# Patient Record
Sex: FEMALE | Race: WHITE | NOT HISPANIC OR LATINO | Employment: FULL TIME | URBAN - METROPOLITAN AREA
[De-identification: names, ages, dates, MRNs, and addresses within clinical notes are randomized per-mention and may not be internally consistent; named-entity substitution may affect disease eponyms.]

---

## 2017-02-09 ENCOUNTER — TELEPHONE (OUTPATIENT)
Dept: FAMILY MEDICINE | Facility: CLINIC | Age: 47
End: 2017-02-09

## 2017-04-05 ENCOUNTER — MYC MEDICAL ADVICE (OUTPATIENT)
Dept: FAMILY MEDICINE | Facility: CLINIC | Age: 47
End: 2017-04-05

## 2017-09-21 ENCOUNTER — OFFICE VISIT (OUTPATIENT)
Dept: FAMILY MEDICINE | Facility: CLINIC | Age: 47
End: 2017-09-21
Payer: COMMERCIAL

## 2017-09-21 ENCOUNTER — HOSPITAL ENCOUNTER (OUTPATIENT)
Dept: MAMMOGRAPHY | Facility: CLINIC | Age: 47
Discharge: HOME | End: 2017-09-21
Attending: FAMILY MEDICINE | Admitting: FAMILY MEDICINE
Payer: COMMERCIAL

## 2017-09-21 VITALS
HEIGHT: 65 IN | BODY MASS INDEX: 24.32 KG/M2 | WEIGHT: 146 LBS | HEART RATE: 100 BPM | SYSTOLIC BLOOD PRESSURE: 116 MMHG | DIASTOLIC BLOOD PRESSURE: 80 MMHG

## 2017-09-21 DIAGNOSIS — Z00.00 ROUTINE GENERAL MEDICAL EXAMINATION AT A HEALTH CARE FACILITY: Primary | ICD-10-CM

## 2017-09-21 DIAGNOSIS — I10 ESSENTIAL HYPERTENSION: ICD-10-CM

## 2017-09-21 DIAGNOSIS — Z12.31 VISIT FOR SCREENING MAMMOGRAM: ICD-10-CM

## 2017-09-21 DIAGNOSIS — Z30.41 ENCOUNTER FOR SURVEILLANCE OF CONTRACEPTIVE PILLS: ICD-10-CM

## 2017-09-21 LAB
ANION GAP SERPL CALCULATED.3IONS-SCNC: 7 MMOL/L (ref 3–14)
BUN SERPL-MCNC: 17 MG/DL (ref 7–30)
CALCIUM SERPL-MCNC: 8.9 MG/DL (ref 8.5–10.1)
CHLORIDE SERPL-SCNC: 102 MMOL/L (ref 94–109)
CO2 SERPL-SCNC: 25 MMOL/L (ref 20–32)
CREAT SERPL-MCNC: 0.84 MG/DL (ref 0.52–1.04)
GFR SERPL CREATININE-BSD FRML MDRD: 72 ML/MIN/1.7M2
GLUCOSE SERPL-MCNC: 85 MG/DL (ref 70–99)
POTASSIUM SERPL-SCNC: 3.8 MMOL/L (ref 3.4–5.3)
SODIUM SERPL-SCNC: 134 MMOL/L (ref 133–144)

## 2017-09-21 PROCEDURE — G0202 SCR MAMMO BI INCL CAD: HCPCS

## 2017-09-21 PROCEDURE — 36415 COLL VENOUS BLD VENIPUNCTURE: CPT | Performed by: FAMILY MEDICINE

## 2017-09-21 PROCEDURE — 80048 BASIC METABOLIC PNL TOTAL CA: CPT | Performed by: FAMILY MEDICINE

## 2017-09-21 PROCEDURE — 99396 PREV VISIT EST AGE 40-64: CPT | Performed by: FAMILY MEDICINE

## 2017-09-21 RX ORDER — LISINOPRIL 20 MG/1
20 TABLET ORAL DAILY
Qty: 90 TABLET | Refills: 3 | Status: SHIPPED | OUTPATIENT
Start: 2017-09-21 | End: 2018-07-30

## 2017-09-21 NOTE — PROGRESS NOTES
SUBJECTIVE:   CC: Desi Granado is an 47 year old woman who presents for preventive health visit.   Answers for HPI/ROS submitted by the patient on 9/20/2017   Annual Exam:  Getting at least 3 servings of Calcium per day:: NO  Bi-annual eye exam:: Yes  Dental care twice a year:: Yes  Sleep apnea or symptoms of sleep apnea:: None  Diet:: Regular (no restrictions)  Frequency of exercise:: 6-7 days/week  Taking medications regularly:: Yes  Medication side effects:: None  Additional concerns today:: No  PHQ-2 Score: 0  Duration of exercise:: Greater than 60 minutes        Today's PHQ-2 Score:   PHQ-2 ( 1999 [employer]) 9/20/2017 9/2/2016   Q1: Little interest or pleasure in doing things 0 -   Q2: Feeling down, depressed or hopeless 0 -   PHQ-2 Score 0 -   Q1: Little interest or pleasure in doing things Not at all Not at all   Q2: Feeling down, depressed or hopeless Not at all Not at all   PHQ-2 Score 0 0         Abuse: Current or Past(Physical, Sexual or Emotional)- No  Do you feel safe in your environment - Yes  Social History   Substance Use Topics     Smoking status: Never Smoker     Smokeless tobacco: Never Used     Alcohol use No      Comment: very rare     The patient does not drink >3 drinks per day nor >7 drinks per week.    Reviewed orders with patient.  Reviewed health maintenance and updated orders accordingly - Yes  BP Readings from Last 3 Encounters:   09/21/17 116/80   09/08/16 124/80   06/21/16 123/82    Wt Readings from Last 3 Encounters:   09/21/17 146 lb (66.2 kg)   09/08/16 150 lb 1.6 oz (68.1 kg)   12/08/15 155 lb (70.3 kg)                  Patient Active Problem List   Diagnosis     Allergic rhinitis     Injury, other and unspecified, knee, leg, ankle, and foot     Family history of other cardiovascular diseases     Essential hypertension, benign     FAMILY HISTORY OF ENDOCRINE DISEASE( other endo or metabol)     Papanicolaou smear of cervix with atypical squamous cells of undetermined  significance (ASC-US)     CARDIOVASCULAR SCREENING; LDL GOAL LESS THAN 160     Past Surgical History:   Procedure Laterality Date     BIOPSY       BREAST LUMPECTOMY, RT/LT  6/23/10    Breast Lumpectomy RT for likely adenomatous lumps     ESOPHAGOSCOPY, GASTROSCOPY, DUODENOSCOPY (EGD), COMBINED N/A 10/1/2015    Procedure: COMBINED ESOPHAGOSCOPY, GASTROSCOPY, DUODENOSCOPY (EGD);  Surgeon: Vijay Almanza MD;  Location: [facility]     SOFT TISSUE SURGERY      Ankle surgery 20+ years ago     SURGICAL HISTORY OF -   1989    arthroscopy of Left Ankle     SURGICAL HISTORY OF -   1990    arthroscopy of Left Ankle     SURGICAL HISTORY OF -   1993    4 wisdom teeth extracted       Social History   Substance Use Topics     Smoking status: Never Smoker     Smokeless tobacco: Never Used     Alcohol use No      Comment: very rare     Family History   Problem Relation Age of Onset     Hypertension Mother      Allergies Mother      Thyroid Disease Mother      Taking thyroid medication     Obesity Mother      CEREBROVASCULAR DISEASE Mother      TIA Feb 2017     Hypertension Father      Thyroid Disease Father      two parathyroids removed     CANCER Maternal Grandmother      Bone CA     Allergies Maternal Grandmother      Circulatory Maternal Grandmother      CEREBROVASCULAR DISEASE Maternal Grandmother      Other Cancer Maternal Grandmother      multiple myeloma     C.A.D. Maternal Grandfather      Respiratory Maternal Grandfather      asthma     Allergies Maternal Grandfather      CEREBROVASCULAR DISEASE Maternal Grandfather      Alzheimer Disease Paternal Grandfather      Neurologic Disorder Paternal Grandfather      Parkinsons     Arthritis Paternal Grandmother      Respiratory Other      Emphysema     DIABETES Other      Asthma Other      generic emphysema     Genetic Disorder Other      genetic emphysema     DIABETES Other      Coronary Artery Disease Other      Other Cancer Other      multiple myeloma     Colon Cancer Other       Cancer - colorectal No family hx of      Breast Cancer No family hx of      Prostate Cancer No family hx of          Current Outpatient Prescriptions   Medication Sig Dispense Refill     lisinopril (PRINIVIL/ZESTRIL) 20 MG tablet Take 1 tablet (20 mg) by mouth daily 90 tablet 3     norgestrel-ethinyl estradiol (LO/OVRAL, 28,) 0.3-30 MG-MCG per tablet Take 1 tablet by mouth daily 84 tablet 4     IBUPROFEN PO AS NEEDED       ZYRTEC 10 MG OR TABS 1 TABLET DAILY 90 3     [DISCONTINUED] lisinopril (PRINIVIL,ZESTRIL) 20 MG tablet Take 1 tablet (20 mg) by mouth daily 90 tablet 3     [DISCONTINUED] norgestrel-ethinyl estradiol (LO/OVRAL, 28,) 0.3-30 MG-MCG per tablet Take 1 tablet by mouth daily 84 tablet 4     [DISCONTINUED] LO/OVRAL, 28, 0.3-30 MG-MCG per tablet Take 1 tablet by mouth daily HAM 84 tablet 3           Patient under age 50, mutual decision reflected in health maintenance.        Pertinent mammograms are reviewed under the imaging tab.Lab Results   Component Value Date    PAP NIL 09/08/2016    PAP NIL 08/14/2013    PAP NIL 08/16/2010     History of abnormal Pap smear: NO - age 30-65 PAP every 5 years with negative HPV co-testing recommended    Reviewed and updated as needed this visit by clinical staff  Tobacco  Allergies  Meds  Med Hx  Surg Hx  Fam Hx  Soc Hx        Reviewed and updated as needed this visit by Provider              ROS:  C: NEGATIVE for fever, chills, change in weight  I: NEGATIVE for worrisome rashes, moles or lesions  E: NEGATIVE for vision changes or irritation  ENT: NEGATIVE for ear, mouth and throat problems  R: NEGATIVE for significant cough or SOB  B: NEGATIVE for masses, tenderness or discharge  CV: NEGATIVE for chest pain, palpitations or peripheral edema  GI: NEGATIVE for nausea, abdominal pain, heartburn, or change in bowel habits  : NEGATIVE for unusual urinary or vaginal symptoms. Periods are regular.  M: NEGATIVE for significant arthralgias or myalgia  N: NEGATIVE for  "weakness, dizziness or paresthesias  P: NEGATIVE for changes in mood or affect    OBJECTIVE:   /84  Pulse 100  Ht 5' 5\" (1.651 m)  Wt 146 lb (66.2 kg)  BMI 24.3 kg/m2  EXAM:  GENERAL: healthy, alert and no distress  EYES: Eyes grossly normal to inspection, PERRL and conjunctivae and sclerae normal  HENT: ear canals and TM's normal, nose and mouth without ulcers or lesions  NECK: no adenopathy, no asymmetry, masses, or scars and thyroid normal to palpation  RESP: lungs clear to auscultation - no rales, rhonchi or wheezes  BREAST: normal without masses, tenderness or nipple discharge and no palpable axillary masses or adenopathy  CV: regular rate and rhythm, normal S1 S2, no S3 or S4, no murmur, click or rub, no peripheral edema and peripheral pulses strong  ABDOMEN: soft, nontender, no hepatosplenomegaly, no masses and bowel sounds normal  MS: no gross musculoskeletal defects noted, no edema  SKIN: no suspicious lesions or rashes  NEURO: Normal strength and tone, mentation intact and speech normal  PSYCH: mentation appears normal, affect normal/bright    ASSESSMENT/PLAN:   1. Routine general medical examination at a health care facility      2. Essential hypertension  Well controlled   - BASIC METABOLIC PANEL  - lisinopril (PRINIVIL/ZESTRIL) 20 MG tablet; Take 1 tablet (20 mg) by mouth daily  Dispense: 90 tablet; Refill: 3    3. Encounter for surveillance of contraceptive pills    - norgestrel-ethinyl estradiol (LO/OVRAL, 28,) 0.3-30 MG-MCG per tablet; Take 1 tablet by mouth daily  Dispense: 84 tablet; Refill: 4    COUNSELING:   Reviewed preventive health counseling, as reflected in patient instructions         reports that she has never smoked. She has never used smokeless tobacco.    Estimated body mass index is 24.3 kg/(m^2) as calculated from the following:    Height as of this encounter: 5' 5\" (1.651 m).    Weight as of this encounter: 146 lb (66.2 kg).         Counseling Resources:  ATP IV " Guidelines  Pooled Cohorts Equation Calculator  Breast Cancer Risk Calculator  FRAX Risk Assessment  ICSI Preventive Guidelines  Dietary Guidelines for Americans, 2010  myThings's MyPlate  ASA Prophylaxis  Lung CA Screening    Pearl Grover MD  [facility]

## 2017-09-21 NOTE — MR AVS SNAPSHOT
After Visit Summary   9/21/2017    Desi Granado    MRN: [id number]           Patient Information     Date Of Birth          1970        Visit Information        Provider Department      9/21/2017 8:00 AM Pearl Grover MD [facility]        Today's Diagnoses     Routine general medical examination at a health care facility    -  1    Essential hypertension        Encounter for surveillance of contraceptive pills          Care Instructions      Preventive Health Recommendations  Female Ages 40 to 49    Yearly exam:     See your health care provider every year in order to  1. Review health changes.   2. Discuss preventive care.    3. Review your medicines if your doctor prescribed any.      Get a Pap test every three years (unless you have an abnormal result and your provider advises testing more often).      If you get Pap tests with HPV test, you only need to test every 5 years, unless you have an abnormal result. You do not need a Pap test if your uterus was removed (hysterectomy) and you have not had cancer.      You should be tested each year for STDs (sexually transmitted diseases), if you're at risk.       Ask your doctor if you should have a mammogram.      Have a colonoscopy (test for colon cancer) if someone in your family has had colon cancer or polyps before age 50.       Have a cholesterol test every 5 years.       Have a diabetes test (fasting glucose) after age 45. If you are at risk for diabetes, you should have this test every 3 years.    Shots: Get a flu shot each year. Get a tetanus shot every 10 years.     Nutrition:     Eat at least 5 servings of fruits and vegetables each day.    Eat whole-grain bread, whole-wheat pasta and brown rice instead of white grains and rice.    Talk to your provider about Calcium and Vitamin D.     Lifestyle    Exercise at least 150 minutes a week (an average of 30 minutes a day, 5 days a week). This will help you control your weight  "and prevent disease.    Limit alcohol to one drink per day.    No smoking.     Wear sunscreen to prevent skin cancer.    See your dentist every six months for an exam and cleaning.          Follow-ups after your visit        Who to contact     Normal or non-critical lab and imaging results will be communicated to you by [employer], letter or phone within 4 business days after the clinic has received the results. If you do not hear from us within 7 days, please contact the clinic through [facility] or phone. If you have a critical or abnormal lab result, we will notify you by phone as soon as possible.  Submit refill requests through [facility] or call your pharmacy and they will forward the refill request to us. Please allow 3 business days for your refill to be completed.          If you need to speak with a  for additional information , please call: [phone]             Additional Information About Your Visit        [facility] Information     [facility] gives you secure access to your electronic health record. If you see a primary care provider, you can also send messages to your care team and make appointments. If you have questions, please call your primary care clinic.  If you do not have a primary care provider, please call [phone] and they will assist you.        Care EveryWhere ID     This is your Care EveryWhere ID. This could be used by other organizations to access your [facility] medical records  [id number]        Your Vitals Were     Pulse Height BMI (Body Mass Index)             100 5' 5\" (1.651 m) 24.3 kg/m2          Blood Pressure from Last 3 Encounters:   09/21/17 116/80   09/08/16 124/80   06/21/16 123/82    Weight from Last 3 Encounters:   09/21/17 146 lb (66.2 kg)   09/08/16 150 lb 1.6 oz (68.1 kg)   12/08/15 155 lb (70.3 kg)              We Performed the Following     BASIC METABOLIC PANEL          Where to get your medicines      These medications were sent to [facility], " [facility], [place] - [address] AT [facility]  [address], [place] [address]     Phone:  [phone]     lisinopril 20 MG tablet    norgestrel-ethinyl estradiol 0.3-30 MG-MCG per tablet          Primary Care Provider Office Phone # Fax #    Pearledmundo Grover [phone] [address] [facility] [address]        Equal Access to Services     [facility] Center[facility] : Hadii aad ku hadasho Soomaali, waaxda luqadaha, qaybta kaalmada adeegyada, waxay idiin hayaan jd mathewaraaidan vargas . So [facility] [phone].    ATENCIÓN: Si habla español, tiene a caldwell disposición servicios gratuitos de asistencia lingüística. [facility] [phone].    We comply with applicable federal civil rights laws and [place] laws. We do not discriminate on the basis of race, color, national origin, age, disability sex, sexual orientation or gender identity.            Thank you!     Thank you for choosing [facility]3]  for your care. Our goal is always to provide you with excellent care. Hearing back from our patients is one way we can continue to improve our services. Please take a few minutes to complete the written survey that you may receive in the mail after your visit with us. Thank you!             Your Updated Medication List - Protect others around you: Learn how to safely use, store and throw away your medicines at www.disposemymeds.org.          This list is accurate as of: 9/21/17 11:14 AM.  Always use your most recent med list.                   Brand Name Dispense Instructions for use Diagnosis    IBUPROFEN PO      AS NEEDED        lisinopril 20 MG tablet    PRINIVIL/ZESTRIL    90 tablet    Take 1 tablet (20 mg) by mouth daily    Essential hypertension       norgestrel-ethinyl estradiol 0.3-30 MG-MCG per tablet    LO/OVRAL (28)    84 tablet    Take 1 tablet by mouth daily    Encounter for surveillance of contraceptive pills       ZYRTEC 10 MG tablet   Generic drug:  cetirizine      90    1 TABLET DAILY    Allergic rhinitis, cause unspecified

## 2017-09-25 NOTE — PROGRESS NOTES
Desi,  Your lab results were normal/stable. Please feel free to my chart or call the office with questions. Pearl Grover M.D.

## 2017-10-02 ENCOUNTER — HOSPITAL ENCOUNTER (OUTPATIENT)
Dept: MAMMOGRAPHY | Facility: CLINIC | Age: 47
Discharge: HOME | End: 2017-10-02
Attending: FAMILY MEDICINE | Admitting: FAMILY MEDICINE
Payer: COMMERCIAL

## 2017-10-02 DIAGNOSIS — R92.8 ABNORMAL MAMMOGRAM: ICD-10-CM

## 2017-10-02 PROCEDURE — G0206 DX MAMMO INCL CAD UNI: HCPCS

## 2017-10-26 ENCOUNTER — MYC MEDICAL ADVICE (OUTPATIENT)
Dept: FAMILY MEDICINE | Facility: CLINIC | Age: 47
End: 2017-10-26

## 2017-10-26 DIAGNOSIS — Z12.31 ENCOUNTER FOR SCREENING MAMMOGRAM FOR BREAST CANCER: Primary | ICD-10-CM

## 2018-04-05 ENCOUNTER — HOSPITAL ENCOUNTER (OUTPATIENT)
Dept: MAMMOGRAPHY | Facility: CLINIC | Age: 48
Discharge: HOME | End: 2018-04-05
Attending: FAMILY MEDICINE | Admitting: FAMILY MEDICINE
Payer: COMMERCIAL

## 2018-04-05 DIAGNOSIS — R92.1 BREAST CALCIFICATION, LEFT: ICD-10-CM

## 2018-04-05 PROCEDURE — 77065 DX MAMMO INCL CAD UNI: CPT | Mod: LT

## 2018-06-01 ENCOUNTER — HOSPITAL ENCOUNTER (OUTPATIENT)
Dept: CT IMAGING | Facility: CLINIC | Age: 48
Discharge: HOME | End: 2018-06-01
Attending: INTERNAL MEDICINE | Admitting: INTERNAL MEDICINE
Payer: COMMERCIAL

## 2018-06-01 ENCOUNTER — OFFICE VISIT (OUTPATIENT)
Dept: FAMILY MEDICINE | Facility: CLINIC | Age: 48
End: 2018-06-01
Payer: COMMERCIAL

## 2018-06-01 ENCOUNTER — TELEPHONE (OUTPATIENT)
Dept: FAMILY MEDICINE | Facility: CLINIC | Age: 48
End: 2018-06-01

## 2018-06-01 VITALS
HEART RATE: 114 BPM | BODY MASS INDEX: 25.44 KG/M2 | DIASTOLIC BLOOD PRESSURE: 87 MMHG | SYSTOLIC BLOOD PRESSURE: 126 MMHG | WEIGHT: 149 LBS | HEIGHT: 64 IN | OXYGEN SATURATION: 99 % | TEMPERATURE: 98.1 F

## 2018-06-01 DIAGNOSIS — R10.32 LLQ PAIN: ICD-10-CM

## 2018-06-01 DIAGNOSIS — Z87.19 HISTORY OF DIVERTICULITIS: ICD-10-CM

## 2018-06-01 DIAGNOSIS — K57.92 ACUTE DIVERTICULITIS: Primary | ICD-10-CM

## 2018-06-01 DIAGNOSIS — Z12.11 SPECIAL SCREENING FOR MALIGNANT NEOPLASMS, COLON: ICD-10-CM

## 2018-06-01 DIAGNOSIS — K57.92 ACUTE DIVERTICULITIS: ICD-10-CM

## 2018-06-01 LAB
ALBUMIN SERPL-MCNC: 3.5 G/DL (ref 3.4–5)
ALP SERPL-CCNC: 82 U/L (ref 40–150)
ALT SERPL W P-5'-P-CCNC: 15 U/L (ref 0–50)
ANION GAP SERPL CALCULATED.3IONS-SCNC: 8 MMOL/L (ref 3–14)
AST SERPL W P-5'-P-CCNC: 13 U/L (ref 0–45)
BASOPHILS # BLD AUTO: 0 10E9/L (ref 0–0.2)
BASOPHILS NFR BLD AUTO: 0.1 %
BILIRUB SERPL-MCNC: 0.7 MG/DL (ref 0.2–1.3)
BUN SERPL-MCNC: 9 MG/DL (ref 7–30)
CALCIUM SERPL-MCNC: 8.8 MG/DL (ref 8.5–10.1)
CHLORIDE SERPL-SCNC: 96 MMOL/L (ref 94–109)
CO2 SERPL-SCNC: 26 MMOL/L (ref 20–32)
CREAT SERPL-MCNC: 0.75 MG/DL (ref 0.52–1.04)
DIFFERENTIAL METHOD BLD: ABNORMAL
EOSINOPHIL # BLD AUTO: 0 10E9/L (ref 0–0.7)
EOSINOPHIL NFR BLD AUTO: 0.2 %
ERYTHROCYTE [DISTWIDTH] IN BLOOD BY AUTOMATED COUNT: 12.3 % (ref 10–15)
GFR SERPL CREATININE-BSD FRML MDRD: 82 ML/MIN/1.7M2
GLUCOSE SERPL-MCNC: 109 MG/DL (ref 70–99)
HCT VFR BLD AUTO: 38.6 % (ref 35–47)
HGB BLD-MCNC: 13.2 G/DL (ref 11.7–15.7)
LYMPHOCYTES # BLD AUTO: 0.7 10E9/L (ref 0.8–5.3)
LYMPHOCYTES NFR BLD AUTO: 4.9 %
MCH RBC QN AUTO: 30.7 PG (ref 26.5–33)
MCHC RBC AUTO-ENTMCNC: 34.2 G/DL (ref 31.5–36.5)
MCV RBC AUTO: 90 FL (ref 78–100)
MONOCYTES # BLD AUTO: 1.1 10E9/L (ref 0–1.3)
MONOCYTES NFR BLD AUTO: 7.2 %
NEUTROPHILS # BLD AUTO: 13 10E9/L (ref 1.6–8.3)
NEUTROPHILS NFR BLD AUTO: 87.6 %
PLATELET # BLD AUTO: 289 10E9/L (ref 150–450)
POTASSIUM SERPL-SCNC: 3.7 MMOL/L (ref 3.4–5.3)
PROT SERPL-MCNC: 7.5 G/DL (ref 6.8–8.8)
RBC # BLD AUTO: 4.3 10E12/L (ref 3.8–5.2)
SODIUM SERPL-SCNC: 130 MMOL/L (ref 133–144)
WBC # BLD AUTO: 14.9 10E9/L (ref 4–11)

## 2018-06-01 PROCEDURE — 74177 CT ABD & PELVIS W/CONTRAST: CPT

## 2018-06-01 PROCEDURE — 36415 COLL VENOUS BLD VENIPUNCTURE: CPT | Performed by: INTERNAL MEDICINE

## 2018-06-01 PROCEDURE — 99214 OFFICE O/P EST MOD 30 MIN: CPT | Performed by: INTERNAL MEDICINE

## 2018-06-01 PROCEDURE — 80053 COMPREHEN METABOLIC PANEL: CPT | Performed by: INTERNAL MEDICINE

## 2018-06-01 PROCEDURE — 85025 COMPLETE CBC W/AUTO DIFF WBC: CPT | Performed by: INTERNAL MEDICINE

## 2018-06-01 PROCEDURE — 25000128 H RX IP 250 OP 636: Performed by: RADIOLOGY

## 2018-06-01 PROCEDURE — 25000125 ZZHC RX 250: Performed by: RADIOLOGY

## 2018-06-01 RX ORDER — CIPROFLOXACIN 500 MG/1
500 TABLET, FILM COATED ORAL 2 TIMES DAILY
Qty: 20 TABLET | Refills: 0 | Status: SHIPPED | OUTPATIENT
Start: 2018-06-01 | End: 2018-08-13

## 2018-06-01 RX ORDER — CIPROFLOXACIN 500 MG/1
500 TABLET, FILM COATED ORAL 2 TIMES DAILY
Qty: 20 TABLET | Refills: 0 | Status: SHIPPED | OUTPATIENT
Start: 2018-06-01 | End: 2018-06-01

## 2018-06-01 RX ORDER — IOPAMIDOL 755 MG/ML
72 INJECTION, SOLUTION INTRAVASCULAR ONCE
Status: COMPLETED | OUTPATIENT
Start: 2018-06-01 | End: 2018-06-01

## 2018-06-01 RX ORDER — METRONIDAZOLE 500 MG/1
500 TABLET ORAL 2 TIMES DAILY
Qty: 20 TABLET | Refills: 0 | Status: SHIPPED | OUTPATIENT
Start: 2018-06-01 | End: 2018-06-01

## 2018-06-01 RX ORDER — ONDANSETRON 4 MG/1
4-8 TABLET, FILM COATED ORAL EVERY 8 HOURS PRN
Qty: 10 TABLET | Refills: 0 | Status: SHIPPED | OUTPATIENT
Start: 2018-06-01 | End: 2018-06-01

## 2018-06-01 RX ORDER — HYDROCODONE BITARTRATE AND ACETAMINOPHEN 5; 325 MG/1; MG/1
1 TABLET ORAL EVERY 6 HOURS PRN
Qty: 8 TABLET | Refills: 0 | Status: SHIPPED | OUTPATIENT
Start: 2018-06-01 | End: 2018-08-13

## 2018-06-01 RX ORDER — METRONIDAZOLE 500 MG/1
500 TABLET ORAL 2 TIMES DAILY
Qty: 20 TABLET | Refills: 0 | Status: SHIPPED | OUTPATIENT
Start: 2018-06-01 | End: 2018-08-13

## 2018-06-01 RX ORDER — ONDANSETRON 4 MG/1
4-8 TABLET, FILM COATED ORAL EVERY 8 HOURS PRN
Qty: 10 TABLET | Refills: 0 | Status: SHIPPED | OUTPATIENT
Start: 2018-06-01 | End: 2018-08-13

## 2018-06-01 RX ADMIN — IOPAMIDOL 72 ML: 755 INJECTION, SOLUTION INTRAVENOUS at 11:23

## 2018-06-01 RX ADMIN — SODIUM CHLORIDE 58 ML: 9 INJECTION, SOLUTION INTRAVENOUS at 11:23

## 2018-06-01 NOTE — TELEPHONE ENCOUNTER
[facility] calling to see if we can send the 4 medications to them. He states he tried calling the pharmacy they were sent to and they said he would need new prescriptions.     [facility]

## 2018-06-01 NOTE — PATIENT INSTRUCTIONS
Diet for Diverticular Disease  What is diverticular disease?   When the inner wall of your colon weakens and forms small pouches, you have diverticulosis. For most people, this causes no symptoms.   If the pouches become inflamed or infected, you have diverticulitis. Warning signs may include pain in the lower abdomen, chills and vomiting (throwing up).  These conditions are called diverticular disease.  What causes it?  The cause has been linked to many years of eating too little fiber. People who eat plenty of whole grains, fresh fruits and vegetables are less likely to get this disease.   Once the pouches have formed, there is no way to reverse them.   How much fiber should I get?  If you're healing from diverticulitis or surgery to remove the inflamed area, you will at first follow a low-fiber diet (less than 10 grams each day). Then, as your doctor advises, you may slowly add fiber. Increase your intake by 1 to 2 grams a day. Your goal will be 25 to 30 grams a day.   To avoid future problems, continue to get 25 to 30 grams of fiber each day.   How can fiber help?   A high-fiber diet will help you have regular bowel movements. Fiber adds bulk to the stool and helps it pass more easily. Fiber also helps prevent the pouches from growing larger or getting infected.  In the past, doctors have warned patients to avoid eating nuts, seeds and popcorn. They believed these foods could get caught in the pouches and inflame them. But recent studies do not support this idea.   Please ask your dietitian for the handout Fiber Content in Common Foods. It will show you how to get more fiber in your diet.  For informational purposes only. Not to replace the advice of your health care provider.   Copyright   2007 [employer] [facility]. All rights reserved. [employer] [id number] - REV 09/15.    Diverticulitis    Some people get pouches along the wall of the colon as they get older. The pouches, called diverticuli, usually cause no  symptoms. If the pouches become blocked, you can get an infection. This infection is called diverticulitis. It causes pain in your lower abdomen and fever. If not treated, it can become a serious condition, causing an abscess to form inside the pouch. The abscess may block the intestinal tract even or rupture, spreading infection throughout the abdomen.  When treatment is started early, oral antibiotics alone may be enough to cure diverticulitis. This method is tried first. But, if you don't improve or if your condition gets worse while using oral antibiotics, you may need to be admitted to the hospital for IV antibiotics. Severe cases may require surgery.  Home care  The following guidelines will help you care for yourself at home:    During the acute illness, rest and follow your healthcare provider's instructions about diet. Sometimes you will need to follow a clear liquid diet to rest your bowel. Once your symptoms are better, you may be told to follow a low-fiber diet for some time. Include foods like:  ? Flake cereal, mashed potatoes, pancakes, waffles, pasta, white bread, rice, applesauce, bananas, eggs, fish, poultry, tofu, and cooked soft vegetables    Take antibiotics exactly as instructed. Don't miss any doses or stop taking the medication, even if you feel better.    Monitor your temperature and tell your healthcare provider if you have rising temperatures.  Preventing future attacks  Once you have an episode of diverticulitis, you are at risk for having it again. After you have recovered from this episode, you may be able to lower your risk by eating a high-fiber diet (20 gm/day to 35 gm/day of fiber). This cleans out the colon pouches that already exist and may prevent new ones from forming. Foods high in fiber include fresh fruits and edible peelings, raw or lightly cooked vegetables, whole grain cereals and breads, dried beans and peas, and bran.  Other steps that can help prevent future attacks  include:    Take your medicines, such as antibiotics, as your healthcare provider says.    Drink 6 to 8 glasses of water every day, unless told otherwise.    Use a heating pad or hot water bottle to help abdominal cramping or pain.    Begin an exercise program. Ask your healthcare provider how to get started. You can benefit from simple activities such as walking or gardening.    Treat diarrhea with a bland diet. Start with liquids only; then slowly add fiber over time.    Watch for changes in your bowel movements (constipation to diarrhea). Avoid constipation by eating a high fiber diet and taking a stool softener if needed.    Get plenty of rest and sleep.  Follow-up care  Follow up with your healthcare provider as advised or sooner if you are not getting better in the next 2 days.  When to seek medical advice  Call your healthcare provider right away if any of these occur:    Fever of 100.4 F (38 C) or higher, or as directed by your healthcare provider    Repeated vomiting or swelling of the abdomen    Weakness, dizziness, light-headedness    Pain in your abdomen that gets worse, severe, or spreads to your back    Pain that moves to the right lower abdomen    Rectal bleeding (stools that are red, black or maroon color)    Unexpected vaginal bleeding  Date Last Reviewed: 9/1/2016 2000-2017 [employer]. [address] [place] [address], [place] [address]. All rights reserved. This information is not intended as a substitute for professional medical care. Always follow your healthcare professional's instructions.

## 2018-06-01 NOTE — TELEPHONE ENCOUNTER
Pt was in today and requested refills be sent to [facility] in [facility]. They were sent to a mail order pharmacy instead. She requested they be transferred by us to [facility] and cancel mail order scripts.   I told her I would send her request to a nurse and call her back. She is at pharmacy. I spoke with an RN and was told patient could ask [facility] to have scripts transferred to them by contacting mail order pharmacy. I tried to call patient right back but got a voicemail so left message with this information.  Sosa Espinoza  CSS

## 2018-06-01 NOTE — TELEPHONE ENCOUNTER
RXs transferred to [facility] - except [place].  Patient reports she has this hard copy with her and will bring to pharmacy.  Heena ZAMORA RN

## 2018-06-01 NOTE — PROGRESS NOTES
SUBJECTIVE:   Desi Granado is a 47 year old female who presents to clinic today for the following health issues:      Father with history diverticulitis    ABDOMINAL PAIN     Onset: 3 days    Description:   Character: Cramping  Location: left lower quadrant  Radiation: None    Intensity: Current 0/10, At worst 7/10     Progression of Symptoms:  same and intermittent    Accompanying Signs & Symptoms:  Fever/Chills?: no   Gas/Bloating: YES  Nausea: YES  Vomitting: no   Diarrhea?: YES  Constipation:no   Dysuria or Hematuria: no    History:   Trauma: no   Previous similar pain: no    Previous tests done: none    Precipitating factors:   Does the pain change with:     Food: YES     BM: YES    Urination: no     Alleviating factors:  na    Therapies Tried and outcome: na    LMP:  not applicable     --started 5/29 PM after eating at pizza buffet which she doesn't normally  --5/30 and 5/31 started with mild diarrhea (3-4 small amounts of watery stool) - thought had stomach flu  --overnight 5/30 LLQ pain woke her up at night  --restricted diet to fluids, bland food, which helped the pain  --then last night had increase in pain, again keeping her up at night  --NPO since last night, so had some crackers this AM and that helped.      Current Outpatient Prescriptions   Medication Sig Dispense Refill     Fluticasone Propionate (FLONASE ALLERGY RELIEF NA)        IBUPROFEN PO AS NEEDED       lisinopril (PRINIVIL/ZESTRIL) 20 MG tablet Take 1 tablet (20 mg) by mouth daily 90 tablet 3     norgestrel-ethinyl estradiol (LO/OVRAL, 28,) 0.3-30 MG-MCG per tablet Take 1 tablet by mouth daily 84 tablet 4     ZYRTEC 10 MG OR TABS 1 TABLET DAILY 90 3       Reviewed and updated as needed this visit by clinical staff  Tobacco  Allergies  Meds  Problems  Med Hx  Surg Hx  Fam Hx  Soc Hx        Reviewed and updated as needed this visit by Provider  Allergies  Meds  Problems         ROS:  Constitutional, HEENT, cardiovascular,  "pulmonary, gi and gu systems are negative, except as otherwise noted.    OBJECTIVE:     /87 (BP Location: Right arm, Patient Position: Sitting, Cuff Size: Adult Regular)  Pulse 114  Temp 98.1  F (36.7  C) (Tympanic)  Ht 5' 4.17\" (1.63 m)  Wt 149 lb (67.6 kg)  SpO2 99%  Breastfeeding? No  BMI 25.44 kg/m2  Body mass index is 25.44 kg/(m^2).  GENERAL APPEARANCE: healthy, alert and no distress  HENT: MMM  NECK: no adenopathy, no asymmetry, masses, or scars and thyroid normal to palpation  RESP: lungs clear to auscultation - no rales, rhonchi or wheezes  CV: regular rates and rhythm, normal S1 S2, no S3 or S4 and no murmur, click or rub  ABDOMEN: soft, moderate left lower quadrant pain with palpation, without rebound or guarding.  Active bowel sounds.  No organomegaly.    Diagnostic Test Results:  Results for orders placed or performed in visit on 06/01/18 (from the past 24 hour(s))   CT Abdomen Pelvis w Contrast    Narrative    CT ABDOMEN AND PELVIS WITH CONTRAST   6/1/2018 11:33 AM     HISTORY: LLQ pain.    TECHNIQUE: CT abdomen and pelvis with 72 mL Isovue-370 IV. Radiation  dose for this scan was reduced using automated exposure control,  adjustment of the mA and/or kV according to patient size, or iterative  reconstruction technique.    COMPARISON: None.    FINDINGS:  Abdomen: The lung bases are unremarkable. The liver, spleen,  gallbladder, pancreas, adrenal glands and kidneys are normal in  appearance. There is no abdominal or pelvic lymph node enlargement.    Pelvis: There are sigmoid diverticula. A segment of sigmoid colon in  the left pelvis is thick-walled and surrounded by inflammation  consistent with acute diverticulitis. There is no perforation. No  focal fluid collection to suggest abscess. Small amount of free fluid  in the pelvis. No other bowel inflammation. No bowel obstruction. The  appendix is normal. There is a small anterior uterine fibroid. No  adnexal mass. No free intraperitoneal " gas.      Impression    IMPRESSION: Sigmoid diverticulitis. No perforation or abscess.    PARESH SANTACRUZ MD   Comprehensive metabolic panel   Result Value Ref Range    Sodium 130 (L) 133 - 144 mmol/L    Potassium 3.7 3.4 - 5.3 mmol/L    Chloride 96 94 - 109 mmol/L    Carbon Dioxide 26 20 - 32 mmol/L    Anion Gap 8 3 - 14 mmol/L    Glucose 109 (H) 70 - 99 mg/dL    Urea Nitrogen 9 7 - 30 mg/dL    Creatinine 0.75 0.52 - 1.04 mg/dL    GFR Estimate 82 >60 mL/min/1.7m2    GFR Estimate If Black >90 >60 mL/min/1.7m2    Calcium 8.8 8.5 - 10.1 mg/dL    Bilirubin Total 0.7 0.2 - 1.3 mg/dL    Albumin 3.5 3.4 - 5.0 g/dL    Protein Total 7.5 6.8 - 8.8 g/dL    Alkaline Phosphatase 82 40 - 150 U/L    ALT 15 0 - 50 U/L    AST 13 0 - 45 U/L   CBC with platelets and differential   Result Value Ref Range    WBC 14.9 (H) 4.0 - 11.0 10e9/L    RBC Count 4.30 3.8 - 5.2 10e12/L    Hemoglobin 13.2 11.7 - 15.7 g/dL    Hematocrit 38.6 35.0 - 47.0 %    MCV 90 78 - 100 fl    MCH 30.7 26.5 - 33.0 pg    MCHC 34.2 31.5 - 36.5 g/dL    RDW 12.3 10.0 - 15.0 %    Platelet Count 289 150 - 450 10e9/L    Diff Method Automated Method     % Neutrophils 87.6 %    % Lymphocytes 4.9 %    % Monocytes 7.2 %    % Eosinophils 0.2 %    % Basophils 0.1 %    Absolute Neutrophil 13.0 (H) 1.6 - 8.3 10e9/L    Absolute Lymphocytes 0.7 (L) 0.8 - 5.3 10e9/L    Absolute Monocytes 1.1 0.0 - 1.3 10e9/L    Absolute Eosinophils 0.0 0.0 - 0.7 10e9/L    Absolute Basophils 0.0 0.0 - 0.2 10e9/L       ASSESSMENT/PLAN:       ICD-10-CM    1. Acute diverticulitis K57.92 ciprofloxacin (CIPRO) 500 MG tablet     metroNIDAZOLE (FLAGYL) 500 MG tablet     ondansetron (ZOFRAN) 4 MG tablet     HYDROcodone-acetaminophen (NORCO) 5-325 MG per tablet   2. LLQ pain R10.32 Fluticasone Propionate (FLONASE ALLERGY RELIEF NA)     Comprehensive metabolic panel     CBC with platelets and differential     CT Abdomen Pelvis w Contrast   3. History of diverticulitis Z87.19 GASTROENTEROLOGY ADULT REF  PROCEDURE ONLY [facility] [phone]   4. Special screening for malignant neoplasms, colon Z12.11 GASTROENTEROLOGY ADULT REF PROCEDURE ONLY [facility] [phone]     Patient should have follow-up colonoscopy 8 weeks after this first initial episode of diverticulitis.    Shirley Rios,   [facility]

## 2018-06-01 NOTE — MR AVS SNAPSHOT
After Visit Summary   6/1/2018    Desi Granado    MRN: [id number]           Patient Information     Date Of Birth          1970        Visit Information        Provider Department      6/1/2018 10:00 AM Shirley Rios, DO [facility]        Today's Diagnoses     Acute diverticulitis    -  1    LLQ pain          Care Instructions    Diet for Diverticular Disease  What is diverticular disease?   When the inner wall of your colon weakens and forms small pouches, you have diverticulosis. For most people, this causes no symptoms.   If the pouches become inflamed or infected, you have diverticulitis. Warning signs may include pain in the lower abdomen, chills and vomiting (throwing up).  These conditions are called diverticular disease.  What causes it?  The cause has been linked to many years of eating too little fiber. People who eat plenty of whole grains, fresh fruits and vegetables are less likely to get this disease.   Once the pouches have formed, there is no way to reverse them.   How much fiber should I get?  If you're healing from diverticulitis or surgery to remove the inflamed area, you will at first follow a low-fiber diet (less than 10 grams each day). Then, as your doctor advises, you may slowly add fiber. Increase your intake by 1 to 2 grams a day. Your goal will be 25 to 30 grams a day.   To avoid future problems, continue to get 25 to 30 grams of fiber each day.   How can fiber help?   A high-fiber diet will help you have regular bowel movements. Fiber adds bulk to the stool and helps it pass more easily. Fiber also helps prevent the pouches from growing larger or getting infected.  In the past, doctors have warned patients to avoid eating nuts, seeds and popcorn. They believed these foods could get caught in the pouches and inflame them. But recent studies do not support this idea.   Please ask your dietitian for the handout Fiber Content in Common Foods. It  will show you how to get more fiber in your diet.  For informational purposes only. Not to replace the advice of your health care provider.   Copyright   2007 [employer]. All rights reserved. [employer] [id number] - REV 09/15.    Diverticulitis    Some people get pouches along the wall of the colon as they get older. The pouches, called diverticuli, usually cause no symptoms. If the pouches become blocked, you can get an infection. This infection is called diverticulitis. It causes pain in your lower abdomen and fever. If not treated, it can become a serious condition, causing an abscess to form inside the pouch. The abscess may block the intestinal tract even or rupture, spreading infection throughout the abdomen.  When treatment is started early, oral antibiotics alone may be enough to cure diverticulitis. This method is tried first. But, if you don't improve or if your condition gets worse while using oral antibiotics, you may need to be admitted to the hospital for IV antibiotics. Severe cases may require surgery.  Home care  The following guidelines will help you care for yourself at home:    During the acute illness, rest and follow your healthcare provider's instructions about diet. Sometimes you will need to follow a clear liquid diet to rest your bowel. Once your symptoms are better, you may be told to follow a low-fiber diet for some time. Include foods like:  ? Flake cereal, mashed potatoes, pancakes, waffles, pasta, white bread, rice, applesauce, bananas, eggs, fish, poultry, tofu, and cooked soft vegetables    Take antibiotics exactly as instructed. Don't miss any doses or stop taking the medication, even if you feel better.    Monitor your temperature and tell your healthcare provider if you have rising temperatures.  Preventing future attacks  Once you have an episode of diverticulitis, you are at risk for having it again. After you have recovered from this episode, you may be able to lower  your risk by eating a high-fiber diet (20 gm/day to 35 gm/day of fiber). This cleans out the colon pouches that already exist and may prevent new ones from forming. Foods high in fiber include fresh fruits and edible peelings, raw or lightly cooked vegetables, whole grain cereals and breads, dried beans and peas, and bran.  Other steps that can help prevent future attacks include:    Take your medicines, such as antibiotics, as your healthcare provider says.    Drink 6 to 8 glasses of water every day, unless told otherwise.    Use a heating pad or hot water bottle to help abdominal cramping or pain.    Begin an exercise program. Ask your healthcare provider how to get started. You can benefit from simple activities such as walking or gardening.    Treat diarrhea with a bland diet. Start with liquids only; then slowly add fiber over time.    Watch for changes in your bowel movements (constipation to diarrhea). Avoid constipation by eating a high fiber diet and taking a stool softener if needed.    Get plenty of rest and sleep.  Follow-up care  Follow up with your healthcare provider as advised or sooner if you are not getting better in the next 2 days.  When to seek medical advice  Call your healthcare provider right away if any of these occur:    Fever of 100.4 F (38 C) or higher, or as directed by your healthcare provider    Repeated vomiting or swelling of the abdomen    Weakness, dizziness, light-headedness    Pain in your abdomen that gets worse, severe, or spreads to your back    Pain that moves to the right lower abdomen    Rectal bleeding (stools that are red, black or maroon color)    Unexpected vaginal bleeding  Date Last Reviewed: 9/1/2016 2000-2017 [employer]. [address] [place] [address], [place] [address]. All rights reserved. This information is not intended as a substitute for professional medical care. Always follow your healthcare professional's instructions.                Follow-ups  "after your visit        Who to contact     If you have questions or need follow up information about today's clinic visit or your schedule please contact [facility] directly at [phone].  Normal or non-critical lab and imaging results will be communicated to you by MyChart, letter or phone within 4 business days after the clinic has received the results. If you do not hear from us within 7 days, please contact the clinic through [employer] or phone. If you have a critical or abnormal lab result, we will notify you by phone as soon as possible.  Submit refill requests through [employer] or call your pharmacy and they will forward the refill request to us. Please allow 3 business days for your refill to be completed.          Additional Information About Your Visit        [employer] Information     [employer] gives you secure access to your electronic health record. If you see a primary care provider, you can also send messages to your care team and make appointments. If you have questions, please call your primary care clinic.  If you do not have a primary care provider, please call [phone] and they will assist you.        Care EveryWhere ID     This is your Care EveryWhere ID. This could be used by other organizations to access your [facility] medical records  [id number]        Your Vitals Were     Pulse Temperature Height Pulse Oximetry Breastfeeding? BMI (Body Mass Index)    114 98.1  F (36.7  C) (Tympanic) 5' 4.17\" (1.63 m) 99% No 25.44 kg/m2       Blood Pressure from Last 3 Encounters:   06/01/18 126/87   09/21/17 116/80   09/08/16 124/80    Weight from Last 3 Encounters:   06/01/18 149 lb (67.6 kg)   09/21/17 146 lb (66.2 kg)   09/08/16 150 lb 1.6 oz (68.1 kg)              We Performed the Following     CBC with platelets and differential     Comprehensive metabolic panel     CT Abdomen Pelvis w Contrast          Today's Medication Changes          These changes are accurate as of 6/1/18 11:52 AM.  " If you have any questions, ask your nurse or doctor.               Start taking these medicines.        Dose/Directions    ciprofloxacin 500 MG tablet   Commonly known as:  CIPRO   Used for:  Acute diverticulitis   Started by:  Shirley Rios DO        Dose:  500 mg   Take 1 tablet (500 mg) by mouth 2 times daily   Quantity:  20 tablet   Refills:  0       HYDROcodone-acetaminophen 5-325 MG per tablet   Commonly known as:  NORCO   Used for:  Acute diverticulitis   Started by:  Shirley Rios DO        Dose:  1 tablet   Take 1 tablet by mouth every 6 hours as needed for pain   Quantity:  8 tablet   Refills:  0       metroNIDAZOLE 500 MG tablet   Commonly known as:  FLAGYL   Used for:  Acute diverticulitis   Started by:  Shirley Rios DO        Dose:  500 mg   Take 1 tablet (500 mg) by mouth 2 times daily   Quantity:  20 tablet   Refills:  0       ondansetron 4 MG tablet   Commonly known as:  ZOFRAN   Used for:  Acute diverticulitis   Started by:  Shirley Rios DO        Dose:  4-8 mg   Take 1-2 tablets (4-8 mg) by mouth every 8 hours as needed for nausea   Quantity:  10 tablet   Refills:  0            Where to get your medicines      [email or web address] [place], [place] - [address] [facility]  [address] [place] [address]     Phone:  [phone]     ciprofloxacin 500 MG tablet    metroNIDAZOLE 500 MG tablet    ondansetron 4 MG tablet         Some of these will need a paper prescription and others can be bought over the counter.  Ask your nurse if you have questions.     Bring a paper prescription for each of these medications     HYDROcodone-acetaminophen 5-325 MG per tablet               Information about OPIOIDS     PRESCRIPTION OPIOIDS: WHAT YOU NEED TO KNOW   You have a prescription for an opioid (narcotic) pain medicine. Opioids can cause addiction. If you have a history of chemical dependency of  any type, you are at a higher risk of becoming addicted to opioids. Only take this medicine after all other options have been tried. Take it for as short a time and as few doses as possible.     Do not:    Drive. If you drive while taking these medicines, you could be arrested for driving under the influence (DUI).    Operate heavy machinery    Do any other dangerous activities while taking these medicines.     Drink any alcohol while taking these medicines.      Take with any other medicines that contain acetaminophen. Read all labels carefully. Look for the word  acetaminophen  or  Tylenol.  Ask your pharmacist if you have questions or are unsure.    Store your pills in a secure place, locked if possible. We will not replace any lost or stolen medicine. If you don t finish your medicine, please throw away (dispose) as directed by your pharmacist. The [place] Pollution Control Agency has more information about safe disposal: https://www.pca.[facility].[place].us/living-green/managing-unwanted-medications    All opioids tend to cause constipation. Drink plenty of water and eat foods that have a lot of fiber, such as fruits, vegetables, prune juice, apple juice and high-fiber cereal. Take a laxative (Miralax, milk of magnesia, Colace, Senna) if you don t move your bowels at least every other day.          Primary Care Provider Office Phone # Fax #    Pearl Grover [phone] [address] [facility] [address]        Equal Access to Services     ZOILA PITTS : Hadgracie Tellez, waaxda bc, qaybta kaaljared lyn, roz shea. So [facility] [phone].    ATENCIÓN: Si habla español, tiene a caldwell disposición servicios gratuitos de asistencia lingüística. Llame al [phone].    We comply with applicable federal civil rights laws and [place] laws. We do not discriminate on the basis of race, color, national origin, age, disability, sex, sexual  orientation, or gender identity.            Thank you!     Thank you for choosing [facility]  for your care. Our goal is always to provide you with excellent care. Hearing back from our patients is one way we can continue to improve our services. Please take a few minutes to complete the written survey that you may receive in the mail after your visit with us. Thank you!             Your Updated Medication List - Protect others around you: Learn how to safely use, store and throw away your medicines at www.disposemymeds.org.          This list is accurate as of 6/1/18 11:52 AM.  Always use your most recent med list.                   Brand Name Dispense Instructions for use Diagnosis    ciprofloxacin 500 MG tablet    CIPRO    20 tablet    Take 1 tablet (500 mg) by mouth 2 times daily    Acute diverticulitis       FLONASE ALLERGY RELIEF NA       LLQ pain       HYDROcodone-acetaminophen 5-325 MG per tablet    NORCO    8 tablet    Take 1 tablet by mouth every 6 hours as needed for pain    Acute diverticulitis       IBUPROFEN PO      AS NEEDED        lisinopril 20 MG tablet    PRINIVIL/ZESTRIL    90 tablet    Take 1 tablet (20 mg) by mouth daily    Essential hypertension       metroNIDAZOLE 500 MG tablet    FLAGYL    20 tablet    Take 1 tablet (500 mg) by mouth 2 times daily    Acute diverticulitis       norgestrel-ethinyl estradiol 0.3-30 MG-MCG per tablet    LO/OVRAL (28)    84 tablet    Take 1 tablet by mouth daily    Encounter for surveillance of contraceptive pills       ondansetron 4 MG tablet    ZOFRAN    10 tablet    Take 1-2 tablets (4-8 mg) by mouth every 8 hours as needed for nausea    Acute diverticulitis       ZYRTEC 10 MG tablet   Generic drug:  cetirizine     90    1 TABLET DAILY    Allergic rhinitis, cause unspecified

## 2018-06-04 ENCOUNTER — MYC MEDICAL ADVICE (OUTPATIENT)
Dept: FAMILY MEDICINE | Facility: CLINIC | Age: 48
End: 2018-06-04

## 2018-06-05 NOTE — TELEPHONE ENCOUNTER
Okay to take Tums and a stool softener.  Could also try Gas-X for gas symptoms.  If the pain is still present 1 week after starting antibiotics, return to clinic

## 2018-06-05 NOTE — TELEPHONE ENCOUNTER
Patient notified of MD's recommendations   Patient verbalized understanding and agreed with MD's plan    Nalini ZAMORA Rn

## 2018-07-30 DIAGNOSIS — I10 ESSENTIAL HYPERTENSION: ICD-10-CM

## 2018-07-30 NOTE — TELEPHONE ENCOUNTER
"LISINOPRIL 20MG TABLETS        Last Written Prescription Date:  9/21/17  Last Fill Quantity: 90,   # refills: 3  Last Office Visit: 9/21/17  Future Office visit:       Requested Prescriptions   Pending Prescriptions Disp Refills     lisinopril (PRINIVIL/ZESTRIL) 20 MG tablet [Pharmacy Med Name: LISINOPRIL 20MG TABLETS] 90 tablet 0     Sig: TAKE 1 TABLET BY MOUTH DAILY    ACE Inhibitors (Including Combos) Protocol Passed    7/30/2018  5:34 PM       Passed - Blood pressure under 140/90 in past 12 months    BP Readings from Last 3 Encounters:   06/01/18 126/87   09/21/17 116/80   09/08/16 124/80                Passed - Recent (12 mo) or future (30 days) visit within the authorizing provider's specialty    Patient had office visit in the last 12 months or has a visit in the next 30 days with authorizing provider or within the authorizing provider's specialty.  See \"Patient Info\" tab in inbasket, or \"Choose Columns\" in Meds & Orders section of the refill encounter.           Passed - Patient is age 18 or older       Passed - No active pregnancy on record       Passed - Normal serum creatinine on file in past 12 months    Recent Labs   Lab Test  06/01/18   1138   CR  0.75            Passed - Normal serum potassium on file in past 12 months    Recent Labs   Lab Test  06/01/18   1138   POTASSIUM  3.7            Passed - No positive pregnancy test in past 12 months          "

## 2018-08-01 RX ORDER — LISINOPRIL 20 MG/1
20 TABLET ORAL DAILY
Qty: 30 TABLET | Refills: 0 | Status: SHIPPED | OUTPATIENT
Start: 2018-08-01 | End: 2018-08-03

## 2018-08-03 DIAGNOSIS — I10 ESSENTIAL HYPERTENSION: ICD-10-CM

## 2018-08-03 RX ORDER — LISINOPRIL 20 MG/1
20 TABLET ORAL DAILY
Qty: 90 TABLET | Refills: 1 | Status: SHIPPED | OUTPATIENT
Start: 2018-08-03 | End: 2018-09-14 | Stop reason: DRUGHIGH

## 2018-08-03 NOTE — TELEPHONE ENCOUNTER
Pharm is requesting for a 90day supply per INS    lisinopril (PRINIVIL/ZESTRIL) 20 MG tablet  Last Written Prescription Date:  8/1/18  Last Fill Quantity: 30,  # refills: 0   Last office visit: 6/1/2018 with prescribing provider:  6/1/18 [facility]   Future Office Visit:

## 2018-08-12 ENCOUNTER — ANESTHESIA EVENT (OUTPATIENT)
Dept: GASTROENTEROLOGY | Facility: CLINIC | Age: 48
End: 2018-08-12
Payer: COMMERCIAL

## 2018-08-12 NOTE — ANESTHESIA PREPROCEDURE EVALUATION
Anesthesia Evaluation     . Pt has had prior anesthetic. Type: MAC and General           ROS/MED HX    ENT/Pulmonary:     (+)allergic rhinitis, , . .    Neurologic:       Cardiovascular: Comment: History of long QT interval    (+) Dyslipidemia, hypertension----. : . . . :. . Previous cardiac testing date:results:date: results:ECG reviewed date:12-12-06 results:Sinus Rhythm   P:QRS - 1:1, Normal P axis, H Rate 98  -RSR(V1) -nondiagnostic .     PROBABLY NORMAL date: results:          METS/Exercise Tolerance:     Hematologic:         Musculoskeletal: Comment: Injury, other and unspecified, knee, leg, ankle, and foot         GI/Hepatic: Comment: hx diverticulitis        Renal/Genitourinary:         Endo:         Psychiatric:         Infectious Disease:         Malignancy:         Other:                                    Anesthesia Plan      History & Physical Review  History and physical reviewed and following examination; no interval change.    ASA Status:  2 .    NPO Status:  > 4 hours    Plan for MAC Reason for MAC:  Deep or markedly invasive procedure (G8)         Postoperative Care      Consents  Anesthetic plan, risks, benefits and alternatives discussed with:  Patient..                          .

## 2018-08-16 ENCOUNTER — HOSPITAL ENCOUNTER (OUTPATIENT)
Facility: CLINIC | Age: 48
Discharge: HOME | End: 2018-08-16
Attending: SURGERY | Admitting: SURGERY
Payer: COMMERCIAL

## 2018-08-16 ENCOUNTER — SURGERY (OUTPATIENT)
Age: 48
End: 2018-08-16

## 2018-08-16 ENCOUNTER — ANESTHESIA (OUTPATIENT)
Dept: GASTROENTEROLOGY | Facility: CLINIC | Age: 48
End: 2018-08-16
Payer: COMMERCIAL

## 2018-08-16 VITALS
SYSTOLIC BLOOD PRESSURE: 129 MMHG | HEIGHT: 65 IN | RESPIRATION RATE: 16 BRPM | BODY MASS INDEX: 24.16 KG/M2 | WEIGHT: 145 LBS | DIASTOLIC BLOOD PRESSURE: 81 MMHG | TEMPERATURE: 98.1 F | OXYGEN SATURATION: 96 %

## 2018-08-16 LAB
COLONOSCOPY: NORMAL
HCG UR QL: NEGATIVE

## 2018-08-16 PROCEDURE — 25000125 ZZHC RX 250: Performed by: NURSE ANESTHETIST, CERTIFIED REGISTERED

## 2018-08-16 PROCEDURE — 25000128 H RX IP 250 OP 636: Performed by: NURSE ANESTHETIST, CERTIFIED REGISTERED

## 2018-08-16 PROCEDURE — 81025 URINE PREGNANCY TEST: CPT | Performed by: NURSE ANESTHETIST, CERTIFIED REGISTERED

## 2018-08-16 PROCEDURE — 45378 DIAGNOSTIC COLONOSCOPY: CPT | Performed by: SURGERY

## 2018-08-16 PROCEDURE — 37000008 ZZH ANESTHESIA TECHNICAL FEE, 1ST 30 MIN: Performed by: SURGERY

## 2018-08-16 RX ORDER — SODIUM CHLORIDE, SODIUM LACTATE, POTASSIUM CHLORIDE, CALCIUM CHLORIDE 600; 310; 30; 20 MG/100ML; MG/100ML; MG/100ML; MG/100ML
INJECTION, SOLUTION INTRAVENOUS CONTINUOUS
Status: DISCONTINUED | OUTPATIENT
Start: 2018-08-16 | End: 2018-08-16 | Stop reason: HOSPADM

## 2018-08-16 RX ORDER — LIDOCAINE 40 MG/G
CREAM TOPICAL
Status: DISCONTINUED | OUTPATIENT
Start: 2018-08-16 | End: 2018-08-16 | Stop reason: HOSPADM

## 2018-08-16 RX ORDER — PROPOFOL 10 MG/ML
INJECTION, EMULSION INTRAVENOUS PRN
Status: DISCONTINUED | OUTPATIENT
Start: 2018-08-16 | End: 2018-08-16

## 2018-08-16 RX ORDER — LIDOCAINE HYDROCHLORIDE 10 MG/ML
INJECTION, SOLUTION INFILTRATION; PERINEURAL PRN
Status: DISCONTINUED | OUTPATIENT
Start: 2018-08-16 | End: 2018-08-16

## 2018-08-16 RX ORDER — ONDANSETRON 2 MG/ML
4 INJECTION INTRAMUSCULAR; INTRAVENOUS
Status: DISCONTINUED | OUTPATIENT
Start: 2018-08-16 | End: 2018-08-16 | Stop reason: HOSPADM

## 2018-08-16 RX ORDER — PROPOFOL 10 MG/ML
INJECTION, EMULSION INTRAVENOUS CONTINUOUS PRN
Status: DISCONTINUED | OUTPATIENT
Start: 2018-08-16 | End: 2018-08-16

## 2018-08-16 RX ADMIN — LIDOCAINE HYDROCHLORIDE 50 MG: 10 INJECTION, SOLUTION INFILTRATION; PERINEURAL at 10:56

## 2018-08-16 RX ADMIN — SODIUM CHLORIDE, POTASSIUM CHLORIDE, SODIUM LACTATE AND CALCIUM CHLORIDE: 600; 310; 30; 20 INJECTION, SOLUTION INTRAVENOUS at 10:31

## 2018-08-16 RX ADMIN — LIDOCAINE HYDROCHLORIDE 1 ML: 10 INJECTION, SOLUTION EPIDURAL; INFILTRATION; INTRACAUDAL; PERINEURAL at 10:31

## 2018-08-16 RX ADMIN — PROPOFOL 50 MG: 10 INJECTION, EMULSION INTRAVENOUS at 11:01

## 2018-08-16 RX ADMIN — PROPOFOL 100 MG: 10 INJECTION, EMULSION INTRAVENOUS at 10:56

## 2018-08-16 RX ADMIN — PROPOFOL 175 MCG/KG/MIN: 10 INJECTION, EMULSION INTRAVENOUS at 10:56

## 2018-08-16 NOTE — ANESTHESIA CARE TRANSFER NOTE
Patient: Desi Granado    Procedure(s):  colonoscopy - Wound Class: II-Clean Contaminated    Diagnosis: hx diverticulitis    screening  Diagnosis Additional Information: No value filed.    Anesthesia Type:   MAC     Note:  Airway :Room Air  Patient transferred to:Phase II  Comments: Patient's VSS. Spontaneous respirations. Patient awake and oriented. IV patent. Report to RN.Handoff Report: Identifed the Patient, Identified the Reponsible Provider, Reviewed the pertinent medical history, Discussed the surgical course, Reviewed Intra-OP anesthesia mangement and issues during anesthesia, Set expectations for post-procedure period and Allowed opportunity for questions and acknowledgement of understanding      Vitals: (Last set prior to Anesthesia Care Transfer)    CRNA VITALS  8/16/2018 1040 - 8/16/2018 1110      8/16/2018             Pulse: 91    SpO2: 99 %                Electronically Signed By: MEE Villalpando CRNA  August 16, 2018  11:10 AM

## 2018-08-16 NOTE — ANESTHESIA POSTPROCEDURE EVALUATION
Patient: Desi Granado    Procedure(s):  colonoscopy - Wound Class: II-Clean Contaminated    Diagnosis:hx diverticulitis    screening  Diagnosis Additional Information: No value filed.    Anesthesia Type:  MAC    Note:  Anesthesia Post Evaluation    Patient location during evaluation: Phase 2 and Bedside  Patient participation: Able to fully participate in evaluation  Level of consciousness: awake and alert  Pain management: adequate  Airway patency: patent  Cardiovascular status: acceptable  Respiratory status: acceptable  Hydration status: acceptable  PONV: none     Anesthetic complications: None          Last vitals:  Vitals:    08/16/18 0957 08/16/18 1115   BP: (!) 137/104 120/82   Resp: 16 16   Temp: 36.7  C (98.1  F)    SpO2: 98% 96%         Electronically Signed By: MEE Villalpando CRNA  August 16, 2018  11:29 AM

## 2018-08-16 NOTE — H&P
"48 year old year old female here for colonoscopy for screening.    Patient Active Problem List   Diagnosis     Allergic rhinitis     Injury, other and unspecified, knee, leg, ankle, and foot     Family history of other cardiovascular diseases     Essential hypertension, benign     FAMILY HISTORY OF ENDOCRINE DISEASE( other endo or metabol)     Papanicolaou smear of cervix with atypical squamous cells of undetermined significance (ASC-US)     CARDIOVASCULAR SCREENING; LDL GOAL LESS THAN 160       Past Medical History:   Diagnosis Date     Allergic rhinitis, cause unspecified      Hypertension 8 years?     Injury, other and unspecified, knee, leg, ankle, and foot 8th grade    left ankle injury       Past Surgical History:   Procedure Laterality Date     BIOPSY       BREAST LUMPECTOMY, RT/LT  6/23/10    Breast Lumpectomy RT for likely adenomatous lumps     ESOPHAGOSCOPY, GASTROSCOPY, DUODENOSCOPY (EGD), COMBINED N/A 10/1/2015    Procedure: COMBINED ESOPHAGOSCOPY, GASTROSCOPY, DUODENOSCOPY (EGD);  Surgeon: Vijay Almanza MD;  Location: [facility]     SOFT TISSUE SURGERY      Ankle surgery 20+ years ago     SURGICAL HISTORY OF -   1989    arthroscopy of Left Ankle     SURGICAL HISTORY OF -   1990    arthroscopy of Left Ankle     SURGICAL HISTORY OF -   1993    4 wisdom teeth extracted       [facility]    No current outpatient prescriptions on file.       Allergies   Allergen Reactions     Aspirin Hives     Dust Mites      Morphine GI Disturbance       Pt reports that she has never smoked. She has never used smokeless tobacco. She reports that she does not drink alcohol or use illicit drugs.    Exam:  BP (!) 137/104  Temp 98.1  F (36.7  C) (Oral)  Resp 16  Ht 1.651 m (5' 5\")  Wt 65.8 kg (145 lb)  LMP 08/13/2018  SpO2 98%  BMI 24.13 kg/m2    Awake, Alert OX3  Lungs - CTA bilaterally  CV - RRR, no murmurs, distal pulses intact  Abd - soft, non-distended, non-tender, +BS  Extr - No cyanosis or edema    A/P 48 year old year old " female in need of colonoscopy for screening. Risks, benefits, alternatives, and complications were discussed including the possibility of perforation and the patient agreed to proceed    Vijay Almanza MD

## 2018-09-14 ENCOUNTER — OFFICE VISIT (OUTPATIENT)
Dept: FAMILY MEDICINE | Facility: CLINIC | Age: 48
End: 2018-09-14
Payer: COMMERCIAL

## 2018-09-14 VITALS
DIASTOLIC BLOOD PRESSURE: 87 MMHG | HEART RATE: 100 BPM | BODY MASS INDEX: 25.19 KG/M2 | HEIGHT: 65 IN | TEMPERATURE: 98.3 F | WEIGHT: 151.2 LBS | SYSTOLIC BLOOD PRESSURE: 138 MMHG

## 2018-09-14 DIAGNOSIS — K57.32 DIVERTICULITIS OF COLON: ICD-10-CM

## 2018-09-14 DIAGNOSIS — I10 ESSENTIAL HYPERTENSION, BENIGN: ICD-10-CM

## 2018-09-14 DIAGNOSIS — Z00.00 ROUTINE GENERAL MEDICAL EXAMINATION AT A HEALTH CARE FACILITY: Primary | ICD-10-CM

## 2018-09-14 DIAGNOSIS — Z30.41 ENCOUNTER FOR SURVEILLANCE OF CONTRACEPTIVE PILLS: ICD-10-CM

## 2018-09-14 LAB
ANION GAP SERPL CALCULATED.3IONS-SCNC: 7 MMOL/L (ref 3–14)
BUN SERPL-MCNC: 15 MG/DL (ref 7–30)
CALCIUM SERPL-MCNC: 8.8 MG/DL (ref 8.5–10.1)
CHLORIDE SERPL-SCNC: 103 MMOL/L (ref 94–109)
CO2 SERPL-SCNC: 25 MMOL/L (ref 20–32)
CREAT SERPL-MCNC: 0.77 MG/DL (ref 0.52–1.04)
GFR SERPL CREATININE-BSD FRML MDRD: 80 ML/MIN/1.7M2
GLUCOSE SERPL-MCNC: 86 MG/DL (ref 70–99)
POTASSIUM SERPL-SCNC: 3.9 MMOL/L (ref 3.4–5.3)
SODIUM SERPL-SCNC: 135 MMOL/L (ref 133–144)

## 2018-09-14 PROCEDURE — 80048 BASIC METABOLIC PNL TOTAL CA: CPT | Performed by: FAMILY MEDICINE

## 2018-09-14 PROCEDURE — 36415 COLL VENOUS BLD VENIPUNCTURE: CPT | Performed by: FAMILY MEDICINE

## 2018-09-14 PROCEDURE — 99396 PREV VISIT EST AGE 40-64: CPT | Performed by: FAMILY MEDICINE

## 2018-09-14 PROCEDURE — 99213 OFFICE O/P EST LOW 20 MIN: CPT | Mod: 25 | Performed by: FAMILY MEDICINE

## 2018-09-14 RX ORDER — CIPROFLOXACIN 500 MG/1
500 TABLET, FILM COATED ORAL 2 TIMES DAILY
Qty: 14 TABLET | Refills: 0 | Status: SHIPPED | OUTPATIENT
Start: 2018-09-14 | End: 2018-10-26

## 2018-09-14 RX ORDER — METRONIDAZOLE 500 MG/1
500 TABLET ORAL 2 TIMES DAILY
Qty: 14 TABLET | Refills: 0 | Status: SHIPPED | OUTPATIENT
Start: 2018-09-14 | End: 2018-10-26

## 2018-09-14 RX ORDER — LISINOPRIL 40 MG/1
40 TABLET ORAL DAILY
Qty: 90 TABLET | Refills: 3 | Status: SHIPPED | OUTPATIENT
Start: 2018-09-14 | End: 2019-08-14

## 2018-09-14 NOTE — MR AVS SNAPSHOT
After Visit Summary   9/14/2018    Desi Granado    MRN: [id number]           Patient Information     Date Of Birth          1970        Visit Information        Provider Department      9/14/2018 8:30 AM Pearl Grover MD [facility]        Today's Diagnoses     Routine general medical examination at a health care facility    -  1    Diverticulitis of colon        Essential hypertension, benign          Care Instructions      Preventive Health Recommendations  Female Ages 40 to 49    Yearly exam:     See your health care provider every year in order to  1. Review health changes.   2. Discuss preventive care.    3. Review your medicines if your doctor prescribed any.      Get a Pap test every three years (unless you have an abnormal result and your provider advises testing more often).      If you get Pap tests with HPV test, you only need to test every 5 years, unless you have an abnormal result. You do not need a Pap test if your uterus was removed (hysterectomy) and you have not had cancer.      You should be tested each year for STDs (sexually transmitted diseases), if you're at risk.     Ask your doctor if you should have a mammogram.      Have a colonoscopy (test for colon cancer) if someone in your family has had colon cancer or polyps before age 50.       Have a cholesterol test every 5 years.       Have a diabetes test (fasting glucose) after age 45. If you are at risk for diabetes, you should have this test every 3 years.    Shots: Get a flu shot each year. Get a tetanus shot every 10 years.     Nutrition:     Eat at least 5 servings of fruits and vegetables each day.    Eat whole-grain bread, whole-wheat pasta and brown rice instead of white grains and rice.    Get adequate Calcium and Vitamin D.      Lifestyle    Exercise at least 150 minutes a week (an average of 30 minutes a day, 5 days a week). This will help you control your weight and prevent disease.    Limit  "alcohol to one drink per day.    No smoking.     Wear sunscreen to prevent skin cancer.    See your dentist every six months for an exam and cleaning.          Follow-ups after your visit        Who to contact     Normal or non-critical lab and imaging results will be communicated to you by Gift Card [employer], letter or phone within 4 business days after the clinic has received the results. If you do not hear from us within 7 days, please contact the clinic through Gift Card [employer] or phone. If you have a critical or abnormal lab result, we will notify you by phone as soon as possible.  Submit refill requests through [employer] or call your pharmacy and they will forward the refill request to us. Please allow 3 business days for your refill to be completed.          If you need to speak with a  for additional information , please call: [phone]             Additional Information About Your Visit        M[employer] Information     M[employer] gives you secure access to your electronic health record. If you see a primary care provider, you can also send messages to your care team and make appointments. If you have questions, please call your primary care clinic.  If you do not have a primary care provider, please call [phone] and they will assist you.        Care EveryWhere ID     This is your Care EveryWhere ID. This could be used by other organizations to access your [place] medical records  [id number]        Your Vitals Were     Pulse Temperature Height Last Period BMI (Body Mass Index)       100 98.3  F (36.8  C) (Tympanic) 5' 5.16\" (1.655 m) 09/11/2018 25.04 kg/m2        Blood Pressure from Last 3 Encounters:   09/14/18 138/87   08/16/18 (P) 141/86   06/01/18 126/87    Weight from Last 3 Encounters:   09/14/18 151 lb 3.2 oz (68.6 kg)   08/16/18 145 lb (65.8 kg)   06/01/18 149 lb (67.6 kg)              We Performed the Following     Basic metabolic panel          Today's Medication Changes          These changes are " accurate as of 9/14/18  9:30 AM.  If you have any questions, ask your nurse or doctor.               Start taking these medicines.        Dose/Directions    ciprofloxacin 500 MG tablet   Commonly known as:  CIPRO   Used for:  Diverticulitis of colon   Started by:  Pearl Grover MD        Dose:  500 mg   Take 1 tablet (500 mg) by mouth 2 times daily   Quantity:  14 tablet   Refills:  0       metroNIDAZOLE 500 MG tablet   Commonly known as:  FLAGYL   Used for:  Diverticulitis of colon   Started by:  Pearl Grover MD        Dose:  500 mg   Take 1 tablet (500 mg) by mouth 2 times daily   Quantity:  14 tablet   Refills:  0         These medicines have changed or have updated prescriptions.        Dose/Directions    * lisinopril 20 MG tablet   Commonly known as:  PRINIVIL/ZESTRIL   This may have changed:  Another medication with the same name was added. Make sure you understand how and when to take each.   Used for:  Essential hypertension   Changed by:  Pearl Grover MD        Dose:  20 mg   Take 1 tablet (20 mg) by mouth daily   Quantity:  90 tablet   Refills:  1       * lisinopril 40 MG tablet   Commonly known as:  PRINIVIL/ZESTRIL   This may have changed:  You were already taking a medication with the same name, and this prescription was added. Make sure you understand how and when to take each.   Used for:  Essential hypertension, benign   Changed by:  Pearl Grover MD        Dose:  40 mg   Take 1 tablet (40 mg) by mouth daily   Quantity:  90 tablet   Refills:  3       * Notice:  This list has 2 medication(s) that are the same as other medications prescribed for you. Read the directions carefully, and ask your doctor or other care provider to review them with you.         Where to get your medicines      These medications were sent to [facility] - [place], [place] - [address] AT [facility]  [address] [place] [address]     Phone:  [phone]      lisinopril 40 MG tablet         Some of these will need a paper prescription and others can be bought over the counter.  Ask your nurse if you have questions.     Bring a paper prescription for each of these medications     ciprofloxacin 500 MG tablet    metroNIDAZOLE 500 MG tablet                Primary Care Provider Office Phone # Fax #    Pearl Grover [phone] [address] [facility] [address]        Equal Access to Services     [facility] GISSELLE : Hadii aad ku hadasho Soomaali, waaxda luqadaha, qaybta kaalmada adeegyada, waxay idiin hayaan adeeg kharash la'aan ah. So [facility] [phone].    ATENCIÓN: Si habla elsie, tiene a caldwell disposición servicios gratuitos de asistencia lingüística. [facility] [phone].    We comply with applicable federal civil rights laws and [place] laws. We do not discriminate on the basis of race, color, national origin, age, disability, sex, sexual orientation, or gender identity.            Thank you!     Thank you for choosing [facility]  for your care. Our goal is always to provide you with excellent care. Hearing back from our patients is one way we can continue to improve our services. Please take a few minutes to complete the written survey that you may receive in the mail after your visit with us. Thank you!             Your Updated Medication List - Protect others around you: Learn how to safely use, store and throw away your medicines at www.disposemymeds.org.          This list is accurate as of 9/14/18  9:30 AM.  Always use your most recent med list.                   Brand Name Dispense Instructions for use Diagnosis    ciprofloxacin 500 MG tablet    CIPRO    14 tablet    Take 1 tablet (500 mg) by mouth 2 times daily    Diverticulitis of colon       FLONASE ALLERGY RELIEF NA       LLQ pain       IBUPROFEN PO      AS NEEDED        * lisinopril 20 MG tablet    PRINIVIL/ZESTRIL    90 tablet    Take 1 tablet (20 mg) by mouth daily     Essential hypertension       * lisinopril 40 MG tablet    PRINIVIL/ZESTRIL    90 tablet    Take 1 tablet (40 mg) by mouth daily    Essential hypertension, benign       metroNIDAZOLE 500 MG tablet    FLAGYL    14 tablet    Take 1 tablet (500 mg) by mouth 2 times daily    Diverticulitis of colon       norgestrel-ethinyl estradiol 0.3-30 MG-MCG per tablet    LO/OVRAL (28)    84 tablet    Take 1 tablet by mouth daily    Encounter for surveillance of contraceptive pills       ZYRTEC 10 MG tablet   Generic drug:  cetirizine     90    1 TABLET DAILY    Allergic rhinitis, cause unspecified       * Notice:  This list has 2 medication(s) that are the same as other medications prescribed for you. Read the directions carefully, and ask your doctor or other care provider to review them with you.

## 2018-09-14 NOTE — PROGRESS NOTES
SUBJECTIVE:   CC: Desi Granado is an 48 year old woman who presents for preventive health visit.     Healthy Habits:    Do you get at least three servings of calcium containing foods daily (dairy, green leafy vegetables, etc.)? No    Amount of exercise or daily activities, outside of work: 5 day(s) per week, 30 min or more    Problems taking medications regularly No    Medication side effects: No    Have you had an eye exam in the past two years? yes    Do you see a dentist twice per year? yes    Do you have sleep apnea, excessive snoring or daytime drowsiness?no      *  Blood pressures have been little more elevated, especially when coming to doctor.    Today's PHQ-2 Score:   PHQ-2 ( 1999 [employer]) 9/20/2017 9/2/2016   Q1: Little interest or pleasure in doing things 0 -   Q2: Feeling down, depressed or hopeless 0 -   PHQ-2 Score 0 -   Q1: Little interest or pleasure in doing things Not at all Not at all   Q2: Feeling down, depressed or hopeless Not at all Not at all   PHQ-2 Score 0 0       Abuse: Current or Past(Physical, Sexual or Emotional)- No  Do you feel safe in your environment - Yes    Social History   Substance Use Topics     Smoking status: Never Smoker     Smokeless tobacco: Never Used     Alcohol use No      Comment: very rare     If you drink alcohol do you typically have >3 drinks per day or >7 drinks per week? No                     Reviewed orders with patient.  Reviewed health maintenance and updated orders accordingly - Yes  Labs reviewed in EPIC  BP Readings from Last 3 Encounters:   09/14/18 138/87   08/16/18 (P) 141/86   06/01/18 126/87    Wt Readings from Last 3 Encounters:   09/14/18 151 lb 3.2 oz (68.6 kg)   08/16/18 145 lb (65.8 kg)   06/01/18 149 lb (67.6 kg)                    Patient under age 50, mutual decision reflected in health maintenance.      Pertinent mammograms are reviewed under the imaging tab. Due in April   History of abnormal Pap smear: NO - age 30-65 PAP every 5  "years with negative HPV co-testing recommended  PAP / HPV Latest Ref Rng & Units 9/8/2016 8/14/2013 8/16/2010   PAP - NIL NIL NIL   HPV 16 DNA NEG Negative - -   HPV 18 DNA NEG Negative - -   OTHER HR HPV NEG Negative - -     Reviewed and updated as needed this visit by clinical staff  Allergies       She was diagnosed with diagnosed diverticulitis and she had the one bout this summer   She does have to travel for work and she is concerned about having a flare she is wondering about antibiotics to travel   She did take the antibiotics and did feel better   Blood pressure is a bit high still a bit high never less than 809 diastolic     Reviewed and updated as needed this visit by Provider        Past Medical History:   Diagnosis Date     Allergic rhinitis, cause unspecified      Hypertension 8 years?     Injury, other and unspecified, knee, leg, ankle, and foot 8th grade    left ankle injury        ROS:  CONSTITUTIONAL: NEGATIVE for fever, chills, change in weight  INTEGUMENTARU/SKIN: NEGATIVE for worrisome rashes, moles or lesions  EYES: NEGATIVE for vision changes or irritation  ENT: NEGATIVE for ear, mouth and throat problems  RESP: NEGATIVE for significant cough or SOB  BREAST: NEGATIVE for masses, tenderness or discharge  CV: NEGATIVE for chest pain, palpitations or peripheral edema  GI: NEGATIVE for nausea, abdominal pain, heartburn, or change in bowel habits  : NEGATIVE for unusual urinary or vaginal symptoms. Periods are regular.  MUSCULOSKELETAL: NEGATIVE for significant arthralgias or myalgia  NEURO: NEGATIVE for weakness, dizziness or paresthesias  PSYCHIATRIC: NEGATIVE for changes in mood or affect    OBJECTIVE:   /87  Pulse 100  Temp 98.3  F (36.8  C) (Tympanic)  Ht 5' 5.16\" (1.655 m)  Wt 151 lb 3.2 oz (68.6 kg)  LMP 09/11/2018  BMI 25.04 kg/m2  EXAM:  GENERAL: healthy, alert and no distress  HENT: ear canals and TM's normal, nose and mouth without ulcers or lesions  NECK: no adenopathy, " "no asymmetry, masses, or scars and thyroid normal to palpation  RESP: lungs clear to auscultation - no rales, rhonchi or wheezes  BREAST: normal without masses, tenderness or nipple discharge and no palpable axillary masses or adenopathy  CV: regular rate and rhythm, normal S1 S2, no S3 or S4, no murmur, click or rub, no peripheral edema and peripheral pulses strong  ABDOMEN: soft, nontender, no hepatosplenomegaly, no masses and bowel sounds normal  MS: no gross musculoskeletal defects noted, no edema  SKIN: no suspicious lesions or rashes  NEURO: Normal strength and tone, mentation intact and speech normal  PSYCH: mentation appears normal, affect normal/bright    Diagnostic Test Results:  No results found for this or any previous visit (from the past 24 hour(s)).    ASSESSMENT/PLAN:   1. Routine general medical examination at a health care facility      2. Diverticulitis of colon  She will travel with paper scripts - metroNIDAZOLE (FLAGYL) 500 MG tablet; Take 1 tablet (500 mg) by mouth 2 times daily  Dispense: 14 tablet; Refill: 0  - ciprofloxacin (CIPRO) 500 MG tablet; Take 1 tablet (500 mg) by mouth 2 times daily  Dispense: 14 tablet; Refill: 0    3. Essential hypertension, benign  Needs better control will increase to 40 mg recheck in 4 weeks   - Basic metabolic panel  - lisinopril (PRINIVIL/ZESTRIL) 40 MG tablet; Take 1 tablet (40 mg) by mouth daily  Dispense: 90 tablet; Refill: 3    COUNSELING:   Reviewed preventive health counseling, as reflected in patient instructions    BP Readings from Last 1 Encounters:   08/16/18 (P) 141/86     Estimated body mass index is 24.13 kg/(m^2) as calculated from the following:    Height as of 8/16/18: 5' 5\" (1.651 m).    Weight as of 8/16/18: 145 lb (65.8 kg).           reports that she has never smoked. She has never used smokeless tobacco.      Counseling Resources:  ATP IV Guidelines  Pooled Cohorts Equation Calculator  Breast Cancer Risk Calculator  FRAX Risk " Assessment  ICSI Preventive Guidelines  Dietary Guidelines for Americans, 2010  USDA's MyPlate  ASA Prophylaxis  Lung CA Screening    Pearl Grover MD  [facility]

## 2018-10-03 ENCOUNTER — HOSPITAL ENCOUNTER (OUTPATIENT)
Dept: MAMMOGRAPHY | Facility: CLINIC | Age: 48
Discharge: HOME | End: 2018-10-03
Attending: FAMILY MEDICINE | Admitting: FAMILY MEDICINE
Payer: COMMERCIAL

## 2018-10-03 DIAGNOSIS — Z12.31 ENCOUNTER FOR SCREENING MAMMOGRAM FOR BREAST CANCER: ICD-10-CM

## 2018-10-03 PROCEDURE — 77067 SCR MAMMO BI INCL CAD: CPT

## 2018-10-04 ENCOUNTER — TELEPHONE (OUTPATIENT)
Dept: FAMILY MEDICINE | Facility: CLINIC | Age: 48
End: 2018-10-04

## 2018-10-04 DIAGNOSIS — R92.8 ABNORMAL MAMMOGRAM: Primary | ICD-10-CM

## 2018-10-04 NOTE — TELEPHONE ENCOUNTER
Please call her.  There is a finding on her mammogram that requires a closer look.     I have placed the orders. She should make the appointment     YOHAN Becerril MD       IMPRESSION: BI-RADS CATEGORY: 0 - Need Additional Imaging Evaluation  and/or Prior Mammograms for Comparison.    RECOMMENDED FOLLOW-UP: Diagnostic Mammogram and Ultrasound.    Additional imaging evaluation the LEFT breast with diagnostic  mammography and possibly ultrasound.    The patient will be notified of the results.

## 2018-10-04 NOTE — TELEPHONE ENCOUNTER
Call placed to patient, reported result notes. Patient has already been notified of follow up needed. Scheduled appointment Ultrasound,Mamography 10/15/18.   Becky AVILES/WILMAR

## 2018-10-15 ENCOUNTER — HOSPITAL ENCOUNTER (OUTPATIENT)
Dept: MAMMOGRAPHY | Facility: CLINIC | Age: 48
Discharge: HOME | End: 2018-10-15
Attending: FAMILY MEDICINE | Admitting: FAMILY MEDICINE
Payer: COMMERCIAL

## 2018-10-15 ENCOUNTER — HOSPITAL ENCOUNTER (OUTPATIENT)
Dept: ULTRASOUND IMAGING | Facility: CLINIC | Age: 48
End: 2018-10-15
Attending: FAMILY MEDICINE
Payer: COMMERCIAL

## 2018-10-15 DIAGNOSIS — R92.8 ABNORMAL MAMMOGRAM: ICD-10-CM

## 2018-10-15 PROCEDURE — G0279 TOMOSYNTHESIS, MAMMO: HCPCS

## 2018-10-15 PROCEDURE — 76642 ULTRASOUND BREAST LIMITED: CPT | Mod: LT

## 2018-10-17 ENCOUNTER — HOSPITAL ENCOUNTER (OUTPATIENT)
Dept: MAMMOGRAPHY | Facility: CLINIC | Age: 48
Discharge: HOME | End: 2018-10-17
Attending: FAMILY MEDICINE | Admitting: FAMILY MEDICINE
Payer: COMMERCIAL

## 2018-10-17 ENCOUNTER — HOSPITAL ENCOUNTER (OUTPATIENT)
Dept: ULTRASOUND IMAGING | Facility: CLINIC | Age: 48
End: 2018-10-17
Attending: FAMILY MEDICINE
Payer: COMMERCIAL

## 2018-10-17 DIAGNOSIS — N63.20 BREAST MASS, LEFT: ICD-10-CM

## 2018-10-17 PROCEDURE — 40000986 MA POST PROCEDURE LEFT

## 2018-10-17 PROCEDURE — 88360 TUMOR IMMUNOHISTOCHEM/MANUAL: CPT | Performed by: RADIOLOGY

## 2018-10-17 PROCEDURE — 00000158 ZZHCL STATISTIC H-FISH PROCESS B/S: Performed by: RADIOLOGY

## 2018-10-17 PROCEDURE — 88341 IMHCHEM/IMCYTCHM EA ADD ANTB: CPT | Mod: 26 | Performed by: RADIOLOGY

## 2018-10-17 PROCEDURE — 88342 IMHCHEM/IMCYTCHM 1ST ANTB: CPT | Mod: 26 | Performed by: RADIOLOGY

## 2018-10-17 PROCEDURE — 88342 IMHCHEM/IMCYTCHM 1ST ANTB: CPT | Performed by: RADIOLOGY

## 2018-10-17 PROCEDURE — 88360 TUMOR IMMUNOHISTOCHEM/MANUAL: CPT | Mod: 26 | Performed by: RADIOLOGY

## 2018-10-17 PROCEDURE — 00000159 ZZHCL STATISTIC H-SEND OUTS PREP: Performed by: RADIOLOGY

## 2018-10-17 PROCEDURE — 88360 TUMOR IMMUNOHISTOCHEM/MANUAL: CPT | Mod: 26,59 | Performed by: RADIOLOGY

## 2018-10-17 PROCEDURE — 88305 TISSUE EXAM BY PATHOLOGIST: CPT | Performed by: RADIOLOGY

## 2018-10-17 PROCEDURE — 88377 M/PHMTRC ALYS ISHQUANT/SEMIQ: CPT | Performed by: PATHOLOGY

## 2018-10-17 PROCEDURE — 88305 TISSUE EXAM BY PATHOLOGIST: CPT | Mod: 26 | Performed by: RADIOLOGY

## 2018-10-17 PROCEDURE — 25000125 ZZHC RX 250: Performed by: RADIOLOGY

## 2018-10-17 PROCEDURE — 88341 IMHCHEM/IMCYTCHM EA ADD ANTB: CPT | Performed by: RADIOLOGY

## 2018-10-17 PROCEDURE — 19083 BX BREAST 1ST LESION US IMAG: CPT | Mod: LT

## 2018-10-17 RX ADMIN — LIDOCAINE HYDROCHLORIDE 10 ML: 10 INJECTION, SOLUTION EPIDURAL; INFILTRATION; INTRACAUDAL; PERINEURAL at 10:33

## 2018-10-17 NOTE — IP AVS SNAPSHOT
MRN:[id number]                      After Visit Summary   10/17/2018    Desi Granado    MRN: [id number]           Visit Information        Provider Department      10/17/2018 10:20 AM [place] [facility]; [facility] Ultrasound           Review of your medicines      UNREVIEWED medicines. Ask your doctor about these medicines        Dose / Directions    ciprofloxacin 500 MG tablet   Commonly known as:  CIPRO   Used for:  Diverticulitis of colon        Dose:  500 mg   Take 1 tablet (500 mg) by mouth 2 times daily   Quantity:  14 tablet   Refills:  0       FLONASE ALLERGY RELIEF NA   Used for:  LLQ pain        Refills:  0       IBUPROFEN PO        AS NEEDED   Refills:  0       lisinopril 40 MG tablet   Commonly known as:  PRINIVIL/ZESTRIL   Used for:  Essential hypertension, benign        Dose:  40 mg   Take 1 tablet (40 mg) by mouth daily   Quantity:  90 tablet   Refills:  3       metroNIDAZOLE 500 MG tablet   Commonly known as:  FLAGYL   Used for:  Diverticulitis of colon        Dose:  500 mg   Take 1 tablet (500 mg) by mouth 2 times daily   Quantity:  14 tablet   Refills:  0       norgestrel-ethinyl estradiol 0.3-30 MG-MCG per tablet   Commonly known as:  LO/OVRAL (28)   Used for:  Encounter for surveillance of contraceptive pills        Dose:  1 tablet   Take 1 tablet by mouth daily   Quantity:  84 tablet   Refills:  4       ZYRTEC 10 MG tablet   Used for:  Allergic rhinitis, cause unspecified   Generic drug:  cetirizine        1 TABLET DAILY   Quantity:  90   Refills:  3                Protect others around you: Learn how to safely use, store and throw away your medicines at www.disposemymeds.org.         Follow-ups after your visit        Your next 10 appointments already scheduled     Oct 17, 2018 11:00 AM CDT   [place] POST PROCEDURE LEFT with [facility]   [facility]    [address] [address]   [phone]           How do I prepare  "for my exam? (Food and drink instructions) No Food and Drink Restrictions.  How do I prepare for my exam? (Other instructions) Do not use any powder, lotion or deodorant under your arms or on your breast. If you do, we will ask you to remove it before your exam.  What should I wear: Wear comfortable, two-piece clothing.  How long does the exam take: Most scans will take 15 minutes.  What should I bring: Bring any previous mammograms from other facilities or have them mailed to the breast center.  Do I need a :  No  is needed.  What do I need to tell my doctor: If you have any allergies, tell your care team.  What should I do after the exam: No restrictions, You may resume normal activities.  What is this test: This test is an x-ray of the breast to look for breast disease. The breast is pressed between two plates to flatten and spread the tissue. An X-ray is taken of the breast from different angles.  Who should I call with questions: If you have any questions, please call the Imaging Department where you will have your exam. Directions, parking instructions, and other information is available on our website, [employer]/imaging.  Other information about my exam Three-dimensional (3D) mammograms are available at [place] locations in [facility], [place], and [place]. [facility] locations include [place] and the [facility] in [place].  Benefits of 3D mammograms include: * Improved rate of cancer detection * Decreases your chance of having to go back for more tests, which means fewer: * \"False-positive\" results (This means that there is an abnormal area but it isn't cancer.) * Invasive testing procedures, such as a biopsy or surgery * Can provide clearer images of the breast if you have dense breast tissue.  *3D mammography is an optional exam that anyone can have with a 2D mammogram. It doesn't replace or take the place of a 2D mammogram. " 2D mammograms remain an effective screening test for all women.  Not all insurance companies cover the cost of a 3D mammogram. Check with your insurance.               Care Instructions        Further instructions from your care team       Breast Biopsy Care Instructions      Site Care:    You may have some discomfort in the breast and may see some bruising at the needle site.    You will be given an ice pack which should be kept in place over the dressing until bedtime tonight.Always keep a gauze pad between your skin and the ice pack.    Wearing your bra continuously for 24 hours post biopsy will give optimal support to the biopsy site.    Activity:       You may go back to your normal routine.     No heavy lifting for 24 hours.    Diet and Medications:       You may go back to your regular diet.     Tylenol is the best choice to relieve your discomfort, the first 24 hours. If you have no bleeding on the first day, Ibuprofen (such as Advil, or Motrin), may be taken on the second day after for pain.     Do not take aspirin for 72 hours following your biopsy.    Questions:     If you have any questions about your procedure performed in Ultrasound, you may contact us at [phone].If you have any questions about your procedure performed in Mammography, you may contact us at [phone].    Results:       The pathology report on your biopsy is usually available within 72 hours. The Radiologist or your personal physician will call you with the results.     You will receive information about any further follow up from your physician.    Call your doctor if you have:       More than slight bleeding or significant pain, or experience swelling of the breast.     If your physician is unavailable, please call the Nurse Advice Line at [phone], or [facility] Emergency Department at [phone].      Patient:______________________________  Time:_________   Date:_____________    Instructor:____________________________   Time:_________  Date:_____________      Additional Information About Your Visit        MyChart Information     [employer] gives you secure access to your electronic health record. If you see a primary care provider, you can also send messages to your care team and make appointments. If you have questions, please call your primary care clinic.  If you do not have a primary care provider, please call [phone] and they will assist you.        Care EveryWhere ID     This is your Care EveryWhere ID. This could be used by other organizations to access your [facility] medical records  [id number]         Primary Care Provider Office Phone # Fax #    Pearl Grover [phone]      Equal Access to Services     [facility] : Hadii essie fagano Sokaruna, waaxda luanthony, qaybta kaalmada riki, roz vargas . So [facility] [phone].    ATENCIÓN: Si andrew zimmerman, tiene a [facility] disposición servicios gratuitos de asistencia lingüística. Llame al [phone].    We comply with applicable federal civil rights laws and [place] laws. We do not discriminate on the basis of race, color, national origin, age, disability, sex, sexual orientation, or gender identity.            Thank you!     Thank you for choosing [facility] for your care. Our goal is always to provide you with excellent care. Hearing back from our patients is one way we can continue to improve our services. Please take a few minutes to complete the written survey that you may receive in the mail after you visit with us. Thank you!             Medication List: This is a list of all your medications and when to take them. Check marks below indicate your daily home schedule. Keep this list as a reference.      Medications           Morning Afternoon Evening Bedtime As Needed    ciprofloxacin 500 MG tablet   Commonly known as:  CIPRO   Take 1 tablet (500 mg) by  mouth 2 times daily                                FLONASE ALLERGY RELIEF NA                                IBUPROFEN PO   AS NEEDED                                lisinopril 40 MG tablet   Commonly known as:  PRINIVIL/ZESTRIL   Take 1 tablet (40 mg) by mouth daily                                metroNIDAZOLE 500 MG tablet   Commonly known as:  FLAGYL   Take 1 tablet (500 mg) by mouth 2 times daily                                norgestrel-ethinyl estradiol 0.3-30 MG-MCG per tablet   Commonly known as:  LO/OVRAL (28)   Take 1 tablet by mouth daily                                ZYRTEC 10 MG tablet   1 TABLET DAILY   Generic drug:  cetirizine

## 2018-10-17 NOTE — IP AVS SNAPSHOT
[facility]    [address] [facility] [place] [place] [address]    Phone:  [phone]                                       After Visit Summary   10/17/2018    Desi Granado    MRN: [id number]           After Visit Summary Signature Page     I have received my discharge instructions, and my questions have been answered. I have discussed any challenges I see with this plan with the nurse or doctor.    ..........................................................................................................................................  Patient/Patient Representative Signature      ..........................................................................................................................................  Patient Representative Print Name and Relationship to Patient    ..................................................               ................................................  Date                                   Time    ..........................................................................................................................................  Reviewed by Signature/Title    ...................................................              ..............................................  Date                                               Time          22EPIC Rev 08/18

## 2018-10-17 NOTE — DISCHARGE INSTRUCTIONS
Breast Biopsy Care Instructions      Site Care:    You may have some discomfort in the breast and may see some bruising at the needle site.    You will be given an ice pack which should be kept in place over the dressing until bedtime tonight.Always keep a gauze pad between your skin and the ice pack.    Wearing your bra continuously for 24 hours post biopsy will give optimal support to the biopsy site.    Activity:       You may go back to your normal routine.     No heavy lifting for 24 hours.    Diet and Medications:       You may go back to your regular diet.     Tylenol is the best choice to relieve your discomfort, the first 24 hours. If you have no bleeding on the first day, Ibuprofen (such as Advil, or Motrin), may be taken on the second day after for pain.     Do not take aspirin for 72 hours following your biopsy.    Questions:     If you have any questions about your procedure performed in Ultrasound, you may contact us at [phone].If you have any questions about your procedure performed in Mammography, you may contact us at [phone].    Results:       The pathology report on your biopsy is usually available within 72 hours. The Radiologist or your personal physician will call you with the results.     You will receive information about any further follow up from your physician.    Call your doctor if you have:       More than slight bleeding or significant pain, or experience swelling of the breast.     If your physician is unavailable, please call the Nurse Advice Line at [phone], or [facility] Emergency Department at [phone].      Patient:______________________________  Time:_________  Date:_____________    Instructor:____________________________   Time:_________  Date:_____________

## 2018-10-18 LAB — COPATH REPORT: NORMAL

## 2018-10-22 ENCOUNTER — MYC MEDICAL ADVICE (OUTPATIENT)
Dept: FAMILY MEDICINE | Facility: CLINIC | Age: 48
End: 2018-10-22

## 2018-10-22 DIAGNOSIS — F43.22 ADJUSTMENT DISORDER WITH ANXIOUS MOOD: Primary | ICD-10-CM

## 2018-10-22 LAB — COPATH REPORT: NORMAL

## 2018-10-22 RX ORDER — LORAZEPAM 1 MG/1
1 TABLET ORAL EVERY 8 HOURS PRN
Qty: 20 TABLET | Refills: 0 | Status: SHIPPED | OUTPATIENT
Start: 2018-10-22 | End: 2018-11-06

## 2018-10-23 DIAGNOSIS — N63.0 BREAST MASS: Primary | ICD-10-CM

## 2018-10-23 NOTE — TELEPHONE ENCOUNTER
Results were given to patient and she has opted to be treated through [facility]. A note was sent to oncology and they are setting up her appointments.   All questions were answered and one of the doctors in the office sent in a prescription for ativan for her. Pearl Grover M.D.;

## 2018-10-24 ENCOUNTER — ONCOLOGY VISIT (OUTPATIENT)
Dept: ONCOLOGY | Facility: CLINIC | Age: 48
End: 2018-10-24
Attending: INTERNAL MEDICINE
Payer: COMMERCIAL

## 2018-10-24 VITALS
HEIGHT: 65 IN | TEMPERATURE: 97.9 F | HEART RATE: 110 BPM | WEIGHT: 145.5 LBS | OXYGEN SATURATION: 98 % | RESPIRATION RATE: 20 BRPM | DIASTOLIC BLOOD PRESSURE: 81 MMHG | BODY MASS INDEX: 24.24 KG/M2 | SYSTOLIC BLOOD PRESSURE: 138 MMHG

## 2018-10-24 DIAGNOSIS — C50.912 INVASIVE DUCTAL CARCINOMA OF BREAST, FEMALE, LEFT (H): ICD-10-CM

## 2018-10-24 DIAGNOSIS — I10 ESSENTIAL HYPERTENSION, BENIGN: Primary | ICD-10-CM

## 2018-10-24 PROCEDURE — G0463 HOSPITAL OUTPT CLINIC VISIT: HCPCS

## 2018-10-24 PROCEDURE — 99205 OFFICE O/P NEW HI 60 MIN: CPT | Performed by: INTERNAL MEDICINE

## 2018-10-24 ASSESSMENT — PAIN SCALES - GENERAL: PAINLEVEL: NO PAIN (0)

## 2018-10-24 NOTE — LETTER
10/24/2018         RE: Desi Granado  [address] [place] [address]        Dear Colleague,    Thank you for referring your patient, Desi Granado, to the [facility], [place], operated by [facility]. Please see a copy of my visit note below.    DATE OF VISIT: Oct 24, 2018    REASON FOR REFERRAL: Management of breast cancer    CHIEF COMPLAINT:   Chief Complaint   Patient presents with     Oncology Clinic Visit     New Patient,  Breast mass        HISTORY OF PRESENT ILLNESS:     48-year-old female patient was found to have developing focal asymmetry in the left breast at approximately 3 o'clock position about 7-8 cm from the nipple on the routine mammogram done on October 30, 2018.  Subsequently she went on to have diagnostic mammogram and ultrasound.  Directed sonogram at the site of mammographic finding showed a 1.6 cm irregular solid lesion.  Subsequently the patient went on to have ultrasound-guided needle core biopsy done on October 17, 2018.  The biopsy came back positive for invasive ductal carcinoma with papillary features.  There is grade 2 out of 3.  Angiolymphatic invasion was not seen.  Ductal carcinoma in situ was not seen.  The tumor was estrogen receptor positive about 100% and progesterone receptor +100% strong nuclear staining.  HER-2/tamara came back negative for amplification.  The patient is here today to discuss management plan.    REVIEW OF SYSTEMS:   Constitutional: Negative for fever, chills, and night sweats.  Skin: negative.  Eyes: negative.  Ears/Nose/Throat: negative.  Respiratory: No shortness of breath, dyspnea on exertion, cough, or hemoptysis.  Cardiovascular: negative.  Gastrointestinal: negative.  Genitourinary: negative.  Musculoskeletal: negative.  Neurologic: negative.  Psychiatric: negative.  Hematologic/Lymphatic/Immunologic: negative.  Endocrine: negative.    PAST MEDICAL HISTORY:   Past Medical History:   Diagnosis Date     Allergic rhinitis, cause unspecified       Hypertension 8 years?     Injury, other and unspecified, knee, leg, ankle, and foot 8th grade    left ankle injury     Invasive ductal carcinoma of breast, female, left (H) 10/26/2018       PAST SURGICAL HISTORY:   Past Surgical History:   Procedure Laterality Date     BIOPSY       BREAST LUMPECTOMY, RT/LT  6/23/10    Breast Lumpectomy RT for likely adenomatous lumps     COLONOSCOPY N/A 8/16/2018    Procedure: COLONOSCOPY;  colonoscopy;  Surgeon: Vijay Almanza MD;  Location: [facility]     ESOPHAGOSCOPY, GASTROSCOPY, DUODENOSCOPY (EGD), COMBINED N/A 10/1/2015    Procedure: COMBINED ESOPHAGOSCOPY, GASTROSCOPY, DUODENOSCOPY (EGD);  Surgeon: Vijay Almanza MD;  Location: [facility]     SOFT TISSUE SURGERY      Ankle surgery 20+ years ago     SURGICAL HISTORY OF -   1989    arthroscopy of Left Ankle     SURGICAL HISTORY OF -   1990    arthroscopy of Left Ankle     SURGICAL HISTORY OF -   1993    4 wisdom teeth extracted       ALLERGIES:   Allergies as of 10/24/2018 - Jensen as Reviewed 10/24/2018   Allergen Reaction Noted     Aspirin Hives 10/17/2005     Dust mites  07/03/2009     Morphine GI Disturbance 06/23/2008       MEDICATIONS:   Current Outpatient Prescriptions   Medication Sig Dispense Refill     Fluticasone Propionate (FLONASE ALLERGY RELIEF NA)        IBUPROFEN PO AS NEEDED       lisinopril (PRINIVIL/ZESTRIL) 40 MG tablet Take 1 tablet (40 mg) by mouth daily 90 tablet 3     LORazepam (ATIVAN) 1 MG tablet Take 1 tablet (1 mg) by mouth every 8 hours as needed for anxiety 20 tablet 0     ZYRTEC 10 MG OR TABS 1 TABLET DAILY 90 3        FAMILY HISTORY:   Family History   Problem Relation Age of Onset     Hypertension Mother      Allergies Mother      Thyroid Disease Mother      Taking thyroid medication     Obesity Mother      Cerebrovascular Disease Mother      TIA Feb 2017     Hypertension Father      Thyroid Disease Father      two parathyroids removed     Cancer Maternal Grandmother      Bone CA     Allergies Maternal  "Grandmother      Circulatory Maternal Grandmother      Cerebrovascular Disease Maternal Grandmother      Other Cancer Maternal Grandmother      multiple myeloma     C.A.D. Maternal Grandfather      Respiratory Maternal Grandfather      asthma     Allergies Maternal Grandfather      Cerebrovascular Disease Maternal Grandfather      Alzheimer Disease Paternal Grandfather      Neurologic Disorder Paternal Grandfather      Parkinsons     Arthritis Paternal Grandmother      Respiratory Other      Emphysema     Diabetes Other      Asthma Other      generic emphysema     Genetic Disorder Other      genetic emphysema     Diabetes Other      Coronary Artery Disease Other      Other Cancer Other      multiple myeloma     Colon Cancer Other      Cancer - colorectal No family hx of      Breast Cancer No family hx of      Prostate Cancer No family hx of         SOCIAL HISTORY:   Social History     Social History     Marital status: Single     Spouse name: N/A     Number of children: 0     Years of education: 18     Occupational History     Senior [employer]     Social History Main Topics     Smoking status: Never Smoker     Smokeless tobacco: Never Used     Alcohol use No      Comment: very rare     Drug use: No     Sexual activity: Not Currently     Partners: Male     Birth control/ protection: Pill     Other Topics Concern     Parent/Sibling W/ Cabg, Mi Or Angioplasty Before 65f 55m? No     Social History Narrative       PHYSICAL EXAMINATION:   /81 (BP Location: Right arm, Patient Position: Right side, Cuff Size: Adult Regular)  Pulse 110  Temp 97.9  F (36.6  C) (Tympanic)  Resp 20  Ht 1.651 m (5' 5\")  Wt 66 kg (145 lb 8 oz)  LMP 10/10/2018 (Exact Date)  SpO2 98%  BMI 24.21 kg/m2  Wt Readings from Last 10 Encounters:   10/26/18 65.6 kg (144 lb 11.2 oz)   10/24/18 66 kg (145 lb 8 oz)   09/14/18 68.6 kg (151 lb 3.2 oz)   08/16/18 65.8 kg (145 lb)   06/01/18 67.6 kg (149 lb)   09/21/17 66.2 kg (146 lb) "   09/08/16 68.1 kg (150 lb 1.6 oz)   12/08/15 70.3 kg (155 lb)   10/01/15 65.8 kg (145 lb)   09/04/15 67.1 kg (148 lb)      ECOG performance status: 0  GENERAL APPEARANCE: Healthy, alert and in no acute distress.  HEENT: Sclerae anicteric. PERRLA. Oropharynx without ulcers, lesions, or thrush.  NECK: Supple. No asymmetry or masses.  LYMPHATICS: No palpable cervical, supraclavicular, axillary, or inguinal lymphadenopathy.  RESP: Lungs clear to auscultation bilaterally without rales, rhonchi or wheezes.  `BREAST:  CARDIOVASCULAR: Regular rate and rhythm. Normal S1, S2; no S3 or S4. No murmur, gallop, or rub.  ABDOMEN: Soft, nontender. Bowel sounds normal. No palpable organomegaly or masses.  MUSCULOSKELETAL: Extremities without gross deformities noted. No edema of bilateral lower extremities.  SKIN: No suspicious lesions or rashes.  NEURO: Alert and oriented x 3. Cranial nerves II-XII grossly intact.  PSYCHIATRIC: Mentation and affect appear normal.    LABORATORY RESULTS:  Hospital Outpatient Visit on 10/17/2018   Component Date Value Ref Range Status     Copath Report 10/17/2018    Final                    Value:Patient Name: ARRON BROWN  MR#: [id number]  Specimen #: [id number]  Collected: 10/17/2018  Received: 10/17/2018  Reported: 10/18/2018 21:52  Ordering Phy(s): MIKHAIL BARAJAS  Additional Phy(s): KRISTI LAY    For improved result formatting, select 'View Enhanced Report Format' under   Linked Documents section.    SPECIMEN(S):  Breast needle biopsy, left mass    FINAL DIAGNOSIS:  Breast needle biopsy, left mass:  - Invasive ductal carcinoma with papillary features (see description and   images)  - Hosmer grade: II (of possible III)       - Jb score 6 (of possible 9)       - Scoring criteria: tubules = 3, nuclear pleomorphism = 2, mitosis =   1.  - Angiolymphatic invasion:  None seen  - Ductal carcinoma in-situ: None seen  - Estrogen receptor:  Positive (100% strong nuclear staining)  -  "Progesterone receptor:  Positive (100% strong nuclear staining)  - Her2/tamara:  FISH analysis will be performed and reported by the   [facility] in a                           separate report.    COMMENT:  The breast reporting protocol is based on AJCC/UICC TNM, 8th edition.   Protocol web posting date: January 2018        To view the digital images associated with this report in Epic, select   \"View Enhanced Report Format\" under the  Linked Documents section of this report to view the PDF version of the   original CoPath generated report.    This case was seen in intradepartmental consultation.    Electronically signed out by:    JAMEY Alejandre M.D.    CLINICAL HISTORY:  48 year old female with left breast mass.    GROSS:  The specimen is received in formalin and 0.9% sodium chloride, labeled   with the patient's name and date of  birth, and designated \"breast core needle biopsy left breast left breast   mass\". It consists of three  yellow-tan fibrofatty breast cores, ranging from 0.5-1.1 cm in length and   averaging 0.1 cm in diameter.  The  specimen is wrapped and entirely submitted in                           one cassette.  Time tissue   removed 1040 hours, time in formalin  1040 hours  10/17/2018. (Dictated by: Blank Khalil 10/17/2018 03:15 PM)    MICROSCOPIC:  The sections show an invasive ductal carcinoma with papillary features   consisting of cells showing a moderate  nuclear pleomorphism with prominent eosinophilic nucleoli surrounding   fibrovascular cores with no supporting  myoepithelial cells on immunostained stained sections for p63.  No ductal   carcinoma in situ is present.  No  significant tubules formation is seen.  Rare mitotic figures are seen   (<5/10 HPF).  No angiolymphatic invasion  is identified.  The ductal nature of the neoplasm is confirmed with   positive immunohistochemical staining for  E-cadherin.    IMMUNOHISTOCHEMICAL ANALYSIS FOR ESTROGEN AND " PROGESTERONE RECEPTORS    RESULTS:    ESTROGEN RECEPTORS:    POSITIVE                    100% of tumor cell nuclei stain positive                           Intensity of staining: Strong nuclear staining    PROGESTERONE R                          ECEPTORS:    POSITIVE                    100% of tumor cell nuclei stain positive.                           Intensity of staining: Strong nuclear staining    Performed on paraffin block: Needle biopsy    External and internal controls stain appropriately.  Standard assay conditions are met.  Assay Conditions:  Fixative and processing: 10% neutral buffered formalin, paraffin embedded.  Cold ischemia time less than one hour. Time of specimen collection   10/17/18 at 10:40AM; Time placed in  formalin 10/17/18 at 10:40 AM.  Total duration of formalin fixation greater than 6 hours and less than 72   hours.  Staining method used: [place] predilute monoclonal antibodies, estrogen   receptor clone SP1 and progesterone  receptor clone 1E2, antigen heat retrieval in EDTA alkaline pH for 60   minutes, Cytocentrics ultraview detection  system and automatic immunostainer.    Scoring system: The ASCO-CAP scoring guidelines are used. A positive test   is defined as positive nuclei  staining in greater th                          an or equal to 1% of tumor cells. A negative test is   defined as nuclear staining in less  than 1% of tumor cells. For positive tests, an estimation of intensity of   nuclear staining over the entire  tumor on tissue sections is also provided.    Reference: PHILL Nayak, ROBBY Farias, Gui M, et al. American Society of   Clinical Oncology/College of  American Pathologists guideline recommendations for immunohistochemical   testing of estrogen and  progesterone receptors in breast cancer, Arch Pathol Lab Med.   2010;134:907-922     (Dictated by: MATTHEW Alejandre MD 10/18/2018)    CPT Codes:  A: 68994-QS7, 79775-KN, 76132-OC, HFISH, SOH, 78814-TGL, 10603-WFL    TESTING  LAB LOCATION:  [address] [address] [phone]    COLLECTION SITE:  Client: [facility]  Location: [facility] ([facility])       Copath Report 10/17/2018    Final                    Value:Patient Name: ARRON GRANADO  MR#: [id number]  Specimen #: [id number]  Collected: 10/17/2018 10:40  Received: 10/19/2018 12:16  Reported: 10/22/2018 19:13  Ordering Phy(s): JUSTIN POWELL  Additional Phy(s): SILVANA LAY    For improved result formatting, select 'View Enhanced Report Format' under   Linked Documents section.  __________________________________________    TEST(S) REQUESTED:  [facility] HER2 FISH    SPECIMEN DESCRIPTION:  Breast Tissue, Paraffin Embedded    CLINICAL COMMENTS:  [id number]    RESULTS:    Ratio of HER2/HAROLDO-17 signals  Arron Granado: 1.1 (DEYANIRA Negative)                                   Avg.   number HER2 signals/nucleus: 1.9                                                                           Avg. number HAROLDO-17 signals/nucleus: 1.8    **Interpretive guidelines per the American Society of Clinical   Oncology/College of American Pathologists  Clinical Practice Guideline Focused Update (Rasheed RODRIGUEZ et al, 2018, Arch   Pathol Lab Med  d                          oi:10.5858/arpa.1641-6831-KS):    -- Group 1: HER2/HAROLDO-17 ratio 2.0 or more -AND- avg. number HER2   signals/nucleus 4.0 or more (DEYANIRA Positive)  -- Group 2: HER2/HAROLDO-17 ratio 2.0 or more -AND- avg. number HER2   signals/nucleus <4.0 (Additional work  required)  -- Group 3: HER2/HAROLDO-17 ratio <2.0 -AND- avg. number HER2 signals/nucleus   6.0 or more (Additional work  required)  -- Group 4: HER2/HAROLDO-17 ratio <2.0 -AND- avg. number HER2 signals/nucleus   4.0 or more and <6.0 (Additional  work required)  -- Group 5: HER2/HAROLDO-17 ratio <2.0 -AND- avg. number HER2 signals/nucleus   <4.0 (DEYANIRA Negative)    INTERPRETATION:  Per the American  Society of Clinical Oncology/College of American   Pathologists Clinical Practice Guideline  Focused Update (Rasheed RODRIGUEZ et al, 2018, Arch Pathol Lab Med    doi:10.5858/arpa.[email or web address]-DZ), the HER2/HAROLDO 17  ratio of 1.1 and average number of HER2 signals/cell of 1.9 places this   specimen in Group 5 (DEYANIRA Negative).    Specimen:  Case [id number], Block A1  Reported f                          ormalin fixation time:  6-72 hours  Number of cells scored: 60  Probe:   [employer] HER2/HAROLDO-17 IQFISH pharmDx Probe Mix to HER2 (17q12) and to   the centromere region of chromosome  17    ADDITIONAL COMMENTS:  The IQFISH pharmDx test has been approved by the FDA for the evaluation of   HER2 (ERBB2) gene amplification  status in formalin-fixed, paraffin-embedded breast cancer tissue specimens   and gastric or gastroesophageal  junction adenocarcinoma.  It is intended for use as an adjunct to other   existing clinicopathologic information  used to evaluate patients with such tumors. This test was developed and   its performance characteristics  determined by the [facility] [place]   Clinical Laboratories.    Electronically Signed Out By:  Linnette Gallegos M.D., UMPhysiciansT Codes:  A: 07557-FIT6, [id number]    TESTING LAB LOCATION:  [address] [id number] [phone]                              COLLECTION SITE:  Client:  [facility]  Location:  [facility] ()         IMAGING RESULTS:  Recent Results (from the past 744 hour(s))   MA Screening Digital Bilateral    Narrative    Examination: Bilateral digital screening mammography with computer  aided detection, 10/3/2018 1:04 PM.    Comparison: 09/21/2017, 09/12/2016, 09/04/2015, 08/27/2014    History: No current breast concerns. Patient reports prior benign  biopsies.    BREAST DENSITY: Heterogeneously dense.    COMMENTS: Possible developing focal asymmetry  in the LEFT breast at  approximately 3:00 position, 7-8 cm from the nipple. No concerning  findings in the right breast.       Impression    IMPRESSION: BI-RADS CATEGORY: 0 - Need Additional Imaging Evaluation  and/or Prior Mammograms for Comparison.    RECOMMENDED FOLLOW-UP: Diagnostic Mammogram and Ultrasound.    Additional imaging evaluation the LEFT breast with diagnostic  mammography and possibly ultrasound.    The patient will be notified of the results.     JUAN R VOGEL MD   MA Diagnostic Left w/Jonathan    Narrative    MA DIAGNOSTIC LEFT W/ JONATHAN, US BREAST LEFT LIMITED 1-3 QUADRANTS  10/15/2018 1:46 PM    HISTORY:  Focal asymmetry in left breast on screening mammogram.  Further assess.    COMPARISON:  10/3/2018.    TECHNIQUE:  Left digital mammography with CAD is performed as well as  DBT. Directed left breast and left axillary ultrasound are also done.    BREAST DENSITY: Heterogeneously dense.    LEFT ADDITIONAL VIEWS: Further detailed mammography to the area in  question shows an irregular 1.6 cm lesion with architectural  distortion.    LEFT BREAST/LEFT AXILLARY ULTRASOUND: Directed sonography to the site  of the mammographic finding shows a 1.6 cm irregular solid lesion.  Ultrasound-guided needle core biopsy is recommended at this time. This  was discussed with patient. We will help her get set up for this.  Additional sonography to the left axilla shows no abnormal lymph  nodes.      Impression    IMPRESSION: BI-RADS CATEGORY: 4 - Suspicious Abnormality-Biopsy Should  Be Considered.    RECOMMENDED FOLLOW-UP: Biopsy.    RIOS PEREZ MD   US Breast Left    Narrative    MA DIAGNOSTIC LEFT W/ JONATHAN, US BREAST LEFT LIMITED 1-3 QUADRANTS  10/15/2018 1:46 PM    HISTORY:  Focal asymmetry in left breast on screening mammogram.  Further assess.    COMPARISON:  10/3/2018.    TECHNIQUE:  Left digital mammography with CAD is performed as well as  DBT. Directed left breast and left axillary ultrasound are also  done.    BREAST DENSITY: Heterogeneously dense.    LEFT ADDITIONAL VIEWS: Further detailed mammography to the area in  question shows an irregular 1.6 cm lesion with architectural  distortion.    LEFT BREAST/LEFT AXILLARY ULTRASOUND: Directed sonography to the site  of the mammographic finding shows a 1.6 cm irregular solid lesion.  Ultrasound-guided needle core biopsy is recommended at this time. This  was discussed with patient. We will help her get set up for this.  Additional sonography to the left axilla shows no abnormal lymph  nodes.      Impression    IMPRESSION: BI-RADS CATEGORY: 4 - Suspicious Abnormality-Biopsy Should  Be Considered.    RECOMMENDED FOLLOW-UP: Biopsy.    RIOS PEREZ MD   MA Post Procedure Left    Addendum: 10/23/2018    I spoke with Dr. Grover on 10/23/2018 and confirmed that she is aware  of these results.    RAYMOND CUENCA MD      Addendum: 10/19/2018    ARRON SAENZ STEPHANIE  [id number], [id number]    FINAL DIAGNOSIS: Invasive ductal carcinoma grade 2. Please see final  path report.    Result is concordant.  Surgical consultation is recommended.      Raymond Cuenca MD   Date of Addendum: 10/19/2018    RAYMOND CUENCA MD      Narrative    ULTRASOUND-GUIDED LEFT BREAST CORE BIOPSY;   CLIP PLACEMENT;   POSTPROCEDURE DIGITAL MAMMOGRAM LEFT BREAST; 10/17/2018 10:58 AM    INDICATION FOR PROCEDURE: Left breast mass at 3:00.     PROCEDURE: Approximately 5 mL lidocaine without epinephrine was  infiltrated for local anesthetic and a 13-gauge trocar was introduced  via lateral approach.  The needle tip was placed adjacent to the  lesion. A series of 3 samples were obtained with a 14-gauge  core-cutting needle. A clip was then deployed to trista the lesion.   There was less than 5 cc of blood loss.    Postbiopsy unilateral digital mammogram of the left breast showed the  clip to be at the expected biopsy site.  The patient tolerated the  procedure without difficulty and there was no significant pain  or  immediate complication at the end of the procedure.       Impression    IMPRESSION: Successful left breast ultrasound-guided core biopsy and  clip placement.  Final pathology is pending.      MIKHAIL BARAJAS MD   US Breast Biopsy Core Needle Left    Addendum: 10/23/2018    I spoke with Dr. Grover on 10/23/2018 and confirmed that she is aware  of these results.    RAYMOND CUENCA MD      Addendum: 10/19/2018    ARRON BROWN  [id number], [id number]    FINAL DIAGNOSIS: Invasive ductal carcinoma grade 2. Please see final  path report.    Result is concordant.  Surgical consultation is recommended.      Raymond Cuenca MD   Date of Addendum: 10/19/2018    RAYMOND CUENCA MD      Narrative    ULTRASOUND-GUIDED LEFT BREAST CORE BIOPSY;   CLIP PLACEMENT;   POSTPROCEDURE DIGITAL MAMMOGRAM LEFT BREAST; 10/17/2018 10:58 AM    INDICATION FOR PROCEDURE: Left breast mass at 3:00.     PROCEDURE: Approximately 5 mL lidocaine without epinephrine was  infiltrated for local anesthetic and a 13-gauge trocar was introduced  via lateral approach.  The needle tip was placed adjacent to the  lesion. A series of 3 samples were obtained with a 14-gauge  core-cutting needle. A clip was then deployed to trista the lesion.   There was less than 5 cc of blood loss.    Postbiopsy unilateral digital mammogram of the left breast showed the  clip to be at the expected biopsy site.  The patient tolerated the  procedure without difficulty and there was no significant pain or  immediate complication at the end of the procedure.       Impression    IMPRESSION: Successful left breast ultrasound-guided core biopsy and  clip placement.  Final pathology is pending.      MIKHAIL BARAJAS MD       ASSESSMENT AND PLAN:    (C50.912) Invasive ductal carcinoma of breast, female, left (H)  This is a 48-year-old female patient with invasive ductal carcinoma of the breast with papillary features.  The tumor is hormone receptor positive and HER-2/tamara negative.  T1cN0 M0.   Today I discussed with the patient and family the natural history of breast cancer.  We talked about staging, biology, management, follow-up and prognosis.  We talked about different modalities and treatment of breast cancer including surgical resection including both lumpectomy with sentinel lymph node biopsy versus mastectomy.  Patient will be seen by surgery later this week to discuss surgical options.  We also talked about breast radiation therapy if breast preservation therapy with limbic is the patient choice.  We talked about systemic chemotherapy of breast cancer including both endocrine therapy and systemic chemotherapy.  We also talked about Oncotype DX to identify those patients who will benefit from chemotherapy.  I gave the patient overview of endocrine therapy with tamoxifen.  I also gave her an overview about potential side effects of endocrine therapy as well.  We also talked about indication for systemic chemotherapy in details as well as potential side effects.  I will meet with the patient again following her surgical treatment to discuss management plan in more details with the surgical pathology is available.  The patient asked about second opinion in [facility].  We will also facilitate this for her.  We also talked about genetic counseling as well, the patient will be referred for genetic counseling.  I also asked the patient to stop use of any birth control pills or any hormones.    (I10) Essential hypertension, benign  (primary encounter diagnosis)  Patient currently on lisinopril 40 mg orally daily.    The patient is ready to learn, no apparent learning barriers were identified, Diagnosis and treatment plans were explained to the patient. The patient expressed understanding of the content. The patient questions were answered to her satisfaction.    Stephen Peña MD    Time spent 60 minutes more than 50% of the time in counseling and coordination of care including discussion of  management of invasive ductal carcinoma of the breast, staging, biology, treatment, follow-up and prognosis`    Chart documentation with Dragon Voice recognition Software. Although reviewed after completion, some words and grammatical errors may remain.    Again, thank you for allowing me to participate in the care of your patient.        Sincerely,        Stephen Peña MD

## 2018-10-24 NOTE — MR AVS SNAPSHOT
After Visit Summary   10/24/2018    Desi Granado    MRN: [id number]           Patient Information     Date Of Birth          1970        Visit Information        Provider Department      10/24/2018 3:40 PM Stephen Peña MD [facility]        Today's Diagnoses     Essential hypertension, benign    -  1    Breast mass          Care Instructions    We would like you to see Dr. Hart for a surgical consult on Friday. We placed a referral for the [facility] for you to get a 2nd opinion. Dr. Peña would like to see you back after you are seen at [place].      When you are in need of a refill, please call your pharmacy and they will send us a request.      Copy of appointments, and after visit summary (AVS) given to patient.      If you have any questions during business hours (M-F 8 AM- 4PM), please call Awilda Loyd RN Oncology Hematology  at [facility] [phone].       For questions after business hours, or on holidays/weekends, please call our after hours Nurse Triage line [phone]. Thank you.            Follow-ups after your visit        Additional Services     GENERAL SURG ADULT REFERRAL       Your provider has referred you to: G: [facility] [phone]   [email or web address]  New breast cancer dx    Please be aware that coverage of these services is subject to the terms and limitations of your health insurance plan.  Call member services at your health plan with any benefit or coverage questions.      Please bring the following with you to your appointment:    (1) Any X-Rays, CTs or MRIs which have been performed.  Contact the facility where they were done to arrange for  prior to your scheduled appointment.   (2) List of current medications   (3) This referral request   (4) Any documents/labs given to you for this referral            Other Specialty Referral       Your  provider has referred you to: [facility]    Please call and schedule your own appointment.     Please be aware that coverage of these services is subject to the terms and limitations of your health insurance plan.  Call member services at your health plan with any benefit or coverage questions.      Please inform our clinic Referral Specialist of any tests that you may have already completed.    Please bring the following with you to your appointment:    (1) Any X-Rays, CTs or MRIs which have been performed.  Contact the facility where they were done to arrange for  prior to your scheduled appointment.  Any new CT, MRI or other procedures ordered by your specialist must be performed at a [facility] or coordinated by your clinic's referral office.    (2) List of current medications   (3) This referral request   (4) Any documents/labs given to you for this referral                  Your next 10 appointments already scheduled     Oct 26, 2018  1:30 PM CDT   (Arrive by 1:15 PM)   [facility] Hart [facility]   [facility]    [address] [place] [id number] [phone]              Who to contact     If you have questions or need follow up information about today's clinic visit or your schedule please contact [facility] directly at [phone].  Normal or non-critical lab and imaging results will be communicated to you by MyChart, letter or phone within 4 business days after the clinic has received the results. If you do not hear from us within 7 days, please contact the clinic through MyChart or phone. If you have a critical or abnormal lab result, we will notify you by phone as soon as possible.  Submit refill requests through [employer] or call your pharmacy and they will forward the refill request to us. Please allow 3 business days for your refill to be completed.          Additional Information About Your Visit        [facility]  "Information     Bar gives you secure access to your electronic health record. If you see a primary care provider, you can also send messages to your care team and make appointments. If you have questions, please call your primary care clinic.  If you do not have a primary care provider, please call [phone] and they will assist you.        Care EveryWhere ID     This is your Care EveryWhere ID. This could be used by other organizations to access your [facility] medical records  [id number]        Your Vitals Were     Pulse Temperature Respirations Height Pulse Oximetry BMI (Body Mass Index)    110 97.9  F (36.6  C) (Tympanic) 20 1.651 m (5' 5\") 98% 24.21 kg/m2       Blood Pressure from Last 3 Encounters:   10/24/18 138/81   09/14/18 138/87   08/16/18 (P) 141/86    Weight from Last 3 Encounters:   10/24/18 66 kg (145 lb 8 oz)   09/14/18 68.6 kg (151 lb 3.2 oz)   08/16/18 65.8 kg (145 lb)              We Performed the Following     GENERAL SURG ADULT REFERRAL     Other Specialty Referral        Primary Care Provider Office Phone # Fax #    Pearl Grover [phone] [address] [address] [facility] [address]        Equal Access to Services     ZOILA PITTS : Hadii essie ku hadasho Soomaali, waaxda luqadaha, qaybta kaalmada adeegyada, waxay idiin haytalonn jd shae. So [facility] [phone].    ATENCIÓN: Si habla español, tiene a caldwell disposición servicios gratuitos de asistencia lingüística. Llame al [phone].    We comply with applicable federal civil rights laws and [place] laws. We do not discriminate on the basis of race, color, national origin, age, disability, sex, sexual orientation, or gender identity.            Thank you!     Thank you for choosing [facility]  for your care. Our goal is always to provide you with excellent care. Hearing back from our patients is one way we can continue to improve our services. Please take a few minutes to complete the written " survey that you may receive in the mail after your visit with us. Thank you!             Your Updated Medication List - Protect others around you: Learn how to safely use, store and throw away your medicines at www.disposemymeds.org.          This list is accurate as of 10/24/18  4:52 PM.  Always use your most recent med list.                   Brand Name Dispense Instructions for use Diagnosis    ciprofloxacin 500 MG tablet    CIPRO    14 tablet    Take 1 tablet (500 mg) by mouth 2 times daily    Diverticulitis of colon       FLONASE ALLERGY RELIEF NA       LLQ pain       IBUPROFEN PO      AS NEEDED        lisinopril 40 MG tablet    PRINIVIL/ZESTRIL    90 tablet    Take 1 tablet (40 mg) by mouth daily    Essential hypertension, benign       LORazepam 1 MG tablet    ATIVAN    20 tablet    Take 1 tablet (1 mg) by mouth every 8 hours as needed for anxiety    Adjustment disorder with anxious mood       metroNIDAZOLE 500 MG tablet    FLAGYL    14 tablet    Take 1 tablet (500 mg) by mouth 2 times daily    Diverticulitis of colon       norgestrel-ethinyl estradiol 0.3-30 MG-MCG per tablet    LO/OVRAL (28)    84 tablet    Take 1 tablet by mouth daily    Encounter for surveillance of contraceptive pills       ZYRTEC 10 MG tablet   Generic drug:  cetirizine     90    1 TABLET DAILY    Allergic rhinitis, cause unspecified

## 2018-10-24 NOTE — NURSING NOTE
"Oncology Rooming Note    October 24, 2018 3:46 PM   Desi Granado is a 48 year old female who presents for:    Chief Complaint   Patient presents with     Oncology Clinic Visit     New Patient,  Breast mass      Initial Vitals: /81 (BP Location: Right arm, Patient Position: Right side, Cuff Size: Adult Regular)  Pulse 110  Temp 97.9  F (36.6  C) (Tympanic)  Resp 20  Ht 1.651 m (5' 5\")  Wt 66 kg (145 lb 8 oz)  SpO2 98%  BMI 24.21 kg/m2 Estimated body mass index is 24.21 kg/(m^2) as calculated from the following:    Height as of this encounter: 1.651 m (5' 5\").    Weight as of this encounter: 66 kg (145 lb 8 oz). Body surface area is 1.74 meters squared.  No Pain (0) Comment: Data Unavailable   No LMP recorded.  Allergies reviewed: Yes  Medications reviewed: Yes    Medications: Medication refills not needed today.  Pharmacy name entered into [employer]:    [facility] [place] - [address] AT [facility] [place] - [address] [place] [address] [address]    Clinical concerns: New Patient,  Breast mass     7 minutes for nursing intake (face to face time)     Yanelis Victor LPN              "

## 2018-10-24 NOTE — PATIENT INSTRUCTIONS
We would like you to see Dr. Hart for a surgical consult on Friday. We placed a referral for the [facility] for you to get a 2nd opinion. Dr. Peña would like to see you back after you are seen at [place].      When you are in need of a refill, please call your pharmacy and they will send us a request.      Copy of appointments, and after visit summary (AVS) given to patient.      If you have any questions during business hours (M-F 8 AM- 4PM), please call Awilda Loyd RN Oncology Hematology  at [facility] [phone].       For questions after business hours, or on holidays/weekends, please call our after hours Nurse Triage line [phone]. Thank you.

## 2018-10-26 ENCOUNTER — OFFICE VISIT (OUTPATIENT)
Dept: SURGERY | Facility: CLINIC | Age: 48
End: 2018-10-26
Payer: COMMERCIAL

## 2018-10-26 VITALS
TEMPERATURE: 98.5 F | DIASTOLIC BLOOD PRESSURE: 80 MMHG | HEIGHT: 65 IN | OXYGEN SATURATION: 99 % | BODY MASS INDEX: 24.11 KG/M2 | WEIGHT: 144.7 LBS | HEART RATE: 121 BPM | SYSTOLIC BLOOD PRESSURE: 126 MMHG

## 2018-10-26 DIAGNOSIS — I10 ESSENTIAL HYPERTENSION, BENIGN: ICD-10-CM

## 2018-10-26 DIAGNOSIS — C50.912 INVASIVE DUCTAL CARCINOMA OF BREAST, FEMALE, LEFT (H): Primary | ICD-10-CM

## 2018-10-26 DIAGNOSIS — J30.9 ALLERGIC RHINITIS, UNSPECIFIED SEASONALITY, UNSPECIFIED TRIGGER: ICD-10-CM

## 2018-10-26 PROCEDURE — 99245 OFF/OP CONSLTJ NEW/EST HI 55: CPT | Performed by: SURGERY

## 2018-10-26 PROCEDURE — 99358 PROLONG SERVICE W/O CONTACT: CPT | Performed by: SURGERY

## 2018-10-26 ASSESSMENT — PAIN SCALES - GENERAL: PAINLEVEL: NO PAIN (0)

## 2018-10-26 NOTE — PROGRESS NOTES
General Surgery Consultation    Desi Granado MRN# [id number]   Age: 48 year old YOB: 1970         Assessment and Plan:   I was asked to see Ms. Granado  By Dr. Peña for evaluation of left breast cancer.  This is a 48 year old female who has the assessment below.      Assessment:    ICD-10-CM    1. Invasive ductal carcinoma of breast, female, left (H) C50.912    2. Essential hypertension, benign I10    3. Allergic rhinitis, unspecified seasonality, unspecified trigger J30.9        Plan:    I explained pt's pathology result as below   I explained the surgical options: breast conservative therapy vs simple mastectomy with sentinel lymph node biopsy.     I advised Ms. Granado that lymph node biopsy is recommended whenever we are dealing with invasive breast cancer and described the procedure for sentinel lymph node biopsy.  We talked about the risk for lymphedema (7%) which is small with removal of only a few nodes.    We talked about level 1 and level 2 axillary lymph node dissection; risk for lymphedema is up to 20%.  We talked about the indications for the axillary dissection.  I reviewed the data regarding lumpectomy and radiation vs mastectomy that shows that the local recurrence risk is slightly higher for lumpectomy and radiation vs mastectomy (5-10% vs. 1-2%), but the survival at 20 years is the same.      Patient has significant family history colon cancer in her paternal greatgrandparents.  But no significant breast cancer in a primary relative.  Patient stated that she will be meeting with a genetic counselor when she gets her second opinion at [place].          Ms. Granado is not interested seeing plastic surgery.  But she understands reconstruction is an option if she changes her mind.       As of right now, Ms. Granado is leaning more towards bilateral simple mastectomy with left sentinely lymph node biopsy, possible left axillary lymph node dissection.  Nilda understands that her overall  survival will not change with a bilateral simple mastectomy.  Understands that there will be more risks involved in a bilateral simple mastectomy.  We also discuss that there are no indications for annual MRIs of the chest wall after a bilateral simple mastectomy, and at this point it is only recommended that we do every 6 months clinical chest wall exam to detect recurrences.  Nilda also understands that her recurrence risks is slightly lower with a mastectomy compared to a partial mastectomy however the overall survival of her cancer does not change.    I drew out the procedure for above procedure in addition to the possible risks of the procedures - seroma, hematoma, wound infection, flap necrosis, and possible second surgery depending on the final result of the pathology report.       Nilda will be getting a second opinion at the [facility] prior to making her decision where her care will be.  She will call me to schedule her surgery and what type of surgery after her consultation with [facility].    30 mins were spent as noncontact time on the day of the visit reviewing patient's imagings,  and looking at pathology results    Total time with patient visit: 80 minutes including discussions about the plan of care and care coordination with the patient and family, mother (selena) and father (Jigar).    I thank Dr. Peña for the opportunity to participate in the patient's care.           Chief Complaint:   Left breast cancer     History is obtained from the patient         History of Present Illness:   This patient is a 48 year old  female without a significant past medical history who presents with biopsy-proven left IDC with papillary features, neg angio invasion, neg DCIS; 2/3; 6/9; ER/AZ (+); HER2 (-); US to left axilla neg for abnl lymph  Nodes.  This was found on a screening mammogram.  She stated she have had callbacks in the past due to her dense breasts.  Stated multiple years ago she  did have a breast biopsy that was concerning and had to undergo an excisional biopsy of the right breast which turned out to be a benign entity.  Patient does not remember the exact pathology however no additional surgeries was needed after that.  Patient stated that there has been no pain or changes to her breasts.  Not noticed a palpable mass.  No skin changes.  No nipple discharge.  And no pain in her breast except for after the biopsy.  Patient stated she has really bad anxiety especially when she gets mammogram.  Thus she does not want to have to worry about further imaging and getting called back.  Thus she is thinking of doing a double mastectomy since her diagnosis as above.  Patient does not have significant family history of breast cancer that she knows of.  However her sister recently got diagnosed and had a atypical lesion excised from her breast.  She denies any past medical history.  Denies any history of MI, CVA.  She is not on any blood thinners.        BREAST-SPECIFIC HISTORY:  Prior breast biopsies: yes, right breast excisional biopsy for ?tubular adenoma of the breast  Prior breast surgeries: yes as above  Prior radiation history: no  Hormone replacement therapy: no  Bra size: 36G  Dominant hand: right     GYN HISTORY:  [id number]  Age at 1st pregnancy: n/a  Age at menarche: 13  Breastfeeding history: n/a  Menopausal? Pre-menopausal - LMP 10/12/18     Reproductive PSH includes: Still has all of her reproductive organs.    Cancer history in self: None      FAMILY HISTORY:  Breast ca: no  Ovarian ca: no  Pancreatic ca: no  Gastric ca: no   Melanoma: no  Colon ca: yes -paternal great grandparents unknown age.  Other cancer: Sister with atypical breast lesion.  Both mother and father was diagnosed with basal cell and squamous cell carcinoma at age 60s.  Maternal grandmother and aunt both had multiple myeloma in their 70s and 80s.              Past Medical History:    has a past medical history of  Allergic rhinitis, cause unspecified; Hypertension (8 years?); Injury, other and unspecified, knee, leg, ankle, and foot (8th grade); and Invasive ductal carcinoma of breast, female, left (H) (10/26/2018).          Past Surgical History:     Past Surgical History:   Procedure Laterality Date     BIOPSY       BREAST LUMPECTOMY, RT/LT  6/23/10    Breast Lumpectomy RT for likely adenomatous lumps     COLONOSCOPY N/A 8/16/2018    Procedure: COLONOSCOPY;  colonoscopy;  Surgeon: Vijay Almanza MD;  Location: [facility]     ESOPHAGOSCOPY, GASTROSCOPY, DUODENOSCOPY (EGD), COMBINED N/A 10/1/2015    Procedure: COMBINED ESOPHAGOSCOPY, GASTROSCOPY, DUODENOSCOPY (EGD);  Surgeon: Vijay Almanza MD;  Location: [facility]     SOFT TISSUE SURGERY      Ankle surgery 20+ years ago     SURGICAL HISTORY OF -   1989    arthroscopy of Left Ankle     SURGICAL HISTORY OF -   1990    arthroscopy of Left Ankle     SURGICAL HISTORY OF -   1993    4 wisdom teeth extracted           Medications:     Current Outpatient Prescriptions on File Prior to Visit:  Fluticasone Propionate (FLONASE ALLERGY RELIEF NA)    IBUPROFEN PO AS NEEDED   lisinopril (PRINIVIL/ZESTRIL) 40 MG tablet Take 1 tablet (40 mg) by mouth daily   LORazepam (ATIVAN) 1 MG tablet Take 1 tablet (1 mg) by mouth every 8 hours as needed for anxiety   ZYRTEC 10 MG OR TABS 1 TABLET DAILY     No current facility-administered medications on file prior to visit.       Allergies:      Allergies   Allergen Reactions     Aspirin Hives     Dust Mites      Morphine GI Disturbance            Social History:   Desi Granado  reports that she has never smoked. She has never used smokeless tobacco. She reports that she does not drink alcohol or use illicit drugs.          Family History:   The patient has no family history of any bleeding, clotting or anesthesia problems.          Review of Systems:     Constitutional: Denies fever or chills   Eyes: Denies change in visual acuity; wears glasses and  "contacts.  HENT: Denies sore throat; +nasal congestion   Respiratory: Denies cough or shortness of breath   Cardiovascular: Denies chest pain or edema   GI: Denies abdominal pain, nausea, vomiting, bloody stools or diarrhea   : Denies dysuria   Musculoskeletal: Denies back pain or joint pain   Integument: Denies rash   Neurologic: Denies headache, focal weakness or sensory changes   Endocrine: Denies polyuria or polydipsia; + Excessive thirst  Lymphatic: Denies swollen glands   Psychiatric: Denies depression or anxiety          Physical Exam:     VITAL SIGNS:  height is 1.651 m (5' 5\") and weight is 65.6 kg (144 lb 11.2 oz). Her temporal temperature is 98.5  F (36.9  C). Her blood pressure is 126/80 and her pulse is 121. Her oxygen saturation is 99%.   Constitutional: Well developed, Well nourished, No acute distress, Non-toxic appearance.   HENT: Normocephalic, Atraumatic, Bilateral external ears normal, Oropharynx moist, No oral exudates, Nose normal.   Eyes: Conjunctiva normal, No discharge.   Neck: Normal range of motion, No tenderness, Supple, No stridor.   Lymphatic: No lymphadenopathy noted.   Cardiovascular: Normal heart rate, Normal rhythm, No murmurs, No rubs, No gallops.   Breast Exam:     RIGHT: normal without suspicious masses, skin changes or axillary nodes, symmetric fibrous changes in both upper outer quadrants, self exam is taught and encouraged.  Noted previous excisional biopsy scar at the 12-1 oclock.    LEFT:normal w skin changes or axillary nodes, symmetric fibrous changes in both upper outer quadrants, self exam is taught and encouraged, mass of size 1-2 cm noted in 3 oclock 4-5 cm from NAC with some ecchymosis.  Thorax & Lungs: Normal breath sounds, No respiratory distress, No wheezing, No chest tenderness.   Abdomen: Bowel sounds normal, Soft, No masses, No pulsatile masses.   Skin: Warm, Dry, No erythema, No rash.   Back: No tenderness, No CVA tenderness.   Extremities: Intact distal " pulses, No edema, No tenderness, No cyanosis, No clubbing.   Musculoskeletal: Good range of motion in all major joints. No tenderness to palpation or major deformities noted.   Neurologic: Alert & oriented x 3, Normal motor function, Normal sensory function, No focal deficits noted.   Psychiatric: Affect normal, Judgment normal, Mood normal.           Data:   WBC -   WBC   Date Value Ref Range Status   06/01/2018 14.9 (H) 4.0 - 11.0 10e9/L Final   ], HgB -   Hemoglobin   Date Value Ref Range Status   06/01/2018 13.2 11.7 - 15.7 g/dL Final   ]   Liver Function Studies -   Recent Labs   Lab Test  06/01/18   1138   PROTTOTAL  7.5   ALBUMIN  3.5   BILITOTAL  0.7   ALKPHOS  82   AST  13   ALT  15       Imagings:   Raymond Cuenca MD Tue Oct 23, 2018  9:17:44 AM CDT   I spoke with Dr. Grover on 10/23/2018 and confirmed that she is aware  of these results.     MD Bang WOOD Steven Gordon, MD Fri Oct 19, 2018  1:33:16 PM CDT   ARRON BROWN  [id number], [id number]     FINAL DIAGNOSIS: Invasive ductal carcinoma grade 2. Please see final  path report.     Result is concordant.  Surgical consultation is recommended.       Raymond Cuenca MD   Date of Addendum: 10/19/2018     RAYMOND CUENCA MD   Study Result   ULTRASOUND-GUIDED LEFT BREAST CORE BIOPSY;   CLIP PLACEMENT;   POSTPROCEDURE DIGITAL MAMMOGRAM LEFT BREAST; 10/17/2018 10:58 AM     INDICATION FOR PROCEDURE: Left breast mass at 3:00.      PROCEDURE: Approximately 5 mL lidocaine without epinephrine was  infiltrated for local anesthetic and a 13-gauge trocar was introduced  via lateral approach.  The needle tip was placed adjacent to the  lesion. A series of 3 samples were obtained with a 14-gauge  core-cutting needle. A clip was then deployed to trista the lesion.   There was less than 5 cc of blood loss.     Postbiopsy unilateral digital mammogram of the left breast showed the  clip to be at the expected biopsy site.  The patient tolerated  the  procedure without difficulty and there was no significant pain or  immediate complication at the end of the procedure.          IMPRESSION: Successful left breast ultrasound-guided core biopsy and  clip placement.  Final pathology is pending.       MIKHAIL BARAJAS MD       MA DIAGNOSTIC LEFT W/ JONATHAN, US BREAST LEFT LIMITED 1-3 QUADRANTS  10/15/2018 1:46 PM     HISTORY:  Focal asymmetry in left breast on screening mammogram.  Further assess.     COMPARISON:  10/3/2018.     TECHNIQUE:  Left digital mammography with CAD is performed as well as  DBT. Directed left breast and left axillary ultrasound are also done.     BREAST DENSITY: Heterogeneously dense.     LEFT ADDITIONAL VIEWS: Further detailed mammography to the area in  question shows an irregular 1.6 cm lesion with architectural  distortion.     LEFT BREAST/LEFT AXILLARY ULTRASOUND: Directed sonography to the site  of the mammographic finding shows a 1.6 cm irregular solid lesion.  Ultrasound-guided needle core biopsy is recommended at this time. This  was discussed with patient. We will help her get set up for this.  Additional sonography to the left axilla shows no abnormal lymph  nodes.         IMPRESSION: BI-RADS CATEGORY: 4 - Suspicious Abnormality-Biopsy Should  Be Considered.     RECOMMENDED FOLLOW-UP: Biopsy.     RIOS PEREZ MD      Examination: Bilateral digital screening mammography with computer  aided detection, 10/3/2018 1:04 PM.     Comparison: 09/21/2017, 09/12/2016, 09/04/2015, 08/27/2014     History: No current breast concerns. Patient reports prior benign  biopsies.     BREAST DENSITY: Heterogeneously dense.     COMMENTS: Possible developing focal asymmetry in the LEFT breast at  approximately 3:00 position, 7-8 cm from the nipple. No concerning  findings in the right breast.          IMPRESSION: BI-RADS CATEGORY: 0 - Need Additional Imaging Evaluation  and/or Prior Mammograms for Comparison.     RECOMMENDED FOLLOW-UP: Diagnostic Mammogram  "and Ultrasound.     Additional imaging evaluation the LEFT breast with diagnostic  mammography and possibly ultrasound.     The patient will be notified of the results.      JUAN R VOGEL MD      Pathology:   Сергейath Report 10/17/2018 10:40 AM 88   Patient Name: ARRON BROWN   MR#: [id number]   Specimen #: [id number]   Collected: 10/17/2018   Received: 10/17/2018   Reported: 10/18/2018 21:52   Ordering Phy(s): MIKHAIL BARAJAS   Additional Phy(s): KRISTI LAY     For improved result formatting, select 'View Enhanced Report Format' under    Linked Documents section.     SPECIMEN(S):   Breast needle biopsy, left mass     FINAL DIAGNOSIS:   Breast needle biopsy, left mass:   - Invasive ductal carcinoma with papillary features (see description and   images)   - Jb grade: II (of possible III)        - Kila score 6 (of possible 9)        - Scoring criteria: tubules = 3, nuclear pleomorphism = 2, mitosis =   1.   - Angiolymphatic invasion:  None seen   - Ductal carcinoma in-situ: None seen   - Estrogen receptor:  Positive (100% strong nuclear staining)   - Progesterone receptor:  Positive (100% strong nuclear staining)   - Her2/tamara:  FISH analysis will be performed and reported by the   [facility] in a separate report.     COMMENT:   The breast reporting protocol is based on AJCC/UICC TNM, 8th edition.   Protocol web posting date: January 2018      Digital images are included with this report (16 images total)     To view the digital images associated with this report in Epic, select   \"View Enhanced Report Format\" under the   Linked Documents section of this report to view the PDF version of the   original [employer] generated report.     This case was seen in intradepartmental consultation.     Electronically signed out by:     JAMEY Alejandre M.D.      Patient Name: ARRON BROWN   MR#: [id number]   Specimen #: [id number]   Collected: 10/17/2018 10:40   Received: " 10/19/2018 12:16   Reported: 10/22/2018 19:13   Ordering Phy(s): JUSTIN POWELL   Additional Phy(s): SILVANA LAY     For improved result formatting, select 'View Enhanced Report Format' under    Linked Documents section.   __________________________________________     TEST(S) REQUESTED:   [facility] HER2 FISH     SPECIMEN DESCRIPTION:   Breast Tissue, Paraffin Embedded     CLINICAL COMMENTS:   [id number]     RESULTS:     Ratio of HER2/HAROLDO-17 signals   Desi Granado: 1.1 (DEYANIRA Negative)                                   Avg.    number HER2 signals/nucleus: 1.9                                                                             Avg. number HAROLDO-17 signals/nucleus: 1.8     **Interpretive guidelines per the American Society of Clinical   Oncology/College of American Pathologists   Clinical Practice Guideline Focused Update (Rasheed RODRIGUEZ et al, 2018, Arch   Pathol Lab Med   doi:10.5858/arpa.6496-1504-PB):     -- Group 1: HER2/HAROLDO-17 ratio 2.0 or more -AND- avg. number HER2   signals/nucleus 4.0 or more (DEYANIRA Positive)   -- Group 2: HER2/HAROLDO-17 ratio 2.0 or more -AND- avg. number HER2   signals/nucleus <4.0 (Additional work   required)   -- Group 3: HER2/HAROLDO-17 ratio <2.0 -AND- avg. number HER2 signals/nucleus   6.0 or more (Additional work   required)   -- Group 4: HER2/HAROLDO-17 ratio <2.0 -AND- avg. number HER2 signals/nucleus   4.0 or more and <6.0 (Additional   work required)   -- Group 5: HER2/HAROLDO-17 ratio <2.0 -AND- avg. number HER2 signals/nucleus   <4.0 (DEYANIRA Negative)     INTERPRETATION:   Per the American Society of Clinical Oncology/College of American   Pathologists Clinical Practice Guideline   Focused Update (Rasheed RODRIGUEZ et al, 2018, Arch Pathol Lab Med     doi:10.5858/arpa.4090-1403-YB), the HER2/HAROLDO 17   ratio of 1.1 and average number of HER2 signals/cell of 1.9 places this   specimen in Group 5 (DEYANIRA Negative).     Specimen:  Case [id number], Block A1   Reported formalin fixation time:   6-72 hours   Number of cells scored: 60   Probe:   [employer] HER2/HAROLDO-17 IQFISH pharmDx Probe Mix to HER2 (17q12) and to   the centromere region of chromosome   17     ADDITIONAL COMMENTS:   The IQFISH pharmDx test has been approved by the FDA for the evaluation of    HER2 (ERBB2) gene amplification   status in formalin-fixed, paraffin-embedded breast cancer tissue specimens    and gastric or gastroesophageal   junction adenocarcinoma.  It is intended for use as an adjunct to other   existing clinicopathologic information   used to evaluate patients with such tumors. This test was developed and   its performance characteristics   determined by the [facility].     Electronically Signed Out By:   Linnette Gallegos M.D., UMPhysiciansT Codes:   A: [id number], [id number]     [facility] Luke's HospitalJulienne HartDO breann 10/26/2018

## 2018-10-26 NOTE — MR AVS SNAPSHOT
"              After Visit Summary   10/26/2018    Desi Granado    MRN: [id number]           Patient Information     Date Of Birth          1970        Visit Information        Provider Department      10/26/2018 1:30 PM Nabila Hrat MD [facility]        Today's Diagnoses     Invasive ductal carcinoma of breast, female, left (H)    -  1    Essential hypertension, benign        Allergic rhinitis, unspecified seasonality, unspecified trigger           Follow-ups after your visit        Who to contact     If you have questions or need follow up information about today's clinic visit or your schedule please contact [facility] directly at [phone].  Normal or non-critical lab and imaging results will be communicated to you by MyChart, letter or phone within 4 business days after the clinic has received the results. If you do not hear from us within 7 days, please contact the clinic through huluhart or phone. If you have a critical or abnormal lab result, we will notify you by phone as soon as possible.  Submit refill requests through [employer] or call your pharmacy and they will forward the refill request to us. Please allow 3 business days for your refill to be completed.          Additional Information About Your Visit        MyChart Information     [employer] gives you secure access to your electronic health record. If you see a primary care provider, you can also send messages to your care team and make appointments. If you have questions, please call your primary care clinic.  If you do not have a primary care provider, please call [phone] and they will assist you.        Care EveryWhere ID     This is your Care EveryWhere ID. This could be used by other organizations to access your [facility] medical records  [id number]        Your Vitals Were     Pulse Temperature Height Last Period Pulse Oximetry BMI (Body Mass Index)    121 98.5  F (36.9  C) (Temporal) 1.651 m (5' 5\") " 10/10/2018 (Exact Date) 99% 24.08 kg/m2       Blood Pressure from Last 3 Encounters:   10/26/18 126/80   10/24/18 138/81   09/14/18 138/87    Weight from Last 3 Encounters:   10/26/18 65.6 kg (144 lb 11.2 oz)   10/24/18 66 kg (145 lb 8 oz)   09/14/18 68.6 kg (151 lb 3.2 oz)              Today, you had the following     No orders found for display       Primary Care Provider Office Phone # Fax #    Pearl Grover [phone] [address] [facility] [address]        Equal Access to Services     LIDIA PITTS : Hadii essie Tellez, wanegrada bc, qaybluisa kaalmada riki, roz shea. So [facility] [phone].    ATENCIÓN: Si habla español, tiene a caldwell disposición servicios gratuitos de asistencia lingüística. Ll[facility] [phone].    We comply with applicable federal civil rights laws and [place] laws. We do not discriminate on the basis of race, color, national origin, age, disability, sex, sexual orientation, or gender identity.            Thank you!     Thank you for choosing [facility]  for your care. Our goal is always to provide you with excellent care. Hearing back from our patients is one way we can continue to improve our services. Please take a few minutes to complete the written survey that you may receive in the mail after your visit with us. Thank you!             Your Updated Medication List - Protect others around you: Learn how to safely use, store and throw away your medicines at www.disposemymeds.org.          This list is accurate as of 10/26/18 11:59 PM.  Always use your most recent med list.                   Brand Name Dispense Instructions for use Diagnosis    FLONASE ALLERGY RELIEF NA       LLQ pain       IBUPROFEN PO      AS NEEDED        lisinopril 40 MG tablet    PRINIVIL/ZESTRIL    90 tablet    Take 1 tablet (40 mg) by mouth daily    Essential hypertension, benign       LORazepam 1 MG tablet    ATIVAN    20  tablet    Take 1 tablet (1 mg) by mouth every 8 hours as needed for anxiety    Adjustment disorder with anxious mood       ZYRTEC 10 MG tablet   Generic drug:  cetirizine     90    1 TABLET DAILY    Allergic rhinitis, cause unspecified

## 2018-10-26 NOTE — PROGRESS NOTES
"/80 (BP Location: Right arm, Patient Position: Sitting, Cuff Size: Adult Large)  Pulse 121  Temp 98.5  F (36.9  C) (Temporal)  Ht 1.651 m (5' 5\")  Wt 65.6 kg (144 lb 11.2 oz)  LMP 10/10/2018 (Exact Date)  SpO2 99%  BMI 24.08 kg/m2  Body mass index is 24.08 kg/(m^2).  5' 5\"  144 lbs 11.2 oz        Rebeca Wadsworth MA on 10/26/2018 at 1:33 PM    "

## 2018-10-26 NOTE — LETTER
10/26/2018         RE: Desi Granado  [address] [place] [address]        Dear Colleague,    Thank you for referring your patient, Desi Granado, to the [facility]. Please see a copy of my visit note below.      General Surgery Consultation    Desi Granado MRN# [id number]   Age: 48 year old YOB: 1970         Assessment and Plan:   I was asked to see Ms. Granado  By Dr. Peña for evaluation of left breast cancer.  This is a 48 year old female who has the assessment below.      Assessment:    ICD-10-CM    1. Invasive ductal carcinoma of breast, female, left (H) C50.912    2. Essential hypertension, benign I10    3. Allergic rhinitis, unspecified seasonality, unspecified trigger J30.9        Plan:    I explained pt's pathology result as below   I explained the surgical options: breast conservative therapy vs simple mastectomy with sentinel lymph node biopsy.     I advised Ms. Granado that lymph node biopsy is recommended whenever we are dealing with invasive breast cancer and described the procedure for sentinel lymph node biopsy.  We talked about the risk for lymphedema (7%) which is small with removal of only a few nodes.    We talked about level 1 and level 2 axillary lymph node dissection; risk for lymphedema is up to 20%.  We talked about the indications for the axillary dissection.  I reviewed the data regarding lumpectomy and radiation vs mastectomy that shows that the local recurrence risk is slightly higher for lumpectomy and radiation vs mastectomy (5-10% vs. 1-2%), but the survival at 20 years is the same.      Patient has significant family history colon cancer in her paternal greatgrandparents.  But no significant breast cancer in a primary relative.  Patient stated that she will be meeting with a genetic counselor when she gets her second opinion at [facility].          Ms. Granado is not interested seeing plastic surgery.  But she  understands reconstruction is an option if she changes her mind.       As of right now, Ms. Granado is leaning more towards bilateral simple mastectomy with left sentinely lymph node biopsy, possible left axillary lymph node dissection.  Nilda understands that her overall survival will not change with a bilateral simple mastectomy.  Understands that there will be more risks involved in a bilateral simple mastectomy.  We also discuss that there are no indications for annual MRIs of the chest wall after a bilateral simple mastectomy, and at this point it is only recommended that we do every 6 months clinical chest wall exam to detect recurrences.  Nilda also understands that her recurrence risks is slightly lower with a mastectomy compared to a partial mastectomy however the overall survival of her cancer does not change.    I drew out the procedure for above procedure in addition to the possible risks of the procedures - seroma, hematoma, wound infection, flap necrosis, and possible second surgery depending on the final result of the pathology report.       Nilda will be getting a second opinion at the [facility] prior to making her decision where her care will be.  She will call me to schedule her surgery and what type of surgery after her consultation with [facility].    30 mins were spent as noncontact time on the day of the visit reviewing patient's imagings,  and looking at pathology results    Total time with patient visit: 80 minutes including discussions about the plan of care and care coordination with the patient and family, mother (selena) and father (Jigar).    I thank Dr. Peña for the opportunity to participate in the patient's care.           Chief Complaint:   Left breast cancer     History is obtained from the patient         History of Present Illness:   This patient is a 48 year old  female without a significant past medical history who presents with biopsy-proven left IDC with  papillary features, neg angio invasion, neg DCIS; 2/3; ; ER/LA (+); HER2 (-); US to left axilla neg for abnl lymph  Nodes.  This was found on a screening mammogram.  She stated she have had callbacks in the past due to her dense breasts.  Stated multiple years ago she did have a breast biopsy that was concerning and had to undergo an excisional biopsy of the right breast which turned out to be a benign entity.  Patient does not remember the exact pathology however no additional surgeries was needed after that.  Patient stated that there has been no pain or changes to her breasts.  Not noticed a palpable mass.  No skin changes.  No nipple discharge.  And no pain in her breast except for after the biopsy.  Patient stated she has really bad anxiety especially when she gets mammogram.  Thus she does not want to have to worry about further imaging and getting called back.  Thus she is thinking of doing a double mastectomy since her diagnosis as above.  Patient does not have significant family history of breast cancer that she knows of.  However her sister recently got diagnosed and had a atypical lesion excised from her breast.  She denies any past medical history.  Denies any history of MI, CVA.  She is not on any blood thinners.        BREAST-SPECIFIC HISTORY:  Prior breast biopsies: yes, right breast excisional biopsy for ?tubular adenoma of the breast  Prior breast surgeries: yes as above  Prior radiation history: no  Hormone replacement therapy: no  Bra size: 36G  Dominant hand: right     GYN HISTORY:  [id number]  Age at 1st pregnancy: n/a  Age at menarche: 13  Breastfeeding history: n/a  Menopausal? Pre-menopausal - LMP 10/12/18     Reproductive PSH includes: Still has all of her reproductive organs.    Cancer history in self: None      FAMILY HISTORY:  Breast ca: no  Ovarian ca: no  Pancreatic ca: no  Gastric ca: no   Melanoma: no  Colon ca: yes -paternal great grandparents unknown age.  Other cancer: Sister with  atypical breast lesion.  Both mother and father was diagnosed with basal cell and squamous cell carcinoma at age 60s.  Maternal grandmother and aunt both had multiple myeloma in their 70s and 80s.              Past Medical History:    has a past medical history of Allergic rhinitis, cause unspecified; Hypertension (8 years?); Injury, other and unspecified, knee, leg, ankle, and foot (8th grade); and Invasive ductal carcinoma of breast, female, left (H) (10/26/2018).          Past Surgical History:     Past Surgical History:   Procedure Laterality Date     BIOPSY       BREAST LUMPECTOMY, RT/LT  6/23/10    Breast Lumpectomy RT for likely adenomatous lumps     COLONOSCOPY N/A 8/16/2018    Procedure: COLONOSCOPY;  colonoscopy;  Surgeon: Vijay Almanza MD;  Location: [facility]     ESOPHAGOSCOPY, GASTROSCOPY, DUODENOSCOPY (EGD), COMBINED N/A 10/1/2015    Procedure: COMBINED ESOPHAGOSCOPY, GASTROSCOPY, DUODENOSCOPY (EGD);  Surgeon: Vijay Almanza MD;  Location: [facility]     SOFT TISSUE SURGERY      Ankle surgery 20+ years ago     SURGICAL HISTORY OF -   1989    arthroscopy of Left Ankle     SURGICAL HISTORY OF -   1990    arthroscopy of Left Ankle     SURGICAL HISTORY OF -   1993    4 wisdom teeth extracted           Medications:     Current Outpatient Prescriptions on File Prior to Visit:  Fluticasone Propionate (FLONASE ALLERGY RELIEF NA)    IBUPROFEN PO AS NEEDED   lisinopril (PRINIVIL/ZESTRIL) 40 MG tablet Take 1 tablet (40 mg) by mouth daily   LORazepam (ATIVAN) 1 MG tablet Take 1 tablet (1 mg) by mouth every 8 hours as needed for anxiety   ZYRTEC 10 MG OR TABS 1 TABLET DAILY     No current facility-administered medications on file prior to visit.       Allergies:      Allergies   Allergen Reactions     Aspirin Hives     Dust Mites      Morphine GI Disturbance            Social History:   Desi Granado  reports that she has never smoked. She has never used smokeless tobacco. She reports that she does not drink alcohol or  "use illicit drugs.          Family History:   The patient has no family history of any bleeding, clotting or anesthesia problems.          Review of Systems:     Constitutional: Denies fever or chills   Eyes: Denies change in visual acuity; wears glasses and contacts.  HENT: Denies sore throat; +nasal congestion   Respiratory: Denies cough or shortness of breath   Cardiovascular: Denies chest pain or edema   GI: Denies abdominal pain, nausea, vomiting, bloody stools or diarrhea   : Denies dysuria   Musculoskeletal: Denies back pain or joint pain   Integument: Denies rash   Neurologic: Denies headache, focal weakness or sensory changes   Endocrine: Denies polyuria or polydipsia; + Excessive thirst  Lymphatic: Denies swollen glands   Psychiatric: Denies depression or anxiety          Physical Exam:     VITAL SIGNS:  height is 1.651 m (5' 5\") and weight is 65.6 kg (144 lb 11.2 oz). Her temporal temperature is 98.5  F (36.9  C). Her blood pressure is 126/80 and her pulse is 121. Her oxygen saturation is 99%.   Constitutional: Well developed, Well nourished, No acute distress, Non-toxic appearance.   HENT: Normocephalic, Atraumatic, Bilateral external ears normal, Oropharynx moist, No oral exudates, Nose normal.   Eyes: Conjunctiva normal, No discharge.   Neck: Normal range of motion, No tenderness, Supple, No stridor.   Lymphatic: No lymphadenopathy noted.   Cardiovascular: Normal heart rate, Normal rhythm, No murmurs, No rubs, No gallops.   Breast Exam:     RIGHT: normal without suspicious masses, skin changes or axillary nodes, symmetric fibrous changes in both upper outer quadrants, self exam is taught and encouraged.  Noted previous excisional biopsy scar at the 12-1 oclock.    LEFT:normal w skin changes or axillary nodes, symmetric fibrous changes in both upper outer quadrants, self exam is taught and encouraged, mass of size 1-2 cm noted in 3 oclock 4-5 cm from NAC with some ecchymosis.  Thorax & Lungs: Normal " breath sounds, No respiratory distress, No wheezing, No chest tenderness.   Abdomen: Bowel sounds normal, Soft, No masses, No pulsatile masses.   Skin: Warm, Dry, No erythema, No rash.   Back: No tenderness, No CVA tenderness.   Extremities: Intact distal pulses, No edema, No tenderness, No cyanosis, No clubbing.   Musculoskeletal: Good range of motion in all major joints. No tenderness to palpation or major deformities noted.   Neurologic: Alert & oriented x 3, Normal motor function, Normal sensory function, No focal deficits noted.   Psychiatric: Affect normal, Judgment normal, Mood normal.           Data:   WBC -   WBC   Date Value Ref Range Status   06/01/2018 14.9 (H) 4.0 - 11.0 10e9/L Final   ], HgB -   Hemoglobin   Date Value Ref Range Status   06/01/2018 13.2 11.7 - 15.7 g/dL Final   ]   Liver Function Studies -   Recent Labs   Lab Test  06/01/18   1138   PROTTOTAL  7.5   ALBUMIN  3.5   BILITOTAL  0.7   ALKPHOS  82   AST  13   ALT  15       Imagings:   Raymond Cuenca MD   cresencio Oct 23, 2018  9:17:44 AM CDT   I spoke with Dr. Grover on 10/23/2018 and confirmed that she is aware  of these results.     MD Bang WOOD Steven Gordon, MD Fri Oct 19, 2018  1:33:16 PM CDT   ARRON LETTY BROWN  [id number], [id number]     FINAL DIAGNOSIS: Invasive ductal carcinoma grade 2. Please see final  path report.     Result is concordant.  Surgical consultation is recommended.       Raymond Cuenca MD   Date of Addendum: 10/19/2018     RAYMOND CUENCA MD   Study Result   ULTRASOUND-GUIDED LEFT BREAST CORE BIOPSY;   CLIP PLACEMENT;   POSTPROCEDURE DIGITAL MAMMOGRAM LEFT BREAST; 10/17/2018 10:58 AM     INDICATION FOR PROCEDURE: Left breast mass at 3:00.      PROCEDURE: Approximately 5 mL lidocaine without epinephrine was  infiltrated for local anesthetic and a 13-gauge trocar was introduced  via lateral approach.  The needle tip was placed adjacent to the  lesion. A series of 3 samples were obtained with a  14-gauge  core-cutting needle. A clip was then deployed to trista the lesion.   There was less than 5 cc of blood loss.     Postbiopsy unilateral digital mammogram of the left breast showed the  clip to be at the expected biopsy site.  The patient tolerated the  procedure without difficulty and there was no significant pain or  immediate complication at the end of the procedure.          IMPRESSION: Successful left breast ultrasound-guided core biopsy and  clip placement.  Final pathology is pending.       MIKHAIL BARAJAS MD       MA DIAGNOSTIC LEFT W/ JONATHAN, US BREAST LEFT LIMITED 1-3 QUADRANTS  10/15/2018 1:46 PM     HISTORY:  Focal asymmetry in left breast on screening mammogram.  Further assess.     COMPARISON:  10/3/2018.     TECHNIQUE:  Left digital mammography with CAD is performed as well as  DBT. Directed left breast and left axillary ultrasound are also done.     BREAST DENSITY: Heterogeneously dense.     LEFT ADDITIONAL VIEWS: Further detailed mammography to the area in  question shows an irregular 1.6 cm lesion with architectural  distortion.     LEFT BREAST/LEFT AXILLARY ULTRASOUND: Directed sonography to the site  of the mammographic finding shows a 1.6 cm irregular solid lesion.  Ultrasound-guided needle core biopsy is recommended at this time. This  was discussed with patient. We will help her get set up for this.  Additional sonography to the left axilla shows no abnormal lymph  nodes.         IMPRESSION: BI-RADS CATEGORY: 4 - Suspicious Abnormality-Biopsy Should  Be Considered.     RECOMMENDED FOLLOW-UP: Biopsy.     RIOS PEREZ MD      Examination: Bilateral digital screening mammography with computer  aided detection, 10/3/2018 1:04 PM.     Comparison: 09/21/2017, 09/12/2016, 09/04/2015, 08/27/2014     History: No current breast concerns. Patient reports prior benign  biopsies.     BREAST DENSITY: Heterogeneously dense.     COMMENTS: Possible developing focal asymmetry in the LEFT breast  "at  approximately 3:00 position, 7-8 cm from the nipple. No concerning  findings in the right breast.          IMPRESSION: BI-RADS CATEGORY: 0 - Need Additional Imaging Evaluation  and/or Prior Mammograms for Comparison.     RECOMMENDED FOLLOW-UP: Diagnostic Mammogram and Ultrasound.     Additional imaging evaluation the LEFT breast with diagnostic  mammography and possibly ultrasound.     The patient will be notified of the results.      JUAN R VOGEL MD      Pathology:   Copath Report 10/17/2018 10:40 AM 88   Patient Name: ARRON BROWN   MR#: [id number]   Specimen #: [id number]   Collected: 10/17/2018   Received: 10/17/2018   Reported: 10/18/2018 21:52   Ordering Phy(s): MIKHAIL BARAJAS   Additional Phy(s): KRISTI LAY     For improved result formatting, select 'View Enhanced Report Format' under    Linked Documents section.     SPECIMEN(S):   Breast needle biopsy, left mass     FINAL DIAGNOSIS:   Breast needle biopsy, left mass:   - Invasive ductal carcinoma with papillary features (see description and   images)   - Comstock grade: II (of possible III)        - Jb score 6 (of possible 9)        - Scoring criteria: tubules = 3, nuclear pleomorphism = 2, mitosis =   1.   - Angiolymphatic invasion:  None seen   - Ductal carcinoma in-situ: None seen   - Estrogen receptor:  Positive (100% strong nuclear staining)   - Progesterone receptor:  Positive (100% strong nuclear staining)   - Her2/tamara:  FISH analysis will be performed and reported by the   [facility]   [facility] in a separate report.     COMMENT:   The breast reporting protocol is based on AJCC/UICC TNM, 8th edition.   Protocol web posting date: January 2018      Digital images are included with this report (16 images total)     To view the digital images associated with this report in Epic, select   \"View Enhanced Report Format\" under the   Linked Documents section of this report to view the PDF version of the "   original [facility] generated report.     This case was seen in intradepartmental consultation.     Electronically signed out by:     JAMEY Powell M.D.      Patient Name: ARRON GRANADO   MR#: [id number]   Specimen #: [id number]   Collected: 10/17/2018 10:40   Received: 10/19/2018 12:16   Reported: 10/22/2018 19:13   Ordering Phy(s): JUSTIN POWELL   Additional Phy(s): SILVANA LAY     For improved result formatting, select 'View Enhanced Report Format' under    Linked Documents section.   __________________________________________     TEST(S) REQUESTED:   [facility] HER2 FISH     SPECIMEN DESCRIPTION:   Breast Tissue, Paraffin Embedded     CLINICAL COMMENTS:   [id number]     RESULTS:     Ratio of HER2/HAROLDO-17 signals   Arron Granado: 1.1 (DEYANIRA Negative)                                   Avg.    number HER2 signals/nucleus: 1.9                                                                             Avg. number HAROLDO-17 signals/nucleus: 1.8     **Interpretive guidelines per the American Society of Clinical   Oncology/College of American Pathologists   Clinical Practice Guideline Focused Update (Rasheed RODRIGUEZ et al, 2018, Arch   Pathol Lab Med   doi:10.5858/arpa.[email or web address]-YE):     -- Group 1: HER2/HAROLDO-17 ratio 2.0 or more -AND- avg. number HER2   signals/nucleus 4.0 or more (DEYANIRA Positive)   -- Group 2: HER2/HAROLDO-17 ratio 2.0 or more -AND- avg. number HER2   signals/nucleus <4.0 (Additional work   required)   -- Group 3: HER2/HAROLDO-17 ratio <2.0 -AND- avg. number HER2 signals/nucleus   6.0 or more (Additional work   required)   -- Group 4: HER2/HAROLDO-17 ratio <2.0 -AND- avg. number HER2 signals/nucleus   4.0 or more and <6.0 (Additional   work required)   -- Group 5: HER2/HAROLDO-17 ratio <2.0 -AND- avg. number HER2 signals/nucleus   <4.0 (DEYANIRA Negative)     INTERPRETATION:   Per the American Society of Clinical Oncology/College of American   Pathologists Clinical Practice Guideline   Focused Update (Rasheed  "MICHAEL et al, 2018, Arch Pathol Lab Med     doi:10.5858/arpa.[email or web address]-UA), the HER2/HAROLDO 17   ratio of 1.1 and average number of HER2 signals/cell of 1.9 places this   specimen in Group 5 (DEYANIRA Negative).     Specimen:  Case [id number], Block A1   Reported formalin fixation time:  6-72 hours   Number of cells scored: 60   Probe:   [employer] HER2/HAROLDO-17 IQFISH pharmDx Probe Mix to HER2 (17q12) and to   the centromere region of chromosome   17     ADDITIONAL COMMENTS:   The IQFISH pharmDx test has been approved by the FDA for the evaluation of    HER2 (ERBB2) gene amplification   status in formalin-fixed, paraffin-embedded breast cancer tissue specimens    and gastric or gastroesophageal   junction adenocarcinoma.  It is intended for use as an adjunct to other   existing clinicopathologic information   used to evaluate patients with such tumors. This test was developed and   its performance characteristics   determined by the [facility] [place]   Clinical Laboratories.     Electronically Signed Out By:   Linnette Gallegos M.D., [facility] Codes:   A: [id number], [id number]     Nabila Hart DO 10/26/2018              /80 (BP Location: Right arm, Patient Position: Sitting, Cuff Size: Adult Large)  Pulse 121  Temp 98.5  F (36.9  C) (Temporal)  Ht 1.651 m (5' 5\")  Wt 65.6 kg (144 lb 11.2 oz)  LMP 10/10/2018 (Exact Date)  SpO2 99%  BMI 24.08 kg/m2  Body mass index is 24.08 kg/(m^2).  5' 5\"  144 lbs 11.2 oz        Rebeca Wadsworth MA on 10/26/2018 at 1:33 PM      Again, thank you for allowing me to participate in the care of your patient.        Sincerely,        Nabila Hart MD    "

## 2018-10-30 NOTE — PROGRESS NOTES
DATE OF VISIT: Oct 24, 2018    REASON FOR REFERRAL: Management of breast cancer    CHIEF COMPLAINT:   Chief Complaint   Patient presents with     Oncology Clinic Visit     New Patient,  Breast mass        HISTORY OF PRESENT ILLNESS:     48-year-old female patient was found to have developing focal asymmetry in the left breast at approximately 3 o'clock position about 7-8 cm from the nipple on the routine mammogram done on October 30, 2018.  Subsequently she went on to have diagnostic mammogram and ultrasound.  Directed sonogram at the site of mammographic finding showed a 1.6 cm irregular solid lesion.  Subsequently the patient went on to have ultrasound-guided needle core biopsy done on October 17, 2018.  The biopsy came back positive for invasive ductal carcinoma with papillary features.  There is grade 2 out of 3.  Angiolymphatic invasion was not seen.  Ductal carcinoma in situ was not seen.  The tumor was estrogen receptor positive about 100% and progesterone receptor +100% strong nuclear staining.  HER-2/tamara came back negative for amplification.  The patient is here today to discuss management plan.    REVIEW OF SYSTEMS:   Constitutional: Negative for fever, chills, and night sweats.  Skin: negative.  Eyes: negative.  Ears/Nose/Throat: negative.  Respiratory: No shortness of breath, dyspnea on exertion, cough, or hemoptysis.  Cardiovascular: negative.  Gastrointestinal: negative.  Genitourinary: negative.  Musculoskeletal: negative.  Neurologic: negative.  Psychiatric: negative.  Hematologic/Lymphatic/Immunologic: negative.  Endocrine: negative.    PAST MEDICAL HISTORY:   Past Medical History:   Diagnosis Date     Allergic rhinitis, cause unspecified      Hypertension 8 years?     Injury, other and unspecified, knee, leg, ankle, and foot 8th grade    left ankle injury     Invasive ductal carcinoma of breast, female, left (H) 10/26/2018       PAST SURGICAL HISTORY:   Past Surgical History:   Procedure Laterality  Date     BIOPSY       BREAST LUMPECTOMY, RT/LT  6/23/10    Breast Lumpectomy RT for likely adenomatous lumps     COLONOSCOPY N/A 8/16/2018    Procedure: COLONOSCOPY;  colonoscopy;  Surgeon: Vijay Almanza MD;  Location: [facility]     ESOPHAGOSCOPY, GASTROSCOPY, DUODENOSCOPY (EGD), COMBINED N/A 10/1/2015    Procedure: COMBINED ESOPHAGOSCOPY, GASTROSCOPY, DUODENOSCOPY (EGD);  Surgeon: Vijay Almanza MD;  Location: [facility]     SOFT TISSUE SURGERY      Ankle surgery 20+ years ago     SURGICAL HISTORY OF -   1989    arthroscopy of Left Ankle     SURGICAL HISTORY OF -   1990    arthroscopy of Left Ankle     SURGICAL HISTORY OF -   1993    4 wisdom teeth extracted       ALLERGIES:   Allergies as of 10/24/2018 - Jensen as Reviewed 10/24/2018   Allergen Reaction Noted     Aspirin Hives 10/17/2005     Dust mites  07/03/2009     Morphine GI Disturbance 06/23/2008       MEDICATIONS:   Current Outpatient Prescriptions   Medication Sig Dispense Refill     Fluticasone Propionate (FLONASE ALLERGY RELIEF NA)        IBUPROFEN PO AS NEEDED       lisinopril (PRINIVIL/ZESTRIL) 40 MG tablet Take 1 tablet (40 mg) by mouth daily 90 tablet 3     LORazepam (ATIVAN) 1 MG tablet Take 1 tablet (1 mg) by mouth every 8 hours as needed for anxiety 20 tablet 0     ZYRTEC 10 MG OR TABS 1 TABLET DAILY 90 3        FAMILY HISTORY:   Family History   Problem Relation Age of Onset     Hypertension Mother      Allergies Mother      Thyroid Disease Mother      Taking thyroid medication     Obesity Mother      Cerebrovascular Disease Mother      TIA Feb 2017     Hypertension Father      Thyroid Disease Father      two parathyroids removed     Cancer Maternal Grandmother      Bone CA     Allergies Maternal Grandmother      Circulatory Maternal Grandmother      Cerebrovascular Disease Maternal Grandmother      Other Cancer Maternal Grandmother      multiple myeloma     C.A.D. Maternal Grandfather      Respiratory Maternal Grandfather      asthma     Allergies  "Maternal Grandfather      Cerebrovascular Disease Maternal Grandfather      Alzheimer Disease Paternal Grandfather      Neurologic Disorder Paternal Grandfather      Parkinsons     Arthritis Paternal Grandmother      Respiratory Other      Emphysema     Diabetes Other      Asthma Other      generic emphysema     Genetic Disorder Other      genetic emphysema     Diabetes Other      Coronary Artery Disease Other      Other Cancer Other      multiple myeloma     Colon Cancer Other      Cancer - colorectal No family hx of      Breast Cancer No family hx of      Prostate Cancer No family hx of         SOCIAL HISTORY:   Social History     Social History     Marital status: Single     Spouse name: N/A     Number of children: 0     Years of education: 18     Occupational History     Senior [employer]     Social History Main Topics     Smoking status: Never Smoker     Smokeless tobacco: Never Used     Alcohol use No      Comment: very rare     Drug use: No     Sexual activity: Not Currently     Partners: Male     Birth control/ protection: Pill     Other Topics Concern     Parent/Sibling W/ Cabg, Mi Or Angioplasty Before 65f 55m? No     Social History Narrative       PHYSICAL EXAMINATION:   /81 (BP Location: Right arm, Patient Position: Right side, Cuff Size: Adult Regular)  Pulse 110  Temp 97.9  F (36.6  C) (Tympanic)  Resp 20  Ht 1.651 m (5' 5\")  Wt 66 kg (145 lb 8 oz)  LMP 10/10/2018 (Exact Date)  SpO2 98%  BMI 24.21 kg/m2  Wt Readings from Last 10 Encounters:   10/26/18 65.6 kg (144 lb 11.2 oz)   10/24/18 66 kg (145 lb 8 oz)   09/14/18 68.6 kg (151 lb 3.2 oz)   08/16/18 65.8 kg (145 lb)   06/01/18 67.6 kg (149 lb)   09/21/17 66.2 kg (146 lb)   09/08/16 68.1 kg (150 lb 1.6 oz)   12/08/15 70.3 kg (155 lb)   10/01/15 65.8 kg (145 lb)   09/04/15 67.1 kg (148 lb)      ECOG performance status: 0  GENERAL APPEARANCE: Healthy, alert and in no acute distress.  HEENT: Sclerae anicteric. PERRLA. " Oropharynx without ulcers, lesions, or thrush.  NECK: Supple. No asymmetry or masses.  LYMPHATICS: No palpable cervical, supraclavicular, axillary, or inguinal lymphadenopathy.  RESP: Lungs clear to auscultation bilaterally without rales, rhonchi or wheezes.  `BREAST:  CARDIOVASCULAR: Regular rate and rhythm. Normal S1, S2; no S3 or S4. No murmur, gallop, or rub.  ABDOMEN: Soft, nontender. Bowel sounds normal. No palpable organomegaly or masses.  MUSCULOSKELETAL: Extremities without gross deformities noted. No edema of bilateral lower extremities.  SKIN: No suspicious lesions or rashes.  NEURO: Alert and oriented x 3. Cranial nerves II-XII grossly intact.  PSYCHIATRIC: Mentation and affect appear normal.    LABORATORY RESULTS:  Hospital Outpatient Visit on 10/17/2018   Component Date Value Ref Range Status     Copath Report 10/17/2018    Final                    Value:Patient Name: ARRON BROWN  MR#: [id number]  Specimen #: [id number]  Collected: 10/17/2018  Received: 10/17/2018  Reported: 10/18/2018 21:52  Ordering Phy(s): MIKHAIL BARAJAS  Additional Phy(s): KRISTI LAY    For improved result formatting, select 'View Enhanced Report Format' under   Linked Documents section.    SPECIMEN(S):  Breast needle biopsy, left mass    FINAL DIAGNOSIS:  Breast needle biopsy, left mass:  - Invasive ductal carcinoma with papillary features (see description and   images)  - [place] grade: II (of possible III)       - Jb score 6 (of possible 9)       - Scoring criteria: tubules = 3, nuclear pleomorphism = 2, mitosis =   1.  - Angiolymphatic invasion:  None seen  - Ductal carcinoma in-situ: None seen  - Estrogen receptor:  Positive (100% strong nuclear staining)  - Progesterone receptor:  Positive (100% strong nuclear staining)  - Her2/tamara:  FISH analysis will be performed and reported by the   [facility] in a                           separate report.    COMMENT:  The breast  "reporting protocol is based on AJCC/UICC TNM, 8th edition.   Protocol web posting date: January 2018        To view the digital images associated with this report in Epic, select   \"View Enhanced Report Format\" under the  Linked Documents section of this report to view the PDF version of the   original CoPath generated report.    This case was seen in intradepartmental consultation.    Electronically signed out by:    JAMEY Alejandre M.D.    CLINICAL HISTORY:  48 year old female with left breast mass.    GROSS:  The specimen is received in formalin and 0.9% sodium chloride, labeled   with the patient's name and date of  birth, and designated \"breast core needle biopsy left breast left breast   mass\". It consists of three  yellow-tan fibrofatty breast cores, ranging from 0.5-1.1 cm in length and   averaging 0.1 cm in diameter.  The  specimen is wrapped and entirely submitted in                           one cassette.  Time tissue   removed 1040 hours, time in formalin  1040 hours  10/17/2018. (Dictated by: Blank Khalil 10/17/2018 03:15 PM)    MICROSCOPIC:  The sections show an invasive ductal carcinoma with papillary features   consisting of cells showing a moderate  nuclear pleomorphism with prominent eosinophilic nucleoli surrounding   fibrovascular cores with no supporting  myoepithelial cells on immunostained stained sections for p63.  No ductal   carcinoma in situ is present.  No  significant tubules formation is seen.  Rare mitotic figures are seen   (<5/10 HPF).  No angiolymphatic invasion  is identified.  The ductal nature of the neoplasm is confirmed with   positive immunohistochemical staining for  E-cadherin.    IMMUNOHISTOCHEMICAL ANALYSIS FOR ESTROGEN AND PROGESTERONE RECEPTORS    RESULTS:    ESTROGEN RECEPTORS:    POSITIVE                    100% of tumor cell nuclei stain positive                           Intensity of staining: Strong nuclear staining    PROGESTERONE R                          " ECEPTORS:    POSITIVE                    100% of tumor cell nuclei stain positive.                           Intensity of staining: Strong nuclear staining    Performed on paraffin block: Needle biopsy    External and internal controls stain appropriately.  Standard assay conditions are met.  Assay Conditions:  Fixative and processing: 10% neutral buffered formalin, paraffin embedded.  Cold ischemia time less than one hour. Time of specimen collection   10/17/18 at 10:40AM; Time placed in  formalin 10/17/18 at 10:40 AM.  Total duration of formalin fixation greater than 6 hours and less than 72   hours.  Staining method used: [place] predilute monoclonal antibodies, estrogen   receptor clone SP1 and progesterone  receptor clone 1E2, antigen heat retrieval in EDTA alkaline pH for 60   minutes, [place] ultraview detection  system and automatic immunostainer.    Scoring system: The ASCO-CAP scoring guidelines are used. A positive test   is defined as positive nuclei  staining in greater th                          an or equal to 1% of tumor cells. A negative test is   defined as nuclear staining in less  than 1% of tumor cells. For positive tests, an estimation of intensity of   nuclear staining over the entire  tumor on tissue sections is also provided.    Reference: PHILL Nayak, ROBBY Farias, LENORA Messer, et al. American Society of   Clinical Oncology/College of  American Pathologists guideline recommendations for immunohistochemical   testing of estrogen and  progesterone receptors in breast cancer, Arch Pathol Lab Med.   2010;134:907-922     (Dictated by: MATTHEW Alejandre MD 10/18/2018)    CPT Codes:  A: 65690-ZI7, 41418-DW, 21383-ON, HFISH, SOH, 38128-RKH, 66376-BZA    TESTING LAB LOCATION:  [address] [address] [phone]    COLLECTION SITE:  Client: [facility]  Location: [facility] (K)  10/17/2018    Final                    Value:Patient Name: ARRON GRANADO  MR#: [id number]  Specimen #: [id number]  Collected: 10/17/2018 10:40  Received: 10/19/2018 12:16  Reported: 10/22/2018 19:13  Ordering Phy(s): JUSTIN POWELL  Additional Phy(s): SILVANA LAY    For improved result formatting, select 'View Enhanced Report Format' under   Linked Documents section.  __________________________________________    TEST(S) REQUESTED:  Cytogenetics HER2 FISH    SPECIMEN DESCRIPTION:  Breast Tissue, Paraffin Embedded    CLINICAL COMMENTS:  [id number]    RESULTS:    Ratio of HER2/HAROLDO-17 signals  Arron Granado: 1.1 (DEYANIRA Negative)                                   Avg.   number HER2 signals/nucleus: 1.9                                                                           Avg. number HAROLDO-17 signals/nucleus: 1.8    **Interpretive guidelines per the American Society of Clinical   Oncology/College of American Pathologists  Clinical Practice Guideline Focused Update (Rasheed RODRIGUEZ et al, 2018, Arch   Pathol Lab Med  d                          oi:10.5858/arpa.[email or web address]-YF):    -- Group 1: HER2/HAROLDO-17 ratio 2.0 or more -AND- avg. number HER2   signals/nucleus 4.0 or more (DEYANIRA Positive)  -- Group 2: HER2/HAROLDO-17 ratio 2.0 or more -AND- avg. number HER2   signals/nucleus <4.0 (Additional work  required)  -- Group 3: HER2/HAROLDO-17 ratio <2.0 -AND- avg. number HER2 signals/nucleus   6.0 or more (Additional work  required)  -- Group 4: HER2/HAROLDO-17 ratio <2.0 -AND- avg. number HER2 signals/nucleus   4.0 or more and <6.0 (Additional  work required)  -- Group 5: HER2/HAROLDO-17 ratio <2.0 -AND- avg. number HER2 signals/nucleus   <4.0 (DEYANIRA Negative)    INTERPRETATION:  Per the American Society of Clinical Oncology/College of American   Pathologists Clinical Practice Guideline  Focused Update (Rasheed RODRIGUEZ et al, 2018, Arch Pathol Lab Med    doi:10.5858/arpa.[email or web address]-PB), the HER2/HAROLDO 17  ratio of 1.1 and average number of  HER2 signals/cell of 1.9 places this   specimen in Group 5 (DEYANIRA Negative).    Specimen:  Case [id number], Block A1  Reported f                          ormalin fixation time:  6-72 hours  Number of cells scored: 60  Probe:   [employer]ako HER2/HAROLDO-17 IQFISH pharmDx Probe Mix to HER2 (17q12) and to   the centromere region of chromosome  17    ADDITIONAL COMMENTS:  The IQFISH pharmDx test has been approved by the FDA for the evaluation of   HER2 (ERBB2) gene amplification  status in formalin-fixed, paraffin-embedded breast cancer tissue specimens   and gastric or gastroesophageal  junction adenocarcinoma.  It is intended for use as an adjunct to other   existing clinicopathologic information  used to evaluate patients with such tumors. This test was developed and   its performance characteristics  determined by the [facility]   Clinical Laboratories.    Electronically Signed Out By:  Linnette Gallegos M.D., UMPhysiciansCPT Codes:  A: 52199-YVF5, [id number]    TESTING LAB LOCATION:  [address] [id number] [phone]                              COLLECTION SITE:  Client:  [facility]  Location:  [facility])         IMAGING RESULTS:  Recent Results (from the past 744 hour(s))   MA Screening Digital Bilateral    Narrative    Examination: Bilateral digital screening mammography with computer  aided detection, 10/3/2018 1:04 PM.    Comparison: 09/21/2017, 09/12/2016, 09/04/2015, 08/27/2014    History: No current breast concerns. Patient reports prior benign  biopsies.    BREAST DENSITY: Heterogeneously dense.    COMMENTS: Possible developing focal asymmetry in the LEFT breast at  approximately 3:00 position, 7-8 cm from the nipple. No concerning  findings in the right breast.       Impression    IMPRESSION: BI-RADS CATEGORY: 0 - Need Additional Imaging Evaluation  and/or Prior Mammograms  for Comparison.    RECOMMENDED FOLLOW-UP: Diagnostic Mammogram and Ultrasound.    Additional imaging evaluation the LEFT breast with diagnostic  mammography and possibly ultrasound.    The patient will be notified of the results.     JUAN R VOGEL MD   MA Diagnostic Left w/Jonathan    Narrative    MA DIAGNOSTIC LEFT W/ JONATHAN, US BREAST LEFT LIMITED 1-3 QUADRANTS  10/15/2018 1:46 PM    HISTORY:  Focal asymmetry in left breast on screening mammogram.  Further assess.    COMPARISON:  10/3/2018.    TECHNIQUE:  Left digital mammography with CAD is performed as well as  DBT. Directed left breast and left axillary ultrasound are also done.    BREAST DENSITY: Heterogeneously dense.    LEFT ADDITIONAL VIEWS: Further detailed mammography to the area in  question shows an irregular 1.6 cm lesion with architectural  distortion.    LEFT BREAST/LEFT AXILLARY ULTRASOUND: Directed sonography to the site  of the mammographic finding shows a 1.6 cm irregular solid lesion.  Ultrasound-guided needle core biopsy is recommended at this time. This  was discussed with patient. We will help her get set up for this.  Additional sonography to the left axilla shows no abnormal lymph  nodes.      Impression    IMPRESSION: BI-RADS CATEGORY: 4 - Suspicious Abnormality-Biopsy Should  Be Considered.    RECOMMENDED FOLLOW-UP: Biopsy.    RIOS PEREZ MD   US Breast Left    Narrative    MA DIAGNOSTIC LEFT W/ JONATHAN, US BREAST LEFT LIMITED 1-3 QUADRANTS  10/15/2018 1:46 PM    HISTORY:  Focal asymmetry in left breast on screening mammogram.  Further assess.    COMPARISON:  10/3/2018.    TECHNIQUE:  Left digital mammography with CAD is performed as well as  DBT. Directed left breast and left axillary ultrasound are also done.    BREAST DENSITY: Heterogeneously dense.    LEFT ADDITIONAL VIEWS: Further detailed mammography to the area in  question shows an irregular 1.6 cm lesion with architectural  distortion.    LEFT BREAST/LEFT AXILLARY ULTRASOUND: Directed  sonography to the site  of the mammographic finding shows a 1.6 cm irregular solid lesion.  Ultrasound-guided needle core biopsy is recommended at this time. This  was discussed with patient. We will help her get set up for this.  Additional sonography to the left axilla shows no abnormal lymph  nodes.      Impression    IMPRESSION: BI-RADS CATEGORY: 4 - Suspicious Abnormality-Biopsy Should  Be Considered.    RECOMMENDED FOLLOW-UP: Biopsy.    RIOS PEREZ MD   MA Post Procedure Left    Addendum: 10/23/2018    I spoke with Dr. Grover on 10/23/2018 and confirmed that she is aware  of these results.    RAYMOND CUENCA MD      Addendum: 10/19/2018    ARRON BROWN  [id number], [id number]    FINAL DIAGNOSIS: Invasive ductal carcinoma grade 2. Please see final  path report.    Result is concordant.  Surgical consultation is recommended.      Raymond Cuenca MD   Date of Addendum: 10/19/2018    RAYMOND CUENCA MD      Narrative    ULTRASOUND-GUIDED LEFT BREAST CORE BIOPSY;   CLIP PLACEMENT;   POSTPROCEDURE DIGITAL MAMMOGRAM LEFT BREAST; 10/17/2018 10:58 AM    INDICATION FOR PROCEDURE: Left breast mass at 3:00.     PROCEDURE: Approximately 5 mL lidocaine without epinephrine was  infiltrated for local anesthetic and a 13-gauge trocar was introduced  via lateral approach.  The needle tip was placed adjacent to the  lesion. A series of 3 samples were obtained with a 14-gauge  core-cutting needle. A clip was then deployed to trista the lesion.   There was less than 5 cc of blood loss.    Postbiopsy unilateral digital mammogram of the left breast showed the  clip to be at the expected biopsy site.  The patient tolerated the  procedure without difficulty and there was no significant pain or  immediate complication at the end of the procedure.       Impression    IMPRESSION: Successful left breast ultrasound-guided core biopsy and  clip placement.  Final pathology is pending.      MIKHAIL BARAJAS MD   US Breast Biopsy Core Needle Left     Addendum: 10/23/2018    I spoke with Dr. Grover on 10/23/2018 and confirmed that she is aware  of these results.    RAYMOND CUENCA MD      Addendum: 10/19/2018    ARRON BROWN  [id number], [id number]    FINAL DIAGNOSIS: Invasive ductal carcinoma grade 2. Please see final  path report.    Result is concordant.  Surgical consultation is recommended.      Raymond Cuenca MD   Date of Addendum: 10/19/2018    RAYMOND CUENCA MD      Narrative    ULTRASOUND-GUIDED LEFT BREAST CORE BIOPSY;   CLIP PLACEMENT;   POSTPROCEDURE DIGITAL MAMMOGRAM LEFT BREAST; 10/17/2018 10:58 AM    INDICATION FOR PROCEDURE: Left breast mass at 3:00.     PROCEDURE: Approximately 5 mL lidocaine without epinephrine was  infiltrated for local anesthetic and a 13-gauge trocar was introduced  via lateral approach.  The needle tip was placed adjacent to the  lesion. A series of 3 samples were obtained with a 14-gauge  core-cutting needle. A clip was then deployed to trista the lesion.   There was less than 5 cc of blood loss.    Postbiopsy unilateral digital mammogram of the left breast showed the  clip to be at the expected biopsy site.  The patient tolerated the  procedure without difficulty and there was no significant pain or  immediate complication at the end of the procedure.       Impression    IMPRESSION: Successful left breast ultrasound-guided core biopsy and  clip placement.  Final pathology is pending.      MIKHAIL BARAJAS MD       ASSESSMENT AND PLAN:    (C50.912) Invasive ductal carcinoma of breast, female, left (H)  This is a 48-year-old female patient with invasive ductal carcinoma of the breast with papillary features.  The tumor is hormone receptor positive and HER-2/tamara negative.  T1cN0 M0.  Today I discussed with the patient and family the natural history of breast cancer.  We talked about staging, biology, management, follow-up and prognosis.  We talked about different modalities and treatment of breast cancer including surgical  resection including both lumpectomy with sentinel lymph node biopsy versus mastectomy.  Patient will be seen by surgery later this week to discuss surgical options.  We also talked about breast radiation therapy if breast preservation therapy with limbic is the patient choice.  We talked about systemic chemotherapy of breast cancer including both endocrine therapy and systemic chemotherapy.  We also talked about Oncotype DX to identify those patients who will benefit from chemotherapy.  I gave the patient overview of endocrine therapy with tamoxifen.  I also gave her an overview about potential side effects of endocrine therapy as well.  We also talked about indication for systemic chemotherapy in details as well as potential side effects.  I will meet with the patient again following her surgical treatment to discuss management plan in more details with the surgical pathology is available.  The patient asked about second opinion in [facility].  We will also facilitate this for her.  We also talked about genetic counseling as well, the patient will be referred for genetic counseling.  I also asked the patient to stop use of any birth control pills or any hormones.    (I10) Essential hypertension, benign  (primary encounter diagnosis)  Patient currently on lisinopril 40 mg orally daily.    The patient is ready to learn, no apparent learning barriers were identified, Diagnosis and treatment plans were explained to the patient. The patient expressed understanding of the content. The patient questions were answered to her satisfaction.    Stephen Peña MD    Time spent 60 minutes more than 50% of the time in counseling and coordination of care including discussion of management of invasive ductal carcinoma of the breast, staging, biology, treatment, follow-up and prognosis`    Chart documentation with Dragon Voice recognition Software. Although reviewed after completion, some words and grammatical errors may  remain.

## 2018-11-01 ENCOUNTER — TELEPHONE (OUTPATIENT)
Dept: ONCOLOGY | Facility: CLINIC | Age: 48
End: 2018-11-01

## 2018-11-01 NOTE — TELEPHONE ENCOUNTER
Forms for FMLA were filled out. Copy was sent to scanning. Patient called to  forms at .  Awilda Loyd RN    Patient needed an update for her form. Refaxed forms to her employer. Patient called and notified.  Awilda Loyd RN

## 2018-11-02 ENCOUNTER — TELEPHONE (OUTPATIENT)
Dept: SURGERY | Facility: CLINIC | Age: 48
End: 2018-11-02

## 2018-11-02 NOTE — TELEPHONE ENCOUNTER
Type of surgery: bilateral simple mastectomies with left sentinel lymph node biopsy with possible left axillary dissection  Location of surgery: [facility] OR  Date and time of surgery: 11/8  Surgeon: Hailey  Pre-Op Appt Date: [facility]- pt to schedule  Post-Op Appt Date: 11/20   Packet sent out: Yes  Pre-cert/Authorization completed:  Not Applicable  Date: NA

## 2018-11-05 ENCOUNTER — TELEPHONE (OUTPATIENT)
Dept: SURGERY | Facility: CLINIC | Age: 48
End: 2018-11-05

## 2018-11-05 ENCOUNTER — MYC MEDICAL ADVICE (OUTPATIENT)
Dept: SURGERY | Facility: CLINIC | Age: 48
End: 2018-11-05

## 2018-11-06 ENCOUNTER — OFFICE VISIT (OUTPATIENT)
Dept: FAMILY MEDICINE | Facility: CLINIC | Age: 48
End: 2018-11-06
Payer: COMMERCIAL

## 2018-11-06 ENCOUNTER — TRANSFERRED RECORDS (OUTPATIENT)
Dept: HEALTH INFORMATION MANAGEMENT | Facility: CLINIC | Age: 48
End: 2018-11-06

## 2018-11-06 VITALS
TEMPERATURE: 98 F | HEART RATE: 104 BPM | WEIGHT: 141.9 LBS | BODY MASS INDEX: 23.64 KG/M2 | HEIGHT: 65 IN | SYSTOLIC BLOOD PRESSURE: 126 MMHG | DIASTOLIC BLOOD PRESSURE: 84 MMHG

## 2018-11-06 DIAGNOSIS — C50.912 INVASIVE DUCTAL CARCINOMA OF BREAST, FEMALE, LEFT (H): ICD-10-CM

## 2018-11-06 DIAGNOSIS — I10 ESSENTIAL HYPERTENSION, BENIGN: ICD-10-CM

## 2018-11-06 DIAGNOSIS — Z01.818 PREOP GENERAL PHYSICAL EXAM: Primary | ICD-10-CM

## 2018-11-06 DIAGNOSIS — F43.22 ADJUSTMENT DISORDER WITH ANXIOUS MOOD: ICD-10-CM

## 2018-11-06 LAB
ERYTHROCYTE [DISTWIDTH] IN BLOOD BY AUTOMATED COUNT: 12.1 % (ref 10–15)
HCT VFR BLD AUTO: 41.8 % (ref 35–47)
HGB BLD-MCNC: 14.3 G/DL (ref 11.7–15.7)
MCH RBC QN AUTO: 30.2 PG (ref 26.5–33)
MCHC RBC AUTO-ENTMCNC: 34.2 G/DL (ref 31.5–36.5)
MCV RBC AUTO: 88 FL (ref 78–100)
PLATELET # BLD AUTO: 336 10E9/L (ref 150–450)
POTASSIUM SERPL-SCNC: 4.1 MMOL/L (ref 3.4–5.3)
RBC # BLD AUTO: 4.74 10E12/L (ref 3.8–5.2)
WBC # BLD AUTO: 8 10E9/L (ref 4–11)

## 2018-11-06 PROCEDURE — 85027 COMPLETE CBC AUTOMATED: CPT | Performed by: FAMILY MEDICINE

## 2018-11-06 PROCEDURE — 99214 OFFICE O/P EST MOD 30 MIN: CPT | Performed by: FAMILY MEDICINE

## 2018-11-06 PROCEDURE — 36415 COLL VENOUS BLD VENIPUNCTURE: CPT | Performed by: FAMILY MEDICINE

## 2018-11-06 PROCEDURE — 84132 ASSAY OF SERUM POTASSIUM: CPT | Performed by: FAMILY MEDICINE

## 2018-11-06 RX ORDER — LORAZEPAM 1 MG/1
1 TABLET ORAL EVERY 8 HOURS PRN
Qty: 20 TABLET | Refills: 0 | Status: SHIPPED | OUTPATIENT
Start: 2018-11-06 | End: 2019-01-10

## 2018-11-06 NOTE — TELEPHONE ENCOUNTER
Spoke to patient and informed her that form has been completed and will be faxed to insurance company along with her employer as requested. She was very appreciative. I let her know what the time off expectation would be (approximately 6 weeks.)    She had some questions regarding surgery. I offered to transfer her to the Surgery RN. She was on her way into [place] and said that she would call later to address those questions.    Rebeca Wadsworth MA on 11/6/2018 at 1:41 PM

## 2018-11-06 NOTE — PROGRESS NOTES
[facility]  [address] [address] [address]  [phone]  Dept: [phone]    PRE-OP EVALUATION:  Today's date: 2018    Desi Granado (: 1970) presents for pre-operative evaluation assessment as requested by Dr. Hart.  She requires evaluation and anesthesia risk assessment prior to undergoing surgery/procedure for treatment of breast cancer .    Proposed Surgery/ Procedure: BILATERAL SIMPLE MASTECTOMIES WITH LEFT SENTINEL LYMPH NODE BIOPSY WITH POSSIBLE LEFT AXILLARY DISSECTION  Date of Surgery/ Procedure: 18  Time of Surgery/ Procedure: 12:00pm  Hospital/Surgical Facility: [facility]  Fax number for surgical facility:   Primary Physician: Pearl Grover  Type of Anesthesia Anticipated: General    Patient has a Health Care Directive or Living Will:  YES on file    1. NO - Do you have a history of heart attack, stroke, stent, bypass or surgery on an artery in the head, neck, heart or legs?  2. NO - Do you ever have any pain or discomfort in your chest?  3. NO - Do you have a history of  Heart Failure?  4. NO - Are you troubled by shortness of breath when: walking on the level, up a slight hill or at night?  5. NO - Do you currently have a cold, bronchitis or other respiratory infection?  6. NO - Do you have a cough, shortness of breath or wheezing?  7. NO - Do you sometimes get pains in the calves of your legs when you walk?  8. NO - Do you or anyone in your family have previous history of blood clots?  9. NO - Do you or does anyone in your family have a serious bleeding problem such as prolonged bleeding following surgeries or cuts?  10. NO - Have you ever had problems with anemia or been told to take iron pills?  11. NO - Have you had any abnormal blood loss such as black, tarry or bloody stools, or abnormal vaginal bleeding?  12. NO - Have you ever had a blood transfusion?  13. NO - Have you or any of your relatives ever had problems with anesthesia?  14. NO  - Do you have sleep apnea, excessive snoring or daytime drowsiness?  15. NO - Do you have any prosthetic heart valves?  16. NO - Do you have prosthetic joints?  17. NO - Is there any chance that you may be pregnant?      HPI:     HPI related to upcoming procedure: breast cancer needs surgical treatment       See problem list for active medical problems.  Problems all longstanding and stable, except as noted/documented.  See ROS for pertinent symptoms related to these conditions.                                                                                                                                                          .    MEDICAL HISTORY:     Patient Active Problem List    Diagnosis Date Noted     Invasive ductal carcinoma of breast, female, left (H) 10/26/2018     Priority: Medium     CARDIOVASCULAR SCREENING; LDL GOAL LESS THAN 160 10/31/2010     Priority: Medium     Papanicolaou smear of cervix with atypical squamous cells of undetermined significance (ASC-US) 09/02/2010     Priority: Medium     8/12/09 - HPV neg.  8/18/10 - normal       FAMILY HISTORY OF ENDOCRINE DISEASE( other endo or metabol) 08/16/2010     Priority: Medium     Essential hypertension, benign 06/21/2007     Priority: Medium     Family history of other cardiovascular diseases 12/12/2006     Priority: Medium     Problem list name updated by automated process. Provider to review and confirm  Problem list name updated by automated process. Provider to review       Allergic rhinitis      Priority: Medium     Problem list name updated by automated process. Provider to review       Injury, other and unspecified, knee, leg, ankle, and foot      Priority: Medium     left ankle injury        Past Medical History:   Diagnosis Date     Allergic rhinitis, cause unspecified      Hypertension 8 years?     Injury, other and unspecified, knee, leg, ankle, and foot 8th grade    left ankle injury     Invasive ductal carcinoma of breast, female, left  "(H) 10/26/2018     Past Surgical History:   Procedure Laterality Date     BIOPSY       BREAST LUMPECTOMY, RT/LT  6/23/10    Breast Lumpectomy RT for likely adenomatous lumps     COLONOSCOPY N/A 8/16/2018    Procedure: COLONOSCOPY;  colonoscopy;  Surgeon: Vijay Almanza MD;  Location: [facility]     ESOPHAGOSCOPY, GASTROSCOPY, DUODENOSCOPY (EGD), COMBINED N/A 10/1/2015    Procedure: COMBINED ESOPHAGOSCOPY, GASTROSCOPY, DUODENOSCOPY (EGD);  Surgeon: Vijay Almanza MD;  Location: [facility]     SOFT TISSUE SURGERY      Ankle surgery 20+ years ago     SURGICAL HISTORY OF -   1989    arthroscopy of Left Ankle     SURGICAL HISTORY OF -   1990    arthroscopy of Left Ankle     SURGICAL HISTORY OF -   1993    4 wisdom teeth extracted     Current Outpatient Prescriptions   Medication Sig Dispense Refill     Fluticasone Propionate (FLONASE ALLERGY RELIEF NA)        IBUPROFEN PO AS NEEDED       lisinopril (PRINIVIL/ZESTRIL) 40 MG tablet Take 1 tablet (40 mg) by mouth daily 90 tablet 3     LORazepam (ATIVAN) 1 MG tablet Take 1 tablet (1 mg) by mouth every 8 hours as needed for anxiety 20 tablet 0     ZYRTEC 10 MG OR TABS 1 TABLET DAILY 90 3     OTC products: None, except as noted above    Allergies   Allergen Reactions     Aspirin Hives     Dust Mites      Morphine Nausea      Latex Allergy: NO    Social History   Substance Use Topics     Smoking status: Never Smoker     Smokeless tobacco: Never Used     Alcohol use No      Comment: very rare     History   Drug Use No       REVIEW OF SYSTEMS:   Constitutional, neuro, ENT, endocrine, pulmonary, cardiac, gastrointestinal, genitourinary, musculoskeletal, integument and psychiatric systems are negative, except as otherwise noted.  Little skin rash  POD   EXAM:   /84  Pulse 104  Temp 98  F (36.7  C) (Tympanic)  Ht 5' 5\" (1.651 m)  Wt 141 lb 14.4 oz (64.4 kg)  LMP 10/26/2018  Breastfeeding? No  BMI 23.61 kg/m2    GENERAL APPEARANCE: healthy, alert and no distress     HENT: ear " canals and TM's normal and nose and mouth without ulcers or lesions     NECK: no adenopathy, no asymmetry, masses, or scars and thyroid normal to palpation     RESP: lungs clear to auscultation - no rales, rhonchi or wheezes     CV: regular rates and rhythm, normal S1 S2, no S3 or S4 and no murmur, click or rub     ABDOMEN:  soft, nontender, no HSM or masses and bowel sounds normal     MS: extremities normal- no gross deformities noted, no evidence of inflammation in joints, FROM in all extremities.     SKIN: no suspicious lesions or rashes     NEURO: Normal strength and tone, sensory exam grossly normal, mentation intact and speech normal     PSYCH: mentation appears normal. and affect normal/bright     LYMPHATICS: No cervical adenopathy    DIAGNOSTICS:     Labs Resulted Today:   Results for orders placed or performed in visit on 11/06/18   Potassium   Result Value Ref Range    Potassium 4.1 3.4 - 5.3 mmol/L   CBC with platelets   Result Value Ref Range    WBC 8.0 4.0 - 11.0 10e9/L    RBC Count 4.74 3.8 - 5.2 10e12/L    Hemoglobin 14.3 11.7 - 15.7 g/dL    Hematocrit 41.8 35.0 - 47.0 %    MCV 88 78 - 100 fl    MCH 30.2 26.5 - 33.0 pg    MCHC 34.2 31.5 - 36.5 g/dL    RDW 12.1 10.0 - 15.0 %    Platelet Count 336 150 - 450 10e9/L       Recent Labs   Lab Test  09/14/18   0934  06/01/18   1138   09/04/15   0833   HGB   --   13.2   --   13.3   PLT   --   289   --   275   NA  135  130*   < >  135   POTASSIUM  3.9  3.7   < >  4.1   CR  0.77  0.75   < >  0.85    < > = values in this interval not displayed.        IMPRESSION:   Reason for surgery/procedure: breast cancer     The proposed surgical procedure is considered LOW risk.    REVISED CARDIAC RISK INDEX  The patient has the following serious cardiovascular risks for perioperative complications such as (MI, PE, VFib and 3  AV Block):  No serious cardiac risks  INTERPRETATION: 0 risks: Class I (very low risk - 0.4% complication rate)    The patient has the following  additional risks for perioperative complications:  No identified additional risks      ICD-10-CM    1. Preop general physical exam Z01.818    2. Invasive ductal carcinoma of breast, female, left (H) C50.912 CBC with platelets   3. Adjustment disorder with anxious mood F43.22 LORazepam (ATIVAN) 1 MG tablet   4. Essential hypertension, benign I10 Potassium       RECOMMENDATIONS:         --Patient is to take all scheduled medications on the day of surgery EXCEPT for modifications listed below.    ACE Inhibitor or Angiotensin Receptor Blocker (ARB) Use  Ace inhibitor or Angiotensin Receptor Blocker (ARB) and should HOLD this medication for the 24 hours prior to surgery.      APPROVAL GIVEN to proceed with proposed procedure, without further diagnostic evaluation       Signed Electronically by: Pearl Grover MD    Copy of this evaluation report is provided to requesting physician.    [facility] Preop Guidelines    Revised Cardiac Risk Index

## 2018-11-06 NOTE — MR AVS SNAPSHOT
After Visit Summary   11/6/2018    Desi Granado    MRN: [id number]           Patient Information     Date Of Birth          1970        Visit Information        Provider Department      11/6/2018 2:30 PM Pearl Grover MD [facility]'s Diagnoses     Preop general physical exam    -  1    Invasive ductal carcinoma of breast, female, left (H)        Adjustment disorder with anxious mood        Essential hypertension, benign          Care Instructions      Before Your Surgery      Call your surgeon if there is any change in your health. This includes signs of a cold or flu (such as a sore throat, runny nose, cough, rash or fever).    Do not smoke, drink alcohol or take over the counter medicine (unless your surgeon or primary care doctor tells you to) for the 24 hours before and after surgery.    If you take prescribed drugs: Follow your doctor s orders about which medicines to take and which to stop until after surgery.    Eating and drinking prior to surgery: follow the instructions from your surgeon    Take a shower or bath the night before surgery. Use the soap your surgeon gave you to gently clean your skin. If you do not have soap from your surgeon, use your regular soap. Do not shave or scrub the surgery site.  Wear clean pajamas and have clean sheets on your bed.       Do not take any ibuprofen  Do not take the lisinopril the day of surgery             Follow-ups after your visit        Your next 10 appointments already scheduled     Nov 08, 2018   Procedure with Nabila Hart MD   [facility] ([facility])    [facility] [address] [place] [address]   [phone]           From [address]: Exit at [address] on south side of [place]. Turn right on [address]. Turn left at stoplight [address]. [facility] will be in view two blocks ahead            Nov 20, 2018  1:00 PM CST   Return Visit with  "Sabino-Julienne Hart MD   [facility] ([facility])    [address] [address] [phone]              Who to contact     Normal or non-critical lab and imaging results will be communicated to you by MyChart, letter or phone within 4 business days after the clinic has received the results. If you do not hear from us within 7 days, please contact the clinic through MyChart or phone. If you have a critical or abnormal lab result, we will notify you by phone as soon as possible.  Submit refill requests through [facility] or call your pharmacy and they will forward the refill request to us. Please allow 3 business days for your refill to be completed.          If you need to speak with a  for additional information , please call: [phone]             Additional Information About Your Visit        [facility] Information     [facility] gives you secure access to your electronic health record. If you see a primary care provider, you can also send messages to your care team and make appointments. If you have questions, please call your primary care clinic.  If you do not have a primary care provider, please call [phone] and they will assist you.        Care EveryWhere ID     This is your Care EveryWhere ID. This could be used by other organizations to access your [facility] medical records  [id number]        Your Vitals Were     Pulse Temperature Height Last Period Breastfeeding? BMI (Body Mass Index)    104 98  F (36.7  C) (Tympanic) 5' 5\" (1.651 m) 10/26/2018 No 23.61 kg/m2       Blood Pressure from Last 3 Encounters:   11/06/18 126/84   10/26/18 126/80   10/24/18 138/81    Weight from Last 3 Encounters:   11/06/18 141 lb 14.4 oz (64.4 kg)   10/26/18 144 lb 11.2 oz (65.6 kg)   10/24/18 145 lb 8 oz (66 kg)              We Performed the Following     CBC with platelets     Potassium          Where to get your medicines      Some of these will need a paper " prescription and others can be bought over the counter.  Ask your nurse if you have questions.     Bring a paper prescription for each of these medications     LORazepam 1 MG tablet          Primary Care Provider Office Phone # Fax #    Pearl Grover [phone] [address] [facility] [address]        Equal Access to Services     ZOILA PITTS : Hadii aad ku hadasho Soomaali, waaxda luqadaha, qaybta kaalmada adeegyada, waxay idiin hayaan adeeg kharash lajostinn . So [facility] [phone].    ATENCIÓN: Si habla español, tiene a caldwell disposición servicios gratuitos de asistencia lingüística. [facility] [phone].    We comply with applicable federal civil rights laws and [place] laws. We do not discriminate on the basis of race, color, national origin, age, disability, sex, sexual orientation, or gender identity.            Thank you!     Thank you for choosing [facility]  for your care. Our goal is always to provide you with excellent care. Hearing back from our patients is one way we can continue to improve our services. Please take a few minutes to complete the written survey that you may receive in the mail after your visit with us. Thank you!             Your Updated Medication List - Protect others around you: Learn how to safely use, store and throw away your medicines at www.disposemymeds.org.          This list is accurate as of 11/6/18  3:09 PM.  Always use your most recent med list.                   Brand Name Dispense Instructions for use Diagnosis    FLONASE ALLERGY RELIEF NA       LLQ pain       IBUPROFEN PO      AS NEEDED        lisinopril 40 MG tablet    PRINIVIL/ZESTRIL    90 tablet    Take 1 tablet (40 mg) by mouth daily    Essential hypertension, benign       LORazepam 1 MG tablet    ATIVAN    20 tablet    Take 1 tablet (1 mg) by mouth every 8 hours as needed for anxiety    Adjustment disorder with anxious mood       ZYRTEC 10 MG tablet   Generic drug:   cetirizine     90    1 TABLET DAILY    Allergic rhinitis, cause unspecified

## 2018-11-06 NOTE — NURSING NOTE
"Initial /84  Pulse 104  Temp 98  F (36.7  C) (Tympanic)  Ht 5' 5\" (1.651 m)  Wt 141 lb 14.4 oz (64.4 kg)  LMP 10/26/2018  Breastfeeding? No  BMI 23.61 kg/m2 Estimated body mass index is 23.61 kg/(m^2) as calculated from the following:    Height as of this encounter: 5' 5\" (1.651 m).    Weight as of this encounter: 141 lb 14.4 oz (64.4 kg). .      "

## 2018-11-07 NOTE — TELEPHONE ENCOUNTER
Reason for call:  Other   Patient called regarding (reason for call): call back  Additional comments: Patient would like to have clarification with the eating/drinking directives before surgery.    Phone number to reach patient:  Cell number on file:    Telephone Information:   Mobile [phone]       Best Time:  Anytime    Can we leave a detailed message on this number?  YES

## 2018-11-08 ENCOUNTER — ANESTHESIA EVENT (OUTPATIENT)
Dept: SURGERY | Facility: CLINIC | Age: 48
End: 2018-11-08
Payer: COMMERCIAL

## 2018-11-08 ENCOUNTER — HOSPITAL ENCOUNTER (OUTPATIENT)
Facility: CLINIC | Age: 48
Discharge: HOME | End: 2018-11-09
Attending: SURGERY | Admitting: SURGERY
Payer: COMMERCIAL

## 2018-11-08 ENCOUNTER — ANESTHESIA (OUTPATIENT)
Dept: SURGERY | Facility: CLINIC | Age: 48
End: 2018-11-08
Payer: COMMERCIAL

## 2018-11-08 ENCOUNTER — HOSPITAL ENCOUNTER (OUTPATIENT)
Dept: NUCLEAR MEDICINE | Facility: CLINIC | Age: 48
Setting detail: NUCLEAR MEDICINE
End: 2018-11-08
Attending: SURGERY
Payer: COMMERCIAL

## 2018-11-08 ENCOUNTER — TRANSFERRED RECORDS (OUTPATIENT)
Dept: HEALTH INFORMATION MANAGEMENT | Facility: CLINIC | Age: 48
End: 2018-11-08

## 2018-11-08 DIAGNOSIS — N63.20 BREAST MASS, LEFT: ICD-10-CM

## 2018-11-08 DIAGNOSIS — C50.912 INVASIVE DUCTAL CARCINOMA OF BREAST, FEMALE, LEFT (H): Primary | ICD-10-CM

## 2018-11-08 DIAGNOSIS — Z90.13 S/P BILATERAL MASTECTOMY: ICD-10-CM

## 2018-11-08 PROBLEM — C50.919 BREAST CANCER (H): Status: ACTIVE | Noted: 2018-11-08

## 2018-11-08 LAB — HCG UR QL: NEGATIVE

## 2018-11-08 PROCEDURE — 88307 TISSUE EXAM BY PATHOLOGIST: CPT | Performed by: SURGERY

## 2018-11-08 PROCEDURE — 36000058 ZZH SURGERY LEVEL 3 EA 15 ADDTL MIN: Performed by: SURGERY

## 2018-11-08 PROCEDURE — 36000056 ZZH SURGERY LEVEL 3 1ST 30 MIN: Performed by: SURGERY

## 2018-11-08 PROCEDURE — 88331 PATH CONSLTJ SURG 1 BLK 1SPC: CPT | Performed by: PATHOLOGY

## 2018-11-08 PROCEDURE — 38792 RA TRACER ID OF SENTINL NODE: CPT | Mod: TC

## 2018-11-08 PROCEDURE — 25000128 H RX IP 250 OP 636: Performed by: SURGERY

## 2018-11-08 PROCEDURE — 00000159 ZZHCL STATISTIC H-SEND OUTS PREP: Performed by: SURGERY

## 2018-11-08 PROCEDURE — 25000128 H RX IP 250 OP 636: Performed by: NURSE ANESTHETIST, CERTIFIED REGISTERED

## 2018-11-08 PROCEDURE — 38900 IO MAP OF SENT LYMPH NODE: CPT | Performed by: SURGERY

## 2018-11-08 PROCEDURE — 25000125 ZZHC RX 250: Performed by: NURSE ANESTHETIST, CERTIFIED REGISTERED

## 2018-11-08 PROCEDURE — 40000306 ZZH STATISTIC PRE PROC ASSESS II: Performed by: SURGERY

## 2018-11-08 PROCEDURE — 38525 BIOPSY/REMOVAL LYMPH NODES: CPT | Mod: LT | Performed by: SURGERY

## 2018-11-08 PROCEDURE — 88341 IMHCHEM/IMCYTCHM EA ADD ANTB: CPT | Mod: 26 | Performed by: SURGERY

## 2018-11-08 PROCEDURE — 78195 LYMPH SYSTEM IMAGING: CPT | Performed by: SURGERY

## 2018-11-08 PROCEDURE — 25000566 ZZH SEVOFLURANE, EA 15 MIN: Performed by: SURGERY

## 2018-11-08 PROCEDURE — 71000014 ZZH RECOVERY PHASE 1 LEVEL 2 FIRST HR: Performed by: SURGERY

## 2018-11-08 PROCEDURE — 19303 MAST SIMPLE COMPLETE: CPT | Mod: 50 | Performed by: SURGERY

## 2018-11-08 PROCEDURE — 88331 PATH CONSLTJ SURG 1 BLK 1SPC: CPT | Mod: 26,59 | Performed by: SURGERY

## 2018-11-08 PROCEDURE — 88342 IMHCHEM/IMCYTCHM 1ST ANTB: CPT | Mod: 26 | Performed by: SURGERY

## 2018-11-08 PROCEDURE — 25000132 ZZH RX MED GY IP 250 OP 250 PS 637: Performed by: SURGERY

## 2018-11-08 PROCEDURE — 34300033 ZZH RX 343: Performed by: SURGERY

## 2018-11-08 PROCEDURE — 81025 URINE PREGNANCY TEST: CPT | Performed by: NURSE ANESTHETIST, CERTIFIED REGISTERED

## 2018-11-08 PROCEDURE — 27210794 ZZH OR GENERAL SUPPLY STERILE: Performed by: SURGERY

## 2018-11-08 PROCEDURE — A9541 TC99M SULFUR COLLOID: HCPCS | Performed by: SURGERY

## 2018-11-08 PROCEDURE — 88342 IMHCHEM/IMCYTCHM 1ST ANTB: CPT | Performed by: SURGERY

## 2018-11-08 PROCEDURE — 88331 PATH CONSLTJ SURG 1 BLK 1SPC: CPT | Mod: 91 | Performed by: SURGERY

## 2018-11-08 PROCEDURE — 88307 TISSUE EXAM BY PATHOLOGIST: CPT | Mod: 26,59 | Performed by: SURGERY

## 2018-11-08 PROCEDURE — 37000008 ZZH ANESTHESIA TECHNICAL FEE, 1ST 30 MIN: Performed by: SURGERY

## 2018-11-08 PROCEDURE — 88341 IMHCHEM/IMCYTCHM EA ADD ANTB: CPT | Performed by: SURGERY

## 2018-11-08 PROCEDURE — 71000015 ZZH RECOVERY PHASE 1 LEVEL 2 EA ADDTL HR: Performed by: SURGERY

## 2018-11-08 PROCEDURE — 27211024 ZZHC OR SUPPLY OTHER OPNP: Performed by: SURGERY

## 2018-11-08 PROCEDURE — 37000009 ZZH ANESTHESIA TECHNICAL FEE, EACH ADDTL 15 MIN: Performed by: SURGERY

## 2018-11-08 RX ORDER — HYDROXYZINE HYDROCHLORIDE 25 MG/1
25 TABLET, FILM COATED ORAL EVERY 6 HOURS PRN
Status: DISCONTINUED | OUTPATIENT
Start: 2018-11-08 | End: 2018-11-09 | Stop reason: HOSPADM

## 2018-11-08 RX ORDER — HEPARIN SODIUM 5000 [USP'U]/.5ML
5000 INJECTION, SOLUTION INTRAVENOUS; SUBCUTANEOUS
Status: DISCONTINUED | OUTPATIENT
Start: 2018-11-08 | End: 2018-11-08 | Stop reason: RX

## 2018-11-08 RX ORDER — ONDANSETRON 4 MG/1
4 TABLET, ORALLY DISINTEGRATING ORAL EVERY 30 MIN PRN
Status: DISCONTINUED | OUTPATIENT
Start: 2018-11-08 | End: 2018-11-08 | Stop reason: HOSPADM

## 2018-11-08 RX ORDER — FENTANYL CITRATE 50 UG/ML
INJECTION, SOLUTION INTRAMUSCULAR; INTRAVENOUS PRN
Status: DISCONTINUED | OUTPATIENT
Start: 2018-11-08 | End: 2018-11-08

## 2018-11-08 RX ORDER — CEFAZOLIN SODIUM 1 G/3ML
1 INJECTION, POWDER, FOR SOLUTION INTRAMUSCULAR; INTRAVENOUS SEE ADMIN INSTRUCTIONS
Status: DISCONTINUED | OUTPATIENT
Start: 2018-11-08 | End: 2018-11-08 | Stop reason: HOSPADM

## 2018-11-08 RX ORDER — ONDANSETRON 2 MG/ML
4 INJECTION INTRAMUSCULAR; INTRAVENOUS EVERY 6 HOURS PRN
Status: DISCONTINUED | OUTPATIENT
Start: 2018-11-08 | End: 2018-11-09 | Stop reason: HOSPADM

## 2018-11-08 RX ORDER — HYDROMORPHONE HYDROCHLORIDE 1 MG/ML
.3-.5 INJECTION, SOLUTION INTRAMUSCULAR; INTRAVENOUS; SUBCUTANEOUS EVERY 5 MIN PRN
Status: DISCONTINUED | OUTPATIENT
Start: 2018-11-08 | End: 2018-11-08 | Stop reason: HOSPADM

## 2018-11-08 RX ORDER — KETOROLAC TROMETHAMINE 30 MG/ML
30 INJECTION, SOLUTION INTRAMUSCULAR; INTRAVENOUS EVERY 6 HOURS
Status: COMPLETED | OUTPATIENT
Start: 2018-11-08 | End: 2018-11-09

## 2018-11-08 RX ORDER — CEFAZOLIN SODIUM 2 G/100ML
2 INJECTION, SOLUTION INTRAVENOUS
Status: COMPLETED | OUTPATIENT
Start: 2018-11-08 | End: 2018-11-08

## 2018-11-08 RX ORDER — OXYCODONE AND ACETAMINOPHEN 5; 325 MG/1; MG/1
1-2 TABLET ORAL EVERY 4 HOURS PRN
Status: DISCONTINUED | OUTPATIENT
Start: 2018-11-08 | End: 2018-11-09 | Stop reason: HOSPADM

## 2018-11-08 RX ORDER — HEPARIN SODIUM 5000 [USP'U]/ML
5000 INJECTION, SOLUTION INTRAVENOUS; SUBCUTANEOUS ONCE
Status: COMPLETED | OUTPATIENT
Start: 2018-11-08 | End: 2018-11-08

## 2018-11-08 RX ORDER — HYDROMORPHONE HYDROCHLORIDE 1 MG/ML
0.2 INJECTION, SOLUTION INTRAMUSCULAR; INTRAVENOUS; SUBCUTANEOUS
Status: DISCONTINUED | OUTPATIENT
Start: 2018-11-08 | End: 2018-11-09 | Stop reason: HOSPADM

## 2018-11-08 RX ORDER — DIMENHYDRINATE 50 MG/ML
12.5 INJECTION, SOLUTION INTRAMUSCULAR; INTRAVENOUS EVERY 10 MIN PRN
Status: DISCONTINUED | OUTPATIENT
Start: 2018-11-08 | End: 2018-11-08 | Stop reason: HOSPADM

## 2018-11-08 RX ORDER — SODIUM CHLORIDE, SODIUM LACTATE, POTASSIUM CHLORIDE, CALCIUM CHLORIDE 600; 310; 30; 20 MG/100ML; MG/100ML; MG/100ML; MG/100ML
INJECTION, SOLUTION INTRAVENOUS CONTINUOUS
Status: DISCONTINUED | OUTPATIENT
Start: 2018-11-08 | End: 2018-11-08 | Stop reason: HOSPADM

## 2018-11-08 RX ORDER — LIDOCAINE 40 MG/G
CREAM TOPICAL
Status: DISCONTINUED | OUTPATIENT
Start: 2018-11-08 | End: 2018-11-09 | Stop reason: HOSPADM

## 2018-11-08 RX ORDER — ONDANSETRON 4 MG/1
4 TABLET, ORALLY DISINTEGRATING ORAL EVERY 6 HOURS PRN
Status: DISCONTINUED | OUTPATIENT
Start: 2018-11-08 | End: 2018-11-09 | Stop reason: HOSPADM

## 2018-11-08 RX ORDER — PROPOFOL 10 MG/ML
INJECTION, EMULSION INTRAVENOUS PRN
Status: DISCONTINUED | OUTPATIENT
Start: 2018-11-08 | End: 2018-11-08

## 2018-11-08 RX ORDER — DEXAMETHASONE SODIUM PHOSPHATE 10 MG/ML
INJECTION, SOLUTION INTRAMUSCULAR; INTRAVENOUS PRN
Status: DISCONTINUED | OUTPATIENT
Start: 2018-11-08 | End: 2018-11-08

## 2018-11-08 RX ORDER — LIDOCAINE 40 MG/G
CREAM TOPICAL
Status: DISCONTINUED | OUTPATIENT
Start: 2018-11-08 | End: 2018-11-08 | Stop reason: HOSPADM

## 2018-11-08 RX ORDER — ALBUTEROL SULFATE 0.83 MG/ML
2.5 SOLUTION RESPIRATORY (INHALATION) EVERY 4 HOURS PRN
Status: DISCONTINUED | OUTPATIENT
Start: 2018-11-08 | End: 2018-11-08 | Stop reason: HOSPADM

## 2018-11-08 RX ORDER — OXYCODONE AND ACETAMINOPHEN 5; 325 MG/1; MG/1
1-2 TABLET ORAL EVERY 4 HOURS PRN
Qty: 20 TABLET | Refills: 0 | Status: SHIPPED | OUTPATIENT
Start: 2018-11-08 | End: 2019-01-10

## 2018-11-08 RX ORDER — NALOXONE HYDROCHLORIDE 0.4 MG/ML
.1-.4 INJECTION, SOLUTION INTRAMUSCULAR; INTRAVENOUS; SUBCUTANEOUS
Status: DISCONTINUED | OUTPATIENT
Start: 2018-11-08 | End: 2018-11-08

## 2018-11-08 RX ORDER — ONDANSETRON 2 MG/ML
4 INJECTION INTRAMUSCULAR; INTRAVENOUS EVERY 30 MIN PRN
Status: DISCONTINUED | OUTPATIENT
Start: 2018-11-08 | End: 2018-11-08 | Stop reason: HOSPADM

## 2018-11-08 RX ORDER — NALOXONE HYDROCHLORIDE 0.4 MG/ML
.1-.4 INJECTION, SOLUTION INTRAMUSCULAR; INTRAVENOUS; SUBCUTANEOUS
Status: DISCONTINUED | OUTPATIENT
Start: 2018-11-08 | End: 2018-11-09 | Stop reason: HOSPADM

## 2018-11-08 RX ORDER — FENTANYL CITRATE 50 UG/ML
25-50 INJECTION, SOLUTION INTRAMUSCULAR; INTRAVENOUS
Status: DISCONTINUED | OUTPATIENT
Start: 2018-11-08 | End: 2018-11-08 | Stop reason: HOSPADM

## 2018-11-08 RX ORDER — LIDOCAINE HYDROCHLORIDE 20 MG/ML
INJECTION, SOLUTION INFILTRATION; PERINEURAL PRN
Status: DISCONTINUED | OUTPATIENT
Start: 2018-11-08 | End: 2018-11-08

## 2018-11-08 RX ADMIN — FENTANYL CITRATE 50 MCG: 50 INJECTION, SOLUTION INTRAMUSCULAR; INTRAVENOUS at 12:06

## 2018-11-08 RX ADMIN — TECHNETIUM TC 99M SULFUR COLLOID 0.75 MILLICURIE: KIT at 11:30

## 2018-11-08 RX ADMIN — RANITIDINE 150 MG: 150 TABLET ORAL at 18:13

## 2018-11-08 RX ADMIN — CEFAZOLIN SODIUM 2 G: 2 INJECTION, SOLUTION INTRAVENOUS at 12:30

## 2018-11-08 RX ADMIN — Medication 0.5 MG: at 16:28

## 2018-11-08 RX ADMIN — DIMENHYDRINATE 12.5 MG: 50 INJECTION, SOLUTION INTRAMUSCULAR; INTRAVENOUS at 16:49

## 2018-11-08 RX ADMIN — HYDROMORPHONE HYDROCHLORIDE 0.5 MG: 1 INJECTION, SOLUTION INTRAMUSCULAR; INTRAVENOUS; SUBCUTANEOUS at 12:49

## 2018-11-08 RX ADMIN — MIDAZOLAM 2 MG: 1 INJECTION INTRAMUSCULAR; INTRAVENOUS at 12:01

## 2018-11-08 RX ADMIN — KETOROLAC TROMETHAMINE 30 MG: 30 INJECTION, SOLUTION INTRAMUSCULAR at 18:14

## 2018-11-08 RX ADMIN — LIDOCAINE HYDROCHLORIDE 2 ML: 10 INJECTION, SOLUTION EPIDURAL; INFILTRATION; INTRACAUDAL; PERINEURAL at 11:54

## 2018-11-08 RX ADMIN — SODIUM CHLORIDE, POTASSIUM CHLORIDE, SODIUM LACTATE AND CALCIUM CHLORIDE: 600; 310; 30; 20 INJECTION, SOLUTION INTRAVENOUS at 11:53

## 2018-11-08 RX ADMIN — FENTANYL CITRATE 50 MCG: 50 INJECTION INTRAMUSCULAR; INTRAVENOUS at 16:09

## 2018-11-08 RX ADMIN — FENTANYL CITRATE 50 MCG: 50 INJECTION, SOLUTION INTRAMUSCULAR; INTRAVENOUS at 13:02

## 2018-11-08 RX ADMIN — SODIUM CHLORIDE, POTASSIUM CHLORIDE, SODIUM LACTATE AND CALCIUM CHLORIDE: 600; 310; 30; 20 INJECTION, SOLUTION INTRAVENOUS at 12:43

## 2018-11-08 RX ADMIN — SODIUM CHLORIDE, POTASSIUM CHLORIDE, SODIUM LACTATE AND CALCIUM CHLORIDE: 600; 310; 30; 20 INJECTION, SOLUTION INTRAVENOUS at 16:51

## 2018-11-08 RX ADMIN — FENTANYL CITRATE 50 MCG: 50 INJECTION INTRAMUSCULAR; INTRAVENOUS at 16:30

## 2018-11-08 RX ADMIN — LIDOCAINE HYDROCHLORIDE 80 MG: 20 INJECTION, SOLUTION INFILTRATION; PERINEURAL at 12:15

## 2018-11-08 RX ADMIN — Medication 100 MG: at 12:15

## 2018-11-08 RX ADMIN — PROPOFOL 200 MG: 10 INJECTION, EMULSION INTRAVENOUS at 12:15

## 2018-11-08 RX ADMIN — PROPOFOL 100 MG: 10 INJECTION, EMULSION INTRAVENOUS at 12:30

## 2018-11-08 RX ADMIN — FENTANYL CITRATE 50 MCG: 50 INJECTION INTRAMUSCULAR; INTRAVENOUS at 16:17

## 2018-11-08 RX ADMIN — HEPARIN SODIUM 5000 UNITS: 5000 INJECTION, SOLUTION INTRAVENOUS; SUBCUTANEOUS at 12:01

## 2018-11-08 RX ADMIN — DEXAMETHASONE SODIUM PHOSPHATE 10 MG: 10 INJECTION, SOLUTION INTRAMUSCULAR; INTRAVENOUS at 12:45

## 2018-11-08 ASSESSMENT — ACTIVITIES OF DAILY LIVING (ADL)
FALL_HISTORY_WITHIN_LAST_SIX_MONTHS: NO
EATING: 0 - INDEPENDENT
TOILETING: 2 - ASSISTIVE PERSON
SWALLOWING: 0 - SWALLOWS FOODS/LIQUIDS WITHOUT DIFFICULTY
DRESS: 2 - ASSISTIVE PERSON
SWALLOWING: 0-->SWALLOWS FOODS/LIQUIDS WITHOUT DIFFICULTY
DRESS: 0-->INDEPENDENT
BATHING: 0-->INDEPENDENT
COGNITION: 0 - NO COGNITION ISSUES REPORTED
COMMUNICATION: 0 - UNDERSTANDS/COMMUNICATES WITHOUT DIFFICULTY
RETIRED_EATING: 0-->INDEPENDENT
AMBULATION: 2 - ASSISTIVE PERSON
TOILETING: 0-->INDEPENDENT
TRANSFERRING: 0-->INDEPENDENT
TRANSFERRING: 2 - ASSISTIVE PERSON
BATHING: 2 - ASSISTIVE PERSON
RETIRED_COMMUNICATION: 0-->UNDERSTANDS/COMMUNICATES WITHOUT DIFFICULTY
CHANGE_IN_FUNCTIONAL_STATUS_SINCE_ONSET_OF_CURRENT_ILLNESS/INJURY: NO
AMBULATION: 0-->INDEPENDENT

## 2018-11-08 NOTE — PROGRESS NOTES
Transfer from pacu to Room med/surg  Transferred to bed via [place] mat  S: 49 y/o F S/P Bilateral mastectomies  Anesthesia Type: General  Surgeon: Dr. Hart  Allergies: See Medication Reconciliation Record  DNR: No   B: Pertinent Medical History:   Past Medical History:   Diagnosis Date     Allergic rhinitis, cause unspecified      Hypertension 8 years?     Injury, other and unspecified, knee, leg, ankle, and foot 8th grade    left ankle injury     Invasive ductal carcinoma of breast, female, left (H) 10/26/2018     Surgical History:   Past Surgical History:   Procedure Laterality Date     BIOPSY       BREAST LUMPECTOMY, RT/LT  6/23/10    Breast Lumpectomy RT for likely adenomatous lumps     COLONOSCOPY N/A 8/16/2018    Procedure: COLONOSCOPY;  colonoscopy;  Surgeon: Vijay Almanza MD;  Location: [facility]     ESOPHAGOSCOPY, GASTROSCOPY, DUODENOSCOPY (EGD), COMBINED N/A 10/1/2015    Procedure: COMBINED ESOPHAGOSCOPY, GASTROSCOPY, DUODENOSCOPY (EGD);  Surgeon: Vijay Almanza MD;  Location: [facility]     SOFT TISSUE SURGERY      Ankle surgery 20+ years ago     SURGICAL HISTORY OF -   1989    arthroscopy of Left Ankle     SURGICAL HISTORY OF -   1990    arthroscopy of Left Ankle     SURGICAL HISTORY OF -   1993    4 wisdom teeth extracted   A: EBL: 20 ml  IVF: 2100 ml  UOP: 200 ml  Pain: IV fentanyl and Dilaudid given for 7/10 bilateral chest, incisional pain. On-Q pump in place and unclamped. Pain manageable now at 1-2/10.   NPO: ___Yes _X__No   Vomiting: ___Yes _X__No   Drainage: Dressing cdi. Bulb drains present serosanguinous drainage.   Skin Integrity: Intact except bilateral chest incisions.  RFO: __X_Yes___No x2 bulb drains to chest. On-Q pump to chest.  Brace/sling/equipment: _X__Yes___No surgical garment.  See PACU record for ongoing assessment, vital signs and pain assessment.  R: Post-Op vitals and assessments as ordered/indicated per patient's condition.  Follow Post-Op orders and notify Physician prn.  Continue to  involve patient/family in plan of care and discharge planning.  Reinforce Pre-Operative education.  Implement skin safety interventions as appropriate.  Name: Ashley IRAHETA RN, report given to SHADY Lange

## 2018-11-08 NOTE — IP AVS SNAPSHOT
MRN:[id number]                      After Visit Summary   11/8/2018    Desi Granado    MRN: [id number]           Thank you!     Thank you for choosing [facility] for your care. Our goal is always to provide you with excellent care. Hearing back from our patients is one way we can continue to improve our services. Please take a few minutes to complete the written survey that you may receive in the mail after you visit with us. Thank you!        Patient Information     Date Of Birth          1970        Designated Caregiver       Most Recent Value    Caregiver    Will someone help with your care after discharge? yes    Name of designated caregiver mom     Phone number of caregiver [phone]    Caregiver address [place]      About your hospital stay     You were admitted on:  November 8, 2018 You last received care in the:  [address] [facility]    You were discharged on:  November 9, 2018        Reason for your hospital stay       S/p bilateral mastectomy; did well post op                  Who to Call     For medical emergencies, please call 911.  For non-urgent questions about your medical care, please call your primary care provider or clinic, [phone]  For questions related to your surgery, please call your surgery clinic        Attending Provider     Provider Specialty    Hart, MD [facility]       Primary Care Provider Office Phone # Fax #    Pearl Grover [phone]      Follow-up Appointments     Follow-up and recommended labs and tests        F/U with me in 10-14 days                  Your next 10 appointments already scheduled     Nov 20, 2018  1:00 PM CST   [facility]    [address] [place] [id number] [phone]              Pending Results     Date and Time Order Name Status Description    11/8/2018 1326 Surgical pathology exam In  "process             Statement of Approval     Ordered          11/09/18 0947  I have reviewed and agree with all the recommendations and orders detailed in this document.  EFFECTIVE NOW     Approved and electronically signed by:  Nabila Hart MD             Admission Information     Date & Time Provider Department Dept. Phone    11/8/2018 Nabila Hart MD [address] [facility] [phone]      Your Vitals Were     Blood Pressure Pulse Temperature Respirations Height Weight    114/62 (BP Location: Left arm) 90 97.7  F (36.5  C) (Oral) 18 1.651 m (5' 5\") 65.9 kg (145 lb 4.5 oz)    Last Period Pulse Oximetry BMI (Body Mass Index)             10/26/2018 94% 24.18 kg/m2         MyChart Information     [facility] gives you secure access to your electronic health record. If you see a primary care provider, you can also send messages to your care team and make appointments. If you have questions, please call your primary care clinic.  If you do not have a primary care provider, please call [phone] and they will assist you.        Care EveryWhere ID     This is your Care EveryWhere ID. This could be used by other organizations to access your [facility] medical records  [id number]        Equal Access to Services     ZOILA PITTS AH: Wu Tellez, wailiana yancey, qaybta kaalmada aderadha, roz shea. So [facility] [phone].    ATENCIÓN: Si habla español, tiene a acldwell disposición servicios gratuitos de asistencia lingüística. Llame al [phone].    We comply with applicable federal civil rights laws and [place] laws. We do not discriminate on the basis of race, color, national origin, age, disability, sex, sexual orientation, or gender identity.               Review of your medicines      START taking        Dose / Directions    ondansetron 4 MG tablet   Commonly known as:  ZOFRAN   Used for:  S/P bilateral mastectomy        Dose:  4 mg   Take 1 tablet (4 " mg) by mouth every 6 hours as needed for nausea   Quantity:  18 tablet   Refills:  0       oxyCODONE-acetaminophen 5-325 MG per tablet   Commonly known as:  PERCOCET   Used for:  Invasive ductal carcinoma of breast, female, left (H), S/P bilateral mastectomy        Dose:  1-2 tablet   Take 1-2 tablets by mouth every 4 hours as needed for pain (moderate to severe)   Quantity:  20 tablet   Refills:  0         CONTINUE these medicines which have NOT CHANGED        Dose / Directions    FLONASE ALLERGY RELIEF NA   Used for:  LLQ pain        Refills:  0       IBUPROFEN PO        AS NEEDED   Refills:  0       lisinopril 40 MG tablet   Commonly known as:  PRINIVIL/ZESTRIL   Used for:  Essential hypertension, benign        Dose:  40 mg   Take 1 tablet (40 mg) by mouth daily   Quantity:  90 tablet   Refills:  3       LORazepam 1 MG tablet   Commonly known as:  ATIVAN   Used for:  Adjustment disorder with anxious mood        Dose:  1 mg   Take 1 tablet (1 mg) by mouth every 8 hours as needed for anxiety   Quantity:  20 tablet   Refills:  0       ZYRTEC 10 MG tablet   Used for:  Allergic rhinitis, cause unspecified   Generic drug:  cetirizine        1 TABLET DAILY   Quantity:  90   Refills:  3            Where to get your medicines      These medications were sent to [facility], [place] - [address]   [address] , [facility] [address]     Phone:  [phone]     ondansetron 4 MG tablet         Some of these will need a paper prescription and others can be bought over the counter. Ask your nurse if you have questions.     Bring a paper prescription for each of these medications     oxyCODONE-acetaminophen 5-325 MG per tablet                Protect others around you: Learn how to safely use, store and throw away your medicines at www.disposemymeds.org.        Information about OPIOIDS     PRESCRIPTION OPIOIDS: WHAT YOU NEED TO KNOW   We gave you an opioid (narcotic) pain medicine. It is important  to manage your pain, but opioids are not always the best choice. You should first try all the other options your care team gave you. Take this medicine for as short a time (and as few doses) as possible.    Some activities can increase your pain, such as bandage changes or therapy sessions. It may help to take your pain medicine 30 to 60 minutes before these activities. Reduce your stress by getting enough sleep, working on hobbies you enjoy and practicing relaxation or meditation. Talk to your care team about ways to manage your pain beyond prescription opioids.    These medicines have risks:    DO NOT drive when on new or higher doses of pain medicine. These medicines can affect your alertness and reaction times, and you could be arrested for driving under the influence (DUI). If you need to use opioids long-term, talk to your care team about driving.    DO NOT operate heavy machinery    DO NOT do any other dangerous activities while taking these medicines.    DO NOT drink any alcohol while taking these medicines.     If the opioid prescribed includes acetaminophen, DO NOT take with any other medicines that contain acetaminophen. Read all labels carefully. Look for the word  acetaminophen  or  Tylenol.  Ask your pharmacist if you have questions or are unsure.    You can get addicted to pain medicines, especially if you have a history of addiction (chemical, alcohol or substance dependence). Talk to your care team about ways to reduce this risk.    All opioids tend to cause constipation. Drink plenty of water and eat foods that have a lot of fiber, such as fruits, vegetables, prune juice, apple juice and high-fiber cereal. Take a laxative (Miralax, milk of magnesia, Colace, Senna) if you don t move your bowels at least every other day. Other side effects include upset stomach, sleepiness, dizziness, throwing up, tolerance (needing more of the medicine to have the same effect), physical dependence and slowed  breathing.    Store your pills in a secure place, locked if possible. We will not replace any lost or stolen medicine. If you don t finish your medicine, please throw away (dispose) as directed by your pharmacist. The [place] Pollution [employer] has more information about safe disposal: https://www.pca.[facility].[place].us/living-green/managing-unwanted-medications             Medication List: This is a list of all your medications and when to take them. Check marks below indicate your daily home schedule. Keep this list as a reference.      Medications           Morning Afternoon Evening Bedtime As Needed    FLONASE ALLERGY RELIEF NA                                   IBUPROFEN PO   AS NEEDED                                   lisinopril 40 MG tablet   Commonly known as:  PRINIVIL/ZESTRIL   Take 1 tablet (40 mg) by mouth daily   Next Dose Due:  None given in hospital.  Return to home routine.                                   LORazepam 1 MG tablet   Commonly known as:  ATIVAN   Take 1 tablet (1 mg) by mouth every 8 hours as needed for anxiety                                   ondansetron 4 MG tablet   Commonly known as:  ZOFRAN   Take 1 tablet (4 mg) by mouth every 6 hours as needed for nausea                                   oxyCODONE-acetaminophen 5-325 MG per tablet   Commonly known as:  PERCOCET   Take 1-2 tablets by mouth every 4 hours as needed for pain (moderate to severe)   Last time this was given:  1 tablet on 11/9/2018 10:14 AM   Next Dose Due:  Can take next dose at 2:15 pm.                                   ZYRTEC 10 MG tablet   1 TABLET DAILY   Generic drug:  cetirizine

## 2018-11-08 NOTE — INTERVAL H&P NOTE
The History and Physical has been reviewed, the patient has been examined and no changes have occurred in the patient's condition since the H & P was completed.       [facility], DO

## 2018-11-08 NOTE — OP NOTE
OPERATIVE NOTE  [facility]    DATE:  11/8/2018    SURGEON:  Nabila Hart DO    ASSISTANT:  AVELINA Mares, MS 3    PREOPERATIVE DIAGNOSIS:  Left invasive ductal carcinoma  General Anxiety disorder    POSTOPERATIVE DIAGNOSIS:  same    OPERATION:  1.  Injection of technetium 99  2.  Intraoperative lymphoscintigraphy  3.  Injection of Lymphazurin  4.  Bilateral Simple mastectomy  5.  Placement of On-Q pain pump  6.  Left Arthur lymph node biopsy    ANESTHESIA:  General endotracheal anesthesia.    INDICATIONS FOR PROCEDURE:  Desi Granado is a 48 year old who developed a breast lump/had an abnormal mammogram that on workup with core-needle biopsy was found to be invasive ductal carcinoma carcinoma of the breast.  After discussion of alternatives, the patient elected to undergo the above procedures. The patient expressed understanding of risks, benefits, and alternatives of bilateral simple mastectomies with left sentinel lymph node biopsy and wishes to proceed.    FINDINGS:  Specimens:   ID Type Source Tests Collected by Time Destination   A : Right Breast Short Superior, Long Lateral Tissue Breast, Right SURGICAL PATHOLOGY EXAM Nablia Hart MD 11/8/2018  1:24 PM    B : Left Breast Short Superior, Long Lateral Tissue Breast, Left SURGICAL PATHOLOGY EXAM Nabila Hart MD 11/8/2018  2:17 PM    C : Left Arthur Node # 1 FROZEN Tissue Lymph Node, Arthur SURGICAL PATHOLOGY EXAM Nabila Hart MD 11/8/2018  2:21 PM    D : Left Arthur Node # 2 Tissue Lymph Node, Arthur SURGICAL PATHOLOGY EXAM Nabila Hart MD 11/8/2018  2:25 PM    E : left sentinel lymph node #3 Tissue Lymph Node, Arthur SURGICAL PATHOLOGY EXAM Nabila Hart MD 11/8/2018  2:27 PM           PROCEDURE:  The patient was properly identified in the preop holding area.  With the help of a nuclear medicine I injected technetium 99 in the preop holding area into the left breast intradermally and this was massaged  for several minutes.  Consent was signed and approximately an hour later the patient was then brought back to the operative suite.  The patient was then placed in a supine position.  Anesthesia was induced per anesthesia protocol.  We started our procedure by injecting methylene blue into the left breast intradermally at the nipple areolar complex.  This was massaged for several minutes.  The patient was then prepped and draped in the usual sterile fashion.  A timeout was then done to confirm the correct patient, positioning and procedure.       I started on the noncancer side, the right breast; A skin incision was made that encompassed the nipple-areola complex passed in a generally transverse direction across the breast. Flaps were raised in the avascular plane between subcutaneous tissue and breast tissue from clavicle superiorly, the sternum medially, the anterior rectus sheath inferiorly, and the anterior border of the latissimus dorsi muscle laterally. Hemostasis was achieved in the flaps. Next, the breast tissue and underlying pectoralis fascia were excised from the pectoralis major muscle, progressing from medially to laterally. At the lateral border of the pectoralis major mus-jo, the breast tissue was swung laterally and dissection progressed under the muscle.  The entire breast was amputated and suture labeled as short superior, long lateral.  The entire breast was copiously irrigated; hemostasis was achieved.  A 15F DAYA was placed at the inframammary laterally - sutured in place in a standard drain stitch fashion with a 2-0 prolene.  An On-Q was placed in the superior aspect medially and the incision was closed with 3-0 simple interrupted buried fashion, followed by 4-0 subcuticular fashion.      I then focused on the left side. A skin incision was made that encompassed the nipple-areola complex passed in a generally transverse direction across the breast to encompass the area of the cancer with skin.  Flaps were raised in the avascular plane between subcutaneous tissue and breast tissue from clavicle superiorly, the sternum medially, the anterior rectus sheath inferiorly, and the anterior border of the latissimus dorsi muscle laterally. Hemostasis was achieved in the flaps. Next, the breast tissue and underlying pectoralis fascia were excised from the pectoralis major muscle, progressing from medially to laterally. At the lateral border of the pectoralis major mus-jo, the breast tissue was swung laterally and dissection progressed under the muscle.  The entire breast was amputated and suture labeled as short superior, long lateral.  Utilizing the Neoprobe, we were able to find the area of a hot node in the left axilla.   The hot node was then grasped with Clarendon and completely excised utilizing the electrocautery Bovie.  Once excised, it was placed on the Neoprobe and the count was approximately [id number].  This was then sent to pathology for frozen section.  Next, utilizing the Neoprobe, we were able to find another area of the hot node approximately 10% to the last one.  This area was palpable.  Therefore, I was able to grasp it with a DeBakey elevated, grasped with a Clarendon and excised it utilizing the electrocautery Bovie.  This was sent to pathology for frozen section labeled as left sentinel lymph node #2.  Next, I found a palpable lymph node.  Therefore, I was able to grasp it with a DeBakey elevated, grasped with a Holly and excised it utilizing the electrocautery Bovie.  This was sent to pathology for frozen section labeled as left sentinel lymph node #3. At this point, I was not able to get any additional detection from the Neoprobe, see any additional blue nodes, and no other palpable lymph node.  The entire breast was copiously irrigated; hemostasis was achieved.  A 15F DAYA was placed at the inframammary laterally - sutured in place in a standard drain stitch fashion with a 2-0 prolene.      Pathology  called back with frozen sections of our 3 sentinel lymph node were all negative.   Patient tolerated the procedure well.    [facility]

## 2018-11-08 NOTE — H&P (VIEW-ONLY)
[facility]  [address] [facility] [address]  [phone]  Dept: [phone]    PRE-OP EVALUATION:  Today's date: 2018    Desi Granado (: 1970) presents for pre-operative evaluation assessment as requested by Dr. Hart.  She requires evaluation and anesthesia risk assessment prior to undergoing surgery/procedure for treatment of breast cancer .    Proposed Surgery/ Procedure: BILATERAL SIMPLE MASTECTOMIES WITH LEFT SENTINEL LYMPH NODE BIOPSY WITH POSSIBLE LEFT AXILLARY DISSECTION  Date of Surgery/ Procedure: 18  Time of Surgery/ Procedure: 12:00pm  Hospital/Surgical Facility: [facility]  Fax number for surgical facility:   Primary Physician: Pearl Grover  Type of Anesthesia Anticipated: General    Patient has a Health Care Directive or Living Will:  YES on file    1. NO - Do you have a history of heart attack, stroke, stent, bypass or surgery on an artery in the head, neck, heart or legs?  2. NO - Do you ever have any pain or discomfort in your chest?  3. NO - Do you have a history of  Heart Failure?  4. NO - Are you troubled by shortness of breath when: walking on the level, up a slight hill or at night?  5. NO - Do you currently have a cold, bronchitis or other respiratory infection?  6. NO - Do you have a cough, shortness of breath or wheezing?  7. NO - Do you sometimes get pains in the calves of your legs when you walk?  8. NO - Do you or anyone in your family have previous history of blood clots?  9. NO - Do you or does anyone in your family have a serious bleeding problem such as prolonged bleeding following surgeries or cuts?  10. NO - Have you ever had problems with anemia or been told to take iron pills?  11. NO - Have you had any abnormal blood loss such as black, tarry or bloody stools, or abnormal vaginal bleeding?  12. NO - Have you ever had a blood transfusion?  13. NO - Have you or any of your relatives ever had problems with anesthesia?  14. NO  - Do you have sleep apnea, excessive snoring or daytime drowsiness?  15. NO - Do you have any prosthetic heart valves?  16. NO - Do you have prosthetic joints?  17. NO - Is there any chance that you may be pregnant?      HPI:     HPI related to upcoming procedure: breast cancer needs surgical treatment       See problem list for active medical problems.  Problems all longstanding and stable, except as noted/documented.  See ROS for pertinent symptoms related to these conditions.                                                                                                                                                          .    MEDICAL HISTORY:     Patient Active Problem List    Diagnosis Date Noted     Invasive ductal carcinoma of breast, female, left (H) 10/26/2018     Priority: Medium     CARDIOVASCULAR SCREENING; LDL GOAL LESS THAN 160 10/31/2010     Priority: Medium     Papanicolaou smear of cervix with atypical squamous cells of undetermined significance (ASC-US) 09/02/2010     Priority: Medium     8/12/09 - HPV neg.  8/18/10 - normal       FAMILY HISTORY OF ENDOCRINE DISEASE( other endo or metabol) 08/16/2010     Priority: Medium     Essential hypertension, benign 06/21/2007     Priority: Medium     Family history of other cardiovascular diseases 12/12/2006     Priority: Medium     Problem list name updated by automated process. Provider to review and confirm  Problem list name updated by automated process. Provider to review       Allergic rhinitis      Priority: Medium     Problem list name updated by automated process. Provider to review       Injury, other and unspecified, knee, leg, ankle, and foot      Priority: Medium     left ankle injury        Past Medical History:   Diagnosis Date     Allergic rhinitis, cause unspecified      Hypertension 8 years?     Injury, other and unspecified, knee, leg, ankle, and foot 8th grade    left ankle injury     Invasive ductal carcinoma of breast, female, left  "(H) 10/26/2018     Past Surgical History:   Procedure Laterality Date     BIOPSY       BREAST LUMPECTOMY, RT/LT  6/23/10    Breast Lumpectomy RT for likely adenomatous lumps     COLONOSCOPY N/A 8/16/2018    Procedure: COLONOSCOPY;  colonoscopy;  Surgeon: Vijay Almanza MD;  Location: [facility]     ESOPHAGOSCOPY, GASTROSCOPY, DUODENOSCOPY (EGD), COMBINED N/A 10/1/2015    Procedure: COMBINED ESOPHAGOSCOPY, GASTROSCOPY, DUODENOSCOPY (EGD);  Surgeon: Vijay Almanza MD;  Location: [facility]     SOFT TISSUE SURGERY      Ankle surgery 20+ years ago     SURGICAL HISTORY OF -   1989    arthroscopy of Left Ankle     SURGICAL HISTORY OF -   1990    arthroscopy of Left Ankle     SURGICAL HISTORY OF -   1993    4 wisdom teeth extracted     Current Outpatient Prescriptions   Medication Sig Dispense Refill     Fluticasone Propionate (FLONASE ALLERGY RELIEF NA)        IBUPROFEN PO AS NEEDED       lisinopril (PRINIVIL/ZESTRIL) 40 MG tablet Take 1 tablet (40 mg) by mouth daily 90 tablet 3     LORazepam (ATIVAN) 1 MG tablet Take 1 tablet (1 mg) by mouth every 8 hours as needed for anxiety 20 tablet 0     ZYRTEC 10 MG OR TABS 1 TABLET DAILY 90 3     OTC products: None, except as noted above    Allergies   Allergen Reactions     Aspirin Hives     Dust Mites      Morphine Nausea      Latex Allergy: NO    Social History   Substance Use Topics     Smoking status: Never Smoker     Smokeless tobacco: Never Used     Alcohol use No      Comment: very rare     History   Drug Use No       REVIEW OF SYSTEMS:   Constitutional, neuro, ENT, endocrine, pulmonary, cardiac, gastrointestinal, genitourinary, musculoskeletal, integument and psychiatric systems are negative, except as otherwise noted.  Little skin rash  POD   EXAM:   /84  Pulse 104  Temp 98  F (36.7  C) (Tympanic)  Ht 5' 5\" (1.651 m)  Wt 141 lb 14.4 oz (64.4 kg)  LMP 10/26/2018  Breastfeeding? No  BMI 23.61 kg/m2    GENERAL APPEARANCE: healthy, alert and no distress     HENT: ear " canals and TM's normal and nose and mouth without ulcers or lesions     NECK: no adenopathy, no asymmetry, masses, or scars and thyroid normal to palpation     RESP: lungs clear to auscultation - no rales, rhonchi or wheezes     CV: regular rates and rhythm, normal S1 S2, no S3 or S4 and no murmur, click or rub     ABDOMEN:  soft, nontender, no HSM or masses and bowel sounds normal     MS: extremities normal- no gross deformities noted, no evidence of inflammation in joints, FROM in all extremities.     SKIN: no suspicious lesions or rashes     NEURO: Normal strength and tone, sensory exam grossly normal, mentation intact and speech normal     PSYCH: mentation appears normal. and affect normal/bright     LYMPHATICS: No cervical adenopathy    DIAGNOSTICS:     Labs Resulted Today:   Results for orders placed or performed in visit on 11/06/18   Potassium   Result Value Ref Range    Potassium 4.1 3.4 - 5.3 mmol/L   CBC with platelets   Result Value Ref Range    WBC 8.0 4.0 - 11.0 10e9/L    RBC Count 4.74 3.8 - 5.2 10e12/L    Hemoglobin 14.3 11.7 - 15.7 g/dL    Hematocrit 41.8 35.0 - 47.0 %    MCV 88 78 - 100 fl    MCH 30.2 26.5 - 33.0 pg    MCHC 34.2 31.5 - 36.5 g/dL    RDW 12.1 10.0 - 15.0 %    Platelet Count 336 150 - 450 10e9/L       Recent Labs   Lab Test  09/14/18   0934  06/01/18   1138   09/04/15   0833   HGB   --   13.2   --   13.3   PLT   --   289   --   275   NA  135  130*   < >  135   POTASSIUM  3.9  3.7   < >  4.1   CR  0.77  0.75   < >  0.85    < > = values in this interval not displayed.        IMPRESSION:   Reason for surgery/procedure: breast cancer     The proposed surgical procedure is considered LOW risk.    REVISED CARDIAC RISK INDEX  The patient has the following serious cardiovascular risks for perioperative complications such as (MI, PE, VFib and 3  AV Block):  No serious cardiac risks  INTERPRETATION: 0 risks: Class I (very low risk - 0.4% complication rate)    The patient has the following  additional risks for perioperative complications:  No identified additional risks      ICD-10-CM    1. Preop general physical exam Z01.818    2. Invasive ductal carcinoma of breast, female, left (H) C50.912 CBC with platelets   3. Adjustment disorder with anxious mood F43.22 LORazepam (ATIVAN) 1 MG tablet   4. Essential hypertension, benign I10 Potassium       RECOMMENDATIONS:         --Patient is to take all scheduled medications on the day of surgery EXCEPT for modifications listed below.    ACE Inhibitor or Angiotensin Receptor Blocker (ARB) Use  Ace inhibitor or Angiotensin Receptor Blocker (ARB) and should HOLD this medication for the 24 hours prior to surgery.      APPROVAL GIVEN to proceed with proposed procedure, without further diagnostic evaluation       Signed Electronically by: Pearl Grover MD    Copy of this evaluation report is provided to requesting physician.    [place] Preop Guidelines    Revised Cardiac Risk Index

## 2018-11-08 NOTE — IP AVS SNAPSHOT
[address] [facility]    [address] [place] [address]    Phone:  [phone]                                       After Visit Summary   11/8/2018    Desi Granado    MRN: [id number]           After Visit Summary Signature Page     I have received my discharge instructions, and my questions have been answered. I have discussed any challenges I see with this plan with the nurse or doctor.    ..........................................................................................................................................  Patient/Patient Representative Signature      ..........................................................................................................................................  Patient Representative Print Name and Relationship to Patient    ..................................................               ................................................  Date                                   Time    ..........................................................................................................................................  Reviewed by Signature/Title    ...................................................              ..............................................  Date                                               Time          22EPIC Rev 08/18

## 2018-11-08 NOTE — ANESTHESIA CARE TRANSFER NOTE
Patient: Desi Granado    Procedure(s):  BILATERAL SIMPLE MASTECTOMIES WITH LEFT SENTINEL LYMPH NODE BIOPSY     Diagnosis: Malignant neoplasm of overlapping sites of left breast in female, estrogen receptor positive  Diagnosis Additional Information: No value filed.    Anesthesia Type:   General, ETT     Note:  Airway :Face Mask  Patient transferred to:PACU  Handoff Report: Identifed the Patient, Identified the Reponsible Provider, Reviewed the pertinent medical history, Discussed the surgical course, Reviewed Intra-OP anesthesia mangement and issues during anesthesia, Set expectations for post-procedure period and Allowed opportunity for questions and acknowledgement of understanding      Vitals: (Last set prior to Anesthesia Care Transfer)    CRNA VITALS  11/8/2018 1520 - 11/8/2018 1601      11/8/2018             Resp Rate (observed): 14                Electronically Signed By: MEE Rolle CRNA  November 8, 2018  4:01 PM

## 2018-11-08 NOTE — ANESTHESIA PREPROCEDURE EVALUATION
Anesthesia Evaluation     . Pt has had prior anesthetic. Type: MAC and General    History of anesthetic complications    Slow to wake up - last anesthetic was > 20 years ago      ROS/MED HX    ENT/Pulmonary:     (+)allergic rhinitis, , . .    Neurologic:  - neg neurologic ROS     Cardiovascular: Comment: History of long QT interval    (+) Dyslipidemia, hypertension----. : . . . :. . Previous cardiac testing date:results:date: results:ECG reviewed date:12-12-06 results:Sinus Rhythm   P:QRS - 1:1, Normal P axis, H Rate 98  -RSR(V1) -nondiagnostic .     PROBABLY NORMAL date: results:          METS/Exercise Tolerance:     Hematologic:         Musculoskeletal: Comment: Injury, other and unspecified, knee, leg, ankle, and foot  - neg musculoskeletal ROS       GI/Hepatic: Comment: hx diverticulitis    (+) GERD Asymptomatic on medication,       Renal/Genitourinary:         Endo:  - neg endo ROS       Psychiatric:  - neg psychiatric ROS       Infectious Disease:  - neg infectious disease ROS       Malignancy:   (+) Malignancy History of Breast  Breast CA Active status post. Left breast        Other:                     Physical Exam  Normal systems: cardiovascular and pulmonary    Airway   Mallampati: II  TM distance: >3 FB  Neck ROM: full    Dental   (+) caps    Cardiovascular   Rhythm and rate: regular and normal  (-) no murmur    Pulmonary    breath sounds clear to auscultation    Other findings: Last dose Lisinopril was 11/7/18                Anesthesia Plan      History & Physical Review  History and physical reviewed and following examination; no interval change.    ASA Status:  2 .    NPO Status:  > 6 hours    Plan for General, ETT and RSI with Intravenous and Propofol induction. Maintenance will be Balanced.    PONV prophylaxis:  Ondansetron (or other 5HT-3) and Dexamethasone or Solumedrol       Postoperative Care  Postoperative pain management:  IV analgesics and Oral pain medications.      Consents  Anesthetic  plan, risks, benefits and alternatives discussed with:  Patient and Parent (Mother and/or Father).  Use of blood products discussed: No .   .                          .

## 2018-11-09 VITALS
OXYGEN SATURATION: 94 % | HEART RATE: 103 BPM | RESPIRATION RATE: 18 BRPM | TEMPERATURE: 97.7 F | BODY MASS INDEX: 24.21 KG/M2 | DIASTOLIC BLOOD PRESSURE: 73 MMHG | SYSTOLIC BLOOD PRESSURE: 129 MMHG | WEIGHT: 145.28 LBS | HEIGHT: 65 IN

## 2018-11-09 LAB — GLUCOSE BLDC GLUCOMTR-MCNC: 86 MG/DL (ref 70–99)

## 2018-11-09 PROCEDURE — 82962 GLUCOSE BLOOD TEST: CPT

## 2018-11-09 PROCEDURE — 25000132 ZZH RX MED GY IP 250 OP 250 PS 637: Performed by: SURGERY

## 2018-11-09 PROCEDURE — 25000128 H RX IP 250 OP 636: Performed by: SURGERY

## 2018-11-09 RX ORDER — ONDANSETRON 4 MG/1
4 TABLET, FILM COATED ORAL EVERY 6 HOURS PRN
Qty: 18 TABLET | Refills: 0 | Status: SHIPPED | OUTPATIENT
Start: 2018-11-09 | End: 2019-01-10

## 2018-11-09 RX ADMIN — KETOROLAC TROMETHAMINE 30 MG: 30 INJECTION, SOLUTION INTRAMUSCULAR at 12:02

## 2018-11-09 RX ADMIN — ONDANSETRON HYDROCHLORIDE 4 MG: 2 INJECTION, SOLUTION INTRAMUSCULAR; INTRAVENOUS at 09:12

## 2018-11-09 RX ADMIN — OXYCODONE HYDROCHLORIDE AND ACETAMINOPHEN 1 TABLET: 5; 325 TABLET ORAL at 02:07

## 2018-11-09 RX ADMIN — KETOROLAC TROMETHAMINE 30 MG: 30 INJECTION, SOLUTION INTRAMUSCULAR at 00:11

## 2018-11-09 RX ADMIN — OXYCODONE HYDROCHLORIDE AND ACETAMINOPHEN 1 TABLET: 5; 325 TABLET ORAL at 10:14

## 2018-11-09 RX ADMIN — KETOROLAC TROMETHAMINE 30 MG: 30 INJECTION, SOLUTION INTRAMUSCULAR at 05:56

## 2018-11-09 RX ADMIN — RANITIDINE 150 MG: 150 TABLET ORAL at 05:58

## 2018-11-09 RX ADMIN — OXYCODONE HYDROCHLORIDE AND ACETAMINOPHEN 1 TABLET: 5; 325 TABLET ORAL at 06:17

## 2018-11-09 NOTE — PROGRESS NOTES
"SUBJECTIVE:  Desi Granado is a 48 year old yo woman POD#1 from bilateral simple mastectomy and left sentinel lymph node biopsy. She is feeling \"not too bad\" this morning. She did not sleep well, but is a light sleeper. She feels comfortable and rates her pain at 3/10. She has taken toradol and percocet  and pain is well controlled. She has been up and moving to the bathroom and back with no dizziness or lightheadedness. Feels pain in her chest, but had more back pain last night. She has passed gas and is voiding well. Denies chest pain, SOB, chills, and N/V.      OBJECTIVE:  /62 (BP Location: Left arm)  Pulse 90  Temp 97.7  F (36.5  C) (Oral)  Resp 18  Ht 1.651 m (5' 5\")  Wt 65.9 kg (145 lb 4.5 oz)  LMP 10/26/2018  SpO2 94%  BMI 24.18 kg/m2    Physical Exam    General: Appears comfortable, lying in bed, alert, no acute distress  CV: Regular rate and rhythm. Normal S1/S2. No murmurs or extra sounds. No peripheral edema  Resp: Lungs clear to auscultation bilaterally. No wheezes, rhonchi, or rales.  Chest: Incisions clean, dry, and intact. No hematomas. Minimal swelling bilaterally. DAYA drains with Serosang bilaterally.    Labs 11/6/18:  WBC 8.0  Hgb 14.3    ASSESSMENT/PLAN:  Desi Granado is a 48 year old now POD#1 from bilateral mastectomy with left sentinel lymph node biopsy. Vitals are stable. Patient is afebrile with no tachycardia or tachypnea. Pain well controlled currently on toradol and some percocet. Voiding, and stooling well. Able to walk w/o lightheadedness or dizziness.  - Continue toradol and percocet for pain medication  - Encourage ambulation  - Regular diet as tolerated  - Anticipate discharge today     Patient was seen and examined by myself and Dr Hart.  The note was then scribed by me.  Debra Yancey, MS3    Physician Attestation   I, [facility], saw and evaluated this patient prior to discharge.  I discussed the patient with the edical student and agree with plan of care " as documented in the note.      I personally reviewed vital signs, physical exam.      I personally spent 20 minutes on discharge activities.    [facility] Union WestJulienne Hart MD  Date of Service (when I saw the patient): 11/09/18

## 2018-11-09 NOTE — PLAN OF CARE
Problem: Patient Care Overview  Goal: Plan of Care/Patient Progress Review  Outcome: Improving  Demonstrated DAYA drain care to pt,  pt 's mom is going to care for her at discharge, mom will be in tomorrow and she would benefit from instructions and teach back.      Problem: Surgery Nonspecified (Adult)  Goal: Signs and Symptoms of Listed Potential Problems Will be Absent, Minimized or Managed (Surgery Nonspecified)  Signs and symptoms of listed potential problems will be absent, minimized or managed by discharge/transition of care (reference Surgery Nonspecified (Adult) CPG).   Outcome: Improving  Feeling of room spinning is gone, pt up with assist of one to bathroom.  Tolerating full liquids, DAYA drains 120 ml bloody drng.  Denies pain on Q pump intact, pt denies pain. LS clear, RA,  VSS.  Capnography/ SCD's on.  Ace wrap CDI.  Anticipates discharge to home with her mom tomorrow.

## 2018-11-09 NOTE — PROGRESS NOTES
S-(situation): Patient registered to Observation. Patient arrived to room 273 from PACU @ 1730    B-(background): bilateral mastectomy    A-(assessment): VSS, denies pain,m room is still spinning when she opens her eyes, (but improved from the PACU) ace wrap to the chest CDI, DAYA drains patent, on Q pump in place, unclamped    R-(recommendations): Orders and observation goals reviewed with pt/ family    Nursing Observation criteria listed below was met:    Skin issues/needs documented:yes  Isolation needs addressed, if appropriate: none  Fall Prevention: Education given and documented: yes  Education Assessment documented:yes  Education Documented (Pre-existing chronic infection such as, MRSA/VRE need education on admission):n/a  OBS video/handout Reviewed & Documen  Aware she is an OP in a bed with discharging to home tomorrow with family  Medication Reconciliation Complete:  yes  New medication patient education completed and documented (Possible Side Effects of Common Medications handout): yes  Home medications if not able to send immediately home with family stored here: n/a  Reminder note placed in discharge instructions: n/a  Patient has discharge needs (If yes, please explain none, parents present and very atttentive

## 2018-11-09 NOTE — ANESTHESIA POSTPROCEDURE EVALUATION
Patient: Desi Granado    Procedure(s):  BILATERAL SIMPLE MASTECTOMIES WITH LEFT SENTINEL LYMPH NODE BIOPSY     Diagnosis:Malignant neoplasm of overlapping sites of left breast in female, estrogen receptor positive  Diagnosis Additional Information: No value filed.    Anesthesia Type:  General, ETT    Note:  Anesthesia Post Evaluation    Patient location during evaluation: Phase 2 and Bedside  Patient participation: Able to fully participate in evaluation  Level of consciousness: awake  Pain management: adequate  Airway patency: patent  Cardiovascular status: acceptable and hemodynamically stable  Respiratory status: acceptable, room air and nonlabored ventilation  Hydration status: stable  PONV: none     Anesthetic complications: None    Comments: Patient was happy with the anesthesia care received and no anesthesia related complications were noted.  I will follow up with the patient again if it is needed.        Last vitals:  Vitals:    11/09/18 0258 11/09/18 0308 11/09/18 0600   BP:  122/69 114/62   Pulse:  102 90   Resp: 18 18 18   Temp:  98.9  F (37.2  C) 97.7  F (36.5  C)   SpO2:  96% 94%         Electronically Signed By: MEE Funes CRNA  November 9, 2018  10:36 AM

## 2018-11-09 NOTE — DISCHARGE SUMMARY
[facility] Discharge Summary    Desi Granado MRN# [id number]   Age: 48 year old YOB: 1970     Date of Admission:  11/8/2018  Date of Discharge::  11/9/2018   Admitting Physician:  Nabila Hart MD  Discharge Physician:  Nabila Hart MD     Home clinic:  [facility]  Primary doctor:   Pearl Grover    Admission Diagnoses:  Malignant neoplasm of overlapping sites of left breast in female, estrogen receptor positive  Breast cancer (H)      Discharge Diagnoses:  Active Problems:    Breast cancer (H)    Assessment: s/p bilateral mastectomy    Plan: stable        Procedures:  OPERATION:  1.  Injection of technetium 99  2.  Intraoperative lymphoscintigraphy  3.  Injection of Lymphazurin  4.  Bilateral Simple mastectomy  5.  Placement of On-Q pain pump  6.  Left [place] lymph node biopsy     Medications prior to Admission:  Prescriptions Prior to Admission   Medication Sig Dispense Refill Last Dose     Fluticasone Propionate (FLONASE ALLERGY RELIEF NA)    11/8/2018 at 0730     lisinopril (PRINIVIL/ZESTRIL) 40 MG tablet Take 1 tablet (40 mg) by mouth daily 90 tablet 3 11/7/2018 at 0700     LORazepam (ATIVAN) 1 MG tablet Take 1 tablet (1 mg) by mouth every 8 hours as needed for anxiety 20 tablet 0 11/7/2018 at 2200     ZYRTEC 10 MG OR TABS 1 TABLET DAILY 90 3 11/8/2018 at 0700     IBUPROFEN PO AS NEEDED   Not Taking         Medications on Discharge:  Current Discharge Medication List      START taking these medications    Details   ondansetron (ZOFRAN) 4 MG tablet Take 1 tablet (4 mg) by mouth every 6 hours as needed for nausea  Qty: 18 tablet, Refills: 0    Associated Diagnoses: S/P bilateral mastectomy      oxyCODONE-acetaminophen (PERCOCET) 5-325 MG per tablet Take 1-2 tablets by mouth every 4 hours as needed for pain (moderate to severe)  Qty: 20 tablet, Refills: 0    Associated Diagnoses: Invasive ductal carcinoma of breast, female, left (H); S/P bilateral mastectomy         CONTINUE these  "medications which have NOT CHANGED    Details   Fluticasone Propionate (FLONASE ALLERGY RELIEF NA)     Associated Diagnoses: LLQ pain      lisinopril (PRINIVIL/ZESTRIL) 40 MG tablet Take 1 tablet (40 mg) by mouth daily  Qty: 90 tablet, Refills: 3    Associated Diagnoses: Essential hypertension, benign      LORazepam (ATIVAN) 1 MG tablet Take 1 tablet (1 mg) by mouth every 8 hours as needed for anxiety  Qty: 20 tablet, Refills: 0    Associated Diagnoses: Adjustment disorder with anxious mood      ZYRTEC 10 MG OR TABS 1 TABLET DAILY  Qty: 90, Refills: 3    Associated Diagnoses: Allergic rhinitis, cause unspecified      IBUPROFEN PO AS NEEDED               Consultations:  No consultations were requested during this admission    Brief History of Illness:  Desi Granado is a 48 year old who developed a breast lump/had an abnormal mammogram that on workup with core-needle biopsy was found to be invasive ductal carcinoma carcinoma of the breast.  After discussion of alternatives, the patient elected to undergo the above procedures. The patient expressed understanding of risks, benefits, and alternatives of bilateral simple mastectomies with left sentinel lymph node biopsy and wishes to proceed.    Hospital Course:  POstoperatively, pain well controlled; no longer bleeding at DAYA left site.      Discharge Exam:    /62 (BP Location: Left arm)  Pulse 90  Temp 97.7  F (36.5  C) (Oral)  Resp 18  Ht 1.651 m (5' 5\")  Wt 65.9 kg (145 lb 4.5 oz)  LMP 10/26/2018  SpO2 94%  BMI 24.18 kg/m2    GENERAL APPEARANCE: healthy, alert and no distress  BREAST: chest wall intact; incision CDI; skin healthy in appearance  ABDOMEN: soft, nontender, without hepatosplenomegaly or masses and bowel sounds normal  SKIN: no suspicious lesions or rashes      Attestation:  I have reviewed today's vital signs, notes, medications, labs and imaging.  Amount of time performed on this discharge summary: <30 minutes.    SabinoAmy Hart MD    "

## 2018-11-09 NOTE — PLAN OF CARE
Problem: Patient Care Overview  Goal: Plan of Care/Patient Progress Review  Outcome: Improving  Dressing to chest is clean dry and intact. Vss. DAYA drains (L)=60mL and (R)=70mL, lightening in color to serosanguinous color. Has voided well. Tolerating regular diet. Percocet one tablet given x2 overnight for pain control and has been effective. Ice packs in place and OnQ pump infusing at 7mL/hr to each catheter, also has had toradol as scheduled. Will continue with plan of care.

## 2018-11-09 NOTE — PLAN OF CARE
Problem: Patient Care Overview  Goal: Plan of Care/Patient Progress Review  Outcome: Completed Date Met: 11/09/18  S-(situation): Patient discharged to home via car with parents.    B-(background): S/P LARISSA mastectomy.    A-(assessment): Patient doing well.  Afebrile.  VSS.  Incisions clean, dry, and intact with no drainage.  DAYA drains patent and draining moderate amount of serosanguinous fluid.  See flowsheets.  Patient and family taught to care, empty, and strip DAYA drains.  Dressings clean, dry, and intact.  Both drains have biopatch and tegaderm in place.  Patient and family instructed to remove tegaderm and biopatch in seven days.  Instructed to shower with pump and drains wrapped.  Patient educated on titration of ON-Q pump medication.  Given pamphlet on care, adjustment, and pulling catheters once medication container is empty.  Pain well controlled with oral medications.  Discharge instructions reviewed and all questions answered.      R-(recommendations): Discharge instructions reviewed with patient and family. Listed belongings gathered and returned to patient. Follow up appointments made.         Discharge Nursing Criteria:     Care Plan and Patient education resolved: Yes    New Medications- pt has been educated about purpose and side effects: Yes    Vaccines  Influenza status verified at discharge:  Yes      Intentionally Retained Items (examples: Drains, Wound packing, ureteral stents, ortega, PICC line or IV)  Patient was sent home with ON-Q catheters, and larissa DAYA drains left in place.   Information you need to know about your procedure form filled out and copy given to patient: Yes  Follow up appointment made for removal: Yes    MISC  Prescriptions if needed, hard copies sent with patient  NA  Home and hospital aquired medications returned to patient: NA  Medication Bin checked and emptied on discharge Yes  Patient reports post-discharge pain management plan is effective: Yes

## 2018-11-10 ENCOUNTER — NURSE TRIAGE (OUTPATIENT)
Dept: NURSING | Facility: CLINIC | Age: 48
End: 2018-11-10

## 2018-11-10 NOTE — TELEPHONE ENCOUNTER
Pt had double mastectomy 11/8/18 and she is thinking one of the drains isnt working well. One is draining 60-70 mls and one only 20 mls. Also, some other questions on equipment.   9:12 am Via the [facility] , I paged the on call surgeon for Dr Hart, to call the patient directly at [phone]. Pt know to call back if has not heard from a provider within 20 minutes.     Maria Luz Garcia RN, [facility]    Reason for Disposition    Caller has URGENT question and triager unable to answer question    Additional Information    Negative: Sounds like a life-threatening emergency to the triager    Negative: Chest pain    Negative: Difficulty breathing    Negative: Surgical incision symptoms and questions    Negative: [1] Discomfort (pain, burning or stinging) when passing urine AND [2] male    Negative: [1] Discomfort (pain, burning or stinging) when passing urine AND [2] female    Negative: Constipation    Negative: Calf pain    Negative: Dizziness is severe, or persists > 24 hours after surgery    Negative: Pain, redness, swelling, or pus at IV Site    Negative: Symptoms arising from use of a urinary catheter (Salvador or Coude)    Negative: Cast problems or questions    Negative: Medication question    Negative: [1] SEVERE headache AND [2] after spinal (epidural) anesthesia    Negative: [1] Vomiting AND [2] persists > 4 hours    Negative: [1] Vomiting AND [2] abdomen looks much more swollen than usual    Negative: [1] Drinking very little AND [2] dehydration suspected (e.g., no urine > 12 hours, very dry mouth, very lightheaded)    Negative: Patient sounds very sick or weak to the triager    Negative: Sounds like a serious complication to the triager    Negative: Fever > 100.5 F (38.1 C)    Negative: [1] SEVERE post-op pain (e.g., excruciating, pain scale 8-10) AND [2] not controlled with pain medications    Protocols used: POST-OP SYMPTOMS AND QUESTIONS-[facility]

## 2018-11-10 NOTE — TELEPHONE ENCOUNTER
Patient calling she has two tube draining from her chest. States that one of the tubes is draining around the site. Denies rash. Denies fever. Informed RN will page on call surgeon to call patient directly.     RN paged on call Surgeon Dr. Schultz at 5:20pm through Hospital  to call patient directly at [phone].    Advised patient to call RN if no call from provider within 20 minutes. Caller verbalized understanding. Denies further questions.      Soham Luis RN  [facility]       Reason for Disposition    Caller has URGENT question and triager unable to answer question    Additional Information    Negative: Sounds like a life-threatening emergency to the triager    Negative: Chest pain    Negative: Difficulty breathing    Negative: Surgical incision symptoms and questions    Negative: [1] Discomfort (pain, burning or stinging) when passing urine AND [2] male    Negative: [1] Discomfort (pain, burning or stinging) when passing urine AND [2] female    Negative: Constipation    Negative: Calf pain    Negative: Dizziness is severe, or persists > 24 hours after surgery    Negative: Pain, redness, swelling, or pus at IV Site    Negative: Symptoms arising from use of a urinary catheter (Salvador or Coude)    Negative: Cast problems or questions    Negative: Medication question    Negative: [1] Widespread rash AND [2] bright red, sunburn-like    Negative: [1] SEVERE headache AND [2] after spinal (epidural) anesthesia    Negative: [1] Vomiting AND [2] persists > 4 hours    Negative: [1] Vomiting AND [2] abdomen looks much more swollen than usual    Negative: [1] Drinking very little AND [2] dehydration suspected (e.g., no urine > 12 hours, very dry mouth, very lightheaded)    Negative: Patient sounds very sick or weak to the triager    Negative: Sounds like a serious complication to the triager    Negative: Fever > 100.5 F (38.1 C)    Negative: [1] SEVERE post-op pain (e.g., excruciating, pain scale  8-10) AND [2] not controlled with pain medications    Protocols used: POST-OP SYMPTOMS AND QUESTIONS-ADULT-

## 2018-11-13 ENCOUNTER — TELEPHONE (OUTPATIENT)
Dept: SURGERY | Facility: CLINIC | Age: 48
End: 2018-11-13

## 2018-11-13 NOTE — TELEPHONE ENCOUNTER
Spoke with Desi.  I reassured her that the JPs will need to stay in until I see her for her postoperative visit.  I encourage her to constantly strip the drain to get any possible clots out to see if her mastectomy pocket will continue to drain.  Patient's pain is doing well otherwise.  Been controlled with Tylenol only.  All of her questions were answered to her satisfaction.  We will see her during her postoperative visit.    [facility], DO

## 2018-11-14 ENCOUNTER — TELEPHONE (OUTPATIENT)
Dept: SURGERY | Facility: CLINIC | Age: 48
End: 2018-11-14

## 2018-11-14 LAB — COPATH REPORT: NORMAL

## 2018-11-14 NOTE — TELEPHONE ENCOUNTER
I spoke with Nilda on the telephone to give her her final results of her breast cancer.  I explained to the patient that all of her margins were negative and that all of the cancer is out and there is no involvement in the skin.  No size of the cancer is listed as 2.2 cm.  Move 3 sentinel lymph nodes and they are all negative for any metastatic disease.    Recommend that she calls the cancer center in [place] to see when her  appointment is or to see Dr. Villagran her medical oncologist.  We also briefly talk about an Oncotype DX and what that means.  Her questions were answered to her satisfaction.    [facility] Hart, DO

## 2018-11-20 ENCOUNTER — OFFICE VISIT (OUTPATIENT)
Dept: SURGERY | Facility: CLINIC | Age: 48
End: 2018-11-20
Payer: COMMERCIAL

## 2018-11-20 VITALS
WEIGHT: 136 LBS | BODY MASS INDEX: 22.66 KG/M2 | SYSTOLIC BLOOD PRESSURE: 126 MMHG | HEIGHT: 65 IN | TEMPERATURE: 98.9 F | DIASTOLIC BLOOD PRESSURE: 68 MMHG

## 2018-11-20 DIAGNOSIS — C50.912 INVASIVE DUCTAL CARCINOMA OF BREAST, FEMALE, LEFT (H): Primary | ICD-10-CM

## 2018-11-20 DIAGNOSIS — Z90.13 S/P BILATERAL MASTECTOMY: ICD-10-CM

## 2018-11-20 PROCEDURE — 99024 POSTOP FOLLOW-UP VISIT: CPT | Performed by: SURGERY

## 2018-11-20 NOTE — MR AVS SNAPSHOT
After Visit Summary   11/20/2018    Desi Granado    MRN: [id number]           Patient Information     Date Of Birth          1970        Visit Information        Provider Department      11/20/2018 1:00 PM Nabila Hart MD [facility]        Today's Diagnoses     Invasive ductal carcinoma of breast, female, left (H)    -  1    S/P bilateral mastectomy           Follow-ups after your visit        Additional Services     PHYSICAL THERAPY REFERRAL       If you have not heard from the scheduling office within 2 business days, please call [phone] for all locations, with the exception of [place], please call [phone] and [place], please call [phone].    Please be aware that coverage of these services is subject to the terms and limitations of your health insurance plan.  Call member services at your health plan with any benefit or coverage questions.                  Your next 10 appointments already scheduled     Nov 21, 2018  3:40 PM CST   Return Visit with Stephen Peña MD   [facility] ([facility])    [facility]  [address] [address] [phone]            Feb 22, 2019  1:00 PM CST   [facility] Visit with Nabila Hart MD   [facility]    [address] [place] [id number] [phone]              Future tests that were ordered for you today     Open Future Orders        Priority Expected Expires Ordered    PHYSICAL THERAPY REFERRAL Routine  11/20/2019 11/20/2018            Who to contact     If you have questions or need follow up information about today's clinic visit or your schedule please contact [facility] directly at [phone].  Normal or non-critical lab and imaging results will be communicated to you by MyChart, letter or phone within 4 business days after the clinic has received the results. If you do  "not hear from us within 7 days, please contact the clinic through [facility] or phone. If you have a critical or abnormal lab result, we will notify you by phone as soon as possible.  Submit refill requests through [facility] or call your pharmacy and they will forward the refill request to us. Please allow 3 business days for your refill to be completed.          Additional Information About Your Visit        [facility] Information     [facility] gives you secure access to your electronic health record. If you see a primary care provider, you can also send messages to your care team and make appointments. If you have questions, please call your primary care clinic.  If you do not have a primary care provider, please call [phone] and they will assist you.        Care EveryWhere ID     This is your Care EveryWhere ID. This could be used by other organizations to access your [facility] medical records  [id number]        Your Vitals Were     Temperature Height Last Period BMI (Body Mass Index)          98.9  F (37.2  C) (Temporal) 1.651 m (5' 5\") 10/26/2018 22.63 kg/m2         Blood Pressure from Last 3 Encounters:   11/20/18 126/68   11/09/18 129/73   11/06/18 126/84    Weight from Last 3 Encounters:   11/20/18 61.7 kg (136 lb)   11/08/18 65.9 kg (145 lb 4.5 oz)   11/06/18 64.4 kg (141 lb 14.4 oz)               Primary Care Provider Office Phone # Fax #    Pearl Grover [phone] [address] [facility] [address]        Equal Access to Services     [facility] Center[facility] : Hadii essie veras hadasho Soomaali, waaxda luqadaha, qaybta kaalmada roz lyn . So [facility] [phone].    ATENCIÓN: Si habla español, tiene a caldwell disposición servicios gratuitos de asistencia lingüística. Llame al [phone].    We comply with applicable federal civil rights laws and [place] laws. We do not discriminate on the basis of race, color, national origin, age, disability, sex, sexual " orientation, or gender identity.            Thank you!     Thank you for choosing [facility]  for your care. Our goal is always to provide you with excellent care. Hearing back from our patients is one way we can continue to improve our services. Please take a few minutes to complete the written survey that you may receive in the mail after your visit with us. Thank you!             Your Updated Medication List - Protect others around you: Learn how to safely use, store and throw away your medicines at www.disposemymeds.org.          This list is accurate as of 11/20/18  3:11 PM.  Always use your most recent med list.                   Brand Name Dispense Instructions for use Diagnosis    FLONASE ALLERGY RELIEF NA       LLQ pain       IBUPROFEN PO      AS NEEDED        lisinopril 40 MG tablet    PRINIVIL/ZESTRIL    90 tablet    Take 1 tablet (40 mg) by mouth daily    Essential hypertension, benign       LORazepam 1 MG tablet    ATIVAN    20 tablet    Take 1 tablet (1 mg) by mouth every 8 hours as needed for anxiety    Adjustment disorder with anxious mood       ondansetron 4 MG tablet    ZOFRAN    18 tablet    Take 1 tablet (4 mg) by mouth every 6 hours as needed for nausea    S/P bilateral mastectomy       oxyCODONE-acetaminophen 5-325 MG per tablet    PERCOCET    20 tablet    Take 1-2 tablets by mouth every 4 hours as needed for pain (moderate to severe)    Invasive ductal carcinoma of breast, female, left (H), S/P bilateral mastectomy       ZYRTEC 10 MG tablet   Generic drug:  cetirizine     90    1 TABLET DAILY    Allergic rhinitis, cause unspecified

## 2018-11-20 NOTE — LETTER
11/20/2018         RE: Desi Granado  [address] [place] [address]        Dear Colleague,    Thank you for referring your patient, Desi Granado, to the [facility]. Please see a copy of my visit note below.    [facility] FOLLOW-UP NOTE  GENERAL SURGERY    PCP: Pearl Grover         Assessment and Plan:   Assessment:   Desi Granado is a 48 year old female who presented post operatively from B/L simple mastectomy with left sentinel LN biopsy 11/14/2018 and is doing very well.       ICD-10-CM    1. Invasive ductal carcinoma of breast, female, left (H) C50.912 PHYSICAL THERAPY REFERRAL   2. S/P bilateral mastectomy Z90.13 PHYSICAL THERAPY REFERRAL       I reviewed the pathology report today with the patient and answered all questions.  ADDENDUM     TO ORIGINAL REPORT   Status: Signed Out   Date Ordered:11/14/2018   Date Complete:11/14/2018   Date Reported:11/14/2018 10:45   Signed Out By: Artie Fernandez M.D., PhD     INTERPRETATION:   ADDENDUM   This addendum is issued to add the following comment:   Intradepartmental consultation is obtained.     __________________________________________     ORIGINAL REPORT:     SPECIMEN(S):   A: Right breast   B: Left breast   C: [place] lymph node, left #1   D: [place] lymph node, left #2   E: [place] lymph node, left #3     FINAL DIAGNOSIS:   A. Breast, right, simple mastectomy:   - Breast parenchyma, nipple and skin with no evidence of malignancy.     B. 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        - Estimated size: see comment 2        - Architectural patterns: Cribriform and solid        - Nuclear grade: Grade II        - Necrosis: Not seen     Lobular carcinoma in-situ: Not present     Tumor extension: Skin and nipple with no evidence of malignancy.     Margins: Uninvolved by invasive carcinoma and ductal carcinoma in-situ -   distance from closest deep margin:   35.0 mm     Regional lymph nodes: Three sentinel lymph nodes with no evidence of   metastatic carcinoma.     Treatment effect: No known presurgical therapy     Lymphovascular invasion: Not identified.     Dermal lymphovascular invasion: Not identified.     Pathologic stage classification: pT2 pN0(sn)     Additional pathologic findings: Proliferative fibrocystic change.     ANCILLARY STUDIES:   ER: Positive (strong staining in 100% of tumor nuclei; [id number],   10/18/2018)   NV: Positive (strong staining in 100% of tumor nuclei; [id number],   10/18/2018)   HER2: No amplification of HER2 gene by FISH ([id number], 10/22/2018)     Clinical history: Focal asymmetry at 3:00 8.0 cm from nipple.  Directed   sonography to the site of the   mammographic finding shows a 1.6 cm irregular solid lesion.  Biopsy showed    invasive ductal carcinoma.     C. Lymph node, left, sentinel #1, excision:   - One lymph node with no evidence of metastatic carcinoma.     D. Lymph node, left, sentinel #2, excision:   - One lymph node with no evidence of metastatic carcinoma.     E. Lymph node, left, sentinel #3, excision:   - One lymph node with no evidence of metastatic carcinoma.     COMMENT:   1) The invasive carcinoma shows two patterns.  The predominant pattern is   that of multiple relatively well   circumscribed nodules of neoplastic cells arranged around fine   fibrovascular cores.  The second pattern is   that of irregular  "smaller nests of conventional invasive ductal carcinoma.     The predominant pattern is the one   used for the final diagnosis above.     2) Ductal carcinoma in-situ is seen in a few ducts in association with the    invasive component in one block   (block B4).  It is not seen in the blocks outside the tumor area.     Electronically signed out by:     Artie Fernandez M.D., PhD       Plan:  Lifting more than 1 gallon of milk.  I will see patient back in the clinic in 3 months.  Pending Oncotype DX.  She will see medical oncology next.  All of her questions were answered to her satisfaction regarding her pathology result.  Patient requested physical therapy to help improve her upper extremities and shoulders range of motion.  Order is in place.           Subjective:     Desi Granado is a 48 year old female who presents post operatively from B/L simple mastectomy with left sentinel LN biopsy 11/14/2018. Pain has been controled. Currently is not using pain medications. Eating a Regular and having regular bladder/bowel function. Overall doing very well.           Objective:       /68  Temp 98.9  F (37.2  C) (Temporal)  Ht 1.651 m (5' 5\")  Wt 61.7 kg (136 lb)  LMP 10/26/2018  BMI 22.63 kg/m2   Constitutional: Awake, alert, in no acute distress.  Respiratory: Non-labored.   Cardiovascular: Regular rate and rhythm.   Abdomen: soft. Incision is Healing well.  JPs with minimal output.  Both were removed with no issues.    SabinoAmy Hart DO  [facility]              Again, thank you for allowing me to participate in the care of your patient.        Sincerely,        Nabila Hart MD    "

## 2018-11-20 NOTE — PROGRESS NOTES
[facility] FOLLOW-UP NOTE  GENERAL SURGERY    PCP: Pearl Grover         Assessment and Plan:   Assessment:   Desi Granado is a 48 year old female who presented post operatively from B/L simple mastectomy with left sentinel LN biopsy 11/14/2018 and is doing very well.       ICD-10-CM    1. Invasive ductal carcinoma of breast, female, left (H) C50.912 PHYSICAL THERAPY REFERRAL   2. S/P bilateral mastectomy Z90.13 PHYSICAL THERAPY REFERRAL       I reviewed the pathology report today with the patient and answered all questions.  ADDENDUM     TO ORIGINAL REPORT   Status: Signed Out   Date Ordered:11/14/2018   Date Complete:11/14/2018   Date Reported:11/14/2018 10:45   Signed Out By: Artie Fernandez M.D., PhD     INTERPRETATION:   ADDENDUM   This addendum is issued to add the following comment:   Intradepartmental consultation is obtained.     __________________________________________     ORIGINAL REPORT:     SPECIMEN(S):   A: Right breast   B: Left breast   C: [place] lymph node, left #1   D: [place] lymph node, left #2   E: [place] lymph node, left #3     FINAL DIAGNOSIS:   A. Breast, right, simple mastectomy:   - Breast parenchyma, nipple and skin with no evidence of malignancy.     B. 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        - Estimated size: see comment 2        - Architectural patterns: Cribriform and solid        - Nuclear grade: Grade  II        - Necrosis: Not seen     Lobular carcinoma in-situ: Not present     Tumor extension: Skin and nipple with no evidence of malignancy.     Margins: Uninvolved by invasive carcinoma and ductal carcinoma in-situ -   distance from closest deep margin:   35.0 mm     Regional lymph nodes: Three sentinel lymph nodes with no evidence of   metastatic carcinoma.     Treatment effect: No known presurgical therapy     Lymphovascular invasion: Not identified.     Dermal lymphovascular invasion: Not identified.     Pathologic stage classification: pT2 pN0(sn)     Additional pathologic findings: Proliferative fibrocystic change.     ANCILLARY STUDIES:   ER: Positive (strong staining in 100% of tumor nuclei; [id number],   10/18/2018)   NE: Positive (strong staining in 100% of tumor nuclei; [id number],   10/18/2018)   HER2: No amplification of HER2 gene by FISH ([id number], 10/22/2018)     Clinical history: Focal asymmetry at 3:00 8.0 cm from nipple.  Directed   sonography to the site of the   mammographic finding shows a 1.6 cm irregular solid lesion.  Biopsy showed    invasive ductal carcinoma.     C. Lymph node, left, sentinel #1, excision:   - One lymph node with no evidence of metastatic carcinoma.     D. Lymph node, left, sentinel #2, excision:   - One lymph node with no evidence of metastatic carcinoma.     E. Lymph node, left, sentinel #3, excision:   - One lymph node with no evidence of metastatic carcinoma.     COMMENT:   1) The invasive carcinoma shows two patterns.  The predominant pattern is   that of multiple relatively well   circumscribed nodules of neoplastic cells arranged around fine   fibrovascular cores.  The second pattern is   that of irregular smaller nests of conventional invasive ductal carcinoma.     The predominant pattern is the one   used for the final diagnosis above.     2) Ductal carcinoma in-situ is seen in a few ducts in association with the    invasive component in one block   (block B4).  It  "is not seen in the blocks outside the tumor area.     Electronically signed out by:     Artie Fernandez M.D., PhD       Plan:  Lifting more than 1 gallon of milk.  I will see patient back in the clinic in 3 months.  Pending Oncotype DX.  She will see medical oncology next.  All of her questions were answered to her satisfaction regarding her pathology result.  Patient requested physical therapy to help improve her upper extremities and shoulders range of motion.  Order is in place.           Subjective:     Desi Granado is a 48 year old female who presents post operatively from B/L simple mastectomy with left sentinel LN biopsy 11/14/2018. Pain has been controled. Currently is not using pain medications. Eating a Regular and having regular bladder/bowel function. Overall doing very well.           Objective:       /68  Temp 98.9  F (37.2  C) (Temporal)  Ht 1.651 m (5' 5\")  Wt 61.7 kg (136 lb)  LMP 10/26/2018  BMI 22.63 kg/m2   Constitutional: Awake, alert, in no acute distress.  Respiratory: Non-labored.   Cardiovascular: Regular rate and rhythm.   Abdomen: soft. Incision is Healing well.  JPs with minimal output.  Both were removed with no issues.    [facility], DO  [facility]            "

## 2018-11-21 ENCOUNTER — ONCOLOGY VISIT (OUTPATIENT)
Dept: ONCOLOGY | Facility: CLINIC | Age: 48
End: 2018-11-21
Attending: INTERNAL MEDICINE
Payer: COMMERCIAL

## 2018-11-21 ENCOUNTER — MYC MEDICAL ADVICE (OUTPATIENT)
Dept: FAMILY MEDICINE | Facility: CLINIC | Age: 48
End: 2018-11-21

## 2018-11-21 VITALS
HEART RATE: 104 BPM | TEMPERATURE: 97.5 F | OXYGEN SATURATION: 100 % | HEIGHT: 65 IN | SYSTOLIC BLOOD PRESSURE: 116 MMHG | RESPIRATION RATE: 20 BRPM | DIASTOLIC BLOOD PRESSURE: 65 MMHG | BODY MASS INDEX: 23.28 KG/M2 | WEIGHT: 139.7 LBS

## 2018-11-21 DIAGNOSIS — C50.912 INVASIVE DUCTAL CARCINOMA OF BREAST, FEMALE, LEFT (H): Primary | ICD-10-CM

## 2018-11-21 DIAGNOSIS — F51.02 TRANSIENT INSOMNIA: Primary | ICD-10-CM

## 2018-11-21 DIAGNOSIS — I10 ESSENTIAL HYPERTENSION, BENIGN: ICD-10-CM

## 2018-11-21 PROCEDURE — 99214 OFFICE O/P EST MOD 30 MIN: CPT | Performed by: INTERNAL MEDICINE

## 2018-11-21 PROCEDURE — G0463 HOSPITAL OUTPT CLINIC VISIT: HCPCS

## 2018-11-21 RX ORDER — TEMAZEPAM 15 MG/1
15-30 CAPSULE ORAL
Qty: 30 CAPSULE | Refills: 5 | Status: SHIPPED | OUTPATIENT
Start: 2018-11-21 | End: 2018-11-28 | Stop reason: DRUGHIGH

## 2018-11-21 ASSESSMENT — PAIN SCALES - GENERAL: PAINLEVEL: NO PAIN (0)

## 2018-11-21 NOTE — PATIENT INSTRUCTIONS
We provided you with information on Adriamycin and Cytoxan. Please review information. We would like you to see our genetic counselor. Dr. Peña would like to see you back in 2 weeks for a follow up appointment with the results of your Oncotypy.       Copy of appointments, and after visit summary (AVS) given to patient.      If you have any questions during business hours (M-F 8 AM- 4PM), please call Awilda Loyd RN Oncology Hematology  at [facility] [phone].       For questions after business hours, or on holidays/weekends, please call our after hours Nurse Triage line [phone]. Thank you.

## 2018-11-21 NOTE — NURSING NOTE
"Oncology Rooming Note    November 21, 2018 3:48 PM   Desi Granado is a 48 year old female who presents for:    Chief Complaint   Patient presents with     Oncology Clinic Visit     f/up after surgery Malignant Neoplasm Of Breast     Initial Vitals: /65 (BP Location: Right arm, Patient Position: Sitting, Cuff Size: Adult Regular)  Pulse 104  Temp 97.5  F (36.4  C) (Tympanic)  Resp 20  Ht 5' 5\" (1.651 m)  Wt 139 lb 11.2 oz (63.4 kg)  LMP 10/26/2018  SpO2 100%  BMI 23.25 kg/m2 Estimated body mass index is 23.25 kg/(m^2) as calculated from the following:    Height as of this encounter: 5' 5\" (1.651 m).    Weight as of this encounter: 139 lb 11.2 oz (63.4 kg). Body surface area is 1.71 meters squared.  No Pain (0) Comment: Data Unavailable   Patient's last menstrual period was 10/26/2018.  Allergies reviewed: Yes  Medications reviewed: Yes    Medications: Medication refills not needed today.  Pharmacy name entered into [employer] Personal Care Device:    [facility] [place] - [address] [facility] [place] - [address] [address]    Clinical concerns:  f/up after surgery Malignant Neoplasm Of Breast    7 minutes for nursing intake (face to face time)     Yanelis Victor LPN            "

## 2018-11-21 NOTE — LETTER
11/21/2018         RE: Desi Granado  [address] [place] [address]        Dear Colleague,    Thank you for referring your patient, Desi Granado, to the [facility]. Please see a copy of my visit note below.    DATE OF VISIT: Nov 21, 2018    Desi Grandao is a 48 year old female is seen today for   Chief Complaint   Patient presents with     Oncology Clinic Visit     f/up after surgery Malignant Neoplasm Of Breast   .       (C50.912) Invasive ductal carcinoma of breast, female, left (H)  (primary encounter diagnosis)  Patient returning today for follow-up visit.  She had bilateral mastectomy.  Surgical pathology revealed simple mastectomy sample of the left showing solid papillary carcinoma 22 mm.  Grade 2.  Margins were clear of involvement.  3 sentinel lymph nodes shows no evidence of metastatic disease.  Lymphovascular invasion was not identified.  The tumor is pT2pN0.  Right breast shows simple mastectomy without any evidence of malignancy.  I reviewed with the patient today the natural history of papillary carcinoma of the breast.  We talked about staging, biology, management, follow-up and prognosis.  Oncotype DX was ordered to see if there is any benefit of chemotherapy.  At this time I would recommend patient to proceed with endocrine therapy.  We talked about ovarian suppression with aromatase inhibitors versus tamoxifen.  I gave the patient an overview of benefit versus risk of both medications and approaches.  I will meet with the patient again when Oncotype DX is available to discuss the management plan in details.  We also recommend patient to be seen by genetic counseling as well.    (I10) Essential hypertension, benign  Patient currently on lisinopril 40 mg orally daily.    The patient is ready to learn, no apparent learning barriers were identified.  Diagnosis and treatment plans were explained to the patient. The patient expressed understanding of the  content. The patient asked appropriate questions. The patient questions were answered to her satisfaction.  Time spent 25 minutes more than 50% of the time in counseling and coordination of care including discussion of management of papillary carcinoma of the breast, side effects of medications, follow-up and prognosis  Chart documentation with Dragon Voice recognition Software. Although reviewed after completion, some words and grammatical errors may remain.    Again, thank you for allowing me to participate in the care of your patient.        Sincerely,        Stephen Peña MD

## 2018-11-21 NOTE — MR AVS SNAPSHOT
After Visit Summary   11/21/2018    Desi Granado    MRN: [id number]           Patient Information     Date Of Birth          1970        Visit Information        Provider Department      11/21/2018 3:40 PM Stephen Peña MD [facility]        Today's Diagnoses     Invasive ductal carcinoma of breast, female, left (H)    -  1    Essential hypertension, benign          Care Instructions    We would like you to see our genetic counselor. Dr. Peña would like to see you back in 2 weeks for a follow up appointment with the results of your Oncotypy.       Copy of appointments, and after visit summary (AVS) given to patient.      If you have any questions during business hours (M-F 8 AM- 4PM), please call Awilda Loyd, RN Oncology Hematology  at [facility] [phone].       For questions after business hours, or on holidays/weekends, please call our after hours Nurse Triage line [phone]. Thank you.            Follow-ups after your visit        Your next 10 appointments already scheduled     Dec 13, 2018  3:00 PM CST   Return Visit with Stephen Peña MD   [facility] ([facility])    [facility]  [address]  [place] [address] [phone]            Feb 22, 2019  1:00 PM CST   Return Visit with Nabila Hart MD   [facility]    [address] [address] [phone]              Future tests that were ordered for you today     Open Future Orders        Priority Expected Expires Ordered    PHYSICAL THERAPY REFERRAL Routine  11/20/2019 11/20/2018            Who to contact     If you have questions or need follow up information about today's clinic visit or your schedule please contact [facility] directly at [phone].  Normal or non-critical lab and imaging results will be communicated to you by  "MyChart, letter or phone within 4 business days after the clinic has received the results. If you do not hear from us within 7 days, please contact the clinic through [employer] or phone. If you have a critical or abnormal lab result, we will notify you by phone as soon as possible.  Submit refill requests through [employer] or call your pharmacy and they will forward the refill request to us. Please allow 3 business days for your refill to be completed.          Additional Information About Your Visit        [employer] Information     [employer] gives you secure access to your electronic health record. If you see a primary care provider, you can also send messages to your care team and make appointments. If you have questions, please call your primary care clinic.  If you do not have a primary care provider, please call [phone] and they will assist you.        Care EveryWhere ID     This is your Care EveryWhere ID. This could be used by other organizations to access your [place] medical records  [id number]        Your Vitals Were     Pulse Temperature Respirations Height Last Period Pulse Oximetry    104 97.5  F (36.4  C) (Tympanic) 20 1.651 m (5' 5\") 10/26/2018 100%    BMI (Body Mass Index)                   23.25 kg/m2            Blood Pressure from Last 3 Encounters:   11/21/18 116/65   11/20/18 126/68   11/09/18 129/73    Weight from Last 3 Encounters:   11/21/18 63.4 kg (139 lb 11.2 oz)   11/20/18 61.7 kg (136 lb)   11/08/18 65.9 kg (145 lb 4.5 oz)              Today, you had the following     No orders found for display         Today's Medication Changes          These changes are accurate as of 11/21/18  4:20 PM.  If you have any questions, ask your nurse or doctor.               Start taking these medicines.        Dose/Directions    temazepam 15 MG capsule   Commonly known as:  RESTORIL   Used for:  Transient insomnia   Started by:  Pearl Grover MD        Dose:  15-30 mg   Take 1-2 capsules (15-30 mg) by " mouth nightly as needed for sleep   Quantity:  30 capsule   Refills:  5            Where to get your medicines      Some of these will need a paper prescription and others can be bought over the counter.  Ask your nurse if you have questions.     Bring a paper prescription for each of these medications     temazepam 15 MG capsule                Primary Care Provider Office Phone # Fax #    Pearl Grover [phone] [address] [facility] [address]        Equal Access to Services     ZOILA PITTS : Hadii aad ku hadasho Soomaali, waaxda luqadaha, qaybta kaalmada adeegyada, waxay idiin hayaan adeeg khclaribelaidan lalilia . So [facility] [phone].    ATENCIÓN: Si lalitla elsie, tiene a caldwell disposición servicios gratuitos de asistencia lingüística. [facility] [phone].    We comply with applicable federal civil rights laws and [place] laws. We do not discriminate on the basis of race, color, national origin, age, disability, sex, sexual orientation, or gender identity.            Thank you!     Thank you for choosing [facility]  for your care. Our goal is always to provide you with excellent care. Hearing back from our patients is one way we can continue to improve our services. Please take a few minutes to complete the written survey that you may receive in the mail after your visit with us. Thank you!             Your Updated Medication List - Protect others around you: Learn how to safely use, store and throw away your medicines at www.disposemymeds.org.          This list is accurate as of 11/21/18  4:20 PM.  Always use your most recent med list.                   Brand Name Dispense Instructions for use Diagnosis    ACETAMINOPHEN PO      Take 1,000 mg by mouth        FLONASE ALLERGY RELIEF NA       LLQ pain       IBUPROFEN PO      AS NEEDED        lisinopril 40 MG tablet    PRINIVIL/ZESTRIL    90 tablet    Take 1 tablet (40 mg) by mouth daily    Essential hypertension, benign        LORazepam 1 MG tablet    ATIVAN    20 tablet    Take 1 tablet (1 mg) by mouth every 8 hours as needed for anxiety    Adjustment disorder with anxious mood       ondansetron 4 MG tablet    ZOFRAN    18 tablet    Take 1 tablet (4 mg) by mouth every 6 hours as needed for nausea    S/P bilateral mastectomy       oxyCODONE-acetaminophen 5-325 MG tablet    PERCOCET    20 tablet    Take 1-2 tablets by mouth every 4 hours as needed for pain (moderate to severe)    Invasive ductal carcinoma of breast, female, left (H), S/P bilateral mastectomy       temazepam 15 MG capsule    RESTORIL    30 capsule    Take 1-2 capsules (15-30 mg) by mouth nightly as needed for sleep    Transient insomnia       ZYRTEC 10 MG tablet   Generic drug:  cetirizine     90    1 TABLET DAILY    Allergic rhinitis, cause unspecified

## 2018-11-22 ENCOUNTER — MYC MEDICAL ADVICE (OUTPATIENT)
Dept: ONCOLOGY | Facility: CLINIC | Age: 48
End: 2018-11-22

## 2018-11-23 NOTE — TELEPHONE ENCOUNTER
Ki67 does not correlate with Oncotype DX RS. Oncotype is more accurate.  Ki-67 is not necessarily predictive of benefit of chemotherapy.  We will discuss all this formation when Oncotype DX score is available.

## 2018-11-26 LAB — LAB SCANNED RESULT: NORMAL

## 2018-11-26 NOTE — TELEPHONE ENCOUNTER
Oncotype was received today. Patient's Oncotype score was 0. Patient does have a follow up appointment on 12/13/18.  Awilda Loyd RN

## 2018-11-26 NOTE — PROGRESS NOTES
DATE OF VISIT: Nov 21, 2018    Desi Granado is a 48 year old female is seen today for   Chief Complaint   Patient presents with     Oncology Clinic Visit     f/up after surgery Malignant Neoplasm Of Breast   .       (C50.912) Invasive ductal carcinoma of breast, female, left (H)  (primary encounter diagnosis)  Patient returning today for follow-up visit.  She had bilateral mastectomy.  Surgical pathology revealed simple mastectomy sample of the left showing solid papillary carcinoma 22 mm.  Grade 2.  Margins were clear of involvement.  3 sentinel lymph nodes shows no evidence of metastatic disease.  Lymphovascular invasion was not identified.  The tumor is pT2pN0.  Right breast shows simple mastectomy without any evidence of malignancy.  I reviewed with the patient today the natural history of papillary carcinoma of the breast.  We talked about staging, biology, management, follow-up and prognosis.  Oncotype DX was ordered to see if there is any benefit of chemotherapy.  At this time I would recommend patient to proceed with endocrine therapy.  We talked about ovarian suppression with aromatase inhibitors versus tamoxifen.  I gave the patient an overview of benefit versus risk of both medications and approaches.  I will meet with the patient again when Oncotype DX is available to discuss the management plan in details.  We also recommend patient to be seen by genetic counseling as well.    (I10) Essential hypertension, benign  Patient currently on lisinopril 40 mg orally daily.    The patient is ready to learn, no apparent learning barriers were identified.  Diagnosis and treatment plans were explained to the patient. The patient expressed understanding of the content. The patient asked appropriate questions. The patient questions were answered to her satisfaction.  Time spent 25 minutes more than 50% of the time in counseling and coordination of care including discussion of management of papillary carcinoma of  the breast, side effects of medications, follow-up and prognosis  Chart documentation with Dragon Voice recognition Software. Although reviewed after completion, some words and grammatical errors may remain.

## 2018-11-27 ENCOUNTER — MYC MEDICAL ADVICE (OUTPATIENT)
Dept: FAMILY MEDICINE | Facility: CLINIC | Age: 48
End: 2018-11-27

## 2018-11-27 ENCOUNTER — HOSPITAL ENCOUNTER (OUTPATIENT)
Dept: PHYSICAL THERAPY | Facility: CLINIC | Age: 48
Setting detail: THERAPIES SERIES
End: 2018-11-27
Attending: SURGERY
Payer: COMMERCIAL

## 2018-11-27 DIAGNOSIS — G47.00 PERSISTENT DISORDER OF INITIATING OR MAINTAINING SLEEP: Primary | ICD-10-CM

## 2018-11-27 PROCEDURE — 40000099 ZZH STATISTIC LYMPHEDEMA VISIT: Performed by: PHYSICAL THERAPIST

## 2018-11-27 PROCEDURE — 97110 THERAPEUTIC EXERCISES: CPT | Mod: GP | Performed by: PHYSICAL THERAPIST

## 2018-11-27 PROCEDURE — 97161 PT EVAL LOW COMPLEX 20 MIN: CPT | Mod: GP | Performed by: PHYSICAL THERAPIST

## 2018-11-27 PROCEDURE — 97535 SELF CARE MNGMENT TRAINING: CPT | Mod: GP | Performed by: PHYSICAL THERAPIST

## 2018-11-27 RX ORDER — TEMAZEPAM 30 MG
30 CAPSULE ORAL
Qty: 90 CAPSULE | Refills: 1 | Status: SHIPPED | OUTPATIENT
Start: 2018-11-27 | End: 2019-01-10

## 2018-11-27 NOTE — PROGRESS NOTES
"Outpatient Lymphedema Therapy Evaluation     11/27/18 1300   Rehab Discipline   Discipline PT   Type of Visit   Type of visit Initial Edema Evaluation       present No   General Information   Start of care 11/27/18   Referring physician Dr. Hailey MD   Orders Evaluate and treat as indicated   Order date 11/20/18   Medical diagnosis invasive ductal carcinoma of L-breast   Onset of illness / date of surgery 11/08/18   Edema onset (denies current lymphedema \"that i'm aware of\")   Affected body parts LUE  (L-breast)   Edema etiology Surgery;Cancer with lymph node dissection   Location - Cancer with lymph node dissection 0/3 positive for disease   Edema etiology comments invasive ductal carcinoma of L-breast s/p B mastectomy with L-SLNB on 11/8/18   Pertinent history of current problem (PT: include personal factors and/or comorbidities that impact the POC; OT: include additional occupational profile info) will be seeing Dr. 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   Living environment comments currently lives with parents but normally lives alone   Current assistive devices (none)   Fall Risk Screen   Fall screen completed by PT   Have you fallen 2 or more times in the past year? No   Have you fallen and had an injury in the past year? No   Is patient a fall risk? No   System Outcome Measures   Outcome Measures " Lymphedema   Lymphedema Life Impact Scale (score range 0-72). A higher score indicates greater impairment. 2   Subjective Report   Patient report of symptoms denies   Precautions   Precautions comments no known precautions   Patient / Family Goals   Patient / family goals statement to improve my ROM in my arms post-bilateral mastectomy   Pain   Patient currently in pain No   Pain comments taking tylenol regularly still; does notice slightly more sensitive   Vitals Signs   Heart Rate 84   SpO2 99   Cognitive Status   Orientation Orientation to person, place and time   Level of consciousness Alert   Follows commands and answers questions 100% of the time   Personal safety and judgement Intact   Memory Intact   Edema Exam / Assessment   Skin condition Intact;Other   Skin condition comments BUE skin all intact; B mastectomy scars still with glue in place and stitches still present; slightly firm non-pitting edema just lateral to L-breast and distal to L-axilla; light bruising still present at L-breast superior to scar   Scar Yes   Location B-mastectomy scar   Scar comments fair; still healing   Radial pulse Symmetrical   Stemmer sign Negative   Ulceration No   Girth Measurements   Girth Measurements Refer to separate girth measurement flowsheet   Volume UE   Right UE (mL) 1470.75   Left UE (mL) 1372.21   UE volume comparison RUE volume greater than LUE volume  (patient is R-hand dominant )   % difference 6.6%   Range of Motion   ROM comments R-GH flex 132 and L-GH flex 90 degrees; R-GH abduction 95 degrees and L-GH abduction 90 degrees; limited by tightness and pulling in chest   Strength   Strength comments 3- to 3 /5 due to limited ROM   Posture   Posture Normal   Palpation   Palpation denies sensitivities to touch/palpation, still with decreased sensation from surgery   Sensory   Sensory perception Light touch   Light touch Impaired   Sensory perception comments above and below B mastectomy scars   Vascular  Assessment   Vascular Assessment Comments no known concerns   Coordination   Coordination Gross motor coordination appropriate   Muscle Tone   Muscle tone No deficits were identified   Planned Edema Interventions   Planned edema interventions Manual lymph drainage;Fit for compression garment;Exercises;Precautions to prevent infection / exacerbation;Education;Manual therapy;Skin care / precautions;Scar mobilization;Myofascial release;Home management program development   Clinical Impression   Criteria for skilled therapeutic intervention met Yes   Therapy diagnosis LUE and LUQ at risk of lymphedema; imparied BUE ROM   Influenced by the following impairments / conditions (stage 0 lymphedema - pt at risk)   Functional limitations due to impairments / conditions difficulty with BUE ROM impairing independence and ease with overhead tasks and ADL   Clinical Presentation Stable/Uncomplicated   Clinical Presentation Rationale clinical judgement; normal post-op edema and decreased ROM post-B mastectomies at this time   Clinical Decision Making (Complexity) Low complexity   Treatment frequency 1 time / week  (every other week)   Treatment duration 8 weeks   Patient / family and/or staff in agreement with plan of care Yes   Risks and benefits of therapy have been explained Yes   Education Assessment   Preferred learning style Listening   Goals   Edema Eval Goals 1;2; 3   Goal 1   Goal identifier 1   Goal description pt to be independent with BUE ROM exercises/stretches to increase independence and ease with overhead activity and ADL   Target date 12/27/18   Goal 2   Goal identifier 2   Goal description pt to verbalize s&s of lymphedema to demo understanding of longterm risk of developing lymphedema   Target date 01/26/19   Goal 3    Goal identifier 3   Goal description pt to have increase B GH flexion to 150 degrees to increase independence and ease with overhead activity and ADL   Target date 01/26/19       Total Evaluation  Time   Total evaluation time 10

## 2018-11-28 ENCOUNTER — ONCOLOGY VISIT (OUTPATIENT)
Dept: ONCOLOGY | Facility: CLINIC | Age: 48
End: 2018-11-28
Attending: INTERNAL MEDICINE
Payer: COMMERCIAL

## 2018-11-28 VITALS
HEART RATE: 88 BPM | HEIGHT: 65 IN | OXYGEN SATURATION: 100 % | RESPIRATION RATE: 18 BRPM | TEMPERATURE: 97.1 F | WEIGHT: 135.3 LBS | SYSTOLIC BLOOD PRESSURE: 100 MMHG | DIASTOLIC BLOOD PRESSURE: 72 MMHG | BODY MASS INDEX: 22.54 KG/M2

## 2018-11-28 DIAGNOSIS — I10 ESSENTIAL HYPERTENSION, BENIGN: ICD-10-CM

## 2018-11-28 DIAGNOSIS — C50.912 INVASIVE DUCTAL CARCINOMA OF BREAST, FEMALE, LEFT (H): Primary | ICD-10-CM

## 2018-11-28 PROCEDURE — G0463 HOSPITAL OUTPT CLINIC VISIT: HCPCS

## 2018-11-28 PROCEDURE — 99214 OFFICE O/P EST MOD 30 MIN: CPT | Performed by: INTERNAL MEDICINE

## 2018-11-28 ASSESSMENT — PAIN SCALES - GENERAL: PAINLEVEL: NO PAIN (0)

## 2018-11-28 NOTE — NURSING NOTE
"Oncology Rooming Note    November 28, 2018 8:06 AM   Desi Granado is a 48 year old female who presents for:    Chief Complaint   Patient presents with     Oncology Clinic Visit     2 week follow up breast cancer. Review Oncotype results.      Initial Vitals: /72 (BP Location: Right arm, Patient Position: Sitting, Cuff Size: Adult Regular)  Pulse 88  Temp 97.1  F (36.2  C) (Tympanic)  Resp 18  Ht 1.651 m (5' 5\")  Wt 61.4 kg (135 lb 4.8 oz)  SpO2 100%  Breastfeeding? No  BMI 22.52 kg/m2 Estimated body mass index is 22.52 kg/(m^2) as calculated from the following:    Height as of this encounter: 1.651 m (5' 5\").    Weight as of this encounter: 61.4 kg (135 lb 4.8 oz). Body surface area is 1.68 meters squared.  No Pain (0) Comment: Data Unavailable   No LMP recorded.  Allergies reviewed: Yes  Medications reviewed: Yes    Medications: Medication refills not needed today.  Pharmacy name entered into [employer]:    [facility]  [facility] [place] - [address] AT [facility] [place] - [address]    Clinical concerns: 2 week follow up breast cancer. Review Oncotype results. No concerns at this time.     8 minutes for nursing intake (face to face time)     Nessa Jeff CMA            "

## 2018-11-28 NOTE — MR AVS SNAPSHOT
After Visit Summary   11/28/2018    Desi Granado    MRN: [id number]           Patient Information     Date Of Birth          1970        Visit Information        Provider Department      11/28/2018 8:00 AM Stephen Peña MD [facility] ONCOLOGY      Today's Diagnoses     Invasive ductal carcinoma of breast, female, left (H)    -  1    Essential hypertension, benign          Care Instructions    We would like you to start zoladex in 2 weeks and continue monthly. Dr. Peña would like to see you back in 6 weeks for a follow up appointment.      Your written prescription has been handed to you at your appointment.    When you are in need of a refill, please call your pharmacy and they will send us a request.      Copy of appointments, and after visit summary (AVS) given to patient.      If you have any questions during business hours (M-F 8 AM- 4PM), please call Awilda Loyd RN Oncology Hematology  at [facility] [phone].       For questions after business hours, or on holidays/weekends, please call our after hours Nurse Triage line [phone]. Thank you.            Follow-ups after your visit        Follow-up notes from your care team     Return in about 6 weeks (around 1/9/2019) for Schedule for chemotherapy as per treatment plan.      Your next 10 appointments already scheduled     Dec 12, 2018  2:30 PM CST   Level O with ROOM 1 [facility] Cancer Infusion ([facility])    [facility]  [address]  [facility] [address]   [phone]            Dec 18, 2018  1:00 PM CST   Breast Wellness Treatment with Tawny Goldstein PT   [facility] Lymphedema ([facility])    [address]  [facility] [address]   [phone]            Feb 22, 2019  1:00 PM CST   [facility]    [address]  "[facility] [id number] [phone]              Who to contact     If you have questions or need follow up information about today's clinic visit or your schedule please contact [facility] CANCER CLINIC directly at [phone].  Normal or non-critical lab and imaging results will be communicated to you by MyChart, letter or phone within 4 business days after the clinic has received the results. If you do not hear from us within 7 days, please contact the clinic through [email or web address] or phone. If you have a critical or abnormal lab result, we will notify you by phone as soon as possible.  Submit refill requests through [employer] or call your pharmacy and they will forward the refill request to us. Please allow 3 business days for your refill to be completed.          Additional Information About Your Visit        [email or web address] Information     [employer] gives you secure access to your electronic health record. If you see a primary care provider, you can also send messages to your care team and make appointments. If you have questions, please call your primary care clinic.  If you do not have a primary care provider, please call [phone] and they will assist you.        Care EveryWhere ID     This is your Care EveryWhere ID. This could be used by other organizations to access your [facility] medical records  [id number]        Your Vitals Were     Pulse Temperature Respirations Height Pulse Oximetry Breastfeeding?    88 97.1  F (36.2  C) (Tympanic) 18 1.651 m (5' 5\") 100% No    BMI (Body Mass Index)                   22.52 kg/m2            Blood Pressure from Last 3 Encounters:   11/28/18 100/72   11/21/18 116/65   11/20/18 126/68    Weight from Last 3 Encounters:   11/28/18 61.4 kg (135 lb 4.8 oz)   11/21/18 63.4 kg (139 lb 11.2 oz)   11/20/18 61.7 kg (136 lb)              Today, you had the following     No orders found for display         Today's Medication Changes          These changes are accurate as of " 11/28/18  8:41 AM.  If you have any questions, ask your nurse or doctor.               Start taking these medicines.        Dose/Directions    order for DME   Used for:  Invasive ductal carcinoma of breast, female, left (H)   Started by:  Stephen Peña MD        Equipment being ordered: prosthetic bra with insert   Quantity:  1 each   Refills:  1         These medicines have changed or have updated prescriptions.        Dose/Directions    temazepam 30 MG capsule   Commonly known as:  RESTORIL   This may have changed:  Another medication with the same name was removed. Continue taking this medication, and follow the directions you see here.   Used for:  Persistent disorder of initiating or maintaining sleep   Changed by:  Stephen ePña MD        Dose:  30 mg   Take 1 capsule (30 mg) by mouth nightly as needed for sleep   Quantity:  90 capsule   Refills:  1            Where to get your medicines      Some of these will need a paper prescription and others can be bought over the counter.  Ask your nurse if you have questions.     Bring a paper prescription for each of these medications     order for DME                Primary Care Provider Office Phone # Fax #    Pearl Grover [phone] [address] [facility] [address]        Equal Access to Services     [facility]: Hadii essie lr Sokaruna, waaxda luanthony, qaybta kaaljared lyn, roz vargas . So [facility] [phone].    ATENCIÓN: Si habla español, tiene a caldwell disposición servicios gratuitos de asistencia lingüística. Irina al [phone].    We comply with applicable federal civil rights laws and [place] laws. We do not discriminate on the basis of race, color, national origin, age, disability, sex, sexual orientation, or gender identity.            Thank you!     Thank you for choosing [facility] CANCER [facility]  for your care. Our goal is always to provide you with excellent care. Hearing  back from our patients is one way we can continue to improve our services. Please take a few minutes to complete the written survey that you may receive in the mail after your visit with us. Thank you!             Your Updated Medication List - Protect others around you: Learn how to safely use, store and throw away your medicines at www.disposemymeds.org.          This list is accurate as of 11/28/18  8:41 AM.  Always use your most recent med list.                   Brand Name Dispense Instructions for use Diagnosis    ACETAMINOPHEN PO      Take 1,000 mg by mouth        FLONASE ALLERGY RELIEF NA       LLQ pain       IBUPROFEN PO      AS NEEDED        lisinopril 40 MG tablet    PRINIVIL/ZESTRIL    90 tablet    Take 1 tablet (40 mg) by mouth daily    Essential hypertension, benign       LORazepam 1 MG tablet    ATIVAN    20 tablet    Take 1 tablet (1 mg) by mouth every 8 hours as needed for anxiety    Adjustment disorder with anxious mood       ondansetron 4 MG tablet    ZOFRAN    18 tablet    Take 1 tablet (4 mg) by mouth every 6 hours as needed for nausea    S/P bilateral mastectomy       order for DME     1 each    Equipment being ordered: prosthetic bra with insert    Invasive ductal carcinoma of breast, female, left (H)       oxyCODONE-acetaminophen 5-325 MG tablet    PERCOCET    20 tablet    Take 1-2 tablets by mouth every 4 hours as needed for pain (moderate to severe)    Invasive ductal carcinoma of breast, female, left (H), S/P bilateral mastectomy       temazepam 30 MG capsule    RESTORIL    90 capsule    Take 1 capsule (30 mg) by mouth nightly as needed for sleep    Persistent disorder of initiating or maintaining sleep       ZYRTEC 10 MG tablet   Generic drug:  cetirizine     90    1 TABLET DAILY    Allergic rhinitis, cause unspecified

## 2018-11-28 NOTE — LETTER
11/28/2018         RE: Desi Granado  [address] [place] [address]        Dear Colleague,    Thank you for referring your patient, Desi Granado, to the [facility]CER [facility]IC. Please see a copy of my visit note below.    DATE OF VISIT: Nov 28, 2018    Desi Granado is a 48 year old female is seen today for   Chief Complaint   Patient presents with     Oncology Clinic Visit     2 week follow up breast cancer. Review Oncotype results.    .       (C50.912) Invasive ductal carcinoma of breast, female, left (H)  (primary encounter diagnosis)  The patient is returning today to discuss management plan.  Oncotype DX result came back 0.  We reviewed with the patient today adjuvant endocrine therapy.  We talked about tamoxifen in details.  We also talked about ovarian suppression and exemestane.  I would recommend patient to start exemestane 25 mg orally daily.  She will be also on ovarian suppression with Zoladex 3.6 mg subcutaneously monthly.  I reviewed the potential false side effects from ovarian suppression and exemestane in details.  I will see the patient again in 6 weeks time or sooner if there are new developments or concerns.    The patient is ready to learn, no apparent learning barriers were identified.  Diagnosis and treatment plans were explained to the patient. The patient expressed understanding of the content. The patient asked appropriate questions. The patient questions were answered to her satisfaction.  Time spent 25 minutes more than 50% of the time in counseling and coordination of care including discussion of adjuvant endocrine therapy, potential side effects, follow-up and prognosis  Chart documentation with Dragon Voice recognition Software. Although reviewed after completion, some words and grammatical errors may remain.    Again, thank you for allowing me to participate in the care of your patient.        Sincerely,        Stephen Peña MD

## 2018-11-28 NOTE — PROGRESS NOTES
DATE OF VISIT: Nov 28, 2018    Desi Granado is a 48 year old female is seen today for   Chief Complaint   Patient presents with     Oncology Clinic Visit     2 week follow up breast cancer. Review Oncotype results.    .       (C50.912) Invasive ductal carcinoma of breast, female, left (H)  (primary encounter diagnosis)  The patient is returning today to discuss management plan.  Oncotype DX result came back 0.  We reviewed with the patient today adjuvant endocrine therapy.  We talked about tamoxifen in details.  We also talked about ovarian suppression and exemestane.  I would recommend patient to start exemestane 25 mg orally daily.  She will be also on ovarian suppression with Zoladex 3.6 mg subcutaneously monthly.  I reviewed the potential false side effects from ovarian suppression and exemestane in details.  I will see the patient again in 6 weeks time or sooner if there are new developments or concerns.    The patient is ready to learn, no apparent learning barriers were identified.  Diagnosis and treatment plans were explained to the patient. The patient expressed understanding of the content. The patient asked appropriate questions. The patient questions were answered to her satisfaction.  Time spent 25 minutes more than 50% of the time in counseling and coordination of care including discussion of adjuvant endocrine therapy, potential side effects, follow-up and prognosis  Chart documentation with Dragon Voice recognition Software. Although reviewed after completion, some words and grammatical errors may remain.

## 2018-12-10 ENCOUNTER — MYC MEDICAL ADVICE (OUTPATIENT)
Dept: FAMILY MEDICINE | Facility: CLINIC | Age: 48
End: 2018-12-10

## 2018-12-10 ENCOUNTER — TELEPHONE (OUTPATIENT)
Dept: ONCOLOGY | Facility: CLINIC | Age: 48
End: 2018-12-10

## 2018-12-10 NOTE — TELEPHONE ENCOUNTER
Dr. Peña received notice today that patient's insurance will not cover the zoladex. She would would still like to proceed with an appeal if possible. Message sent to Yeny Loyd RN

## 2018-12-12 RX ORDER — TAMOXIFEN CITRATE 20 MG/1
20 TABLET ORAL DAILY
Qty: 30 TABLET | Refills: 1 | Status: SHIPPED | OUTPATIENT
Start: 2018-12-12 | End: 2018-12-26

## 2018-12-17 ENCOUNTER — MYC MEDICAL ADVICE (OUTPATIENT)
Dept: SURGERY | Facility: CLINIC | Age: 48
End: 2018-12-17

## 2018-12-17 NOTE — LETTER
[address] [address] [phone]        2018    Desi Granado  [address] [place] [address]          To whom it may concern:     Re: Short Term Disability for patient: eDsi Granado (: 1970).       This patient has been under my care and is cleared to return to work on 2018 with no restrictions.  Please contact my office at the number above with any questions or concerns.             Sincerely,            Dr. Hart

## 2018-12-17 NOTE — TELEPHONE ENCOUNTER
Sent MM back to patient with additional questions.     Yelena Feldman RN. . .  12/17/2018, 10:33 AM

## 2018-12-18 ENCOUNTER — TELEPHONE (OUTPATIENT)
Dept: PHYSICAL THERAPY | Facility: CLINIC | Age: 48
End: 2018-12-18

## 2018-12-18 ENCOUNTER — HOSPITAL ENCOUNTER (OUTPATIENT)
Dept: PHYSICAL THERAPY | Facility: CLINIC | Age: 48
Setting detail: THERAPIES SERIES
End: 2018-12-18
Attending: SURGERY
Payer: COMMERCIAL

## 2018-12-18 DIAGNOSIS — Z90.12 S/P LEFT MASTECTOMY: Primary | ICD-10-CM

## 2018-12-18 PROCEDURE — 97110 THERAPEUTIC EXERCISES: CPT | Mod: GP | Performed by: PHYSICAL THERAPIST

## 2018-12-18 PROCEDURE — 97140 MANUAL THERAPY 1/> REGIONS: CPT | Mod: GP | Performed by: PHYSICAL THERAPIST

## 2018-12-18 NOTE — TELEPHONE ENCOUNTER
Letter created and signed by Dr. Hart.  Sent mm to patient asking how she would like to get this letter.   Yelena Feldman RN. . .  12/18/2018, 9:29 AM

## 2018-12-18 NOTE — TELEPHONE ENCOUNTER
Dr. Hart,  I am entering an order for a compression sleeve and pair of compression socks if you can please sign-off at your earliest convenience.     Thank you!  Tawny Goldstein, PT, DPT, CLT

## 2018-12-26 ENCOUNTER — MYC MEDICAL ADVICE (OUTPATIENT)
Dept: ONCOLOGY | Facility: CLINIC | Age: 48
End: 2018-12-26

## 2018-12-26 DIAGNOSIS — C50.912 INVASIVE DUCTAL CARCINOMA OF BREAST, FEMALE, LEFT (H): ICD-10-CM

## 2018-12-26 RX ORDER — TAMOXIFEN CITRATE 20 MG/1
20 TABLET ORAL DAILY
Qty: 90 TABLET | Refills: 1 | Status: SHIPPED | OUTPATIENT
Start: 2018-12-26 | End: 2018-12-27

## 2018-12-26 NOTE — TELEPHONE ENCOUNTER
Dr. Peña -  I do not see orders or notes in patient's chart saying she was started on tamoxifen. She was to start zoladex, but had issues with insurance approving it so she wanted to do an appeal. I do not see zoladex or tamoxifen in her treatment plan. Also a formal teach was not done on tamoxifen so a status check was not done either.    Please see [employer] message below and clarify if you want her to continue on tamoxifen or if appeal is granted switch her to zalodex. Thank you.  Awilda Loyd RN

## 2018-12-27 RX ORDER — TAMOXIFEN CITRATE 20 MG/1
20 TABLET ORAL DAILY
Qty: 90 TABLET | Refills: 1 | Status: SHIPPED | OUTPATIENT
Start: 2018-12-27 | End: 2019-04-11

## 2018-12-27 NOTE — ADDENDUM NOTE
Encounter addended by: Tawny Goldstein, PT on: 12/27/2018 10:52 AM   Actions taken: Flowsheet accepted, Sign clinical note

## 2018-12-27 NOTE — PROGRESS NOTES
Outpatient Physical Therapy Progress Note     Patient: Desi Granado  : 1970    Beginning/End Dates of Reporting Period:  2018 to 2018    Referring Provider: Dr. Ratna Hart MD    Therapy Diagnosis: LUE at risk of lymphedema due to LN removal and s/p bilateral mastectomy      Client Self Report: i'm doing a lot better than the last time you saw me, the range is coming back    Objective Measurements:   Objective Measure: ROM  Details: R GH flex 146 and L GH flex 122 flex; RGH abduction 125 degrees and LGH abduction 105degrees     Outcome Measures (most recent score):   LLIS = 2 on date of initial evaluation; pt will again complete LLIS at time of discharge    Goals:  Goal Identifier 1   Goal Description pt to be independent with BUE ROM exercises/stretches to increase independence and ease with overhead activity and ADL   Target Date 18   Date Met  18   Progress:     Goal Identifier 2   Goal Description pt to verbalize s&s of lymphedema to demo understanding of longterm risk of developing lymphedema   Target Date 19   Date Met      Progress:     Goal Identifier 3   Goal Description pt to have increase B GH flexion to 150 degrees to increase independence and ease with overhead activity and ADL   Target Date 19   Date Met      Progress:     Progress Toward Goals:   Patient has made good progress toward goals.  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 Pt last seen on 18 and planning to return on 19 for follow-up and anticipated discharge.       Plan:  Continue therapy per current plan of care.    Discharge:  No

## 2019-01-10 ENCOUNTER — ONCOLOGY VISIT (OUTPATIENT)
Dept: ONCOLOGY | Facility: CLINIC | Age: 49
End: 2019-01-10
Attending: INTERNAL MEDICINE
Payer: COMMERCIAL

## 2019-01-10 ENCOUNTER — HOSPITAL ENCOUNTER (OUTPATIENT)
Dept: LAB | Facility: CLINIC | Age: 49
Discharge: HOME | End: 2019-01-10
Attending: INTERNAL MEDICINE | Admitting: INTERNAL MEDICINE
Payer: COMMERCIAL

## 2019-01-10 VITALS
OXYGEN SATURATION: 97 % | SYSTOLIC BLOOD PRESSURE: 110 MMHG | BODY MASS INDEX: 22.71 KG/M2 | WEIGHT: 136.3 LBS | TEMPERATURE: 98.6 F | RESPIRATION RATE: 18 BRPM | HEIGHT: 65 IN | HEART RATE: 99 BPM | DIASTOLIC BLOOD PRESSURE: 72 MMHG

## 2019-01-10 DIAGNOSIS — C50.912 INVASIVE DUCTAL CARCINOMA OF BREAST, FEMALE, LEFT (H): Primary | ICD-10-CM

## 2019-01-10 DIAGNOSIS — I10 ESSENTIAL HYPERTENSION, BENIGN: ICD-10-CM

## 2019-01-10 LAB
ALBUMIN SERPL-MCNC: 4 G/DL (ref 3.4–5)
ALP SERPL-CCNC: 69 U/L (ref 40–150)
ALT SERPL W P-5'-P-CCNC: 24 U/L (ref 0–50)
ANION GAP SERPL CALCULATED.3IONS-SCNC: 8 MMOL/L (ref 3–14)
AST SERPL W P-5'-P-CCNC: 12 U/L (ref 0–45)
BASOPHILS # BLD AUTO: 0 10E9/L (ref 0–0.2)
BASOPHILS NFR BLD AUTO: 0.6 %
BILIRUB SERPL-MCNC: 0.4 MG/DL (ref 0.2–1.3)
BUN SERPL-MCNC: 22 MG/DL (ref 7–30)
CALCIUM SERPL-MCNC: 9.2 MG/DL (ref 8.5–10.1)
CHLORIDE SERPL-SCNC: 102 MMOL/L (ref 94–109)
CO2 SERPL-SCNC: 26 MMOL/L (ref 20–32)
CREAT SERPL-MCNC: 1.13 MG/DL (ref 0.52–1.04)
DIFFERENTIAL METHOD BLD: NORMAL
EOSINOPHIL # BLD AUTO: 0.1 10E9/L (ref 0–0.7)
EOSINOPHIL NFR BLD AUTO: 1.4 %
ERYTHROCYTE [DISTWIDTH] IN BLOOD BY AUTOMATED COUNT: 11.9 % (ref 10–15)
GFR SERPL CREATININE-BSD FRML MDRD: 57 ML/MIN/{1.73_M2}
GLUCOSE SERPL-MCNC: 95 MG/DL (ref 70–99)
HCT VFR BLD AUTO: 39.2 % (ref 35–47)
HGB BLD-MCNC: 13.2 G/DL (ref 11.7–15.7)
IMM GRANULOCYTES # BLD: 0 10E9/L (ref 0–0.4)
IMM GRANULOCYTES NFR BLD: 0.5 %
LYMPHOCYTES # BLD AUTO: 1.6 10E9/L (ref 0.8–5.3)
LYMPHOCYTES NFR BLD AUTO: 23.7 %
MCH RBC QN AUTO: 30.7 PG (ref 26.5–33)
MCHC RBC AUTO-ENTMCNC: 33.7 G/DL (ref 31.5–36.5)
MCV RBC AUTO: 91 FL (ref 78–100)
MONOCYTES # BLD AUTO: 0.5 10E9/L (ref 0–1.3)
MONOCYTES NFR BLD AUTO: 7.6 %
NEUTROPHILS # BLD AUTO: 4.4 10E9/L (ref 1.6–8.3)
NEUTROPHILS NFR BLD AUTO: 66.2 %
NRBC # BLD AUTO: 0 10*3/UL
NRBC BLD AUTO-RTO: 0 /100
PLATELET # BLD AUTO: 304 10E9/L (ref 150–450)
POTASSIUM SERPL-SCNC: 4.2 MMOL/L (ref 3.4–5.3)
PROT SERPL-MCNC: 7.4 G/DL (ref 6.8–8.8)
RBC # BLD AUTO: 4.3 10E12/L (ref 3.8–5.2)
SODIUM SERPL-SCNC: 136 MMOL/L (ref 133–144)
WBC # BLD AUTO: 6.6 10E9/L (ref 4–11)

## 2019-01-10 PROCEDURE — 99214 OFFICE O/P EST MOD 30 MIN: CPT | Performed by: INTERNAL MEDICINE

## 2019-01-10 PROCEDURE — 85025 COMPLETE CBC W/AUTO DIFF WBC: CPT | Performed by: INTERNAL MEDICINE

## 2019-01-10 PROCEDURE — G0463 HOSPITAL OUTPT CLINIC VISIT: HCPCS

## 2019-01-10 PROCEDURE — 36415 COLL VENOUS BLD VENIPUNCTURE: CPT | Performed by: INTERNAL MEDICINE

## 2019-01-10 PROCEDURE — 80053 COMPREHEN METABOLIC PANEL: CPT | Performed by: INTERNAL MEDICINE

## 2019-01-10 ASSESSMENT — MIFFLIN-ST. JEOR: SCORE: 1241.19

## 2019-01-10 ASSESSMENT — PAIN SCALES - GENERAL: PAINLEVEL: NO PAIN (0)

## 2019-01-10 NOTE — NURSING NOTE
"Oncology Rooming Note    January 10, 2019 2:24 PM   Desi Granado is a 48 year old female who presents for:    Chief Complaint   Patient presents with     Oncology Clinic Visit     6 week recheck Invasive ductal carcinoma of breast, female, left      Initial Vitals: /72 (BP Location: Right arm, Patient Position: Sitting, Cuff Size: Adult Regular)   Pulse 99   Temp 98.6  F (37  C) (Tympanic)   Resp 18   Ht 1.638 m (5' 4.5\")   Wt 61.8 kg (136 lb 4.8 oz)   SpO2 97%   Breastfeeding? No   BMI 23.03 kg/m   Estimated body mass index is 23.03 kg/m  as calculated from the following:    Height as of this encounter: 1.638 m (5' 4.5\").    Weight as of this encounter: 61.8 kg (136 lb 4.8 oz). Body surface area is 1.68 meters squared.  No Pain (0) Comment: Data Unavailable   No LMP recorded.  Allergies reviewed: Yes  Medications reviewed: Yes    Medications: Medication refills not needed today.  Pharmacy name entered into [employer]:    [facility] [place] - [address] AT [facility] [place] [facility] [place] - [address] [place] [address]    Clinical concerns: 6 week recheck Invasive ductal carcinoma of breast, female, left. C/o achy since she has started Tamoxifen.     8 minutes for nursing intake (face to face time)     Nessa Jeff CMA            "

## 2019-01-10 NOTE — LETTER
1/10/2019         RE: Desi Granado  [address] [place] [address]        Dear Colleague,    Thank you for referring your patient, Desi Granado, to the [facility] CANCER CLINIC. Please see a copy of my visit note below.    Hematology/ Oncology Follow-up Visit:  Amilcar 10, 2019    Reason for Visit:   Chief Complaint   Patient presents with     Oncology Clinic Visit     6 week recheck Invasive ductal carcinoma of breast, female, left        Oncologic History:    Desi Garnado was found to have developing focal asymmetry in the left breast at approximately 3 o'clock position about 7-8 cm from the nipple on the routine mammogram done on October 30, 2018.  Subsequently she went on to have diagnostic mammogram and ultrasound.  Directed sonogram at the site of mammographic finding showed a 1.6 cm irregular solid lesion.  Subsequently the patient went on to have ultrasound-guided needle core biopsy done on October 17, 2018.  The biopsy came back positive for invasive ductal carcinoma with papillary features.  There is grade 2 out of 3.  Angiolymphatic invasion was not seen.  Ductal carcinoma in situ was not seen.  The tumor was estrogen receptor positive about 100% and progesterone receptor +100% strong nuclear staining.  HER-2/tamara came back negative for amplification. She had bilateral mastectomy.  Surgical pathology revealed simple mastectomy sample of the left showing solid papillary carcinoma 22 mm.  Grade 2.  Margins were clear of involvement.  3 sentinel lymph nodes shows no evidence of metastatic disease.  Lymphovascular invasion was not identified.  The tumor is pT2pN0.  Right breast shows simple mastectomy without any evidence of malignancy.  Oncotype came back 0.    Interval History:  Patient returning today for follow-up.  She was started on tamoxifen last months.  She has been tolerating tamoxifen without significant side effects including hot flashes or nausea or vomiting.  She  "denies any bone aches or pains.    Review Of Systems:  Constitutional: Negative for fever, chills, and night sweats.  Skin: negative.  Eyes: negative.  Ears/Nose/Throat: negative.  Respiratory: No shortness of breath, dyspnea on exertion, cough, or hemoptysis.  Cardiovascular: negative.  Gastrointestinal: negative.  Genitourinary: negative.  Musculoskeletal: negative.  Neurologic: negative.  Psychiatric: negative.  Hematologic/Lymphatic/Immunologic: negative.  Endocrine: negative.    All other ROS negative unless mentioned in interval history.    Past medical, social, surgical, and family histories reviewed.    Allergies:  Allergies as of 01/10/2019 - Reviewed 11/28/2018   Allergen Reaction Noted     Aspirin Hives 10/17/2005     Dust mites  07/03/2009     Morphine Nausea 06/23/2008       Current Medications:  Current Outpatient Medications   Medication Sig Dispense Refill     Fluticasone Propionate (FLONASE ALLERGY RELIEF NA)        lisinopril (PRINIVIL/ZESTRIL) 40 MG tablet Take 1 tablet (40 mg) by mouth daily 90 tablet 3     order for DME Equipment being ordered: prosthetic bra with insert 1 each 1     tamoxifen (NOLVADEX) 20 MG tablet Take 1 tablet (20 mg) by mouth daily 90 tablet 1     ZYRTEC 10 MG OR TABS 1 TABLET DAILY 90 3     ACETAMINOPHEN PO Take 1,000 mg by mouth          Physical Exam:  /72 (BP Location: Right arm, Patient Position: Sitting, Cuff Size: Adult Regular)   Pulse 99   Temp 98.6  F (37  C) (Tympanic)   Resp 18   Ht 1.638 m (5' 4.5\")   Wt 61.8 kg (136 lb 4.8 oz)   SpO2 97%   Breastfeeding? No   BMI 23.03 kg/m     Wt Readings from Last 12 Encounters:   01/10/19 61.8 kg (136 lb 4.8 oz)   11/28/18 61.4 kg (135 lb 4.8 oz)   11/21/18 63.4 kg (139 lb 11.2 oz)   11/20/18 61.7 kg (136 lb)   11/08/18 65.9 kg (145 lb 4.5 oz)   11/06/18 64.4 kg (141 lb 14.4 oz)   10/26/18 65.6 kg (144 lb 11.2 oz)   10/24/18 66 kg (145 lb 8 oz)   09/14/18 68.6 kg (151 lb 3.2 oz)   08/16/18 65.8 kg (145 lb) " "  06/01/18 67.6 kg (149 lb)   09/21/17 66.2 kg (146 lb)     ECOG performance status: 0  GENERAL APPEARANCE: Healthy, alert and in no acute distress.  HEENT: Sclerae anicteric. PERRLA. Oropharynx without ulcers, lesions, or thrush.  NECK: Supple. No asymmetry or masses.  LYMPHATICS: No palpable cervical, supraclavicular, axillary, or inguinal lymphadenopathy.  RESP: Lungs clear to auscultation bilaterally without rales, rhonchi or wheezes.\",  BREAST: Scars of bilateral mastectomy   \"CARDIOVASCULAR: Regular rate and rhythm. Normal S1, S2; no S3 or S4. No murmur, gallop, or rub.  ABDOMEN: Soft, nontender. Bowel sounds normal. No palpable organomegaly or masses.  MUSCULOSKELETAL: Extremities without gross deformities noted. No edema of bilateral lower extremities.  SKIN: No suspicious lesions or rashes.  NEURO: Alert and oriented x 3. Cranial nerves II-XII grossly intact.  PSYCHIATRIC: Mentation and affect appear normal.    Laboratory/Imaging Studies:  No visits with results within 2 Week(s) from this visit.   Latest known visit with results is:   Office Visit on 11/06/2018   Component Date Value Ref Range Status     Potassium 11/06/2018 4.1  3.4 - 5.3 mmol/L Final     WBC 11/06/2018 8.0  4.0 - 11.0 10e9/L Final     RBC Count 11/06/2018 4.74  3.8 - 5.2 10e12/L Final     Hemoglobin 11/06/2018 14.3  11.7 - 15.7 g/dL Final     Hematocrit 11/06/2018 41.8  35.0 - 47.0 % Final     MCV 11/06/2018 88  78 - 100 fl Final     MCH 11/06/2018 30.2  26.5 - 33.0 pg Final     MCHC 11/06/2018 34.2  31.5 - 36.5 g/dL Final     RDW 11/06/2018 12.1  10.0 - 15.0 % Final     Platelet Count 11/06/2018 336  150 - 450 10e9/L Final          Assessment and plan:    (C50.912) Invasive ductal carcinoma of breast, female, left (H)  (primary encounter diagnosis)  I reviewed with the patient today most recent laboratory test.  There is no clinical evidence of breast cancer recurrence.  The patient will continue on tamoxifen 20 mg orally daily.  We " talked again about ovarian suppression and aromatase inhibitors.  We will submit to the insurance again to to see coverage for the medications.    (I10) Essential hypertension, benign  The patient currently on lisinopril 40 mg orally daily.    The patient is ready to learn, no apparent learning barriers were identified.  Diagnosis and treatment plans were explained to the patient. The patient expressed understanding of the content. The patient asked appropriate questions. The patient questions were answered to her satisfaction.    Chart documentation with Dragon Voice recognition Software. Although reviewed after completion, some words and grammatical errors may remain.    Again, thank you for allowing me to participate in the care of your patient.        Sincerely,        Stephen Peña MD

## 2019-01-11 NOTE — PROGRESS NOTES
Hematology/ Oncology Follow-up Visit:  Amilcar 10, 2019    Reason for Visit:   Chief Complaint   Patient presents with     Oncology Clinic Visit     6 week recheck Invasive ductal carcinoma of breast, female, left        Oncologic History:    Desi Granado was found to have developing focal asymmetry in the left breast at approximately 3 o'clock position about 7-8 cm from the nipple on the routine mammogram done on October 30, 2018.  Subsequently she went on to have diagnostic mammogram and ultrasound.  Directed sonogram at the site of mammographic finding showed a 1.6 cm irregular solid lesion.  Subsequently the patient went on to have ultrasound-guided needle core biopsy done on October 17, 2018.  The biopsy came back positive for invasive ductal carcinoma with papillary features.  There is grade 2 out of 3.  Angiolymphatic invasion was not seen.  Ductal carcinoma in situ was not seen.  The tumor was estrogen receptor positive about 100% and progesterone receptor +100% strong nuclear staining.  HER-2/tamara came back negative for amplification. She had bilateral mastectomy.  Surgical pathology revealed simple mastectomy sample of the left showing solid papillary carcinoma 22 mm.  Grade 2.  Margins were clear of involvement.  3 sentinel lymph nodes shows no evidence of metastatic disease.  Lymphovascular invasion was not identified.  The tumor is pT2pN0.  Right breast shows simple mastectomy without any evidence of malignancy.  Oncotype came back 0.    Interval History:  Patient returning today for follow-up.  She was started on tamoxifen last months.  She has been tolerating tamoxifen without significant side effects including hot flashes or nausea or vomiting.  She denies any bone aches or pains.    Review Of Systems:  Constitutional: Negative for fever, chills, and night sweats.  Skin: negative.  Eyes: negative.  Ears/Nose/Throat: negative.  Respiratory: No shortness of breath, dyspnea on exertion, cough, or  "hemoptysis.  Cardiovascular: negative.  Gastrointestinal: negative.  Genitourinary: negative.  Musculoskeletal: negative.  Neurologic: negative.  Psychiatric: negative.  Hematologic/Lymphatic/Immunologic: negative.  Endocrine: negative.    All other ROS negative unless mentioned in interval history.    Past medical, social, surgical, and family histories reviewed.    Allergies:  Allergies as of 01/10/2019 - Reviewed 11/28/2018   Allergen Reaction Noted     Aspirin Hives 10/17/2005     Dust mites  07/03/2009     Morphine Nausea 06/23/2008       Current Medications:  Current Outpatient Medications   Medication Sig Dispense Refill     Fluticasone Propionate (FLONASE ALLERGY RELIEF NA)        lisinopril (PRINIVIL/ZESTRIL) 40 MG tablet Take 1 tablet (40 mg) by mouth daily 90 tablet 3     order for DME Equipment being ordered: prosthetic bra with insert 1 each 1     tamoxifen (NOLVADEX) 20 MG tablet Take 1 tablet (20 mg) by mouth daily 90 tablet 1     ZYRTEC 10 MG OR TABS 1 TABLET DAILY 90 3     ACETAMINOPHEN PO Take 1,000 mg by mouth          Physical Exam:  /72 (BP Location: Right arm, Patient Position: Sitting, Cuff Size: Adult Regular)   Pulse 99   Temp 98.6  F (37  C) (Tympanic)   Resp 18   Ht 1.638 m (5' 4.5\")   Wt 61.8 kg (136 lb 4.8 oz)   SpO2 97%   Breastfeeding? No   BMI 23.03 kg/m    Wt Readings from Last 12 Encounters:   01/10/19 61.8 kg (136 lb 4.8 oz)   11/28/18 61.4 kg (135 lb 4.8 oz)   11/21/18 63.4 kg (139 lb 11.2 oz)   11/20/18 61.7 kg (136 lb)   11/08/18 65.9 kg (145 lb 4.5 oz)   11/06/18 64.4 kg (141 lb 14.4 oz)   10/26/18 65.6 kg (144 lb 11.2 oz)   10/24/18 66 kg (145 lb 8 oz)   09/14/18 68.6 kg (151 lb 3.2 oz)   08/16/18 65.8 kg (145 lb)   06/01/18 67.6 kg (149 lb)   09/21/17 66.2 kg (146 lb)     ECOG performance status: 0  GENERAL APPEARANCE: Healthy, alert and in no acute distress.  HEENT: Sclerae anicteric. PERRLA. Oropharynx without ulcers, lesions, or thrush.  NECK: Supple. No " "asymmetry or masses.  LYMPHATICS: No palpable cervical, supraclavicular, axillary, or inguinal lymphadenopathy.  RESP: Lungs clear to auscultation bilaterally without rales, rhonchi or wheezes.\",  BREAST: Scars of bilateral mastectomy   \"CARDIOVASCULAR: Regular rate and rhythm. Normal S1, S2; no S3 or S4. No murmur, gallop, or rub.  ABDOMEN: Soft, nontender. Bowel sounds normal. No palpable organomegaly or masses.  MUSCULOSKELETAL: Extremities without gross deformities noted. No edema of bilateral lower extremities.  SKIN: No suspicious lesions or rashes.  NEURO: Alert and oriented x 3. Cranial nerves II-XII grossly intact.  PSYCHIATRIC: Mentation and affect appear normal.    Laboratory/Imaging Studies:  No visits with results within 2 Week(s) from this visit.   Latest known visit with results is:   Office Visit on 11/06/2018   Component Date Value Ref Range Status     Potassium 11/06/2018 4.1  3.4 - 5.3 mmol/L Final     WBC 11/06/2018 8.0  4.0 - 11.0 10e9/L Final     RBC Count 11/06/2018 4.74  3.8 - 5.2 10e12/L Final     Hemoglobin 11/06/2018 14.3  11.7 - 15.7 g/dL Final     Hematocrit 11/06/2018 41.8  35.0 - 47.0 % Final     MCV 11/06/2018 88  78 - 100 fl Final     MCH 11/06/2018 30.2  26.5 - 33.0 pg Final     MCHC 11/06/2018 34.2  31.5 - 36.5 g/dL Final     RDW 11/06/2018 12.1  10.0 - 15.0 % Final     Platelet Count 11/06/2018 336  150 - 450 10e9/L Final          Assessment and plan:    (C50.912) Invasive ductal carcinoma of breast, female, left (H)  (primary encounter diagnosis)  I reviewed with the patient today most recent laboratory test.  There is no clinical evidence of breast cancer recurrence.  The patient will continue on tamoxifen 20 mg orally daily.  We talked again about ovarian suppression and aromatase inhibitors.  We will submit to the insurance again to to see coverage for the medications.    (I10) Essential hypertension, benign  The patient currently on lisinopril 40 mg orally daily.    The " patient is ready to learn, no apparent learning barriers were identified.  Diagnosis and treatment plans were explained to the patient. The patient expressed understanding of the content. The patient asked appropriate questions. The patient questions were answered to her satisfaction.    Chart documentation with Dragon Voice recognition Software. Although reviewed after completion, some words and grammatical errors may remain.

## 2019-01-11 NOTE — ASSESSMENT & PLAN NOTE
Desi Granado was found to have developing focal asymmetry in the left breast at approximately 3 o'clock position about 7-8 cm from the nipple on the routine mammogram done on October 30, 2018.  Subsequently she went on to have diagnostic mammogram and ultrasound.  Directed sonogram at the site of mammographic finding showed a 1.6 cm irregular solid lesion.  Subsequently the patient went on to have ultrasound-guided needle core biopsy done on October 17, 2018.  The biopsy came back positive for invasive ductal carcinoma with papillary features.  There is grade 2 out of 3.  Angiolymphatic invasion was not seen.  Ductal carcinoma in situ was not seen.  The tumor was estrogen receptor positive about 100% and progesterone receptor +100% strong nuclear staining.  HER-2/tamara came back negative for amplification. She had bilateral mastectomy.  Surgical pathology revealed simple mastectomy sample of the left showing solid papillary carcinoma 22 mm.  Grade 2.  Margins were clear of involvement.  3 sentinel lymph nodes shows no evidence of metastatic disease.  Lymphovascular invasion was not identified.  The tumor is pT2pN0.  Right breast shows simple mastectomy without any evidence of malignancy.  Oncotype came back 0.

## 2019-01-22 ENCOUNTER — HOSPITAL ENCOUNTER (OUTPATIENT)
Dept: PHYSICAL THERAPY | Facility: CLINIC | Age: 49
Setting detail: THERAPIES SERIES
End: 2019-01-22
Attending: SURGERY
Payer: COMMERCIAL

## 2019-01-22 DIAGNOSIS — Z00.6 EXAMINATION OF PARTICIPANT OR CONTROL IN CLINICAL RESEARCH: Primary | ICD-10-CM

## 2019-01-22 PROCEDURE — 97140 MANUAL THERAPY 1/> REGIONS: CPT | Mod: GP | Performed by: PHYSICAL THERAPIST

## 2019-01-22 PROCEDURE — 36415 COLL VENOUS BLD VENIPUNCTURE: CPT

## 2019-01-22 PROCEDURE — 99000 SPECIMEN HANDLING OFFICE-LAB: CPT

## 2019-01-31 NOTE — PROGRESS NOTES
Outpatient Physical Therapy Progress Note     Patient: Desi Granado  : 1970    Beginning/End Dates of Reporting Period:  2019 to 2019    Referring Provider: Dr. Hailey MD    Therapy Diagnosis: LUE at risk of lymphedema      Client Self Report: i got the sleeve and stockings and brought them in today    Objective Measurements:  Objective Measure: girth  Details: RUE +3.0% and LUE +6.2%    Objective Measure: ROM  Details: R GH flex 155 and L GH flex 140 flex; RGH abduction 132 degrees and LGH abduction 115degrees     Outcome Measures (most recent score):   LLIS = 2 on date of initial evaluation on 18; pt will again complete LLIS at time of discharge    Goals:  Goal Identifier 1   Goal Description pt to be independent with BUE ROM exercises/stretches to increase independence and ease with overhead activity and ADL   Target Date 18   Date Met  18   Progress:     Goal Identifier 2   Goal Description pt to verbalize s&s of lymphedema to demo understanding of longterm risk of developing lymphedema   Target Date 19   Date Met      Progress:     Goal Identifier 3   Goal Description pt to have increase B GH flexion to 150 degrees to increase independence and ease with overhead activity and ADL   Target Date 19   Date Met  (19 - met on RUE and LUE progressing)   Progress:     Progress Toward Goals:   Patient last seen in clinic on 19 and fit for compression sleeve with education on donning/doffing and wear/care. Pt also needing/wanting compression stockings for LEs due to frequent travel/flying for work and will follow-up back at clinic when she receives them.  At that time will also ensure that UE girth has been maintained.       Plan:  Continue therapy per current plan of care.    Discharge:  No

## 2019-01-31 NOTE — ADDENDUM NOTE
Encounter addended by: Tawny Goldstein, PT on: 1/31/2019 12:48 PM   Actions taken: Sign clinical note, Flowsheet accepted

## 2019-02-07 ENCOUNTER — MYC MEDICAL ADVICE (OUTPATIENT)
Dept: ONCOLOGY | Facility: CLINIC | Age: 49
End: 2019-02-07

## 2019-02-19 ENCOUNTER — HOSPITAL ENCOUNTER (OUTPATIENT)
Dept: PHYSICAL THERAPY | Facility: CLINIC | Age: 49
Setting detail: THERAPIES SERIES
End: 2019-02-19
Attending: SURGERY
Payer: COMMERCIAL

## 2019-02-19 PROCEDURE — 97140 MANUAL THERAPY 1/> REGIONS: CPT | Mod: GP | Performed by: PHYSICAL THERAPIST

## 2019-02-19 NOTE — PROGRESS NOTES
Outpatient Physical Therapy Discharge Note     Patient: Desi Granado  : 1970    Beginning/End Dates of Reporting Period:  2018 to 2019    Referring Provider: Hailey Menjivar MD    Therapy Diagnosis: LUE at risk of lymphedema      Client Self Report: the arm is doing well and my motion I think has improved    Objective Measurements:  Objective Measure: girth  Details: since last session on 19: RUE -3.8% and LLE -5.0%    Objective Measure: ROM  Details: R GH flex 155 and L GH flex 150 flex; RGH abduction 140 degrees and LGH abduction 125degrees     Outcome Measures (most recent score):  Lymphedema Life Impact Scale (score range 0-72). A higher score indicates greater impairment.: 0    Goals:  Goal Identifier 1   Goal Description pt to be independent with BUE ROM exercises/stretches to increase independence and ease with overhead activity and ADL   Target Date 18   Date Met  18   Progress:     Goal Identifier 2   Goal Description pt to verbalize s&s of lymphedema to demo understanding of longterm risk of developing lymphedema   Target Date 19   Date Met  19   Progress:     Goal Identifier 3   Goal Description pt to have increase B GH flexion to 150 degrees to increase independence and ease with overhead activity and ADL   Target Date 19   Date Met  19   Progress:     Progress Toward Goals:   OP lymphedema goals met      Plan:  Discharge from therapy.    Discharge:    Reason for Discharge: Patient has met all goals.    Equipment Issued: compression sleeve: Medi Martin 20-30mmHg, size I with SB;  compression stockings: Medi Comfort 20-30mmHg size 3    Discharge Plan: Patient to continue home program.

## 2019-02-22 ENCOUNTER — OFFICE VISIT (OUTPATIENT)
Dept: SURGERY | Facility: CLINIC | Age: 49
End: 2019-02-22
Payer: COMMERCIAL

## 2019-02-22 VITALS
WEIGHT: 140 LBS | SYSTOLIC BLOOD PRESSURE: 92 MMHG | BODY MASS INDEX: 23.32 KG/M2 | DIASTOLIC BLOOD PRESSURE: 58 MMHG | HEIGHT: 65 IN | TEMPERATURE: 98.5 F

## 2019-02-22 DIAGNOSIS — Z90.13 H/O BILATERAL MASTECTOMY: ICD-10-CM

## 2019-02-22 DIAGNOSIS — C50.912 INVASIVE DUCTAL CARCINOMA OF BREAST, FEMALE, LEFT (H): Primary | ICD-10-CM

## 2019-02-22 PROCEDURE — 99214 OFFICE O/P EST MOD 30 MIN: CPT | Performed by: SURGERY

## 2019-02-22 ASSESSMENT — MIFFLIN-ST. JEOR: SCORE: 1257.98

## 2019-02-22 NOTE — LETTER
2/22/2019         RE: Desi Granado  [address] [place] [address]        Dear Colleague,    Thank you for referring your patient, Desi Granado, to the [facility]. Please see a copy of my visit note below.    BREAST CANCER FOLLOW UP  CHIEF COMPLAINT  Chief Complaint   Patient presents with     Surgical Followup      B/L simple mastectomy, DOS 11/08/18     HPI  Desi Granado is a 48 year old female who presents with   Chief Complaint   Patient presents with     Surgical Followup      B/L simple mastectomy, DOS 11/08/18     The patient presents for her breast cancer followup appointment. Overall, she is feeling good. She denies any significant fatigue, fevers, chills, or changes in appetite. She has not suffered any significant weight loss.  Tolerating tamoxifen with no issues.     She has not noted any areas of skin changes or any masses in the breast or chest wall that she is concerned about.  She denies skin dimpling, puckering, erythema, or nipple discharge. She has not noted any palpable axillary lymphadenopathy.  Starting traveling more  Review of Systems  Constitutional: Denies fever or chills   Eyes: Denies change in visual acuity   HENT: Denies nasal congestion or sore throat   Respiratory: Denies cough or shortness of breath   Cardiovascular: Denies chest pain or edema   GI: Denies abdominal pain, nausea, vomiting, bloody stools or diarrhea   : Denies dysuria   Musculoskeletal: Denies back pain or joint pain   Integument: Denies rash   Neurologic: Denies headache, focal weakness or sensory changes   Endocrine: Denies polyuria or polydipsia   Lymphatic: Denies swollen glands   Psychiatric: Denies depression or anxiety     PAST MEDICAL HISTORY  Past Medical History:   Diagnosis Date     Allergic rhinitis, cause unspecified      Hypertension 8 years?     Injury, other and unspecified, knee, leg, ankle, and foot 8th grade    left ankle injury     Invasive  ductal carcinoma of breast, female, left (H) 10/26/2018     FAMILY HISTORY  Family History   Problem Relation Age of Onset     Hypertension Mother      Allergies Mother      Thyroid Disease Mother         Taking thyroid medication     Obesity Mother      Cerebrovascular Disease Mother         TIA Feb 2017     Hypertension Father      Thyroid Disease Father         two parathyroids removed     Cancer Maternal Grandmother         Bone CA     Allergies Maternal Grandmother      Circulatory Maternal Grandmother      Cerebrovascular Disease Maternal Grandmother      Other Cancer Maternal Grandmother         multiple myeloma     C.A.D. Maternal Grandfather      Respiratory Maternal Grandfather         asthma     Allergies Maternal Grandfather      Cerebrovascular Disease Maternal Grandfather      Alzheimer Disease Paternal Grandfather      Neurologic Disorder Paternal Grandfather         Parkinsons     Arthritis Paternal Grandmother      Respiratory Other         Emphysema     Diabetes Other      Asthma Other         generic emphysema     Genetic Disorder Other         genetic emphysema     Diabetes Other      Coronary Artery Disease Other      Other Cancer Other         multiple myeloma     Colon Cancer Other      Cancer - colorectal No family hx of      Breast Cancer No family hx of      Prostate Cancer No family hx of      SOCIAL HISTORY  Social History     Socioeconomic History     Marital status: Single     Spouse name: None     Number of children: 0     Years of education: 18     Highest education level: None   Occupational History     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     Employer: [employer]   Social Needs     Financial resource strain: None     Food insecurity:     Worry: None     Inability: None     Transportation needs:     Medical: None     Non-medical: None   Tobacco Use     Smoking status: Never Smoker     Smokeless tobacco: Never Used   Substance and Sexual Activity     Alcohol use: No     Alcohol/week: 0.0  oz     Comment: very rare     Drug use: No     Sexual activity: Not Currently     Partners: Male     Birth control/protection: Pill   Lifestyle     Physical activity:     Days per week: None     Minutes per session: None     Stress: None   Relationships     Social connections:     Talks on phone: None     Gets together: None     Attends [employer]ology service: None     Active member of club or organization: None     Attends meetings of clubs or organizations: None     Relationship status: None     Intimate partner violence:     Fear of current or ex partner: None     Emotionally abused: None     Physically abused: None     Forced sexual activity: None   Other Topics Concern     Parent/sibling w/ CABG, MI or angioplasty before 65F 55M? No   Social History Narrative     None     SURGICAL HISTORY  Past Surgical History:   Procedure Laterality Date     BIOPSY       BREAST LUMPECTOMY, RT/LT  6/23/10    Breast Lumpectomy RT for likely adenomatous lumps     COLONOSCOPY N/A 8/16/2018    Procedure: COLONOSCOPY;  colonoscopy;  Surgeon: Vijay Almanza MD;  Location: [facility]     ESOPHAGOSCOPY, GASTROSCOPY, DUODENOSCOPY (EGD), COMBINED N/A 10/1/2015    Procedure: COMBINED ESOPHAGOSCOPY, GASTROSCOPY, DUODENOSCOPY (EGD);  Surgeon: Vijay Almanza MD;  Location: [facility]     MASTECTOMY SIMPLE BILATERAL, SENTINEL NODE BILATERAL, COMBINED Bilateral 11/8/2018    Procedure: BILATERAL SIMPLE MASTECTOMIES WITH LEFT SENTINEL LYMPH NODE BIOPSY ;  Surgeon: Nabila Hart MD;  Location: [facility]     SOFT TISSUE SURGERY      Ankle surgery 20+ years ago     SURGICAL HISTORY OF -   1989    arthroscopy of Left Ankle     SURGICAL HISTORY OF -   1990    arthroscopy of Left Ankle     SURGICAL HISTORY OF -   1993    4 wisdom teeth extracted     CURRENT MEDICATIONS    Current Outpatient Medications:      ACETAMINOPHEN PO, Take 1,000 mg by mouth, Disp: , Rfl:      Fluticasone Propionate (FLONASE ALLERGY RELIEF NA), , Disp: , Rfl:      lisinopril (PRINIVIL/ZESTRIL)  "40 MG tablet, Take 1 tablet (40 mg) by mouth daily, Disp: 90 tablet, Rfl: 3     order for DME, Equipment being ordered: prosthetic bra with insert, Disp: 1 each, Rfl: 1     tamoxifen (NOLVADEX) 20 MG tablet, Take 1 tablet (20 mg) by mouth daily, Disp: 90 tablet, Rfl: 1     ZYRTEC 10 MG OR TABS, 1 TABLET DAILY, Disp: 90, Rfl: 3  ALLERGIES  Allergies   Allergen Reactions     Aspirin Hives     Dust Mites      Morphine Nausea     PHYSICAL EXAM  VITAL SIGNS:  height is 1.638 m (5' 4.5\") and weight is 63.5 kg (140 lb). Her temporal temperature is 98.5  F (36.9  C). Her blood pressure is 92/58.   Constitutional: Well developed, Well nourished, No acute distress, Non-toxic appearance.   HENT: Normocephalic, Atraumatic, Bilateral external ears normal, Oropharynx moist, No oral exudates, Nose normal.   Eyes: EOMI, Conjunctiva normal, No discharge.   Neck: Normal range of motion, No tenderness, Supple, No stridor.   Lymphatic: No lymphadenopathy noted.   Cardiovascular: Normal heart rate, Normal rhythm, No murmurs, No rubs, No gallops.   Breast Exam:   RIGHT: No areas of skin dimpling or puckering. No erythema. No skin scaling or changes. No focal areas of significant tenderness.  No palpable axillary lymphadenopathy.  LEFT: No areas of skin dimpling or puckering. No erythema. No skin scaling or changes. No focal areas of significant tenderness.   No palpable axillary lymphadenopathy.  Thorax & Lungs: Normal breath sounds, No respiratory distress, No wheezing, No chest tenderness.   Abdomen: Bowel sounds normal, Soft, No masses, No pulsatile masses.   Skin: Warm, Dry, No erythema, No rash.   Back: No tenderness, No CVA tenderness.   Extremities: Intact distal pulses, No edema, No tenderness, No cyanosis, No clubbing.   Musculoskeletal: Good range of motion in all major joints. No tenderness to palpation or major deformities noted.   Neurologic: Alert & oriented x 3, Normal motor function, Normal sensory function, No focal " deficits noted.   Psychiatric: Affect normal, Judgment normal, Mood normal.         Imaging Studies:   No results found for this or any previous visit (from the past 744 hour(s)).    [unfilled]  FINAL IMPRESSION AND PLAN  (C50.912) Invasive ductal carcinoma of breast, female, left (H)  (primary encounter diagnosis)  Comment: s/p bilateral mastectomy  The patient is 3 months out from her breast cancer surgery. There is no evidence of recurrent disease on my exam today. She is continuing to recover well from her breast cancer treatment. No signs of lymphedema  She will continue periodic self breast or chest wall exam. She is encouraged to followup with me for any changes on self-exam, or for any concerns or questions. She will followup with her primary care provider for routine care, and continue followup with her oncologist as scheduled.  My recommendations were discussed with the patient. She will see me in 3 months for another exam. Patient will be scheduled for any indicated mammography or additional imaging.     Nabila Hart,   2/22/2019        Again, thank you for allowing me to participate in the care of your patient.        Sincerely,        Nabila Hart MD

## 2019-02-22 NOTE — PROGRESS NOTES
BREAST CANCER FOLLOW UP  CHIEF COMPLAINT  Chief Complaint   Patient presents with     Surgical Followup      B/L simple mastectomy, DOS 11/08/18     hospitals  Desi Granado is a 48 year old female who presents with   Chief Complaint   Patient presents with     Surgical Followup      B/L simple mastectomy, DOS 11/08/18     The patient presents for her breast cancer followup appointment. Overall, she is feeling good. She denies any significant fatigue, fevers, chills, or changes in appetite. She has not suffered any significant weight loss.  Tolerating tamoxifen with no issues.     She has not noted any areas of skin changes or any masses in the breast or chest wall that she is concerned about.  She denies skin dimpling, puckering, erythema, or nipple discharge. She has not noted any palpable axillary lymphadenopathy.  Starting traveling more  Review of Systems  Constitutional: Denies fever or chills   Eyes: Denies change in visual acuity   HENT: Denies nasal congestion or sore throat   Respiratory: Denies cough or shortness of breath   Cardiovascular: Denies chest pain or edema   GI: Denies abdominal pain, nausea, vomiting, bloody stools or diarrhea   : Denies dysuria   Musculoskeletal: Denies back pain or joint pain   Integument: Denies rash   Neurologic: Denies headache, focal weakness or sensory changes   Endocrine: Denies polyuria or polydipsia   Lymphatic: Denies swollen glands   Psychiatric: Denies depression or anxiety     PAST MEDICAL HISTORY  Past Medical History:   Diagnosis Date     Allergic rhinitis, cause unspecified      Hypertension 8 years?     Injury, other and unspecified, knee, leg, ankle, and foot 8th grade    left ankle injury     Invasive ductal carcinoma of breast, female, left (H) 10/26/2018     FAMILY HISTORY  Family History   Problem Relation Age of Onset     Hypertension Mother      Allergies Mother      Thyroid Disease Mother         Taking thyroid medication     Obesity Mother       Cerebrovascular Disease Mother         TIA Feb 2017     Hypertension Father      Thyroid Disease Father         two parathyroids removed     Cancer Maternal Grandmother         Bone CA     Allergies Maternal Grandmother      Circulatory Maternal Grandmother      Cerebrovascular Disease Maternal Grandmother      Other Cancer Maternal Grandmother         multiple myeloma     C.A.D. Maternal Grandfather      Respiratory Maternal Grandfather         asthma     Allergies Maternal Grandfather      Cerebrovascular Disease Maternal Grandfather      Alzheimer Disease Paternal Grandfather      Neurologic Disorder Paternal Grandfather         Parkinsons     Arthritis Paternal Grandmother      Respiratory Other         Emphysema     Diabetes Other      Asthma Other         generic emphysema     Genetic Disorder Other         genetic emphysema     Diabetes Other      Coronary Artery Disease Other      Other Cancer Other         multiple myeloma     Colon Cancer Other      Cancer - colorectal No family hx of      Breast Cancer No family hx of      Prostate Cancer No family hx of      SOCIAL HISTORY  Social History     Socioeconomic History     Marital status: Single     Spouse name: None     Number of children: 0     Years of education: 18     Highest education level: None   Occupational History     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     Employer: [employer]   Social Needs     Financial resource strain: None     Food insecurity:     Worry: None     Inability: None     Transportation needs:     Medical: None     Non-medical: None   Tobacco Use     Smoking status: Never Smoker     Smokeless tobacco: Never Used   Substance and Sexual Activity     Alcohol use: No     Alcohol/week: 0.0 oz     Comment: very rare     Drug use: No     Sexual activity: Not Currently     Partners: Male     Birth control/protection: Pill   Lifestyle     Physical activity:     Days per week: None     Minutes per session: None     Stress: None   Relationships      Social connections:     Talks on phone: None     Gets together: None     Attends Yazdanism service: None     Active member of club or organization: None     Attends meetings of clubs or organizations: None     Relationship status: None     Intimate partner violence:     Fear of current or ex partner: None     Emotionally abused: None     Physically abused: None     Forced sexual activity: None   Other Topics Concern     Parent/sibling w/ CABG, MI or angioplasty before 65F 55M? No   Social History Narrative     None     SURGICAL HISTORY  Past Surgical History:   Procedure Laterality Date     BIOPSY       BREAST LUMPECTOMY, RT/LT  6/23/10    Breast Lumpectomy RT for likely adenomatous lumps     COLONOSCOPY N/A 8/16/2018    Procedure: COLONOSCOPY;  colonoscopy;  Surgeon: Vijay Almanza MD;  Location: [facility]     ESOPHAGOSCOPY, GASTROSCOPY, DUODENOSCOPY (EGD), COMBINED N/A 10/1/2015    Procedure: COMBINED ESOPHAGOSCOPY, GASTROSCOPY, DUODENOSCOPY (EGD);  Surgeon: Vijay Almanza MD;  Location: [facility]     MASTECTOMY SIMPLE BILATERAL, SENTINEL NODE BILATERAL, COMBINED Bilateral 11/8/2018    Procedure: BILATERAL SIMPLE MASTECTOMIES WITH LEFT SENTINEL LYMPH NODE BIOPSY ;  Surgeon: Nabila Hart MD;  Location: [facility]     SOFT TISSUE SURGERY      Ankle surgery 20+ years ago     SURGICAL HISTORY OF -   1989    arthroscopy of Left Ankle     SURGICAL HISTORY OF -   1990    arthroscopy of Left Ankle     SURGICAL HISTORY OF -   1993    4 wisdom teeth extracted     CURRENT MEDICATIONS    Current Outpatient Medications:      ACETAMINOPHEN PO, Take 1,000 mg by mouth, Disp: , Rfl:      Fluticasone Propionate (FLONASE ALLERGY RELIEF NA), , Disp: , Rfl:      lisinopril (PRINIVIL/ZESTRIL) 40 MG tablet, Take 1 tablet (40 mg) by mouth daily, Disp: 90 tablet, Rfl: 3     order for DME, Equipment being ordered: prosthetic bra with insert, Disp: 1 each, Rfl: 1     tamoxifen (NOLVADEX) 20 MG tablet, Take 1 tablet (20 mg) by mouth daily, Disp: 90  "tablet, Rfl: 1     ZYRTEC 10 MG OR TABS, 1 TABLET DAILY, Disp: 90, Rfl: 3  ALLERGIES  Allergies   Allergen Reactions     Aspirin Hives     Dust Mites      Morphine Nausea     PHYSICAL EXAM  VITAL SIGNS:  height is 1.638 m (5' 4.5\") and weight is 63.5 kg (140 lb). Her temporal temperature is 98.5  F (36.9  C). Her blood pressure is 92/58.   Constitutional: Well developed, Well nourished, No acute distress, Non-toxic appearance.   HENT: Normocephalic, Atraumatic, Bilateral external ears normal, Oropharynx moist, No oral exudates, Nose normal.   Eyes: EOMI, Conjunctiva normal, No discharge.   Neck: Normal range of motion, No tenderness, Supple, No stridor.   Lymphatic: No lymphadenopathy noted.   Cardiovascular: Normal heart rate, Normal rhythm, No murmurs, No rubs, No gallops.   Breast Exam:   RIGHT: No areas of skin dimpling or puckering. No erythema. No skin scaling or changes. No focal areas of significant tenderness.  No palpable axillary lymphadenopathy.  LEFT: No areas of skin dimpling or puckering. No erythema. No skin scaling or changes. No focal areas of significant tenderness.   No palpable axillary lymphadenopathy.  Thorax & Lungs: Normal breath sounds, No respiratory distress, No wheezing, No chest tenderness.   Abdomen: Bowel sounds normal, Soft, No masses, No pulsatile masses.   Skin: Warm, Dry, No erythema, No rash.   Back: No tenderness, No CVA tenderness.   Extremities: Intact distal pulses, No edema, No tenderness, No cyanosis, No clubbing.   Musculoskeletal: Good range of motion in all major joints. No tenderness to palpation or major deformities noted.   Neurologic: Alert & oriented x 3, Normal motor function, Normal sensory function, No focal deficits noted.   Psychiatric: Affect normal, Judgment normal, Mood normal.         Imaging Studies:   No results found for this or any previous visit (from the past 744 hour(s)).    [unfilled]  FINAL IMPRESSION AND PLAN  (K24.608) Invasive ductal " carcinoma of breast, female, left (H)  (primary encounter diagnosis)  Comment: s/p bilateral mastectomy  The patient is 3 months out from her breast cancer surgery. There is no evidence of recurrent disease on my exam today. She is continuing to recover well from her breast cancer treatment. No signs of lymphedema  She will continue periodic self breast or chest wall exam. She is encouraged to followup with me for any changes on self-exam, or for any concerns or questions. She will followup with her primary care provider for routine care, and continue followup with her oncologist as scheduled.  My recommendations were discussed with the patient. She will see me in 3 months for another exam. Patient will be scheduled for any indicated mammography or additional imaging.     Sabino-Julienne Hrat, DO  2/22/2019

## 2019-03-19 ENCOUNTER — TRANSFERRED RECORDS (OUTPATIENT)
Dept: HEALTH INFORMATION MANAGEMENT | Facility: CLINIC | Age: 49
End: 2019-03-19

## 2019-03-20 ENCOUNTER — MYC MEDICAL ADVICE (OUTPATIENT)
Dept: FAMILY MEDICINE | Facility: CLINIC | Age: 49
End: 2019-03-20

## 2019-03-21 ENCOUNTER — OFFICE VISIT (OUTPATIENT)
Dept: FAMILY MEDICINE | Facility: CLINIC | Age: 49
End: 2019-03-21
Payer: COMMERCIAL

## 2019-03-21 VITALS
RESPIRATION RATE: 12 BRPM | WEIGHT: 142 LBS | DIASTOLIC BLOOD PRESSURE: 70 MMHG | TEMPERATURE: 97.9 F | HEART RATE: 95 BPM | SYSTOLIC BLOOD PRESSURE: 124 MMHG | OXYGEN SATURATION: 100 % | BODY MASS INDEX: 23.66 KG/M2 | HEIGHT: 65 IN

## 2019-03-21 DIAGNOSIS — F41.0 ANXIETY ATTACK: ICD-10-CM

## 2019-03-21 DIAGNOSIS — J30.89 ALLERGIC RHINITIS DUE TO OTHER ALLERGIC TRIGGER, UNSPECIFIED SEASONALITY: Primary | ICD-10-CM

## 2019-03-21 PROCEDURE — 99214 OFFICE O/P EST MOD 30 MIN: CPT | Performed by: INTERNAL MEDICINE

## 2019-03-21 RX ORDER — CALCIUM POLYCARBOPHIL 625 MG
TABLET ORAL DAILY
COMMUNITY
End: 2020-09-22

## 2019-03-21 RX ORDER — MONTELUKAST SODIUM 10 MG/1
10 TABLET ORAL AT BEDTIME
Qty: 30 TABLET | Refills: 11 | Status: SHIPPED | OUTPATIENT
Start: 2019-03-21 | End: 2019-04-01

## 2019-03-21 RX ORDER — CHOLECALCIFEROL (VITAMIN D3) 50 MCG
1 TABLET ORAL DAILY
COMMUNITY
End: 2024-09-15

## 2019-03-21 ASSESSMENT — ANXIETY QUESTIONNAIRES
IF YOU CHECKED OFF ANY PROBLEMS ON THIS QUESTIONNAIRE, HOW DIFFICULT HAVE THESE PROBLEMS MADE IT FOR YOU TO DO YOUR WORK, TAKE CARE OF THINGS AT HOME, OR GET ALONG WITH OTHER PEOPLE: NOT DIFFICULT AT ALL
2. NOT BEING ABLE TO STOP OR CONTROL WORRYING: SEVERAL DAYS
6. BECOMING EASILY ANNOYED OR IRRITABLE: NOT AT ALL
3. WORRYING TOO MUCH ABOUT DIFFERENT THINGS: NOT AT ALL
GAD7 TOTAL SCORE: 2
5. BEING SO RESTLESS THAT IT IS HARD TO SIT STILL: NOT AT ALL
7. FEELING AFRAID AS IF SOMETHING AWFUL MIGHT HAPPEN: NOT AT ALL
1. FEELING NERVOUS, ANXIOUS, OR ON EDGE: SEVERAL DAYS

## 2019-03-21 ASSESSMENT — MIFFLIN-ST. JEOR: SCORE: 1268.11

## 2019-03-21 ASSESSMENT — PATIENT HEALTH QUESTIONNAIRE - PHQ9
5. POOR APPETITE OR OVEREATING: NOT AT ALL
SUM OF ALL RESPONSES TO PHQ QUESTIONS 1-9: 1

## 2019-03-21 NOTE — PATIENT INSTRUCTIONS
1. Start Singulair 10 mg bedtime  2. Agree that this is not a serious heart/lung condition but perfect storm of allergies, anxiety, medication side effect.  Benadryl as alternative?  Make your own rice sock - long clean sock, put uncooked rice in it, tie off end, microwave for 1-2 minutes.

## 2019-03-21 NOTE — PROGRESS NOTES
SUBJECTIVE:   Desi Granado is a 48 year old female who presents to clinic today for the following health issues:  Chief Complaint   Patient presents with     Er F/u     [facility] in [place] - 3/19/18 - Anxiety   Pt has signed a HODAN today from [facility] to release the records to us        Pt verbalize the note bellow:  While on a business trip, I had an allergy attack and took decongestants.  After rapid heart rate and dehydration went to local hospital.  They tested heart, thyroid and blood clots - all negative.  Said it was probably the decongestants, but that I should follow-up when home.      She reports chronic severe allergies to dust.  She had a severe episode 4 years ago, seen at [facility]. At that time had dehydration, hypokalemia.    For the last 1 week, she has felt severe allergy symptoms - nasal congestion//rhinorrhea, face pain/pressure.  She used Afrin, Tylenol, Maximum strength Mucinex - has pseudoephedrine.  She felt very dehydrated, dizzy, palpitations.  Heart rate was high on Apple watch.  She had acute coronary syndrome r/o along w/other blood tests.  Was given 2 L IVF    Follows with Dr. Koch.  He recommend Zyrtec 10 mg BID and Flonase 2 sprays BID each nostril. And Afrin 1 spray prior to flying.  She is getting allergy shots.   She used singulair in the remote past, unsure if helpful.  Also QNasal.    Anxiety Follow-Up    Status since last visit: Improved     Other associated symptoms:None    Complicating factors:   Significant life event: No   Current substance abuse: None  Depression symptoms: No  No flowsheet data found.    CA-7    Amount of exercise or physical activity: None    Problems taking medications regularly: No    Medication side effects: none    Diet: regular (no restrictions)    Denies CA more situational anxiety          Current Outpatient Medications   Medication Sig Dispense Refill     ACETAMINOPHEN PO Take 1,000 mg by mouth       calcium carbonate 600  "mg-vitamin D 400 units (CALTRATE) 600-400 MG-UNIT per tablet Take 1 tablet by mouth 2 times daily       Calcium Polycarbophil (FIBER) 625 MG tablet Take by mouth daily       Fluticasone Propionate (FLONASE ALLERGY RELIEF NA)        lisinopril (PRINIVIL/ZESTRIL) 40 MG tablet Take 1 tablet (40 mg) by mouth daily 90 tablet 3     order for DME Equipment being ordered: prosthetic bra with insert 1 each 1     tamoxifen (NOLVADEX) 20 MG tablet Take 1 tablet (20 mg) by mouth daily 90 tablet 1     vitamin D3 (CHOLECALCIFEROL) 2000 units tablet Take 1 tablet by mouth daily       ZYRTEC 10 MG OR TABS 1 TABLET DAILY 90 3       Reviewed and updated as needed this visit by clinical staff  Allergies       Reviewed and updated as needed this visit by Provider         ROS:  Constitutional, HEENT, cardiovascular, pulmonary, gi and gu systems are negative, except as otherwise noted.    OBJECTIVE:     /70   Pulse 95   Temp 97.9  F (36.6  C) (Tympanic)   Resp 12   Ht 1.64 m (5' 4.57\")   Wt 64.4 kg (142 lb)   LMP 03/14/2019   SpO2 100%   Breastfeeding? No   BMI 23.95 kg/m    Body mass index is 23.95 kg/m .  GENERAL APPEARANCE: healthy, alert and no distress  RESP: lungs clear to auscultation - no rales, rhonchi or wheezes  CV: regular rates and rhythm, normal S1 S2, no S3 or S4 and no murmur, click or rub      ASSESSMENT/PLAN:       ICD-10-CM    1. Allergic rhinitis due to other allergic trigger, unspecified seasonality J30.89 montelukast (SINGULAIR) 10 MG tablet   2. Anxiety attack F41.0      Reviewed the after visit summary she was given from the outside ER.  There are not lab values, but can see that she had troponin, d-dimer, TSH, chest x-ray, EKG.  She reports these were all told her as being normal.  Based on her description of the events, it sounds that she had a flareup of her allergies, and also a side effect/reaction to the decongestant with some underlying mild anxiety driving things as well.  Because she " appears to be sensitive to the decongestant, and her allergies are poorly controlled, recommend to start Singulair, along with the medication changes recommended by her allergist as above    Dr. Shirley Rios,   [facility]

## 2019-03-22 ASSESSMENT — ANXIETY QUESTIONNAIRES: GAD7 TOTAL SCORE: 2

## 2019-04-01 ENCOUNTER — MYC REFILL (OUTPATIENT)
Dept: FAMILY MEDICINE | Facility: CLINIC | Age: 49
End: 2019-04-01

## 2019-04-01 DIAGNOSIS — J30.89 ALLERGIC RHINITIS DUE TO OTHER ALLERGIC TRIGGER, UNSPECIFIED SEASONALITY: ICD-10-CM

## 2019-04-02 RX ORDER — MONTELUKAST SODIUM 10 MG/1
10 TABLET ORAL AT BEDTIME
Qty: 90 TABLET | Refills: 2 | Status: SHIPPED | OUTPATIENT
Start: 2019-04-02 | End: 2020-01-08

## 2019-04-02 NOTE — TELEPHONE ENCOUNTER
Original Rx sent to [facility] in [place].  Pharmacy transfer request to [place] [facility]. Changed to #90 day supply and refills adjusted.     Outpatient Medication Detail      Disp Refills Start End HAM   montelukast (SINGULAIR) 10 MG tablet 30 tablet 11 3/21/2019  --   Sig - Route: Take 1 tablet (10 mg) by mouth At Bedtime - Oral   Sent to pharmacy as: montelukast (SINGULAIR) 10 MG tablet   Class: E-Prescribe   Order: [id number]   E-Prescribing Status: Receipt confirmed by pharmacy (3/21/2019 11:30 AM CDT)   Printout Tracking     External Result Report   Pharmacy     [facility] - [place], [place] - [address] [place] [address]   Associated Diagnoses     Allergic rhinitis due to other allergic trigger, unspecified seasonality [J30.89]  - Primary         Roberta STRATTON RN

## 2019-04-03 ENCOUNTER — HOSPITAL ENCOUNTER (OUTPATIENT)
Dept: LAB | Facility: CLINIC | Age: 49
Discharge: HOME | End: 2019-04-03
Attending: INTERNAL MEDICINE | Admitting: INTERNAL MEDICINE
Payer: COMMERCIAL

## 2019-04-03 DIAGNOSIS — C50.912 INVASIVE DUCTAL CARCINOMA OF BREAST, FEMALE, LEFT (H): ICD-10-CM

## 2019-04-03 LAB
ALBUMIN SERPL-MCNC: 4 G/DL (ref 3.4–5)
ALP SERPL-CCNC: 61 U/L (ref 40–150)
ALT SERPL W P-5'-P-CCNC: 20 U/L (ref 0–50)
ANION GAP SERPL CALCULATED.3IONS-SCNC: 7 MMOL/L (ref 3–14)
AST SERPL W P-5'-P-CCNC: 8 U/L (ref 0–45)
BASOPHILS # BLD AUTO: 0 10E9/L (ref 0–0.2)
BASOPHILS NFR BLD AUTO: 0.6 %
BILIRUB SERPL-MCNC: 0.4 MG/DL (ref 0.2–1.3)
BUN SERPL-MCNC: 17 MG/DL (ref 7–30)
CALCIUM SERPL-MCNC: 9.5 MG/DL (ref 8.5–10.1)
CHLORIDE SERPL-SCNC: 105 MMOL/L (ref 94–109)
CO2 SERPL-SCNC: 26 MMOL/L (ref 20–32)
CREAT SERPL-MCNC: 0.87 MG/DL (ref 0.52–1.04)
DIFFERENTIAL METHOD BLD: NORMAL
EOSINOPHIL # BLD AUTO: 0.1 10E9/L (ref 0–0.7)
EOSINOPHIL NFR BLD AUTO: 1.5 %
ERYTHROCYTE [DISTWIDTH] IN BLOOD BY AUTOMATED COUNT: 12.4 % (ref 10–15)
GFR SERPL CREATININE-BSD FRML MDRD: 79 ML/MIN/{1.73_M2}
GLUCOSE SERPL-MCNC: 88 MG/DL (ref 70–99)
HCT VFR BLD AUTO: 40.7 % (ref 35–47)
HGB BLD-MCNC: 13.5 G/DL (ref 11.7–15.7)
IMM GRANULOCYTES # BLD: 0 10E9/L (ref 0–0.4)
IMM GRANULOCYTES NFR BLD: 0.6 %
LYMPHOCYTES # BLD AUTO: 1.6 10E9/L (ref 0.8–5.3)
LYMPHOCYTES NFR BLD AUTO: 24.1 %
MCH RBC QN AUTO: 29.8 PG (ref 26.5–33)
MCHC RBC AUTO-ENTMCNC: 33.2 G/DL (ref 31.5–36.5)
MCV RBC AUTO: 90 FL (ref 78–100)
MONOCYTES # BLD AUTO: 0.4 10E9/L (ref 0–1.3)
MONOCYTES NFR BLD AUTO: 6.4 %
NEUTROPHILS # BLD AUTO: 4.4 10E9/L (ref 1.6–8.3)
NEUTROPHILS NFR BLD AUTO: 66.8 %
NRBC # BLD AUTO: 0 10*3/UL
NRBC BLD AUTO-RTO: 0 /100
PLATELET # BLD AUTO: 295 10E9/L (ref 150–450)
POTASSIUM SERPL-SCNC: 4 MMOL/L (ref 3.4–5.3)
PROT SERPL-MCNC: 7.5 G/DL (ref 6.8–8.8)
RBC # BLD AUTO: 4.53 10E12/L (ref 3.8–5.2)
SODIUM SERPL-SCNC: 138 MMOL/L (ref 133–144)
WBC # BLD AUTO: 6.6 10E9/L (ref 4–11)

## 2019-04-03 PROCEDURE — 36415 COLL VENOUS BLD VENIPUNCTURE: CPT | Performed by: INTERNAL MEDICINE

## 2019-04-03 PROCEDURE — 80053 COMPREHEN METABOLIC PANEL: CPT | Performed by: INTERNAL MEDICINE

## 2019-04-03 PROCEDURE — 85025 COMPLETE CBC W/AUTO DIFF WBC: CPT | Performed by: INTERNAL MEDICINE

## 2019-04-05 NOTE — TELEPHONE ENCOUNTER
RECORDS STATUS - BREAST    RECORDS REQUESTED FROM: Internal   DATE REQUESTED: 4/5/2019    NOTES DETAILS STATUS   OFFICE NOTE from referring provider Internal 11/21/18   OFFICE NOTE from medical oncologist     OFFICE NOTE from surgeon     OFFICE NOTE from radiation oncologist     DISCHARGE SUMMARY from hospital     DISCHARGE REPORT from the ER     OPERATIVE REPORT     MEDICATION LIST     CLINICAL TRIAL TREATMENTS TO DATE     LABS     PATHOLOGY REPORTS  (Tissue diagnosis, Stage, ER/GA percentage positive and intensity of staining, HER2 IHC, FISH, and all biopsies from breast and any distant metastasis)                 Internal 11.08.18, Surgical Path, L Sentinal Lymph Node  10.17.18, HER 2 KARLA FISH  10.17.18, Surgical Path, L Breast Needle Biopsy   GENONOMIC TESTING     TYPE:   (Next Generation Sequencing, including Foundation One testing, and Oncotype score)     IMAGING (NEED IMAGES & REPORT)     CT SCANS     MRI     MAMMO     ULTRASOUND     PET     BONE SCAN     BRAIN MRI

## 2019-04-11 ENCOUNTER — ONCOLOGY VISIT (OUTPATIENT)
Dept: ONCOLOGY | Facility: CLINIC | Age: 49
End: 2019-04-11
Attending: INTERNAL MEDICINE
Payer: COMMERCIAL

## 2019-04-11 VITALS
SYSTOLIC BLOOD PRESSURE: 118 MMHG | HEIGHT: 65 IN | HEART RATE: 112 BPM | BODY MASS INDEX: 23.99 KG/M2 | RESPIRATION RATE: 16 BRPM | OXYGEN SATURATION: 99 % | TEMPERATURE: 99.3 F | WEIGHT: 144 LBS | DIASTOLIC BLOOD PRESSURE: 71 MMHG

## 2019-04-11 DIAGNOSIS — I10 ESSENTIAL HYPERTENSION, BENIGN: ICD-10-CM

## 2019-04-11 DIAGNOSIS — C50.912 INVASIVE DUCTAL CARCINOMA OF BREAST, FEMALE, LEFT (H): Primary | ICD-10-CM

## 2019-04-11 PROCEDURE — 99214 OFFICE O/P EST MOD 30 MIN: CPT | Performed by: INTERNAL MEDICINE

## 2019-04-11 PROCEDURE — G0463 HOSPITAL OUTPT CLINIC VISIT: HCPCS

## 2019-04-11 RX ORDER — TAMOXIFEN CITRATE 20 MG/1
20 TABLET ORAL DAILY
Qty: 90 TABLET | Refills: 1 | Status: SHIPPED | OUTPATIENT
Start: 2019-04-11 | End: 2019-07-12 | Stop reason: ALTCHOICE

## 2019-04-11 ASSESSMENT — MIFFLIN-ST. JEOR: SCORE: 1276.12

## 2019-04-11 ASSESSMENT — PAIN SCALES - GENERAL: PAINLEVEL: NO PAIN (0)

## 2019-04-11 NOTE — PROGRESS NOTES
Hematology/ Oncology Follow-up Visit:  Apr 11, 2019    Reason for Visit:   Chief Complaint   Patient presents with     Oncology Clinic Visit     6 week recheck Invasive ductal carcinoma of breast, female, left        Oncologic History:  Invasive ductal carcinoma of breast, female, left (H)    Desi Granado was found to have developing focal asymmetry in the left breast at approximately 3 o'clock position about 7-8 cm from the nipple on the routine mammogram done on October 30, 2018.  Subsequently she went on to have diagnostic mammogram and ultrasound.  Directed sonogram at the site of mammographic finding showed a 1.6 cm irregular solid lesion.  Subsequently the patient went on to have ultrasound-guided needle core biopsy done on October 17, 2018.  The biopsy came back positive for invasive ductal carcinoma with papillary features.  There is grade 2 out of 3.  Angiolymphatic invasion was not seen.  Ductal carcinoma in situ was not seen.  The tumor was estrogen receptor positive about 100% and progesterone receptor +100% strong nuclear staining.  HER-2/tamara came back negative for amplification. She had bilateral mastectomy.  Surgical pathology revealed simple mastectomy sample of the left showing solid papillary carcinoma 22 mm.  Grade 2.  Margins were clear of involvement.  3 sentinel lymph nodes shows no evidence of metastatic disease.  Lymphovascular invasion was not identified.  The tumor is pT2pN0.  Right breast shows simple mastectomy without any evidence of malignancy.  Oncotype came back 0.        Interval History:  Patient is here today for follow-up visit.  She has been feeling well without any recent complaints weight loss night sweats fever or chills.  She denies any nausea or vomiting or diarrhea.  She is currently on tamoxifen.  She has been tolerating tamoxifen well without significant side effects.    Review Of Systems:  Constitutional: Negative for fever, chills, and night sweats.  Skin:  "negative.  Eyes: negative.  Ears/Nose/Throat: negative.  Respiratory: No shortness of breath, dyspnea on exertion, cough, or hemoptysis.  Cardiovascular: negative.  Gastrointestinal: negative.  Genitourinary: negative.  Musculoskeletal: negative.  Neurologic: negative.  Psychiatric: negative.  Hematologic/Lymphatic/Immunologic: negative.  Endocrine: negative.    All other ROS negative unless mentioned in interval history.    Past medical, social, surgical, and family histories reviewed.    Allergies:  Allergies as of 04/11/2019 - Reviewed 04/11/2019   Allergen Reaction Noted     Aspirin Hives 10/17/2005     Aspirin Hives 08/03/2009     Dust mites  07/03/2009     Morphine Nausea 06/23/2008       Current Medications:  Current Outpatient Medications   Medication Sig Dispense Refill     ACETAMINOPHEN PO Take 1,000 mg by mouth       calcium carbonate 600 mg-vitamin D 400 units (CALTRATE) 600-400 MG-UNIT per tablet Take 1 tablet by mouth 2 times daily       Calcium Polycarbophil (FIBER) 625 MG tablet Take by mouth daily       Fluticasone Propionate (FLONASE ALLERGY RELIEF NA)        lisinopril (PRINIVIL/ZESTRIL) 40 MG tablet Take 1 tablet (40 mg) by mouth daily 90 tablet 3     montelukast (SINGULAIR) 10 MG tablet Take 1 tablet (10 mg) by mouth At Bedtime 90 tablet 2     tamoxifen (NOLVADEX) 20 MG tablet Take 1 tablet (20 mg) by mouth daily 90 tablet 1     vitamin D3 (CHOLECALCIFEROL) 2000 units tablet Take 1 tablet by mouth daily       ZYRTEC 10 MG OR TABS 1 TABLET DAILY 90 3     order for DME Equipment being ordered: prosthetic bra with insert 1 each 1        Physical Exam:  /71 (BP Location: Right arm, Patient Position: Sitting, Cuff Size: Adult Regular)   Pulse 112   Temp 99.3  F (37.4  C) (Tympanic)   Resp 16   Ht 1.638 m (5' 4.5\")   Wt 65.3 kg (144 lb)   LMP 03/14/2019   SpO2 99%   BMI 24.34 kg/m    Wt Readings from Last 12 Encounters:   04/11/19 65.3 kg (144 lb)   03/21/19 64.4 kg (142 lb)   02/22/19 " "63.5 kg (140 lb)   01/10/19 61.8 kg (136 lb 4.8 oz)   11/28/18 61.4 kg (135 lb 4.8 oz)   11/21/18 63.4 kg (139 lb 11.2 oz)   11/20/18 61.7 kg (136 lb)   11/08/18 65.9 kg (145 lb 4.5 oz)   11/06/18 64.4 kg (141 lb 14.4 oz)   10/26/18 65.6 kg (144 lb 11.2 oz)   10/24/18 66 kg (145 lb 8 oz)   09/14/18 68.6 kg (151 lb 3.2 oz)     ECOG performance status: 0  GENERAL APPEARANCE: Healthy, alert and in no acute distress.  HEENT: Sclerae anicteric. PERRLA. Oropharynx without ulcers, lesions, or thrush.  NECK: Supple. No asymmetry or masses.  LYMPHATICS: No palpable cervical, supraclavicular, axillary, or inguinal lymphadenopathy.  RESP: Lungs clear to auscultation bilaterally without rales, rhonchi or wheezes.\", BREAST: Scars of bilateral mastectomies \"CARDIOVASCULAR: Regular rate and rhythm. Normal S1, S2; no S3 or S4. No murmur, gallop, or rub.  ABDOMEN: Soft, nontender. Bowel sounds normal. No palpable organomegaly or masses.  MUSCULOSKELETAL: Extremities without gross deformities noted. No edema of bilateral lower extremities.  SKIN: No suspicious lesions or rashes.  NEURO: Alert and oriented x 3. Cranial nerves II-XII grossly intact.  PSYCHIATRIC: Mentation and affect appear normal.    Laboratory/Imaging Studies:  No visits with results within 2 Week(s) from this visit.   Latest known visit with results is:   Oncology Visit on 01/10/2019   Component Date Value Ref Range Status     Sodium 01/10/2019 136  133 - 144 mmol/L Final     Potassium 01/10/2019 4.2  3.4 - 5.3 mmol/L Final     Chloride 01/10/2019 102  94 - 109 mmol/L Final     Carbon Dioxide 01/10/2019 26  20 - 32 mmol/L Final     Anion Gap 01/10/2019 8  3 - 14 mmol/L Final     Glucose 01/10/2019 95  70 - 99 mg/dL Final     Urea Nitrogen 01/10/2019 22  7 - 30 mg/dL Final     Creatinine 01/10/2019 1.13* 0.52 - 1.04 mg/dL Final     GFR Estimate 01/10/2019 57* >60 mL/min/[1.73_m2] Final    Comment: Non  GFR Calc  Starting 12/18/2018, serum creatinine " based estimated GFR (eGFR) will be   calculated using the Chronic Kidney Disease Epidemiology Collaboration   (CKD-EPI) equation.       GFR Estimate If Black 01/10/2019 66  >60 mL/min/[1.73_m2] Final    Comment:  GFR Calc  Starting 12/18/2018, serum creatinine based estimated GFR (eGFR) will be   calculated using the Chronic Kidney Disease Epidemiology Collaboration   (CKD-EPI) equation.       Calcium 01/10/2019 9.2  8.5 - 10.1 mg/dL Final     Bilirubin Total 01/10/2019 0.4  0.2 - 1.3 mg/dL Final     Albumin 01/10/2019 4.0  3.4 - 5.0 g/dL Final     Protein Total 01/10/2019 7.4  6.8 - 8.8 g/dL Final     Alkaline Phosphatase 01/10/2019 69  40 - 150 U/L Final     ALT 01/10/2019 24  0 - 50 U/L Final     AST 01/10/2019 12  0 - 45 U/L Final     WBC 01/10/2019 6.6  4.0 - 11.0 10e9/L Final     RBC Count 01/10/2019 4.30  3.8 - 5.2 10e12/L Final     Hemoglobin 01/10/2019 13.2  11.7 - 15.7 g/dL Final     Hematocrit 01/10/2019 39.2  35.0 - 47.0 % Final     MCV 01/10/2019 91  78 - 100 fl Final     MCH 01/10/2019 30.7  26.5 - 33.0 pg Final     MCHC 01/10/2019 33.7  31.5 - 36.5 g/dL Final     RDW 01/10/2019 11.9  10.0 - 15.0 % Final     Platelet Count 01/10/2019 304  150 - 450 10e9/L Final     Diff Method 01/10/2019 Automated Method   Final     % Neutrophils 01/10/2019 66.2  % Final     % Lymphocytes 01/10/2019 23.7  % Final     % Monocytes 01/10/2019 7.6  % Final     % Eosinophils 01/10/2019 1.4  % Final     % Basophils 01/10/2019 0.6  % Final     % Immature Granulocytes 01/10/2019 0.5  % Final     Nucleated RBCs 01/10/2019 0  0 /100 Final     Absolute Neutrophil 01/10/2019 4.4  1.6 - 8.3 10e9/L Final     Absolute Lymphocytes 01/10/2019 1.6  0.8 - 5.3 10e9/L Final     Absolute Monocytes 01/10/2019 0.5  0.0 - 1.3 10e9/L Final     Absolute Eosinophils 01/10/2019 0.1  0.0 - 0.7 10e9/L Final     Absolute Basophils 01/10/2019 0.0  0.0 - 0.2 10e9/L Final     Abs Immature Granulocytes 01/10/2019 0.0  0 - 0.4 10e9/L Final      Absolute Nucleated RBC 01/10/2019 0.0   Final        Assessment and plan:  (C50.912) Invasive ductal carcinoma of breast, female, left (H)  (primary encounter diagnosis)  I reviewed to the patient most recent laboratory test.  There is no clinical evidence of breast cancer recurrence . we will continue endocrine therapy with tamoxifen 20 mg orally daily.  The patient will continue on calcium and vitamin D supplements.  We talked again about ovarian suppression.  I will see if Zoladex and Aromasin will be approved through her insurance.  I will see the patient again in 3 months or sooner if there are new developments or concerns.    (I10) Essential hypertension, benign  Patient currently on lisinopril 40 mg orally daily.    The patient is ready to learn, no apparent learning barriers were identified.  Diagnosis and treatment plans were explained to the patient. The patient expressed understanding of the content. The patient asked appropriate questions. The patient questions were answered to her satisfaction.    Chart documentation with Dragon Voice recognition Software. Although reviewed after completion, some words and grammatical errors may remain.

## 2019-04-11 NOTE — PROGRESS NOTES
"Oncology Rooming Note    April 11, 2019 1:54 PM   Desi Granado is a 48 year old female who presents for:    Chief Complaint   Patient presents with     Oncology Clinic Visit     6 week recheck Invasive ductal carcinoma of breast, female, left      Initial Vitals: /71 (BP Location: Right arm, Patient Position: Sitting, Cuff Size: Adult Regular)   Pulse 112   Temp 99.3  F (37.4  C) (Tympanic)   Resp 16   Ht 1.638 m (5' 4.5\")   Wt 65.3 kg (144 lb)   LMP 03/14/2019   SpO2 99%   BMI 24.34 kg/m   Estimated body mass index is 24.34 kg/m  as calculated from the following:    Height as of this encounter: 1.638 m (5' 4.5\").    Weight as of this encounter: 65.3 kg (144 lb). Body surface area is 1.72 meters squared.  No Pain (0) Comment: Data Unavailable   Patient's last menstrual period was 03/14/2019.  Allergies reviewed: Yes  Medications reviewed: Yes    Medications: Medication refills not needed today.  Pharmacy name entered into EPIC:      [facility] [place], [place] - [id number] AT [facility]    Clinical concerns: 6 week recheck Invasive ductal carcinoma of breast, female, left.       Sabra Boyd CMA              "

## 2019-04-11 NOTE — LETTER
"    4/11/2019         RE: Desi Granado  [address] [place] [address]        Dear Colleague,    Thank you for referring your patient, Desi Granado, to the [facility]. Please see a copy of my visit note below.    Oncology Rooming Note    April 11, 2019 1:54 PM   Desi Granado is a 48 year old female who presents for:    Chief Complaint   Patient presents with     Oncology Clinic Visit     6 week recheck Invasive ductal carcinoma of breast, female, left      Initial Vitals: /71 (BP Location: Right arm, Patient Position: Sitting, Cuff Size: Adult Regular)   Pulse 112   Temp 99.3  F (37.4  C) (Tympanic)   Resp 16   Ht 1.638 m (5' 4.5\")   Wt 65.3 kg (144 lb)   LMP 03/14/2019   SpO2 99%   BMI 24.34 kg/m    Estimated body mass index is 24.34 kg/m  as calculated from the following:    Height as of this encounter: 1.638 m (5' 4.5\").    Weight as of this encounter: 65.3 kg (144 lb). Body surface area is 1.72 meters squared.  No Pain (0) Comment: Data Unavailable   Patient's last menstrual period was 03/14/2019.  Allergies reviewed: Yes  Medications reviewed: Yes    Medications: Medication refills not needed today.  Pharmacy name entered into EPIC:      [facility] - [place], [place] - [address] AT [facility]    Clinical concerns: 6 week recheck Invasive ductal carcinoma of breast, female, left.       Sabra Boyd CMA                Hematology/ Oncology Follow-up Visit:  Apr 11, 2019    Reason for Visit:   Chief Complaint   Patient presents with     Oncology Clinic Visit     6 week recheck Invasive ductal carcinoma of breast, female, left        Oncologic History:  Invasive ductal carcinoma of breast, female, left (H)    Desi Granado was found to have developing focal asymmetry in the left breast at approximately 3 o'clock position about 7-8 cm from the nipple on the routine mammogram done on October 30, 2018.  " Subsequently she went on to have diagnostic mammogram and ultrasound.  Directed sonogram at the site of mammographic finding showed a 1.6 cm irregular solid lesion.  Subsequently the patient went on to have ultrasound-guided needle core biopsy done on October 17, 2018.  The biopsy came back positive for invasive ductal carcinoma with papillary features.  There is grade 2 out of 3.  Angiolymphatic invasion was not seen.  Ductal carcinoma in situ was not seen.  The tumor was estrogen receptor positive about 100% and progesterone receptor +100% strong nuclear staining.  HER-2/tamara came back negative for amplification. She had bilateral mastectomy.  Surgical pathology revealed simple mastectomy sample of the left showing solid papillary carcinoma 22 mm.  Grade 2.  Margins were clear of involvement.  3 sentinel lymph nodes shows no evidence of metastatic disease.  Lymphovascular invasion was not identified.  The tumor is pT2pN0.  Right breast shows simple mastectomy without any evidence of malignancy.  Oncotype came back 0.        Interval History:  Patient is here today for follow-up visit.  She has been feeling well without any recent complaints weight loss night sweats fever or chills.  She denies any nausea or vomiting or diarrhea.  She is currently on tamoxifen.  She has been tolerating tamoxifen well without significant side effects.    Review Of Systems:  Constitutional: Negative for fever, chills, and night sweats.  Skin: negative.  Eyes: negative.  Ears/Nose/Throat: negative.  Respiratory: No shortness of breath, dyspnea on exertion, cough, or hemoptysis.  Cardiovascular: negative.  Gastrointestinal: negative.  Genitourinary: negative.  Musculoskeletal: negative.  Neurologic: negative.  Psychiatric: negative.  Hematologic/Lymphatic/Immunologic: negative.  Endocrine: negative.    All other ROS negative unless mentioned in interval history.    Past medical, social, surgical, and family histories  "reviewed.    Allergies:  Allergies as of 04/11/2019 - Reviewed 04/11/2019   Allergen Reaction Noted     Aspirin Hives 10/17/2005     Aspirin Hives 08/03/2009     Dust mites  07/03/2009     Morphine Nausea 06/23/2008       Current Medications:  Current Outpatient Medications   Medication Sig Dispense Refill     ACETAMINOPHEN PO Take 1,000 mg by mouth       calcium carbonate 600 mg-vitamin D 400 units (CALTRATE) 600-400 MG-UNIT per tablet Take 1 tablet by mouth 2 times daily       Calcium Polycarbophil (FIBER) 625 MG tablet Take by mouth daily       Fluticasone Propionate (FLONASE ALLERGY RELIEF NA)        lisinopril (PRINIVIL/ZESTRIL) 40 MG tablet Take 1 tablet (40 mg) by mouth daily 90 tablet 3     montelukast (SINGULAIR) 10 MG tablet Take 1 tablet (10 mg) by mouth At Bedtime 90 tablet 2     tamoxifen (NOLVADEX) 20 MG tablet Take 1 tablet (20 mg) by mouth daily 90 tablet 1     vitamin D3 (CHOLECALCIFEROL) 2000 units tablet Take 1 tablet by mouth daily       ZYRTEC 10 MG OR TABS 1 TABLET DAILY 90 3     order for DME Equipment being ordered: prosthetic bra with insert 1 each 1        Physical Exam:  /71 (BP Location: Right arm, Patient Position: Sitting, Cuff Size: Adult Regular)   Pulse 112   Temp 99.3  F (37.4  C) (Tympanic)   Resp 16   Ht 1.638 m (5' 4.5\")   Wt 65.3 kg (144 lb)   LMP 03/14/2019   SpO2 99%   BMI 24.34 kg/m     Wt Readings from Last 12 Encounters:   04/11/19 65.3 kg (144 lb)   03/21/19 64.4 kg (142 lb)   02/22/19 63.5 kg (140 lb)   01/10/19 61.8 kg (136 lb 4.8 oz)   11/28/18 61.4 kg (135 lb 4.8 oz)   11/21/18 63.4 kg (139 lb 11.2 oz)   11/20/18 61.7 kg (136 lb)   11/08/18 65.9 kg (145 lb 4.5 oz)   11/06/18 64.4 kg (141 lb 14.4 oz)   10/26/18 65.6 kg (144 lb 11.2 oz)   10/24/18 66 kg (145 lb 8 oz)   09/14/18 68.6 kg (151 lb 3.2 oz)     ECOG performance status: 0  GENERAL APPEARANCE: Healthy, alert and in no acute distress.  HEENT: Sclerae anicteric. PERRLA. Oropharynx without ulcers, " "lesions, or thrush.  NECK: Supple. No asymmetry or masses.  LYMPHATICS: No palpable cervical, supraclavicular, axillary, or inguinal lymphadenopathy.  RESP: Lungs clear to auscultation bilaterally without rales, rhonchi or wheezes.\", BREAST: Scars of bilateral mastectomies \"CARDIOVASCULAR: Regular rate and rhythm. Normal S1, S2; no S3 or S4. No murmur, gallop, or rub.  ABDOMEN: Soft, nontender. Bowel sounds normal. No palpable organomegaly or masses.  MUSCULOSKELETAL: Extremities without gross deformities noted. No edema of bilateral lower extremities.  SKIN: No suspicious lesions or rashes.  NEURO: Alert and oriented x 3. Cranial nerves II-XII grossly intact.  PSYCHIATRIC: Mentation and affect appear normal.    Laboratory/Imaging Studies:  No visits with results within 2 Week(s) from this visit.   Latest known visit with results is:   Oncology Visit on 01/10/2019   Component Date Value Ref Range Status     Sodium 01/10/2019 136  133 - 144 mmol/L Final     Potassium 01/10/2019 4.2  3.4 - 5.3 mmol/L Final     Chloride 01/10/2019 102  94 - 109 mmol/L Final     Carbon Dioxide 01/10/2019 26  20 - 32 mmol/L Final     Anion Gap 01/10/2019 8  3 - 14 mmol/L Final     Glucose 01/10/2019 95  70 - 99 mg/dL Final     Urea Nitrogen 01/10/2019 22  7 - 30 mg/dL Final     Creatinine 01/10/2019 1.13* 0.52 - 1.04 mg/dL Final     GFR Estimate 01/10/2019 57* >60 mL/min/[1.73_m2] Final    Comment: Non  GFR Calc  Starting 12/18/2018, serum creatinine based estimated GFR (eGFR) will be   calculated using the Chronic Kidney Disease Epidemiology Collaboration   (CKD-EPI) equation.       GFR Estimate If Black 01/10/2019 66  >60 mL/min/[1.73_m2] Final    Comment:  GFR Calc  Starting 12/18/2018, serum creatinine based estimated GFR (eGFR) will be   calculated using the Chronic Kidney Disease Epidemiology Collaboration   (CKD-EPI) equation.       Calcium 01/10/2019 9.2  8.5 - 10.1 mg/dL Final     Bilirubin Total " 01/10/2019 0.4  0.2 - 1.3 mg/dL Final     Albumin 01/10/2019 4.0  3.4 - 5.0 g/dL Final     Protein Total 01/10/2019 7.4  6.8 - 8.8 g/dL Final     Alkaline Phosphatase 01/10/2019 69  40 - 150 U/L Final     ALT 01/10/2019 24  0 - 50 U/L Final     AST 01/10/2019 12  0 - 45 U/L Final     WBC 01/10/2019 6.6  4.0 - 11.0 10e9/L Final     RBC Count 01/10/2019 4.30  3.8 - 5.2 10e12/L Final     Hemoglobin 01/10/2019 13.2  11.7 - 15.7 g/dL Final     Hematocrit 01/10/2019 39.2  35.0 - 47.0 % Final     MCV 01/10/2019 91  78 - 100 fl Final     MCH 01/10/2019 30.7  26.5 - 33.0 pg Final     MCHC 01/10/2019 33.7  31.5 - 36.5 g/dL Final     RDW 01/10/2019 11.9  10.0 - 15.0 % Final     Platelet Count 01/10/2019 304  150 - 450 10e9/L Final     Diff Method 01/10/2019 Automated Method   Final     % Neutrophils 01/10/2019 66.2  % Final     % Lymphocytes 01/10/2019 23.7  % Final     % Monocytes 01/10/2019 7.6  % Final     % Eosinophils 01/10/2019 1.4  % Final     % Basophils 01/10/2019 0.6  % Final     % Immature Granulocytes 01/10/2019 0.5  % Final     Nucleated RBCs 01/10/2019 0  0 /100 Final     Absolute Neutrophil 01/10/2019 4.4  1.6 - 8.3 10e9/L Final     Absolute Lymphocytes 01/10/2019 1.6  0.8 - 5.3 10e9/L Final     Absolute Monocytes 01/10/2019 0.5  0.0 - 1.3 10e9/L Final     Absolute Eosinophils 01/10/2019 0.1  0.0 - 0.7 10e9/L Final     Absolute Basophils 01/10/2019 0.0  0.0 - 0.2 10e9/L Final     Abs Immature Granulocytes 01/10/2019 0.0  0 - 0.4 10e9/L Final     Absolute Nucleated RBC 01/10/2019 0.0   Final        Assessment and plan:  (C50.912) Invasive ductal carcinoma of breast, female, left (H)  (primary encounter diagnosis)  I reviewed to the patient most recent laboratory test.  There is no clinical evidence of breast cancer recurrence . we will continue endocrine therapy with tamoxifen 20 mg orally daily.  The patient will continue on calcium and vitamin D supplements.  We talked again about ovarian suppression.  I will  see if Zoladex and Aromasin will be approved through her insurance.  I will see the patient again in 3 months or sooner if there are new developments or concerns.    (I10) Essential hypertension, benign  Patient currently on lisinopril 40 mg orally daily.    The patient is ready to learn, no apparent learning barriers were identified.  Diagnosis and treatment plans were explained to the patient. The patient expressed understanding of the content. The patient asked appropriate questions. The patient questions were answered to her satisfaction.    Chart documentation with Dragon Voice recognition Software. Although reviewed after completion, some words and grammatical errors may remain.    Again, thank you for allowing me to participate in the care of your patient.        Sincerely,        Stephen Peña MD

## 2019-04-18 NOTE — PROGRESS NOTES
Currently on tamoxifen. Needing approval through insurance company to be on zoladex and aromasin.   Insurance number is [phone]. Will need to appeal if this is denied.  Awilda Loyd RN

## 2019-05-07 ENCOUNTER — OFFICE VISIT (OUTPATIENT)
Dept: SURGERY | Facility: CLINIC | Age: 49
End: 2019-05-07
Payer: COMMERCIAL

## 2019-05-07 VITALS
HEIGHT: 65 IN | BODY MASS INDEX: 24.28 KG/M2 | WEIGHT: 145.7 LBS | DIASTOLIC BLOOD PRESSURE: 60 MMHG | SYSTOLIC BLOOD PRESSURE: 100 MMHG

## 2019-05-07 DIAGNOSIS — C50.912 INVASIVE DUCTAL CARCINOMA OF BREAST, FEMALE, LEFT (H): Primary | ICD-10-CM

## 2019-05-07 PROCEDURE — 99214 OFFICE O/P EST MOD 30 MIN: CPT | Performed by: SURGERY

## 2019-05-07 ASSESSMENT — MIFFLIN-ST. JEOR: SCORE: 1283.83

## 2019-05-07 ASSESSMENT — PAIN SCALES - GENERAL: PAINLEVEL: NO PAIN (0)

## 2019-05-07 NOTE — PROGRESS NOTES
BREAST CANCER FOLLOW UP  CHIEF COMPLAINT  Chief Complaint   Patient presents with     RECHECK     B/L simple mastectomy, DOS 11/08/18     HPI  Desi Granado is a 48 year old female who presents with   Chief Complaint   Patient presents with     RECHECK     B/L simple mastectomy, DOS 11/08/18     The patient presents for her breast cancer followup appointment. Overall, she is feeling good. She denies any significant fatigue, fevers, chills, or changes in appetite. She has not suffered any significant weight loss.  Tolerating tamoxifen with no issues.     She has not noted any areas of skin changes or any masses in the breast or chest wall that she is concerned about.  She denies skin dimpling, puckering, erythema, or nipple discharge. She has not noted any palpable axillary lymphadenopathy.  Starting traveling more.  Having heavy periods since she's been off the OCP and tamoxifen; med onc wanting to place her on AI instead of tamoxifen.   Review of Systems  Constitutional: Denies fever or chills   Eyes: Denies change in visual acuity   HENT: Denies nasal congestion or sore throat   Respiratory: Denies cough or shortness of breath   Cardiovascular: Denies chest pain or edema   GI: Denies abdominal pain, nausea, vomiting, bloody stools or diarrhea   : Denies dysuria   Musculoskeletal: Denies back pain or joint pain   Integument: Denies rash   Neurologic: Denies headache, focal weakness or sensory changes   Endocrine: Denies polyuria or polydipsia   Lymphatic: Denies swollen glands   Psychiatric: Denies depression or anxiety     PAST MEDICAL HISTORY  Past Medical History:   Diagnosis Date     Allergic rhinitis, cause unspecified      Hypertension 8 years?     Injury, other and unspecified, knee, leg, ankle, and foot 8th grade    left ankle injury     Invasive ductal carcinoma of breast, female, left (H) 10/26/2018     FAMILY HISTORY  Family History   Problem Relation Age of Onset     Hypertension Mother       Allergies Mother      Thyroid Disease Mother         Taking thyroid medication     Obesity Mother      Cerebrovascular Disease Mother         TIA Feb 2017     Hypertension Father      Thyroid Disease Father         two parathyroids removed     Cancer Maternal Grandmother         Bone CA     Allergies Maternal Grandmother      Circulatory Maternal Grandmother      Cerebrovascular Disease Maternal Grandmother      Other Cancer Maternal Grandmother         multiple myeloma     C.A.D. Maternal Grandfather      Respiratory Maternal Grandfather         asthma     Allergies Maternal Grandfather      Cerebrovascular Disease Maternal Grandfather      Alzheimer Disease Paternal Grandfather      Neurologic Disorder Paternal Grandfather         Parkinsons     Arthritis Paternal Grandmother      Respiratory Other         Emphysema     Diabetes Other      Asthma Other         generic emphysema     Genetic Disorder Other         genetic emphysema     Diabetes Other      Coronary Artery Disease Other      Other Cancer Other         multiple myeloma     Colon Cancer Other      Cancer - colorectal No family hx of      Breast Cancer No family hx of      Prostate Cancer No family hx of      SOCIAL HISTORY  Social History     Socioeconomic History     Marital status: Single     Spouse name: None     Number of children: 0     Years of education: 18     Highest education level: None   Occupational History     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     Employer: [employer]   Social Needs     Financial resource strain: None     Food insecurity:     Worry: None     Inability: None     Transportation needs:     Medical: None     Non-medical: None   Tobacco Use     Smoking status: Never Smoker     Smokeless tobacco: Never Used   Substance and Sexual Activity     Alcohol use: No     Alcohol/week: 0.0 oz     Comment: very rare     Drug use: No     Sexual activity: Not Currently     Partners: Male     Birth control/protection: Pill   Lifestyle      Physical activity:     Days per week: None     Minutes per session: None     Stress: None   Relationships     Social connections:     Talks on phone: None     Gets together: None     Attends Gnosticism service: None     Active member of club or organization: None     Attends meetings of clubs or organizations: None     Relationship status: None     Intimate partner violence:     Fear of current or ex partner: None     Emotionally abused: None     Physically abused: None     Forced sexual activity: None   Other Topics Concern     Parent/sibling w/ CABG, MI or angioplasty before 65F 55M? No   Social History Narrative     None     SURGICAL HISTORY  Past Surgical History:   Procedure Laterality Date     BIOPSY       BREAST LUMPECTOMY, RT/LT  6/23/10    Breast Lumpectomy RT for likely adenomatous lumps     COLONOSCOPY N/A 8/16/2018    Procedure: COLONOSCOPY;  colonoscopy;  Surgeon: Vijay Almanza MD;  Location: [facility]     ESOPHAGOSCOPY, GASTROSCOPY, DUODENOSCOPY (EGD), COMBINED N/A 10/1/2015    Procedure: COMBINED ESOPHAGOSCOPY, GASTROSCOPY, DUODENOSCOPY (EGD);  Surgeon: Vijay Almanza MD;  Location: [facility]     MASTECTOMY SIMPLE BILATERAL, SENTINEL NODE BILATERAL, COMBINED Bilateral 11/8/2018    Procedure: BILATERAL SIMPLE MASTECTOMIES WITH LEFT SENTINEL LYMPH NODE BIOPSY ;  Surgeon: Nabila Hart MD;  Location: [facility]     SOFT TISSUE SURGERY      Ankle surgery 20+ years ago     SURGICAL HISTORY OF -   1989    arthroscopy of Left Ankle     SURGICAL HISTORY OF -   1990    arthroscopy of Left Ankle     SURGICAL HISTORY OF -   1993    4 wisdom teeth extracted     CURRENT MEDICATIONS    Current Outpatient Medications:      calcium carbonate 600 mg-vitamin D 400 units (CALTRATE) 600-400 MG-UNIT per tablet, Take 1 tablet by mouth 2 times daily, Disp: , Rfl:      Calcium Polycarbophil (FIBER) 625 MG tablet, Take by mouth daily, Disp: , Rfl:      Fluticasone Propionate (FLONASE ALLERGY RELIEF NA), , Disp: , Rfl:      lisinopril  "(PRINIVIL/ZESTRIL) 40 MG tablet, Take 1 tablet (40 mg) by mouth daily, Disp: 90 tablet, Rfl: 3     montelukast (SINGULAIR) 10 MG tablet, Take 1 tablet (10 mg) by mouth At Bedtime, Disp: 90 tablet, Rfl: 2     tamoxifen (NOLVADEX) 20 MG tablet, Take 1 tablet (20 mg) by mouth daily, Disp: 90 tablet, Rfl: 1     vitamin D3 (CHOLECALCIFEROL) 2000 units tablet, Take 1 tablet by mouth daily, Disp: , Rfl:      ZYRTEC 10 MG OR TABS, 1 TABLET DAILY, Disp: 90, Rfl: 3     ACETAMINOPHEN PO, Take 1,000 mg by mouth, Disp: , Rfl:   ALLERGIES  Allergies   Allergen Reactions     Aspirin Hives     Dust Mites      Morphine Nausea     PHYSICAL EXAM  VITAL SIGNS:  height is 1.638 m (5' 4.5\") and weight is 66.1 kg (145 lb 11.2 oz).   Constitutional: Well developed, Well nourished, No acute distress, Non-toxic appearance.   HENT: Normocephalic, Atraumatic, Bilateral external ears normal, Oropharynx moist, No oral exudates, Nose normal.   Eyes: EOMI, Conjunctiva normal, No discharge.   Neck: Normal range of motion, No tenderness, Supple, No stridor.   Lymphatic: No lymphadenopathy noted.   Cardiovascular: Normal heart rate, Normal rhythm, No murmurs, No rubs, No gallops.   Breast Exam:   RIGHT: No areas of skin dimpling or puckering. No erythema. No skin scaling or changes. No focal areas of significant tenderness.  No palpable axillary lymphadenopathy.  LEFT: No areas of skin dimpling or puckering. No erythema. No skin scaling or changes. No focal areas of significant tenderness.   No palpable axillary lymphadenopathy.  Thorax & Lungs: Normal breath sounds, No respiratory distress, No wheezing, No chest tenderness.   Abdomen: Bowel sounds normal, Soft, No masses, No pulsatile masses.   Skin: Warm, Dry, No erythema, No rash.   Back: No tenderness, No CVA tenderness.   Extremities: Intact distal pulses, No edema, No tenderness, No cyanosis, No clubbing.   Musculoskeletal: Good range of motion in all major joints. No tenderness to palpation or " major deformities noted.   Neurologic: Alert & oriented x 3, Normal motor function, Normal sensory function, No focal deficits noted.   Psychiatric: Affect normal, Judgment normal, Mood normal.       Imaging Studies:   No results found for this or any previous visit (from the past 744 hour(s)).    FINAL IMPRESSION AND PLAN  (C50.912) Invasive ductal carcinoma of breast, female, left (H)  (primary encounter diagnosis)  Comment: s/p bilateral mastectomy  The patient is 6 months out from her breast cancer surgery. There is no evidence of recurrent disease on my exam today. She is continuing to recover well from her breast cancer treatment. No signs of lymphedema  She will continue periodic self breast or chest wall exam. She is encouraged to followup with me for any changes on self-exam, or for any concerns or questions. She will followup with her primary care provider for routine care, and continue followup with her oncologist as scheduled.  My recommendations were discussed with the patient. She will see me in 6 months for another exam. Patient will be scheduled for any indicated mammography or additional imaging.     Sabino-Julienne Hart, DO  2/22/2019

## 2019-05-07 NOTE — LETTER
5/7/2019         RE: Desi Granado  [address] [place] [address]        Dear Colleague,    Thank you for referring your patient, Desi Granado, to the [facility]. Please see a copy of my visit note below.    BREAST CANCER FOLLOW UP  CHIEF COMPLAINT  Chief Complaint   Patient presents with     RECHECK     B/L simple mastectomy, DOS 11/08/18     HPI  Desi Granado is a 48 year old female who presents with   Chief Complaint   Patient presents with     RECHECK     B/L simple mastectomy, DOS 11/08/18     The patient presents for her breast cancer followup appointment. Overall, she is feeling good. She denies any significant fatigue, fevers, chills, or changes in appetite. She has not suffered any significant weight loss.  Tolerating tamoxifen with no issues.     She has not noted any areas of skin changes or any masses in the breast or chest wall that she is concerned about.  She denies skin dimpling, puckering, erythema, or nipple discharge. She has not noted any palpable axillary lymphadenopathy.  Starting traveling more.  Having heavy periods since she's been off the OCP and tamoxifen; med onc wanting to place her on AI instead of tamoxifen.   Review of Systems  Constitutional: Denies fever or chills   Eyes: Denies change in visual acuity   HENT: Denies nasal congestion or sore throat   Respiratory: Denies cough or shortness of breath   Cardiovascular: Denies chest pain or edema   GI: Denies abdominal pain, nausea, vomiting, bloody stools or diarrhea   : Denies dysuria   Musculoskeletal: Denies back pain or joint pain   Integument: Denies rash   Neurologic: Denies headache, focal weakness or sensory changes   Endocrine: Denies polyuria or polydipsia   Lymphatic: Denies swollen glands   Psychiatric: Denies depression or anxiety     PAST MEDICAL HISTORY  Past Medical History:   Diagnosis Date     Allergic rhinitis, cause unspecified      Hypertension 8 years?      Injury, other and unspecified, knee, leg, ankle, and foot 8th grade    left ankle injury     Invasive ductal carcinoma of breast, female, left (H) 10/26/2018     FAMILY HISTORY  Family History   Problem Relation Age of Onset     Hypertension Mother      Allergies Mother      Thyroid Disease Mother         Taking thyroid medication     Obesity Mother      Cerebrovascular Disease Mother         TIA Feb 2017     Hypertension Father      Thyroid Disease Father         two parathyroids removed     Cancer Maternal Grandmother         Bone CA     Allergies Maternal Grandmother      Circulatory Maternal Grandmother      Cerebrovascular Disease Maternal Grandmother      Other Cancer Maternal Grandmother         multiple myeloma     C.A.D. Maternal Grandfather      Respiratory Maternal Grandfather         asthma     Allergies Maternal Grandfather      Cerebrovascular Disease Maternal Grandfather      Alzheimer Disease Paternal Grandfather      Neurologic Disorder Paternal Grandfather         Parkinsons     Arthritis Paternal Grandmother      Respiratory Other         Emphysema     Diabetes Other      Asthma Other         generic emphysema     Genetic Disorder Other         genetic emphysema     Diabetes Other      Coronary Artery Disease Other      Other Cancer Other         multiple myeloma     Colon Cancer Other      Cancer - colorectal No family hx of      Breast Cancer No family hx of      Prostate Cancer No family hx of      SOCIAL HISTORY  Social History     Socioeconomic History     Marital status: Single     Spouse name: None     Number of children: 0     Years of education: 18     Highest education level: None   Occupational History     Occupation: Senior [employer]     Employer: [employer]   Social Needs     Financial resource strain: None     Food insecurity:     Worry: None     Inability: None     Transportation needs:     Medical: None     Non-medical: None   Tobacco Use     Smoking status: Never Smoker      Smokeless tobacco: Never Used   Substance and Sexual Activity     Alcohol use: No     Alcohol/week: 0.0 oz     Comment: very rare     Drug use: No     Sexual activity: Not Currently     Partners: Male     Birth control/protection: Pill   Lifestyle     Physical activity:     Days per week: None     Minutes per session: None     Stress: None   Relationships     Social connections:     Talks on phone: None     Gets together: None     Attends Spiritism service: None     Active member of club or organization: None     Attends meetings of clubs or organizations: None     Relationship status: None     Intimate partner violence:     Fear of current or ex partner: None     Emotionally abused: None     Physically abused: None     Forced sexual activity: None   Other Topics Concern     Parent/sibling w/ CABG, MI or angioplasty before 65F 55M? No   Social History Narrative     None     SURGICAL HISTORY  Past Surgical History:   Procedure Laterality Date     BIOPSY       BREAST LUMPECTOMY, RT/LT  6/23/10    Breast Lumpectomy RT for likely adenomatous lumps     COLONOSCOPY N/A 8/16/2018    Procedure: COLONOSCOPY;  colonoscopy;  Surgeon: Vijay Almanza MD;  Location: [facility]     ESOPHAGOSCOPY, GASTROSCOPY, DUODENOSCOPY (EGD), COMBINED N/A 10/1/2015    Procedure: COMBINED ESOPHAGOSCOPY, GASTROSCOPY, DUODENOSCOPY (EGD);  Surgeon: Vijay Almanza MD;  Location: [facility]     MASTECTOMY SIMPLE BILATERAL, SENTINEL NODE BILATERAL, COMBINED Bilateral 11/8/2018    Procedure: BILATERAL SIMPLE MASTECTOMIES WITH LEFT SENTINEL LYMPH NODE BIOPSY ;  Surgeon: Nabila Hart MD;  [facility]     SOFT TISSUE SURGERY      Ankle surgery 20+ years ago     SURGICAL HISTORY OF -   1989    arthroscopy of Left Ankle     SURGICAL HISTORY OF -   1990    arthroscopy of Left Ankle     SURGICAL HISTORY OF -   1993    4 wisdom teeth extracted     CURRENT MEDICATIONS    Current Outpatient Medications:      calcium carbonate 600 mg-vitamin D 400 units (CALTRATE)  "600-400 MG-UNIT per tablet, Take 1 tablet by mouth 2 times daily, Disp: , Rfl:      Calcium Polycarbophil (FIBER) 625 MG tablet, Take by mouth daily, Disp: , Rfl:      Fluticasone Propionate (FLONASE ALLERGY RELIEF NA), , Disp: , Rfl:      lisinopril (PRINIVIL/ZESTRIL) 40 MG tablet, Take 1 tablet (40 mg) by mouth daily, Disp: 90 tablet, Rfl: 3     montelukast (SINGULAIR) 10 MG tablet, Take 1 tablet (10 mg) by mouth At Bedtime, Disp: 90 tablet, Rfl: 2     tamoxifen (NOLVADEX) 20 MG tablet, Take 1 tablet (20 mg) by mouth daily, Disp: 90 tablet, Rfl: 1     vitamin D3 (CHOLECALCIFEROL) 2000 units tablet, Take 1 tablet by mouth daily, Disp: , Rfl:      ZYRTEC 10 MG OR TABS, 1 TABLET DAILY, Disp: 90, Rfl: 3     ACETAMINOPHEN PO, Take 1,000 mg by mouth, Disp: , Rfl:   ALLERGIES  Allergies   Allergen Reactions     Aspirin Hives     Dust Mites      Morphine Nausea     PHYSICAL EXAM  VITAL SIGNS:  height is 1.638 m (5' 4.5\") and weight is 66.1 kg (145 lb 11.2 oz).   Constitutional: Well developed, Well nourished, No acute distress, Non-toxic appearance.   HENT: Normocephalic, Atraumatic, Bilateral external ears normal, Oropharynx moist, No oral exudates, Nose normal.   Eyes: EOMI, Conjunctiva normal, No discharge.   Neck: Normal range of motion, No tenderness, Supple, No stridor.   Lymphatic: No lymphadenopathy noted.   Cardiovascular: Normal heart rate, Normal rhythm, No murmurs, No rubs, No gallops.   Breast Exam:   RIGHT: No areas of skin dimpling or puckering. No erythema. No skin scaling or changes. No focal areas of significant tenderness.  No palpable axillary lymphadenopathy.  LEFT: No areas of skin dimpling or puckering. No erythema. No skin scaling or changes. No focal areas of significant tenderness.   No palpable axillary lymphadenopathy.  Thorax & Lungs: Normal breath sounds, No respiratory distress, No wheezing, No chest tenderness.   Abdomen: Bowel sounds normal, Soft, No masses, No pulsatile masses.   Skin: " Warm, Dry, No erythema, No rash.   Back: No tenderness, No CVA tenderness.   Extremities: Intact distal pulses, No edema, No tenderness, No cyanosis, No clubbing.   Musculoskeletal: Good range of motion in all major joints. No tenderness to palpation or major deformities noted.   Neurologic: Alert & oriented x 3, Normal motor function, Normal sensory function, No focal deficits noted.   Psychiatric: Affect normal, Judgment normal, Mood normal.       Imaging Studies:   No results found for this or any previous visit (from the past 744 hour(s)).    FINAL IMPRESSION AND PLAN  (C50.912) Invasive ductal carcinoma of breast, female, left (H)  (primary encounter diagnosis)  Comment: s/p bilateral mastectomy  The patient is 6 months out from her breast cancer surgery. There is no evidence of recurrent disease on my exam today. She is continuing to recover well from her breast cancer treatment. No signs of lymphedema  She will continue periodic self breast or chest wall exam. She is encouraged to followup with me for any changes on self-exam, or for any concerns or questions. She will followup with her primary care provider for routine care, and continue followup with her oncologist as scheduled.  My recommendations were discussed with the patient. She will see me in 6 months for another exam. Patient will be scheduled for any indicated mammography or additional imaging.     [facility],   2/22/2019        Again, thank you for allowing me to participate in the care of your patient.        Sincerely,        [facility]

## 2019-05-10 ENCOUNTER — ONCOLOGY VISIT (OUTPATIENT)
Dept: ONCOLOGY | Facility: CLINIC | Age: 49
End: 2019-05-10
Payer: COMMERCIAL

## 2019-05-10 ENCOUNTER — PRE VISIT (OUTPATIENT)
Dept: ONCOLOGY | Facility: CLINIC | Age: 49
End: 2019-05-10

## 2019-05-10 DIAGNOSIS — Z80.0 FAMILY HISTORY OF COLON CANCER: ICD-10-CM

## 2019-05-10 DIAGNOSIS — Z80.42 FAMILY HISTORY OF PROSTATE CANCER: ICD-10-CM

## 2019-05-10 DIAGNOSIS — Z80.3 FAMILY HISTORY OF MALIGNANT NEOPLASM OF BREAST: ICD-10-CM

## 2019-05-10 DIAGNOSIS — C50.912 INVASIVE DUCTAL CARCINOMA OF BREAST, FEMALE, LEFT (H): Primary | ICD-10-CM

## 2019-05-10 LAB
LOCATION PERFORMED: NORMAL
NORMAL RANGE FOR SEND OUTS MISC TEST: NORMAL
RESULT: NORMAL
SEND OUTS MISC TEST CODE: 9510
SEND OUTS MISC TEST SPECIMEN: NORMAL
TEST NAME: NORMAL

## 2019-05-10 PROCEDURE — 96040 ZZC GENETIC COUNSELING, EACH 30 MIN: CPT | Performed by: GENETIC COUNSELOR, MS

## 2019-05-10 NOTE — PATIENT INSTRUCTIONS
Assessing Cancer Risk  Only about 5-10% of cancers are thought to be due to an inherited cancer susceptibility gene.    These families often have:    Several people with the same or related types of cancer    Cancers diagnosed at a young age (before age 50)    Individuals with more than one primary cancer    Multiple generations of the family affected with cancer    Some people may be candidates for genetic testing of more than one gene.  For these families, genetic testing using a cancer panel may be offered.  These panels will test different genes known to increase the risk for breast, ovarian, uterine, and/or other cancers. All of the genes discussed below have published clinical management guidelines for individuals who are found to carry a mutation. The purpose of this handout is to serve as a brief summary of the genes analyzed by the panels used to inquire about hereditary breast and gynecologic cancer:  JARET, BRCA1, BRCA2, BRIP1, CDH1, CHEK2, MLH1, MSH2, MSH6, PMS2, EPCAM, PTEN, PALB2, RAD51C, RAD51D, and TP53.  ______________________________________________________________________________  Hereditary Breast and Ovarian Cancer Syndrome   (BRCA1 and BRCA2)  A single mutation in one of the copies of BRCA1 or BRCA2 increases the risk for breast and ovarian cancer, among others.  The risk for pancreatic cancer and melanoma may also be slightly increased in some families.  The chart below shows the chance that someone with a BRCA mutation would develop cancer in his or her lifetime1,2,3,4.        A person s ethnic background is also important to consider, as individuals of Ashkenazi Jehovah's witness ancestry have a higher chance of having a BRCA gene mutation.  There are three BRCA mutations that occur more frequently in this population.    Peter Syndrome   (MLH1, MSH2, MSH6, PMS2, and EPCAM)  Currently five genes are known to cause Peter Syndrome: MLH1, MSH2, MSH6, PMS2, and EPCAM.  A single mutation in one of the  Peter Syndrome genes increases the risk for colon, endometrial, ovarian, and stomach cancers.  Other cancers that occur less commonly in Peter Syndrome include urinary tract, skin, and brain cancers.  The chart below shows the chance that a person with Peter syndrome would develop cancer in his or her lifetime5.      *Cancer risk varies depending on Peter syndrome gene found    Cowden Syndrome   (PTEN)  Cowden syndrome is a hereditary condition that increases the risk for breast, thyroid, endometrial, colon, and kidney cancer.  Cowden syndrome is caused by a mutation in the PTEN gene.  A single mutation in one of the copies of PTEN causes Cowden syndrome and increases cancer risk.  The chart below shows the chance that someone with a PTEN mutation would develop cancer in their lifetime6,7.  Other benign features seen in some individuals with Cowden syndrome include benign skin lesions (facial papules, keratoses, lipomas), learning disability, autism, thyroid nodules, colon polyps, and larger head size.      *One recent study found breast cancer risk to be increased to 85%    Li-Fraumeni Syndrome   (TP53)  Li-Fraumeni Syndrome (LFS) is a cancer predisposition syndrome caused by a mutation in the TP53 gene. A single mutation in one of the copies of TP53 increases the risk for multiple cancers. Individuals with LFS are at an increased risk for developing cancer at a young age. The lifetime risk for development of a LFS-associated cancer is 50% by age 30 and 90% by age 60.   Core Cancers: Sarcomas, Breast, Brain, Lung, Leukemias/Lymphomas, Adrenocortical carcinomas  Other Cancers: Gastrointestinal, Thyroid, Skin, Genitourinary    Hereditary Diffuse Gastric Cancer   (CDH1)  Currently, one gene is known to cause hereditary diffuse gastric cancer (HDGC): CDH1.  Individuals with HDGC are at increased risk for diffuse gastric cancer and lobular breast cancer. Of people diagnosed with HDGC, 30-50% have a mutation in the CDH1  gene.  This suggests there are likely other genes that may cause HDGC that have not been identified yet.      Lifetime Cancer Risks    General Population HDGC    Diffuse Gastric  <1% ~80%   Breast 12% 39-52%         Additional Genes  JARET  JARET is a moderate-risk breast cancer gene. Women who have a mutation in JARET can have between a 2-4 fold increased risk for breast cancer compared to the general population8. JARET mutations have also been associated with increased risk for pancreatic cancer, however an estimate of this cancer risk is not well understood9. Individuals who inherit two JARET mutations have a condition called ataxia-telangiectasia (AT).  This rare autosomal recessive condition affects the nervous system and immune system, and is associated with progressive cerebellar ataxia beginning in childhood.  Individuals with ataxia-telangiectasia often have a weakened immune system and have an increased risk for childhood cancers.    PALB2  Mutations in PALB2 have been shown to increase the risk of breast cancer up to 33-58% in some families; where individuals fall within this risk range is dependent upon family [id number]. PALB2 mutations have also been associated with increased risk for pancreatic cancer, although this risk has not been quantified yet.  Individuals who inherit two PALB2 mutations--one from their mother and one from their father--have a condition called Fanconi Anemia.  This rare autosomal recessive condition is associated with short stature, developmental delay, bone marrow failure, and increased risk for childhood cancers.    CHEK2   CHEK2 is a moderate-risk breast cancer gene.  Women who have a mutation in CHEK2 have around a 2-fold increased risk for breast cancer compared to the general population, and this risk may be higher depending upon family history.11,12,13 Mutations in CHEK2 have also been shown to increase the risk of a number of other cancers, including colon and prostate, however  these cancer risks are currently not well understood.    BRIP1, RAD51C and RAD51D  Mutations in BRIP1, RAD51C, and RAD51D have been shown to increase the risk of ovarian cancer and possibly female breast cancer as well14,15 .       Lifetime Cancer Risk    General Population BRIP1 RAD51C RAD51D   Ovarian 1-2% ~5-8% ~5-9% ~7-15%           Inheritance  All of the cancer syndromes reviewed above are inherited in an autosomal dominant pattern.  This means that if a parent has a mutation, each of his or her children will have a 50% chance of inheriting that same mutation.  Therefore, each child--male or female--would have a 50% chance of being at increased risk for developing cancer.      Image obtained from Genetics Home Reference, 2013     Mutations in some genes can occur de lynette, which means that a person s mutation occurred for the first time in them and was not inherited from a parent.  Now that they have the mutation, however, it can be passed on to future generations.    Genetic Testing  Genetic testing involves a blood test and will look at the genetic information in the JARET, BRCA1, BRCA2, BRIP1, CDH1, CHEK2, MLH1, MSH2, MSH6, PMS2, EPCAM, PTEN, PALB2, RAD51C, RAD51D, and TP53 genes for any harmful mutations that are associated with increased cancer risk.  If possible, it is recommended that the person(s) who has had cancer be tested before other family members.  That person will give us the most useful information about whether or not a specific gene is associated with the cancer in the family.    Results  There are three possible results of genetic testing:    Positive--a harmful mutation was identified in one or more of the genes    Negative--no mutation was identified in any of the genes on this panel    Variant of unknown significance--a variation in one of the genes was identified, but it is unclear how this impacts cancer risk in the family    Advantages and Disadvantages   There are advantages and  disadvantages to genetic testing.    Advantages    May clarify your cancer risk    Can help you make medical decisions    May explain the cancers in your family    May give useful information to your family members (if you share your results)    Disadvantages    Possible negative emotional impact of learning about inherited cancer risk    Uncertainty in interpreting a negative test result in some situations    Possible genetic discrimination concerns (see below)    Genetic Information Nondiscrimination Act (PINKY)  PINKY is a federal law that protects individuals from health insurance or employment discrimination based on a genetic test result alone.  Although rare, there are currently no legal discrimination protections in terms of life insurance, long term care, or disability insurances.  Visit the [employer] Care[employer] website to learn more.    Reducing Cancer Risk  All of the genes described above have nationally recognized cancer screening guidelines that would be recommended for individuals who test positive.  In addition to increased cancer screening, surgeries may be offered or recommended to reduce cancer risk.  Recommendations are based upon an individual s genetic test result as well as their personal and family history of cancer.    Questions to Think About Regarding Genetic Testing:    What effect will the test result have on me and my relationship with my family members if I have an inherited gene mutation?  If I don t have a gene mutation?    Should I share my test results, and how will my family react to this news, which may also affect them?    Are my children ready to learn new information that may one day affect their own health?    Hereditary Cancer Resources    FORCE: Facing Our Risk of Cancer Empowered facingourrisk.org   Bright Pink bebrightpink.org   Li-Fraumeni Syndrome Association lfsassociation.org   PTEN World PTENworld.com   No stomach for cancer, Inc.  nostomachforcancer.org   Stomach cancer relief network Scrnet.org   Collaborative Group of the Americas on Inherited Colorectal Cancer (CGA) cgaicc.com    Cancer Care cancercare.org   American Cancer Society (ACS) cancer.org   National Cancer [place] (NCI) cancer.gov     Please call us if you have any questions or concerns.   Cancer Risk Management Program [phone] ([phone])  X Milena Null, MS, [facility]  [phone]  ? Viky Adams, MS, [facility]  [phone]  ? Marisel Apple, MS, [facility]  [phone]  ? Mike Rain, MS, [facility]  [phone]  ? Nichole Jay, MS, [facility] [phone]    References  1. Lillie LANDA, Rachana PDP, Jessica S, Renea BILLY, Nkechi JE, Emir JL, Esteban N, Trace H, Nadine O, Jose A, Oscar B, Uzma P, Kindra S, Gagan DM, Donnie N, Bill E, Julio H, Blaine E, Lubinski J, Gronwald J, Be B, Tulinius H, Thorlacius S, Eerola H, Mark H, Laura K, Jovanna OP. Average risks of breast and ovarian cancer associated with BRCA1 or BRCA2 mutations detected in case series unselected for family history: a combined analysis of 222 studies. Am J Hum Kajal. 2003;72:1117-30.  2. Skye N, La M, Tino G.  BRCA1 and BRCA2 Hereditary Breast and Ovarian Cancer. Gene Reviews online. 2013.  3. Eric YC, Jovon S, Radha G, Fabian S. Breast cancer risk among male BRCA1 and BRCA2 mutation carriers. J Natl Cancer Inst. 2007;99:1811-4.  4. Alexander WEIR, Ray I, Brian J, Megan E, Adelia ER, Tony F. Risk of breast cancer in male BRCA2 carriers. J Med Kajal. 2010;47:710-1.  5. National Comprehensive Cancer Network. Clinical practice guidelines in oncology, colorectal cancer screening. Available online (registration required). 2015.  6. Tin GRISSOM, Sam J, Barb J, Shabnam LA, Taryn MS, Eng C. Lifetime cancer risks in individuals with germline PTEN mutations. Clin Cancer Res. 2012;18:400-7.  7. Trevor CONCEPCION. Cowden Syndrome: A Critical Review of the Clinical Literature. J Kajal .  2009:18:13-27.  8. Adry A, Pedrito D, Lucy S, Ariana P, Lin T, Erica M, Shun B, Randee H, Tracy R, Kishan K, Haylie L, Alexander DG, Gagan D, Billy DF, Luly MR, The Breast Cancer Susceptibility Collaboration () & Ferdi AREVALO. JARET mutations that cause ataxia-telangiectasia are breast cancer susceptibility alleles. Nature Genetics. 2006;38:873-875  9. Bhargav N , Dilcia Y, Ashley J, Yi L, Marry GM , Juanjose ML, Gallinger S, Gonzales AG, Syngal S, Jorge ML, Faiza J , Frankie R, Ismael SZ, Estephanie JR, Ruma VE, Odilon M, Vomilind B, Juan José N, Jahaira RH, Jose Raul KW, and Jerry AP. JARET mutations in patients with hereditary pancreatic cancer. Cancer Discover. 2012;2:41-46  10. Lillie SESAY, et al. Breast-Cancer Risk in Families with Mutations in PALB2. NEJM. 2014; 371(6):497-506.  11. CHEK2 Breast Cancer Case-Control Consortium. CHEK2*1100delC and susceptibility to breast cancer: A collaborative analysis involving 10,860 breast cancer cases and 9,065 controls from 10 studies. Am J Hum Kajal, 74 (2004), pp. 7430-3143  12. Jesus T, Aaron S, Kirstin K, et al. Spectrum of Mutations in BRCA1, BRCA2, CHEK2, and TP53 in Families at High Risk of Breast Cancer. BRAYDON. 2006;295(12):6837-3272.   13. Gayatri C, Naren D, America A, et al. Risk of breast cancer in women with a CHEK2 mutation with and without a family history of breast cancer. J Clin Oncol. 2011;29:6846-9771.  14. Jones CASPER, Herminia E, Kwame SJ, et al. Contribution of germline mutations in the RAD51B, RAD51C, and RAD51D genes to ovarian cancer in the population. J Clin Oncol. 2015;33(26):2488-0895. Doi:10.1200/JCO.2015.61.2408.  15. Gabino T, Christina BUSTAMANTE, Acosta P, et al. Mutations in BRIP1 confer high risk of ovarian cancer. Candida Kajal. 2011;43(11):0097-8762. doi:10.1038/ng.955.

## 2019-05-10 NOTE — LETTER
Cancer Risk Management  Program Locations    [facility]  [facility] [place] [place] [facility]  Mailing Address  Cancer Risk Management Program  [facility]  [address]  [place] [address]    New patient appointments  [phone]  May 10, 2019     Desi Granado  [address] [place] [address]      Dear Desi,    It was a pleasure meeting with you at the [facility] on 5/10/2019.  Here is a copy of the progress note from your recent genetic counseling visit to the Cancer Risk Management Program.  If you have any additional questions, please feel free to call.    Referring Provider: Stephen Peña MD    Presenting Information:   I met with Desi Granado today for genetic counseling at the Cancer Risk Management Program at the [facility] to discuss her personal history of breast cancer and family history of cancer.  She is here alone today to review this history, cancer screening recommendations, and available genetic testing options.    Personal History:  Desi is a 48 year old female. She was diagnosed with invasive ductal breast cancer with papillary features at 48. Her pathology after double mastectomy showed a 22mm solid papillary carcinoma. The tumor was ER+/HI+/Her2 negative. She does not have any other history of cancer.       Desi has her ovaries, fallopian tubes and uterus in place. She had a colonoscopy in 2018 due to diverticulitis. She reports seeing a dermatologist regularly; she has never had any pre-cancerous lesions removed. She reports no history of tobacco use.     Family History: (Please see scanned pedigree for detailed family history information)    Sister, age 50, had some atypical cells in her breast that we identified about a year ago. She was  "recommended to take Tamoxifen, but has declined.     Both parents have had basal cell carcinoma (BCC). Her father has had multiple BCCs on his head and ears. He is bald, and has had a lot of sun exposure. No other relatives have had BCCs.    Maternal aunt had multiple myeloma.    Maternal uncle  at 14 from neuroblastoma.     Maternal uncle has a \"genetic emphysema\", and uses an oxygen tank. Nilda was later able to clarify that this was alpha-1 antitrypsin deficiency after texting her mother later in the appointment. No other relatives have this diagnosis, but no relatives have undergone testing. She reports that no other relatives have had lung issues.     Maternal grandmother passed away from multiple myeloma at 83.    Maternal grandfather passed away from prostate cancer.     Paternal uncle had liver cancer, and passed at 52. He had a history of hepatitis and drug use.    Paternal aunt is 78 and was diagnosed with breast cancer about a year ago. Nilda believes this was an early-stage breast cancer.     Paternal-maternal great-grandfather and two great uncles had colon cancer. They passed away in their 60s.    Her maternal ethnicity is Monegasque and English. Her paternal ethnicity is [place].  There is no known Ashkenazi Protestant ancestry on either side of her family. There is no reported consanguinity.    Discussion:    Desi's personal and family history of breast cancer is suggestive of a hereditary cancer syndrome.     We discussed BRCA1/2. Mutations in either BRCA1 or BRCA2 cause Hereditary Breast and Ovarian Cancer syndrome (HBOC).  Women with HBOC have an increased risk for breast and ovarian cancer.  There is also an increased risk for prostate and breast cancer among males with a BRCA1/BRCA2 mutation.  Males and females with BRCA1/BRCA2 mutations also face a slightly increased risk for pancreatic cancer.      A detailed handout regarding breast and gynecologic cancer risk genes and the information we " discussed was provided to Desi at the end of our appointment today and can be found in the after visit summary. Topics included: inheritance pattern, cancer risks, cancer screening recommendations, and also risks, benefits and limitations of testing.    Based on her personal and family history, Desi meets current National Comprehensive Cancer Network (NCCN) criteria for genetic testing of BRCA1/2.      We discussed that there are additional genes that could cause increased risk for breast cancer. As many of these genes present with overlapping features in a family and accurate cancer risk cannot always be established based upon the pedigree analysis alone, it would be reasonable for Desi to consider panel genetic testing to analyze multiple genes at once. We also discussed adding genes for colorectal cancer due to her family history on her maternal grandmother's side of the family.   We reviewed genetic testing options for the cancers seen in Desi s family history that suggest a hereditary cause: an actionable high/moderate risk gene custom panel and an expanded high and moderate risk custom panel.  Desi expressed an interest in learning as much information as possible from the testing. She opted for the expanded panel.  The CustomNext-Cancer panel we discussed is a combination of the following panels from [employer]:   ColoNext panel and the AXIN2, MSH3, and NTHL1 genes (APC, AXIN2, BMPR1A, CDH1, CHEK2, GREM1, EPCAM, MLH1, MSH2, MSH3, MSH6, MUTYH, NTHL1, PMS2, POLD1, POLE, PTEN, SMAD4, STK11, and TP53)  OvaNext (JARET, BARD1, BRCA1, BRCA2, BRIP1, CDH1, CHEK2, DICER1, EPCAM, MLH1, MRE11A, MSH2, MSH6, MUTYH, NBN, NF1, PALB2, PMS2, PTEN, RAD50, RAD51C, RAD51D, SMARCA4, STK11, and TP53)  We discussed that many genes on this panel are associated with specific hereditary cancer syndromes and have published management guidelines. Other genes have medical management guidelines available to screen for  certain cancers. The remaining genes are associated with increased cancer risk and may allow us to make medical recommendations when mutations are identified.      Consent was obtained and genetic testing for CustomNext-Cancer was sent to [facility]. Turnaround time: approximately 5 weeks.    Medical Management: For Desi, we reviewed that the information from genetic testing may determine:    additional cancer screening for which Desi may qualify (i.e. more frequent colonoscopies, more frequent dermatologic exams, etc.),    options for risk reducing surgeries Desi could consider (i.e.  surgery to remove her ovaries and/or uterus),      and targeted chemotherapies if indicated in the future (i.e. immunotherapy for individuals with Peter syndrome, PARP inhibitors, etc.).     These recommendations and possible targeted chemotherapies will be discussed in detail once genetic testing is completed.     We also discussed her family history of alpha-1 antitrypsin deficiency, which causes lung and liver issues. We discussed that having different alleles for the gene for alpha-1 antitrypsin deficiency (SERPINA1) can lead to different severities of the condition. M is the common allele. Those who have two M alleles do not have lung or liver disease. Individuals with S or Z alleles produce lower levels of alpha-1 antitrypsin, which may lead to symptoms depending on the combination of alleles. The combination of M, S, and Z alleles one has affects the severity of this condition. She was encouraged to discuss the family history of alpha-1 antitrypsin deficiency with her doctor. She can pursue genetic testing for this, if desired, through her primary care physician. Of note, Nilda does not report having any lung issues herself.     Plan:  1) Today Desi elected to proceed with CustomNext-Cancer (combination of OvaNext, ColoNext + three additional colorectal cancer risk genes).  2) This information  should be available in 4-5 weeks.  3) I will call her to discuss the results.    Face to face time: 40 minutes    Milena Null MS, [facility]  Licensed Genetic Counselor  P. [phone]  F. [phone]

## 2019-05-21 ENCOUNTER — PATIENT OUTREACH (OUTPATIENT)
Dept: ONCOLOGY | Facility: CLINIC | Age: 49
End: 2019-05-21

## 2019-05-21 NOTE — PROGRESS NOTES
Patient called clinic. She reports that she received a letter from her insurance company that Exemestane and zoladex was approved. Message sent to Dr. Peña to order medication. This is in her treatment plan, but medication need to be ordered. Message sent to schedulers to set an appointment for her zolodex. Patient will come in for formal teach before starting treatment.  Patient is aware.  Awilda Lody RN

## 2019-05-23 RX ORDER — EXEMESTANE 25 MG/1
25 TABLET ORAL DAILY
Qty: 90 TABLET | Refills: 1 | Status: SHIPPED | OUTPATIENT
Start: 2019-05-23 | End: 2019-10-22

## 2019-05-28 ENCOUNTER — TELEPHONE (OUTPATIENT)
Dept: ONCOLOGY | Facility: CLINIC | Age: 49
End: 2019-05-28

## 2019-05-28 NOTE — TELEPHONE ENCOUNTER
"5/28/2019    Referring Provider: Stephen Peña MD    Presenting Information:  I spoke to Desi by phone today to discuss her genetic testing results. Her blood was drawn on 5/10/2019. The CustomNext-Cancer test was ordered from [facility]. This testing was done because of Desi's personal and family history of breast cancer and family history of colon cancer.    Genetic Testing Result: NEGATIVE  Desi is negative for mutations in the following 34 genes: APC, JARET, AXIN2, BARD1, BMPR1A, BRCA1, BRCA2, BRIP1, CDH1, CHEK2, DICER1, MLH1, MRE11A, MSH2, MSH3, MSH6, MUTYH, NBN, NF1, NTHL1, PALB2, PMS2, POLD1, POLE, PTEN, RAD50, RAD51C, RAD51D, SMAD4, SMARCA4, STK11 and TP53 (sequencing and deletion/duplication); EPCAM and GREM1 (deletion/duplication only).      A copy of the test report can be found in the Laboratory tab, dated 5/10/19, and named \"SEND OUTS MISC TEST\". The report is scanned in as a linked document.    Interpretation:  We discussed several different interpretations of this negative test result.    1. One explanation may be that there is a different gene or combination of genes and environment that are associated with the cancers in Desi and/or her relatives.    2. It is possible that other relatives, such as her paternal relatives with colon cancer, do/did have a mutation in a cancer risk gene and she did not inherit it. She reported today that her paternal cousin was recently diagnosed with colon cancer at age 55. This was added to her family tree today, and updated in the EMR.   3. There is also a small possibility that there is a mutation in one of these genes, and we could not find it with our current testing methods.       Screening:  Based on this negative test result, it is important for Desi and her relatives to refer back to the family history for appropriate cancer screening.      Desi should continue to follow the treatment and follow-up recommendations of her physicians " regarding her diagnosis of breast cancer.     Desi s close female relatives remain at increased risk for breast cancer given their family history. Breast cancer screening is generally recommended to begin approximately 10 years younger than the earliest age of breast cancer diagnosis in the family, or at age 40, whichever comes first. Breast screening options should be discussed with an individual's primary care provider and a genetic counselor, to determine at what age to begin screening, what screening is appropriate, and if additional screening (such as breast MRI) is necessary based on personal/family history factors.    Other population cancer screening options, such as those recommended by the American Cancer Society and the National Comprehensive Cancer Network (NCCN), are also appropriate for Desi and her family. These screening recommendations may change if there are changes to Desi's personal and/or family history. Final screening recommendations should be made by each individual's managing physician.    Additional Testing Considerations:  Although Desi's genetic testing result was negative, other relatives may still carry a gene mutation associated with cancer risk. In particular, genetic counseling is recommended for her cousin with colon cancer, and other paternal relatives due to the family history of colon cancer, to discuss genetic testing options. Her dad could also consider meeting with a genetic counselor to discuss testing options. Depending on how closely someone is related to the people in this family who have had cancer, testing may or may not be indicated.     Summary:  We do not have an explanation for Desi's personal or family history of cancer.  Because of that, it is important that she continue with cancer screening based on her personal and family history as discussed above.    Genetic testing is rapidly advancing, and new cancer susceptibility genes will most likely  be identified in the future.  Therefore, I encourage Desi to contact me if there are changes in her personal or family history of cancer.  This may change how we assess her cancer risk, screening, and the testing we would offer.    Plan:  1. A copy of the test results will be mailed to Desi.  2. She plans to follow-up with Dr. Peña.  3. She should contact me if her personal or family history of cancer changes.    If Desi has any further questions, I encourage her to contact me at [phone].    Time spent on the phone: 5 minutes.    Milena Null MS, [facility]  Licensed Genetic Counselor  P. [phone]  F. [phone]

## 2019-05-28 NOTE — LETTER
Cancer Risk Management  Program [facility]  [facility]  [facility]  Mailing Address  Cancer Risk Management Program  [facility]  [address]  [place] [address]    New patient appointments  [phone]  May 28, 2019    Desi Granado  [address] [place] [address]      Dear Desi,    It was a pleasure speaking with you on the phone on 5/28/2019.  Here is a copy of the progress note from our discussion.  If you have any additional questions, please feel free to call.    Referring Provider: Stephen Peña MD    Presenting Information:  I spoke to Desi by phone today to discuss her genetic testing results. Her blood was drawn on 5/10/2019. The CustomNext-Cancer test was ordered from [facility]. This testing was done because of Desi's personal and family history of breast cancer and family history of colon cancer.    Genetic Testing Result: NEGATIVE  Desi is negative for mutations in the following 34 genes: APC, JARET, AXIN2, BARD1, BMPR1A, BRCA1, BRCA2, BRIP1, CDH1, CHEK2, DICER1, MLH1, MRE11A, MSH2, MSH3, MSH6, MUTYH, NBN, NF1, NTHL1, PALB2, PMS2, POLD1, POLE, PTEN, RAD50, RAD51C, RAD51D, SMAD4, SMARCA4, STK11 and TP53 (sequencing and deletion/duplication); EPCAM and GREM1 (deletion/duplication only).      Interpretation:  We discussed several different interpretations of this negative test result.    1. One explanation may be that there is a different gene or combination of genes and environment that are associated with the cancers in Desi and/or her relatives.    2. It is possible that other relatives, such as her paternal relatives with colon cancer, do/did have a mutation in a cancer risk gene and she did not inherit it. She reported today that her paternal cousin  was recently diagnosed with colon cancer at age 55. This was added to her family tree today, and updated in the EMR.   3. There is also a small possibility that there is a mutation in one of these genes, and we could not find it with our current testing methods.       Screening:  Based on this negative test result, it is important for Desi and her relatives to refer back to the family history for appropriate cancer screening.      Desi should continue to follow the treatment and follow-up recommendations of her physicians regarding her diagnosis of breast cancer.     Desi s close female relatives remain at increased risk for breast cancer given their family history. Breast cancer screening is generally recommended to begin approximately 10 years younger than the earliest age of breast cancer diagnosis in the family, or at age 40, whichever comes first. Breast screening options should be discussed with an individual's primary care provider and a genetic counselor, to determine at what age to begin screening, what screening is appropriate, and if additional screening (such as breast MRI) is necessary based on personal/family history factors.    Other population cancer screening options, such as those recommended by the American Cancer Society and the National Comprehensive Cancer Network (NCCN), are also appropriate for Desi and her family. These screening recommendations may change if there are changes to Desi's personal and/or family history. Final screening recommendations should be made by each individual's managing physician.    Additional Testing Considerations:  Although Desi's genetic testing result was negative, other relatives may still carry a gene mutation associated with cancer risk. In particular, genetic counseling is recommended for her cousin with colon cancer, and other paternal relatives due to the family history of colon cancer, to discuss genetic testing options. Her dad  could also consider meeting with a genetic counselor to discuss testing options. Depending on how closely someone is related to the people in this family who have had cancer, testing may or may not be indicated.     Summary:  We do not have an explanation for Desi's personal or family history of cancer.  Because of that, it is important that she continue with cancer screening based on her personal and family history as discussed above.    Genetic testing is rapidly advancing, and new cancer susceptibility genes will most likely be identified in the future.  Therefore, I encourage Desi to contact me if there are changes in her personal or family history of cancer.  This may change how we assess her cancer risk, screening, and the testing we would offer.    Plan:  1. A copy of the test results will be mailed to Desi.  2. She plans to follow-up with Dr. Peña.  3. She should contact me if her personal or family history of cancer changes.    If Desi has any further questions, I encourage her to contact me at [phone].    Time spent on the phone: 5 minutes.    Milena uNll MS, [facility]  Licensed Genetic Counselor  P. [phone]  F. [phone]

## 2019-05-28 NOTE — TELEPHONE ENCOUNTER
5/28/2019    I called Desi today to discuss her genetic test results, but was unable to reach her.  I left a non-detailed voicemail with my name and phone number.    Milena Null MS, [facility]  Licensed Genetic Counselor  P. [phone]  F. [phone]

## 2019-05-30 NOTE — PROGRESS NOTES
Patient called today to update our office that she will be receiving the exemestane in the mail June 1st and is to have her 1st Zoladex injection tomorrow 05.31.19.  She reports no one called to review the chemotherapy with her and she has many questions. She does not know how often she is to get the Zoladex, what the side effects are, what the management and monitoring is, etc. She reports she was came to clinic in January or February for the teach and was told the exemestane was not covered by her insurance so not to worry about it. Then when it was approved by her insurance company this month, was told she would need education but none was scheduled.    Discussed the exemestane and Zoladex via telephone today and answered questions to patient's satisfaction. Patient will also arrive 15 minutes prior to her appointment to review any further questions she may have and to be given educational handouts in regard to the medications, side effects, management, monitoring, and when to contact our office.    Patient expresses her thanks for this information.

## 2019-05-31 ENCOUNTER — DOCUMENTATION ONLY (OUTPATIENT)
Dept: ONCOLOGY | Facility: CLINIC | Age: 49
End: 2019-05-31

## 2019-05-31 ENCOUNTER — INFUSION THERAPY VISIT (OUTPATIENT)
Dept: INFUSION THERAPY | Facility: CLINIC | Age: 49
End: 2019-05-31
Attending: INTERNAL MEDICINE
Payer: COMMERCIAL

## 2019-05-31 VITALS — SYSTOLIC BLOOD PRESSURE: 154 MMHG | RESPIRATION RATE: 16 BRPM | HEART RATE: 87 BPM | DIASTOLIC BLOOD PRESSURE: 76 MMHG

## 2019-05-31 DIAGNOSIS — C50.919 MALIGNANT NEOPLASM OF BREAST IN FEMALE, ESTROGEN RECEPTOR POSITIVE, UNSPECIFIED LATERALITY, UNSPECIFIED SITE OF BREAST (H): Primary | ICD-10-CM

## 2019-05-31 DIAGNOSIS — Z17.0 MALIGNANT NEOPLASM OF BREAST IN FEMALE, ESTROGEN RECEPTOR POSITIVE, UNSPECIFIED LATERALITY, UNSPECIFIED SITE OF BREAST (H): Primary | ICD-10-CM

## 2019-05-31 DIAGNOSIS — C50.912 INVASIVE DUCTAL CARCINOMA OF BREAST, FEMALE, LEFT (H): Primary | ICD-10-CM

## 2019-05-31 PROCEDURE — 25000128 H RX IP 250 OP 636: Performed by: INTERNAL MEDICINE

## 2019-05-31 PROCEDURE — 96401 CHEMO ANTI-NEOPL SQ/IM: CPT

## 2019-05-31 PROCEDURE — 96372 THER/PROPH/DIAG INJ SC/IM: CPT

## 2019-05-31 RX ADMIN — GOSERELIN ACETATE 3.6 MG: 3.6 IMPLANT SUBCUTANEOUS at 15:36

## 2019-05-31 ASSESSMENT — PAIN SCALES - GENERAL: PAINLEVEL: NO PAIN (0)

## 2019-05-31 NOTE — PROGRESS NOTES
Infusion Nursing Note:  Desi Granado presents today for Zoladex.    Patient seen by provider today: No   present during visit today: Not Applicable.    Note: N/A.    Intravenous Access:  No Intravenous access/labs at this visit.    Treatment Conditions:  Not Applicable.      Post Infusion Assessment:  Patient tolerated injection without incident.       Discharge Plan:   Patient discharged in stable condition accompanied by: self.  Departure Mode: Ambulatory.    Kendrick Le RN

## 2019-05-31 NOTE — PROGRESS NOTES
"Chemotherapy Education    Patient is a 48 year old female here today for chemotherapy education, accompanied by self.  Pt has a cancer diagnosis of breast cancer.  Their Oncologist is Dr. ROWENA Peña, and PCP is Pearl Grover.    Reviewed the following with the patient and their support person:    Treatment Goal: Curative    Regimen: exemestane daily concurrent with zoladex injections monthly    Duration: rationale for strict adherence, specific supportive medication and side effects (including long-term and short-term) and management; skin changes/hand-foot syndrome, anemia, neutropenia, thrombocytopenia, diarrhea/constipation, hair loss syndrome, memory changes/ \"chemobrain\", mouth sores, taste changes, neuropathy, fatigue, infertility, myelosuppression, and risk of extravasation or infiltration.    Infection prevention and monitoring of lab values, what lab tests and what changes of these values meant, along with the possibility of hydration or blood product transfusion, or the need to defer or hold treatment.      General Chemotherapy Information, including ways it is excreted from the body and cleaning and containment of vomitus or other bodily fluid, use of the bathroom, sexual health and intimacy, what to do if needing to miss a treatment, when to call a provider and the need for staff to wear protective equipment.    Oral Chemotherapy: No      Importance of Central line care (port) or IV site care.    Written Information:   \"My Cancer Guidebook\" Binder   \"Getting Ready for Chemotherapy: What to Expect, Before, During, and After  your Treatment\",   Via Oncology medication information sheets,   Information on when to contact the provider,   Various programs offered at [facility], and   [employer] card with contact information given; Oncology Clinic, RN Case  Manager, and the after-hours Nurse Advise Line.  No barriers to learning identified. Patient and family verbalized understanding of all written and " verbal information. All questions answered to patients satisfaction.   General Orientation to the Medical Oncology department, Infusion Services department, Huc/scheduling, bathrooms and usual flow of the treatment day provided as well as introduction to the Infusion nurses.    Bottle Pump Infuser: N/A     Other Concerns: None at this time  Comments: Dexa scan ordered per baseline per order of Dr. Peña.    Pt instructed to call with further questions or concerns.  Patient states understanding and is in agreement with this plan.  Copy of appointments, and after visit summary (AVS) given to patient. Patient discharged ambulatory.    Neelam Amaor, RN, BSN, OCN  Oncology Hematology   [facility]  Phone [phone]

## 2019-06-04 ENCOUNTER — HOSPITAL ENCOUNTER (OUTPATIENT)
Dept: BONE DENSITY | Facility: CLINIC | Age: 49
Discharge: HOME | End: 2019-06-04
Attending: INTERNAL MEDICINE | Admitting: INTERNAL MEDICINE
Payer: COMMERCIAL

## 2019-06-04 DIAGNOSIS — C50.912 INVASIVE DUCTAL CARCINOMA OF BREAST, FEMALE, LEFT (H): ICD-10-CM

## 2019-06-04 PROCEDURE — 77080 DXA BONE DENSITY AXIAL: CPT

## 2019-06-12 ENCOUNTER — TELEPHONE (OUTPATIENT)
Dept: ONCOLOGY | Facility: CLINIC | Age: 49
End: 2019-06-12

## 2019-06-12 DIAGNOSIS — C50.912 INVASIVE DUCTAL CARCINOMA OF BREAST, FEMALE, LEFT (H): Primary | ICD-10-CM

## 2019-06-12 NOTE — TELEPHONE ENCOUNTER
Status Check:    Pt is a 48 year old female, recently started on Aromasin and Zoladex on 05.31.19. Call placed for status check.    Primary care provider is: Pearl Grover    Diagnosis:   Encounter Diagnosis   Name Primary?     Invasive ductal carcinoma of breast, female, left (H) Yes       Oncology provider is:  Dr. ROWENA Peña    How are you doing/feeling?: Great. No ill effects at all. Menstrual cycle is slowing down, expected side effect with the occasional flushing. But reports this is very manageable.  Any further issues?: none. Eating, drinking, voiding, stooling without issue. No fever, chills, pain, discomfort.   Next Follow up/Recommendations: Advised patient to utilize PRN medications as needed, eat nutritious meals, drink plenty of fluids, and keep scheduled appointments. If symptoms or other concerns develop after hours, encouraged patient to call our after hours number: [phone].  Patient instructed to call with any questions or concerns.  Patient states understanding and is in agreement with this plan.    Patient instructed to call with any questions or concerns.  Patient states understanding and is in agreement with this plan.    Approximately 7 minutes spent on telephone with patient reviewing assessment and symptom(s) and providing symptom management.    Neelam Amaro RN, BSN, OCN  Oncology Hematology   Breast Cancer Navigator  [facility]  [email or web address]  [phone]

## 2019-06-19 LAB — LAB SCANNED RESULT: NORMAL

## 2019-06-28 ENCOUNTER — INFUSION THERAPY VISIT (OUTPATIENT)
Dept: INFUSION THERAPY | Facility: CLINIC | Age: 49
End: 2019-06-28
Attending: INTERNAL MEDICINE
Payer: COMMERCIAL

## 2019-06-28 VITALS
DIASTOLIC BLOOD PRESSURE: 61 MMHG | SYSTOLIC BLOOD PRESSURE: 119 MMHG | HEART RATE: 95 BPM | TEMPERATURE: 98.2 F | RESPIRATION RATE: 18 BRPM

## 2019-06-28 DIAGNOSIS — Z17.0 MALIGNANT NEOPLASM OF BREAST IN FEMALE, ESTROGEN RECEPTOR POSITIVE, UNSPECIFIED LATERALITY, UNSPECIFIED SITE OF BREAST (H): Primary | ICD-10-CM

## 2019-06-28 DIAGNOSIS — C50.919 MALIGNANT NEOPLASM OF BREAST IN FEMALE, ESTROGEN RECEPTOR POSITIVE, UNSPECIFIED LATERALITY, UNSPECIFIED SITE OF BREAST (H): Primary | ICD-10-CM

## 2019-06-28 PROCEDURE — 96402 CHEMO HORMON ANTINEOPL SQ/IM: CPT

## 2019-06-28 PROCEDURE — 25000128 H RX IP 250 OP 636: Performed by: INTERNAL MEDICINE

## 2019-06-28 RX ADMIN — GOSERELIN ACETATE 3.6 MG: 3.6 IMPLANT SUBCUTANEOUS at 15:38

## 2019-06-28 ASSESSMENT — PAIN SCALES - GENERAL: PAINLEVEL: NO PAIN (0)

## 2019-06-28 NOTE — PROGRESS NOTES
Infusion Nursing Note:  Desi Granado presents today for Zoladex.    Patient seen by provider today: No   present during visit today: Not Applicable.    Note: N/A.    Intravenous Access:  No Intravenous access/labs at this visit.    Treatment Conditions:  Not Applicable.    Post Infusion Assessment:  Patient tolerated injection without incident.  Site patent and intact, free from redness, edema or discomfort.  No evidence of extravasations.     Discharge Plan:   Discharge instructions reviewed with: Patient.  Patient and/or family verbalized understanding of discharge instructions and all questions answered.  AVS to patient via [employer].  Patient will return 7/26/2019 for next appointment.   Patient discharged in stable condition accompanied by: self.  Departure Mode: Ambulatory.    Ashley Schwarz RN

## 2019-07-05 ENCOUNTER — HOSPITAL ENCOUNTER (OUTPATIENT)
Dept: LAB | Facility: CLINIC | Age: 49
Discharge: HOME | End: 2019-07-05
Attending: INTERNAL MEDICINE | Admitting: INTERNAL MEDICINE
Payer: COMMERCIAL

## 2019-07-05 DIAGNOSIS — C50.912 INVASIVE DUCTAL CARCINOMA OF BREAST, FEMALE, LEFT (H): ICD-10-CM

## 2019-07-05 LAB
ALBUMIN SERPL-MCNC: 3.7 G/DL (ref 3.4–5)
ALP SERPL-CCNC: 74 U/L (ref 40–150)
ALT SERPL W P-5'-P-CCNC: 19 U/L (ref 0–50)
ANION GAP SERPL CALCULATED.3IONS-SCNC: 6 MMOL/L (ref 3–14)
AST SERPL W P-5'-P-CCNC: 12 U/L (ref 0–45)
BASOPHILS # BLD AUTO: 0 10E9/L (ref 0–0.2)
BASOPHILS NFR BLD AUTO: 0.7 %
BILIRUB SERPL-MCNC: 0.4 MG/DL (ref 0.2–1.3)
BUN SERPL-MCNC: 20 MG/DL (ref 7–30)
CALCIUM SERPL-MCNC: 9.3 MG/DL (ref 8.5–10.1)
CANCER AG27-29 SERPL-ACNC: 8 U/ML (ref 0–39)
CHLORIDE SERPL-SCNC: 107 MMOL/L (ref 94–109)
CO2 SERPL-SCNC: 28 MMOL/L (ref 20–32)
CREAT SERPL-MCNC: 1.12 MG/DL (ref 0.52–1.04)
DIFFERENTIAL METHOD BLD: NORMAL
EOSINOPHIL # BLD AUTO: 0.1 10E9/L (ref 0–0.7)
EOSINOPHIL NFR BLD AUTO: 2.4 %
ERYTHROCYTE [DISTWIDTH] IN BLOOD BY AUTOMATED COUNT: 11.9 % (ref 10–15)
GFR SERPL CREATININE-BSD FRML MDRD: 58 ML/MIN/{1.73_M2}
GLUCOSE SERPL-MCNC: 86 MG/DL (ref 70–99)
HCT VFR BLD AUTO: 39.8 % (ref 35–47)
HGB BLD-MCNC: 12.8 G/DL (ref 11.7–15.7)
IMM GRANULOCYTES # BLD: 0 10E9/L (ref 0–0.4)
IMM GRANULOCYTES NFR BLD: 0.5 %
LYMPHOCYTES # BLD AUTO: 1.5 10E9/L (ref 0.8–5.3)
LYMPHOCYTES NFR BLD AUTO: 25.7 %
MCH RBC QN AUTO: 28.5 PG (ref 26.5–33)
MCHC RBC AUTO-ENTMCNC: 32.2 G/DL (ref 31.5–36.5)
MCV RBC AUTO: 89 FL (ref 78–100)
MONOCYTES # BLD AUTO: 0.4 10E9/L (ref 0–1.3)
MONOCYTES NFR BLD AUTO: 7.2 %
NEUTROPHILS # BLD AUTO: 3.6 10E9/L (ref 1.6–8.3)
NEUTROPHILS NFR BLD AUTO: 63.5 %
NRBC # BLD AUTO: 0 10*3/UL
NRBC BLD AUTO-RTO: 0 /100
PLATELET # BLD AUTO: 261 10E9/L (ref 150–450)
POTASSIUM SERPL-SCNC: 4.3 MMOL/L (ref 3.4–5.3)
PROT SERPL-MCNC: 7 G/DL (ref 6.8–8.8)
RBC # BLD AUTO: 4.49 10E12/L (ref 3.8–5.2)
SODIUM SERPL-SCNC: 141 MMOL/L (ref 133–144)
WBC # BLD AUTO: 5.7 10E9/L (ref 4–11)

## 2019-07-05 PROCEDURE — 36415 COLL VENOUS BLD VENIPUNCTURE: CPT | Performed by: INTERNAL MEDICINE

## 2019-07-05 PROCEDURE — 80053 COMPREHEN METABOLIC PANEL: CPT | Performed by: INTERNAL MEDICINE

## 2019-07-05 PROCEDURE — 86300 IMMUNOASSAY TUMOR CA 15-3: CPT | Performed by: INTERNAL MEDICINE

## 2019-07-05 PROCEDURE — 85025 COMPLETE CBC W/AUTO DIFF WBC: CPT | Performed by: INTERNAL MEDICINE

## 2019-07-12 ENCOUNTER — ONCOLOGY VISIT (OUTPATIENT)
Dept: ONCOLOGY | Facility: CLINIC | Age: 49
End: 2019-07-12
Attending: INTERNAL MEDICINE
Payer: COMMERCIAL

## 2019-07-12 VITALS
DIASTOLIC BLOOD PRESSURE: 73 MMHG | SYSTOLIC BLOOD PRESSURE: 117 MMHG | HEIGHT: 65 IN | BODY MASS INDEX: 24.87 KG/M2 | RESPIRATION RATE: 18 BRPM | TEMPERATURE: 98.2 F | HEART RATE: 98 BPM | OXYGEN SATURATION: 98 % | WEIGHT: 149.3 LBS

## 2019-07-12 DIAGNOSIS — I10 ESSENTIAL HYPERTENSION, BENIGN: ICD-10-CM

## 2019-07-12 DIAGNOSIS — C50.912 INVASIVE DUCTAL CARCINOMA OF BREAST, FEMALE, LEFT (H): Primary | ICD-10-CM

## 2019-07-12 PROCEDURE — G0463 HOSPITAL OUTPT CLINIC VISIT: HCPCS

## 2019-07-12 PROCEDURE — 99214 OFFICE O/P EST MOD 30 MIN: CPT | Performed by: INTERNAL MEDICINE

## 2019-07-12 ASSESSMENT — PAIN SCALES - GENERAL: PAINLEVEL: NO PAIN (0)

## 2019-07-12 ASSESSMENT — MIFFLIN-ST. JEOR: SCORE: 1304.13

## 2019-07-12 NOTE — PROGRESS NOTES
"Oncology Rooming Note    July 12, 2019 2:43 PM   Desi Granado is a 48 year old female who presents for:    Chief Complaint   Patient presents with     Oncology Clinic Visit     3 month follow up breast cancer.      Initial Vitals: /73 (BP Location: Right arm, Patient Position: Sitting, Cuff Size: Adult Regular)   Pulse 98   Temp 98.2  F (36.8  C) (Tympanic)   Resp 18   Ht 1.645 m (5' 4.75\")   Wt 67.7 kg (149 lb 4.8 oz)   SpO2 98%   Breastfeeding? No   BMI 25.04 kg/m   Estimated body mass index is 25.04 kg/m  as calculated from the following:    Height as of this encounter: 1.645 m (5' 4.75\").    Weight as of this encounter: 67.7 kg (149 lb 4.8 oz). Body surface area is 1.76 meters squared.  No Pain (0) Comment: Data Unavailable   No LMP recorded.  Allergies reviewed: Yes  Medications reviewed: Yes    Medications: Medication refills not needed today.  Pharmacy name entered into [employer]:    [facility] [place] - [address] AT [facility] [place] - [address] [place] [address]    Clinical concerns:  3 month follow up breast cancer.       Nessa Jeff CMA            "

## 2019-07-12 NOTE — LETTER
"    7/12/2019         RE: Desi Granado  [address] [place] [address]        Dear Colleague,    Thank you for referring your patient, Desi Granado, to the [facility]. Please see a copy of my visit note below.    Oncology Rooming Note    July 12, 2019 2:43 PM   Desi Granado is a 48 year old female who presents for:    Chief Complaint   Patient presents with     Oncology Clinic Visit     3 month follow up breast cancer.      Initial Vitals: /73 (BP Location: Right arm, Patient Position: Sitting, Cuff Size: Adult Regular)   Pulse 98   Temp 98.2  F (36.8  C) (Tympanic)   Resp 18   Ht 1.645 m (5' 4.75\")   Wt 67.7 kg (149 lb 4.8 oz)   SpO2 98%   Breastfeeding? No   BMI 25.04 kg/m    Estimated body mass index is 25.04 kg/m  as calculated from the following:    Height as of this encounter: 1.645 m (5' 4.75\").    Weight as of this encounter: 67.7 kg (149 lb 4.8 oz). Body surface area is 1.76 meters squared.  No Pain (0) Comment: Data Unavailable   No LMP recorded.  Allergies reviewed: Yes  Medications reviewed: Yes    Medications: Medication refills not needed today.  Pharmacy name entered into [employer]:    [facility] [place] - [address] AT [facility] [place] - [address] [place] [address]    Clinical concerns:  3 month follow up breast cancer.       Nessa Jeff [facility]              Hematology/ Oncology Follow-up Visit:  Jul 12, 2019    Reason for Visit:   Chief Complaint   Patient presents with     Oncology Clinic Visit     3 month follow up breast cancer.        Oncologic History:  Invasive ductal carcinoma of breast, female, left (H)    Desi Granado was found to have developing focal asymmetry in the left breast at approximately 3 o'clock position about 7-8 cm from the nipple on the routine mammogram done on " October 30, 2018.  Subsequently she went on to have diagnostic mammogram and ultrasound.  Directed sonogram at the site of mammographic finding showed a 1.6 cm irregular solid lesion.  Subsequently the patient went on to have ultrasound-guided needle core biopsy done on October 17, 2018.  The biopsy came back positive for invasive ductal carcinoma with papillary features.  There is grade 2 out of 3.  Angiolymphatic invasion was not seen.  Ductal carcinoma in situ was not seen.  The tumor was estrogen receptor positive about 100% and progesterone receptor +100% strong nuclear staining.  HER-2/tamara came back negative for amplification. She had bilateral mastectomy.  Surgical pathology revealed simple mastectomy sample of the left showing solid papillary carcinoma 22 mm.  Grade 2.  Margins were clear of involvement.  3 sentinel lymph nodes shows no evidence of metastatic disease.  Lymphovascular invasion was not identified.  The tumor is pT2pN0.  Right breast shows simple mastectomy without any evidence of malignancy.  Oncotype came back 0.        Interval History:  Patient is here today for follow-up visit.  She has been feeling well without recent complaints weight loss night sweats fever or chills.  Patient currently on adjuvant endocrine therapy with exemestane and Zoladex.  She has been tolerating treatment well without significant side effects.    Review Of Systems:  Constitutional: Negative for fever, chills, and night sweats.  Skin: negative.  Eyes: negative.  Ears/Nose/Throat: negative.  Respiratory: No shortness of breath, dyspnea on exertion, cough, or hemoptysis.  Cardiovascular: negative.  Gastrointestinal: negative.  Genitourinary: negative.  Musculoskeletal: negative.  Neurologic: negative.  Psychiatric: negative.  Hematologic/Lymphatic/Immunologic: negative.  Endocrine: negative.    All other ROS negative unless mentioned in interval history.    Past medical, social, surgical, and family histories  "reviewed.    Allergies:  Allergies as of 07/12/2019 - Reviewed 07/12/2019   Allergen Reaction Noted     Aspirin Hives 08/03/2009     Dust mites Unknown 07/03/2009     Morphine Nausea 06/23/2008       Current Medications:  Current Outpatient Medications   Medication Sig Dispense Refill     ACETAMINOPHEN PO Take 1,000 mg by mouth       calcium carbonate 600 mg-vitamin D 400 units (CALTRATE) 600-400 MG-UNIT per tablet Take 1 tablet by mouth 2 times daily       Calcium Polycarbophil (FIBER) 625 MG tablet Take by mouth daily       exemestane (AROMASIN) 25 MG tablet Take 1 tablet (25 mg) by mouth daily 90 tablet 1     Fluticasone Propionate (FLONASE ALLERGY RELIEF NA)        goserelin (ZOLADEX) 3.6 MG injection Inject 3.6 mg Subcutaneous once       lisinopril (PRINIVIL/ZESTRIL) 40 MG tablet Take 1 tablet (40 mg) by mouth daily 90 tablet 3     montelukast (SINGULAIR) 10 MG tablet Take 1 tablet (10 mg) by mouth At Bedtime 90 tablet 2     vitamin D3 (CHOLECALCIFEROL) 2000 units tablet Take 1 tablet by mouth daily       ZYRTEC 10 MG OR TABS 1 TABLET DAILY 90 3        Physical Exam:  /73 (BP Location: Right arm, Patient Position: Sitting, Cuff Size: Adult Regular)   Pulse 98   Temp 98.2  F (36.8  C) (Tympanic)   Resp 18   Ht 1.645 m (5' 4.75\")   Wt 67.7 kg (149 lb 4.8 oz)   SpO2 98%   Breastfeeding? No   BMI 25.04 kg/m     Wt Readings from Last 12 Encounters:   07/12/19 67.7 kg (149 lb 4.8 oz)   05/07/19 66.1 kg (145 lb 11.2 oz)   04/11/19 65.3 kg (144 lb)   03/21/19 64.4 kg (142 lb)   02/22/19 63.5 kg (140 lb)   01/10/19 61.8 kg (136 lb 4.8 oz)   11/28/18 61.4 kg (135 lb 4.8 oz)   11/21/18 63.4 kg (139 lb 11.2 oz)   11/20/18 61.7 kg (136 lb)   11/08/18 65.9 kg (145 lb 4.5 oz)   11/06/18 64.4 kg (141 lb 14.4 oz)   10/26/18 65.6 kg (144 lb 11.2 oz)     GENERAL APPEARANCE: Healthy, alert and in no acute distress.  HEENT: Sclerae anicteric. PERRLA. Oropharynx without ulcers, lesions, or thrush.  NECK: Supple. No " asymmetry or masses.  LYMPHATICS: No palpable cervical, supraclavicular, axillary, or inguinal lymphadenopathy.  BREAST scars of bilateral mastectomies  RESP: Lungs clear to auscultation bilaterally without rales, rhonchi or wheezes.  CARDIOVASCULAR: Regular rate and rhythm. Normal S1, S2; no S3 or S4. No murmur, gallop, or rub.  ABDOMEN: Soft, nontender. Bowel sounds normal. No palpable organomegaly or masses.  MUSCULOSKELETAL: Extremities without gross deformities noted. No edema of bilateral lower extremities.  SKIN: No suspicious lesions or rashes.  NEURO: Alert and oriented x 3. Cranial nerves II-XII grossly intact.  PSYCHIATRIC: Mentation and affect appear normal.    Laboratory/Imaging Studies:  No visits with results within 2 Week(s) from this visit.   Latest known visit with results is:   Oncology Visit on 05/10/2019   Component Date Value Ref Range Status     Lab Scanned Result 05/10/2019 MISCELLANEOUS TEST-Scanned   Final-Edited     Test Name 05/10/2019 [facility]   Final     Send Outs Misc Test Code 05/10/2019 9,510   Final     Send Outs Misc Test Specimen 05/10/2019 [facility]   Final     Location Performed 05/10/2019 [facility]   Final     Result 05/10/2019 PENDING   Incomplete     Normal Range for Send Outs Misc Te* 05/10/2019 PENDING   Incomplete          Assessment and plan:    (C50.912) Invasive ductal carcinoma of breast, female, left (H)  (primary encounter diagnosis)  I reviewed with patient most recent laboratory test.  There is no clinical evidence of breast cancer recurrence.  We will see the patient again in 3 months or sooner if there are new developments or concerns.    (I10) Essential hypertension, benign  Patient currently on lisinopril 40 mg orally daily.    The patient is ready to learn, no apparent learning barriers were identified.  Diagnosis and treatment plans were explained to the patient. The patient expressed understanding of the content. The patient asked appropriate questions.  The patient questions were answered to her satisfaction.    Chart documentation with Dragon Voice recognition Software. Although reviewed after completion, some words and grammatical errors may remain.      Again, thank you for allowing me to participate in the care of your patient.        Sincerely,        Stephen Peña MD

## 2019-07-13 ENCOUNTER — MYC MEDICAL ADVICE (OUTPATIENT)
Dept: FAMILY MEDICINE | Facility: CLINIC | Age: 49
End: 2019-07-13

## 2019-07-16 NOTE — PROGRESS NOTES
Hematology/ Oncology Follow-up Visit:  Jul 12, 2019    Reason for Visit:   Chief Complaint   Patient presents with     Oncology Clinic Visit     3 month follow up breast cancer.        Oncologic History:  Invasive ductal carcinoma of breast, female, left (H)    Desi Granado was found to have developing focal asymmetry in the left breast at approximately 3 o'clock position about 7-8 cm from the nipple on the routine mammogram done on October 30, 2018.  Subsequently she went on to have diagnostic mammogram and ultrasound.  Directed sonogram at the site of mammographic finding showed a 1.6 cm irregular solid lesion.  Subsequently the patient went on to have ultrasound-guided needle core biopsy done on October 17, 2018.  The biopsy came back positive for invasive ductal carcinoma with papillary features.  There is grade 2 out of 3.  Angiolymphatic invasion was not seen.  Ductal carcinoma in situ was not seen.  The tumor was estrogen receptor positive about 100% and progesterone receptor +100% strong nuclear staining.  HER-2/tamara came back negative for amplification. She had bilateral mastectomy.  Surgical pathology revealed simple mastectomy sample of the left showing solid papillary carcinoma 22 mm.  Grade 2.  Margins were clear of involvement.  3 sentinel lymph nodes shows no evidence of metastatic disease.  Lymphovascular invasion was not identified.  The tumor is pT2pN0.  Right breast shows simple mastectomy without any evidence of malignancy.  Oncotype came back 0.        Interval History:  Patient is here today for follow-up visit.  She has been feeling well without recent complaints weight loss night sweats fever or chills.  Patient currently on adjuvant endocrine therapy with exemestane and Zoladex.  She has been tolerating treatment well without significant side effects.    Review Of Systems:  Constitutional: Negative for fever, chills, and night sweats.  Skin: negative.  Eyes: negative.  Ears/Nose/Throat:  "negative.  Respiratory: No shortness of breath, dyspnea on exertion, cough, or hemoptysis.  Cardiovascular: negative.  Gastrointestinal: negative.  Genitourinary: negative.  Musculoskeletal: negative.  Neurologic: negative.  Psychiatric: negative.  Hematologic/Lymphatic/Immunologic: negative.  Endocrine: negative.    All other ROS negative unless mentioned in interval history.    Past medical, social, surgical, and family histories reviewed.    Allergies:  Allergies as of 07/12/2019 - Reviewed 07/12/2019   Allergen Reaction Noted     Aspirin Hives 08/03/2009     Dust mites Unknown 07/03/2009     Morphine Nausea 06/23/2008       Current Medications:  Current Outpatient Medications   Medication Sig Dispense Refill     ACETAMINOPHEN PO Take 1,000 mg by mouth       calcium carbonate 600 mg-vitamin D 400 units (CALTRATE) 600-400 MG-UNIT per tablet Take 1 tablet by mouth 2 times daily       Calcium Polycarbophil (FIBER) 625 MG tablet Take by mouth daily       exemestane (AROMASIN) 25 MG tablet Take 1 tablet (25 mg) by mouth daily 90 tablet 1     Fluticasone Propionate (FLONASE ALLERGY RELIEF NA)        goserelin (ZOLADEX) 3.6 MG injection Inject 3.6 mg Subcutaneous once       lisinopril (PRINIVIL/ZESTRIL) 40 MG tablet Take 1 tablet (40 mg) by mouth daily 90 tablet 3     montelukast (SINGULAIR) 10 MG tablet Take 1 tablet (10 mg) by mouth At Bedtime 90 tablet 2     vitamin D3 (CHOLECALCIFEROL) 2000 units tablet Take 1 tablet by mouth daily       ZYRTEC 10 MG OR TABS 1 TABLET DAILY 90 3        Physical Exam:  /73 (BP Location: Right arm, Patient Position: Sitting, Cuff Size: Adult Regular)   Pulse 98   Temp 98.2  F (36.8  C) (Tympanic)   Resp 18   Ht 1.645 m (5' 4.75\")   Wt 67.7 kg (149 lb 4.8 oz)   SpO2 98%   Breastfeeding? No   BMI 25.04 kg/m    Wt Readings from Last 12 Encounters:   07/12/19 67.7 kg (149 lb 4.8 oz)   05/07/19 66.1 kg (145 lb 11.2 oz)   04/11/19 65.3 kg (144 lb)   03/21/19 64.4 kg (142 lb) "   02/22/19 63.5 kg (140 lb)   01/10/19 61.8 kg (136 lb 4.8 oz)   11/28/18 61.4 kg (135 lb 4.8 oz)   11/21/18 63.4 kg (139 lb 11.2 oz)   11/20/18 61.7 kg (136 lb)   11/08/18 65.9 kg (145 lb 4.5 oz)   11/06/18 64.4 kg (141 lb 14.4 oz)   10/26/18 65.6 kg (144 lb 11.2 oz)     GENERAL APPEARANCE: Healthy, alert and in no acute distress.  HEENT: Sclerae anicteric. PERRLA. Oropharynx without ulcers, lesions, or thrush.  NECK: Supple. No asymmetry or masses.  LYMPHATICS: No palpable cervical, supraclavicular, axillary, or inguinal lymphadenopathy.  BREAST scars of bilateral mastectomies  RESP: Lungs clear to auscultation bilaterally without rales, rhonchi or wheezes.  CARDIOVASCULAR: Regular rate and rhythm. Normal S1, S2; no S3 or S4. No murmur, gallop, or rub.  ABDOMEN: Soft, nontender. Bowel sounds normal. No palpable organomegaly or masses.  MUSCULOSKELETAL: Extremities without gross deformities noted. No edema of bilateral lower extremities.  SKIN: No suspicious lesions or rashes.  NEURO: Alert and oriented x 3. Cranial nerves II-XII grossly intact.  PSYCHIATRIC: Mentation and affect appear normal.    Laboratory/Imaging Studies:  No visits with results within 2 Week(s) from this visit.   Latest known visit with results is:   Oncology Visit on 05/10/2019   Component Date Value Ref Range Status     Lab Scanned Result 05/10/2019 MISCELLANEOUS TEST-Scanned   Final-Edited     Test Name 05/10/2019 [facility]   Final     Send Outs Misc Test Code 05/10/2019 9,510   Final     Send Outs Misc Test Specimen 05/10/2019 [facility]   Final     Location Performed 05/10/2019 [facility]   Final     Result 05/10/2019 PENDING   Incomplete     Normal Range for Send Outs Misc Te* 05/10/2019 PENDING   Incomplete          Assessment and plan:    (C50.912) Invasive ductal carcinoma of breast, female, left (H)  (primary encounter diagnosis)  I reviewed with patient most recent laboratory test.  There is no clinical evidence of breast cancer  recurrence.  We will see the patient again in 3 months or sooner if there are new developments or concerns.    (I10) Essential hypertension, benign  Patient currently on lisinopril 40 mg orally daily.    The patient is ready to learn, no apparent learning barriers were identified.  Diagnosis and treatment plans were explained to the patient. The patient expressed understanding of the content. The patient asked appropriate questions. The patient questions were answered to her satisfaction.    Chart documentation with Dragon Voice recognition Software. Although reviewed after completion, some words and grammatical errors may remain.

## 2019-07-26 ENCOUNTER — INFUSION THERAPY VISIT (OUTPATIENT)
Dept: INFUSION THERAPY | Facility: CLINIC | Age: 49
End: 2019-07-26
Attending: INTERNAL MEDICINE
Payer: COMMERCIAL

## 2019-07-26 VITALS
RESPIRATION RATE: 18 BRPM | HEART RATE: 84 BPM | SYSTOLIC BLOOD PRESSURE: 113 MMHG | TEMPERATURE: 97.9 F | DIASTOLIC BLOOD PRESSURE: 64 MMHG

## 2019-07-26 DIAGNOSIS — C50.919 MALIGNANT NEOPLASM OF BREAST IN FEMALE, ESTROGEN RECEPTOR POSITIVE, UNSPECIFIED LATERALITY, UNSPECIFIED SITE OF BREAST (H): Primary | ICD-10-CM

## 2019-07-26 DIAGNOSIS — Z17.0 MALIGNANT NEOPLASM OF BREAST IN FEMALE, ESTROGEN RECEPTOR POSITIVE, UNSPECIFIED LATERALITY, UNSPECIFIED SITE OF BREAST (H): Primary | ICD-10-CM

## 2019-07-26 PROCEDURE — 25000128 H RX IP 250 OP 636: Performed by: INTERNAL MEDICINE

## 2019-07-26 PROCEDURE — 96402 CHEMO HORMON ANTINEOPL SQ/IM: CPT

## 2019-07-26 RX ADMIN — GOSERELIN ACETATE 3.6 MG: 3.6 IMPLANT SUBCUTANEOUS at 15:25

## 2019-07-26 ASSESSMENT — PAIN SCALES - GENERAL: PAINLEVEL: NO PAIN (0)

## 2019-07-26 NOTE — PROGRESS NOTES
Infusion Nursing Note:  Desi Granado presents today for Zoladex.    Patient seen by provider today: No   present during visit today: Not Applicable.    Note: N/A.    Intravenous Access:  No Intravenous access/labs at this visit.    Treatment Conditions:  Not Applicable.    Post Infusion Assessment:  Patient tolerated injection without incident.  Site patent and intact, free from redness, edema or discomfort.  No evidence of extravasations.     Discharge Plan:   Discharge instructions reviewed with: Patient.  Patient and/or family verbalized understanding of discharge instructions and all questions answered.  AVS to patient via [employer].  Patient will return 8/23/2019 for next appointment.   Patient discharged in stable condition accompanied by: self.  Departure Mode: Ambulatory.    Ashley Schwarz RN

## 2019-08-14 ENCOUNTER — OFFICE VISIT (OUTPATIENT)
Dept: FAMILY MEDICINE | Facility: CLINIC | Age: 49
End: 2019-08-14
Payer: COMMERCIAL

## 2019-08-14 VITALS
BODY MASS INDEX: 25.32 KG/M2 | HEART RATE: 95 BPM | WEIGHT: 151 LBS | RESPIRATION RATE: 12 BRPM | DIASTOLIC BLOOD PRESSURE: 68 MMHG | OXYGEN SATURATION: 100 % | SYSTOLIC BLOOD PRESSURE: 112 MMHG | TEMPERATURE: 96.7 F

## 2019-08-14 DIAGNOSIS — I10 ESSENTIAL HYPERTENSION, BENIGN: ICD-10-CM

## 2019-08-14 DIAGNOSIS — R79.89 ELEVATED SERUM CREATININE: Primary | ICD-10-CM

## 2019-08-14 LAB
ALBUMIN UR-MCNC: NEGATIVE MG/DL
APPEARANCE UR: CLEAR
BILIRUB UR QL STRIP: NEGATIVE
COLOR UR AUTO: YELLOW
CREAT UR-MCNC: 45 MG/DL
GLUCOSE UR STRIP-MCNC: NEGATIVE MG/DL
HGB UR QL STRIP: ABNORMAL
KETONES UR STRIP-MCNC: NEGATIVE MG/DL
LEUKOCYTE ESTERASE UR QL STRIP: NEGATIVE
MICROALBUMIN UR-MCNC: <5 MG/L
MICROALBUMIN/CREAT UR: NORMAL MG/G CR (ref 0–25)
NITRATE UR QL: NEGATIVE
PH UR STRIP: 5.5 PH (ref 5–7)
PROT UR-MCNC: <0.05 G/L
PROT/CREAT 24H UR: NORMAL G/G CR (ref 0–0.2)
RBC #/AREA URNS AUTO: ABNORMAL /HPF
SOURCE: ABNORMAL
SP GR UR STRIP: 1.01 (ref 1–1.03)
URATE CRY #/AREA URNS HPF: ABNORMAL /HPF
UROBILINOGEN UR STRIP-ACNC: 0.2 EU/DL (ref 0.2–1)
WBC #/AREA URNS AUTO: ABNORMAL /HPF

## 2019-08-14 PROCEDURE — 81001 URINALYSIS AUTO W/SCOPE: CPT | Performed by: INTERNAL MEDICINE

## 2019-08-14 PROCEDURE — 82043 UR ALBUMIN QUANTITATIVE: CPT | Performed by: INTERNAL MEDICINE

## 2019-08-14 PROCEDURE — 84156 ASSAY OF PROTEIN URINE: CPT | Performed by: INTERNAL MEDICINE

## 2019-08-14 PROCEDURE — 99214 OFFICE O/P EST MOD 30 MIN: CPT | Performed by: INTERNAL MEDICINE

## 2019-08-14 RX ORDER — LISINOPRIL 30 MG/1
30 TABLET ORAL DAILY
Qty: 90 TABLET | Refills: 3 | Status: SHIPPED | OUTPATIENT
Start: 2019-08-14 | End: 2019-08-29

## 2019-08-14 NOTE — PROGRESS NOTES
Subjective     Desi Granado is a 49 year old female who presents to clinic today for the following health issues:    HPI     Lab : Pt is concern with the levels of creatinine on her blood.  Pt noticed some labs done and the levels are off, pt verbalize that she usually drinks 5 bottles of 32 oz of liquid a day.     Urination pain: No     History of breast cancer.  On zoladex (has been it for 3 months), aromasin.  Never had systemic chemo or radiation, s/p double mastectomy.  Diagnosed age 48.  Maternal gma and aunt with history of MM  Hypertension:  On lisinopril since her 30s.  Was doubled 9/2018.  GFR normal 9/2018, then 1/2019, GFR upper 50s. No UA on file.  Was on hydrochlorothiazide in the past and developed hypokalemia.          Current Outpatient Medications   Medication Sig Dispense Refill     calcium carbonate 600 mg-vitamin D 400 units (CALTRATE) 600-400 MG-UNIT per tablet Take 1 tablet by mouth 2 times daily       Calcium Polycarbophil (FIBER) 625 MG tablet Take by mouth daily       exemestane (AROMASIN) 25 MG tablet Take 1 tablet (25 mg) by mouth daily 90 tablet 1     Fluticasone Propionate (FLONASE ALLERGY RELIEF NA)        goserelin (ZOLADEX) 3.6 MG injection Inject 3.6 mg Subcutaneous once       lisinopril (PRINIVIL/ZESTRIL) 40 MG tablet Take 1 tablet (40 mg) by mouth daily 90 tablet 3     montelukast (SINGULAIR) 10 MG tablet Take 1 tablet (10 mg) by mouth At Bedtime 90 tablet 2     vitamin D3 (CHOLECALCIFEROL) 2000 units tablet Take 1 tablet by mouth daily       ZYRTEC 10 MG OR TABS 1 TABLET DAILY 90 3     ACETAMINOPHEN PO Take 1,000 mg by mouth           Reviewed and updated as needed this visit by Provider         Review of Systems   ROS COMP: Constitutional, HEENT, cardiovascular, pulmonary, gi and gu systems are negative, except as otherwise noted.      Objective    /68   Pulse 95   Temp 96.7  F (35.9  C) (Tympanic)   Resp 12   Wt 68.5 kg (151 lb)   SpO2 100%   Breastfeeding? No    BMI 25.32 kg/m    Body mass index is 25.32 kg/m .  Physical Exam   GENERAL APPEARANCE: healthy, alert and no distress    Diagnostic Test Results:  Labs reviewed in Epic  Results for orders placed or performed during the hospital encounter of 07/05/19   Ca27.29  breast tumor marker   Result Value Ref Range    CA 27-29 8 0 - 39 U/mL   Comprehensive metabolic panel   Result Value Ref Range    Sodium 141 133 - 144 mmol/L    Potassium 4.3 3.4 - 5.3 mmol/L    Chloride 107 94 - 109 mmol/L    Carbon Dioxide 28 20 - 32 mmol/L    Anion Gap 6 3 - 14 mmol/L    Glucose 86 70 - 99 mg/dL    Urea Nitrogen 20 7 - 30 mg/dL    Creatinine 1.12 (H) 0.52 - 1.04 mg/dL    GFR Estimate 58 (L) >60 mL/min/[1.73_m2]    GFR Estimate If Black 67 >60 mL/min/[1.73_m2]    Calcium 9.3 8.5 - 10.1 mg/dL    Bilirubin Total 0.4 0.2 - 1.3 mg/dL    Albumin 3.7 3.4 - 5.0 g/dL    Protein Total 7.0 6.8 - 8.8 g/dL    Alkaline Phosphatase 74 40 - 150 U/L    ALT 19 0 - 50 U/L    AST 12 0 - 45 U/L   CBC with platelets differential   Result Value Ref Range    WBC 5.7 4.0 - 11.0 10e9/L    RBC Count 4.49 3.8 - 5.2 10e12/L    Hemoglobin 12.8 11.7 - 15.7 g/dL    Hematocrit 39.8 35.0 - 47.0 %    MCV 89 78 - 100 fl    MCH 28.5 26.5 - 33.0 pg    MCHC 32.2 31.5 - 36.5 g/dL    RDW 11.9 10.0 - 15.0 %    Platelet Count 261 150 - 450 10e9/L    Diff Method Automated Method     % Neutrophils 63.5 %    % Lymphocytes 25.7 %    % Monocytes 7.2 %    % Eosinophils 2.4 %    % Basophils 0.7 %    % Immature Granulocytes 0.5 %    Nucleated RBCs 0 0 /100    Absolute Neutrophil 3.6 1.6 - 8.3 10e9/L    Absolute Lymphocytes 1.5 0.8 - 5.3 10e9/L    Absolute Monocytes 0.4 0.0 - 1.3 10e9/L    Absolute Eosinophils 0.1 0.0 - 0.7 10e9/L    Absolute Basophils 0.0 0.0 - 0.2 10e9/L    Abs Immature Granulocytes 0.0 0 - 0.4 10e9/L    Absolute Nucleated RBC 0.0            Assessment & Plan       ICD-10-CM    1. Elevated serum creatinine R79.89 UA reflex to Microscopic and Culture     Protein  random  urine with Creat Ratio     Albumin Random Urine Quantitative with Creat Ratio   2. Essential hypertension, benign I10 lisinopril (PRINIVIL/ZESTRIL) 30 MG tablet     UA reflex to Microscopic and Culture     Protein  random urine with Creat Ratio     Albumin Random Urine Quantitative with Creat Ratio     **Basic metabolic panel FUTURE 14d        The timing of the reduced kidney function correlates to the increase in the lisinopril.  This appears to be the most likely cause of her reduced kidney function.  Will check urine to ensure there is no proteinuria.  Decrease lisinopril and monitor kidney function.  If it still elevated, may need to stop it entirely versus a lower dose.      Patient Instructions   1. Urine test today  2. Non-fasting (well-hydrated) blood test in 2 weeks  3. Reduce lisinopril to 30 mg once daily  4. If blood pressure is > 130/80 may need to consider starting amlodipine      Return in about 2 weeks (around 8/28/2019) for Lab Work.    Dr. Shirley Rios,   [facility]

## 2019-08-14 NOTE — PATIENT INSTRUCTIONS
1. Urine test today  2. Non-fasting (well-hydrated) blood test in 2 weeks  3. Reduce lisinopril to 30 mg once daily  4. If blood pressure is > 130/80 may need to consider starting amlodipine

## 2019-08-23 ENCOUNTER — INFUSION THERAPY VISIT (OUTPATIENT)
Dept: INFUSION THERAPY | Facility: CLINIC | Age: 49
End: 2019-08-23
Attending: INTERNAL MEDICINE
Payer: COMMERCIAL

## 2019-08-23 VITALS
RESPIRATION RATE: 16 BRPM | HEART RATE: 90 BPM | TEMPERATURE: 97.1 F | SYSTOLIC BLOOD PRESSURE: 113 MMHG | DIASTOLIC BLOOD PRESSURE: 60 MMHG

## 2019-08-23 DIAGNOSIS — C50.919 MALIGNANT NEOPLASM OF BREAST IN FEMALE, ESTROGEN RECEPTOR POSITIVE, UNSPECIFIED LATERALITY, UNSPECIFIED SITE OF BREAST (H): Primary | ICD-10-CM

## 2019-08-23 DIAGNOSIS — Z17.0 MALIGNANT NEOPLASM OF BREAST IN FEMALE, ESTROGEN RECEPTOR POSITIVE, UNSPECIFIED LATERALITY, UNSPECIFIED SITE OF BREAST (H): Primary | ICD-10-CM

## 2019-08-23 PROCEDURE — 96402 CHEMO HORMON ANTINEOPL SQ/IM: CPT

## 2019-08-23 PROCEDURE — 25000128 H RX IP 250 OP 636: Performed by: INTERNAL MEDICINE

## 2019-08-23 RX ADMIN — GOSERELIN ACETATE 3.6 MG: 3.6 IMPLANT SUBCUTANEOUS at 15:33

## 2019-08-28 DIAGNOSIS — I10 ESSENTIAL HYPERTENSION, BENIGN: ICD-10-CM

## 2019-08-28 LAB
ANION GAP SERPL CALCULATED.3IONS-SCNC: 5 MMOL/L (ref 3–14)
BUN SERPL-MCNC: 14 MG/DL (ref 7–30)
CALCIUM SERPL-MCNC: 9.6 MG/DL (ref 8.5–10.1)
CHLORIDE SERPL-SCNC: 104 MMOL/L (ref 94–109)
CO2 SERPL-SCNC: 29 MMOL/L (ref 20–32)
CREAT SERPL-MCNC: 0.84 MG/DL (ref 0.52–1.04)
GFR SERPL CREATININE-BSD FRML MDRD: 82 ML/MIN/{1.73_M2}
GLUCOSE SERPL-MCNC: 85 MG/DL (ref 70–99)
POTASSIUM SERPL-SCNC: 4 MMOL/L (ref 3.4–5.3)
SODIUM SERPL-SCNC: 138 MMOL/L (ref 133–144)

## 2019-08-28 PROCEDURE — 80048 BASIC METABOLIC PNL TOTAL CA: CPT | Performed by: INTERNAL MEDICINE

## 2019-08-28 PROCEDURE — 36415 COLL VENOUS BLD VENIPUNCTURE: CPT | Performed by: INTERNAL MEDICINE

## 2019-08-29 ENCOUNTER — MYC MEDICAL ADVICE (OUTPATIENT)
Dept: FAMILY MEDICINE | Facility: CLINIC | Age: 49
End: 2019-08-29

## 2019-08-29 DIAGNOSIS — I10 ESSENTIAL HYPERTENSION, BENIGN: ICD-10-CM

## 2019-08-29 RX ORDER — LISINOPRIL 30 MG/1
30 TABLET ORAL DAILY
Qty: 90 TABLET | Refills: 3 | Status: SHIPPED | OUTPATIENT
Start: 2019-08-29 | End: 2019-09-19

## 2019-09-19 ENCOUNTER — OFFICE VISIT (OUTPATIENT)
Dept: FAMILY MEDICINE | Facility: CLINIC | Age: 49
End: 2019-09-19
Payer: COMMERCIAL

## 2019-09-19 VITALS
TEMPERATURE: 97.9 F | OXYGEN SATURATION: 99 % | HEIGHT: 65 IN | SYSTOLIC BLOOD PRESSURE: 110 MMHG | HEART RATE: 91 BPM | BODY MASS INDEX: 25.49 KG/M2 | DIASTOLIC BLOOD PRESSURE: 78 MMHG | WEIGHT: 153 LBS

## 2019-09-19 DIAGNOSIS — I10 ESSENTIAL HYPERTENSION, BENIGN: ICD-10-CM

## 2019-09-19 DIAGNOSIS — Z00.00 ROUTINE GENERAL MEDICAL EXAMINATION AT A HEALTH CARE FACILITY: Primary | ICD-10-CM

## 2019-09-19 DIAGNOSIS — K57.32 DIVERTICULITIS OF COLON: ICD-10-CM

## 2019-09-19 PROCEDURE — 99396 PREV VISIT EST AGE 40-64: CPT | Performed by: FAMILY MEDICINE

## 2019-09-19 RX ORDER — METRONIDAZOLE 500 MG/1
500 TABLET ORAL 2 TIMES DAILY
Qty: 14 TABLET | Refills: 0 | Status: SHIPPED | OUTPATIENT
Start: 2019-09-19 | End: 2019-10-18

## 2019-09-19 RX ORDER — LISINOPRIL 30 MG/1
30 TABLET ORAL DAILY
Qty: 90 TABLET | Refills: 3 | Status: SHIPPED | OUTPATIENT
Start: 2019-09-19 | End: 2020-08-11

## 2019-09-19 RX ORDER — CIPROFLOXACIN 500 MG/1
500 TABLET, FILM COATED ORAL 2 TIMES DAILY
Qty: 14 TABLET | Refills: 0 | Status: SHIPPED | OUTPATIENT
Start: 2019-09-19 | End: 2019-10-18

## 2019-09-19 ASSESSMENT — MIFFLIN-ST. JEOR: SCORE: 1315.91

## 2019-09-19 NOTE — PROGRESS NOTES
SUBJECTIVE:   CC: Desi Granado is an 49 year old woman who presents for preventive health visit.     Healthy Habits:    Do you get at least three servings of calcium containing foods daily (dairy, green leafy vegetables, etc.)? no, taking calcium and/or vitamin D supplement: yes     Amount of exercise or daily activities, outside of work: 5 day(s) per week    Problems taking medications regularly No    Medication side effects: No    Have you had an eye exam in the past two years? yes    Do you see a dentist twice per year? yes    Do you have sleep apnea, excessive snoring or daytime drowsiness?no      Doing well   Cont to follow up with oncology  She has a good energy level.  Blood pressure has been great even on the lower dose of the lisinopril and the renal fucntion is again normal.     Today's PHQ-2 Score:   PHQ-2 ( 1999 [employer]) 9/19/2019 9/14/2018   Q1: Little interest or pleasure in doing things 0 0   Q2: Feeling down, depressed or hopeless 0 0   PHQ-2 Score 0 0   Q1: Little interest or pleasure in doing things - -   Q2: Feeling down, depressed or hopeless - -   PHQ-2 Score - -       Abuse: Current or Past(Physical, Sexual or Emotional)- No  Do you feel safe in your environment? Yes    Social History     Tobacco Use     Smoking status: Never Smoker     Smokeless tobacco: Never Used   Substance Use Topics     Alcohol use: No     Alcohol/week: 0.0 oz     Comment: very rare     If you drink alcohol do you typically have >3 drinks per day or >7 drinks per week? No                     Reviewed orders with patient.  Reviewed health maintenance and updated orders accordingly - Yes  Labs reviewed in [facility]    Alternate mammogram schedule due to breast cancer history     Pertinent mammograms are reviewed under the imaging tab.  History of abnormal Pap smear: NO - age 30-65 PAP every 5 years with negative HPV co-testing recommended  PAP / HPV Latest Ref Rng & Units 9/8/2016 8/14/2013 8/16/2010   PAP - NIL NIL NIL    HPV 16 DNA NEG Negative - -   HPV 18 DNA NEG Negative - -   OTHER HR HPV NEG Negative - -     Reviewed and updated as needed this visit by clinical staff  Tobacco  Allergies  Meds  Med Hx  Surg Hx  Fam Hx  Soc Hx        Reviewed and updated as needed this visit by Provider        Past Medical History:   Diagnosis Date     Allergic rhinitis, cause unspecified      Hypertension 8 years?     Injury, other and unspecified, knee, leg, ankle, and foot 8th grade    left ankle injury     Invasive ductal carcinoma of breast, female, left (H) 10/26/2018      Past Surgical History:   Procedure Laterality Date     BIOPSY       BREAST LUMPECTOMY, RT/LT  6/23/10    Breast Lumpectomy RT for likely adenomatous lumps     COLONOSCOPY N/A 8/16/2018    Procedure: COLONOSCOPY;  colonoscopy;  Surgeon: Vijay Almanza MD;  Location: [facility]     ESOPHAGOSCOPY, GASTROSCOPY, DUODENOSCOPY (EGD), COMBINED N/A 10/1/2015    Procedure: COMBINED ESOPHAGOSCOPY, GASTROSCOPY, DUODENOSCOPY (EGD);  Surgeon: Vijay Almanza MD;  Location: [facility]     MASTECTOMY SIMPLE BILATERAL, SENTINEL NODE BILATERAL, COMBINED Bilateral 11/8/2018    Procedure: BILATERAL SIMPLE MASTECTOMIES WITH LEFT SENTINEL LYMPH NODE BIOPSY ;  Surgeon: Nabila Hart MD;  Location: [facility]     SOFT TISSUE SURGERY      Ankle surgery 20+ years ago     SURGICAL HISTORY OF -   1989    arthroscopy of Left Ankle     SURGICAL HISTORY OF -   1990    arthroscopy of Left Ankle     SURGICAL HISTORY OF -   1993    4 wisdom teeth extracted       ROS:  CONSTITUTIONAL: NEGATIVE for fever, chills, change in weight  INTEGUMENTARY/SKIN: NEGATIVE for worrisome rashes, moles or lesions  EYES: NEGATIVE for vision changes or irritation  ENT: NEGATIVE for ear, mouth and throat problems  RESP: NEGATIVE for significant cough or SOB  BREAST: NEGATIVE for masses, tenderness or discharge  CV: NEGATIVE for chest pain, palpitations or peripheral edema  GI: NEGATIVE for nausea, abdominal pain, heartburn, or  "change in bowel habits  : NEGATIVE for unusual urinary or vaginal symptoms. No vaginal bleeding. Stopped in June last period   MUSCULOSKELETAL: NEGATIVE for significant arthralgias or myalgia  NEURO: NEGATIVE for weakness, dizziness or paresthesias  PSYCHIATRIC: NEGATIVE for changes in mood or affect   Having a few warm moments   Will be getting the flu shot in oct   OBJECTIVE:   /78   Pulse 91   Temp 97.9  F (36.6  C) (Tympanic)   Ht 1.645 m (5' 4.75\")   Wt 69.4 kg (153 lb)   SpO2 99%   BMI 25.66 kg/m    EXAM:  GENERAL: healthy, alert and no distress  NECK: no adenopathy, no asymmetry, masses, or scars and thyroid normal to palpation  RESP: lungs clear to auscultation - no rales, rhonchi or wheezes  CV: regular rate and rhythm, normal S1 S2, no S3 or S4, no murmur, click or rub, no peripheral edema and peripheral pulses strong  ABDOMEN: soft, nontender, no hepatosplenomegaly, no masses and bowel sounds normal  MS: no gross musculoskeletal defects noted, no edema    Diagnostic Test Results:  Labs reviewed in Epic    ASSESSMENT/PLAN:   1. Routine general medical examination at a health care facility  Normal     2. Essential hypertension, benign  Cont on the lower dose   - lisinopril (PRINIVIL/ZESTRIL) 30 MG tablet; Take 1 tablet (30 mg) by mouth daily  Dispense: 90 tablet; Refill: 3    3. Diverticulitis of colon  She takes the written prescriptions when she travels so that she can start this immediately has not needed to use in the last year   - ciprofloxacin (CIPRO) 500 MG tablet; Take 1 tablet (500 mg) by mouth 2 times daily  Dispense: 14 tablet; Refill: 0  - metroNIDAZOLE (FLAGYL) 500 MG tablet; Take 1 tablet (500 mg) by mouth 2 times daily  Dispense: 14 tablet; Refill: 0    COUNSELING:   Reviewed preventive health counseling, as reflected in patient instructions    Estimated body mass index is 25.66 kg/m  as calculated from the following:    Height as of this encounter: 1.645 m (5' 4.75\").    Weight " as of this encounter: 69.4 kg (153 lb).         reports that she has never smoked. She has never used smokeless tobacco.      Counseling Resources:  ATP IV Guidelines  Pooled Cohorts Equation Calculator  Breast Cancer Risk Calculator  FRAX Risk Assessment  ICSI Preventive Guidelines  Dietary Guidelines for Americans, 2010  USDA's MyPlate  ASA Prophylaxis  Lung CA Screening    Pearl Grover MD  [facility]

## 2019-09-20 ENCOUNTER — INFUSION THERAPY VISIT (OUTPATIENT)
Dept: INFUSION THERAPY | Facility: CLINIC | Age: 49
End: 2019-09-20
Attending: INTERNAL MEDICINE
Payer: COMMERCIAL

## 2019-09-20 VITALS — SYSTOLIC BLOOD PRESSURE: 134 MMHG | DIASTOLIC BLOOD PRESSURE: 67 MMHG | RESPIRATION RATE: 18 BRPM | HEART RATE: 86 BPM

## 2019-09-20 DIAGNOSIS — Z17.0 MALIGNANT NEOPLASM OF BREAST IN FEMALE, ESTROGEN RECEPTOR POSITIVE, UNSPECIFIED LATERALITY, UNSPECIFIED SITE OF BREAST (H): Primary | ICD-10-CM

## 2019-09-20 DIAGNOSIS — C50.919 MALIGNANT NEOPLASM OF BREAST IN FEMALE, ESTROGEN RECEPTOR POSITIVE, UNSPECIFIED LATERALITY, UNSPECIFIED SITE OF BREAST (H): Primary | ICD-10-CM

## 2019-09-20 PROCEDURE — 96402 CHEMO HORMON ANTINEOPL SQ/IM: CPT

## 2019-09-20 PROCEDURE — 25000128 H RX IP 250 OP 636: Performed by: INTERNAL MEDICINE

## 2019-09-20 RX ADMIN — GOSERELIN ACETATE 3.6 MG: 3.6 IMPLANT SUBCUTANEOUS at 15:28

## 2019-09-20 NOTE — PROGRESS NOTES
Infusion Nursing Note:  Desi Granado presents today for Zoladex.    Patient seen by provider today: No   present during visit today: Not Applicable.    Note: N/A.    Intravenous Access:  No Intravenous access/labs at this visit.    Treatment Conditions:  Not Applicable.      Post Infusion Assessment:  Patient tolerated injection without incident.       Discharge Plan:   Patient discharged in stable condition accompanied by: self.  Departure Mode: Ambulatory.    Kendrick Le RN, RN

## 2019-09-30 ENCOUNTER — HEALTH MAINTENANCE LETTER (OUTPATIENT)
Age: 49
End: 2019-09-30

## 2019-10-14 ENCOUNTER — HOSPITAL ENCOUNTER (OUTPATIENT)
Dept: LAB | Facility: CLINIC | Age: 49
Discharge: HOME | End: 2019-10-14
Attending: INTERNAL MEDICINE | Admitting: INTERNAL MEDICINE
Payer: COMMERCIAL

## 2019-10-14 DIAGNOSIS — C50.912 INVASIVE DUCTAL CARCINOMA OF BREAST, FEMALE, LEFT (H): ICD-10-CM

## 2019-10-14 LAB
ALBUMIN SERPL-MCNC: 3.8 G/DL (ref 3.4–5)
ALP SERPL-CCNC: 93 U/L (ref 40–150)
ALT SERPL W P-5'-P-CCNC: 20 U/L (ref 0–50)
ANION GAP SERPL CALCULATED.3IONS-SCNC: 4 MMOL/L (ref 3–14)
AST SERPL W P-5'-P-CCNC: 11 U/L (ref 0–45)
BASOPHILS # BLD AUTO: 0 10E9/L (ref 0–0.2)
BASOPHILS NFR BLD AUTO: 0.3 %
BILIRUB SERPL-MCNC: 0.3 MG/DL (ref 0.2–1.3)
BUN SERPL-MCNC: 19 MG/DL (ref 7–30)
CALCIUM SERPL-MCNC: 9.5 MG/DL (ref 8.5–10.1)
CANCER AG27-29 SERPL-ACNC: 10 U/ML (ref 0–39)
CHLORIDE SERPL-SCNC: 105 MMOL/L (ref 94–109)
CO2 SERPL-SCNC: 29 MMOL/L (ref 20–32)
CREAT SERPL-MCNC: 1.4 MG/DL (ref 0.52–1.04)
DIFFERENTIAL METHOD BLD: NORMAL
EOSINOPHIL # BLD AUTO: 0.1 10E9/L (ref 0–0.7)
EOSINOPHIL NFR BLD AUTO: 1.4 %
ERYTHROCYTE [DISTWIDTH] IN BLOOD BY AUTOMATED COUNT: 12.1 % (ref 10–15)
GFR SERPL CREATININE-BSD FRML MDRD: 44 ML/MIN/{1.73_M2}
GLUCOSE SERPL-MCNC: 88 MG/DL (ref 70–99)
HCT VFR BLD AUTO: 39.6 % (ref 35–47)
HGB BLD-MCNC: 13.1 G/DL (ref 11.7–15.7)
IMM GRANULOCYTES # BLD: 0 10E9/L (ref 0–0.4)
IMM GRANULOCYTES NFR BLD: 0.5 %
LYMPHOCYTES # BLD AUTO: 1.4 10E9/L (ref 0.8–5.3)
LYMPHOCYTES NFR BLD AUTO: 23.4 %
MCH RBC QN AUTO: 29.2 PG (ref 26.5–33)
MCHC RBC AUTO-ENTMCNC: 33.1 G/DL (ref 31.5–36.5)
MCV RBC AUTO: 88 FL (ref 78–100)
MONOCYTES # BLD AUTO: 0.4 10E9/L (ref 0–1.3)
MONOCYTES NFR BLD AUTO: 7.1 %
NEUTROPHILS # BLD AUTO: 4 10E9/L (ref 1.6–8.3)
NEUTROPHILS NFR BLD AUTO: 67.3 %
NRBC # BLD AUTO: 0 10*3/UL
NRBC BLD AUTO-RTO: 0 /100
PLATELET # BLD AUTO: 346 10E9/L (ref 150–450)
POTASSIUM SERPL-SCNC: 4 MMOL/L (ref 3.4–5.3)
PROT SERPL-MCNC: 7.2 G/DL (ref 6.8–8.8)
RBC # BLD AUTO: 4.48 10E12/L (ref 3.8–5.2)
SODIUM SERPL-SCNC: 138 MMOL/L (ref 133–144)
WBC # BLD AUTO: 5.9 10E9/L (ref 4–11)

## 2019-10-14 PROCEDURE — 86300 IMMUNOASSAY TUMOR CA 15-3: CPT | Performed by: INTERNAL MEDICINE

## 2019-10-14 PROCEDURE — 36415 COLL VENOUS BLD VENIPUNCTURE: CPT | Performed by: INTERNAL MEDICINE

## 2019-10-14 PROCEDURE — 85025 COMPLETE CBC W/AUTO DIFF WBC: CPT | Performed by: INTERNAL MEDICINE

## 2019-10-14 PROCEDURE — 80053 COMPREHEN METABOLIC PANEL: CPT | Performed by: INTERNAL MEDICINE

## 2019-10-18 ENCOUNTER — INFUSION THERAPY VISIT (OUTPATIENT)
Dept: INFUSION THERAPY | Facility: CLINIC | Age: 49
End: 2019-10-18
Attending: INTERNAL MEDICINE
Payer: COMMERCIAL

## 2019-10-18 ENCOUNTER — ONCOLOGY VISIT (OUTPATIENT)
Dept: ONCOLOGY | Facility: CLINIC | Age: 49
End: 2019-10-18
Attending: INTERNAL MEDICINE
Payer: COMMERCIAL

## 2019-10-18 VITALS
RESPIRATION RATE: 18 BRPM | HEART RATE: 84 BPM | WEIGHT: 153.9 LBS | BODY MASS INDEX: 25.64 KG/M2 | HEIGHT: 65 IN | TEMPERATURE: 97.7 F | DIASTOLIC BLOOD PRESSURE: 77 MMHG | OXYGEN SATURATION: 96 % | SYSTOLIC BLOOD PRESSURE: 132 MMHG

## 2019-10-18 DIAGNOSIS — I10 ESSENTIAL HYPERTENSION, BENIGN: ICD-10-CM

## 2019-10-18 DIAGNOSIS — C50.919 MALIGNANT NEOPLASM OF BREAST IN FEMALE, ESTROGEN RECEPTOR POSITIVE, UNSPECIFIED LATERALITY, UNSPECIFIED SITE OF BREAST (H): Primary | ICD-10-CM

## 2019-10-18 DIAGNOSIS — Z17.0 MALIGNANT NEOPLASM OF BREAST IN FEMALE, ESTROGEN RECEPTOR POSITIVE, UNSPECIFIED LATERALITY, UNSPECIFIED SITE OF BREAST (H): Primary | ICD-10-CM

## 2019-10-18 DIAGNOSIS — C50.912 INVASIVE DUCTAL CARCINOMA OF BREAST, FEMALE, LEFT (H): Primary | ICD-10-CM

## 2019-10-18 PROCEDURE — G0463 HOSPITAL OUTPT CLINIC VISIT: HCPCS

## 2019-10-18 PROCEDURE — 25000128 H RX IP 250 OP 636: Performed by: INTERNAL MEDICINE

## 2019-10-18 PROCEDURE — 99214 OFFICE O/P EST MOD 30 MIN: CPT | Performed by: INTERNAL MEDICINE

## 2019-10-18 PROCEDURE — 96402 CHEMO HORMON ANTINEOPL SQ/IM: CPT

## 2019-10-18 RX ADMIN — GOSERELIN ACETATE 3.6 MG: 3.6 IMPLANT SUBCUTANEOUS at 15:32

## 2019-10-18 ASSESSMENT — PAIN SCALES - GENERAL: PAINLEVEL: NO PAIN (0)

## 2019-10-18 ASSESSMENT — MIFFLIN-ST. JEOR: SCORE: 1320

## 2019-10-18 NOTE — PROGRESS NOTES
Hematology/ Oncology Follow-up Visit:  Oct 18, 2019    Reason for Visit:   Chief Complaint   Patient presents with     Oncology Clinic Visit     3 month follow up breast cancer.        Oncologic History:    Invasive ductal carcinoma of breast, female, left (H)    Desi Granado was found to have developing focal asymmetry in the left breast at approximately 3 o'clock position about 7-8 cm from the nipple on the routine mammogram done on October 30, 2018.  Subsequently she went on to have diagnostic mammogram and ultrasound.  Directed sonogram at the site of mammographic finding showed a 1.6 cm irregular solid lesion.  Subsequently the patient went on to have ultrasound-guided needle core biopsy done on October 17, 2018.  The biopsy came back positive for invasive ductal carcinoma with papillary features.  There is grade 2 out of 3.  Angiolymphatic invasion was not seen.  Ductal carcinoma in situ was not seen.  The tumor was estrogen receptor positive about 100% and progesterone receptor +100% strong nuclear staining.  HER-2/tamara came back negative for amplification. She had bilateral mastectomy.  Surgical pathology revealed simple mastectomy sample of the left showing solid papillary carcinoma 22 mm.  Grade 2.  Margins were clear of involvement.  3 sentinel lymph nodes shows no evidence of metastatic disease.  Lymphovascular invasion was not identified.  The tumor is pT2pN0.  Right breast shows simple mastectomy without any evidence of malignancy.  Oncotype came back 0.        Interval History:  Patient is here today for follow-up visit.  She has been doing well without any recent complaints weight loss night sweats fever chills patient denies any nausea vomiting or diarrhea.  She denies any fever or chills.  She has been on Aromasin and Zoladex.  She has been tolerating treatment without significant side effects.    Review Of Systems:  Constitutional: Negative for fever, chills, and night sweats.  Skin:  "negative.  Eyes: negative.  Ears/Nose/Throat: negative.  Respiratory: No shortness of breath, dyspnea on exertion, cough, or hemoptysis.  Cardiovascular: negative.  Gastrointestinal: negative.  Genitourinary: negative.  Musculoskeletal: negative.  Neurologic: negative.  Psychiatric: negative.  Hematologic/Lymphatic/Immunologic: negative.  Endocrine: negative.    All other ROS negative unless mentioned in interval history.    Past medical, social, surgical, and family histories reviewed.    Allergies:  Allergies as of 10/18/2019 - Reviewed 10/18/2019   Allergen Reaction Noted     Aspirin Hives 08/03/2009     Dust mites Unknown 07/03/2009     Morphine Nausea 06/23/2008       Current Medications:  Current Outpatient Medications   Medication Sig Dispense Refill     ACETAMINOPHEN PO Take 1,000 mg by mouth       calcium carbonate 600 mg-vitamin D 400 units (CALTRATE) 600-400 MG-UNIT per tablet Take 1 tablet by mouth 2 times daily       Calcium Polycarbophil (FIBER) 625 MG tablet Take by mouth daily       exemestane (AROMASIN) 25 MG tablet Take 1 tablet (25 mg) by mouth daily 90 tablet 1     Fluticasone Propionate (FLONASE ALLERGY RELIEF NA)        goserelin (ZOLADEX) 3.6 MG injection Inject 3.6 mg Subcutaneous once       lisinopril (PRINIVIL/ZESTRIL) 30 MG tablet Take 1 tablet (30 mg) by mouth daily 90 tablet 3     montelukast (SINGULAIR) 10 MG tablet Take 1 tablet (10 mg) by mouth At Bedtime 90 tablet 2     vitamin D3 (CHOLECALCIFEROL) 2000 units tablet Take 1 tablet by mouth daily       ZYRTEC 10 MG OR TABS 1 TABLET DAILY 90 3        Physical Exam:  /77 (BP Location: Right arm, Patient Position: Sitting, Cuff Size: Adult Large)   Pulse 84   Temp 97.7  F (36.5  C) (Tympanic)   Resp 18   Ht 1.645 m (5' 4.75\")   Wt 69.8 kg (153 lb 14.4 oz)   SpO2 96%   Breastfeeding? No   BMI 25.81 kg/m    Wt Readings from Last 12 Encounters:   10/18/19 69.8 kg (153 lb 14.4 oz)   09/19/19 69.4 kg (153 lb)   08/14/19 68.5 kg " "(151 lb)   07/12/19 67.7 kg (149 lb 4.8 oz)   05/07/19 66.1 kg (145 lb 11.2 oz)   04/11/19 65.3 kg (144 lb)   03/21/19 64.4 kg (142 lb)   02/22/19 63.5 kg (140 lb)   01/10/19 61.8 kg (136 lb 4.8 oz)   11/28/18 61.4 kg (135 lb 4.8 oz)   11/21/18 63.4 kg (139 lb 11.2 oz)   11/20/18 61.7 kg (136 lb)     ECOG performance status: 0  GENERAL APPEARANCE: Healthy, alert and in no acute distress.  HEENT: Sclerae anicteric. PERRLA. Oropharynx without ulcers, lesions, or thrush.  NECK: Supple. No asymmetry or masses.  LYMPHATICS: No palpable cervical, supraclavicular, axillary, or inguinal lymphadenopathy.  RESP: Lungs clear to auscultation bilaterally without rales, rhonchi or wheezes.\", BREAST: Bilateral scars of mastectomies.  There is no dominant masses or axillary adenopathy.  \"CARDIOVASCULAR: Regular rate and rhythm. Normal S1, S2; no S3 or S4. No murmur, gallop, or rub.  ABDOMEN: Soft, nontender. Bowel sounds normal. No palpable organomegaly or masses.  MUSCULOSKELETAL: Extremities without gross deformities noted. No edema of bilateral lower extremities.  SKIN: No suspicious lesions or rashes.  NEURO: Alert and oriented x 3. Cranial nerves II-XII grossly intact.  PSYCHIATRIC: Mentation and affect appear normal.    Laboratory/Imaging Studies:  No visits with results within 2 Week(s) from this visit.   Latest known visit with results is:   Orders Only on 08/28/2019   Component Date Value Ref Range Status     Sodium 08/28/2019 138  133 - 144 mmol/L Final     Potassium 08/28/2019 4.0  3.4 - 5.3 mmol/L Final     Chloride 08/28/2019 104  94 - 109 mmol/L Final     Carbon Dioxide 08/28/2019 29  20 - 32 mmol/L Final     Anion Gap 08/28/2019 5  3 - 14 mmol/L Final     Glucose 08/28/2019 85  70 - 99 mg/dL Final    Non Fasting     Urea Nitrogen 08/28/2019 14  7 - 30 mg/dL Final     Creatinine 08/28/2019 0.84  0.52 - 1.04 mg/dL Final     GFR Estimate 08/28/2019 82  >60 mL/min/[1.73_m2] Final    Comment: Non  GFR " Calc  Starting 12/18/2018, serum creatinine based estimated GFR (eGFR) will be   calculated using the Chronic Kidney Disease Epidemiology Collaboration   (CKD-EPI) equation.       GFR Estimate If Black 08/28/2019 >90  >60 mL/min/[1.73_m2] Final    Comment:  GFR Calc  Starting 12/18/2018, serum creatinine based estimated GFR (eGFR) will be   calculated using the Chronic Kidney Disease Epidemiology Collaboration   (CKD-EPI) equation.       Calcium 08/28/2019 9.6  8.5 - 10.1 mg/dL Final          Assessment and plan:    (C50.912) Invasive ductal carcinoma of breast, female, left (H)  (primary encounter diagnosis)  I reviewed with the patient today most recent laboratory test.  There is no clinical evidence of breast cancer recurrence.  We patient will continue on Zoladex monthly as well as Aromasin 25 mg orally daily.  Patient will continue calcium and vitamin D supplements.  I will see the patient again in 6 months time or sooner if there are any developments or concerns.    (I10) Essential hypertension, benign  Pressures currently well controlled she is currently on lisinopril 30 mg orally daily.    Mild renal impairment.  Creatinine today is 1.3    The patient is ready to learn, no apparent learning barriers were identified.  Diagnosis and treatment plans were explained to the patient. The patient expressed understanding of the content. The patient asked appropriate questions. The patient questions were answered to her satisfaction.    Chart documentation with Dragon Voice recognition Software. Although reviewed after completion, some words and grammatical errors may remain.

## 2019-10-18 NOTE — PROGRESS NOTES
"Oncology Rooming Note    October 18, 2019 3:07 PM   Desi Granado is a 49 year old female who presents for:    Chief Complaint   Patient presents with     Oncology Clinic Visit     3 month follow up breast cancer.      Initial Vitals: /77 (BP Location: Right arm, Patient Position: Sitting, Cuff Size: Adult Large)   Pulse 84   Temp 97.7  F (36.5  C) (Tympanic)   Resp 18   Ht 1.645 m (5' 4.75\")   Wt 69.8 kg (153 lb 14.4 oz)   SpO2 96%   Breastfeeding? No   BMI 25.81 kg/m   Estimated body mass index is 25.81 kg/m  as calculated from the following:    Height as of this encounter: 1.645 m (5' 4.75\").    Weight as of this encounter: 69.8 kg (153 lb 14.4 oz). Body surface area is 1.79 meters squared.  No Pain (0) Comment: Data Unavailable   No LMP recorded.  Allergies reviewed: Yes  Medications reviewed: Yes    Medications: Medication refills not needed today.  Pharmacy name entered into [employer]:    [facility] [place] [place] [facility] [address] [facility] [place] - [address] [place] [address]  WRITTEN PRESCRIPTION REQUESTED    Clinical concerns: 3 month follow up breast cancer.       Nessa Jeff CMA            "

## 2019-10-18 NOTE — PROGRESS NOTES
Infusion Nursing Note:  Desi Granado presents today for Zoladex.    Patient seen by provider today: Yes: Dr. Peña   present during visit today: Not Applicable.    Note: N/A.    Intravenous Access:  n/a.      Post Infusion Assessment:  Patient tolerated injection; injection site bled very quickly - pressure dressing applied.       Discharge Plan:   Patient discharged in stable condition accompanied by: self.  Departure Mode: Ambulatory.    Larissa Fang RN                         patient went to urgent care for occasional SOB and back pain with cough was sent here for a CTA of Chest  and Echo has HX of bicuspid valve patient went to urgent care for occasional SOB and back pain with cough was sent here for a CTA of Chest  and Echo has HX of bicuspid valve, patient in no distress

## 2019-10-22 DIAGNOSIS — C50.912 INVASIVE DUCTAL CARCINOMA OF BREAST, FEMALE, LEFT (H): ICD-10-CM

## 2019-10-22 RX ORDER — EXEMESTANE 25 MG/1
25 TABLET ORAL DAILY
Qty: 90 TABLET | Refills: 1 | Status: SHIPPED | OUTPATIENT
Start: 2019-10-22 | End: 2020-06-12

## 2019-10-22 NOTE — PROGRESS NOTES
Fax received from [facility] requesting a refill of exemestane on behalf of patient.  Last refill: 5.23.19  # 90 with 1 refills at [facility].  Last office visit:  10.18.19    This is an appropriate refill, and has been e-prescribed. Awilda Loyd RN

## 2019-11-06 ENCOUNTER — MYC MEDICAL ADVICE (OUTPATIENT)
Dept: FAMILY MEDICINE | Facility: CLINIC | Age: 49
End: 2019-11-06

## 2019-11-08 ENCOUNTER — OFFICE VISIT (OUTPATIENT)
Dept: SURGERY | Facility: CLINIC | Age: 49
End: 2019-11-08
Payer: COMMERCIAL

## 2019-11-08 VITALS
SYSTOLIC BLOOD PRESSURE: 118 MMHG | TEMPERATURE: 97.6 F | BODY MASS INDEX: 25.11 KG/M2 | WEIGHT: 150.7 LBS | DIASTOLIC BLOOD PRESSURE: 70 MMHG | HEIGHT: 65 IN

## 2019-11-08 DIAGNOSIS — C50.919 MALIGNANT NEOPLASM OF BREAST IN FEMALE, ESTROGEN RECEPTOR POSITIVE, UNSPECIFIED LATERALITY, UNSPECIFIED SITE OF BREAST (H): ICD-10-CM

## 2019-11-08 DIAGNOSIS — Z17.0 MALIGNANT NEOPLASM OF BREAST IN FEMALE, ESTROGEN RECEPTOR POSITIVE, UNSPECIFIED LATERALITY, UNSPECIFIED SITE OF BREAST (H): ICD-10-CM

## 2019-11-08 DIAGNOSIS — I10 ESSENTIAL HYPERTENSION, BENIGN: ICD-10-CM

## 2019-11-08 DIAGNOSIS — Z90.13 S/P BILATERAL MASTECTOMY: ICD-10-CM

## 2019-11-08 DIAGNOSIS — K57.32 DIVERTICULITIS OF LARGE INTESTINE WITHOUT PERFORATION OR ABSCESS WITHOUT BLEEDING: ICD-10-CM

## 2019-11-08 DIAGNOSIS — C50.912 INVASIVE DUCTAL CARCINOMA OF BREAST, FEMALE, LEFT (H): Primary | ICD-10-CM

## 2019-11-08 PROCEDURE — 99214 OFFICE O/P EST MOD 30 MIN: CPT | Performed by: SURGERY

## 2019-11-08 RX ORDER — CIPROFLOXACIN 250 MG/1
250 TABLET, FILM COATED ORAL 2 TIMES DAILY
COMMUNITY
End: 2020-06-17

## 2019-11-08 ASSESSMENT — MIFFLIN-ST. JEOR: SCORE: 1305.48

## 2019-11-08 NOTE — PROGRESS NOTES
BREAST CANCER FOLLOW UP  CHIEF COMPLAINT  Chief Complaint   Patient presents with     RECHECK     F/u Breast - 6 month     HPI  Desi Granado is a 48 year old female who presents with   Chief Complaint   Patient presents with     RECHECK     F/u Breast - 6 month     The patient presents for her breast cancer followup appointment. Overall, she is feeling good. She denies any significant fatigue, fevers, chills, or changes in appetite. She has not suffered any significant weight loss.  Tolerating tamoxifen with no issues.     She has not noted any areas of skin changes or any masses in the breast or chest wall that she is concerned about.  She denies skin dimpling, puckering, erythema, or nipple discharge. She has not noted any palpable axillary lymphadenopathy.  Starting traveling more.  Having heavy periods since she's been off the OCP and tamoxifen; thus switched to exemestrane and has been tolerating this very well.  Overall happy.  She's excited for the Holiday season with no pain or drains as this was her 1 year anniversary from her double mastectomy.  Has no plan for reconstruction.      Currently been being treated for acute diverticulitis with cipro/flagyl.  THis is her 2nd episode the past 2 years.     Review of Systems  Constitutional: Denies fever or chills   Eyes: Denies change in visual acuity   HENT: Denies nasal congestion or sore throat   Respiratory: Denies cough or shortness of breath   Cardiovascular: Denies chest pain or edema   GI: Denies  nausea, vomiting, bloody stools or diarrhea; +abdominal pain,  : Denies dysuria   Musculoskeletal: Denies back pain or joint pain   Integument: Denies rash   Neurologic: Denies headache, focal weakness or sensory changes   Endocrine: Denies polyuria or polydipsia   Lymphatic: Denies swollen glands   Psychiatric: Denies depression or anxiety     PAST MEDICAL HISTORY  Past Medical History:   Diagnosis Date     Allergic rhinitis, cause unspecified       Hypertension 8 years?     Injury, other and unspecified, knee, leg, ankle, and foot 8th grade    left ankle injury     Invasive ductal carcinoma of breast, female, left (H) 10/26/2018     FAMILY HISTORY  Family History   Problem Relation Age of Onset     Hypertension Mother      Allergies Mother      Thyroid Disease Mother         Taking thyroid medication     Obesity Mother      Cerebrovascular Disease Mother         TIA Feb 2017     Hypertension Father      Thyroid Disease Father         two parathyroids removed     Cancer Maternal Grandmother         Bone CA     Allergies Maternal Grandmother      Circulatory Maternal Grandmother      Cerebrovascular Disease Maternal Grandmother      Other Cancer Maternal Grandmother         multiple myeloma     C.A.D. Maternal Grandfather      Respiratory Maternal Grandfather         asthma     Allergies Maternal Grandfather      Cerebrovascular Disease Maternal Grandfather      Alzheimer Disease Paternal Grandfather      Neurologic Disorder Paternal Grandfather         Parkinsons     Arthritis Paternal Grandmother      Respiratory Other         Emphysema     Diabetes Other      Asthma Other         generic emphysema     Genetic Disorder Other         genetic emphysema     Breast Cancer Sister         benign lump indicated future risk     Diabetes Other      Coronary Artery Disease Other      Other Cancer Other         multiple myeloma     Colon Cancer Other      Cerebrovascular Disease Other      Cancer - colorectal No family hx of      Breast Cancer No family hx of      Prostate Cancer No family hx of      SOCIAL HISTORY  Social History     Socioeconomic History     Marital status: Single     Spouse name: None     Number of children: 0     Years of education: 18     Highest education level: None   Occupational History     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     Employer: [employer]   Social Needs     Financial resource strain: None     Food insecurity:     Worry: None      Inability: None     Transportation needs:     Medical: None     Non-medical: None   Tobacco Use     Smoking status: Never Smoker     Smokeless tobacco: Never Used   Substance and Sexual Activity     Alcohol use: No     Alcohol/week: 0.0 oz     Comment: very rare     Drug use: No     Sexual activity: Not Currently     Partners: Male     Birth control/protection: Pill   Lifestyle     Physical activity:     Days per week: None     Minutes per session: None     Stress: None   Relationships     Social connections:     Talks on phone: None     Gets together: None     Attends Mosque service: None     Active member of club or organization: None     Attends meetings of clubs or organizations: None     Relationship status: None     Intimate partner violence:     Fear of current or ex partner: None     Emotionally abused: None     Physically abused: None     Forced sexual activity: None   Other Topics Concern     Parent/sibling w/ CABG, MI or angioplasty before 65F 55M? No   Social History Narrative     None     SURGICAL HISTORY  Past Surgical History:   Procedure Laterality Date     BIOPSY       BREAST LUMPECTOMY, RT/LT  6/23/10    Breast Lumpectomy RT for likely adenomatous lumps     COLONOSCOPY N/A 8/16/2018    Procedure: COLONOSCOPY;  colonoscopy;  Surgeon: Vijay Almanza MD;  Location: [facility]     ESOPHAGOSCOPY, GASTROSCOPY, DUODENOSCOPY (EGD), COMBINED N/A 10/1/2015    Procedure: COMBINED ESOPHAGOSCOPY, GASTROSCOPY, DUODENOSCOPY (EGD);  Surgeon: Vijay Almanza MD;  Location: [facility]     MASTECTOMY SIMPLE BILATERAL, SENTINEL NODE BILATERAL, COMBINED Bilateral 11/8/2018    Procedure: BILATERAL SIMPLE MASTECTOMIES WITH LEFT SENTINEL LYMPH NODE BIOPSY ;  Surgeon: Nabila Hart MD;  [facility]     SOFT TISSUE SURGERY      Ankle surgery 20+ years ago     SURGICAL HISTORY OF -   1989    arthroscopy of Left Ankle     SURGICAL HISTORY OF -   1990    arthroscopy of Left Ankle     SURGICAL HISTORY OF -   1993    4 wisdom teeth  "extracted     CURRENT MEDICATIONS    Current Outpatient Medications:      ACETAMINOPHEN PO, Take 1,000 mg by mouth, Disp: , Rfl:      calcium carbonate 600 mg-vitamin D 400 units (CALTRATE) 600-400 MG-UNIT per tablet, Take 1 tablet by mouth 2 times daily, Disp: , Rfl:      Calcium Polycarbophil (FIBER) 625 MG tablet, Take by mouth daily, Disp: , Rfl:      ciprofloxacin (CIPRO) 250 MG tablet, Take 250 mg by mouth 2 times daily, Disp: , Rfl:      exemestane (AROMASIN) 25 MG tablet, Take 1 tablet (25 mg) by mouth daily, Disp: 90 tablet, Rfl: 1     Fluticasone Propionate (FLONASE ALLERGY RELIEF NA), , Disp: , Rfl:      goserelin (ZOLADEX) 3.6 MG injection, Inject 3.6 mg Subcutaneous once, Disp: , Rfl:      lisinopril (PRINIVIL/ZESTRIL) 30 MG tablet, Take 1 tablet (30 mg) by mouth daily, Disp: 90 tablet, Rfl: 3     montelukast (SINGULAIR) 10 MG tablet, Take 1 tablet (10 mg) by mouth At Bedtime, Disp: 90 tablet, Rfl: 2     vitamin D3 (CHOLECALCIFEROL) 2000 units tablet, Take 1 tablet by mouth daily, Disp: , Rfl:      ZYRTEC 10 MG OR TABS, 1 TABLET DAILY, Disp: 90, Rfl: 3  ALLERGIES  Allergies   Allergen Reactions     Aspirin Hives     Dust Mites Unknown     Morphine Nausea     PHYSICAL EXAM  VITAL SIGNS:  height is 1.645 m (5' 4.75\") and weight is 68.4 kg (150 lb 11.2 oz). Her temporal temperature is 97.6  F (36.4  C). Her blood pressure is 118/70.   Constitutional: Well developed, Well nourished, No acute distress, Non-toxic appearance.   HENT: Normocephalic, Atraumatic, Bilateral external ears normal, Oropharynx moist, No oral exudates, Nose normal.   Eyes: EOMI, Conjunctiva normal, No discharge.   Neck: Normal range of motion, No tenderness, Supple, No stridor.   Lymphatic: No lymphadenopathy noted.   Cardiovascular: Normal heart rate, Normal rhythm, No murmurs, No rubs, No gallops.   Breast Exam:   RIGHT: Surgically absent breast. No areas of skin dimpling or puckering. No erythema. No skin scaling or changes. No " focal areas of significant tenderness.  No palpable axillary lymphadenopathy.  LEFT: Surgically absent breast. No areas of skin dimpling or puckering. No erythema. No skin scaling or changes. No focal areas of significant tenderness.   No palpable axillary, cervical, supraclavicular lymphadenopathy.  Thorax & Lungs: Normal breath sounds, No respiratory distress, No wheezing, No chest tenderness.   Abdomen: Bowel sounds normal, Soft, No masses, No pulsatile masses.   Skin: Warm, Dry, No erythema, No rash.   Back: No tenderness, No CVA tenderness.   Extremities: Intact distal pulses, No edema, No tenderness, No cyanosis, No clubbing.   Musculoskeletal: Good range of motion in all major joints. No tenderness to palpation or major deformities noted.   Neurologic: Alert & oriented x 3, Normal motor function, Normal sensory function, No focal deficits noted.   Psychiatric: Affect normal, Judgment normal, Mood normal.       Imaging Studies:   No results found for this or any previous visit (from the past 744 hour(s)).    FINAL IMPRESSION AND PLAN  (C50.912) Invasive ductal carcinoma of breast, female, left (H)  (primary encounter diagnosis)  Comment: s/p bilateral mastectomy  The patient is 12 months out from her breast cancer surgery. There is no evidence of recurrent disease on my exam today. She is continuing to recover well from her breast cancer treatment. No signs of lymphedema  She will continue periodic self breast or chest wall exam. She is encouraged to followup with me for any changes on self-exam, or for any concerns or questions. She will followup with her primary care provider for routine care, and continue followup with her oncologist as scheduled.  My recommendations were discussed with the patient. She will see me in 6 months for another exam.      [facility], DO

## 2019-11-08 NOTE — LETTER
11/8/2019         RE: Desi Granado  [address] [place] [address]        Dear Colleague,    Thank you for referring your patient, Desi Granado, to the [facility]. Please see a copy of my visit note below.    BREAST CANCER FOLLOW UP  CHIEF COMPLAINT  Chief Complaint   Patient presents with     RECHECK     F/u Breast - 6 month     HPI  Desi Granado is a 48 year old female who presents with   Chief Complaint   Patient presents with     RECHECK     F/u Breast - 6 month     The patient presents for her breast cancer followup appointment. Overall, she is feeling good. She denies any significant fatigue, fevers, chills, or changes in appetite. She has not suffered any significant weight loss.  Tolerating tamoxifen with no issues.     She has not noted any areas of skin changes or any masses in the breast or chest wall that she is concerned about.  She denies skin dimpling, puckering, erythema, or nipple discharge. She has not noted any palpable axillary lymphadenopathy.  Starting traveling more.  Having heavy periods since she's been off the OCP and tamoxifen; thus switched to exemestrane and has been tolerating this very well.  Overall happy.  She's excited for the Holiday season with no pain or drains as this was her 1 year anniversary from her double mastectomy.  Has no plan for reconstruction.      Currently been being treated for acute diverticulitis with cipro/flagyl.  THis is her 2nd episode the past 2 years.     Review of Systems  Constitutional: Denies fever or chills   Eyes: Denies change in visual acuity   HENT: Denies nasal congestion or sore throat   Respiratory: Denies cough or shortness of breath   Cardiovascular: Denies chest pain or edema   GI: Denies  nausea, vomiting, bloody stools or diarrhea; +abdominal pain,  : Denies dysuria   Musculoskeletal: Denies back pain or joint pain   Integument: Denies rash   Neurologic: Denies headache, focal  weakness or sensory changes   Endocrine: Denies polyuria or polydipsia   Lymphatic: Denies swollen glands   Psychiatric: Denies depression or anxiety     PAST MEDICAL HISTORY  Past Medical History:   Diagnosis Date     Allergic rhinitis, cause unspecified      Hypertension 8 years?     Injury, other and unspecified, knee, leg, ankle, and foot 8th grade    left ankle injury     Invasive ductal carcinoma of breast, female, left (H) 10/26/2018     FAMILY HISTORY  Family History   Problem Relation Age of Onset     Hypertension Mother      Allergies Mother      Thyroid Disease Mother         Taking thyroid medication     Obesity Mother      Cerebrovascular Disease Mother         TIA Feb 2017     Hypertension Father      Thyroid Disease Father         two parathyroids removed     Cancer Maternal Grandmother         Bone CA     Allergies Maternal Grandmother      Circulatory Maternal Grandmother      Cerebrovascular Disease Maternal Grandmother      Other Cancer Maternal Grandmother         multiple myeloma     C.A.D. Maternal Grandfather      Respiratory Maternal Grandfather         asthma     Allergies Maternal Grandfather      Cerebrovascular Disease Maternal Grandfather      Alzheimer Disease Paternal Grandfather      Neurologic Disorder Paternal Grandfather         Parkinsons     Arthritis Paternal Grandmother      Respiratory Other         Emphysema     Diabetes Other      Asthma Other         generic emphysema     Genetic Disorder Other         genetic emphysema     Breast Cancer Sister         benign lump indicated future risk     Diabetes Other      Coronary Artery Disease Other      Other Cancer Other         multiple myeloma     Colon Cancer Other      Cerebrovascular Disease Other      Cancer - colorectal No family hx of      Breast Cancer No family hx of      Prostate Cancer No family hx of      SOCIAL HISTORY  Social History     Socioeconomic History     Marital status: Single     Spouse name: None     Number  of children: 0     Years of education: 18     Highest education level: None   Occupational History     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     Employer: [employer]   Social Needs     Financial resource strain: None     Food insecurity:     Worry: None     Inability: None     Transportation needs:     Medical: None     Non-medical: None   Tobacco Use     Smoking status: Never Smoker     Smokeless tobacco: Never Used   Substance and Sexual Activity     Alcohol use: No     Alcohol/week: 0.0 oz     Comment: very rare     Drug use: No     Sexual activity: Not Currently     Partners: Male     Birth control/protection: Pill   Lifestyle     Physical activity:     Days per week: None     Minutes per session: None     Stress: None   Relationships     Social connections:     Talks on phone: None     Gets together: None     Attends Judaism service: None     Active member of club or organization: None     Attends meetings of clubs or organizations: None     Relationship status: None     Intimate partner violence:     Fear of current or ex partner: None     Emotionally abused: None     Physically abused: None     Forced sexual activity: None   Other Topics Concern     Parent/sibling w/ CABG, MI or angioplasty before 65F 55M? No   Social History Narrative     None     SURGICAL HISTORY  Past Surgical History:   Procedure Laterality Date     BIOPSY       BREAST LUMPECTOMY, RT/LT  6/23/10    Breast Lumpectomy RT for likely adenomatous lumps     COLONOSCOPY N/A 8/16/2018    Procedure: COLONOSCOPY;  colonoscopy;  Surgeon: Vijay Almanza MD;  Location: [facility]     ESOPHAGOSCOPY, GASTROSCOPY, DUODENOSCOPY (EGD), COMBINED N/A 10/1/2015    Procedure: COMBINED ESOPHAGOSCOPY, GASTROSCOPY, DUODENOSCOPY (EGD);  Surgeon: Vijay Almanza MD;  Location: [facility]     MASTECTOMY SIMPLE BILATERAL, SENTINEL NODE BILATERAL, COMBINED Bilateral 11/8/2018    Procedure: BILATERAL SIMPLE MASTECTOMIES WITH LEFT SENTINEL LYMPH NODE BIOPSY ;  Surgeon: Nabila Hart,  "MD;  Location[facility]     SOFT TISSUE SURGERY      Ankle surgery 20+ years ago     SURGICAL HISTORY OF -   1989    arthroscopy of Left Ankle     SURGICAL HISTORY OF -   1990    arthroscopy of Left Ankle     SURGICAL HISTORY OF -   1993    4 wisdom teeth extracted     CURRENT MEDICATIONS    Current Outpatient Medications:      ACETAMINOPHEN PO, Take 1,000 mg by mouth, Disp: , Rfl:      calcium carbonate 600 mg-vitamin D 400 units (CALTRATE) 600-400 MG-UNIT per tablet, Take 1 tablet by mouth 2 times daily, Disp: , Rfl:      Calcium Polycarbophil (FIBER) 625 MG tablet, Take by mouth daily, Disp: , Rfl:      ciprofloxacin (CIPRO) 250 MG tablet, Take 250 mg by mouth 2 times daily, Disp: , Rfl:      exemestane (AROMASIN) 25 MG tablet, Take 1 tablet (25 mg) by mouth daily, Disp: 90 tablet, Rfl: 1     Fluticasone Propionate (FLONASE ALLERGY RELIEF NA), , Disp: , Rfl:      goserelin (ZOLADEX) 3.6 MG injection, Inject 3.6 mg Subcutaneous once, Disp: , Rfl:      lisinopril (PRINIVIL/ZESTRIL) 30 MG tablet, Take 1 tablet (30 mg) by mouth daily, Disp: 90 tablet, Rfl: 3     montelukast (SINGULAIR) 10 MG tablet, Take 1 tablet (10 mg) by mouth At Bedtime, Disp: 90 tablet, Rfl: 2     vitamin D3 (CHOLECALCIFEROL) 2000 units tablet, Take 1 tablet by mouth daily, Disp: , Rfl:      ZYRTEC 10 MG OR TABS, 1 TABLET DAILY, Disp: 90, Rfl: 3  ALLERGIES  Allergies   Allergen Reactions     Aspirin Hives     Dust Mites Unknown     Morphine Nausea     PHYSICAL EXAM  VITAL SIGNS:  height is 1.645 m (5' 4.75\") and weight is 68.4 kg (150 lb 11.2 oz). Her temporal temperature is 97.6  F (36.4  C). Her blood pressure is 118/70.   Constitutional: Well developed, Well nourished, No acute distress, Non-toxic appearance.   HENT: Normocephalic, Atraumatic, Bilateral external ears normal, Oropharynx moist, No oral exudates, Nose normal.   Eyes: EOMI, Conjunctiva normal, No discharge.   Neck: Normal range of motion, No tenderness, Supple, No stridor. "   Lymphatic: No lymphadenopathy noted.   Cardiovascular: Normal heart rate, Normal rhythm, No murmurs, No rubs, No gallops.   Breast Exam:   RIGHT: Surgically absent breast. No areas of skin dimpling or puckering. No erythema. No skin scaling or changes. No focal areas of significant tenderness.  No palpable axillary lymphadenopathy.  LEFT: Surgically absent breast. No areas of skin dimpling or puckering. No erythema. No skin scaling or changes. No focal areas of significant tenderness.   No palpable axillary, cervical, supraclavicular lymphadenopathy.  Thorax & Lungs: Normal breath sounds, No respiratory distress, No wheezing, No chest tenderness.   Abdomen: Bowel sounds normal, Soft, No masses, No pulsatile masses.   Skin: Warm, Dry, No erythema, No rash.   Back: No tenderness, No CVA tenderness.   Extremities: Intact distal pulses, No edema, No tenderness, No cyanosis, No clubbing.   Musculoskeletal: Good range of motion in all major joints. No tenderness to palpation or major deformities noted.   Neurologic: Alert & oriented x 3, Normal motor function, Normal sensory function, No focal deficits noted.   Psychiatric: Affect normal, Judgment normal, Mood normal.       Imaging Studies:   No results found for this or any previous visit (from the past 744 hour(s)).    FINAL IMPRESSION AND PLAN  (C50.912) Invasive ductal carcinoma of breast, female, left (H)  (primary encounter diagnosis)  Comment: s/p bilateral mastectomy  The patient is 12 months out from her breast cancer surgery. There is no evidence of recurrent disease on my exam today. She is continuing to recover well from her breast cancer treatment. No signs of lymphedema  She will continue periodic self breast or chest wall exam. She is encouraged to followup with me for any changes on self-exam, or for any concerns or questions. She will followup with her primary care provider for routine care, and continue followup with her oncologist as scheduled.  My  recommendations were discussed with the patient. She will see me in 6 months for another exam.      [facility], DO          Again, thank you for allowing me to participate in the care of your patient.        Sincerely,        Nabila Hart MD

## 2019-11-15 ENCOUNTER — INFUSION THERAPY VISIT (OUTPATIENT)
Dept: INFUSION THERAPY | Facility: CLINIC | Age: 49
End: 2019-11-15
Attending: INTERNAL MEDICINE
Payer: COMMERCIAL

## 2019-11-15 VITALS — RESPIRATION RATE: 16 BRPM | SYSTOLIC BLOOD PRESSURE: 115 MMHG | HEART RATE: 94 BPM | DIASTOLIC BLOOD PRESSURE: 72 MMHG

## 2019-11-15 DIAGNOSIS — C50.919 MALIGNANT NEOPLASM OF BREAST IN FEMALE, ESTROGEN RECEPTOR POSITIVE, UNSPECIFIED LATERALITY, UNSPECIFIED SITE OF BREAST (H): Primary | ICD-10-CM

## 2019-11-15 DIAGNOSIS — Z17.0 MALIGNANT NEOPLASM OF BREAST IN FEMALE, ESTROGEN RECEPTOR POSITIVE, UNSPECIFIED LATERALITY, UNSPECIFIED SITE OF BREAST (H): Primary | ICD-10-CM

## 2019-11-15 PROCEDURE — 25000128 H RX IP 250 OP 636: Performed by: INTERNAL MEDICINE

## 2019-11-15 PROCEDURE — 96402 CHEMO HORMON ANTINEOPL SQ/IM: CPT

## 2019-11-15 RX ADMIN — GOSERELIN ACETATE 3.6 MG: 3.6 IMPLANT SUBCUTANEOUS at 15:41

## 2019-11-15 ASSESSMENT — PAIN SCALES - GENERAL: PAINLEVEL: NO PAIN (0)

## 2019-11-26 ENCOUNTER — MYC MEDICAL ADVICE (OUTPATIENT)
Dept: FAMILY MEDICINE | Facility: CLINIC | Age: 49
End: 2019-11-26

## 2019-11-26 DIAGNOSIS — K57.92 DIVERTICULITIS: Primary | ICD-10-CM

## 2019-11-27 RX ORDER — CIPROFLOXACIN 500 MG/1
500 TABLET, FILM COATED ORAL 2 TIMES DAILY
Qty: 20 TABLET | Refills: 0 | Status: SHIPPED | OUTPATIENT
Start: 2019-11-27 | End: 2020-09-22

## 2019-11-27 RX ORDER — METRONIDAZOLE 500 MG/1
500 TABLET ORAL 3 TIMES DAILY
Qty: 30 TABLET | Refills: 0 | Status: SHIPPED | OUTPATIENT
Start: 2019-11-27 | End: 2020-06-17

## 2019-12-13 ENCOUNTER — INFUSION THERAPY VISIT (OUTPATIENT)
Dept: INFUSION THERAPY | Facility: CLINIC | Age: 49
End: 2019-12-13
Attending: INTERNAL MEDICINE
Payer: COMMERCIAL

## 2019-12-13 VITALS
RESPIRATION RATE: 16 BRPM | DIASTOLIC BLOOD PRESSURE: 70 MMHG | SYSTOLIC BLOOD PRESSURE: 126 MMHG | HEART RATE: 78 BPM | TEMPERATURE: 97.3 F

## 2019-12-13 DIAGNOSIS — Z17.0 MALIGNANT NEOPLASM OF BREAST IN FEMALE, ESTROGEN RECEPTOR POSITIVE, UNSPECIFIED LATERALITY, UNSPECIFIED SITE OF BREAST (H): Primary | ICD-10-CM

## 2019-12-13 DIAGNOSIS — C50.919 MALIGNANT NEOPLASM OF BREAST IN FEMALE, ESTROGEN RECEPTOR POSITIVE, UNSPECIFIED LATERALITY, UNSPECIFIED SITE OF BREAST (H): Primary | ICD-10-CM

## 2019-12-13 PROCEDURE — 96402 CHEMO HORMON ANTINEOPL SQ/IM: CPT

## 2019-12-13 PROCEDURE — 25000128 H RX IP 250 OP 636: Performed by: INTERNAL MEDICINE

## 2019-12-13 RX ADMIN — GOSERELIN ACETATE 3.6 MG: 3.6 IMPLANT SUBCUTANEOUS at 15:00

## 2020-01-08 ENCOUNTER — TELEPHONE (OUTPATIENT)
Dept: FAMILY MEDICINE | Facility: CLINIC | Age: 50
End: 2020-01-08

## 2020-01-08 DIAGNOSIS — J30.89 ALLERGIC RHINITIS DUE TO OTHER ALLERGIC TRIGGER, UNSPECIFIED SEASONALITY: ICD-10-CM

## 2020-01-08 RX ORDER — MONTELUKAST SODIUM 10 MG/1
10 TABLET ORAL AT BEDTIME
Qty: 90 TABLET | Refills: 3 | Status: SHIPPED | OUTPATIENT
Start: 2020-01-08 | End: 2020-11-30

## 2020-01-08 NOTE — TELEPHONE ENCOUNTER
I called pt to clarify.  She says that she uses Singulair for allergies.  She tried to go off it recently but it did not go well.  She says her dust allergies are more bothersome than she realized.    Debra Waters RN    Montelukast (Singulair) 10 mg tab  Last Written Prescription Date:  4/2/19  Last Fill Quantity: 90,  # refills: 2   Last office visit: 3/21/2019 with prescribing provider: Garbiel   Future Office Visit:   Next 5 appointments (look out 90 days)    Apr 03, 2020  3:00 PM CDT  Return Visit with Stephen Peña MD  [facility] ([facility]) [facility]  [address]  [facility] [address]  [phone]

## 2020-01-08 NOTE — TELEPHONE ENCOUNTER
To: Shirley Rios  The prescription for montelukast (SINGULAIR) 10 MG tablet has . If you would like this patient to continue this medication, please send new RX. Thank you

## 2020-01-08 NOTE — TELEPHONE ENCOUNTER
Requested Prescriptions   Pending Prescriptions Disp Refills     montelukast (SINGULAIR) 10 MG tablet 90 tablet 2     Sig: Take 1 tablet (10 mg) by mouth At Bedtime       There is no refill protocol information for this order

## 2020-01-08 NOTE — TELEPHONE ENCOUNTER
Routing refill request to provider for review/approval because:  Drug not on the [facility] refill protocol     Pt takes for dust allergies and seasonal allergies.  Debra Waters RN

## 2020-01-10 ENCOUNTER — INFUSION THERAPY VISIT (OUTPATIENT)
Dept: INFUSION THERAPY | Facility: CLINIC | Age: 50
End: 2020-01-10
Attending: INTERNAL MEDICINE
Payer: COMMERCIAL

## 2020-01-10 VITALS — DIASTOLIC BLOOD PRESSURE: 68 MMHG | SYSTOLIC BLOOD PRESSURE: 116 MMHG | HEART RATE: 90 BPM

## 2020-01-10 DIAGNOSIS — Z17.0 MALIGNANT NEOPLASM OF BREAST IN FEMALE, ESTROGEN RECEPTOR POSITIVE, UNSPECIFIED LATERALITY, UNSPECIFIED SITE OF BREAST (H): Primary | ICD-10-CM

## 2020-01-10 DIAGNOSIS — C50.919 MALIGNANT NEOPLASM OF BREAST IN FEMALE, ESTROGEN RECEPTOR POSITIVE, UNSPECIFIED LATERALITY, UNSPECIFIED SITE OF BREAST (H): Primary | ICD-10-CM

## 2020-01-10 PROCEDURE — 25000128 H RX IP 250 OP 636: Performed by: INTERNAL MEDICINE

## 2020-01-10 PROCEDURE — 96402 CHEMO HORMON ANTINEOPL SQ/IM: CPT

## 2020-01-10 RX ADMIN — GOSERELIN ACETATE 3.6 MG: 3.6 IMPLANT SUBCUTANEOUS at 15:28

## 2020-01-10 NOTE — PROGRESS NOTES
Infusion Nursing Note:  Desi Granado presents today for Zoladex injection.    Patient seen by provider today: No   present during visit today: Not Applicable.    Note: N/A.    Intravenous Access:  No Intravenous access/labs at this visit.    Treatment Conditions:  Not Applicable.      Post Infusion Assessment:  Patient tolerated injection without incident.       Discharge Plan:   Patient discharged in stable condition accompanied by: self.  Departure Mode: Ambulatory.    Larissa Fang RN                      116/68

## 2020-01-21 DIAGNOSIS — C50.912 INVASIVE DUCTAL CARCINOMA OF BREAST, FEMALE, LEFT (H): Primary | ICD-10-CM

## 2020-01-21 NOTE — PROGRESS NOTES
Message rec'd from pt requesting new DME orders for compression sleeve and compression stockings. Orders placed, will have MD sign tomorrow. Will mail orders to patient, per her request.     Zbigniew Clancy RN on 1/21/2020 at 3:15 PM

## 2020-02-07 ENCOUNTER — INFUSION THERAPY VISIT (OUTPATIENT)
Dept: INFUSION THERAPY | Facility: CLINIC | Age: 50
End: 2020-02-07
Attending: INTERNAL MEDICINE
Payer: COMMERCIAL

## 2020-02-07 VITALS — DIASTOLIC BLOOD PRESSURE: 73 MMHG | HEART RATE: 90 BPM | SYSTOLIC BLOOD PRESSURE: 127 MMHG | TEMPERATURE: 97.3 F

## 2020-02-07 DIAGNOSIS — Z17.0 MALIGNANT NEOPLASM OF BREAST IN FEMALE, ESTROGEN RECEPTOR POSITIVE, UNSPECIFIED LATERALITY, UNSPECIFIED SITE OF BREAST (H): Primary | ICD-10-CM

## 2020-02-07 DIAGNOSIS — C50.919 MALIGNANT NEOPLASM OF BREAST IN FEMALE, ESTROGEN RECEPTOR POSITIVE, UNSPECIFIED LATERALITY, UNSPECIFIED SITE OF BREAST (H): Primary | ICD-10-CM

## 2020-02-07 PROCEDURE — 25000128 H RX IP 250 OP 636: Performed by: INTERNAL MEDICINE

## 2020-02-07 PROCEDURE — 96402 CHEMO HORMON ANTINEOPL SQ/IM: CPT

## 2020-02-07 RX ADMIN — GOSERELIN ACETATE 3.6 MG: 3.6 IMPLANT SUBCUTANEOUS at 15:36

## 2020-03-06 ENCOUNTER — INFUSION THERAPY VISIT (OUTPATIENT)
Dept: INFUSION THERAPY | Facility: CLINIC | Age: 50
End: 2020-03-06
Attending: INTERNAL MEDICINE
Payer: COMMERCIAL

## 2020-03-06 VITALS — HEART RATE: 84 BPM | SYSTOLIC BLOOD PRESSURE: 120 MMHG | DIASTOLIC BLOOD PRESSURE: 72 MMHG | TEMPERATURE: 98.1 F

## 2020-03-06 DIAGNOSIS — Z17.0 MALIGNANT NEOPLASM OF BREAST IN FEMALE, ESTROGEN RECEPTOR POSITIVE, UNSPECIFIED LATERALITY, UNSPECIFIED SITE OF BREAST (H): Primary | ICD-10-CM

## 2020-03-06 DIAGNOSIS — C50.919 MALIGNANT NEOPLASM OF BREAST IN FEMALE, ESTROGEN RECEPTOR POSITIVE, UNSPECIFIED LATERALITY, UNSPECIFIED SITE OF BREAST (H): Primary | ICD-10-CM

## 2020-03-06 PROCEDURE — 96402 CHEMO HORMON ANTINEOPL SQ/IM: CPT

## 2020-03-06 PROCEDURE — 25000128 H RX IP 250 OP 636: Performed by: INTERNAL MEDICINE

## 2020-03-06 RX ADMIN — GOSERELIN ACETATE 3.6 MG: 3.6 IMPLANT SUBCUTANEOUS at 15:30

## 2020-03-06 NOTE — PROGRESS NOTES
Infusion Nursing Note:  Desi Granado presents today for Zoladex  Patient seen by provider today: No   present during visit today: Not Applicable.    Note: N/A.    Intravenous Access:  No Intravenous access/labs at this visit.    Treatment Conditions:  Not Applicable.      Post Infusion Assessment:  Patient tolerated Zoladex injection subcutaneous to the left side of the abdomen without incident.       Discharge Plan:   Patient discharged in stable condition accompanied by: self.  Departure Mode: Ambulatory.  Pt to return on 4/3/20 at 3:00 pm for labs followed by Zoladex at 3:30 pm.     Elli Magana RN

## 2020-03-18 ENCOUNTER — E-VISIT (OUTPATIENT)
Dept: FAMILY MEDICINE | Facility: CLINIC | Age: 50
End: 2020-03-18
Payer: COMMERCIAL

## 2020-03-18 ENCOUNTER — MYC MEDICAL ADVICE (OUTPATIENT)
Dept: FAMILY MEDICINE | Facility: CLINIC | Age: 50
End: 2020-03-18

## 2020-03-18 DIAGNOSIS — F43.22 ADJUSTMENT DISORDER WITH ANXIOUS MOOD: Primary | ICD-10-CM

## 2020-03-18 PROCEDURE — 99421 OL DIG E/M SVC 5-10 MIN: CPT | Performed by: FAMILY MEDICINE

## 2020-03-18 RX ORDER — LORAZEPAM 1 MG/1
1 TABLET ORAL
Qty: 30 TABLET | Refills: 1 | Status: SHIPPED | OUTPATIENT
Start: 2020-03-18 | End: 2020-06-17

## 2020-03-18 ASSESSMENT — ANXIETY QUESTIONNAIRES
GAD7 TOTAL SCORE: 15
1. FEELING NERVOUS, ANXIOUS, OR ON EDGE: NEARLY EVERY DAY
GAD7 TOTAL SCORE: 15
3. WORRYING TOO MUCH ABOUT DIFFERENT THINGS: NOT AT ALL
4. TROUBLE RELAXING: NEARLY EVERY DAY
2. NOT BEING ABLE TO STOP OR CONTROL WORRYING: MORE THAN HALF THE DAYS
7. FEELING AFRAID AS IF SOMETHING AWFUL MIGHT HAPPEN: NEARLY EVERY DAY
7. FEELING AFRAID AS IF SOMETHING AWFUL MIGHT HAPPEN: NEARLY EVERY DAY
5. BEING SO RESTLESS THAT IT IS HARD TO SIT STILL: NEARLY EVERY DAY
6. BECOMING EASILY ANNOYED OR IRRITABLE: SEVERAL DAYS

## 2020-03-19 ASSESSMENT — ANXIETY QUESTIONNAIRES: GAD7 TOTAL SCORE: 15

## 2020-03-23 ENCOUNTER — MYC MEDICAL ADVICE (OUTPATIENT)
Dept: FAMILY MEDICINE | Facility: CLINIC | Age: 50
End: 2020-03-23

## 2020-03-30 ENCOUNTER — MYC MEDICAL ADVICE (OUTPATIENT)
Dept: FAMILY MEDICINE | Facility: CLINIC | Age: 50
End: 2020-03-30

## 2020-03-30 DIAGNOSIS — F43.22 ADJUSTMENT DISORDER WITH ANXIOUS MOOD: Primary | ICD-10-CM

## 2020-03-31 RX ORDER — HYDROXYZINE HYDROCHLORIDE 10 MG/1
10-30 TABLET, FILM COATED ORAL EVERY 6 HOURS PRN
Qty: 120 TABLET | Refills: 3 | Status: SHIPPED | OUTPATIENT
Start: 2020-03-31 | End: 2020-04-06

## 2020-04-06 ENCOUNTER — MYC MEDICAL ADVICE (OUTPATIENT)
Dept: FAMILY MEDICINE | Facility: CLINIC | Age: 50
End: 2020-04-06

## 2020-04-06 DIAGNOSIS — F43.22 ADJUSTMENT DISORDER WITH ANXIOUS MOOD: ICD-10-CM

## 2020-04-06 RX ORDER — HYDROXYZINE HYDROCHLORIDE 10 MG/1
10-30 TABLET, FILM COATED ORAL EVERY 6 HOURS PRN
Qty: 120 TABLET | Refills: 0 | Status: SHIPPED | OUTPATIENT
Start: 2020-04-06 | End: 2020-06-17

## 2020-04-06 NOTE — TELEPHONE ENCOUNTER
"Received fax from [employer] regarding Hydroxyzine Hcl 10 mg tablets.     \"Patient's plan will cover a minimum of 60 days. Please provide quantity sufficient for a 60 to 90 day supply for Hydroxyzine 10 mg tablets.\"    Please re-order and clarify in the comments that whatever quantity is ordered is a 60 or a 90 day supply.  Thank you.  Azam Erazo RN    "

## 2020-05-14 ENCOUNTER — MYC MEDICAL ADVICE (OUTPATIENT)
Dept: ONCOLOGY | Facility: CLINIC | Age: 50
End: 2020-05-14

## 2020-05-15 ENCOUNTER — PATIENT OUTREACH (OUTPATIENT)
Dept: ONCOLOGY | Facility: CLINIC | Age: 50
End: 2020-05-15

## 2020-06-12 DIAGNOSIS — C50.912 INVASIVE DUCTAL CARCINOMA OF BREAST, FEMALE, LEFT (H): ICD-10-CM

## 2020-06-12 RX ORDER — EXEMESTANE 25 MG/1
25 TABLET ORAL DAILY
Qty: 90 TABLET | Refills: 1 | Status: SHIPPED | OUTPATIENT
Start: 2020-06-12 | End: 2020-12-01

## 2020-06-12 NOTE — PROGRESS NOTES
fax received from [facility] pharmacy requesting a refill of exemestane on behalf of Dr. Peña.  Last refill: 10/22/19  # 90 with 1 refills at above pharmacy.  Last office visit:  10/18/19  Next office visit:  6/17/19    Desi Flores RN

## 2020-06-17 ENCOUNTER — ONCOLOGY VISIT (OUTPATIENT)
Dept: ONCOLOGY | Facility: CLINIC | Age: 50
End: 2020-06-17
Attending: INTERNAL MEDICINE
Payer: COMMERCIAL

## 2020-06-17 ENCOUNTER — INFUSION THERAPY VISIT (OUTPATIENT)
Dept: INFUSION THERAPY | Facility: CLINIC | Age: 50
End: 2020-06-17
Attending: INTERNAL MEDICINE
Payer: COMMERCIAL

## 2020-06-17 ENCOUNTER — HOSPITAL ENCOUNTER (OUTPATIENT)
Dept: LAB | Facility: CLINIC | Age: 50
Discharge: HOME | End: 2020-06-17
Attending: INTERNAL MEDICINE | Admitting: INTERNAL MEDICINE
Payer: COMMERCIAL

## 2020-06-17 VITALS
DIASTOLIC BLOOD PRESSURE: 75 MMHG | HEIGHT: 65 IN | TEMPERATURE: 98.5 F | BODY MASS INDEX: 24.91 KG/M2 | RESPIRATION RATE: 16 BRPM | OXYGEN SATURATION: 100 % | SYSTOLIC BLOOD PRESSURE: 136 MMHG | HEART RATE: 85 BPM | WEIGHT: 149.5 LBS

## 2020-06-17 DIAGNOSIS — I10 ESSENTIAL HYPERTENSION, BENIGN: ICD-10-CM

## 2020-06-17 DIAGNOSIS — C50.912 INVASIVE DUCTAL CARCINOMA OF BREAST, FEMALE, LEFT (H): ICD-10-CM

## 2020-06-17 DIAGNOSIS — C50.919 MALIGNANT NEOPLASM OF BREAST IN FEMALE, ESTROGEN RECEPTOR POSITIVE, UNSPECIFIED LATERALITY, UNSPECIFIED SITE OF BREAST (H): Primary | ICD-10-CM

## 2020-06-17 DIAGNOSIS — Z17.0 MALIGNANT NEOPLASM OF BREAST IN FEMALE, ESTROGEN RECEPTOR POSITIVE, UNSPECIFIED LATERALITY, UNSPECIFIED SITE OF BREAST (H): Primary | ICD-10-CM

## 2020-06-17 DIAGNOSIS — C50.912 INVASIVE DUCTAL CARCINOMA OF BREAST, FEMALE, LEFT (H): Primary | ICD-10-CM

## 2020-06-17 LAB
ALBUMIN SERPL-MCNC: 3.8 G/DL (ref 3.4–5)
ALP SERPL-CCNC: 78 U/L (ref 40–150)
ALT SERPL W P-5'-P-CCNC: 21 U/L (ref 0–50)
ANION GAP SERPL CALCULATED.3IONS-SCNC: 8 MMOL/L (ref 3–14)
AST SERPL W P-5'-P-CCNC: 14 U/L (ref 0–45)
BASOPHILS # BLD AUTO: 0.1 10E9/L (ref 0–0.2)
BASOPHILS NFR BLD AUTO: 0.7 %
BILIRUB SERPL-MCNC: 0.4 MG/DL (ref 0.2–1.3)
BUN SERPL-MCNC: 13 MG/DL (ref 7–30)
CALCIUM SERPL-MCNC: 9.5 MG/DL (ref 8.5–10.1)
CANCER AG27-29 SERPL-ACNC: 14 U/ML (ref 0–39)
CHLORIDE SERPL-SCNC: 98 MMOL/L (ref 94–109)
CO2 SERPL-SCNC: 27 MMOL/L (ref 20–32)
CREAT SERPL-MCNC: 0.87 MG/DL (ref 0.52–1.04)
DIFFERENTIAL METHOD BLD: NORMAL
EOSINOPHIL # BLD AUTO: 0.1 10E9/L (ref 0–0.7)
EOSINOPHIL NFR BLD AUTO: 1.5 %
ERYTHROCYTE [DISTWIDTH] IN BLOOD BY AUTOMATED COUNT: 12.5 % (ref 10–15)
GFR SERPL CREATININE-BSD FRML MDRD: 77 ML/MIN/{1.73_M2}
GLUCOSE SERPL-MCNC: 89 MG/DL (ref 70–99)
HCT VFR BLD AUTO: 37 % (ref 35–47)
HGB BLD-MCNC: 12.7 G/DL (ref 11.7–15.7)
IMM GRANULOCYTES # BLD: 0 10E9/L (ref 0–0.4)
IMM GRANULOCYTES NFR BLD: 0.4 %
LYMPHOCYTES # BLD AUTO: 1.6 10E9/L (ref 0.8–5.3)
LYMPHOCYTES NFR BLD AUTO: 21.6 %
MCH RBC QN AUTO: 29.6 PG (ref 26.5–33)
MCHC RBC AUTO-ENTMCNC: 34.3 G/DL (ref 31.5–36.5)
MCV RBC AUTO: 86 FL (ref 78–100)
MONOCYTES # BLD AUTO: 0.6 10E9/L (ref 0–1.3)
MONOCYTES NFR BLD AUTO: 7.7 %
NEUTROPHILS # BLD AUTO: 5 10E9/L (ref 1.6–8.3)
NEUTROPHILS NFR BLD AUTO: 68.1 %
NRBC # BLD AUTO: 0 10*3/UL
NRBC BLD AUTO-RTO: 0 /100
PLATELET # BLD AUTO: 299 10E9/L (ref 150–450)
POTASSIUM SERPL-SCNC: 3.9 MMOL/L (ref 3.4–5.3)
PROT SERPL-MCNC: 7.2 G/DL (ref 6.8–8.8)
RBC # BLD AUTO: 4.29 10E12/L (ref 3.8–5.2)
SODIUM SERPL-SCNC: 133 MMOL/L (ref 133–144)
WBC # BLD AUTO: 7.3 10E9/L (ref 4–11)

## 2020-06-17 PROCEDURE — 96372 THER/PROPH/DIAG INJ SC/IM: CPT

## 2020-06-17 PROCEDURE — 36415 COLL VENOUS BLD VENIPUNCTURE: CPT | Performed by: INTERNAL MEDICINE

## 2020-06-17 PROCEDURE — G0463 HOSPITAL OUTPT CLINIC VISIT: HCPCS | Mod: 25

## 2020-06-17 PROCEDURE — 96401 CHEMO ANTI-NEOPL SQ/IM: CPT

## 2020-06-17 PROCEDURE — 25000128 H RX IP 250 OP 636: Performed by: INTERNAL MEDICINE

## 2020-06-17 PROCEDURE — 86300 IMMUNOASSAY TUMOR CA 15-3: CPT | Performed by: INTERNAL MEDICINE

## 2020-06-17 PROCEDURE — 80053 COMPREHEN METABOLIC PANEL: CPT | Performed by: INTERNAL MEDICINE

## 2020-06-17 PROCEDURE — 85025 COMPLETE CBC W/AUTO DIFF WBC: CPT | Performed by: INTERNAL MEDICINE

## 2020-06-17 PROCEDURE — 99214 OFFICE O/P EST MOD 30 MIN: CPT | Performed by: INTERNAL MEDICINE

## 2020-06-17 RX ADMIN — GOSERELIN ACETATE 3.6 MG: 3.6 IMPLANT SUBCUTANEOUS at 15:58

## 2020-06-17 ASSESSMENT — PAIN SCALES - GENERAL: PAINLEVEL: NO PAIN (0)

## 2020-06-17 ASSESSMENT — MIFFLIN-ST. JEOR: SCORE: 1300.04

## 2020-06-17 NOTE — PROGRESS NOTES
Infusion Nursing Note:  Desi Granado presents today for Zoladex.    Patient seen by provider today: Yes: Dr. Peña    present during visit today: Not Applicable.    Note: N/A.    Intravenous Access:  Labs drawn without difficulty.    Treatment Conditions:  Not Applicable.      Post Infusion Assessment:  Patient tolerated Zoladex injection subcutaneous to the right side of the abdomen without incident.       Discharge Plan:   Patient discharged in stable condition accompanied by: self.  Departure Mode: Ambulatory.  Pt to return 7/15/20 at 2;00 pm for monthly Zoladex then it will change to every three months.     Elli Magana RN

## 2020-06-17 NOTE — LETTER
"    6/17/2020         RE: Desi Granado  [address] [place] [address]        Dear Colleague,    Thank you for referring your patient, Desi Granado, to the [facility]. Please see a copy of my visit note below.    Oncology Rooming Note    June 17, 2020 3:31 PM   Desi Granado is a 49 year old female who presents for:    Chief Complaint   Patient presents with     Oncology Clinic Visit     6 month recheck Invasive ductal carcinoma of breast, female, left, review labs & Zoladex today     Initial Vitals: /75 (BP Location: Left arm, Patient Position: Sitting, Cuff Size: Adult Regular)   Pulse 85   Temp 98.5  F (36.9  C) (Tympanic)   Resp 16   Ht 1.645 m (5' 4.75\")   Wt 67.8 kg (149 lb 8 oz)   SpO2 100%   BMI 25.07 kg/m   Estimated body mass index is 25.07 kg/m  as calculated from the following:    Height as of this encounter: 1.645 m (5' 4.75\").    Weight as of this encounter: 67.8 kg (149 lb 8 oz). Body surface area is 1.76 meters squared.  No Pain (0) Comment: Data Unavailable   No LMP recorded.  Allergies reviewed: Yes  Medications reviewed: Yes    Medications: Medication refills not needed today.  Pharmacy name entered into EPIC:      [facility] [id number] - [id number] AT [place] & [address]    Clinical concerns: 6 month recheck Invasive ductal carcinoma of breast, female, left, review labs & Zoladex today.      Sabra Boyd CMA                 Hematology/ Oncology Follow-up Visit:  Jun 17, 2020    Reason for Visit:   Chief Complaint   Patient presents with     Oncology Clinic Visit     6 month recheck Invasive ductal carcinoma of breast, female, left, review labs & Zoladex today       Oncologic History:    Invasive ductal carcinoma of breast, female, left (H)    Desi Granado was found to have developing focal asymmetry in the left breast at approximately 3 o'clock position about 7-8 cm from the nipple on the routine " mammogram done on October 30, 2018.  Subsequently she went on to have diagnostic mammogram and ultrasound.  Directed sonogram at the site of mammographic finding showed a 1.6 cm irregular solid lesion.  Subsequently the patient went on to have ultrasound-guided needle core biopsy done on October 17, 2018.  The biopsy came back positive for invasive ductal carcinoma with papillary features.  There is grade 2 out of 3.  Angiolymphatic invasion was not seen.  Ductal carcinoma in situ was not seen.  The tumor was estrogen receptor positive about 100% and progesterone receptor +100% strong nuclear staining.  HER-2/tamara came back negative for amplification. She had bilateral mastectomy.  Surgical pathology revealed simple mastectomy sample of the left showing solid papillary carcinoma 22 mm.  Grade 2.  Margins were clear of involvement.  3 sentinel lymph nodes shows no evidence of metastatic disease.  Lymphovascular invasion was not identified.  The tumor is pT2pN0.  Right breast shows simple mastectomy without any evidence of malignancy.  Oncotype came back 0.        Interval History:  Patient has been feeling well without any recent complaints weight loss or night sweats or fever or chills.  She denies any nausea vomiting or diarrhea.  She denies any shortness of breath or cough or wheezing.    Review Of Systems:  Constitutional: Negative for fever, chills, and night sweats.  Skin: negative.  Eyes: negative.  Ears/Nose/Throat: negative.  Respiratory: No shortness of breath, dyspnea on exertion, cough, or hemoptysis.  Cardiovascular: negative.  Gastrointestinal: negative.  Genitourinary: negative.  Musculoskeletal: negative.  Neurologic: negative.  Psychiatric: negative.  Hematologic/Lymphatic/Immunologic: negative.  Endocrine: negative.    All other ROS negative unless mentioned in interval history.    Past medical, social, surgical, and family histories reviewed.    Allergies:  Allergies as of 06/17/2020 - Reviewed  "06/17/2020   Allergen Reaction Noted     Aspirin Hives 08/03/2009     Dust mites Unknown 07/03/2009     Morphine Nausea 06/23/2008       Current Medications:  Current Outpatient Medications   Medication Sig Dispense Refill     calcium carbonate 600 mg-vitamin D 400 units (CALTRATE) 600-400 MG-UNIT per tablet Take 1 tablet by mouth 2 times daily       Calcium Polycarbophil (FIBER) 625 MG tablet Take by mouth daily       exemestane (AROMASIN) 25 MG tablet Take 1 tablet (25 mg) by mouth daily 90 tablet 1     Fluticasone Propionate (FLONASE ALLERGY RELIEF NA)        goserelin (ZOLADEX) 3.6 MG injection Inject 3.6 mg Subcutaneous once       lisinopril (PRINIVIL/ZESTRIL) 30 MG tablet Take 1 tablet (30 mg) by mouth daily 90 tablet 3     montelukast (SINGULAIR) 10 MG tablet Take 1 tablet (10 mg) by mouth At Bedtime 90 tablet 3     vitamin D3 (CHOLECALCIFEROL) 2000 units tablet Take 1 tablet by mouth daily       ZYRTEC 10 MG OR TABS 1 TABLET DAILY 90 3     ACETAMINOPHEN PO Take 1,000 mg by mouth       ciprofloxacin (CIPRO) 500 MG tablet Take 1 tablet (500 mg) by mouth 2 times daily 20 tablet 0     order for DME Equipment being ordered: Compression sleeve 30-40 mmHg (Patient not taking: Reported on 6/17/2020) 1 each 1     order for DME Equipment being ordered: Compression stockings 30-40 mmHg (Patient not taking: Reported on 6/17/2020) 1 each 1        Physical Exam:  /75 (BP Location: Left arm, Patient Position: Sitting, Cuff Size: Adult Regular)   Pulse 85   Temp 98.5  F (36.9  C) (Tympanic)   Resp 16   Ht 1.645 m (5' 4.75\")   Wt 67.8 kg (149 lb 8 oz)   SpO2 100%   BMI 25.07 kg/m    Wt Readings from Last 12 Encounters:   06/17/20 67.8 kg (149 lb 8 oz)   11/08/19 68.4 kg (150 lb 11.2 oz)   10/18/19 69.8 kg (153 lb 14.4 oz)   09/19/19 69.4 kg (153 lb)   08/14/19 68.5 kg (151 lb)   07/12/19 67.7 kg (149 lb 4.8 oz)   05/07/19 66.1 kg (145 lb 11.2 oz)   04/11/19 65.3 kg (144 lb)   03/21/19 64.4 kg (142 lb)   02/22/19 " "63.5 kg (140 lb)   01/10/19 61.8 kg (136 lb 4.8 oz)   11/28/18 61.4 kg (135 lb 4.8 oz)     ECOG performance status: 0  GENERAL APPEARANCE: Healthy, alert and in no acute distress.  HEENT: Sclerae anicteric. PERRLA. Oropharynx without ulcers, lesions, or thrush.  NECK: Supple. No asymmetry or masses.  LYMPHATICS: No palpable cervical, supraclavicular, axillary, or inguinal lymphadenopathy.  RESP: Lungs clear to auscultation bilaterally without rales, rhonchi or wheezes.\",  BREAST: Scars of bilateral mastectomy.  \"CARDIOVASCULAR: Regular rate and rhythm. Normal S1, S2; no S3 or S4. No murmur, gallop, or rub.  ABDOMEN: Soft, nontender. Bowel sounds normal. No palpable organomegaly or masses.  MUSCULOSKELETAL: Extremities without gross deformities noted. No edema of bilateral lower extremities.  SKIN: No suspicious lesions or rashes.  NEURO: Alert and oriented x 3. Cranial nerves II-XII grossly intact.  PSYCHIATRIC: Mentation and affect appear normal.    Laboratory/Imaging Studies:  No visits with results within 2 Week(s) from this visit.   Latest known visit with results is:   Orders Only on 08/28/2019   Component Date Value Ref Range Status     Sodium 08/28/2019 138  133 - 144 mmol/L Final     Potassium 08/28/2019 4.0  3.4 - 5.3 mmol/L Final     Chloride 08/28/2019 104  94 - 109 mmol/L Final     Carbon Dioxide 08/28/2019 29  20 - 32 mmol/L Final     Anion Gap 08/28/2019 5  3 - 14 mmol/L Final     Glucose 08/28/2019 85  70 - 99 mg/dL Final    Non Fasting     Urea Nitrogen 08/28/2019 14  7 - 30 mg/dL Final     Creatinine 08/28/2019 0.84  0.52 - 1.04 mg/dL Final     GFR Estimate 08/28/2019 82  >60 mL/min/[1.73_m2] Final    Comment: Non  GFR Calc  Starting 12/18/2018, serum creatinine based estimated GFR (eGFR) will be   calculated using the Chronic Kidney Disease Epidemiology Collaboration   (CKD-EPI) equation.       GFR Estimate If Black 08/28/2019 >90  >60 mL/min/[1.73_m2] Final    Comment:  " American GFR Calc  Starting 12/18/2018, serum creatinine based estimated GFR (eGFR) will be   calculated using the Chronic Kidney Disease Epidemiology Collaboration   (CKD-EPI) equation.       Calcium 08/28/2019 9.6  8.5 - 10.1 mg/dL Final        No results found for this or any previous visit (from the past 744 hour(s)).    Assessment and plan:  (C50.912) Invasive ductal carcinoma of breast, female, left (H)  (primary encounter diagnosis)  I reviewed with the patient today most recent laboratory tests.  There is no clinical evidence of breast cancer recurrence.  The patient will continue on adjuvant endocrine therapy with exemestane and Zoladex injection.  We will Zoladex injection to every 3 months instead of monthly.  I will see the patient again in 6 months or sooner if there are new developments or concerns.    (I10) Essential hypertension, benign  Blood pressure is currently well controlled on lisinopril 30 mg orally daily.    The patient is ready to learn, no apparent learning barriers were identified.  Diagnosis and treatment plans were explained to the patient. The patient expressed understanding of the content. The patient asked appropriate questions. The patient questions were answered to her satisfaction.    Chart documentation with Dragon Voice recognition Software. Although reviewed after completion, some words and grammatical errors may remain.      Again, thank you for allowing me to participate in the care of your patient.        Sincerely,        Stephen Peña MD

## 2020-06-17 NOTE — PROGRESS NOTES
"Oncology Rooming Note    June 17, 2020 3:31 PM   Desi Granado is a 49 year old female who presents for:    Chief Complaint   Patient presents with     Oncology Clinic Visit     6 month recheck Invasive ductal carcinoma of breast, female, left, review labs & Zoladex today     Initial Vitals: /75 (BP Location: Left arm, Patient Position: Sitting, Cuff Size: Adult Regular)   Pulse 85   Temp 98.5  F (36.9  C) (Tympanic)   Resp 16   Ht 1.645 m (5' 4.75\")   Wt 67.8 kg (149 lb 8 oz)   SpO2 100%   BMI 25.07 kg/m   Estimated body mass index is 25.07 kg/m  as calculated from the following:    Height as of this encounter: 1.645 m (5' 4.75\").    Weight as of this encounter: 67.8 kg (149 lb 8 oz). Body surface area is 1.76 meters squared.  No Pain (0) Comment: Data Unavailable   No LMP recorded.  Allergies reviewed: Yes  Medications reviewed: Yes    Medications: Medication refills not needed today.  Pharmacy name entered into EPIC:      [facility] [id number] - [id number] AT [place] & [address]    Clinical concerns: 6 month recheck Invasive ductal carcinoma of breast, female, left, review labs & Zoladex today.      Sabra Boyd CMA               "

## 2020-06-17 NOTE — PATIENT INSTRUCTIONS
Continue with Zoladex and exemestane.  Zoladex will be changed to every 3 months  Follow-up in 6 months with repeat laboratory tests.

## 2020-06-24 NOTE — PROGRESS NOTES
Hematology/ Oncology Follow-up Visit:  Jun 17, 2020    Reason for Visit:   Chief Complaint   Patient presents with     Oncology Clinic Visit     6 month recheck Invasive ductal carcinoma of breast, female, left, review labs & Zoladex today       Oncologic History:    Invasive ductal carcinoma of breast, female, left (H)    Desi Granado was found to have developing focal asymmetry in the left breast at approximately 3 o'clock position about 7-8 cm from the nipple on the routine mammogram done on October 30, 2018.  Subsequently she went on to have diagnostic mammogram and ultrasound.  Directed sonogram at the site of mammographic finding showed a 1.6 cm irregular solid lesion.  Subsequently the patient went on to have ultrasound-guided needle core biopsy done on October 17, 2018.  The biopsy came back positive for invasive ductal carcinoma with papillary features.  There is grade 2 out of 3.  Angiolymphatic invasion was not seen.  Ductal carcinoma in situ was not seen.  The tumor was estrogen receptor positive about 100% and progesterone receptor +100% strong nuclear staining.  HER-2/tamara came back negative for amplification. She had bilateral mastectomy.  Surgical pathology revealed simple mastectomy sample of the left showing solid papillary carcinoma 22 mm.  Grade 2.  Margins were clear of involvement.  3 sentinel lymph nodes shows no evidence of metastatic disease.  Lymphovascular invasion was not identified.  The tumor is pT2pN0.  Right breast shows simple mastectomy without any evidence of malignancy.  Oncotype came back 0.        Interval History:  Patient has been feeling well without any recent complaints weight loss or night sweats or fever or chills.  She denies any nausea vomiting or diarrhea.  She denies any shortness of breath or cough or wheezing.    Review Of Systems:  Constitutional: Negative for fever, chills, and night sweats.  Skin: negative.  Eyes: negative.  Ears/Nose/Throat:  negative.  Respiratory: No shortness of breath, dyspnea on exertion, cough, or hemoptysis.  Cardiovascular: negative.  Gastrointestinal: negative.  Genitourinary: negative.  Musculoskeletal: negative.  Neurologic: negative.  Psychiatric: negative.  Hematologic/Lymphatic/Immunologic: negative.  Endocrine: negative.    All other ROS negative unless mentioned in interval history.    Past medical, social, surgical, and family histories reviewed.    Allergies:  Allergies as of 06/17/2020 - Reviewed 06/17/2020   Allergen Reaction Noted     Aspirin Hives 08/03/2009     Dust mites Unknown 07/03/2009     Morphine Nausea 06/23/2008       Current Medications:  Current Outpatient Medications   Medication Sig Dispense Refill     calcium carbonate 600 mg-vitamin D 400 units (CALTRATE) 600-400 MG-UNIT per tablet Take 1 tablet by mouth 2 times daily       Calcium Polycarbophil (FIBER) 625 MG tablet Take by mouth daily       exemestane (AROMASIN) 25 MG tablet Take 1 tablet (25 mg) by mouth daily 90 tablet 1     Fluticasone Propionate (FLONASE ALLERGY RELIEF NA)        goserelin (ZOLADEX) 3.6 MG injection Inject 3.6 mg Subcutaneous once       lisinopril (PRINIVIL/ZESTRIL) 30 MG tablet Take 1 tablet (30 mg) by mouth daily 90 tablet 3     montelukast (SINGULAIR) 10 MG tablet Take 1 tablet (10 mg) by mouth At Bedtime 90 tablet 3     vitamin D3 (CHOLECALCIFEROL) 2000 units tablet Take 1 tablet by mouth daily       ZYRTEC 10 MG OR TABS 1 TABLET DAILY 90 3     ACETAMINOPHEN PO Take 1,000 mg by mouth       ciprofloxacin (CIPRO) 500 MG tablet Take 1 tablet (500 mg) by mouth 2 times daily 20 tablet 0     order for DME Equipment being ordered: Compression sleeve 30-40 mmHg (Patient not taking: Reported on 6/17/2020) 1 each 1     order for DME Equipment being ordered: Compression stockings 30-40 mmHg (Patient not taking: Reported on 6/17/2020) 1 each 1        Physical Exam:  /75 (BP Location: Left arm, Patient Position: Sitting, Cuff  "Size: Adult Regular)   Pulse 85   Temp 98.5  F (36.9  C) (Tympanic)   Resp 16   Ht 1.645 m (5' 4.75\")   Wt 67.8 kg (149 lb 8 oz)   SpO2 100%   BMI 25.07 kg/m    Wt Readings from Last 12 Encounters:   06/17/20 67.8 kg (149 lb 8 oz)   11/08/19 68.4 kg (150 lb 11.2 oz)   10/18/19 69.8 kg (153 lb 14.4 oz)   09/19/19 69.4 kg (153 lb)   08/14/19 68.5 kg (151 lb)   07/12/19 67.7 kg (149 lb 4.8 oz)   05/07/19 66.1 kg (145 lb 11.2 oz)   04/11/19 65.3 kg (144 lb)   03/21/19 64.4 kg (142 lb)   02/22/19 63.5 kg (140 lb)   01/10/19 61.8 kg (136 lb 4.8 oz)   11/28/18 61.4 kg (135 lb 4.8 oz)     ECOG performance status: 0  GENERAL APPEARANCE: Healthy, alert and in no acute distress.  HEENT: Sclerae anicteric. PERRLA. Oropharynx without ulcers, lesions, or thrush.  NECK: Supple. No asymmetry or masses.  LYMPHATICS: No palpable cervical, supraclavicular, axillary, or inguinal lymphadenopathy.  RESP: Lungs clear to auscultation bilaterally without rales, rhonchi or wheezes.\",  BREAST: Scars of bilateral mastectomy.  \"CARDIOVASCULAR: Regular rate and rhythm. Normal S1, S2; no S3 or S4. No murmur, gallop, or rub.  ABDOMEN: Soft, nontender. Bowel sounds normal. No palpable organomegaly or masses.  MUSCULOSKELETAL: Extremities without gross deformities noted. No edema of bilateral lower extremities.  SKIN: No suspicious lesions or rashes.  NEURO: Alert and oriented x 3. Cranial nerves II-XII grossly intact.  PSYCHIATRIC: Mentation and affect appear normal.    Laboratory/Imaging Studies:  No visits with results within 2 Week(s) from this visit.   Latest known visit with results is:   Orders Only on 08/28/2019   Component Date Value Ref Range Status     Sodium 08/28/2019 138  133 - 144 mmol/L Final     Potassium 08/28/2019 4.0  3.4 - 5.3 mmol/L Final     Chloride 08/28/2019 104  94 - 109 mmol/L Final     Carbon Dioxide 08/28/2019 29  20 - 32 mmol/L Final     Anion Gap 08/28/2019 5  3 - 14 mmol/L Final     Glucose 08/28/2019 85  70 - " 99 mg/dL Final    Non Fasting     Urea Nitrogen 08/28/2019 14  7 - 30 mg/dL Final     Creatinine 08/28/2019 0.84  0.52 - 1.04 mg/dL Final     GFR Estimate 08/28/2019 82  >60 mL/min/[1.73_m2] Final    Comment: Non  GFR Calc  Starting 12/18/2018, serum creatinine based estimated GFR (eGFR) will be   calculated using the Chronic Kidney Disease Epidemiology Collaboration   (CKD-EPI) equation.       GFR Estimate If Black 08/28/2019 >90  >60 mL/min/[1.73_m2] Final    Comment:  GFR Calc  Starting 12/18/2018, serum creatinine based estimated GFR (eGFR) will be   calculated using the Chronic Kidney Disease Epidemiology Collaboration   (CKD-EPI) equation.       Calcium 08/28/2019 9.6  8.5 - 10.1 mg/dL Final        No results found for this or any previous visit (from the past 744 hour(s)).    Assessment and plan:  (C50.912) Invasive ductal carcinoma of breast, female, left (H)  (primary encounter diagnosis)  I reviewed with the patient today most recent laboratory tests.  There is no clinical evidence of breast cancer recurrence.  The patient will continue on adjuvant endocrine therapy with exemestane and Zoladex injection.  We will Zoladex injection to every 3 months instead of monthly.  I will see the patient again in 6 months or sooner if there are new developments or concerns.    (I10) Essential hypertension, benign  Blood pressure is currently well controlled on lisinopril 30 mg orally daily.    The patient is ready to learn, no apparent learning barriers were identified.  Diagnosis and treatment plans were explained to the patient. The patient expressed understanding of the content. The patient asked appropriate questions. The patient questions were answered to her satisfaction.    Chart documentation with Dragon Voice recognition Software. Although reviewed after completion, some words and grammatical errors may remain.

## 2020-07-15 ENCOUNTER — INFUSION THERAPY VISIT (OUTPATIENT)
Dept: INFUSION THERAPY | Facility: CLINIC | Age: 50
End: 2020-07-15
Attending: FAMILY MEDICINE
Payer: COMMERCIAL

## 2020-07-15 DIAGNOSIS — Z17.0 MALIGNANT NEOPLASM OF BREAST IN FEMALE, ESTROGEN RECEPTOR POSITIVE, UNSPECIFIED LATERALITY, UNSPECIFIED SITE OF BREAST (H): Primary | ICD-10-CM

## 2020-07-15 DIAGNOSIS — C50.919 MALIGNANT NEOPLASM OF BREAST IN FEMALE, ESTROGEN RECEPTOR POSITIVE, UNSPECIFIED LATERALITY, UNSPECIFIED SITE OF BREAST (H): Primary | ICD-10-CM

## 2020-07-15 PROCEDURE — 96402 CHEMO HORMON ANTINEOPL SQ/IM: CPT

## 2020-07-15 PROCEDURE — 25000128 H RX IP 250 OP 636: Performed by: INTERNAL MEDICINE

## 2020-07-15 RX ADMIN — GOSERELIN ACETATE 10.8 MG: 10.8 IMPLANT SUBCUTANEOUS at 14:35

## 2020-07-15 NOTE — PROGRESS NOTES
Infusion Nursing Note:  Desi Granado presents today for Zoladex.    Patient seen by provider today: No   present during visit today: Not Applicable.    Note: N/A.    Intravenous Access:  No Intravenous access/labs at this visit.    Treatment Conditions:  Not Applicable.      Post Infusion Assessment:  Patient tolerated injection without incident.       Discharge Plan:   Patient discharged in stable condition accompanied by: self.  Departure Mode: Ambulatory. Next dose is scheduled for 10/4.    Maribell Pulliam RN

## 2020-07-28 ENCOUNTER — OFFICE VISIT (OUTPATIENT)
Dept: SURGERY | Facility: CLINIC | Age: 50
End: 2020-07-28
Payer: COMMERCIAL

## 2020-07-28 VITALS
DIASTOLIC BLOOD PRESSURE: 68 MMHG | SYSTOLIC BLOOD PRESSURE: 116 MMHG | TEMPERATURE: 98.2 F | WEIGHT: 148 LBS | BODY MASS INDEX: 24.66 KG/M2 | HEIGHT: 65 IN

## 2020-07-28 DIAGNOSIS — C50.919 MALIGNANT NEOPLASM OF BREAST IN FEMALE, ESTROGEN RECEPTOR POSITIVE, UNSPECIFIED LATERALITY, UNSPECIFIED SITE OF BREAST (H): Primary | ICD-10-CM

## 2020-07-28 DIAGNOSIS — C50.912 INVASIVE DUCTAL CARCINOMA OF BREAST, FEMALE, LEFT (H): ICD-10-CM

## 2020-07-28 DIAGNOSIS — Z17.0 MALIGNANT NEOPLASM OF BREAST IN FEMALE, ESTROGEN RECEPTOR POSITIVE, UNSPECIFIED LATERALITY, UNSPECIFIED SITE OF BREAST (H): Primary | ICD-10-CM

## 2020-07-28 DIAGNOSIS — Z90.13 S/P BILATERAL MASTECTOMY: ICD-10-CM

## 2020-07-28 PROCEDURE — 99214 OFFICE O/P EST MOD 30 MIN: CPT | Performed by: SURGERY

## 2020-07-28 ASSESSMENT — MIFFLIN-ST. JEOR: SCORE: 1293.23

## 2020-07-28 NOTE — PROGRESS NOTES
BREAST CANCER FOLLOW UP  CHIEF COMPLAINT  Chief Complaint   Patient presents with     RECHECK     6 month follow up, breast      HPI  Desi Granado is a 48 year old female who presents with   Chief Complaint   Patient presents with     RECHECK     6 month follow up, breast      The patient presents for her breast cancer followup appointment. Overall, she is feeling good. She denies any significant fatigue, fevers, chills, or changes in appetite. She has not suffered any significant weight loss.    She has not noted any areas of skin changes or any masses in the breast or chest wall that she is concerned about.  She denies skin dimpling, puckering, erythema, or nipple discharge. She has not noted any palpable axillary lymphadenopathy.  She has not traveled due to COVID-19.  Last trip was back in February.  She is wondering if there is anything we can do about the lumpiness of the chest wall.      Review of Systems  Constitutional: Denies fever or chills   Eyes: Denies change in visual acuity   HENT: Denies nasal congestion or sore throat   Respiratory: Denies cough or shortness of breath   Cardiovascular: Denies chest pain or edema   GI: Denies  nausea, vomiting, bloody stools or diarrhea; neg abdominal pain  : Denies dysuria   Musculoskeletal: Denies back pain or joint pain   Integument: Denies rash   Neurologic: Denies headache, focal weakness or sensory changes   Endocrine: Denies polyuria or polydipsia   Lymphatic: Denies swollen glands   Psychiatric: Denies depression or anxiety     PAST MEDICAL HISTORY  Past Medical History:   Diagnosis Date     Allergic rhinitis, cause unspecified      Hypertension 8 years?     Injury, other and unspecified, knee, leg, ankle, and foot 8th grade    left ankle injury     Invasive ductal carcinoma of breast, female, left (H) 10/26/2018     FAMILY HISTORY  Family History   Problem Relation Age of Onset     Hypertension Mother      Allergies Mother      Thyroid Disease Mother          Taking thyroid medication     Obesity Mother      Cerebrovascular Disease Mother         TIA Feb 2017     Hypertension Father      Thyroid Disease Father         two parathyroids removed     Cancer Maternal Grandmother         Bone CA     Allergies Maternal Grandmother      Circulatory Maternal Grandmother      Cerebrovascular Disease Maternal Grandmother      Other Cancer Maternal Grandmother         multiple myeloma     C.A.D. Maternal Grandfather      Respiratory Maternal Grandfather         asthma     Allergies Maternal Grandfather      Cerebrovascular Disease Maternal Grandfather      Alzheimer Disease Paternal Grandfather      Neurologic Disorder Paternal Grandfather         Parkinsons     Arthritis Paternal Grandmother      Respiratory Other         Emphysema     Diabetes Other      Asthma Other         generic emphysema     Genetic Disorder Other         genetic emphysema     Breast Cancer Sister         benign lump indicated future risk     Diabetes Other      Coronary Artery Disease Other      Other Cancer Other         multiple myeloma     Colon Cancer Other      Cerebrovascular Disease Other      Cancer - colorectal No family hx of      Breast Cancer No family hx of      Prostate Cancer No family hx of      SOCIAL HISTORY  Social History     Socioeconomic History     Marital status: Single     Spouse name: None     Number of children: 0     Years of education: 18     Highest education level: None   Occupational History     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     Employer: [employer]   Social Needs     Financial resource strain: None     Food insecurity:     Worry: None     Inability: None     Transportation needs:     Medical: None     Non-medical: None   Tobacco Use     Smoking status: Never Smoker     Smokeless tobacco: Never Used   Substance and Sexual Activity     Alcohol use: No     Alcohol/week: 0.0 oz     Comment: very rare     Drug use: No     Sexual activity: Not Currently     Partners:  Male     Birth control/protection: Pill   Lifestyle     Physical activity:     Days per week: None     Minutes per session: None     Stress: None   Relationships     Social connections:     Talks on phone: None     Gets together: None     Attends Latter-day service: None     Active member of club or organization: None     Attends meetings of clubs or organizations: None     Relationship status: None     Intimate partner violence:     Fear of current or ex partner: None     Emotionally abused: None     Physically abused: None     Forced sexual activity: None   Other Topics Concern     Parent/sibling w/ CABG, MI or angioplasty before 65F 55M? No   Social History Narrative     None     SURGICAL HISTORY  Past Surgical History:   Procedure Laterality Date     BIOPSY       BREAST LUMPECTOMY, RT/LT  6/23/10    Breast Lumpectomy RT for likely adenomatous lumps     COLONOSCOPY N/A 8/16/2018    Procedure: COLONOSCOPY;  colonoscopy;  Surgeon: Vijay Almanza MD;  Location: [facility]     ESOPHAGOSCOPY, GASTROSCOPY, DUODENOSCOPY (EGD), COMBINED N/A 10/1/2015    Procedure: COMBINED ESOPHAGOSCOPY, GASTROSCOPY, DUODENOSCOPY (EGD);  Surgeon: Vijay Almanza MD;  Location: [facility]     MASTECTOMY SIMPLE BILATERAL, SENTINEL NODE BILATERAL, COMBINED Bilateral 11/8/2018    Procedure: BILATERAL SIMPLE MASTECTOMIES WITH LEFT SENTINEL LYMPH NODE BIOPSY ;  Surgeon: Nabila Hart MD;  Location: [facility]     SOFT TISSUE SURGERY      Ankle surgery 20+ years ago     SURGICAL HISTORY OF -   1989    arthroscopy of Left Ankle     SURGICAL HISTORY OF -   1990    arthroscopy of Left Ankle     SURGICAL HISTORY OF -   1993    4 wisdom teeth extracted     CURRENT MEDICATIONS    Current Outpatient Medications:      ACETAMINOPHEN PO, Take 1,000 mg by mouth, Disp: , Rfl:      calcium carbonate 600 mg-vitamin D 400 units (CALTRATE) 600-400 MG-UNIT per tablet, Take 1 tablet by mouth 2 times daily, Disp: , Rfl:      Calcium Polycarbophil (FIBER) 625 MG tablet, Take by  "mouth daily, Disp: , Rfl:      ciprofloxacin (CIPRO) 500 MG tablet, Take 1 tablet (500 mg) by mouth 2 times daily, Disp: 20 tablet, Rfl: 0     exemestane (AROMASIN) 25 MG tablet, Take 1 tablet (25 mg) by mouth daily, Disp: 90 tablet, Rfl: 1     Fluticasone Propionate (FLONASE ALLERGY RELIEF NA), , Disp: , Rfl:      goserelin (ZOLADEX) 3.6 MG injection, Inject 3.6 mg Subcutaneous once, Disp: , Rfl:      lisinopril (PRINIVIL/ZESTRIL) 30 MG tablet, Take 1 tablet (30 mg) by mouth daily, Disp: 90 tablet, Rfl: 3     montelukast (SINGULAIR) 10 MG tablet, Take 1 tablet (10 mg) by mouth At Bedtime, Disp: 90 tablet, Rfl: 3     order for DME, Equipment being ordered: Compression sleeve 30-40 mmHg (Patient not taking: Reported on 6/17/2020), Disp: 1 each, Rfl: 1     order for DME, Equipment being ordered: Compression stockings 30-40 mmHg (Patient not taking: Reported on 6/17/2020), Disp: 1 each, Rfl: 1     vitamin D3 (CHOLECALCIFEROL) 2000 units tablet, Take 1 tablet by mouth daily, Disp: , Rfl:      ZYRTEC 10 MG OR TABS, 1 TABLET DAILY, Disp: 90, Rfl: 3  ALLERGIES  Allergies   Allergen Reactions     Aspirin Hives     Dust Mites Unknown     Morphine Nausea     PHYSICAL EXAM  VITAL SIGNS:  height is 1.645 m (5' 4.75\") and weight is 67.1 kg (148 lb). Her temporal temperature is 98.2  F (36.8  C). Her blood pressure is 116/68.   Constitutional: Well developed, Well nourished, No acute distress, Non-toxic appearance.   HENT: Normocephalic, Atraumatic, Bilateral external ears normal, Oropharynx moist, No oral exudates, Nose normal.   Eyes: EOMI, Conjunctiva normal, No discharge.   Neck: Normal range of motion, No tenderness, Supple, No stridor.   Lymphatic: No lymphadenopathy noted.   Cardiovascular: Normal heart rate, Normal rhythm, No murmurs, No rubs, No gallops.   Breast Exam:   RIGHT: Surgically absent breast. No areas of skin dimpling or puckering. No erythema. No skin scaling or changes. No focal areas of significant " tenderness.  No palpable axillary lymphadenopathy.  LEFT: Surgically absent breast. No areas of skin dimpling or puckering. No erythema. No skin scaling or changes. No focal areas of significant tenderness.   No palpable axillary, cervical, supraclavicular lymphadenopathy.  Thorax & Lungs: Normal breath sounds, No respiratory distress, No wheezing, No chest tenderness.   Abdomen: Bowel sounds normal, Soft, No masses, No pulsatile masses.   Skin: Warm, Dry, No erythema, No rash.   Back: No tenderness, No CVA tenderness.   Extremities: Intact distal pulses, No edema, No tenderness, No cyanosis, No clubbing.   Musculoskeletal: Good range of motion in all major joints. No tenderness to palpation or major deformities noted.   Neurologic: Alert & oriented x 3, Normal motor function, Normal sensory function, No focal deficits noted.   Psychiatric: Affect normal, Judgment normal, Mood normal.       Imaging Studies:   No results found for this or any previous visit (from the past 744 hour(s)).    FINAL IMPRESSION AND PLAN  (C50.912) Invasive ductal carcinoma of breast, female, left (H)  (primary encounter diagnosis)  Comment: s/p bilateral mastectomy  The patient is 18 months out from her breast cancer surgery. There is no evidence of recurrent disease on my exam today. She is continuing to recover well from her breast cancer treatment. No signs of lymphedema.  Tolerating AI with no issues (aromasin)  She will continue periodic self breast or chest wall exam. She is encouraged to followup with me for any changes on self-exam, or for any concerns or questions. She will followup with her primary care provider for routine care, and continue followup with her oncologist as scheduled.  My recommendations were discussed with the patient. She will see me in 6 months for another exam.    She will think about reconstruction, specifically fat grafting to see if we can even out the chest wall.  She will call me or my chart me for  recommendation of a plastic surgeon in the near future.    25 mins visit, more than 50% of face to face time was spent in counseling and coordinating care as discussed above.      [facility], DO

## 2020-07-28 NOTE — LETTER
7/28/2020         RE: Desi Granado  [address] [place] [address]        Dear Colleague,    Thank you for referring your patient, Desi Granado, to the [facility]. Please see a copy of my visit note below.    BREAST CANCER FOLLOW UP  CHIEF COMPLAINT  Chief Complaint   Patient presents with     RECHECK     6 month follow up, breast      HPI  Desi Granado is a 48 year old female who presents with   Chief Complaint   Patient presents with     RECHECK     6 month follow up, breast      The patient presents for her breast cancer followup appointment. Overall, she is feeling good. She denies any significant fatigue, fevers, chills, or changes in appetite. She has not suffered any significant weight loss.    She has not noted any areas of skin changes or any masses in the breast or chest wall that she is concerned about.  She denies skin dimpling, puckering, erythema, or nipple discharge. She has not noted any palpable axillary lymphadenopathy.  She has not traveled due to COVID-19.  Last trip was back in February.  She is wondering if there is anything we can do about the lumpiness of the chest wall.      Review of Systems  Constitutional: Denies fever or chills   Eyes: Denies change in visual acuity   HENT: Denies nasal congestion or sore throat   Respiratory: Denies cough or shortness of breath   Cardiovascular: Denies chest pain or edema   GI: Denies  nausea, vomiting, bloody stools or diarrhea; neg abdominal pain  : Denies dysuria   Musculoskeletal: Denies back pain or joint pain   Integument: Denies rash   Neurologic: Denies headache, focal weakness or sensory changes   Endocrine: Denies polyuria or polydipsia   Lymphatic: Denies swollen glands   Psychiatric: Denies depression or anxiety     PAST MEDICAL HISTORY  Past Medical History:   Diagnosis Date     Allergic rhinitis, cause unspecified      Hypertension 8 years?     Injury, other and unspecified, knee,  leg, ankle, and foot 8th grade    left ankle injury     Invasive ductal carcinoma of breast, female, left (H) 10/26/2018     FAMILY HISTORY  Family History   Problem Relation Age of Onset     Hypertension Mother      Allergies Mother      Thyroid Disease Mother         Taking thyroid medication     Obesity Mother      Cerebrovascular Disease Mother         TIA Feb 2017     Hypertension Father      Thyroid Disease Father         two parathyroids removed     Cancer Maternal Grandmother         Bone CA     Allergies Maternal Grandmother      Circulatory Maternal Grandmother      Cerebrovascular Disease Maternal Grandmother      Other Cancer Maternal Grandmother         multiple myeloma     C.A.D. Maternal Grandfather      Respiratory Maternal Grandfather         asthma     Allergies Maternal Grandfather      Cerebrovascular Disease Maternal Grandfather      Alzheimer Disease Paternal Grandfather      Neurologic Disorder Paternal Grandfather         Parkinsons     Arthritis Paternal Grandmother      Respiratory Other         Emphysema     Diabetes Other      Asthma Other         generic emphysema     Genetic Disorder Other         genetic emphysema     Breast Cancer Sister         benign lump indicated future risk     Diabetes Other      Coronary Artery Disease Other      Other Cancer Other         multiple myeloma     Colon Cancer Other      Cerebrovascular Disease Other      Cancer - colorectal No family hx of      Breast Cancer No family hx of      Prostate Cancer No family hx of      SOCIAL HISTORY  Social History     Socioeconomic History     Marital status: Single     Spouse name: None     Number of children: 0     Years of education: 18     Highest education level: None   Occupational History     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     Employer: [employer]   Social Needs     Financial resource strain: None     Food insecurity:     Worry: None     Inability: None     Transportation needs:     Medical: None      Non-medical: None   Tobacco Use     Smoking status: Never Smoker     Smokeless tobacco: Never Used   Substance and Sexual Activity     Alcohol use: No     Alcohol/week: 0.0 oz     Comment: very rare     Drug use: No     Sexual activity: Not Currently     Partners: Male     Birth control/protection: Pill   Lifestyle     Physical activity:     Days per week: None     Minutes per session: None     Stress: None   Relationships     Social connections:     Talks on phone: None     Gets together: None     Attends Sikh service: None     Active member of club or organization: None     Attends meetings of clubs or organizations: None     Relationship status: None     Intimate partner violence:     Fear of current or ex partner: None     Emotionally abused: None     Physically abused: None     Forced sexual activity: None   Other Topics Concern     Parent/sibling w/ CABG, MI or angioplasty before 65F 55M? No   Social History Narrative     None     SURGICAL HISTORY  Past Surgical History:   Procedure Laterality Date     BIOPSY       BREAST LUMPECTOMY, RT/LT  6/23/10    Breast Lumpectomy RT for likely adenomatous lumps     COLONOSCOPY N/A 8/16/2018    Procedure: COLONOSCOPY;  colonoscopy;  Surgeon: Vijay Almanza MD;  Location: [facility]     ESOPHAGOSCOPY, GASTROSCOPY, DUODENOSCOPY (EGD), COMBINED N/A 10/1/2015    Procedure: COMBINED ESOPHAGOSCOPY, GASTROSCOPY, DUODENOSCOPY (EGD);  Surgeon: Vijay Almanza MD;  Location: [facility]     MASTECTOMY SIMPLE BILATERAL, SENTINEL NODE BILATERAL, COMBINED Bilateral 11/8/2018    Procedure: BILATERAL SIMPLE MASTECTOMIES WITH LEFT SENTINEL LYMPH NODE BIOPSY ;  Surgeon: Nabila Hart MD;  Location[facility]     SOFT TISSUE SURGERY      Ankle surgery 20+ years ago     SURGICAL HISTORY OF -   1989    arthroscopy of Left Ankle     SURGICAL HISTORY OF -   1990    arthroscopy of Left Ankle     SURGICAL HISTORY OF -   1993    4 wisdom teeth extracted     CURRENT MEDICATIONS    Current Outpatient  "Medications:      ACETAMINOPHEN PO, Take 1,000 mg by mouth, Disp: , Rfl:      calcium carbonate 600 mg-vitamin D 400 units (CALTRATE) 600-400 MG-UNIT per tablet, Take 1 tablet by mouth 2 times daily, Disp: , Rfl:      Calcium Polycarbophil (FIBER) 625 MG tablet, Take by mouth daily, Disp: , Rfl:      ciprofloxacin (CIPRO) 500 MG tablet, Take 1 tablet (500 mg) by mouth 2 times daily, Disp: 20 tablet, Rfl: 0     exemestane (AROMASIN) 25 MG tablet, Take 1 tablet (25 mg) by mouth daily, Disp: 90 tablet, Rfl: 1     Fluticasone Propionate (FLONASE ALLERGY RELIEF NA), , Disp: , Rfl:      goserelin (ZOLADEX) 3.6 MG injection, Inject 3.6 mg Subcutaneous once, Disp: , Rfl:      lisinopril (PRINIVIL/ZESTRIL) 30 MG tablet, Take 1 tablet (30 mg) by mouth daily, Disp: 90 tablet, Rfl: 3     montelukast (SINGULAIR) 10 MG tablet, Take 1 tablet (10 mg) by mouth At Bedtime, Disp: 90 tablet, Rfl: 3     order for DME, Equipment being ordered: Compression sleeve 30-40 mmHg (Patient not taking: Reported on 6/17/2020), Disp: 1 each, Rfl: 1     order for DME, Equipment being ordered: Compression stockings 30-40 mmHg (Patient not taking: Reported on 6/17/2020), Disp: 1 each, Rfl: 1     vitamin D3 (CHOLECALCIFEROL) 2000 units tablet, Take 1 tablet by mouth daily, Disp: , Rfl:      ZYRTEC 10 MG OR TABS, 1 TABLET DAILY, Disp: 90, Rfl: 3  ALLERGIES  Allergies   Allergen Reactions     Aspirin Hives     Dust Mites Unknown     Morphine Nausea     PHYSICAL EXAM  VITAL SIGNS:  height is 1.645 m (5' 4.75\") and weight is 67.1 kg (148 lb). Her temporal temperature is 98.2  F (36.8  C). Her blood pressure is 116/68.   Constitutional: Well developed, Well nourished, No acute distress, Non-toxic appearance.   HENT: Normocephalic, Atraumatic, Bilateral external ears normal, Oropharynx moist, No oral exudates, Nose normal.   Eyes: EOMI, Conjunctiva normal, No discharge.   Neck: Normal range of motion, No tenderness, Supple, No stridor.   Lymphatic: No " lymphadenopathy noted.   Cardiovascular: Normal heart rate, Normal rhythm, No murmurs, No rubs, No gallops.   Breast Exam:   RIGHT: Surgically absent breast. No areas of skin dimpling or puckering. No erythema. No skin scaling or changes. No focal areas of significant tenderness.  No palpable axillary lymphadenopathy.  LEFT: Surgically absent breast. No areas of skin dimpling or puckering. No erythema. No skin scaling or changes. No focal areas of significant tenderness.   No palpable axillary, cervical, supraclavicular lymphadenopathy.  Thorax & Lungs: Normal breath sounds, No respiratory distress, No wheezing, No chest tenderness.   Abdomen: Bowel sounds normal, Soft, No masses, No pulsatile masses.   Skin: Warm, Dry, No erythema, No rash.   Back: No tenderness, No CVA tenderness.   Extremities: Intact distal pulses, No edema, No tenderness, No cyanosis, No clubbing.   Musculoskeletal: Good range of motion in all major joints. No tenderness to palpation or major deformities noted.   Neurologic: Alert & oriented x 3, Normal motor function, Normal sensory function, No focal deficits noted.   Psychiatric: Affect normal, Judgment normal, Mood normal.       Imaging Studies:   No results found for this or any previous visit (from the past 744 hour(s)).    FINAL IMPRESSION AND PLAN  (C50.912) Invasive ductal carcinoma of breast, female, left (H)  (primary encounter diagnosis)  Comment: s/p bilateral mastectomy  The patient is 18 months out from her breast cancer surgery. There is no evidence of recurrent disease on my exam today. She is continuing to recover well from her breast cancer treatment. No signs of lymphedema.  Tolerating AI with no issues (aromasin)  She will continue periodic self breast or chest wall exam. She is encouraged to followup with me for any changes on self-exam, or for any concerns or questions. She will followup with her primary care provider for routine care, and continue followup with her  oncologist as scheduled.  My recommendations were discussed with the patient. She will see me in 6 months for another exam.    She will think about reconstruction, specifically fat grafting to see if we can even out the chest wall.  She will call me or my chart me for recommendation of a plastic surgeon in the near future.    25 mins visit, more than 50% of face to face time was spent in counseling and coordinating care as discussed above.      [facility], DO          Again, thank you for allowing me to participate in the care of your patient.        Sincerely,        Nabila Hart MD

## 2020-08-11 DIAGNOSIS — I10 ESSENTIAL HYPERTENSION, BENIGN: ICD-10-CM

## 2020-08-11 RX ORDER — LISINOPRIL 30 MG/1
TABLET ORAL
Qty: 30 TABLET | Refills: 0 | Status: SHIPPED | OUTPATIENT
Start: 2020-08-11 | End: 2020-08-24

## 2020-08-11 NOTE — TELEPHONE ENCOUNTER
"Requested Prescriptions   Pending Prescriptions Disp Refills     lisinopril (ZESTRIL) 30 MG tablet [Pharmacy Med Name: LISINOPRIL 30MG TABLETS] 90 tablet 3     Sig: TAKE 1 TABLET BY MOUTH DAILY.       ACE Inhibitors (Including Combos) Protocol Passed - 8/11/2020  7:39 AM        Passed - Blood pressure under 140/90 in past 12 months     BP Readings from Last 3 Encounters:   07/28/20 116/68   06/17/20 136/75   03/06/20 120/72                 Passed - Recent (12 mo) or future (30 days) visit within the authorizing provider's specialty     Patient has had an office visit with the authorizing provider or a provider within the authorizing providers department within the previous 12 mos or has a future within next 30 days. See \"Patient Info\" tab in inbasket, or \"Choose Columns\" in Meds & Orders section of the refill encounter.              Passed - Medication is active on med list        Passed - Patient is age 18 or older        Passed - No active pregnancy on record        Passed - Normal serum creatinine on file in past 12 months     Recent Labs   Lab Test 06/17/20  1517   CR 0.87       Ok to refill medication if creatinine is low          Passed - Normal serum potassium on file in past 12 months     Recent Labs   Lab Test 06/17/20  1517   POTASSIUM 3.9             Passed - No positive pregnancy test within past 12 months             "

## 2020-08-14 ENCOUNTER — MYC REFILL (OUTPATIENT)
Dept: FAMILY MEDICINE | Facility: CLINIC | Age: 50
End: 2020-08-14

## 2020-08-14 DIAGNOSIS — I10 ESSENTIAL HYPERTENSION, BENIGN: ICD-10-CM

## 2020-08-14 RX ORDER — LISINOPRIL 30 MG/1
30 TABLET ORAL DAILY
Qty: 30 TABLET | Refills: 0 | Status: CANCELLED | OUTPATIENT
Start: 2020-08-14

## 2020-08-24 ENCOUNTER — MYC MEDICAL ADVICE (OUTPATIENT)
Dept: FAMILY MEDICINE | Facility: CLINIC | Age: 50
End: 2020-08-24

## 2020-08-24 DIAGNOSIS — I10 ESSENTIAL HYPERTENSION, BENIGN: ICD-10-CM

## 2020-08-24 RX ORDER — LISINOPRIL 30 MG/1
30 TABLET ORAL DAILY
Qty: 90 TABLET | Refills: 0 | Status: SHIPPED | OUTPATIENT
Start: 2020-08-24 | End: 2020-09-22

## 2020-08-24 NOTE — TELEPHONE ENCOUNTER
Medication is being filled for 1 time refill only due to:  Patient needs labs and. Patient needs to be seen because she is due for office visit and labs next month..     Pt informed in her Surveypal message.    Debra Waters RN

## 2020-08-24 NOTE — TELEPHONE ENCOUNTER
"    Requested Prescriptions   Pending Prescriptions Disp Refills     lisinopril (ZESTRIL) 30 MG tablet 30 tablet 0     Sig: Take 1 tablet (30 mg) by mouth daily       ACE Inhibitors (Including Combos) Protocol Passed - 8/24/2020 10:41 AM        Passed - Blood pressure under 140/90 in past 12 months     BP Readings from Last 3 Encounters:   07/28/20 116/68   06/17/20 136/75   03/06/20 120/72                 Passed - Recent (12 mo) or future (30 days) visit within the authorizing provider's specialty     Patient has had an office visit with the authorizing provider or a provider within the authorizing providers department within the previous 12 mos or has a future within next 30 days. See \"Patient Info\" tab in inbasket, or \"Choose Columns\" in Meds & Orders section of the refill encounter.              Passed - Medication is active on med list        Passed - Patient is age 18 or older        Passed - No active pregnancy on record        Passed - Normal serum creatinine on file in past 12 months     Recent Labs   Lab Test 06/17/20  1517   CR 0.87       Ok to refill medication if creatinine is low          Passed - Normal serum potassium on file in past 12 months     Recent Labs   Lab Test 06/17/20  1517   POTASSIUM 3.9             Passed - No positive pregnancy test within past 12 months                 "

## 2020-09-21 ASSESSMENT — ENCOUNTER SYMPTOMS
EYE PAIN: 0
HEMATOCHEZIA: 0
BREAST MASS: 0
DIZZINESS: 0
NAUSEA: 0
DIARRHEA: 0
PARESTHESIAS: 0
ARTHRALGIAS: 1
COUGH: 0
NERVOUS/ANXIOUS: 0
WEAKNESS: 0
FEVER: 0
PALPITATIONS: 0
SHORTNESS OF BREATH: 0
CONSTIPATION: 0
JOINT SWELLING: 0
ABDOMINAL PAIN: 0
HEADACHES: 0
CHILLS: 0
DYSURIA: 0
SORE THROAT: 0
MYALGIAS: 0
FREQUENCY: 0
HEARTBURN: 0
HEMATURIA: 0

## 2020-09-22 ENCOUNTER — OFFICE VISIT (OUTPATIENT)
Dept: FAMILY MEDICINE | Facility: CLINIC | Age: 50
End: 2020-09-22
Payer: COMMERCIAL

## 2020-09-22 VITALS
BODY MASS INDEX: 25.49 KG/M2 | HEART RATE: 91 BPM | HEIGHT: 65 IN | SYSTOLIC BLOOD PRESSURE: 118 MMHG | DIASTOLIC BLOOD PRESSURE: 72 MMHG | OXYGEN SATURATION: 97 % | WEIGHT: 153 LBS | TEMPERATURE: 97.5 F

## 2020-09-22 DIAGNOSIS — Z23 NEED FOR INFLUENZA VACCINATION: ICD-10-CM

## 2020-09-22 DIAGNOSIS — Z86.39 HISTORY OF VITAMIN D DEFICIENCY: ICD-10-CM

## 2020-09-22 DIAGNOSIS — Z00.00 ROUTINE GENERAL MEDICAL EXAMINATION AT A HEALTH CARE FACILITY: Primary | ICD-10-CM

## 2020-09-22 DIAGNOSIS — I10 ESSENTIAL HYPERTENSION, BENIGN: ICD-10-CM

## 2020-09-22 DIAGNOSIS — Z23 NEED FOR VACCINATION: ICD-10-CM

## 2020-09-22 PROCEDURE — 90471 IMMUNIZATION ADMIN: CPT | Performed by: FAMILY MEDICINE

## 2020-09-22 PROCEDURE — 99396 PREV VISIT EST AGE 40-64: CPT | Mod: 25 | Performed by: FAMILY MEDICINE

## 2020-09-22 PROCEDURE — 90472 IMMUNIZATION ADMIN EACH ADD: CPT | Performed by: FAMILY MEDICINE

## 2020-09-22 PROCEDURE — 90750 HZV VACC RECOMBINANT IM: CPT | Performed by: FAMILY MEDICINE

## 2020-09-22 PROCEDURE — 90682 RIV4 VACC RECOMBINANT DNA IM: CPT | Performed by: FAMILY MEDICINE

## 2020-09-22 PROCEDURE — 36415 COLL VENOUS BLD VENIPUNCTURE: CPT | Performed by: FAMILY MEDICINE

## 2020-09-22 PROCEDURE — 82306 VITAMIN D 25 HYDROXY: CPT | Performed by: FAMILY MEDICINE

## 2020-09-22 RX ORDER — LISINOPRIL 30 MG/1
30 TABLET ORAL DAILY
Qty: 90 TABLET | Refills: 3 | Status: SHIPPED | OUTPATIENT
Start: 2020-09-22 | End: 2021-08-17

## 2020-09-22 ASSESSMENT — ENCOUNTER SYMPTOMS
DIARRHEA: 0
PARESTHESIAS: 0
FREQUENCY: 0
HEMATURIA: 0
MYALGIAS: 0
FEVER: 0
EYE PAIN: 0
NERVOUS/ANXIOUS: 0
HEMATOCHEZIA: 0
ARTHRALGIAS: 1
PALPITATIONS: 0
CONSTIPATION: 0
JOINT SWELLING: 0
DIZZINESS: 0
COUGH: 0
DYSURIA: 0
SORE THROAT: 0
BREAST MASS: 0
SHORTNESS OF BREATH: 0
CHILLS: 0
HEARTBURN: 0
NAUSEA: 0
WEAKNESS: 0
HEADACHES: 0
ABDOMINAL PAIN: 0

## 2020-09-22 ASSESSMENT — MIFFLIN-ST. JEOR: SCORE: 1310.91

## 2020-09-22 NOTE — PROGRESS NOTES
SUBJECTIVE:   CC: Desi Granado is an 50 year old woman who presents for preventive health visit.     Patient has been advised of split billing requirements and indicates understanding: Yes  Healthy Habits:     Getting at least 3 servings of Calcium per day:  NO    Bi-annual eye exam:  Yes    Dental care twice a year:  Yes    Sleep apnea or symptoms of sleep apnea:  None    Diet:  Regular (no restrictions)    Frequency of exercise:  2-3 days/week    Duration of exercise:  30-45 minutes    Taking medications regularly:  Yes    Medication side effects:  Other    PHQ-2 Total Score: 0    Additional concerns today:  Yes        Hypertension Follow-up    Do you check your blood pressure regularly outside of the clinic? Yes     Are you following a low salt diet? No    Are your blood pressures ever more than 140 on the top number (systolic) OR more   than 90 on the bottom number (diastolic), for example 140/90? Yes      Today's PHQ-2 Score:   PHQ-2 ( 1999 Pf[employer]) 9/21/2020   Q1: Little interest or pleasure in doing things 0   Q2: Feeling down, depressed or hopeless 0   PHQ-2 Score 0   Q1: Little interest or pleasure in doing things Not at all   Q2: Feeling down, depressed or hopeless Not at all   PHQ-2 Score 0       Abuse: Current or Past (Physical, Sexual or Emotional) - No  Do you feel safe in your environment? Yes        Social History     Tobacco Use     Smoking status: Never Smoker     Smokeless tobacco: Never Used   Substance Use Topics     Alcohol use: No     Alcohol/week: 0.0 standard drinks     Comment: very rare     If you drink alcohol do you typically have >3 drinks per day or >7 drinks per week? No    Alcohol Use 9/21/2020   Prescreen: >3 drinks/day or >7 drinks/week? No   Prescreen: >3 drinks/day or >7 drinks/week? -   No flowsheet data found.    Reviewed orders with patient.  Reviewed health maintenance and updated orders accordingly - Yes  Labs reviewed in [facility]    Mammogram Screening: Patient over age  50, mutual decision to screen reflected in health maintenance.    Pertinent mammograms are reviewed under the imaging tab.  History of abnormal Pap smear: NO - age 30-65 PAP every 5 years with negative HPV co-testing recommended  PAP / HPV Latest Ref Rng & Units 9/8/2016 8/14/2013 8/16/2010   PAP - NIL NIL NIL   HPV 16 DNA NEG Negative - -   HPV 18 DNA NEG Negative - -   OTHER HR HPV NEG Negative - -     Reviewed and updated as needed this visit by clinical staff  Tobacco  Allergies         Reviewed and updated as needed this visit by Provider        Past Medical History:   Diagnosis Date     Allergic rhinitis, cause unspecified      Hypertension 8 years?     Injury, other and unspecified, knee, leg, ankle, and foot 8th grade    left ankle injury     Invasive ductal carcinoma of breast, female, left (H) 10/26/2018        Review of Systems   Constitutional: Negative for chills and fever.   HENT: Negative for congestion, ear pain, hearing loss and sore throat.    Eyes: Negative for pain and visual disturbance.   Respiratory: Negative for cough and shortness of breath.    Cardiovascular: Negative for chest pain, palpitations and peripheral edema.   Gastrointestinal: Negative for abdominal pain, constipation, diarrhea, heartburn, hematochezia and nausea.   Breasts:  Negative for tenderness, breast mass and discharge.   Genitourinary: Negative for dysuria, frequency, genital sores, hematuria, pelvic pain, urgency, vaginal bleeding and vaginal discharge.   Musculoskeletal: Positive for arthralgias. Negative for joint swelling and myalgias.   Skin: Negative for rash.   Neurological: Negative for dizziness, weakness, headaches and paresthesias.   Psychiatric/Behavioral: Negative for mood changes. The patient is not nervous/anxious.      CONSTITUTIONAL: NEGATIVE for fever, chills, change in weight  INTEGUMENTARY/SKIN: NEGATIVE for worrisome rashes, moles or lesions  EYES: NEGATIVE for vision changes or irritation  ENT:  "NEGATIVE for ear, mouth and throat problems  RESP: NEGATIVE for significant cough or SOB  BREAST: NEGATIVE for masses, tenderness or discharge  CV: NEGATIVE for chest pain, palpitations or peripheral edema  GI: NEGATIVE for nausea, abdominal pain, heartburn, or change in bowel habits  : NEGATIVE for unusual urinary or vaginal symptoms. No vaginal bleeding.  MUSCULOSKELETAL: NEGATIVE for significant arthralgias or myalgia  NEURO: NEGATIVE for weakness, dizziness or paresthesias  PSYCHIATRIC: NEGATIVE for changes in mood or affect      OBJECTIVE:   /72   Pulse 91   Temp 97.5  F (36.4  C) (Tympanic)   Ht 1.645 m (5' 4.75\")   Wt 69.4 kg (153 lb)   SpO2 97%   BMI 25.66 kg/m    Physical Exam  GENERAL: healthy, alert and no distress  NECK: no adenopathy, no asymmetry, masses, or scars and thyroid normal to palpation  RESP: lungs clear to auscultation - no rales, rhonchi or wheezes  BREAST: s/p mastectomy welll healed no masses or ax la   CV: regular rate and rhythm, normal S1 S2, no S3 or S4, no murmur, click or rub, no peripheral edema and peripheral pulses strong  ABDOMEN: soft, nontender, no hepatosplenomegaly, no masses and bowel sounds normal  MS: no gross musculoskeletal defects noted, no edema    Diagnostic Test Results:  Labs reviewed in Epic    ASSESSMENT/PLAN:   1. Routine general medical examination at a health care facility      2. Essential hypertension, benign    - lisinopril (ZESTRIL) 30 MG tablet; Take 1 tablet (30 mg) by mouth daily  Dispense: 90 tablet; Refill: 3    3. History of vitamin D deficiency  Has been taking otc will check for adequacy  - Vitamin D Deficiency    4. Need for influenza vaccination    - C RIV4 (FLUBLOK) VACCINE RECOMBINANT DNA PRSRV ANTIBIO FREE, IM [[id number]]  - ADMIN 1st VACCINE    Patient has been advised of split billing requirements and indicates understanding: Yes  COUNSELING:  Reviewed preventive health counseling, as reflected in patient instructions       " "Immunizations    Vaccinated for: Influenza and Zoster          Estimated body mass index is 25.66 kg/m  as calculated from the following:    Height as of this encounter: 1.645 m (5' 4.75\").    Weight as of this encounter: 69.4 kg (153 lb).        She reports that she has never smoked. She has never used smokeless tobacco.      Counseling Resources:  ATP IV Guidelines  Pooled Cohorts Equation Calculator  Breast Cancer Risk Calculator  BRCA-Related Cancer Risk Assessment: FHS-7 Tool  FRAX Risk Assessment  ICSI Preventive Guidelines  Dietary Guidelines for Americans, 2010  USDA's MyPlate  ASA Prophylaxis  Lung CA Screening    Pearl Grover MD  [facility]  "

## 2020-09-23 LAB — DEPRECATED CALCIDIOL+CALCIFEROL SERPL-MC: 61 UG/L (ref 20–75)

## 2020-10-14 ENCOUNTER — INFUSION THERAPY VISIT (OUTPATIENT)
Dept: INFUSION THERAPY | Facility: CLINIC | Age: 50
End: 2020-10-14
Attending: INTERNAL MEDICINE
Payer: COMMERCIAL

## 2020-10-14 VITALS — HEART RATE: 118 BPM | DIASTOLIC BLOOD PRESSURE: 75 MMHG | TEMPERATURE: 98.2 F | SYSTOLIC BLOOD PRESSURE: 134 MMHG

## 2020-10-14 DIAGNOSIS — C50.919 MALIGNANT NEOPLASM OF BREAST IN FEMALE, ESTROGEN RECEPTOR POSITIVE, UNSPECIFIED LATERALITY, UNSPECIFIED SITE OF BREAST (H): Primary | ICD-10-CM

## 2020-10-14 DIAGNOSIS — Z17.0 MALIGNANT NEOPLASM OF BREAST IN FEMALE, ESTROGEN RECEPTOR POSITIVE, UNSPECIFIED LATERALITY, UNSPECIFIED SITE OF BREAST (H): Primary | ICD-10-CM

## 2020-10-14 PROCEDURE — 96402 CHEMO HORMON ANTINEOPL SQ/IM: CPT | Performed by: INTERNAL MEDICINE

## 2020-10-14 PROCEDURE — 99207 PR NO CHARGE LOS: CPT

## 2020-10-14 PROCEDURE — 250N000011 HC RX IP 250 OP 636: Performed by: INTERNAL MEDICINE

## 2020-10-14 PROCEDURE — 96372 THER/PROPH/DIAG INJ SC/IM: CPT | Performed by: INTERNAL MEDICINE

## 2020-10-14 RX ADMIN — GOSERELIN ACETATE 10.8 MG: 10.8 IMPLANT SUBCUTANEOUS at 13:43

## 2020-10-14 NOTE — PROGRESS NOTES
Nursing Note:  Desi Granado presents today for Zoladex.    Patient seen by provider today: No   present during visit today: Not Applicable.    Note: N/A.    Intravenous Access:  No Intravenous access/labs at this visit.    Discharge Plan:   Patient next appointment 12/16/20.    Claudy Nelson RN

## 2020-11-03 ENCOUNTER — PATIENT OUTREACH (OUTPATIENT)
Dept: ONCOLOGY | Facility: CLINIC | Age: 50
End: 2020-11-03

## 2020-11-03 DIAGNOSIS — Z85.3 PERSONAL HISTORY OF MALIGNANT NEOPLASM OF BREAST: Primary | ICD-10-CM

## 2020-11-25 DIAGNOSIS — J30.89 ALLERGIC RHINITIS DUE TO OTHER ALLERGIC TRIGGER, UNSPECIFIED SEASONALITY: ICD-10-CM

## 2020-11-25 NOTE — TELEPHONE ENCOUNTER
"Requested Prescriptions   Pending Prescriptions Disp Refills     montelukast (SINGULAIR) 10 MG tablet 90 tablet 3     Sig: Take 1 tablet (10 mg) by mouth At Bedtime       Leukotriene Inhibitors Protocol Passed - 11/25/2020  1:18 PM        Passed - Patient is age 12 or older     If patient is under 16, ok to refill using age based dosing.           Passed - Recent (12 mo) or future (30 days) visit within the authorizing provider's specialty     Patient has had an office visit with the authorizing provider or a provider within the authorizing providers department within the previous 12 mos or has a future within next 30 days. See \"Patient Info\" tab in inbasket, or \"Choose Columns\" in Meds & Orders section of the refill encounter.              Passed - Medication is active on med list             "

## 2020-11-30 RX ORDER — MONTELUKAST SODIUM 10 MG/1
10 TABLET ORAL AT BEDTIME
Qty: 90 TABLET | Refills: 2 | Status: SHIPPED | OUTPATIENT
Start: 2020-11-30 | End: 2021-08-17

## 2020-12-01 DIAGNOSIS — C50.912 INVASIVE DUCTAL CARCINOMA OF BREAST, FEMALE, LEFT (H): ICD-10-CM

## 2020-12-01 RX ORDER — EXEMESTANE 25 MG/1
25 TABLET ORAL DAILY
Qty: 90 TABLET | Refills: 1 | Status: SHIPPED | OUTPATIENT
Start: 2020-12-01 | End: 2021-05-06

## 2020-12-01 NOTE — PROGRESS NOTES
Fax request received from patients pharmacy requesting a refill of Exemestane on behalf of Dr. Peña.  Last refill: 6/12/20  # 90 with 1 refills at [facility] pharmacy.  Last office visit:  6/17/20  Next office visit:  1/22/21    Desi Flores RN

## 2021-01-01 NOTE — LETTER
"    10/18/2019         RE: Desi Granado  [address] [place] [address]        Dear Colleague,    Thank you for referring your patient, Desi Granado, to the [facility]. Please see a copy of my visit note below.    Oncology Rooming Note    October 18, 2019 3:07 PM   Desi Granado is a 49 year old female who presents for:    Chief Complaint   Patient presents with     Oncology Clinic Visit     3 month follow up breast cancer.      Initial Vitals: /77 (BP Location: Right arm, Patient Position: Sitting, Cuff Size: Adult Large)   Pulse 84   Temp 97.7  F (36.5  C) (Tympanic)   Resp 18   Ht 1.645 m (5' 4.75\")   Wt 69.8 kg (153 lb 14.4 oz)   SpO2 96%   Breastfeeding? No   BMI 25.81 kg/m    Estimated body mass index is 25.81 kg/m  as calculated from the following:    Height as of this encounter: 1.645 m (5' 4.75\").    Weight as of this encounter: 69.8 kg (153 lb 14.4 oz). Body surface area is 1.79 meters squared.  No Pain (0) Comment: Data Unavailable   No LMP recorded.  Allergies reviewed: Yes  Medications reviewed: Yes    Medications: Medication refills not needed today.  Pharmacy name entered into [facility]:    [facility] [place] - [address] [facility] [address] [facility] [place] - [address] [place] [place] [address]  WRITTEN PRESCRIPTION REQUESTED    Clinical concerns: 3 month follow up breast cancer.       Nessa Jeff, [facility]              Hematology/ Oncology Follow-up Visit:  Oct 18, 2019    Reason for Visit:   Chief Complaint   Patient presents with     Oncology Clinic Visit     3 month follow up breast cancer.        Oncologic History:    Invasive ductal carcinoma of breast, female, left (H)    Desi Granado was found to have developing focal asymmetry in the left breast at approximately 3 o'clock position about 7-8 cm from the nipple on the " routine mammogram done on October 30, 2018.  Subsequently she went on to have diagnostic mammogram and ultrasound.  Directed sonogram at the site of mammographic finding showed a 1.6 cm irregular solid lesion.  Subsequently the patient went on to have ultrasound-guided needle core biopsy done on October 17, 2018.  The biopsy came back positive for invasive ductal carcinoma with papillary features.  There is grade 2 out of 3.  Angiolymphatic invasion was not seen.  Ductal carcinoma in situ was not seen.  The tumor was estrogen receptor positive about 100% and progesterone receptor +100% strong nuclear staining.  HER-2/tamara came back negative for amplification. She had bilateral mastectomy.  Surgical pathology revealed simple mastectomy sample of the left showing solid papillary carcinoma 22 mm.  Grade 2.  Margins were clear of involvement.  3 sentinel lymph nodes shows no evidence of metastatic disease.  Lymphovascular invasion was not identified.  The tumor is pT2pN0.  Right breast shows simple mastectomy without any evidence of malignancy.  Oncotype came back 0.        Interval History:  Patient is here today for follow-up visit.  She has been doing well without any recent complaints weight loss night sweats fever chills patient denies any nausea vomiting or diarrhea.  She denies any fever or chills.  She has been on Aromasin and Zoladex.  She has been tolerating treatment without significant side effects.    Review Of Systems:  Constitutional: Negative for fever, chills, and night sweats.  Skin: negative.  Eyes: negative.  Ears/Nose/Throat: negative.  Respiratory: No shortness of breath, dyspnea on exertion, cough, or hemoptysis.  Cardiovascular: negative.  Gastrointestinal: negative.  Genitourinary: negative.  Musculoskeletal: negative.  Neurologic: negative.  Psychiatric: negative.  Hematologic/Lymphatic/Immunologic: negative.  Endocrine: negative.    All other ROS negative unless mentioned in interval  "history.    Past medical, social, surgical, and family histories reviewed.    Allergies:  Allergies as of 10/18/2019 - Reviewed 10/18/2019   Allergen Reaction Noted     Aspirin Hives 08/03/2009     Dust mites Unknown 07/03/2009     Morphine Nausea 06/23/2008       Current Medications:  Current Outpatient Medications   Medication Sig Dispense Refill     ACETAMINOPHEN PO Take 1,000 mg by mouth       calcium carbonate 600 mg-vitamin D 400 units (CALTRATE) 600-400 MG-UNIT per tablet Take 1 tablet by mouth 2 times daily       Calcium Polycarbophil (FIBER) 625 MG tablet Take by mouth daily       exemestane (AROMASIN) 25 MG tablet Take 1 tablet (25 mg) by mouth daily 90 tablet 1     Fluticasone Propionate (FLONASE ALLERGY RELIEF NA)        goserelin (ZOLADEX) 3.6 MG injection Inject 3.6 mg Subcutaneous once       lisinopril (PRINIVIL/ZESTRIL) 30 MG tablet Take 1 tablet (30 mg) by mouth daily 90 tablet 3     montelukast (SINGULAIR) 10 MG tablet Take 1 tablet (10 mg) by mouth At Bedtime 90 tablet 2     vitamin D3 (CHOLECALCIFEROL) 2000 units tablet Take 1 tablet by mouth daily       ZYRTEC 10 MG OR TABS 1 TABLET DAILY 90 3        Physical Exam:  /77 (BP Location: Right arm, Patient Position: Sitting, Cuff Size: Adult Large)   Pulse 84   Temp 97.7  F (36.5  C) (Tympanic)   Resp 18   Ht 1.645 m (5' 4.75\")   Wt 69.8 kg (153 lb 14.4 oz)   SpO2 96%   Breastfeeding? No   BMI 25.81 kg/m     Wt Readings from Last 12 Encounters:   10/18/19 69.8 kg (153 lb 14.4 oz)   09/19/19 69.4 kg (153 lb)   08/14/19 68.5 kg (151 lb)   07/12/19 67.7 kg (149 lb 4.8 oz)   05/07/19 66.1 kg (145 lb 11.2 oz)   04/11/19 65.3 kg (144 lb)   03/21/19 64.4 kg (142 lb)   02/22/19 63.5 kg (140 lb)   01/10/19 61.8 kg (136 lb 4.8 oz)   11/28/18 61.4 kg (135 lb 4.8 oz)   11/21/18 63.4 kg (139 lb 11.2 oz)   11/20/18 61.7 kg (136 lb)     ECOG performance status: 0  GENERAL APPEARANCE: Healthy, alert and in no acute distress.  HEENT: Sclerae anicteric. " "PERRLA. Oropharynx without ulcers, lesions, or thrush.  NECK: Supple. No asymmetry or masses.  LYMPHATICS: No palpable cervical, supraclavicular, axillary, or inguinal lymphadenopathy.  RESP: Lungs clear to auscultation bilaterally without rales, rhonchi or wheezes.\", BREAST: Bilateral scars of mastectomies.  There is no dominant masses or axillary adenopathy.  \"CARDIOVASCULAR: Regular rate and rhythm. Normal S1, S2; no S3 or S4. No murmur, gallop, or rub.  ABDOMEN: Soft, nontender. Bowel sounds normal. No palpable organomegaly or masses.  MUSCULOSKELETAL: Extremities without gross deformities noted. No edema of bilateral lower extremities.  SKIN: No suspicious lesions or rashes.  NEURO: Alert and oriented x 3. Cranial nerves II-XII grossly intact.  PSYCHIATRIC: Mentation and affect appear normal.    Laboratory/Imaging Studies:  No visits with results within 2 Week(s) from this visit.   Latest known visit with results is:   Orders Only on 08/28/2019   Component Date Value Ref Range Status     Sodium 08/28/2019 138  133 - 144 mmol/L Final     Potassium 08/28/2019 4.0  3.4 - 5.3 mmol/L Final     Chloride 08/28/2019 104  94 - 109 mmol/L Final     Carbon Dioxide 08/28/2019 29  20 - 32 mmol/L Final     Anion Gap 08/28/2019 5  3 - 14 mmol/L Final     Glucose 08/28/2019 85  70 - 99 mg/dL Final    Non Fasting     Urea Nitrogen 08/28/2019 14  7 - 30 mg/dL Final     Creatinine 08/28/2019 0.84  0.52 - 1.04 mg/dL Final     GFR Estimate 08/28/2019 82  >60 mL/min/[1.73_m2] Final    Comment: Non  GFR Calc  Starting 12/18/2018, serum creatinine based estimated GFR (eGFR) will be   calculated using the Chronic Kidney Disease Epidemiology Collaboration   (CKD-EPI) equation.       GFR Estimate If Black 08/28/2019 >90  >60 mL/min/[1.73_m2] Final    Comment:  GFR Calc  Starting 12/18/2018, serum creatinine based estimated GFR (eGFR) will be   calculated using the Chronic Kidney Disease Epidemiology " Collaboration   (CKD-EPI) equation.       Calcium 08/28/2019 9.6  8.5 - 10.1 mg/dL Final          Assessment and plan:    (C50.912) Invasive ductal carcinoma of breast, female, left (H)  (primary encounter diagnosis)  I reviewed with the patient today most recent laboratory test.  There is no clinical evidence of breast cancer recurrence.  We patient will continue on Zoladex monthly as well as Aromasin 25 mg orally daily.  Patient will continue calcium and vitamin D supplements.  I will see the patient again in 6 months time or sooner if there are any developments or concerns.    (I10) Essential hypertension, benign  Pressures currently well controlled she is currently on lisinopril 30 mg orally daily.    Mild renal impairment.  Creatinine today is 1.3    The patient is ready to learn, no apparent learning barriers were identified.  Diagnosis and treatment plans were explained to the patient. The patient expressed understanding of the content. The patient asked appropriate questions. The patient questions were answered to her satisfaction.    Chart documentation with Dragon Voice recognition Software. Although reviewed after completion, some words and grammatical errors may remain.    Again, thank you for allowing me to participate in the care of your patient.        Sincerely,        Stephen Peña MD     2 Hour(s) 8 Minute(s)

## 2021-01-05 ENCOUNTER — TELEPHONE (OUTPATIENT)
Dept: ONCOLOGY | Facility: CLINIC | Age: 51
End: 2021-01-05

## 2021-01-05 NOTE — TELEPHONE ENCOUNTER
"----- Message from Stephen Peña MD sent at 1/5/2021  9:12 AM CST -----  Regarding: RE: My Chart message  Well, there is no need to check hormone levels between cycles.  ----- Message -----  From: Desi Flores RN  Sent: 1/4/2021  11:43 AM CST  To: Desi Flores RN, Stephen Peña MD  Subject: My Chart message                                 Please see patients Mychart message and let me know if you would like any additional labs ordered for this patient.   Thanks!  Nilda    \"Hello - happy new year!  I will be in next Thursday (1/14) for blood work and a Zoladex injection.  I have wondered if it wouldn't be a good idea to do a test to ensure that my hormone levels are where they are supposed to be at the end of the three-month injection cycle.  This seems like an opportune time to check this.  If you agree, could you please place the order so that we can draw blood (I assume that's how things are checked, anyway) while I am there next week?     Thank you!  Nilda\"      "

## 2021-01-08 ENCOUNTER — VIRTUAL VISIT (OUTPATIENT)
Dept: ONCOLOGY | Facility: CLINIC | Age: 51
End: 2021-01-08
Attending: INTERNAL MEDICINE
Payer: COMMERCIAL

## 2021-01-08 VITALS — HEIGHT: 65 IN | BODY MASS INDEX: 24.99 KG/M2 | WEIGHT: 150 LBS

## 2021-01-08 DIAGNOSIS — I10 ESSENTIAL HYPERTENSION, BENIGN: Primary | ICD-10-CM

## 2021-01-08 DIAGNOSIS — C50.912 INVASIVE DUCTAL CARCINOMA OF BREAST, FEMALE, LEFT (H): ICD-10-CM

## 2021-01-08 PROCEDURE — 99214 OFFICE O/P EST MOD 30 MIN: CPT | Mod: 95 | Performed by: INTERNAL MEDICINE

## 2021-01-08 PROCEDURE — 999N001193 HC VIDEO/TELEPHONE VISIT; NO CHARGE

## 2021-01-08 ASSESSMENT — PAIN SCALES - GENERAL: PAINLEVEL: NO PAIN (0)

## 2021-01-08 ASSESSMENT — MIFFLIN-ST. JEOR: SCORE: 1301.28

## 2021-01-08 NOTE — LETTER
"    1/8/2021         RE: Desi Granado  [address] [place] [address]        Dear Colleague,    Thank you for referring your patient, Desi Granado, to the [facility]. Please see a copy of my visit note below.    Oncology Rooming Note- Video visit Via [employer].     January 8, 2021 1:01 PM   Desi Granado is a 50 year old female who presents for:    Chief Complaint   Patient presents with     Oncology Clinic Visit     6 month follow up breast cancer. Discuss treatment options.      Initial Vitals: Ht 1.651 m (5' 5\")   Wt 68 kg (150 lb)   Breastfeeding No   BMI 24.96 kg/m   Estimated body mass index is 24.96 kg/m  as calculated from the following:    Height as of this encounter: 1.651 m (5' 5\").    Weight as of this encounter: 68 kg (150 lb). Body surface area is 1.77 meters squared.  No Pain (0) Comment: Data Unavailable   No LMP recorded.  Allergies reviewed: Yes  Medications reviewed: Yes    Medications: Medication refills not needed today.  Pharmacy name entered into [employer]:    [facility]  [facility] - [id number] - [address] AT [address]    Clinical concerns: 6 month follow up breast cancer. Discuss treatment options.       Nessa Jeff, [facility]                Hematology/ Oncology virtual video visit:  Jan 8, 2021    Due to the concerns around COVID-19 and adhering to social distancing we conducted this visit via video visit.      Reason for Visit:   Chief Complaint   Patient presents with     Oncology Clinic Visit     6 month follow up breast cancer. Discuss treatment options.        Oncologic History:  Invasive ductal carcinoma of breast, female, left (H)    Desi Granado was found to have developing focal asymmetry in the left breast at approximately 3 o'clock position about 7-8 cm from the nipple on the routine mammogram done on October 30, 2018.  Subsequently she " went on to have diagnostic mammogram and ultrasound.  Directed sonogram at the site of mammographic finding showed a 1.6 cm irregular solid lesion.  Subsequently the patient went on to have ultrasound-guided needle core biopsy done on October 17, 2018.  The biopsy came back positive for invasive ductal carcinoma with papillary features.  There is grade 2 out of 3.  Angiolymphatic invasion was not seen.  Ductal carcinoma in situ was not seen.  The tumor was estrogen receptor positive about 100% and progesterone receptor +100% strong nuclear staining.  HER-2/tamara came back negative for amplification. She had bilateral mastectomy.  Surgical pathology revealed simple mastectomy sample of the left showing solid papillary carcinoma 22 mm.  Grade 2.  Margins were clear of involvement.  3 sentinel lymph nodes shows no evidence of metastatic disease.  Lymphovascular invasion was not identified.  The tumor is pT2pN0.  Right breast shows simple mastectomy without any evidence of malignancy.  Oncotype came back 0.        Interval History:  Patient has been feeling well without any recent complaints of weight loss or night sweats or fever or chills.  She denies any bone aches or pains.  She has been tolerating treatment without any significant side effects.    Review of systems:  Pertinent positives have been included in HPI; remainder of detailed complete 20-point ROS was negative.    Past medical, social, surgical, and family histories reviewed.    Allergies:  Allergies as of 01/08/2021 - Reviewed 01/08/2021   Allergen Reaction Noted     Aspirin Hives 08/03/2009     Dust mites Unknown 07/03/2009     Morphine Nausea 06/23/2008       Current Medications:  Current Outpatient Medications   Medication Sig Dispense Refill     calcium carbonate 600 mg-vitamin D 400 units (CALTRATE) 600-400 MG-UNIT per tablet Take 1 tablet by mouth 2 times daily       exemestane (AROMASIN) 25 MG tablet Take 1 tablet (25 mg) by mouth daily 90 tablet 1      Fluticasone Propionate (FLONASE ALLERGY RELIEF NA)        goserelin (ZOLADEX) 3.6 MG injection Inject 3.6 mg Subcutaneous once       lisinopril (ZESTRIL) 30 MG tablet Take 1 tablet (30 mg) by mouth daily 90 tablet 3     montelukast (SINGULAIR) 10 MG tablet Take 1 tablet (10 mg) by mouth At Bedtime 90 tablet 2     order for DME Equipment being ordered: Compression sleeve 30-40 mmHg 1 each 1     order for DME Equipment being ordered: Compression stockings 30-40 mmHg 1 each 1     vitamin D3 (CHOLECALCIFEROL) 2000 units tablet Take 1 tablet by mouth daily       ZYRTEC 10 MG OR TABS 1 TABLET DAILY 90 3        Physical examination:  Physical Exam as observed via telehealth:         Constitutional - General appearance, and body habitus are within normal range         Eyes -there is no redness, or discharge seen.         Respiratory -there is no cough, or labored breathing observed         Musculoskeletal -full range of motion is observed         Skin -there is no visible discoloration, or visible lesions         Neurological -there is tremors is observed         Psychiatric -the patient is alert & oriented.    The rest of a comprehensive physical examination is deferred due to PHE (public health emergency) video visit restrictions.    Laboratory/Imaging Studies:  No visits with results within 2 Week(s) from this visit.   Latest known visit with results is:   Office Visit on 09/22/2020   Component Date Value Ref Range Status     Vitamin D Deficiency screening 09/22/2020 61  20 - 75 ug/L Final    Comment: Season, race, dietary intake, and treatment affect the concentration of   25-hydroxy-Vitamin D. Values may decrease during winter months and increase   during summer months. Values 20-29 ug/L may indicate Vitamin D insufficiency   and values <20 ug/L may indicate Vitamin D deficiency.  Vitamin D determination is routinely performed by an immunoassay specific for   25 hydroxyvitamin D3.  If an individual is on vitamin D2  (ergocalciferol)   supplementation, please specify 25 OH vitamin D2 and D3 level determination by   LCMSMS test [id number].          Assessment and plan:    (C50.912) Invasive ductal carcinoma of breast, female, left (H)  There is no clear evidence of disease recurrence.  We reviewed the management plan in details specifically Zoladex injections.  After long discussion we decided to proceed with Zoladex every 3 months.  Patient will continue also on exemestane 25 mg orally daily.  She will continue on calcium and vitamin D supplements.  I will see the patient again in 6 months time or sooner if there are new developments or concerns    (I10) Essential hypertension, benign  (primary encounter diagnosis)  She has been on lisinopril 30 mg orally daily.      The patient is ready to learn, no apparent learning barriers were identified.  Diagnosis and treatment plans were explained to the patient. The patient expressed understanding of the content. The patient asked appropriate questions. The patient questions were answered to her satisfaction.    This is started 1 PM  Visit ended 1:30 PM    Stephen Peña MD    Chart documentation with Dragon Voice recognition Software. Although reviewed after completion, some words and grammatical errors may remain.      Again, thank you for allowing me to participate in the care of your patient.        Sincerely,        Stephen Peña MD

## 2021-01-08 NOTE — PROGRESS NOTES
"Oncology Rooming Note- Video visit Via [employer].     January 8, 2021 1:01 PM   Desi Granado is a 50 year old female who presents for:    Chief Complaint   Patient presents with     Oncology Clinic Visit     6 month follow up breast cancer. Discuss treatment options.      Initial Vitals: Ht 1.651 m (5' 5\")   Wt 68 kg (150 lb)   Breastfeeding No   BMI 24.96 kg/m   Estimated body mass index is 24.96 kg/m  as calculated from the following:    Height as of this encounter: 1.651 m (5' 5\").    Weight as of this encounter: 68 kg (150 lb). Body surface area is 1.77 meters squared.  No Pain (0) Comment: Data Unavailable   No LMP recorded.  Allergies reviewed: Yes  Medications reviewed: Yes    Medications: Medication refills not needed today.  Pharmacy name entered into [facility]:    [facility]  [facility] - [place], [place] - [address] AT [address]    Clinical concerns: 6 month follow up breast cancer. Discuss treatment options.       Nessa Jeff CMA            "

## 2021-01-08 NOTE — LETTER
"    1/8/2021         RE: Desi Granado  [address] [place] [address]        Dear Colleague,    Thank you for referring your patient, Desi Granado, to the [facility]. Please see a copy of my visit note below.    Oncology Rooming Note- Video visit Via [employer].     January 8, 2021 1:01 PM   Desi Granado is a 50 year old female who presents for:    Chief Complaint   Patient presents with     Oncology Clinic Visit     6 month follow up breast cancer. Discuss treatment options.      Initial Vitals: Ht 1.651 m (5' 5\")   Wt 68 kg (150 lb)   Breastfeeding No   BMI 24.96 kg/m   Estimated body mass index is 24.96 kg/m  as calculated from the following:    Height as of this encounter: 1.651 m (5' 5\").    Weight as of this encounter: 68 kg (150 lb). Body surface area is 1.77 meters squared.  No Pain (0) Comment: Data Unavailable   No LMP recorded.  Allergies reviewed: Yes  Medications reviewed: Yes    Medications: Medication refills not needed today.  Pharmacy name entered into [employer]:    [facility]  [facility] - [place], [place] - [address] AT [address]    Clinical concerns: 6 month follow up breast cancer. Discuss treatment options.       Nessa Jeff, [facility]                Hematology/ Oncology virtual video visit:  Jan 8, 2021    Due to the concerns around COVID-19 and adhering to social distancing we conducted this visit via video visit.      Reason for Visit:   Chief Complaint   Patient presents with     Oncology Clinic Visit     6 month follow up breast cancer. Discuss treatment options.        Oncologic History:  Invasive ductal carcinoma of breast, female, left (H)    Desi Granado was found to have developing focal asymmetry in the left breast at approximately 3 o'clock position about 7-8 cm from the nipple on the routine mammogram done on October 30, 2018.  Subsequently she " went on to have diagnostic mammogram and ultrasound.  Directed sonogram at the site of mammographic finding showed a 1.6 cm irregular solid lesion.  Subsequently the patient went on to have ultrasound-guided needle core biopsy done on October 17, 2018.  The biopsy came back positive for invasive ductal carcinoma with papillary features.  There is grade 2 out of 3.  Angiolymphatic invasion was not seen.  Ductal carcinoma in situ was not seen.  The tumor was estrogen receptor positive about 100% and progesterone receptor +100% strong nuclear staining.  HER-2/tamara came back negative for amplification. She had bilateral mastectomy.  Surgical pathology revealed simple mastectomy sample of the left showing solid papillary carcinoma 22 mm.  Grade 2.  Margins were clear of involvement.  3 sentinel lymph nodes shows no evidence of metastatic disease.  Lymphovascular invasion was not identified.  The tumor is pT2pN0.  Right breast shows simple mastectomy without any evidence of malignancy.  Oncotype came back 0.        Interval History:  Patient has been feeling well without any recent complaints of weight loss or night sweats or fever or chills.  She denies any bone aches or pains.  She has been tolerating treatment without any significant side effects.    Review of systems:  Pertinent positives have been included in HPI; remainder of detailed complete 20-point ROS was negative.    Past medical, social, surgical, and family histories reviewed.    Allergies:  Allergies as of 01/08/2021 - Reviewed 01/08/2021   Allergen Reaction Noted     Aspirin Hives 08/03/2009     Dust mites Unknown 07/03/2009     Morphine Nausea 06/23/2008       Current Medications:  Current Outpatient Medications   Medication Sig Dispense Refill     calcium carbonate 600 mg-vitamin D 400 units (CALTRATE) 600-400 MG-UNIT per tablet Take 1 tablet by mouth 2 times daily       exemestane (AROMASIN) 25 MG tablet Take 1 tablet (25 mg) by mouth daily 90 tablet 1      Fluticasone Propionate (FLONASE ALLERGY RELIEF NA)        goserelin (ZOLADEX) 3.6 MG injection Inject 3.6 mg Subcutaneous once       lisinopril (ZESTRIL) 30 MG tablet Take 1 tablet (30 mg) by mouth daily 90 tablet 3     montelukast (SINGULAIR) 10 MG tablet Take 1 tablet (10 mg) by mouth At Bedtime 90 tablet 2     order for DME Equipment being ordered: Compression sleeve 30-40 mmHg 1 each 1     order for DME Equipment being ordered: Compression stockings 30-40 mmHg 1 each 1     vitamin D3 (CHOLECALCIFEROL) 2000 units tablet Take 1 tablet by mouth daily       ZYRTEC 10 MG OR TABS 1 TABLET DAILY 90 3        Physical examination:  Physical Exam as observed via telehealth:         Constitutional - General appearance, and body habitus are within normal range         Eyes -there is no redness, or discharge seen.         Respiratory -there is no cough, or labored breathing observed         Musculoskeletal -full range of motion is observed         Skin -there is no visible discoloration, or visible lesions         Neurological -there is tremors is observed         Psychiatric -the patient is alert & oriented.    The rest of a comprehensive physical examination is deferred due to PHE (public health emergency) video visit restrictions.    Laboratory/Imaging Studies:  No visits with results within 2 Week(s) from this visit.   Latest known visit with results is:   Office Visit on 09/22/2020   Component Date Value Ref Range Status     Vitamin D Deficiency screening 09/22/2020 61  20 - 75 ug/L Final    Comment: Season, race, dietary intake, and treatment affect the concentration of   25-hydroxy-Vitamin D. Values may decrease during winter months and increase   during summer months. Values 20-29 ug/L may indicate Vitamin D insufficiency   and values <20 ug/L may indicate Vitamin D deficiency.  Vitamin D determination is routinely performed by an immunoassay specific for   25 hydroxyvitamin D3.  If an individual is on vitamin D2  (ergocalciferol)   supplementation, please specify 25 OH vitamin D2 and D3 level determination by   LCMSMS test [id number].          Assessment and plan:    (C50.912) Invasive ductal carcinoma of breast, female, left (H)  There is no clear evidence of disease recurrence.  We reviewed the management plan in details specifically Zoladex injections.  After long discussion we decided to proceed with Zoladex every 3 months.  Patient will continue also on exemestane 25 mg orally daily.  She will continue on calcium and vitamin D supplements.  I will see the patient again in 6 months time or sooner if there are new developments or concerns    (I10) Essential hypertension, benign  (primary encounter diagnosis)  She has been on lisinopril 30 mg orally daily.      The patient is ready to learn, no apparent learning barriers were identified.  Diagnosis and treatment plans were explained to the patient. The patient expressed understanding of the content. The patient asked appropriate questions. The patient questions were answered to her satisfaction.    This is started 1 PM  Visit ended 1:30 PM    Stephen Peña MD    Chart documentation with Dragon Voice recognition Software. Although reviewed after completion, some words and grammatical errors may remain.      Again, thank you for allowing me to participate in the care of your patient.        Sincerely,        Stephen Peña MD

## 2021-01-11 NOTE — PROGRESS NOTES
Hematology/ Oncology virtual video visit:  Jan 8, 2021    Due to the concerns around COVID-19 and adhering to social distancing we conducted this visit via video visit.      Reason for Visit:   Chief Complaint   Patient presents with     Oncology Clinic Visit     6 month follow up breast cancer. Discuss treatment options.        Oncologic History:  Invasive ductal carcinoma of breast, female, left (H)    Desi Granado was found to have developing focal asymmetry in the left breast at approximately 3 o'clock position about 7-8 cm from the nipple on the routine mammogram done on October 30, 2018.  Subsequently she went on to have diagnostic mammogram and ultrasound.  Directed sonogram at the site of mammographic finding showed a 1.6 cm irregular solid lesion.  Subsequently the patient went on to have ultrasound-guided needle core biopsy done on October 17, 2018.  The biopsy came back positive for invasive ductal carcinoma with papillary features.  There is grade 2 out of 3.  Angiolymphatic invasion was not seen.  Ductal carcinoma in situ was not seen.  The tumor was estrogen receptor positive about 100% and progesterone receptor +100% strong nuclear staining.  HER-2/tamara came back negative for amplification. She had bilateral mastectomy.  Surgical pathology revealed simple mastectomy sample of the left showing solid papillary carcinoma 22 mm.  Grade 2.  Margins were clear of involvement.  3 sentinel lymph nodes shows no evidence of metastatic disease.  Lymphovascular invasion was not identified.  The tumor is pT2pN0.  Right breast shows simple mastectomy without any evidence of malignancy.  Oncotype came back 0.        Interval History:  Patient has been feeling well without any recent complaints of weight loss or night sweats or fever or chills.  She denies any bone aches or pains.  She has been tolerating treatment without any significant side effects.    Review of systems:  Pertinent positives have been included  in HPI; remainder of detailed complete 20-point ROS was negative.    Past medical, social, surgical, and family histories reviewed.    Allergies:  Allergies as of 01/08/2021 - Reviewed 01/08/2021   Allergen Reaction Noted     Aspirin Hives 08/03/2009     Dust mites Unknown 07/03/2009     Morphine Nausea 06/23/2008       Current Medications:  Current Outpatient Medications   Medication Sig Dispense Refill     calcium carbonate 600 mg-vitamin D 400 units (CALTRATE) 600-400 MG-UNIT per tablet Take 1 tablet by mouth 2 times daily       exemestane (AROMASIN) 25 MG tablet Take 1 tablet (25 mg) by mouth daily 90 tablet 1     Fluticasone Propionate (FLONASE ALLERGY RELIEF NA)        goserelin (ZOLADEX) 3.6 MG injection Inject 3.6 mg Subcutaneous once       lisinopril (ZESTRIL) 30 MG tablet Take 1 tablet (30 mg) by mouth daily 90 tablet 3     montelukast (SINGULAIR) 10 MG tablet Take 1 tablet (10 mg) by mouth At Bedtime 90 tablet 2     order for DME Equipment being ordered: Compression sleeve 30-40 mmHg 1 each 1     order for DME Equipment being ordered: Compression stockings 30-40 mmHg 1 each 1     vitamin D3 (CHOLECALCIFEROL) 2000 units tablet Take 1 tablet by mouth daily       ZYRTEC 10 MG OR TABS 1 TABLET DAILY 90 3        Physical examination:  Physical Exam as observed via telehealth:         Constitutional - General appearance, and body habitus are within normal range         Eyes -there is no redness, or discharge seen.         Respiratory -there is no cough, or labored breathing observed         Musculoskeletal -full range of motion is observed         Skin -there is no visible discoloration, or visible lesions         Neurological -there is tremors is observed         Psychiatric -the patient is alert & oriented.    The rest of a comprehensive physical examination is deferred due to PHE (public health emergency) video visit restrictions.    Laboratory/Imaging Studies:  No visits with results within 2 Week(s) from  this visit.   Latest known visit with results is:   Office Visit on 09/22/2020   Component Date Value Ref Range Status     Vitamin D Deficiency screening 09/22/2020 61  20 - 75 ug/L Final    Comment: Season, race, dietary intake, and treatment affect the concentration of   25-hydroxy-Vitamin D. Values may decrease during winter months and increase   during summer months. Values 20-29 ug/L may indicate Vitamin D insufficiency   and values <20 ug/L may indicate Vitamin D deficiency.  Vitamin D determination is routinely performed by an immunoassay specific for   25 hydroxyvitamin D3.  If an individual is on vitamin D2 (ergocalciferol)   supplementation, please specify 25 OH vitamin D2 and D3 level determination by   LCMSMS test [id number].          Assessment and plan:    (C50.912) Invasive ductal carcinoma of breast, female, left (H)  There is no clear evidence of disease recurrence.  We reviewed the management plan in details specifically Zoladex injections.  After long discussion we decided to proceed with Zoladex every 3 months.  Patient will continue also on exemestane 25 mg orally daily.  She will continue on calcium and vitamin D supplements.  I will see the patient again in 6 months time or sooner if there are new developments or concerns    (I10) Essential hypertension, benign  (primary encounter diagnosis)  She has been on lisinopril 30 mg orally daily.      The patient is ready to learn, no apparent learning barriers were identified.  Diagnosis and treatment plans were explained to the patient. The patient expressed understanding of the content. The patient asked appropriate questions. The patient questions were answered to her satisfaction.    This is started 1 PM  Visit ended 1:30 PM    Stephen Peña MD    Chart documentation with Dragon Voice recognition Software. Although reviewed after completion, some words and grammatical errors may remain.

## 2021-01-14 ENCOUNTER — ALLIED HEALTH/NURSE VISIT (OUTPATIENT)
Dept: FAMILY MEDICINE | Facility: CLINIC | Age: 51
End: 2021-01-14
Payer: COMMERCIAL

## 2021-01-14 ENCOUNTER — INFUSION THERAPY VISIT (OUTPATIENT)
Dept: INFUSION THERAPY | Facility: CLINIC | Age: 51
End: 2021-01-14
Attending: INTERNAL MEDICINE
Payer: COMMERCIAL

## 2021-01-14 ENCOUNTER — PATIENT OUTREACH (OUTPATIENT)
Dept: ONCOLOGY | Facility: CLINIC | Age: 51
End: 2021-01-14

## 2021-01-14 ENCOUNTER — HOSPITAL ENCOUNTER (OUTPATIENT)
Dept: LAB | Facility: CLINIC | Age: 51
Discharge: HOME | End: 2021-01-14
Attending: INTERNAL MEDICINE | Admitting: INTERNAL MEDICINE
Payer: COMMERCIAL

## 2021-01-14 VITALS — DIASTOLIC BLOOD PRESSURE: 80 MMHG | TEMPERATURE: 98.4 F | SYSTOLIC BLOOD PRESSURE: 130 MMHG | HEART RATE: 90 BPM

## 2021-01-14 DIAGNOSIS — C50.912 INVASIVE DUCTAL CARCINOMA OF BREAST, FEMALE, LEFT (H): Primary | ICD-10-CM

## 2021-01-14 DIAGNOSIS — Z17.0 MALIGNANT NEOPLASM OF BREAST IN FEMALE, ESTROGEN RECEPTOR POSITIVE, UNSPECIFIED LATERALITY, UNSPECIFIED SITE OF BREAST (H): ICD-10-CM

## 2021-01-14 DIAGNOSIS — Z23 ENCOUNTER FOR IMMUNIZATION: Primary | ICD-10-CM

## 2021-01-14 DIAGNOSIS — C50.919 MALIGNANT NEOPLASM OF BREAST IN FEMALE, ESTROGEN RECEPTOR POSITIVE, UNSPECIFIED LATERALITY, UNSPECIFIED SITE OF BREAST (H): ICD-10-CM

## 2021-01-14 LAB
ALBUMIN SERPL-MCNC: 4.2 G/DL (ref 3.4–5)
ALP SERPL-CCNC: 119 U/L (ref 40–150)
ALT SERPL W P-5'-P-CCNC: 18 U/L (ref 0–50)
ANION GAP SERPL CALCULATED.3IONS-SCNC: 2 MMOL/L (ref 3–14)
AST SERPL W P-5'-P-CCNC: 12 U/L (ref 0–45)
BASOPHILS # BLD AUTO: 0 10E9/L (ref 0–0.2)
BASOPHILS NFR BLD AUTO: 0.7 %
BILIRUB SERPL-MCNC: 0.3 MG/DL (ref 0.2–1.3)
BUN SERPL-MCNC: 21 MG/DL (ref 7–30)
CALCIUM SERPL-MCNC: 9.8 MG/DL (ref 8.5–10.1)
CANCER AG27-29 SERPL-ACNC: 16 U/ML (ref 0–39)
CHLORIDE SERPL-SCNC: 108 MMOL/L (ref 94–109)
CO2 SERPL-SCNC: 30 MMOL/L (ref 20–32)
CREAT SERPL-MCNC: 0.97 MG/DL (ref 0.52–1.04)
DIFFERENTIAL METHOD BLD: NORMAL
EOSINOPHIL # BLD AUTO: 0.1 10E9/L (ref 0–0.7)
EOSINOPHIL NFR BLD AUTO: 1.5 %
ERYTHROCYTE [DISTWIDTH] IN BLOOD BY AUTOMATED COUNT: 12.5 % (ref 10–15)
GFR SERPL CREATININE-BSD FRML MDRD: 68 ML/MIN/{1.73_M2}
GLUCOSE SERPL-MCNC: 83 MG/DL (ref 70–99)
HCT VFR BLD AUTO: 41.3 % (ref 35–47)
HGB BLD-MCNC: 13.9 G/DL (ref 11.7–15.7)
IMM GRANULOCYTES # BLD: 0 10E9/L (ref 0–0.4)
IMM GRANULOCYTES NFR BLD: 0.3 %
LYMPHOCYTES # BLD AUTO: 1.7 10E9/L (ref 0.8–5.3)
LYMPHOCYTES NFR BLD AUTO: 27.1 %
MCH RBC QN AUTO: 29.5 PG (ref 26.5–33)
MCHC RBC AUTO-ENTMCNC: 33.7 G/DL (ref 31.5–36.5)
MCV RBC AUTO: 88 FL (ref 78–100)
MONOCYTES # BLD AUTO: 0.5 10E9/L (ref 0–1.3)
MONOCYTES NFR BLD AUTO: 7.3 %
NEUTROPHILS # BLD AUTO: 3.9 10E9/L (ref 1.6–8.3)
NEUTROPHILS NFR BLD AUTO: 63.1 %
NRBC # BLD AUTO: 0 10*3/UL
NRBC BLD AUTO-RTO: 0 /100
PLATELET # BLD AUTO: 275 10E9/L (ref 150–450)
POTASSIUM SERPL-SCNC: 4.1 MMOL/L (ref 3.4–5.3)
PROT SERPL-MCNC: 7.6 G/DL (ref 6.8–8.8)
RBC # BLD AUTO: 4.71 10E12/L (ref 3.8–5.2)
SODIUM SERPL-SCNC: 140 MMOL/L (ref 133–144)
WBC # BLD AUTO: 6.1 10E9/L (ref 4–11)

## 2021-01-14 PROCEDURE — 99207 PR NO CHARGE NURSE ONLY: CPT

## 2021-01-14 PROCEDURE — 96402 CHEMO HORMON ANTINEOPL SQ/IM: CPT

## 2021-01-14 PROCEDURE — 250N000011 HC RX IP 250 OP 636: Performed by: INTERNAL MEDICINE

## 2021-01-14 PROCEDURE — 80053 COMPREHEN METABOLIC PANEL: CPT | Performed by: INTERNAL MEDICINE

## 2021-01-14 PROCEDURE — 85025 COMPLETE CBC W/AUTO DIFF WBC: CPT | Performed by: INTERNAL MEDICINE

## 2021-01-14 PROCEDURE — 86300 IMMUNOASSAY TUMOR CA 15-3: CPT | Performed by: INTERNAL MEDICINE

## 2021-01-14 PROCEDURE — 90750 HZV VACC RECOMBINANT IM: CPT

## 2021-01-14 PROCEDURE — 90471 IMMUNIZATION ADMIN: CPT

## 2021-01-14 PROCEDURE — 36415 COLL VENOUS BLD VENIPUNCTURE: CPT | Performed by: INTERNAL MEDICINE

## 2021-01-14 RX ADMIN — GOSERELIN ACETATE 10.8 MG: 10.8 IMPLANT SUBCUTANEOUS at 15:25

## 2021-01-14 NOTE — PROGRESS NOTES
Infusion Nursing Note:  Desi Granado presents today for Zoladex.    Patient seen by provider today: No   present during visit today: Not Applicable.    Note: N/A.      Intravenous Access:  No Intravenous access at this visit.    Treatment Conditions:  Not Applicable.      Post Infusion Assessment:  Patient tolerated injection without incident.  Site patent and intact, free from redness, edema or discomfort.       Discharge Plan:   Patient discharged in stable condition accompanied by: self.  Departure Mode: Ambulatory. Pt will return 4/14/2021.     Wing Iqbal RN

## 2021-01-14 NOTE — NURSING NOTE
Prior to immunization administration, verified patients identity using patient s name and date of birth. Please see Immunization Activity for additional information.     Screening Questionnaire for Adult Immunization    Are you sick today?   No   Do you have allergies to medications, food, a vaccine component or latex?   Yes   Have you ever had a serious reaction after receiving a vaccination?   No   Do you have a long-term health problem with heart, lung, kidney, or metabolic disease (e.g., diabetes), asthma, a blood disorder, no spleen, complement component deficiency, a cochlear implant, or a spinal fluid leak?  Are you on long-term aspirin therapy?   No   Do you have cancer, leukemia, HIV/AIDS, or any other immune system problem?   Yes   Do you have a parent, brother, or sister with an immune system problem?   Don't Know   In the past 3 months, have you taken medications that affect  your immune system, such as prednisone, other steroids, or anticancer drugs; drugs for the treatment of rheumatoid arthritis, Crohn s disease, or psoriasis; or have you had radiation treatments?   Don't Know   Have you had a seizure, or a brain or other nervous system problem?   No   During the past year, have you received a transfusion of blood or blood    products, or been given immune (gamma) globulin or antiviral drug?   Don't Know   For women: Are you pregnant or is there a chance you could become       pregnant during the next month?   No   Have you received any vaccinations in the past 4 weeks?   No     Immunization questionnaire was positive for at least one answer.  Notified -  No contraindications; no live vaccine given.        Per orders of Dr. Grover, injection of shingrix #2 given by SAADIA PATEL. Patient instructed to remain in clinic for 15 minutes afterwards, and to report any adverse reaction to me immediately.       Screening performed by SAADIA PATEL on 1/14/2021 at 3:05 PM.

## 2021-02-02 ENCOUNTER — OFFICE VISIT (OUTPATIENT)
Dept: SURGERY | Facility: CLINIC | Age: 51
End: 2021-02-02
Payer: COMMERCIAL

## 2021-02-02 VITALS
SYSTOLIC BLOOD PRESSURE: 100 MMHG | HEIGHT: 65 IN | TEMPERATURE: 98.5 F | WEIGHT: 153 LBS | DIASTOLIC BLOOD PRESSURE: 68 MMHG | BODY MASS INDEX: 25.49 KG/M2

## 2021-02-02 DIAGNOSIS — Z90.13 S/P BILATERAL MASTECTOMY: ICD-10-CM

## 2021-02-02 DIAGNOSIS — C50.912 INVASIVE DUCTAL CARCINOMA OF BREAST, FEMALE, LEFT (H): Primary | ICD-10-CM

## 2021-02-02 DIAGNOSIS — Z17.0 MALIGNANT NEOPLASM OF BREAST IN FEMALE, ESTROGEN RECEPTOR POSITIVE, UNSPECIFIED LATERALITY, UNSPECIFIED SITE OF BREAST (H): ICD-10-CM

## 2021-02-02 DIAGNOSIS — C50.919 MALIGNANT NEOPLASM OF BREAST IN FEMALE, ESTROGEN RECEPTOR POSITIVE, UNSPECIFIED LATERALITY, UNSPECIFIED SITE OF BREAST (H): ICD-10-CM

## 2021-02-02 PROCEDURE — 99214 OFFICE O/P EST MOD 30 MIN: CPT | Performed by: SURGERY

## 2021-02-02 ASSESSMENT — MIFFLIN-ST. JEOR: SCORE: 1310.91

## 2021-02-02 NOTE — LETTER
2/2/2021         RE: Desi Granado  [address] [place] [address]        Dear Colleague,    Thank you for referring your patient, Desi Granado, to the [facility]. Please see a copy of my visit note below.    BREAST CANCER FOLLOW UP  CHIEF COMPLAINT  Chief Complaint   Patient presents with     RECHECK     Malignant neoplasm of breast in female     HPI  Desi Garnado is a 48 year old female who presents with   Chief Complaint   Patient presents with     RECHECK     Malignant neoplasm of breast in female     The patient presents for her breast cancer followup appointment. Overall, she is feeling good. She denies any significant fatigue, fevers, chills, or changes in appetite. She has not suffered any significant weight loss.    She has not noted any areas of skin changes or any masses in the breast or chest wall that she is concerned about.  She denies skin dimpling, puckering, erythema, or nipple discharge. She has not noted any palpable axillary lymphadenopathy.  She has not traveled due to COVID-[employer].  Last trip was back in February.  Got her parents into the lottery for COVID 19;  She's wondering if she would be able to stop getting zoladex if she undergoes a oopherectomy.  I recommend that she talks to her medical oncologist about this.  I expalined to her that doing the oopherectomy would not negate her from taking an AI.    Review of Systems  Constitutional: Denies fever or chills   Eyes: Denies change in visual acuity   HENT: Denies nasal congestion or sore throat   Respiratory: Denies cough or shortness of breath   Cardiovascular: Denies chest pain or edema   GI: Denies  nausea, vomiting, bloody stools or diarrhea; neg abdominal pain  : Denies dysuria   Musculoskeletal: Denies back pain or joint pain   Integument: Denies rash   Neurologic: Denies headache, focal weakness or sensory changes   Endocrine: Denies polyuria or polydipsia   Lymphatic:  Denies swollen glands   Psychiatric: Denies depression or anxiety     PAST MEDICAL HISTORY  Past Medical History:   Diagnosis Date     Allergic rhinitis, cause unspecified      Hypertension 8 years?     Injury, other and unspecified, knee, leg, ankle, and foot 8th grade    left ankle injury     Invasive ductal carcinoma of breast, female, left (H) 10/26/2018     FAMILY HISTORY  Family History   Problem Relation Age of Onset     Hypertension Mother      Allergies Mother      Thyroid Disease Mother         Taking thyroid medication     Obesity Mother      Cerebrovascular Disease Mother         TIA Feb 2017     Hypertension Father      Thyroid Disease Father         two parathyroids removed     Cancer Maternal Grandmother         Bone CA     Allergies Maternal Grandmother      Circulatory Maternal Grandmother      Cerebrovascular Disease Maternal Grandmother      Other Cancer Maternal Grandmother         multiple myeloma     C.A.D. Maternal Grandfather      Respiratory Maternal Grandfather         asthma     Allergies Maternal Grandfather      Cerebrovascular Disease Maternal Grandfather      Alzheimer Disease Paternal Grandfather      Neurologic Disorder Paternal Grandfather         Parkinsons     Arthritis Paternal Grandmother      Respiratory Other         Emphysema     Diabetes Other      Asthma Other         generic emphysema     Genetic Disorder Other         genetic emphysema     Breast Cancer Sister         benign lump indicated future risk     Diabetes Other      Coronary Artery Disease Other      Other Cancer Other         multiple myeloma     Colon Cancer Other      Cerebrovascular Disease Other      Cancer - colorectal No family hx of      Breast Cancer No family hx of      Prostate Cancer No family hx of      SOCIAL HISTORY  Social History     Socioeconomic History     Marital status: Single     Spouse name: None     Number of children: 0     Years of education: 18     Highest education level: None    Occupational History     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     Employer: [employer]   Social Needs     Financial resource strain: None     Food insecurity:     Worry: None     Inability: None     Transportation needs:     Medical: None     Non-medical: None   Tobacco Use     Smoking status: Never Smoker     Smokeless tobacco: Never Used   Substance and Sexual Activity     Alcohol use: No     Alcohol/week: 0.0 oz     Comment: very rare     Drug use: No     Sexual activity: Not Currently     Partners: Male     Birth control/protection: Pill   Lifestyle     Physical activity:     Days per week: None     Minutes per session: None     Stress: None   Relationships     Social connections:     Talks on phone: None     Gets together: None     Attends Rastafarian service: None     Active member of club or organization: None     Attends meetings of clubs or organizations: None     Relationship status: None     Intimate partner violence:     Fear of current or ex partner: None     Emotionally abused: None     Physically abused: None     Forced sexual activity: None   Other Topics Concern     Parent/sibling w/ CABG, MI or angioplasty before 65F 55M? No   Social History Narrative     None     SURGICAL HISTORY  Past Surgical History:   Procedure Laterality Date     BIOPSY       BREAST LUMPECTOMY, RT/LT  6/23/10    Breast Lumpectomy RT for likely adenomatous lumps     COLONOSCOPY N/A 8/16/2018    Procedure: COLONOSCOPY;  colonoscopy;  Surgeon: Vijay Almanza MD;  Location: [facility]     ESOPHAGOSCOPY, GASTROSCOPY, DUODENOSCOPY (EGD), COMBINED N/A 10/1/2015    Procedure: COMBINED ESOPHAGOSCOPY, GASTROSCOPY, DUODENOSCOPY (EGD);  Surgeon: Vijay Almanza MD;  Location: [facility]     MASTECTOMY SIMPLE BILATERAL, SENTINEL NODE BILATERAL, COMBINED Bilateral 11/8/2018    Procedure: BILATERAL SIMPLE MASTECTOMIES WITH LEFT SENTINEL LYMPH NODE BIOPSY ;  Surgeon: Nabila Hart MD;  [facility]     SOFT TISSUE SURGERY      Ankle surgery 20+ years  "ago     SURGICAL HISTORY OF -   1989    arthroscopy of Left Ankle     SURGICAL HISTORY OF -   1990    arthroscopy of Left Ankle     SURGICAL HISTORY OF -   1993    4 wisdom teeth extracted     CURRENT MEDICATIONS    Current Outpatient Medications:      calcium carbonate 600 mg-vitamin D 400 units (CALTRATE) 600-400 MG-UNIT per tablet, Take 1 tablet by mouth 2 times daily, Disp: , Rfl:      exemestane (AROMASIN) 25 MG tablet, Take 1 tablet (25 mg) by mouth daily, Disp: 90 tablet, Rfl: 1     Fluticasone Propionate (FLONASE ALLERGY RELIEF NA), , Disp: , Rfl:      goserelin (ZOLADEX) 3.6 MG injection, Inject 3.6 mg Subcutaneous once, Disp: , Rfl:      lisinopril (ZESTRIL) 30 MG tablet, Take 1 tablet (30 mg) by mouth daily, Disp: 90 tablet, Rfl: 3     montelukast (SINGULAIR) 10 MG tablet, Take 1 tablet (10 mg) by mouth At Bedtime, Disp: 90 tablet, Rfl: 2     order for DME, Equipment being ordered: Compression sleeve 30-40 mmHg, Disp: 1 each, Rfl: 1     order for DME, Equipment being ordered: Compression stockings 30-40 mmHg, Disp: 1 each, Rfl: 1     vitamin D3 (CHOLECALCIFEROL) 2000 units tablet, Take 1 tablet by mouth daily, Disp: , Rfl:      ZYRTEC 10 MG OR TABS, 1 TABLET DAILY, Disp: 90, Rfl: 3  ALLERGIES  Allergies   Allergen Reactions     Aspirin Hives     Dust Mites Unknown     Morphine Nausea     PHYSICAL EXAM  VITAL SIGNS:  height is 1.645 m (5' 4.75\") and weight is 69.4 kg (153 lb). Her temporal temperature is 98.5  F (36.9  C). Her blood pressure is 100/68.   Constitutional: Well developed, Well nourished, No acute distress, Non-toxic appearance.   HENT: Normocephalic, Atraumatic, Bilateral external ears normal, Oropharynx moist, No oral exudates, Nose normal.   Eyes: EOMI, Conjunctiva normal, No discharge.   Neck: Normal range of motion, No tenderness, Supple, No stridor.   Lymphatic: No lymphadenopathy noted.   Cardiovascular: Normal heart rate, Normal rhythm, No murmurs, No rubs, No gallops.   Breast Exam: "   RIGHT: Surgically absent breast. No areas of skin dimpling or puckering. No erythema. No skin scaling or changes. No focal areas of significant tenderness.  No palpable axillary lymphadenopathy.  LEFT: Surgically absent breast. No areas of skin dimpling or puckering. No erythema. No skin scaling or changes. No focal areas of significant tenderness.   No palpable axillary, cervical, supraclavicular lymphadenopathy.  Thorax & Lungs: Normal breath sounds, No respiratory distress, No wheezing, No chest tenderness.   Abdomen: Bowel sounds normal, Soft, No masses, No pulsatile masses.   Skin: Warm, Dry, No erythema, No rash.   Back: No tenderness, No CVA tenderness.   Extremities: Intact distal pulses, No edema, No tenderness, No cyanosis, No clubbing.   Musculoskeletal: Good range of motion in all major joints. No tenderness to palpation or major deformities noted.   Neurologic: Alert & oriented x 3, Normal motor function, Normal sensory function, No focal deficits noted.   Psychiatric: Affect normal, Judgment normal, Mood normal.       Imaging Studies:   No results found for this or any previous visit (from the past 744 hour(s)).    FINAL IMPRESSION AND PLAN  (C50.912) Invasive ductal carcinoma of breast, female, left (H)  (primary encounter diagnosis)  Comment: s/p bilateral mastectomy  The patient is 18 months out from her breast cancer surgery. There is no evidence of recurrent disease on my exam today. She is continuing to recover well from her breast cancer treatment. No signs of lymphedema.  Tolerating AI with no issues (aromasin)  She will continue periodic self breast or chest wall exam. She is encouraged to followup with me for any changes on self-exam, or for any concerns or questions. She will followup with her primary care provider for routine care, and continue followup with her oncologist as scheduled.  My recommendations were discussed with the patient. She will see me in 6 months for another exam.          25 mins visit, more than 50% of face to face time was spent in counseling and coordinating care as discussed above.      [facility], DO            Again, thank you for allowing me to participate in the care of your patient.        Sincerely,        [facility] MD [place]

## 2021-02-02 NOTE — PROGRESS NOTES
BREAST CANCER FOLLOW UP  CHIEF COMPLAINT  Chief Complaint   Patient presents with     RECHECK     Malignant neoplasm of breast in female     HPI  Desi Granado is a 48 year old female who presents with   Chief Complaint   Patient presents with     RECHECK     Malignant neoplasm of breast in female     The patient presents for her breast cancer followup appointment. Overall, she is feeling good. She denies any significant fatigue, fevers, chills, or changes in appetite. She has not suffered any significant weight loss.    She has not noted any areas of skin changes or any masses in the breast or chest wall that she is concerned about.  She denies skin dimpling, puckering, erythema, or nipple discharge. She has not noted any palpable axillary lymphadenopathy.  She has not traveled due to COVID-[employer].  Last trip was back in February.  Got her parents into the lottery for COVID 19;  She's wondering if she would be able to stop getting zoladex if she undergoes a oopherectomy.  I recommend that she talks to her medical oncologist about this.  I expalined to her that doing the oopherectomy would not negate her from taking an AI.    Review of Systems  Constitutional: Denies fever or chills   Eyes: Denies change in visual acuity   HENT: Denies nasal congestion or sore throat   Respiratory: Denies cough or shortness of breath   Cardiovascular: Denies chest pain or edema   GI: Denies  nausea, vomiting, bloody stools or diarrhea; neg abdominal pain  : Denies dysuria   Musculoskeletal: Denies back pain or joint pain   Integument: Denies rash   Neurologic: Denies headache, focal weakness or sensory changes   Endocrine: Denies polyuria or polydipsia   Lymphatic: Denies swollen glands   Psychiatric: Denies depression or anxiety     PAST MEDICAL HISTORY  Past Medical History:   Diagnosis Date     Allergic rhinitis, cause unspecified      Hypertension 8 years?     Injury, other and unspecified, knee, leg, ankle, and foot 8th grade     left ankle injury     Invasive ductal carcinoma of breast, female, left (H) 10/26/2018     FAMILY HISTORY  Family History   Problem Relation Age of Onset     Hypertension Mother      Allergies Mother      Thyroid Disease Mother         Taking thyroid medication     Obesity Mother      Cerebrovascular Disease Mother         TIA Feb 2017     Hypertension Father      Thyroid Disease Father         two parathyroids removed     Cancer Maternal Grandmother         Bone CA     Allergies Maternal Grandmother      Circulatory Maternal Grandmother      Cerebrovascular Disease Maternal Grandmother      Other Cancer Maternal Grandmother         multiple myeloma     C.A.D. Maternal Grandfather      Respiratory Maternal Grandfather         asthma     Allergies Maternal Grandfather      Cerebrovascular Disease Maternal Grandfather      Alzheimer Disease Paternal Grandfather      Neurologic Disorder Paternal Grandfather         Parkinsons     Arthritis Paternal Grandmother      Respiratory Other         Emphysema     Diabetes Other      Asthma Other         generic emphysema     Genetic Disorder Other         genetic emphysema     Breast Cancer Sister         benign lump indicated future risk     Diabetes Other      Coronary Artery Disease Other      Other Cancer Other         multiple myeloma     Colon Cancer Other      Cerebrovascular Disease Other      Cancer - colorectal No family hx of      Breast Cancer No family hx of      Prostate Cancer No family hx of      SOCIAL HISTORY  Social History     Socioeconomic History     Marital status: Single     Spouse name: None     Number of children: 0     Years of education: 18     Highest education level: None   Occupational History     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     Employer: [employer]   Social Needs     Financial resource strain: None     Food insecurity:     Worry: None     Inability: None     Transportation needs:     Medical: None     Non-medical: None   Tobacco Use      Smoking status: Never Smoker     Smokeless tobacco: Never Used   Substance and Sexual Activity     Alcohol use: No     Alcohol/week: 0.0 oz     Comment: very rare     Drug use: No     Sexual activity: Not Currently     Partners: Male     Birth control/protection: Pill   Lifestyle     Physical activity:     Days per week: None     Minutes per session: None     Stress: None   Relationships     Social connections:     Talks on phone: None     Gets together: None     Attends Christianity service: None     Active member of club or organization: None     Attends meetings of clubs or organizations: None     Relationship status: None     Intimate partner violence:     Fear of current or ex partner: None     Emotionally abused: None     Physically abused: None     Forced sexual activity: None   Other Topics Concern     Parent/sibling w/ CABG, MI or angioplasty before 65F 55M? No   Social History Narrative     None     SURGICAL HISTORY  Past Surgical History:   Procedure Laterality Date     BIOPSY       BREAST LUMPECTOMY, RT/LT  6/23/10    Breast Lumpectomy RT for likely adenomatous lumps     COLONOSCOPY N/A 8/16/2018    Procedure: COLONOSCOPY;  colonoscopy;  Surgeon: Vijay Almanza MD;  Location: [facility]     ESOPHAGOSCOPY, GASTROSCOPY, DUODENOSCOPY (EGD), COMBINED N/A 10/1/2015    Procedure: COMBINED ESOPHAGOSCOPY, GASTROSCOPY, DUODENOSCOPY (EGD);  Surgeon: Vijay Almanza MD;  Location: [facility]     MASTECTOMY SIMPLE BILATERAL, SENTINEL NODE BILATERAL, COMBINED Bilateral 11/8/2018    Procedure: BILATERAL SIMPLE MASTECTOMIES WITH LEFT SENTINEL LYMPH NODE BIOPSY ;  Surgeon: Nabila Hart MD;  [facility]     SOFT TISSUE SURGERY      Ankle surgery 20+ years ago     SURGICAL HISTORY OF -   1989    arthroscopy of Left Ankle     SURGICAL HISTORY OF -   1990    arthroscopy of Left Ankle     SURGICAL HISTORY OF -   1993    4 wisdom teeth extracted     CURRENT MEDICATIONS    Current Outpatient Medications:      calcium carbonate 600  "mg-vitamin D 400 units (CALTRATE) 600-400 MG-UNIT per tablet, Take 1 tablet by mouth 2 times daily, Disp: , Rfl:      exemestane (AROMASIN) 25 MG tablet, Take 1 tablet (25 mg) by mouth daily, Disp: 90 tablet, Rfl: 1     Fluticasone Propionate (FLONASE ALLERGY RELIEF NA), , Disp: , Rfl:      goserelin (ZOLADEX) 3.6 MG injection, Inject 3.6 mg Subcutaneous once, Disp: , Rfl:      lisinopril (ZESTRIL) 30 MG tablet, Take 1 tablet (30 mg) by mouth daily, Disp: 90 tablet, Rfl: 3     montelukast (SINGULAIR) 10 MG tablet, Take 1 tablet (10 mg) by mouth At Bedtime, Disp: 90 tablet, Rfl: 2     order for DME, Equipment being ordered: Compression sleeve 30-40 mmHg, Disp: 1 each, Rfl: 1     order for DME, Equipment being ordered: Compression stockings 30-40 mmHg, Disp: 1 each, Rfl: 1     vitamin D3 (CHOLECALCIFEROL) 2000 units tablet, Take 1 tablet by mouth daily, Disp: , Rfl:      ZYRTEC 10 MG OR TABS, 1 TABLET DAILY, Disp: 90, Rfl: 3  ALLERGIES  Allergies   Allergen Reactions     Aspirin Hives     Dust Mites Unknown     Morphine Nausea     PHYSICAL EXAM  VITAL SIGNS:  height is 1.645 m (5' 4.75\") and weight is 69.4 kg (153 lb). Her temporal temperature is 98.5  F (36.9  C). Her blood pressure is 100/68.   Constitutional: Well developed, Well nourished, No acute distress, Non-toxic appearance.   HENT: Normocephalic, Atraumatic, Bilateral external ears normal, Oropharynx moist, No oral exudates, Nose normal.   Eyes: EOMI, Conjunctiva normal, No discharge.   Neck: Normal range of motion, No tenderness, Supple, No stridor.   Lymphatic: No lymphadenopathy noted.   Cardiovascular: Normal heart rate, Normal rhythm, No murmurs, No rubs, No gallops.   Breast Exam:   RIGHT: Surgically absent breast. No areas of skin dimpling or puckering. No erythema. No skin scaling or changes. No focal areas of significant tenderness.  No palpable axillary lymphadenopathy.  LEFT: Surgically absent breast. No areas of skin dimpling or puckering. No " erythema. No skin scaling or changes. No focal areas of significant tenderness.   No palpable axillary, cervical, supraclavicular lymphadenopathy.  Thorax & Lungs: Normal breath sounds, No respiratory distress, No wheezing, No chest tenderness.   Abdomen: Bowel sounds normal, Soft, No masses, No pulsatile masses.   Skin: Warm, Dry, No erythema, No rash.   Back: No tenderness, No CVA tenderness.   Extremities: Intact distal pulses, No edema, No tenderness, No cyanosis, No clubbing.   Musculoskeletal: Good range of motion in all major joints. No tenderness to palpation or major deformities noted.   Neurologic: Alert & oriented x 3, Normal motor function, Normal sensory function, No focal deficits noted.   Psychiatric: Affect normal, Judgment normal, Mood normal.       Imaging Studies:   No results found for this or any previous visit (from the past 744 hour(s)).    FINAL IMPRESSION AND PLAN  (C50.912) Invasive ductal carcinoma of breast, female, left (H)  (primary encounter diagnosis)  Comment: s/p bilateral mastectomy  The patient is 18 months out from her breast cancer surgery. There is no evidence of recurrent disease on my exam today. She is continuing to recover well from her breast cancer treatment. No signs of lymphedema.  Tolerating AI with no issues (aromasin)  She will continue periodic self breast or chest wall exam. She is encouraged to followup with me for any changes on self-exam, or for any concerns or questions. She will followup with her primary care provider for routine care, and continue followup with her oncologist as scheduled.  My recommendations were discussed with the patient. She will see me in 6 months for another exam.         25 mins visit, more than 50% of face to face time was spent in counseling and coordinating care as discussed above.      [facility], DO

## 2021-03-13 ENCOUNTER — IMMUNIZATION (OUTPATIENT)
Dept: FAMILY MEDICINE | Facility: CLINIC | Age: 51
End: 2021-03-13
Payer: COMMERCIAL

## 2021-03-13 PROCEDURE — 0011A PR COVID VAC MODERNA 100 MCG/0.5 ML IM: CPT

## 2021-03-13 PROCEDURE — 91301 PR COVID VAC MODERNA 100 MCG/0.5 ML IM: CPT

## 2021-04-08 ENCOUNTER — INFUSION THERAPY VISIT (OUTPATIENT)
Dept: INFUSION THERAPY | Facility: CLINIC | Age: 51
End: 2021-04-08
Attending: INTERNAL MEDICINE
Payer: COMMERCIAL

## 2021-04-08 VITALS
HEART RATE: 87 BPM | RESPIRATION RATE: 18 BRPM | SYSTOLIC BLOOD PRESSURE: 134 MMHG | DIASTOLIC BLOOD PRESSURE: 76 MMHG | TEMPERATURE: 98 F

## 2021-04-08 DIAGNOSIS — Z17.0 MALIGNANT NEOPLASM OF BREAST IN FEMALE, ESTROGEN RECEPTOR POSITIVE, UNSPECIFIED LATERALITY, UNSPECIFIED SITE OF BREAST (H): Primary | ICD-10-CM

## 2021-04-08 DIAGNOSIS — C50.919 MALIGNANT NEOPLASM OF BREAST IN FEMALE, ESTROGEN RECEPTOR POSITIVE, UNSPECIFIED LATERALITY, UNSPECIFIED SITE OF BREAST (H): Primary | ICD-10-CM

## 2021-04-08 PROCEDURE — 96402 CHEMO HORMON ANTINEOPL SQ/IM: CPT

## 2021-04-08 PROCEDURE — 250N000011 HC RX IP 250 OP 636: Performed by: NURSE PRACTITIONER

## 2021-04-08 RX ADMIN — GOSERELIN ACETATE 3.6 MG: 3.6 IMPLANT SUBCUTANEOUS at 15:45

## 2021-04-08 NOTE — PROGRESS NOTES
Infusion Nursing Note:  Desi Granado presents today for Zoladex.    Patient seen by provider today: No   present during visit today: Not Applicable.    Note: N/A.  Patient did meet criteria for an asymptomatic covid-19 PCR test in infusion today. Patient declined the covid-19 test.    Intravenous Access:  No Intravenous access/labs at this visit.    Treatment Conditions:  Not Applicable.    Post Infusion Assessment:  Patient tolerated injection without incident.  Site patent and intact, free from redness, edema or discomfort.  No evidence of extravasations.     Discharge Plan:   Discharge instructions reviewed with: Patient.  Patient and/or family verbalized understanding of discharge instructions and all questions answered.  AVS to patient via [employer].  Patient will return 5/6/2021 for next appointment.   Patient discharged in stable condition accompanied by: self.  Departure Mode: Ambulatory.    Ashley Schwarz RN

## 2021-04-10 ENCOUNTER — IMMUNIZATION (OUTPATIENT)
Dept: FAMILY MEDICINE | Facility: CLINIC | Age: 51
End: 2021-04-10
Attending: FAMILY MEDICINE
Payer: COMMERCIAL

## 2021-04-10 PROCEDURE — 0012A PR COVID VAC MODERNA 100 MCG/0.5 ML IM: CPT

## 2021-04-10 PROCEDURE — 91301 PR COVID VAC MODERNA 100 MCG/0.5 ML IM: CPT

## 2021-05-06 ENCOUNTER — INFUSION THERAPY VISIT (OUTPATIENT)
Dept: INFUSION THERAPY | Facility: CLINIC | Age: 51
End: 2021-05-06
Attending: INTERNAL MEDICINE
Payer: COMMERCIAL

## 2021-05-06 VITALS — DIASTOLIC BLOOD PRESSURE: 75 MMHG | SYSTOLIC BLOOD PRESSURE: 127 MMHG

## 2021-05-06 DIAGNOSIS — C50.919 MALIGNANT NEOPLASM OF BREAST IN FEMALE, ESTROGEN RECEPTOR POSITIVE, UNSPECIFIED LATERALITY, UNSPECIFIED SITE OF BREAST (H): Primary | ICD-10-CM

## 2021-05-06 DIAGNOSIS — Z17.0 MALIGNANT NEOPLASM OF BREAST IN FEMALE, ESTROGEN RECEPTOR POSITIVE, UNSPECIFIED LATERALITY, UNSPECIFIED SITE OF BREAST (H): Primary | ICD-10-CM

## 2021-05-06 DIAGNOSIS — C50.912 INVASIVE DUCTAL CARCINOMA OF BREAST, FEMALE, LEFT (H): ICD-10-CM

## 2021-05-06 PROCEDURE — 96402 CHEMO HORMON ANTINEOPL SQ/IM: CPT

## 2021-05-06 PROCEDURE — 250N000011 HC RX IP 250 OP 636: Performed by: NURSE PRACTITIONER

## 2021-05-06 RX ORDER — EXEMESTANE 25 MG/1
25 TABLET ORAL DAILY
Qty: 90 TABLET | Refills: 1 | Status: SHIPPED | OUTPATIENT
Start: 2021-05-06 | End: 2021-08-17

## 2021-05-06 RX ADMIN — GOSERELIN ACETATE 3.6 MG: 3.6 IMPLANT SUBCUTANEOUS at 15:44

## 2021-05-06 NOTE — PROGRESS NOTES
Infusion Nursing Note:  Desi Granado presents today for Zoladex.    Patient seen by provider today: No   present during visit today: Not Applicable.    Note: N/A.    Intravenous Access:  No Intravenous access/labs at this visit.    Treatment Conditions:  Not Applicable.      Post Infusion Assessment:  Patient tolerated injection without incident.  Site patent and intact, free from redness, edema or discomfort.       Discharge Plan:   Copy of AVS reviewed with patient and/or family.  Patient will return 6/3/21 for next appointment.  Patient discharged in stable condition accompanied by: self.  Departure Mode: Ambulatory.    Yolanda Emmanuel RN

## 2021-05-18 ENCOUNTER — OFFICE VISIT (OUTPATIENT)
Dept: FAMILY MEDICINE | Facility: CLINIC | Age: 51
End: 2021-05-18
Payer: COMMERCIAL

## 2021-05-18 VITALS
DIASTOLIC BLOOD PRESSURE: 82 MMHG | HEIGHT: 65 IN | OXYGEN SATURATION: 97 % | BODY MASS INDEX: 25.33 KG/M2 | WEIGHT: 152 LBS | HEART RATE: 95 BPM | TEMPERATURE: 98.2 F | SYSTOLIC BLOOD PRESSURE: 120 MMHG

## 2021-05-18 DIAGNOSIS — M77.8 TENDINITIS OF RIGHT FOREARM: ICD-10-CM

## 2021-05-18 DIAGNOSIS — Q66.70 HIGH FOOT ARCH: Primary | ICD-10-CM

## 2021-05-18 PROCEDURE — 99213 OFFICE O/P EST LOW 20 MIN: CPT | Performed by: FAMILY MEDICINE

## 2021-05-18 ASSESSMENT — MIFFLIN-ST. JEOR: SCORE: 1306.38

## 2021-05-18 NOTE — PATIENT INSTRUCTIONS
Try the topical biofreeze, lidocaine patches for the arm    If it is not improving let me know and I will send you to hand therapy

## 2021-05-18 NOTE — PROGRESS NOTES
"    Assessment & Plan     High foot arch  Last orthotics 6 years ago   - Orthotics, Prosthetics and Custom Compression Order for DME - ONLY FOR DME    Tendinitis of right forearm  Sleeve topical            BMI:   Estimated body mass index is 25.49 kg/m  as calculated from the following:    Height as of this encounter: 1.645 m (5' 4.75\").    Weight as of this encounter: 68.9 kg (152 lb).       Patient Instructions   Try the topical biofreeze, lidocaine patches for the arm    If it is not improving let me know and I will send you to hand therapy      Return in about 4 weeks (around 6/15/2021) for if not improving.    Pearl Grover MD  [facility] STEPAN Munguia is a 50 year old who presents for the following health issues     HPI     *RX for new orthotics     Musculoskeletal problem/pain  Onset/Duration: couple months, intermittent. Shaving legs this morning was painful - burning.   Description  Location: elbow - right  Joint Swelling: YES - intermittent  Redness: no  Pain: YES  Warmth: no  Progression of Symptoms:  worsening and intermittent  Accompanying signs and symptoms:   Fevers: no  Numbness/tingling/weakness: YES - weakness  History  Trauma to the area: no  Recent illness:  no  Previous similar problem: no  Previous evaluation:  no  Precipitating or alleviating factors:  Aggravating factors include: certain movement and picking up items.   Therapies tried and outcome: support wrap- didn't help     Couple of months she has noted this she had this especially when she is lifting   Tried tennis band didn't help         Review of Systems   Constitutional, HEENT, cardiovascular, pulmonary, gi and gu systems are negative, except as otherwise noted.      Objective    /82 (BP Location: Right arm, Patient Position: Sitting, Cuff Size: Adult Regular)   Pulse 95   Temp 98.2  F (36.8  C) (Tympanic)   Ht 1.645 m (5' 4.75\")   Wt 68.9 kg (152 lb)   SpO2 97%   BMI 25.49 kg/m    Body mass " index is 25.49 kg/m .  Physical Exam   GENERAL: healthy, alert and no distress  MS: RUE exam shows normal strength and muscle mass, no erythema, induration, or nodules, no evidence of joint effusion, no evidence of joint instability and tender over the distal biceps tendon no lateral epicondylitis or medial epi   SKIN: no suspicious lesions or rashes  NEURO: Normal strength and tone, mentation intact and speech normal    Infusion Therapy Visit on 01/14/2021   Component Date Value Ref Range Status     WBC 01/14/2021 6.1  4.0 - 11.0 10e9/L Final     RBC Count 01/14/2021 4.71  3.8 - 5.2 10e12/L Final     Hemoglobin 01/14/2021 13.9  11.7 - 15.7 g/dL Final     Hematocrit 01/14/2021 41.3  35.0 - 47.0 % Final     MCV 01/14/2021 88  78 - 100 fl Final     MCH 01/14/2021 29.5  26.5 - 33.0 pg Final     MCHC 01/14/2021 33.7  31.5 - 36.5 g/dL Final     RDW 01/14/2021 12.5  10.0 - 15.0 % Final     Platelet Count 01/14/2021 275  150 - 450 10e9/L Final     Diff Method 01/14/2021 Automated Method   Final     % Neutrophils 01/14/2021 63.1  % Final     % Lymphocytes 01/14/2021 27.1  % Final     % Monocytes 01/14/2021 7.3  % Final     % Eosinophils 01/14/2021 1.5  % Final     % Basophils 01/14/2021 0.7  % Final     % Immature Granulocytes 01/14/2021 0.3  % Final     Nucleated RBCs 01/14/2021 0  0 /100 Final     Absolute Neutrophil 01/14/2021 3.9  1.6 - 8.3 10e9/L Final     Absolute Lymphocytes 01/14/2021 1.7  0.8 - 5.3 10e9/L Final     Absolute Monocytes 01/14/2021 0.5  0.0 - 1.3 10e9/L Final     Absolute Eosinophils 01/14/2021 0.1  0.0 - 0.7 10e9/L Final     Absolute Basophils 01/14/2021 0.0  0.0 - 0.2 10e9/L Final     Abs Immature Granulocytes 01/14/2021 0.0  0 - 0.4 10e9/L Final     Absolute Nucleated RBC 01/14/2021 0.0   Final     Sodium 01/14/2021 140  133 - 144 mmol/L Final     Potassium 01/14/2021 4.1  3.4 - 5.3 mmol/L Final     Chloride 01/14/2021 108  94 - 109 mmol/L Final     Carbon Dioxide 01/14/2021 30  20 - 32 mmol/L Final      Anion Gap 01/14/2021 2* 3 - 14 mmol/L Final     Glucose 01/14/2021 83  70 - 99 mg/dL Final     Urea Nitrogen 01/14/2021 21  7 - 30 mg/dL Final     Creatinine 01/14/2021 0.97  0.52 - 1.04 mg/dL Final     GFR Estimate 01/14/2021 68  >60 mL/min/[1.73_m2] Final    Comment: Non  GFR Calc  Starting 12/18/2018, serum creatinine based estimated GFR (eGFR) will be   calculated using the Chronic Kidney Disease Epidemiology Collaboration   (CKD-EPI) equation.       GFR Estimate If Black 01/14/2021 79  >60 mL/min/[1.73_m2] Final    Comment:  GFR Calc  Starting 12/18/2018, serum creatinine based estimated GFR (eGFR) will be   calculated using the Chronic Kidney Disease Epidemiology Collaboration   (CKD-EPI) equation.       Calcium 01/14/2021 9.8  8.5 - 10.1 mg/dL Final     Bilirubin Total 01/14/2021 0.3  0.2 - 1.3 mg/dL Final     Albumin 01/14/2021 4.2  3.4 - 5.0 g/dL Final     Protein Total 01/14/2021 7.6  6.8 - 8.8 g/dL Final     Alkaline Phosphatase 01/14/2021 119  40 - 150 U/L Final     ALT 01/14/2021 18  0 - 50 U/L Final     AST 01/14/2021 12  0 - 45 U/L Final     CA 27-29 01/14/2021 16  0 - 39 U/mL Final    Assay Method:  Chemiluminescence using Siemens Centaur XP       Pearl Grover M.D.

## 2021-06-01 ENCOUNTER — MYC MEDICAL ADVICE (OUTPATIENT)
Dept: FAMILY MEDICINE | Facility: CLINIC | Age: 51
End: 2021-06-01

## 2021-06-01 DIAGNOSIS — M25.521 PAIN IN JOINT INVOLVING UPPER ARM, RIGHT: Primary | ICD-10-CM

## 2021-06-03 ENCOUNTER — INFUSION THERAPY VISIT (OUTPATIENT)
Dept: INFUSION THERAPY | Facility: CLINIC | Age: 51
End: 2021-06-03
Attending: INTERNAL MEDICINE
Payer: COMMERCIAL

## 2021-06-03 DIAGNOSIS — C50.919 MALIGNANT NEOPLASM OF BREAST IN FEMALE, ESTROGEN RECEPTOR POSITIVE, UNSPECIFIED LATERALITY, UNSPECIFIED SITE OF BREAST (H): Primary | ICD-10-CM

## 2021-06-03 DIAGNOSIS — Z17.0 MALIGNANT NEOPLASM OF BREAST IN FEMALE, ESTROGEN RECEPTOR POSITIVE, UNSPECIFIED LATERALITY, UNSPECIFIED SITE OF BREAST (H): Primary | ICD-10-CM

## 2021-06-03 PROCEDURE — 96402 CHEMO HORMON ANTINEOPL SQ/IM: CPT

## 2021-06-03 PROCEDURE — 250N000011 HC RX IP 250 OP 636: Performed by: NURSE PRACTITIONER

## 2021-06-03 RX ADMIN — GOSERELIN ACETATE 3.6 MG: 3.6 IMPLANT SUBCUTANEOUS at 15:44

## 2021-06-03 NOTE — PROGRESS NOTES
Infusion Nursing Note:  Desi Granado presents today for Zoladex injection.    Patient seen by provider today: No   present during visit today: Not Applicable.    Note: N/A.    Intravenous Access:  No Intravenous access at this visit.    Treatment Conditions:  Not Applicable.      Post Infusion Assessment:  Patient tolerated injection without incident.       Discharge Plan:   Patient discharged in stable condition accompanied by: self.  Departure Mode: Ambulatory.    Maribell Pulliam RN

## 2021-06-11 ENCOUNTER — TRANSFERRED RECORDS (OUTPATIENT)
Dept: HEALTH INFORMATION MANAGEMENT | Facility: CLINIC | Age: 51
End: 2021-06-11

## 2021-06-23 ENCOUNTER — VIRTUAL VISIT (OUTPATIENT)
Dept: FAMILY MEDICINE | Facility: CLINIC | Age: 51
End: 2021-06-23
Payer: COMMERCIAL

## 2021-06-23 ENCOUNTER — MYC MEDICAL ADVICE (OUTPATIENT)
Dept: FAMILY MEDICINE | Facility: CLINIC | Age: 51
End: 2021-06-23

## 2021-06-23 DIAGNOSIS — L28.2 PRURITIC RASH: ICD-10-CM
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
## 2021-06-23 PROCEDURE — 99213 OFFICE O/P EST LOW 20 MIN: CPT | Mod: 95 | Performed by: NURSE PRACTITIONER

## 2021-06-23 RX ORDER — PREDNISONE 20 MG/1
40 TABLET ORAL DAILY
Qty: 6 TABLET | Refills: 0 | Status: SHIPPED | OUTPATIENT
Start: 2021-06-23 | End: 2021-06-26

## 2021-06-23 NOTE — TELEPHONE ENCOUNTER
Call placed to patient regarding MyChart message  Video Visit scheduled for today 6/23/2021 at 1520 with Sam to assess insect bites and determine next best steps    Reese Gamboa RN

## 2021-06-23 NOTE — PROGRESS NOTES
Nilda is a 50 year old who is being evaluated via a billable video visit.      How would you like to obtain your AVS? MyChart  If the video visit is dropped, the invitation should be resent by: Text to cell phone: [phone]  Will anyone else be joining your video visit? No    Video Start Time: 3:40 PM    Assessment & Plan     Mosquito bite, initial encounter  History consistent with lasting bites that are pruritic. OTC medications were not effective. Prednisone short dose prescribed. Side effects, risks and benefits of medication were discussed with patient. Discussed how and when to take medication. Follow-up if symptoms do not improve.     - predniSONE (DELTASONE) 20 MG tablet; Take 2 tablets (40 mg) by mouth daily for 3 days    Pruritic rash    - predniSONE (DELTASONE) 20 MG tablet; Take 2 tablets (40 mg) by mouth daily for 3 days             Patient Instructions   Prednisone Discharge Instructions:  Please take the steroid, Prednisone, for the full course as prescribed.  Take Prednisone with food or milk to minimize stomach upset.       Side effects of Prednisone include difficulty sleeping, increased appetite, weight gain, and changes in mood.  If you are diabetic, please monitor your blood sugar regularly while taking this medicine as Prednisone can cause high blood sugar.       Return if symptoms worsen or fail to improve.    MEE Mart CNP  [facility] STEPAN Munguia is a 50 year old who presents for the following health issues     HPI     Concern - Mosquitoes Bites  Onset: 6/19/21  Description: Red with a little clear fluid that comes outs, they didn't start itching until Monday. Yesterday the itching has increased to the point it's unbearable.   Intensity: moderate during the day and in the evening can be serve   Progression of Symptoms:  worsening  Precipitating factors:        Worsened by: During the evening at bedtime when patients skin gets warmer they seem to be more  "itchy.     Additional provider notes: Got bug bites the 06/19/21, started itching 2 days later. They were doing a backyard movie night. She states she saw mosquitoes flying \"everywhere.\" She got ~30 bites on lower legs. Has had mosquito bites in the past that have lasted several days. Has used both benadryl topical, hydrocortisone topical, and calamine lotion with no relief. She takes zyrtec and singulair daily year around. States symptoms are worse at night when sheets touch her legs, she hasn't been sleeping. Has had to use short course of prednisone in the past when nothing else worked.     Review of Systems   Constitutional: Negative.    Skin:        Multiple pruritic bug bites on bilat lower legs            Objective           Vitals:  No vitals were obtained today due to virtual visit.    Physical Exam   GENERAL: Healthy, alert and no distress  EYES: Eyes grossly normal to inspection.  No discharge or erythema, or obvious scleral/conjunctival abnormalities.  RESP: No audible wheeze, cough, or visible cyanosis.  No visible retractions or increased work of breathing.    SKIN: bilat lower legs with scattered raised red areas that are consistent with mosquito bites  NEURO: Cranial nerves grossly intact.  Mentation and speech appropriate for age.  PSYCH: Mentation appears normal, affect normal/bright, judgement and insight intact, normal speech and appearance well-groomed.                Video-Visit Details    Type of service:  Video Visit    Video End Time:3:47 PM    Originating Location (pt. Location): Home    Distant Location (provider location):  [facility]     Platform used for Video Visit: [employer]de"

## 2021-06-24 ASSESSMENT — ENCOUNTER SYMPTOMS: CONSTITUTIONAL NEGATIVE: 1

## 2021-06-24 NOTE — PATIENT INSTRUCTIONS
Prednisone Discharge Instructions:  Please take the steroid, Prednisone, for the full course as prescribed.  Take Prednisone with food or milk to minimize stomach upset.       Side effects of Prednisone include difficulty sleeping, increased appetite, weight gain, and changes in mood.  If you are diabetic, please monitor your blood sugar regularly while taking this medicine as Prednisone can cause high blood sugar.

## 2021-07-01 ENCOUNTER — HOSPITAL ENCOUNTER (OUTPATIENT)
Facility: CLINIC | Age: 51
Setting detail: SPECIMEN
Discharge: HOME | End: 2021-07-01
Attending: INTERNAL MEDICINE | Admitting: INTERNAL MEDICINE
Payer: COMMERCIAL

## 2021-07-01 ENCOUNTER — INFUSION THERAPY VISIT (OUTPATIENT)
Dept: INFUSION THERAPY | Facility: CLINIC | Age: 51
End: 2021-07-01
Attending: INTERNAL MEDICINE
Payer: COMMERCIAL

## 2021-07-01 VITALS — SYSTOLIC BLOOD PRESSURE: 125 MMHG | DIASTOLIC BLOOD PRESSURE: 75 MMHG

## 2021-07-01 DIAGNOSIS — C50.912 INVASIVE DUCTAL CARCINOMA OF BREAST, FEMALE, LEFT (H): ICD-10-CM

## 2021-07-01 DIAGNOSIS — C50.919 MALIGNANT NEOPLASM OF BREAST IN FEMALE, ESTROGEN RECEPTOR POSITIVE, UNSPECIFIED LATERALITY, UNSPECIFIED SITE OF BREAST (H): Primary | ICD-10-CM

## 2021-07-01 DIAGNOSIS — Z17.0 MALIGNANT NEOPLASM OF BREAST IN FEMALE, ESTROGEN RECEPTOR POSITIVE, UNSPECIFIED LATERALITY, UNSPECIFIED SITE OF BREAST (H): Primary | ICD-10-CM

## 2021-07-01 LAB
ALBUMIN SERPL-MCNC: 4.1 G/DL (ref 3.4–5)
ALP SERPL-CCNC: 115 U/L (ref 40–150)
ALT SERPL W P-5'-P-CCNC: 19 U/L (ref 0–50)
ANION GAP SERPL CALCULATED.3IONS-SCNC: 4 MMOL/L (ref 3–14)
AST SERPL W P-5'-P-CCNC: 13 U/L (ref 0–45)
BASOPHILS # BLD AUTO: 0 10E9/L (ref 0–0.2)
BASOPHILS NFR BLD AUTO: 0.6 %
BILIRUB SERPL-MCNC: 0.4 MG/DL (ref 0.2–1.3)
BUN SERPL-MCNC: 17 MG/DL (ref 7–30)
CALCIUM SERPL-MCNC: 9.5 MG/DL (ref 8.5–10.1)
CANCER AG27-29 SERPL-ACNC: 8 U/ML (ref 0–39)
CHLORIDE SERPL-SCNC: 104 MMOL/L (ref 94–109)
CO2 SERPL-SCNC: 30 MMOL/L (ref 20–32)
CREAT SERPL-MCNC: 0.9 MG/DL (ref 0.52–1.04)
DIFFERENTIAL METHOD BLD: NORMAL
EOSINOPHIL # BLD AUTO: 0.1 10E9/L (ref 0–0.7)
EOSINOPHIL NFR BLD AUTO: 1.7 %
ERYTHROCYTE [DISTWIDTH] IN BLOOD BY AUTOMATED COUNT: 12.1 % (ref 10–15)
GFR SERPL CREATININE-BSD FRML MDRD: 74 ML/MIN/{1.73_M2}
GLUCOSE SERPL-MCNC: 100 MG/DL (ref 70–99)
HCT VFR BLD AUTO: 41.7 % (ref 35–47)
HGB BLD-MCNC: 14.4 G/DL (ref 11.7–15.7)
IMM GRANULOCYTES # BLD: 0 10E9/L (ref 0–0.4)
IMM GRANULOCYTES NFR BLD: 0.6 %
LYMPHOCYTES # BLD AUTO: 1.7 10E9/L (ref 0.8–5.3)
LYMPHOCYTES NFR BLD AUTO: 22.9 %
MCH RBC QN AUTO: 29.8 PG (ref 26.5–33)
MCHC RBC AUTO-ENTMCNC: 34.5 G/DL (ref 31.5–36.5)
MCV RBC AUTO: 86 FL (ref 78–100)
MONOCYTES # BLD AUTO: 0.5 10E9/L (ref 0–1.3)
MONOCYTES NFR BLD AUTO: 7.2 %
NEUTROPHILS # BLD AUTO: 4.9 10E9/L (ref 1.6–8.3)
NEUTROPHILS NFR BLD AUTO: 67 %
NRBC # BLD AUTO: 0 10*3/UL
NRBC BLD AUTO-RTO: 0 /100
PLATELET # BLD AUTO: 296 10E9/L (ref 150–450)
POTASSIUM SERPL-SCNC: 3.9 MMOL/L (ref 3.4–5.3)
PROT SERPL-MCNC: 7.4 G/DL (ref 6.8–8.8)
RBC # BLD AUTO: 4.83 10E12/L (ref 3.8–5.2)
SODIUM SERPL-SCNC: 138 MMOL/L (ref 133–144)
WBC # BLD AUTO: 7.3 10E9/L (ref 4–11)

## 2021-07-01 PROCEDURE — 85025 COMPLETE CBC W/AUTO DIFF WBC: CPT | Performed by: INTERNAL MEDICINE

## 2021-07-01 PROCEDURE — 96402 CHEMO HORMON ANTINEOPL SQ/IM: CPT

## 2021-07-01 PROCEDURE — 80053 COMPREHEN METABOLIC PANEL: CPT | Performed by: INTERNAL MEDICINE

## 2021-07-01 PROCEDURE — 250N000011 HC RX IP 250 OP 636: Performed by: INTERNAL MEDICINE

## 2021-07-01 PROCEDURE — 86300 IMMUNOASSAY TUMOR CA 15-3: CPT | Performed by: INTERNAL MEDICINE

## 2021-07-01 PROCEDURE — 36415 COLL VENOUS BLD VENIPUNCTURE: CPT | Performed by: INTERNAL MEDICINE

## 2021-07-01 RX ADMIN — GOSERELIN ACETATE 3.6 MG: 3.6 IMPLANT SUBCUTANEOUS at 15:32

## 2021-07-01 NOTE — PROGRESS NOTES
Infusion Nursing Note:  Desi Granado presents today for Zoladex.    Patient seen by provider today: No   present during visit today: Not Applicable.    Note: N/A.    Intravenous Access:  Labs drawn peripherally without difficulty.    Treatment Conditions:  Not Applicable.      Post Infusion Assessment:  Patient tolerated injection without incident.  Access discontinued per protocol.       Discharge Plan:   Copy of AVS reviewed with patient and/or family.  Patient will return 7/29/21 for next appointment.  Patient discharged in stable condition accompanied by: self.  Departure Mode: Ambulatory.      Yolanda Emmanuel RN

## 2021-07-09 ENCOUNTER — ONCOLOGY VISIT (OUTPATIENT)
Dept: ONCOLOGY | Facility: CLINIC | Age: 51
End: 2021-07-09
Attending: INTERNAL MEDICINE
Payer: COMMERCIAL

## 2021-07-09 VITALS
RESPIRATION RATE: 18 BRPM | DIASTOLIC BLOOD PRESSURE: 73 MMHG | WEIGHT: 154.4 LBS | BODY MASS INDEX: 25.72 KG/M2 | OXYGEN SATURATION: 99 % | SYSTOLIC BLOOD PRESSURE: 115 MMHG | HEART RATE: 84 BPM | TEMPERATURE: 97.1 F | HEIGHT: 65 IN

## 2021-07-09 DIAGNOSIS — M85.852 OSTEOPENIA OF NECK OF LEFT FEMUR: ICD-10-CM

## 2021-07-09 DIAGNOSIS — C50.912 INVASIVE DUCTAL CARCINOMA OF BREAST, FEMALE, LEFT (H): Primary | ICD-10-CM

## 2021-07-09 DIAGNOSIS — I10 ESSENTIAL HYPERTENSION, BENIGN: ICD-10-CM

## 2021-07-09 DIAGNOSIS — Z79.811 AROMATASE INHIBITOR USE: ICD-10-CM

## 2021-07-09 DIAGNOSIS — Z78.0 MENOPAUSE: ICD-10-CM

## 2021-07-09 PROCEDURE — 99214 OFFICE O/P EST MOD 30 MIN: CPT | Performed by: INTERNAL MEDICINE

## 2021-07-09 PROCEDURE — G0463 HOSPITAL OUTPT CLINIC VISIT: HCPCS

## 2021-07-09 ASSESSMENT — PAIN SCALES - GENERAL: PAINLEVEL: NO PAIN (0)

## 2021-07-09 ASSESSMENT — MIFFLIN-ST. JEOR: SCORE: 1317.26

## 2021-07-09 NOTE — LETTER
"    7/9/2021         RE: Desi Granado  [address] [place] [address]        Dear Colleague,    Thank you for referring your patient, Desi Granado, to the [facility]. Please see a copy of my visit note below.    Oncology Rooming Note    July 9, 2021 3:35 PM   Desi Granado is a 50 year old female who presents for:    Chief Complaint   Patient presents with     Oncology Clinic Visit     6 month follow up breast cancer, left. Review lab results.      Initial Vitals: /73 (BP Location: Right arm, Patient Position: Sitting, Cuff Size: Adult Regular)   Pulse 84   Temp 97.1  F (36.2  C) (Oral)   Resp 18   Ht 1.645 m (5' 4.75\")   Wt 70 kg (154 lb 6.4 oz)   SpO2 99%   Breastfeeding No   BMI 25.89 kg/m   Estimated body mass index is 25.89 kg/m  as calculated from the following:    Height as of this encounter: 1.645 m (5' 4.75\").    Weight as of this encounter: 70 kg (154 lb 6.4 oz). Body surface area is 1.79 meters squared.  No Pain (0) Comment: Data Unavailable   No LMP recorded.  Allergies reviewed: Yes  Medications reviewed: Yes    Medications: Medication refills not needed today.  Pharmacy name entered into [employer]:    [facility] - [place], [place] - [address] AT [place] [facility] [place] - [address] [place] [address]    Clinical concerns: 6 month follow up breast cancer, left. Review lab results.       Nessa Jeff CMA              Hematology/ Oncology Follow-up Visit:  Jul 9, 2021    Reason for Visit:   Chief Complaint   Patient presents with     Oncology Clinic Visit     6 month follow up breast cancer, left. Review lab results.        Oncologic History:  Invasive ductal carcinoma of breast, female, left (H)    Desi Granado was found to have developing focal asymmetry in the left breast at approximately 3 " o'clock position about 7-8 cm from the nipple on the routine mammogram done on October 30, 2018.  Subsequently she went on to have diagnostic mammogram and ultrasound.  Directed sonogram at the site of mammographic finding showed a 1.6 cm irregular solid lesion.  Subsequently the patient went on to have ultrasound-guided needle core biopsy done on October 17, 2018.  The biopsy came back positive for invasive ductal carcinoma with papillary features.  There is grade 2 out of 3.  Angiolymphatic invasion was not seen.  Ductal carcinoma in situ was not seen.  The tumor was estrogen receptor positive about 100% and progesterone receptor +100% strong nuclear staining.  HER-2/tamara came back negative for amplification. She had bilateral mastectomy.  Surgical pathology revealed simple mastectomy sample of the left showing solid papillary carcinoma 22 mm.  Grade 2.  Margins were clear of involvement.  3 sentinel lymph nodes shows no evidence of metastatic disease.  Lymphovascular invasion was not identified.  The tumor is pT2pN0.  Right breast shows simple mastectomy without any evidence of malignancy.  Oncotype came back 0.        Interval History:  Patient is here today for follow-up visit. She has been feeling well without recent complaints of weight loss or night sweats or fever or chills. She denies any nausea vomiting or diarrhea. She denies any fever or chills. She is currently on exemestane and Zoladex.    Review Of Systems:  All other ROS negative unless mentioned in interval history.    Past medical, social, surgical, and family histories reviewed.    Allergies:  Allergies as of 07/09/2021 - Reviewed 07/09/2021   Allergen Reaction Noted     Aspirin Hives 08/03/2009     Dust mites Unknown 07/03/2009     Morphine Nausea 06/23/2008       Current Medications:  Current Outpatient Medications   Medication Sig Dispense Refill     calcium carbonate 600 mg-vitamin D 400 units (CALTRATE) 600-400 MG-UNIT per tablet Take 1  "tablet by mouth 2 times daily       exemestane (AROMASIN) 25 MG tablet Take 1 tablet (25 mg) by mouth daily 90 tablet 1     Fluticasone Propionate (FLONASE ALLERGY RELIEF NA)        lisinopril (ZESTRIL) 30 MG tablet Take 1 tablet (30 mg) by mouth daily 90 tablet 3     montelukast (SINGULAIR) 10 MG tablet Take 1 tablet (10 mg) by mouth At Bedtime 90 tablet 2     vitamin D3 (CHOLECALCIFEROL) 2000 units tablet Take 1 tablet by mouth daily       ZYRTEC 10 MG OR TABS 1 TABLET DAILY 90 3        Physical Exam:  /73 (BP Location: Right arm, Patient Position: Sitting, Cuff Size: Adult Regular)   Pulse 84   Temp 97.1  F (36.2  C) (Oral)   Resp 18   Ht 1.645 m (5' 4.75\")   Wt 70 kg (154 lb 6.4 oz)   SpO2 99%   Breastfeeding No   BMI 25.89 kg/m    Wt Readings from Last 12 Encounters:   07/09/21 70 kg (154 lb 6.4 oz)   05/18/21 68.9 kg (152 lb)   02/02/21 69.4 kg (153 lb)   01/08/21 68 kg (150 lb)   09/22/20 69.4 kg (153 lb)   07/28/20 67.1 kg (148 lb)   06/17/20 67.8 kg (149 lb 8 oz)   11/08/19 68.4 kg (150 lb 11.2 oz)   10/18/19 69.8 kg (153 lb 14.4 oz)   09/19/19 69.4 kg (153 lb)   08/14/19 68.5 kg (151 lb)   07/12/19 67.7 kg (149 lb 4.8 oz)       GENERAL APPEARANCE: Healthy, alert and in no acute distress.  HEENT: Sclerae anicteric. PERRLA. Oropharynx without ulcers, lesions, or thrush.  NECK: Supple. No asymmetry or masses.  LYMPHATICS: No palpable cervical, supraclavicular, axillary, or inguinal lymphadenopathy.  RESP: Lungs clear to auscultation bilaterally without rales, rhonchi or wheezes.\",  BREAST: Scars of bilateral mastectomies without any evidence of lumps or adenopathy. \"CARDIOVASCULAR: Regular rate and rhythm. Normal S1, S2; no S3 or S4. No murmur, gallop, or rub.  ABDOMEN: Soft, nontender. Bowel sounds normal. No palpable organomegaly or masses.  MUSCULOSKELETAL: Extremities without gross deformities noted. No edema of bilateral lower extremities.  SKIN: No suspicious lesions or rashes.  NEURO: " Alert and oriented x 3. Cranial nerves II-XII grossly intact.  PSYCHIATRIC: Mentation and affect appear normal.    Laboratory/Imaging Studies:  Infusion Therapy Visit on 07/01/2021   Component Date Value Ref Range Status     CA 27-29 07/01/2021 8  0 - 39 U/mL Final    Assay Method:  Chemiluminescence using Siemens Centaur XP     Sodium 07/01/2021 138  133 - 144 mmol/L Final     Potassium 07/01/2021 3.9  3.4 - 5.3 mmol/L Final     Chloride 07/01/2021 104  94 - 109 mmol/L Final     Carbon Dioxide 07/01/2021 30  20 - 32 mmol/L Final     Anion Gap 07/01/2021 4  3 - 14 mmol/L Final     Glucose 07/01/2021 100* 70 - 99 mg/dL Final     Urea Nitrogen 07/01/2021 17  7 - 30 mg/dL Final     Creatinine 07/01/2021 0.90  0.52 - 1.04 mg/dL Final     GFR Estimate 07/01/2021 74  >60 mL/min/[1.73_m2] Final    Comment: Non  GFR Calc  Starting 12/18/2018, serum creatinine based estimated GFR (eGFR) will be   calculated using the Chronic Kidney Disease Epidemiology Collaboration   (CKD-EPI) equation.       GFR Estimate If Black 07/01/2021 86  >60 mL/min/[1.73_m2] Final    Comment:  GFR Calc  Starting 12/18/2018, serum creatinine based estimated GFR (eGFR) will be   calculated using the Chronic Kidney Disease Epidemiology Collaboration   (CKD-EPI) equation.       Calcium 07/01/2021 9.5  8.5 - 10.1 mg/dL Final     Bilirubin Total 07/01/2021 0.4  0.2 - 1.3 mg/dL Final     Albumin 07/01/2021 4.1  3.4 - 5.0 g/dL Final     Protein Total 07/01/2021 7.4  6.8 - 8.8 g/dL Final     Alkaline Phosphatase 07/01/2021 115  40 - 150 U/L Final     ALT 07/01/2021 19  0 - 50 U/L Final     AST 07/01/2021 13  0 - 45 U/L Final     WBC 07/01/2021 7.3  4.0 - 11.0 10e9/L Final     RBC Count 07/01/2021 4.83  3.8 - 5.2 10e12/L Final     Hemoglobin 07/01/2021 14.4  11.7 - 15.7 g/dL Final     Hematocrit 07/01/2021 41.7  35.0 - 47.0 % Final     MCV 07/01/2021 86  78 - 100 fl Final     MCH 07/01/2021 29.8  26.5 - 33.0 pg Final     MCHC  07/01/2021 34.5  31.5 - 36.5 g/dL Final     RDW 07/01/2021 12.1  10.0 - 15.0 % Final     Platelet Count 07/01/2021 296  150 - 450 10e9/L Final     Diff Method 07/01/2021 Automated Method   Final     % Neutrophils 07/01/2021 67.0  % Final     % Lymphocytes 07/01/2021 22.9  % Final     % Monocytes 07/01/2021 7.2  % Final     % Eosinophils 07/01/2021 1.7  % Final     % Basophils 07/01/2021 0.6  % Final     % Immature Granulocytes 07/01/2021 0.6  % Final     Nucleated RBCs 07/01/2021 0  0 /100 Final     Absolute Neutrophil 07/01/2021 4.9  1.6 - 8.3 10e9/L Final     Absolute Lymphocytes 07/01/2021 1.7  0.8 - 5.3 10e9/L Final     Absolute Monocytes 07/01/2021 0.5  0.0 - 1.3 10e9/L Final     Absolute Eosinophils 07/01/2021 0.1  0.0 - 0.7 10e9/L Final     Absolute Basophils 07/01/2021 0.0  0.0 - 0.2 10e9/L Final     Abs Immature Granulocytes 07/01/2021 0.0  0 - 0.4 10e9/L Final     Absolute Nucleated RBC 07/01/2021 0.0   Final          Assessment and plan:    (C50.912) Invasive ductal carcinoma of breast, female, left (H)  (primary encounter diagnosis)  I reviewed with the patient today most recent laboratory tests. There is no clinical evidence of breast cancer recurrence. She will continue on exemestane as well as Zoladex injections monthly. We will see the patient again in 6 months or sooner if there are new developments or concerns.    (I10) Essential hypertension, benign  Patient currently on lisinopril 30 mg orally daily.    (Z78.0) Menopause  Plan: DX Hip/Pelvis/Spine    The patient is ready to learn, no apparent learning barriers were identified.  Diagnosis and treatment plans were explained to the patient. The patient expressed understanding of the content. The patient asked appropriate questions. The patient questions were answered to her satisfaction.    Chart documentation with Dragon Voice recognition Software. Although reviewed after completion, some words and grammatical errors may remain.      Again, thank  you for allowing me to participate in the care of your patient.        Sincerely,        Stephen Peña MD

## 2021-07-09 NOTE — PROGRESS NOTES
Hematology/ Oncology Follow-up Visit:  Jul 9, 2021    Reason for Visit:   Chief Complaint   Patient presents with     Oncology Clinic Visit     6 month follow up breast cancer, left. Review lab results.        Oncologic History:  Invasive ductal carcinoma of breast, female, left (H)    Desi Granado was found to have developing focal asymmetry in the left breast at approximately 3 o'clock position about 7-8 cm from the nipple on the routine mammogram done on October 30, 2018.  Subsequently she went on to have diagnostic mammogram and ultrasound.  Directed sonogram at the site of mammographic finding showed a 1.6 cm irregular solid lesion.  Subsequently the patient went on to have ultrasound-guided needle core biopsy done on October 17, 2018.  The biopsy came back positive for invasive ductal carcinoma with papillary features.  There is grade 2 out of 3.  Angiolymphatic invasion was not seen.  Ductal carcinoma in situ was not seen.  The tumor was estrogen receptor positive about 100% and progesterone receptor +100% strong nuclear staining.  HER-2/tamara came back negative for amplification. She had bilateral mastectomy.  Surgical pathology revealed simple mastectomy sample of the left showing solid papillary carcinoma 22 mm.  Grade 2.  Margins were clear of involvement.  3 sentinel lymph nodes shows no evidence of metastatic disease.  Lymphovascular invasion was not identified.  The tumor is pT2pN0.  Right breast shows simple mastectomy without any evidence of malignancy.  Oncotype came back 0.        Interval History:  Patient is here today for follow-up visit. She has been feeling well without recent complaints of weight loss or night sweats or fever or chills. She denies any nausea vomiting or diarrhea. She denies any fever or chills. She is currently on exemestane and Zoladex.    Review Of Systems:  All other ROS negative unless mentioned in interval history.    Past medical, social, surgical, and family histories  "reviewed.    Allergies:  Allergies as of 07/09/2021 - Reviewed 07/09/2021   Allergen Reaction Noted     Aspirin Hives 08/03/2009     Dust mites Unknown 07/03/2009     Morphine Nausea 06/23/2008       Current Medications:  Current Outpatient Medications   Medication Sig Dispense Refill     calcium carbonate 600 mg-vitamin D 400 units (CALTRATE) 600-400 MG-UNIT per tablet Take 1 tablet by mouth 2 times daily       exemestane (AROMASIN) 25 MG tablet Take 1 tablet (25 mg) by mouth daily 90 tablet 1     Fluticasone Propionate (FLONASE ALLERGY RELIEF NA)        lisinopril (ZESTRIL) 30 MG tablet Take 1 tablet (30 mg) by mouth daily 90 tablet 3     montelukast (SINGULAIR) 10 MG tablet Take 1 tablet (10 mg) by mouth At Bedtime 90 tablet 2     vitamin D3 (CHOLECALCIFEROL) 2000 units tablet Take 1 tablet by mouth daily       ZYRTEC 10 MG OR TABS 1 TABLET DAILY 90 3        Physical Exam:  /73 (BP Location: Right arm, Patient Position: Sitting, Cuff Size: Adult Regular)   Pulse 84   Temp 97.1  F (36.2  C) (Oral)   Resp 18   Ht 1.645 m (5' 4.75\")   Wt 70 kg (154 lb 6.4 oz)   SpO2 99%   Breastfeeding No   BMI 25.89 kg/m    Wt Readings from Last 12 Encounters:   07/09/21 70 kg (154 lb 6.4 oz)   05/18/21 68.9 kg (152 lb)   02/02/21 69.4 kg (153 lb)   01/08/21 68 kg (150 lb)   09/22/20 69.4 kg (153 lb)   07/28/20 67.1 kg (148 lb)   06/17/20 67.8 kg (149 lb 8 oz)   11/08/19 68.4 kg (150 lb 11.2 oz)   10/18/19 69.8 kg (153 lb 14.4 oz)   09/19/19 69.4 kg (153 lb)   08/14/19 68.5 kg (151 lb)   07/12/19 67.7 kg (149 lb 4.8 oz)       GENERAL APPEARANCE: Healthy, alert and in no acute distress.  HEENT: Sclerae anicteric. PERRLA. Oropharynx without ulcers, lesions, or thrush.  NECK: Supple. No asymmetry or masses.  LYMPHATICS: No palpable cervical, supraclavicular, axillary, or inguinal lymphadenopathy.  RESP: Lungs clear to auscultation bilaterally without rales, rhonchi or wheezes.\",  BREAST: Scars of bilateral mastectomies " "without any evidence of lumps or adenopathy. \"CARDIOVASCULAR: Regular rate and rhythm. Normal S1, S2; no S3 or S4. No murmur, gallop, or rub.  ABDOMEN: Soft, nontender. Bowel sounds normal. No palpable organomegaly or masses.  MUSCULOSKELETAL: Extremities without gross deformities noted. No edema of bilateral lower extremities.  SKIN: No suspicious lesions or rashes.  NEURO: Alert and oriented x 3. Cranial nerves II-XII grossly intact.  PSYCHIATRIC: Mentation and affect appear normal.    Laboratory/Imaging Studies:  Infusion Therapy Visit on 07/01/2021   Component Date Value Ref Range Status     CA 27-29 07/01/2021 8  0 - 39 U/mL Final    Assay Method:  Chemiluminescence using Siemens Centaur XP     Sodium 07/01/2021 138  133 - 144 mmol/L Final     Potassium 07/01/2021 3.9  3.4 - 5.3 mmol/L Final     Chloride 07/01/2021 104  94 - 109 mmol/L Final     Carbon Dioxide 07/01/2021 30  20 - 32 mmol/L Final     Anion Gap 07/01/2021 4  3 - 14 mmol/L Final     Glucose 07/01/2021 100* 70 - 99 mg/dL Final     Urea Nitrogen 07/01/2021 17  7 - 30 mg/dL Final     Creatinine 07/01/2021 0.90  0.52 - 1.04 mg/dL Final     GFR Estimate 07/01/2021 74  >60 mL/min/[1.73_m2] Final    Comment: Non  GFR Calc  Starting 12/18/2018, serum creatinine based estimated GFR (eGFR) will be   calculated using the Chronic Kidney Disease Epidemiology Collaboration   (CKD-EPI) equation.       GFR Estimate If Black 07/01/2021 86  >60 mL/min/[1.73_m2] Final    Comment:  GFR Calc  Starting 12/18/2018, serum creatinine based estimated GFR (eGFR) will be   calculated using the Chronic Kidney Disease Epidemiology Collaboration   (CKD-EPI) equation.       Calcium 07/01/2021 9.5  8.5 - 10.1 mg/dL Final     Bilirubin Total 07/01/2021 0.4  0.2 - 1.3 mg/dL Final     Albumin 07/01/2021 4.1  3.4 - 5.0 g/dL Final     Protein Total 07/01/2021 7.4  6.8 - 8.8 g/dL Final     Alkaline Phosphatase 07/01/2021 115  40 - 150 U/L Final     ALT " 07/01/2021 19  0 - 50 U/L Final     AST 07/01/2021 13  0 - 45 U/L Final     WBC 07/01/2021 7.3  4.0 - 11.0 10e9/L Final     RBC Count 07/01/2021 4.83  3.8 - 5.2 10e12/L Final     Hemoglobin 07/01/2021 14.4  11.7 - 15.7 g/dL Final     Hematocrit 07/01/2021 41.7  35.0 - 47.0 % Final     MCV 07/01/2021 86  78 - 100 fl Final     MCH 07/01/2021 29.8  26.5 - 33.0 pg Final     MCHC 07/01/2021 34.5  31.5 - 36.5 g/dL Final     RDW 07/01/2021 12.1  10.0 - 15.0 % Final     Platelet Count 07/01/2021 296  150 - 450 10e9/L Final     Diff Method 07/01/2021 Automated Method   Final     % Neutrophils 07/01/2021 67.0  % Final     % Lymphocytes 07/01/2021 22.9  % Final     % Monocytes 07/01/2021 7.2  % Final     % Eosinophils 07/01/2021 1.7  % Final     % Basophils 07/01/2021 0.6  % Final     % Immature Granulocytes 07/01/2021 0.6  % Final     Nucleated RBCs 07/01/2021 0  0 /100 Final     Absolute Neutrophil 07/01/2021 4.9  1.6 - 8.3 10e9/L Final     Absolute Lymphocytes 07/01/2021 1.7  0.8 - 5.3 10e9/L Final     Absolute Monocytes 07/01/2021 0.5  0.0 - 1.3 10e9/L Final     Absolute Eosinophils 07/01/2021 0.1  0.0 - 0.7 10e9/L Final     Absolute Basophils 07/01/2021 0.0  0.0 - 0.2 10e9/L Final     Abs Immature Granulocytes 07/01/2021 0.0  0 - 0.4 10e9/L Final     Absolute Nucleated RBC 07/01/2021 0.0   Final          Assessment and plan:    (C50.912) Invasive ductal carcinoma of breast, female, left (H)  (primary encounter diagnosis)  I reviewed with the patient today most recent laboratory tests. There is no clinical evidence of breast cancer recurrence. She will continue on exemestane as well as Zoladex injections monthly. We will see the patient again in 6 months or sooner if there are new developments or concerns.    (I10) Essential hypertension, benign  Patient currently on lisinopril 30 mg orally daily.    (Z78.0) Menopause  Plan: DX Hip/Pelvis/Spine    The patient is ready to learn, no apparent learning barriers were  identified.  Diagnosis and treatment plans were explained to the patient. The patient expressed understanding of the content. The patient asked appropriate questions. The patient questions were answered to her satisfaction.    Chart documentation with Dragon Voice recognition Software. Although reviewed after completion, some words and grammatical errors may remain.

## 2021-07-09 NOTE — PATIENT INSTRUCTIONS
Continue with the current treatment plan.  Follow-up in 6 months with laboratory tests  Arrange for bone density scan

## 2021-07-09 NOTE — PROGRESS NOTES
"Oncology Rooming Note    July 9, 2021 3:35 PM   Desi Granado is a 50 year old female who presents for:    Chief Complaint   Patient presents with     Oncology Clinic Visit     6 month follow up breast cancer, left. Review lab results.      Initial Vitals: /73 (BP Location: Right arm, Patient Position: Sitting, Cuff Size: Adult Regular)   Pulse 84   Temp 97.1  F (36.2  C) (Oral)   Resp 18   Ht 1.645 m (5' 4.75\")   Wt 70 kg (154 lb 6.4 oz)   SpO2 99%   Breastfeeding No   BMI 25.89 kg/m   Estimated body mass index is 25.89 kg/m  as calculated from the following:    Height as of this encounter: 1.645 m (5' 4.75\").    Weight as of this encounter: 70 kg (154 lb 6.4 oz). Body surface area is 1.79 meters squared.  No Pain (0) Comment: Data Unavailable   No LMP recorded.  Allergies reviewed: Yes  Medications reviewed: Yes    Medications: Medication refills not needed today.  Pharmacy name entered into [facility]:    [facility] - [place], [place] - [address] [facility] [place] - [address] [place] [address]    Clinical concerns: 6 month follow up breast cancer, left. Review lab results.       Nessa Jeff, SAADIA            "

## 2021-07-12 NOTE — TELEPHONE ENCOUNTER
Multifactorial possibly due to ischemic cardiomyopathy, COPD, mild aortic stenosis  Cardiology suggested to get a VQ scan.   Reason for Call:  Other appointment    Detailed comments: Patient dropped off Short term disability forms last Friday and she is wondering if she would be able to get a copy of this faxed to her work? Jaylen Juarez Please fax to [phone]     Phone Number Patient can be reached at: Home number on file [phone]    Best Time: any    Can we leave a detailed message on this number? YES    Call taken on 11/5/2018 at 10:56 AM by Tawny Gordon

## 2021-07-22 ENCOUNTER — HOSPITAL ENCOUNTER (OUTPATIENT)
Dept: BONE DENSITY | Facility: CLINIC | Age: 51
Discharge: HOME | End: 2021-07-22
Attending: INTERNAL MEDICINE | Admitting: INTERNAL MEDICINE
Payer: COMMERCIAL

## 2021-07-22 DIAGNOSIS — Z78.0 MENOPAUSE: ICD-10-CM

## 2021-07-22 PROCEDURE — 77080 DXA BONE DENSITY AXIAL: CPT

## 2021-07-23 ENCOUNTER — PATIENT OUTREACH (OUTPATIENT)
Dept: ONCOLOGY | Facility: CLINIC | Age: 51
End: 2021-07-23

## 2021-07-23 PROBLEM — Z79.811 AROMATASE INHIBITOR USE: Status: ACTIVE | Noted: 2021-07-23

## 2021-07-23 PROBLEM — M85.859 OSTEOPENIA OF NECK OF FEMUR: Status: ACTIVE | Noted: 2021-07-23

## 2021-07-23 PROBLEM — Z78.0 MENOPAUSE: Status: ACTIVE | Noted: 2021-07-23

## 2021-07-23 RX ORDER — EPINEPHRINE 1 MG/ML
0.3 INJECTION, SOLUTION, CONCENTRATE INTRAVENOUS EVERY 5 MIN PRN
Status: CANCELLED | OUTPATIENT
Start: 2021-07-23

## 2021-07-23 RX ORDER — METHYLPREDNISOLONE SODIUM SUCCINATE 125 MG/2ML
125 INJECTION, POWDER, LYOPHILIZED, FOR SOLUTION INTRAMUSCULAR; INTRAVENOUS
Status: CANCELLED
Start: 2021-07-23

## 2021-07-23 RX ORDER — ALBUTEROL SULFATE 90 UG/1
1-2 AEROSOL, METERED RESPIRATORY (INHALATION)
Status: CANCELLED
Start: 2021-07-23

## 2021-07-23 RX ORDER — MEPERIDINE HYDROCHLORIDE 25 MG/ML
25 INJECTION INTRAMUSCULAR; INTRAVENOUS; SUBCUTANEOUS EVERY 30 MIN PRN
Status: CANCELLED | OUTPATIENT
Start: 2021-07-23

## 2021-07-23 RX ORDER — DIPHENHYDRAMINE HYDROCHLORIDE 50 MG/ML
50 INJECTION INTRAMUSCULAR; INTRAVENOUS
Status: CANCELLED
Start: 2021-07-23

## 2021-07-23 RX ORDER — NALOXONE HYDROCHLORIDE 0.4 MG/ML
0.2 INJECTION, SOLUTION INTRAMUSCULAR; INTRAVENOUS; SUBCUTANEOUS
Status: CANCELLED | OUTPATIENT
Start: 2021-07-23

## 2021-07-23 RX ORDER — ALBUTEROL SULFATE 0.83 MG/ML
2.5 SOLUTION RESPIRATORY (INHALATION)
Status: CANCELLED | OUTPATIENT
Start: 2021-07-23

## 2021-07-23 NOTE — PROGRESS NOTES
Patient called and said Dr. Peña is recommending Prolia every 6 months due to recent bone density results. Patient would like to go ahead with this. Sent message to Dr. Peña to place orders.Desi Flores RN on 7/23/2021 at 3:01 PM

## 2021-07-23 NOTE — RESULT ENCOUNTER NOTE
Mild-moderate osteopenia of the left femoral neck.  I will recommend to continue on calcium and vitamin D supplements.  Consider Prolia 60 mg every 6 months.  Repeat bone density scan in 2 years

## 2021-07-28 ENCOUNTER — OFFICE VISIT (OUTPATIENT)
Dept: SURGERY | Facility: CLINIC | Age: 51
End: 2021-07-28
Payer: COMMERCIAL

## 2021-07-28 VITALS
HEIGHT: 65 IN | SYSTOLIC BLOOD PRESSURE: 110 MMHG | BODY MASS INDEX: 25.49 KG/M2 | DIASTOLIC BLOOD PRESSURE: 72 MMHG | TEMPERATURE: 98.3 F | WEIGHT: 153 LBS

## 2021-07-28 DIAGNOSIS — C50.912 INVASIVE DUCTAL CARCINOMA OF BREAST, FEMALE, LEFT (H): Primary | ICD-10-CM

## 2021-07-28 DIAGNOSIS — Z79.811 AROMATASE INHIBITOR USE: ICD-10-CM

## 2021-07-28 PROCEDURE — 99214 OFFICE O/P EST MOD 30 MIN: CPT | Performed by: SURGERY

## 2021-07-28 ASSESSMENT — MIFFLIN-ST. JEOR: SCORE: 1310.91

## 2021-07-28 NOTE — LETTER
"    7/28/2021         RE: Desi Granado  [address] [place] [address]        Dear Colleague,    Thank you for referring your patient, Desi Granado, to the [facility]. Please see a copy of my visit note below.        Assessment & Plan   Problem List Items Addressed This Visit        Other    Invasive ductal carcinoma of breast, female, left (H) - Primary    Aromatase inhibitor use      Other Visit Diagnoses     BMI 25.0-25.9,adult             No abnormalities on the chest wall.  No skin changes.  He is 2-1/2 years out from her bilateral mastectomy.  She has no plan on any reconstruction.  She is tolerating her AI.  Follow-up in 6 months.  Of her questions were answered to her satisfaction.    No follow-ups on file.    Nabila Hart MD  [facility]    Stephanie Munguia is a 50 year old who presents for the following health issues:    left IDC with papillary features, neg angio invasion, neg DCIS; 2/3; 6/9; ER/[place] (+); HER2 (-); US to left axilla neg for abnl lymph  nodes    Final path: 2.2cm IDC with papillary 0/3 +LNs s/p B/L mastectomy with left SLNB 11/8/2018  AI - didn't tolerate Tamoxofin; currently on exemestane.  No new changes.           Review of Systems   Constitutional, HEENT, cardiovascular, pulmonary, gi and gu systems are negative, except as otherwise noted.      Objective    /72   Temp 98.3  F (36.8  C) (Temporal)   Ht 1.645 m (5' 4.75\")   Wt 69.4 kg (153 lb)   BMI 25.66 kg/m    Body mass index is 25.66 kg/m .  Physical Exam  Vitals reviewed.   Eyes:      Conjunctiva/sclera: Conjunctivae normal.   Cardiovascular:      Rate and Rhythm: Normal rate.      Pulses: Normal pulses.   Pulmonary:      Effort: Pulmonary effort is normal.   Chest:      Breasts:         Right: Absent.         Left: Absent.   Abdominal:      Palpations: Abdomen is soft.   Musculoskeletal:      Cervical back: Normal range of motion. "   Lymphadenopathy:      Upper Body:      Right upper body: No supraclavicular or axillary adenopathy.      Left upper body: No supraclavicular or axillary adenopathy.   Neurological:      Mental Status: She is alert.                    Again, thank you for allowing me to participate in the care of your patient.        Sincerely,        SabinoAmy Hart MD

## 2021-07-28 NOTE — PROGRESS NOTES
"    Assessment & Plan   Problem List Items Addressed This Visit        Other    Invasive ductal carcinoma of breast, female, left (H) - Primary    Aromatase inhibitor use      Other Visit Diagnoses     BMI 25.0-25.9,adult             No abnormalities on the chest wall.  No skin changes.  He is 2-1/2 years out from her bilateral mastectomy.  She has no plan on any reconstruction.  She is tolerating her AI.  Follow-up in 6 months.  Of her questions were answered to her satisfaction.    No follow-ups on file.    [facility] HealthDi[facility] Hart MD  [facility]    Stephanie Munguia is a 50 year old who presents for the following health issues:    left IDC with papillary features, neg angio invasion, neg DCIS; 2/3; 6/9; ER/CA (+); HER2 (-); US to left axilla neg for abnl lymph  nodes    Final path: 2.2cm IDC with papillary 0/3 +LNs s/p B/L mastectomy with left SLNB 11/8/2018  AI - didn't tolerate Tamoxofin; currently on exemestane.  No new changes.           Review of Systems   Constitutional, HEENT, cardiovascular, pulmonary, gi and gu systems are negative, except as otherwise noted.      Objective    /72   Temp 98.3  F (36.8  C) (Temporal)   Ht 1.645 m (5' 4.75\")   Wt 69.4 kg (153 lb)   BMI 25.66 kg/m    Body mass index is 25.66 kg/m .  Physical Exam  Vitals reviewed.   Eyes:      Conjunctiva/sclera: Conjunctivae normal.   Cardiovascular:      Rate and Rhythm: Normal rate.      Pulses: Normal pulses.   Pulmonary:      Effort: Pulmonary effort is normal.   Chest:      Breasts:         Right: Absent.         Left: Absent.   Abdominal:      Palpations: Abdomen is soft.   Musculoskeletal:      Cervical back: Normal range of motion.   Lymphadenopathy:      Upper Body:      Right upper body: No supraclavicular or axillary adenopathy.      Left upper body: No supraclavicular or axillary adenopathy.   Neurological:      Mental Status: She is alert.                "

## 2021-07-29 ENCOUNTER — INFUSION THERAPY VISIT (OUTPATIENT)
Dept: INFUSION THERAPY | Facility: CLINIC | Age: 51
End: 2021-07-29
Attending: INTERNAL MEDICINE
Payer: COMMERCIAL

## 2021-07-29 VITALS — TEMPERATURE: 97.8 F | SYSTOLIC BLOOD PRESSURE: 134 MMHG | HEART RATE: 83 BPM | DIASTOLIC BLOOD PRESSURE: 67 MMHG

## 2021-07-29 DIAGNOSIS — Z17.0 MALIGNANT NEOPLASM OF BREAST IN FEMALE, ESTROGEN RECEPTOR POSITIVE, UNSPECIFIED LATERALITY, UNSPECIFIED SITE OF BREAST (H): ICD-10-CM

## 2021-07-29 DIAGNOSIS — C50.919 MALIGNANT NEOPLASM OF BREAST IN FEMALE, ESTROGEN RECEPTOR POSITIVE, UNSPECIFIED LATERALITY, UNSPECIFIED SITE OF BREAST (H): ICD-10-CM

## 2021-07-29 DIAGNOSIS — Z79.811 AROMATASE INHIBITOR USE: ICD-10-CM

## 2021-07-29 DIAGNOSIS — C50.912 INVASIVE DUCTAL CARCINOMA OF BREAST, FEMALE, LEFT (H): ICD-10-CM

## 2021-07-29 DIAGNOSIS — M85.852 OSTEOPENIA OF NECK OF LEFT FEMUR: ICD-10-CM

## 2021-07-29 DIAGNOSIS — Z78.0 MENOPAUSE: Primary | ICD-10-CM

## 2021-07-29 PROCEDURE — 96402 CHEMO HORMON ANTINEOPL SQ/IM: CPT

## 2021-07-29 PROCEDURE — 96372 THER/PROPH/DIAG INJ SC/IM: CPT | Mod: XS | Performed by: INTERNAL MEDICINE

## 2021-07-29 PROCEDURE — 250N000011 HC RX IP 250 OP 636: Performed by: INTERNAL MEDICINE

## 2021-07-29 RX ORDER — ALBUTEROL SULFATE 90 UG/1
1-2 AEROSOL, METERED RESPIRATORY (INHALATION)
Status: CANCELLED
Start: 2022-01-25

## 2021-07-29 RX ORDER — METHYLPREDNISOLONE SODIUM SUCCINATE 125 MG/2ML
125 INJECTION, POWDER, LYOPHILIZED, FOR SOLUTION INTRAMUSCULAR; INTRAVENOUS
Status: CANCELLED
Start: 2022-01-25

## 2021-07-29 RX ORDER — MEPERIDINE HYDROCHLORIDE 25 MG/ML
25 INJECTION INTRAMUSCULAR; INTRAVENOUS; SUBCUTANEOUS EVERY 30 MIN PRN
Status: CANCELLED | OUTPATIENT
Start: 2022-01-25

## 2021-07-29 RX ORDER — DIPHENHYDRAMINE HYDROCHLORIDE 50 MG/ML
50 INJECTION INTRAMUSCULAR; INTRAVENOUS
Status: CANCELLED
Start: 2022-01-25

## 2021-07-29 RX ORDER — EPINEPHRINE 1 MG/ML
0.3 INJECTION, SOLUTION, CONCENTRATE INTRAVENOUS EVERY 5 MIN PRN
Status: CANCELLED | OUTPATIENT
Start: 2022-01-25

## 2021-07-29 RX ORDER — ALBUTEROL SULFATE 0.83 MG/ML
2.5 SOLUTION RESPIRATORY (INHALATION)
Status: CANCELLED | OUTPATIENT
Start: 2022-01-25

## 2021-07-29 RX ORDER — NALOXONE HYDROCHLORIDE 0.4 MG/ML
0.2 INJECTION, SOLUTION INTRAMUSCULAR; INTRAVENOUS; SUBCUTANEOUS
Status: CANCELLED | OUTPATIENT
Start: 2022-01-25

## 2021-07-29 RX ADMIN — DENOSUMAB 60 MG: 60 INJECTION SUBCUTANEOUS at 15:48

## 2021-07-29 RX ADMIN — GOSERELIN ACETATE 3.6 MG: 3.6 IMPLANT SUBCUTANEOUS at 15:46

## 2021-07-29 NOTE — PROGRESS NOTES
Infusion Nursing Note:  Desi Granado presents today for Zoladex, Prolia.    Patient seen by provider today: No   present during visit today: Not Applicable.    Note:  Patient declined the covid-19 test.    Intravenous Access:  No Intravenous access/labs at this visit.    Treatment Conditions:  Lab results reviewed, okay to proceed with treatment.      Post Infusion Assessment:  Patient tolerated injections without incident.       Discharge Plan:   Patient discharged in stable condition accompanied by: self.  Departure Mode: Ambulatory.  Pt was given handout information regarding Prolia.      Larissa Fang RN

## 2021-08-17 ENCOUNTER — MYC MEDICAL ADVICE (OUTPATIENT)
Dept: FAMILY MEDICINE | Facility: CLINIC | Age: 51
End: 2021-08-17

## 2021-08-17 DIAGNOSIS — C50.912 INVASIVE DUCTAL CARCINOMA OF BREAST, FEMALE, LEFT (H): ICD-10-CM

## 2021-08-17 DIAGNOSIS — I10 ESSENTIAL HYPERTENSION, BENIGN: ICD-10-CM

## 2021-08-17 DIAGNOSIS — J30.89 ALLERGIC RHINITIS DUE TO OTHER ALLERGIC TRIGGER, UNSPECIFIED SEASONALITY: ICD-10-CM

## 2021-08-17 RX ORDER — LISINOPRIL 30 MG/1
30 TABLET ORAL DAILY
Qty: 90 TABLET | Refills: 3 | Status: SHIPPED | OUTPATIENT
Start: 2021-08-17 | End: 2022-07-13

## 2021-08-17 RX ORDER — EXEMESTANE 25 MG/1
25 TABLET ORAL DAILY
Qty: 90 TABLET | Refills: 3 | Status: SHIPPED | OUTPATIENT
Start: 2021-08-17 | End: 2022-08-01

## 2021-08-17 RX ORDER — MONTELUKAST SODIUM 10 MG/1
10 TABLET ORAL AT BEDTIME
Qty: 90 TABLET | Refills: 3 | Status: SHIPPED | OUTPATIENT
Start: 2021-08-17 | End: 2022-07-13

## 2021-08-27 ENCOUNTER — INFUSION THERAPY VISIT (OUTPATIENT)
Dept: INFUSION THERAPY | Facility: CLINIC | Age: 51
End: 2021-08-27
Attending: INTERNAL MEDICINE
Payer: COMMERCIAL

## 2021-08-27 VITALS
RESPIRATION RATE: 16 BRPM | SYSTOLIC BLOOD PRESSURE: 136 MMHG | HEART RATE: 92 BPM | TEMPERATURE: 98 F | DIASTOLIC BLOOD PRESSURE: 63 MMHG

## 2021-08-27 DIAGNOSIS — Z17.0 MALIGNANT NEOPLASM OF BREAST IN FEMALE, ESTROGEN RECEPTOR POSITIVE, UNSPECIFIED LATERALITY, UNSPECIFIED SITE OF BREAST (H): Primary | ICD-10-CM

## 2021-08-27 DIAGNOSIS — C50.919 MALIGNANT NEOPLASM OF BREAST IN FEMALE, ESTROGEN RECEPTOR POSITIVE, UNSPECIFIED LATERALITY, UNSPECIFIED SITE OF BREAST (H): Primary | ICD-10-CM

## 2021-08-27 PROCEDURE — 250N000011 HC RX IP 250 OP 636: Performed by: INTERNAL MEDICINE

## 2021-08-27 PROCEDURE — 96402 CHEMO HORMON ANTINEOPL SQ/IM: CPT

## 2021-08-27 RX ADMIN — GOSERELIN ACETATE 3.6 MG: 3.6 IMPLANT SUBCUTANEOUS at 15:50

## 2021-09-22 ENCOUNTER — PATIENT OUTREACH (OUTPATIENT)
Dept: ONCOLOGY | Facility: CLINIC | Age: 51
End: 2021-09-22

## 2021-09-22 NOTE — PROGRESS NOTES
New Patient Oncology Nurse Navigator Note     Referring provider: Self     Referring Clinic/Organization: She has been a patient [facility] at [facility]     Referred to (specialty): Medical oncology    Requested provider (if applicable): Dr. Elvia Grijalva     Date Referral Received: 9/22/21     Evaluation for : Breast cancer     Clinical History (per Nurse review of records provided):    20/18 diagnosis with left breast solid papillary CA, 2.2 cm, Grade 2, ER/IL+, HER2 neg, node negative.  S/p bilateral mastectomy.  She gets semi-annual Prolia and monthly Zolodex at [facility].       Patient had called the schedulers for [facility] and eventually this request was routed to me.  Now that Dr. Peña has left the [facility] location, Nilda would like to follow with Dr. Grijalva for her oncology care.  If possible she would still like to have monthly Zolodex injections, and semi-annual Prolia injections at [place].        Of note: Dr. Peña had been runnning Ca27.29 breast tumor marker prior to Nilda's follow ups.  Patient recognizes not all medical oncologists use this marker, but if Dr. Grijalva does want one she'd like to have that draw the week prior in [place] if possible.

## 2021-09-24 ENCOUNTER — INFUSION THERAPY VISIT (OUTPATIENT)
Dept: INFUSION THERAPY | Facility: CLINIC | Age: 51
End: 2021-09-24
Attending: INTERNAL MEDICINE
Payer: COMMERCIAL

## 2021-09-24 VITALS
HEART RATE: 69 BPM | SYSTOLIC BLOOD PRESSURE: 129 MMHG | TEMPERATURE: 97.2 F | DIASTOLIC BLOOD PRESSURE: 62 MMHG | RESPIRATION RATE: 16 BRPM

## 2021-09-24 DIAGNOSIS — C50.919 MALIGNANT NEOPLASM OF BREAST IN FEMALE, ESTROGEN RECEPTOR POSITIVE, UNSPECIFIED LATERALITY, UNSPECIFIED SITE OF BREAST (H): Primary | ICD-10-CM

## 2021-09-24 DIAGNOSIS — Z17.0 MALIGNANT NEOPLASM OF BREAST IN FEMALE, ESTROGEN RECEPTOR POSITIVE, UNSPECIFIED LATERALITY, UNSPECIFIED SITE OF BREAST (H): Primary | ICD-10-CM

## 2021-09-24 PROCEDURE — 96402 CHEMO HORMON ANTINEOPL SQ/IM: CPT

## 2021-09-24 PROCEDURE — 250N000011 HC RX IP 250 OP 636: Performed by: NURSE PRACTITIONER

## 2021-09-24 RX ADMIN — GOSERELIN ACETATE 3.6 MG: 3.6 IMPLANT SUBCUTANEOUS at 15:30

## 2021-09-27 NOTE — PROGRESS NOTES
Telephoned and spoke with Nilda informing her Dr. Grijalva will see her in January for follow up as she transfers care from Dr. Peña at [facility].  Dr. Grijalva did not specifically respond regarding CA 27.29, but Dr. Peña had previously placed orders for CMP, CBC, and CA 27.29 so Nilda can get those at her local clinic in [place] and then see Dr. rGijalva with those results reporting. She was appreciate of the call and in agreement with this plan.

## 2021-09-29 ASSESSMENT — ENCOUNTER SYMPTOMS
EYE PAIN: 0
MYALGIAS: 0
ARTHRALGIAS: 1
HEARTBURN: 0
FEVER: 0
PARESTHESIAS: 0
HEADACHES: 0
COUGH: 0
HEMATOCHEZIA: 0
CHILLS: 0
NAUSEA: 0
NERVOUS/ANXIOUS: 0
DIARRHEA: 0
ABDOMINAL PAIN: 0
FREQUENCY: 0
SHORTNESS OF BREATH: 0
PALPITATIONS: 0
WEAKNESS: 0
DIZZINESS: 0
SORE THROAT: 0
DYSURIA: 0
JOINT SWELLING: 0
BREAST MASS: 0
CONSTIPATION: 0
HEMATURIA: 0

## 2021-09-30 ENCOUNTER — TELEPHONE (OUTPATIENT)
Dept: ONCOLOGY | Facility: CLINIC | Age: 51
End: 2021-09-30

## 2021-09-30 NOTE — TELEPHONE ENCOUNTER
9/30/21 - [facility] to call [phone] opt5, opt2 with questions about appt with Dr. Grijalva in January (transferring care from Dr. Peña). -Alonzo

## 2021-10-06 ENCOUNTER — OFFICE VISIT (OUTPATIENT)
Dept: FAMILY MEDICINE | Facility: CLINIC | Age: 51
End: 2021-10-06
Payer: COMMERCIAL

## 2021-10-06 VITALS
SYSTOLIC BLOOD PRESSURE: 120 MMHG | HEIGHT: 65 IN | OXYGEN SATURATION: 99 % | HEART RATE: 89 BPM | BODY MASS INDEX: 25.49 KG/M2 | WEIGHT: 153 LBS | DIASTOLIC BLOOD PRESSURE: 76 MMHG | TEMPERATURE: 98.2 F

## 2021-10-06 DIAGNOSIS — Z00.00 ROUTINE GENERAL MEDICAL EXAMINATION AT A HEALTH CARE FACILITY: ICD-10-CM

## 2021-10-06 DIAGNOSIS — Z23 NEED FOR INFLUENZA VACCINATION: ICD-10-CM

## 2021-10-06 DIAGNOSIS — Z13.220 SCREENING FOR CHOLESTEROL LEVEL: Primary | ICD-10-CM

## 2021-10-06 DIAGNOSIS — Z11.59 NEED FOR HEPATITIS C SCREENING TEST: ICD-10-CM

## 2021-10-06 DIAGNOSIS — Z12.4 SCREENING FOR CERVICAL CANCER: ICD-10-CM

## 2021-10-06 PROCEDURE — 90682 RIV4 VACC RECOMBINANT DNA IM: CPT | Performed by: FAMILY MEDICINE

## 2021-10-06 PROCEDURE — 99396 PREV VISIT EST AGE 40-64: CPT | Mod: 25 | Performed by: FAMILY MEDICINE

## 2021-10-06 PROCEDURE — 90471 IMMUNIZATION ADMIN: CPT | Performed by: FAMILY MEDICINE

## 2021-10-06 PROCEDURE — G0123 SCREEN CERV/VAG THIN LAYER: HCPCS | Performed by: FAMILY MEDICINE

## 2021-10-06 PROCEDURE — 87624 HPV HI-RISK TYP POOLED RSLT: CPT | Performed by: FAMILY MEDICINE

## 2021-10-06 ASSESSMENT — ENCOUNTER SYMPTOMS
DYSURIA: 0
CONSTIPATION: 0
NAUSEA: 0
HEMATURIA: 0
FREQUENCY: 0
ABDOMINAL PAIN: 0
FEVER: 0
DIZZINESS: 0
HEMATOCHEZIA: 0
WEAKNESS: 0
BREAST MASS: 0
JOINT SWELLING: 0
EYE PAIN: 0
COUGH: 0
MYALGIAS: 0
CHILLS: 0
SORE THROAT: 0
PALPITATIONS: 0
PARESTHESIAS: 0
NERVOUS/ANXIOUS: 0
HEADACHES: 0
HEARTBURN: 0
SHORTNESS OF BREATH: 0
DIARRHEA: 0
ARTHRALGIAS: 1

## 2021-10-06 ASSESSMENT — MIFFLIN-ST. JEOR: SCORE: 1305.91

## 2021-10-06 NOTE — PROGRESS NOTES
SUBJECTIVE:   CC: Desi Granado is an 51 year old woman who presents for preventive health visit.     Patient has been advised of split billing requirements and indicates understanding: Yes  Healthy Habits:     Getting at least 3 servings of Calcium per day:  Yes    Bi-annual eye exam:  Yes    Dental care twice a year:  Yes    Sleep apnea or symptoms of sleep apnea:  None    Diet:  Regular (no restrictions)    Frequency of exercise:  2-3 days/week    Duration of exercise:  30-45 minutes    Taking medications regularly:  No    Medication side effects:  Other    PHQ-2 Total Score: 1    Additional concerns today:  No          Today's PHQ-2 Score:   PHQ-2 ( 1999 Pfizer) 9/29/2021   Q1: Little interest or pleasure in doing things 0   Q2: Feeling down, depressed or hopeless 1   PHQ-2 Score 1   Q1: Little interest or pleasure in doing things Not at all   Q2: Feeling down, depressed or hopeless Several days   PHQ-2 Score 1       Abuse: Current or Past (Physical, Sexual or Emotional) - No  Do you feel safe in your environment? Yes    Have you ever done Advance Care Planning? (For example, a Health Directive, POLST, or a discussion with a medical provider or your loved ones about your wishes): Yes, advance care planning is on file.    Social History     Tobacco Use     Smoking status: Never Smoker     Smokeless tobacco: Never Used   Substance Use Topics     Alcohol use: No     Alcohol/week: 0.0 standard drinks     Comment: very rare     If you drink alcohol do you typically have >3 drinks per day or >7 drinks per week? No    No flowsheet data found.    Reviewed orders with patient.  Reviewed health maintenance and updated orders accordingly - Yes  Labs reviewed in EPIC    Breast Cancer Screening:  Any new diagnosis of family breast, ovarian, or bowel cancer? No    FHS-7: No flowsheet data found.    Mammogram Screening - Alternate mammogram schedule due to breast cancer history  Pertinent mammograms are reviewed under  "the imaging tab.    History of abnormal Pap smear: NO - age 30-65 PAP every 5 years with negative HPV co-testing recommended  PAP / HPV Latest Ref Rng & Units 9/8/2016 8/14/2013 8/16/2010   PAP (Historical) - NIL NIL NIL   HPV16 NEG Negative - -   HPV18 NEG Negative - -   HRHPV NEG Negative - -     Reviewed and updated as needed this visit by clinical staff  Tobacco  Allergies  Meds   Med Hx  Surg Hx  Fam Hx  Soc Hx        Reviewed and updated as needed this visit by Provider                Past Medical History:   Diagnosis Date     Allergic rhinitis, cause unspecified      Hypertension 8 years?     Injury, other and unspecified, knee, leg, ankle, and foot 8th grade    left ankle injury     Invasive ductal carcinoma of breast, female, left (H) 10/26/2018        Review of Systems   Constitutional: Negative for chills and fever.   HENT: Negative for congestion, ear pain, hearing loss and sore throat.    Eyes: Negative for pain and visual disturbance.   Respiratory: Negative for cough and shortness of breath.    Cardiovascular: Negative for chest pain, palpitations and peripheral edema.   Gastrointestinal: Negative for abdominal pain, constipation, diarrhea, heartburn, hematochezia and nausea.   Breasts:  Negative for tenderness, breast mass and discharge.   Genitourinary: Negative for dysuria, frequency, genital sores, hematuria, pelvic pain, urgency, vaginal bleeding and vaginal discharge.   Musculoskeletal: Positive for arthralgias. Negative for joint swelling and myalgias.   Skin: Negative for rash.   Neurological: Negative for dizziness, weakness, headaches and paresthesias.   Psychiatric/Behavioral: Negative for mood changes. The patient is not nervous/anxious.           OBJECTIVE:   /76   Pulse 89   Temp 98.2  F (36.8  C) (Tympanic)   Ht 1.645 m (5' 4.75\")   Wt 69.4 kg (153 lb)   SpO2 99%   BMI 25.66 kg/m    Physical Exam  GENERAL: healthy, alert and no distress  EYES: Eyes grossly normal to " "inspection, PERRL and conjunctivae and sclerae normal  HENT: ear canals and TM's normal, nose and mouth without ulcers or lesions  NECK: no adenopathy, no asymmetry, masses, or scars and thyroid normal to palpation  RESP: lungs clear to auscultation - no rales, rhonchi or wheezes  BREAST: s/p mastectomy  and   CV: regular rate and rhythm, normal S1 S2, no S3 or S4, no murmur, click or rub, no peripheral edema and peripheral pulses strong  ABDOMEN: soft, nontender, no hepatosplenomegaly, no masses and bowel sounds normal   (female): normal female external genitalia, normal urethral meatus, vaginal mucosa pink, moist, well rugated, and normal cervix/adnexa/uterus without masses or discharge  MS: no gross musculoskeletal defects noted, no edema  SKIN: no suspicious lesions or rashes  NEURO: Normal strength and tone, mentation intact and speech normal  PSYCH: mentation appears normal, affect normal/bright        ASSESSMENT/PLAN:   (Z13.220) Screening for cholesterol level  (primary encounter diagnosis)  Comment:   Plan: Lipid panel reflex to direct LDL Fasting            (Z00.00) Routine general medical examination at a health care facility  Comment:   Plan:     (Z12.4) Screening for cervical cancer  Comment:   Plan: PAP screen with HPV - recommended age 30 - 65         years            (Z23) Need for influenza vaccination  Comment:   Plan: INFLUENZA QUAD, RECOMBINANT, P-FREE (RIV4)         (FLUBLOK)            (Z11.59) Need for hepatitis C screening test  Comment:   Plan: Hepatitis C Screen Reflex to HCV RNA Quant and         Genotype              Patient has been advised of split billing requirements and indicates understanding: Yes  COUNSELING:  Reviewed preventive health counseling, as reflected in patient instructions    Estimated body mass index is 25.66 kg/m  as calculated from the following:    Height as of this encounter: 1.645 m (5' 4.75\").    Weight as of this encounter: 69.4 kg (153 lb).        She reports " that she has never smoked. She has never used smokeless tobacco.      Counseling Resources:  ATP IV Guidelines  Pooled Cohorts Equation Calculator  Breast Cancer Risk Calculator  BRCA-Related Cancer Risk Assessment: FHS-7 Tool  FRAX Risk Assessment  ICSI Preventive Guidelines  Dietary Guidelines for Americans, 2010  USDA's MyPlate  ASA Prophylaxis  Lung CA Screening    Pearl Grover MD  [facility]

## 2021-10-12 LAB
BKR LAB AP GYN ADEQUACY: NORMAL
BKR LAB AP GYN INTERPRETATION: NORMAL
BKR LAB AP HPV REFLEX: NORMAL
BKR LAB AP PREVIOUS ABNORMAL: NORMAL
PATH REPORT.COMMENTS IMP SPEC: NORMAL
PATH REPORT.RELEVANT HX SPEC: NORMAL

## 2021-10-13 LAB
HUMAN PAPILLOMA VIRUS 16 DNA: NEGATIVE
HUMAN PAPILLOMA VIRUS 18 DNA: NEGATIVE
HUMAN PAPILLOMA VIRUS FINAL DIAGNOSIS: NORMAL
HUMAN PAPILLOMA VIRUS OTHER HR: NEGATIVE

## 2021-10-15 ENCOUNTER — PATIENT OUTREACH (OUTPATIENT)
Dept: FAMILY MEDICINE | Facility: CLINIC | Age: 51
End: 2021-10-15

## 2021-10-18 ENCOUNTER — MYC MEDICAL ADVICE (OUTPATIENT)
Dept: FAMILY MEDICINE | Facility: CLINIC | Age: 51
End: 2021-10-18

## 2021-10-22 ENCOUNTER — INFUSION THERAPY VISIT (OUTPATIENT)
Dept: INFUSION THERAPY | Facility: CLINIC | Age: 51
End: 2021-10-22
Attending: INTERNAL MEDICINE
Payer: COMMERCIAL

## 2021-10-22 VITALS — DIASTOLIC BLOOD PRESSURE: 68 MMHG | SYSTOLIC BLOOD PRESSURE: 116 MMHG | TEMPERATURE: 97.2 F | HEART RATE: 93 BPM

## 2021-10-22 DIAGNOSIS — Z17.0 MALIGNANT NEOPLASM OF BREAST IN FEMALE, ESTROGEN RECEPTOR POSITIVE, UNSPECIFIED LATERALITY, UNSPECIFIED SITE OF BREAST (H): Primary | ICD-10-CM

## 2021-10-22 DIAGNOSIS — C50.919 MALIGNANT NEOPLASM OF BREAST IN FEMALE, ESTROGEN RECEPTOR POSITIVE, UNSPECIFIED LATERALITY, UNSPECIFIED SITE OF BREAST (H): Primary | ICD-10-CM

## 2021-10-22 PROCEDURE — 250N000011 HC RX IP 250 OP 636: Performed by: NURSE PRACTITIONER

## 2021-10-22 PROCEDURE — 96402 CHEMO HORMON ANTINEOPL SQ/IM: CPT

## 2021-10-22 RX ADMIN — GOSERELIN ACETATE 3.6 MG: 3.6 IMPLANT SUBCUTANEOUS at 15:34

## 2021-10-22 NOTE — PROGRESS NOTES
Infusion Nursing Note:  Desi Granado presents today for Zoladex   Patient seen by provider today: No   present during visit today: Not Applicable.    Note: N/A.      Intravenous Access:  No Intravenous access/labs at this visit.    Treatment Conditions:  Not Applicable.      Post Infusion Assessment:  Patient tolerated Zoladex injection subcutaneous to the right side of the abdomen without incident.       Discharge Plan:   Patient discharged in stable condition accompanied by: self.  Departure Mode: Ambulatory.  Pt to return on 11/19/21 at 3:30 pm for next Zoladex.       Elli Magana RN

## 2021-10-25 ENCOUNTER — MYC MEDICAL ADVICE (OUTPATIENT)
Dept: FAMILY MEDICINE | Facility: CLINIC | Age: 51
End: 2021-10-25

## 2021-10-25 DIAGNOSIS — R19.7 DIARRHEA, UNSPECIFIED TYPE: Primary | ICD-10-CM

## 2021-10-25 DIAGNOSIS — R14.0 ABDOMINAL BLOATING: ICD-10-CM

## 2021-11-19 ENCOUNTER — INFUSION THERAPY VISIT (OUTPATIENT)
Dept: INFUSION THERAPY | Facility: CLINIC | Age: 51
End: 2021-11-19
Attending: INTERNAL MEDICINE
Payer: COMMERCIAL

## 2021-11-19 VITALS
RESPIRATION RATE: 16 BRPM | SYSTOLIC BLOOD PRESSURE: 136 MMHG | DIASTOLIC BLOOD PRESSURE: 80 MMHG | TEMPERATURE: 97.6 F | HEART RATE: 85 BPM

## 2021-11-19 DIAGNOSIS — Z17.0 MALIGNANT NEOPLASM OF BREAST IN FEMALE, ESTROGEN RECEPTOR POSITIVE, UNSPECIFIED LATERALITY, UNSPECIFIED SITE OF BREAST (H): Primary | ICD-10-CM

## 2021-11-19 DIAGNOSIS — C50.919 MALIGNANT NEOPLASM OF BREAST IN FEMALE, ESTROGEN RECEPTOR POSITIVE, UNSPECIFIED LATERALITY, UNSPECIFIED SITE OF BREAST (H): Primary | ICD-10-CM

## 2021-11-19 PROCEDURE — 250N000011 HC RX IP 250 OP 636: Performed by: NURSE PRACTITIONER

## 2021-11-19 PROCEDURE — 96402 CHEMO HORMON ANTINEOPL SQ/IM: CPT

## 2021-11-19 RX ADMIN — GOSERELIN ACETATE 3.6 MG: 3.6 IMPLANT SUBCUTANEOUS at 15:36

## 2021-11-19 ASSESSMENT — PAIN SCALES - GENERAL: PAINLEVEL: NO PAIN (0)

## 2021-12-09 ENCOUNTER — OFFICE VISIT (OUTPATIENT)
Dept: FAMILY MEDICINE | Facility: CLINIC | Age: 51
End: 2021-12-09
Payer: COMMERCIAL

## 2021-12-09 VITALS
HEIGHT: 65 IN | SYSTOLIC BLOOD PRESSURE: 126 MMHG | TEMPERATURE: 97.1 F | HEART RATE: 86 BPM | WEIGHT: 155 LBS | BODY MASS INDEX: 25.83 KG/M2 | DIASTOLIC BLOOD PRESSURE: 80 MMHG | OXYGEN SATURATION: 99 %

## 2021-12-09 DIAGNOSIS — Z13.220 SCREENING FOR CHOLESTEROL LEVEL: ICD-10-CM

## 2021-12-09 DIAGNOSIS — I10 ESSENTIAL HYPERTENSION, BENIGN: Primary | ICD-10-CM

## 2021-12-09 DIAGNOSIS — Z12.4 SCREENING FOR CERVICAL CANCER: ICD-10-CM

## 2021-12-09 DIAGNOSIS — Z11.59 NEED FOR HEPATITIS C SCREENING TEST: ICD-10-CM

## 2021-12-09 LAB
ANION GAP SERPL CALCULATED.3IONS-SCNC: 4 MMOL/L (ref 3–14)
BUN SERPL-MCNC: 17 MG/DL (ref 7–30)
CALCIUM SERPL-MCNC: 9.4 MG/DL (ref 8.5–10.1)
CHLORIDE BLD-SCNC: 107 MMOL/L (ref 94–109)
CHOLEST SERPL-MCNC: 208 MG/DL
CO2 SERPL-SCNC: 29 MMOL/L (ref 20–32)
CREAT SERPL-MCNC: 0.87 MG/DL (ref 0.52–1.04)
FASTING STATUS PATIENT QL REPORTED: YES
GFR SERPL CREATININE-BSD FRML MDRD: 77 ML/MIN/1.73M2
GLUCOSE BLD-MCNC: 92 MG/DL (ref 70–99)
HCV AB SERPL QL IA: NONREACTIVE
HDLC SERPL-MCNC: 69 MG/DL
LDLC SERPL CALC-MCNC: 122 MG/DL
NONHDLC SERPL-MCNC: 139 MG/DL
POTASSIUM BLD-SCNC: 4 MMOL/L (ref 3.4–5.3)
SODIUM SERPL-SCNC: 140 MMOL/L (ref 133–144)
TRIGL SERPL-MCNC: 87 MG/DL

## 2021-12-09 PROCEDURE — 87624 HPV HI-RISK TYP POOLED RSLT: CPT | Performed by: FAMILY MEDICINE

## 2021-12-09 PROCEDURE — 36415 COLL VENOUS BLD VENIPUNCTURE: CPT | Performed by: FAMILY MEDICINE

## 2021-12-09 PROCEDURE — 80048 BASIC METABOLIC PNL TOTAL CA: CPT | Performed by: FAMILY MEDICINE

## 2021-12-09 PROCEDURE — G0123 SCREEN CERV/VAG THIN LAYER: HCPCS | Performed by: FAMILY MEDICINE

## 2021-12-09 PROCEDURE — 80061 LIPID PANEL: CPT | Performed by: FAMILY MEDICINE

## 2021-12-09 PROCEDURE — 86803 HEPATITIS C AB TEST: CPT | Performed by: FAMILY MEDICINE

## 2021-12-09 PROCEDURE — 99213 OFFICE O/P EST LOW 20 MIN: CPT | Performed by: FAMILY MEDICINE

## 2021-12-09 ASSESSMENT — MIFFLIN-ST. JEOR: SCORE: 1314.99

## 2021-12-09 NOTE — PATIENT INSTRUCTIONS

## 2021-12-09 NOTE — PROGRESS NOTES
"  Assessment & Plan         Screening for cervical cancer  Last pap was inadequate - PAP screen with HPV - recommended age 30 - 65 years    Essential hypertension, benign  Well controlled   - Basic metabolic panel  (Ca, Cl, CO2, Creat, Gluc, K, Na, BUN); Future    Screening for cholesterol level  Check today   - Lipid panel reflex to direct LDL Fasting; Future             Return in about 1 year (around 12/9/2022) for Physical Exam.    Pearl Grover MD  [facility] STEPAN Munguia is a 51 year old who presents for the following health issues     HPI     Repeat pap      Last pap was inadequate.  Cervix is difficult to get to     Review of Systems   Constitutional, HEENT, cardiovascular, pulmonary, GI, , musculoskeletal, neuro, skin, endocrine and psych systems are negative, except as otherwise noted.      Objective    /80   Pulse 86   Temp 97.1  F (36.2  C) (Tympanic)   Ht 1.645 m (5' 4.75\")   Wt 70.3 kg (155 lb)   SpO2 99%   BMI 25.99 kg/m    Body mass index is 25.99 kg/m .  Physical Exam   GENERAL: healthy, alert and no distress   (female): normal female external genitalia, normal urethral meatus, vaginal mucosa,  Cervix partially viewed on patient right superior. without masses or discharge        Pearl Grover M.D.            "

## 2021-12-13 LAB
BKR LAB AP GYN ADEQUACY: NORMAL
BKR LAB AP GYN INTERPRETATION: NORMAL
BKR LAB AP HPV REFLEX: NORMAL
BKR LAB AP PREVIOUS ABNORMAL: NORMAL
PATH REPORT.COMMENTS IMP SPEC: NORMAL
PATH REPORT.COMMENTS IMP SPEC: NORMAL
PATH REPORT.RELEVANT HX SPEC: NORMAL

## 2021-12-16 ENCOUNTER — PATIENT OUTREACH (OUTPATIENT)
Dept: FAMILY MEDICINE | Facility: CLINIC | Age: 51
End: 2021-12-16
Payer: COMMERCIAL

## 2021-12-17 ENCOUNTER — INFUSION THERAPY VISIT (OUTPATIENT)
Dept: INFUSION THERAPY | Facility: CLINIC | Age: 51
End: 2021-12-17
Attending: INTERNAL MEDICINE
Payer: COMMERCIAL

## 2021-12-17 VITALS
DIASTOLIC BLOOD PRESSURE: 81 MMHG | SYSTOLIC BLOOD PRESSURE: 135 MMHG | TEMPERATURE: 97.9 F | RESPIRATION RATE: 16 BRPM | HEART RATE: 66 BPM

## 2021-12-17 DIAGNOSIS — C50.919 MALIGNANT NEOPLASM OF BREAST IN FEMALE, ESTROGEN RECEPTOR POSITIVE, UNSPECIFIED LATERALITY, UNSPECIFIED SITE OF BREAST (H): Primary | ICD-10-CM

## 2021-12-17 DIAGNOSIS — Z17.0 MALIGNANT NEOPLASM OF BREAST IN FEMALE, ESTROGEN RECEPTOR POSITIVE, UNSPECIFIED LATERALITY, UNSPECIFIED SITE OF BREAST (H): Primary | ICD-10-CM

## 2021-12-17 PROCEDURE — 250N000011 HC RX IP 250 OP 636: Performed by: NURSE PRACTITIONER

## 2021-12-17 PROCEDURE — 96401 CHEMO ANTI-NEOPL SQ/IM: CPT

## 2021-12-17 PROCEDURE — 96402 CHEMO HORMON ANTINEOPL SQ/IM: CPT

## 2021-12-17 RX ADMIN — GOSERELIN ACETATE 3.6 MG: 3.6 IMPLANT SUBCUTANEOUS at 15:36

## 2021-12-17 ASSESSMENT — PAIN SCALES - GENERAL: PAINLEVEL: NO PAIN (0)

## 2022-01-07 ENCOUNTER — OFFICE VISIT (OUTPATIENT)
Dept: SURGERY | Facility: CLINIC | Age: 52
End: 2022-01-07
Payer: COMMERCIAL

## 2022-01-07 ENCOUNTER — LAB (OUTPATIENT)
Dept: LAB | Facility: CLINIC | Age: 52
End: 2022-01-07
Payer: COMMERCIAL

## 2022-01-07 VITALS
DIASTOLIC BLOOD PRESSURE: 84 MMHG | WEIGHT: 160 LBS | TEMPERATURE: 99.1 F | SYSTOLIC BLOOD PRESSURE: 136 MMHG | HEIGHT: 65 IN | BODY MASS INDEX: 26.66 KG/M2

## 2022-01-07 DIAGNOSIS — Z79.811 AROMATASE INHIBITOR USE: ICD-10-CM

## 2022-01-07 DIAGNOSIS — C50.912 INVASIVE DUCTAL CARCINOMA OF BREAST, FEMALE, LEFT (H): ICD-10-CM

## 2022-01-07 DIAGNOSIS — Z17.0 MALIGNANT NEOPLASM OF BREAST IN FEMALE, ESTROGEN RECEPTOR POSITIVE, UNSPECIFIED LATERALITY, UNSPECIFIED SITE OF BREAST (H): Primary | ICD-10-CM

## 2022-01-07 DIAGNOSIS — Z90.13 S/P BILATERAL MASTECTOMY: ICD-10-CM

## 2022-01-07 DIAGNOSIS — C50.919 MALIGNANT NEOPLASM OF BREAST IN FEMALE, ESTROGEN RECEPTOR POSITIVE, UNSPECIFIED LATERALITY, UNSPECIFIED SITE OF BREAST (H): Primary | ICD-10-CM

## 2022-01-07 LAB
ALBUMIN SERPL-MCNC: 4 G/DL (ref 3.4–5)
ALP SERPL-CCNC: 73 U/L (ref 40–150)
ALT SERPL W P-5'-P-CCNC: 20 U/L (ref 0–50)
ANION GAP SERPL CALCULATED.3IONS-SCNC: 5 MMOL/L (ref 3–14)
AST SERPL W P-5'-P-CCNC: 12 U/L (ref 0–45)
BASOPHILS # BLD AUTO: 0 10E3/UL (ref 0–0.2)
BASOPHILS NFR BLD AUTO: 1 %
BILIRUB SERPL-MCNC: 0.4 MG/DL (ref 0.2–1.3)
BUN SERPL-MCNC: 19 MG/DL (ref 7–30)
CALCIUM SERPL-MCNC: 9.8 MG/DL (ref 8.5–10.1)
CANCER AG27-29 SERPL-ACNC: 9 U/ML (ref 0–39)
CHLORIDE BLD-SCNC: 104 MMOL/L (ref 94–109)
CO2 SERPL-SCNC: 28 MMOL/L (ref 20–32)
CREAT SERPL-MCNC: 0.79 MG/DL (ref 0.52–1.04)
EOSINOPHIL # BLD AUTO: 0 10E3/UL (ref 0–0.7)
EOSINOPHIL NFR BLD AUTO: 0 %
ERYTHROCYTE [DISTWIDTH] IN BLOOD BY AUTOMATED COUNT: 12.9 % (ref 10–15)
GFR SERPL CREATININE-BSD FRML MDRD: 90 ML/MIN/1.73M2
GLUCOSE BLD-MCNC: 111 MG/DL (ref 70–99)
HCT VFR BLD AUTO: 41.6 % (ref 35–47)
HGB BLD-MCNC: 14.2 G/DL (ref 11.7–15.7)
IMM GRANULOCYTES # BLD: 0.1 10E3/UL
IMM GRANULOCYTES NFR BLD: 1 %
LYMPHOCYTES # BLD AUTO: 1.3 10E3/UL (ref 0.8–5.3)
LYMPHOCYTES NFR BLD AUTO: 17 %
MCH RBC QN AUTO: 29.8 PG (ref 26.5–33)
MCHC RBC AUTO-ENTMCNC: 34.1 G/DL (ref 31.5–36.5)
MCV RBC AUTO: 87 FL (ref 78–100)
MONOCYTES # BLD AUTO: 0.4 10E3/UL (ref 0–1.3)
MONOCYTES NFR BLD AUTO: 5 %
NEUTROPHILS # BLD AUTO: 6.1 10E3/UL (ref 1.6–8.3)
NEUTROPHILS NFR BLD AUTO: 76 %
NRBC # BLD AUTO: 0 10E3/UL
NRBC BLD AUTO-RTO: 0 /100
PLATELET # BLD AUTO: 287 10E3/UL (ref 150–450)
POTASSIUM BLD-SCNC: 3.6 MMOL/L (ref 3.4–5.3)
PROT SERPL-MCNC: 7.9 G/DL (ref 6.8–8.8)
RBC # BLD AUTO: 4.76 10E6/UL (ref 3.8–5.2)
SODIUM SERPL-SCNC: 137 MMOL/L (ref 133–144)
WBC # BLD AUTO: 8 10E3/UL (ref 4–11)

## 2022-01-07 PROCEDURE — 86300 IMMUNOASSAY TUMOR CA 15-3: CPT

## 2022-01-07 PROCEDURE — 99214 OFFICE O/P EST MOD 30 MIN: CPT | Performed by: SURGERY

## 2022-01-07 PROCEDURE — 85025 COMPLETE CBC W/AUTO DIFF WBC: CPT

## 2022-01-07 PROCEDURE — 36415 COLL VENOUS BLD VENIPUNCTURE: CPT

## 2022-01-07 PROCEDURE — 80053 COMPREHEN METABOLIC PANEL: CPT

## 2022-01-07 ASSESSMENT — MIFFLIN-ST. JEOR: SCORE: 1337.67

## 2022-01-07 NOTE — LETTER
"    1/7/2022         RE: Desi Granado  [address] [place] [address]        Dear Colleague,    Thank you for referring your patient, Desi Granado, to the [facility]. Please see a copy of my visit note below.        Assessment & Plan   Problem List Items Addressed This Visit     None         No abnormalities on the chest wall.  No skin changes.  She is 3-greg years out from her bilateral mastectomy.  She has no plan on any reconstruction.  She is tolerating her AI.  Follow-up in November 2022; then annual after that.  Of her questions were answered to her satisfaction.    25 mins visit, more than 50% of face to face time was spent in counseling and coordinating care as discussed above.      No follow-ups on file.    Nabila Hart MD  [facility]    Stephanie Munguia is a 50 year old who presents for the following health issues:    left IDC with papillary features, neg angio invasion, neg DCIS; 2/3; 6/9; ER/ME (+); HER2 (-); US to left axilla neg for abnl lymph  nodes    Final path: 2.2cm IDC with papillary 0/3 +LNs s/p B/L mastectomy with left SLNB 11/8/2018  AI - didn't tolerate Tamoxofin; currently on exemestane.      She got a new job at [employer] where she's completely remote.  Her new med/onc is Dr. Grijalva.  She's doing well otherwise; no new concerns or questions.        Review of Systems   Constitutional, HEENT, cardiovascular, pulmonary, gi and gu systems are negative, except as otherwise noted.      Objective    /84   Temp 99.1  F (37.3  C) (Temporal)   Ht 1.645 m (5' 4.75\")   Wt 72.6 kg (160 lb)   BMI 26.83 kg/m    Body mass index is 26.83 kg/m .  Physical Exam  Vitals reviewed.   Eyes:      Conjunctiva/sclera: Conjunctivae normal.   Cardiovascular:      Rate and Rhythm: Normal rate.      Pulses: Normal pulses.   Pulmonary:      Effort: Pulmonary effort is normal.   Chest:   Breasts:      Right: Absent. No axillary " adenopathy or supraclavicular adenopathy.      Left: Absent. No axillary adenopathy or supraclavicular adenopathy.       Abdominal:      Palpations: Abdomen is soft.   Musculoskeletal:      Cervical back: Normal range of motion.   Lymphadenopathy:      Upper Body:      Right upper body: No supraclavicular or axillary adenopathy.      Left upper body: No supraclavicular or axillary adenopathy.   Neurological:      Mental Status: She is alert.                    Again, thank you for allowing me to participate in the care of your patient.        Sincerely,        Nabila Hart MD

## 2022-01-07 NOTE — PROGRESS NOTES
"    Assessment & Plan   Problem List Items Addressed This Visit     None         No abnormalities on the chest wall.  No skin changes.  She is 3-greg years out from her bilateral mastectomy.  She has no plan on any reconstruction.  She is tolerating her AI.  Follow-up in November 2022; then annual after that.  Of her questions were answered to her satisfaction.    25 mins visit, more than 50% of face to face time was spent in counseling and coordinating care as discussed above.      No follow-ups on file.    SabinoAmy Hart MD  [facility] RK Munguia is a 50 year old who presents for the following health issues:    left IDC with papillary features, neg angio invasion, neg DCIS; 2/3; 6/9; ER/NM (+); HER2 (-); US to left axilla neg for abnl lymph  nodes    Final path: 2.2cm IDC with papillary 0/3 +LNs s/p B/L mastectomy with left SLNB 11/8/2018  AI - didn't tolerate Tamoxofin; currently on exemestane.      She got a new job at [employer] where she's completely remote.  Her new med/onc is Dr. Grijalva.  She's doing well otherwise; no new concerns or questions.        Review of Systems   Constitutional, HEENT, cardiovascular, pulmonary, gi and gu systems are negative, except as otherwise noted.      Objective    /84   Temp 99.1  F (37.3  C) (Temporal)   Ht 1.645 m (5' 4.75\")   Wt 72.6 kg (160 lb)   BMI 26.83 kg/m    Body mass index is 26.83 kg/m .  Physical Exam  Vitals reviewed.   Eyes:      Conjunctiva/sclera: Conjunctivae normal.   Cardiovascular:      Rate and Rhythm: Normal rate.      Pulses: Normal pulses.   Pulmonary:      Effort: Pulmonary effort is normal.   Chest:   Breasts:      Right: Absent. No axillary adenopathy or supraclavicular adenopathy.      Left: Absent. No axillary adenopathy or supraclavicular adenopathy.       Abdominal:      Palpations: Abdomen is soft.   Musculoskeletal:      Cervical back: Normal range of motion.   Lymphadenopathy:      Upper Body:      Right " upper body: No supraclavicular or axillary adenopathy.      Left upper body: No supraclavicular or axillary adenopathy.   Neurological:      Mental Status: She is alert.

## 2022-01-13 ENCOUNTER — VIRTUAL VISIT (OUTPATIENT)
Dept: ONCOLOGY | Facility: CLINIC | Age: 52
End: 2022-01-13
Attending: INTERNAL MEDICINE
Payer: COMMERCIAL

## 2022-01-13 DIAGNOSIS — Z79.811 AROMATASE INHIBITOR USE: ICD-10-CM

## 2022-01-13 DIAGNOSIS — C50.912 INVASIVE DUCTAL CARCINOMA OF BREAST, FEMALE, LEFT (H): Primary | ICD-10-CM

## 2022-01-13 DIAGNOSIS — Z78.0 MENOPAUSE: ICD-10-CM

## 2022-01-13 DIAGNOSIS — M85.852 OSTEOPENIA OF NECK OF LEFT FEMUR: ICD-10-CM

## 2022-01-13 PROCEDURE — 99214 OFFICE O/P EST MOD 30 MIN: CPT | Mod: 95 | Performed by: INTERNAL MEDICINE

## 2022-01-13 NOTE — LETTER
1/13/2022     RE: Desi Granado  [address] [place] [address]    Dear Colleague,    Thank you for referring your patient, Desi Granado, to the [facility]. Please see a copy of my visit note below.    Nilda is a 51 year old who is being evaluated via a billable video visit.      How would you like to obtain your AVS? MyChart  If the video visit is dropped, the invitation should be resent by: Text to cell phone: [phone]  Will anyone else be joining your video visit? Imani DUFF    Video Start Time: 10:31 AM  Video-Visit Details    Type of service:  Video Visit    Video End Time:11:16 AM    Originating Location (pt. Location): Home    Distant Location (provider location):  [facility]     Platform used for Video Visit: [facility]      Oncology Follow-Up Note  Jan 13, 2022    Ms. Granado is reached over video conference in follow up of her left breast cancer.     Oncologic History:  Invasive ductal carcinoma of breast, female, left (H)     Desi Granado was found to have developing focal asymmetry in the left breast at approximately 3 o'clock position about 7-8 cm from the nipple on the routine mammogram done on October 30, 2018. Subsequently she went on to have diagnostic mammogram and ultrasound. Directed sonogram at the site of mammographic finding showed a 1.6 cm irregular solid lesion. Subsequently the patient went on to have ultrasound-guided needle core biopsy done on October 17, 2018. The biopsy came back positive for invasive ductal carcinoma with papillary features. There is grade 2 out of 3. Angiolymphatic invasion was not seen. Ductal carcinoma in situ was not seen. The tumor was estrogen receptor positive about 100% and progesterone receptor +100% strong nuclear staining. HER-2/tamara came back negative for amplification. She had bilateral mastectomy. Surgical pathology revealed simple mastectomy  sample of the left showing solid papillary carcinoma 22 mm. Grade 2. Margins were clear of involvement. 3 sentinel lymph nodes shows no evidence of metastatic disease. Lymphovascular invasion was not identified. The tumor is pT2pN0. Right breast shows simple mastectomy without any evidence of malignancy. Oncotype came back 0. She established care with Dr. Peña and was then put on Exemestane and Zoladex.    Interval History:  Nilda is doing well on treatment. She saw her breast surgeon last week and her clinical examination was negative for any signs of disease recurrence. She has episodic hot flash, arthralgia in her hands. She denies fatigue or mood swing.    Review Of Systems:  All other ROS negative unless mentioned in interval history.     Past medical, social, surgical, and family histories reviewed.     Allergies:        Allergies as of 07/09/2021 - Reviewed 07/09/2021   Allergen Reaction Noted     Aspirin Hives 08/03/2009     Dust mites Unknown 07/03/2009     Morphine Nausea 06/23/2008      Current Medications:  Current Outpatient Medications   Medication     calcium carbonate 600 mg-vitamin D 400 units (CALTRATE) 600-400 MG-UNIT per tablet     exemestane (AROMASIN) 25 MG tablet     Fluticasone Propionate (FLONASE ALLERGY RELIEF NA)     lisinopril (ZESTRIL) 30 MG tablet     montelukast (SINGULAIR) 10 MG tablet     vitamin D3 (CHOLECALCIFEROL) 2000 units tablet     ZYRTEC 10 MG OR TABS     No current facility-administered medications for this visit.     She gets semi-annual Prolia and [facility]x at [facility].       Physical Exam:  GENERAL: Healthy, alert and no distress  EYES: Eyes grossly normal to inspection. No discharge or erythema, or obvious scleral/conjunctival abnormalities.  PSYCH: Mentation appears normal, affect normal/bright, judgement and insight intact, normal speech and appearance well-groomed.    Laboratory/Imaging Studies:  CBC RESULTS: Recent Labs   Lab Test 01/07/22  1306   WBC 8.0   RBC  4.76   HGB 14.2   HCT 41.6   MCV 87   MCH 29.8   MCHC 34.1   RDW 12.9        Recent Labs   Lab Test 01/07/22  1306 12/09/21  0904    140   POTASSIUM 3.6 4.0   CHLORIDE 104 107   CO2 28 29   ANIONGAP 5 4   * 92   BUN 19 17   CR 0.79 0.87   MARISELA 9.8 9.4     CA 27-29   Date Value Ref Range Status   01/07/2022 9 0 - 39 U/mL Final   07/01/2021 8 0 - 39 U/mL Final     Comment:     Assay Method:  Chemiluminescence using Siemens Centaur XP     DEXA scan 7/22/21  IMPRESSION:  1. Mild-moderate osteopenia in the left femoral neck, worse than on  the previous exam.  2. Mild osteopenia in the right femoral neck, mildly worse.  3. In the lumbar spine there is at least mild-moderate, if not  prominent, osteopenia. This is worse than on the previous exam.     Assessment and plan:  Ms. Granado is a 51-year-old female with left solid papillary carcinoma, Grade 2, ER+/MS+/HER2-. She is status post bilateral mastectomy and left sentinel node biopsy. Her disease is pT2N0(sn). Oncotype score is 0. She is doing well on adjuvant AI. There is no clinical evidence of breast cancer recurrence.     Left breast cancer: she will continue on exemestane as well as Zoladex injections monthly. Will plan for at least 5 years of AI. We discussed continuing Zoladex for ovarian suppression vs. oophorectomy, and reached an agreement to continue Zoladex. Will plan for follow up in 6 months.     Osteopenia: continue semi-annual Prolia.    Essential HTN: on lisinopril    The patient was seen and discussed with staff, Dr. Grijalva.    Lili Vasquez MD  Resident, PGY-4  Department of Radiation Oncology  [facility]    Addendum:  Pt was seen and evaluated by me with Dr. Vasquez.  I reviewed the above to reflect my evaluation.    It was a pleasure to meet Ms. Granado.  She is doing well on her current regimen of zoladex and exemestane.  I discussed that I would continue the current course.  We discussed proceeding with this for a total  of five years and then rediscussing the pros/cons of prolonged endocrine therapy.    She will continue prolia for her bone health.    We discussed many aspects of survivorship, and put this information in her checkout materials regarding exercise, diet and weight management.    She has a primary care provider, and I encouraged yearly visits, at a minimum.    Elvia Grijalva MD

## 2022-01-14 ENCOUNTER — TELEPHONE (OUTPATIENT)
Dept: ONCOLOGY | Facility: CLINIC | Age: 52
End: 2022-01-14
Payer: COMMERCIAL

## 2022-01-14 ENCOUNTER — INFUSION THERAPY VISIT (OUTPATIENT)
Dept: INFUSION THERAPY | Facility: CLINIC | Age: 52
End: 2022-01-14
Attending: INTERNAL MEDICINE
Payer: COMMERCIAL

## 2022-01-14 VITALS
HEART RATE: 65 BPM | RESPIRATION RATE: 16 BRPM | SYSTOLIC BLOOD PRESSURE: 132 MMHG | TEMPERATURE: 98.3 F | DIASTOLIC BLOOD PRESSURE: 80 MMHG

## 2022-01-14 DIAGNOSIS — C50.919 MALIGNANT NEOPLASM OF BREAST IN FEMALE, ESTROGEN RECEPTOR POSITIVE, UNSPECIFIED LATERALITY, UNSPECIFIED SITE OF BREAST (H): Primary | ICD-10-CM

## 2022-01-14 DIAGNOSIS — Z17.0 MALIGNANT NEOPLASM OF BREAST IN FEMALE, ESTROGEN RECEPTOR POSITIVE, UNSPECIFIED LATERALITY, UNSPECIFIED SITE OF BREAST (H): Primary | ICD-10-CM

## 2022-01-14 PROCEDURE — 96402 CHEMO HORMON ANTINEOPL SQ/IM: CPT

## 2022-01-14 PROCEDURE — 250N000011 HC RX IP 250 OP 636: Performed by: INTERNAL MEDICINE

## 2022-01-14 RX ADMIN — GOSERELIN ACETATE 3.6 MG: 3.6 IMPLANT SUBCUTANEOUS at 15:51

## 2022-01-14 NOTE — PROGRESS NOTES
Infusion Nursing Note:  Desi Granado presents today for Zoladex.    Patient seen by provider today: No   present during visit today: Not Applicable.    Note: N/A.      Intravenous Access:  N/A.    Treatment Conditions:  Not Applicable.      Post Infusion Assessment:  Patient tolerated injection without incident.       Discharge Plan:   Discharge instructions reviewed with: Patient.  Patient and/or family verbalized understanding of discharge instructions and all questions answered.  Patient discharged in stable condition accompanied by: self.  Departure Mode: Ambulatory.      Yanelis Goodman RN

## 2022-01-14 NOTE — TELEPHONE ENCOUNTER
----- Message from Jina Roth sent at 1/14/2022  2:10 PM CST -----  Regarding: [facility] provider  Called pt to schedule and pt said they [facility]

## 2022-01-17 NOTE — PATIENT INSTRUCTIONS
"Nilda,    It was nice to \"see\" you today.      Here are some of the resources we had discussed:    - Thrive series    - Taking Charge of your Survivorship ([email or web address])    - Annual Cancer Survivorship Conference    The info regarding the conference and the Thrive series is available at [email or web address]      Continue your current treatment plan with zoladex monthly and oral exemestane daily, along with your prolia twice yearly.    Let me know if you have any questions.    We will see you back in 6 months.    Elvia Grijalva    "

## 2022-01-25 ENCOUNTER — MYC MEDICAL ADVICE (OUTPATIENT)
Dept: FAMILY MEDICINE | Facility: CLINIC | Age: 52
End: 2022-01-25
Payer: COMMERCIAL

## 2022-01-25 DIAGNOSIS — J30.89 ALLERGIC RHINITIS DUE TO OTHER ALLERGIC TRIGGER, UNSPECIFIED SEASONALITY: Primary | ICD-10-CM

## 2022-01-27 RX ORDER — AZELASTINE 1 MG/ML
1 SPRAY, METERED NASAL 2 TIMES DAILY
Qty: 90 ML | Refills: 1 | Status: SHIPPED | OUTPATIENT
Start: 2022-01-27 | End: 2022-10-13

## 2022-02-10 ENCOUNTER — APPOINTMENT (OUTPATIENT)
Dept: GENERAL RADIOLOGY | Facility: CLINIC | Age: 52
End: 2022-02-10
Attending: PHYSICIAN ASSISTANT
Payer: COMMERCIAL

## 2022-02-10 ENCOUNTER — HOSPITAL ENCOUNTER (EMERGENCY)
Facility: CLINIC | Age: 52
Discharge: HOME | End: 2022-02-10
Attending: PHYSICIAN ASSISTANT | Admitting: PHYSICIAN ASSISTANT
Payer: COMMERCIAL

## 2022-02-10 VITALS
SYSTOLIC BLOOD PRESSURE: 158 MMHG | WEIGHT: 155 LBS | HEIGHT: 65 IN | RESPIRATION RATE: 16 BRPM | DIASTOLIC BLOOD PRESSURE: 89 MMHG | BODY MASS INDEX: 25.83 KG/M2 | TEMPERATURE: 99.3 F | OXYGEN SATURATION: 99 % | HEART RATE: 110 BPM

## 2022-02-10 DIAGNOSIS — S82.409A FIBULA FRACTURE: ICD-10-CM

## 2022-02-10 PROCEDURE — 73610 X-RAY EXAM OF ANKLE: CPT | Mod: RT

## 2022-02-10 PROCEDURE — 27786 TREATMENT OF ANKLE FRACTURE: CPT | Mod: RT | Performed by: PHYSICIAN ASSISTANT

## 2022-02-10 PROCEDURE — G0463 HOSPITAL OUTPT CLINIC VISIT: HCPCS | Mod: 25 | Performed by: PHYSICIAN ASSISTANT

## 2022-02-10 PROCEDURE — 73630 X-RAY EXAM OF FOOT: CPT | Mod: RT

## 2022-02-10 PROCEDURE — 99284 EMERGENCY DEPT VISIT MOD MDM: CPT | Mod: 57 | Performed by: PHYSICIAN ASSISTANT

## 2022-02-10 PROCEDURE — 2894A VOIDCORRECT: CPT | Mod: 54 | Performed by: PHYSICIAN ASSISTANT

## 2022-02-10 ASSESSMENT — MIFFLIN-ST. JEOR: SCORE: 1318.96

## 2022-02-10 NOTE — ED PROVIDER NOTES
History     Chief Complaint   Patient presents with     Ankle Pain     HPI  Desi Granado is a 51 year old female who presents to the urgent care with concern of right ankle pain after injury just prior to arrival.  Patient reports that she was walking in her driveway when she slipped on the ice and inverted her right ankle resulting in a fall.  She has since developed increasing pain, swelling.  She denies any significant ecchymosis, lacerations or abrasions.  No distal numbness or paresthesias.  No other areas of injury.  She has not attempted any OTC treatments directly for symptoms however did take ibuprofen within the last 6 hours for a headache prior to her fall.     Allergies:  Allergies   Allergen Reactions     Aspirin Hives     Dust Mites Unknown     Morphine Nausea     Problem List:    Patient Active Problem List    Diagnosis Date Noted     Menopause 07/23/2021     Priority: Medium     Aromatase inhibitor use 07/23/2021     Priority: Medium     Osteopenia of neck of femur 07/23/2021     Priority: Medium     Breast cancer (H) 11/08/2018     Priority: Medium     Invasive ductal carcinoma of breast, female, left (H) 10/26/2018     Priority: Medium     CARDIOVASCULAR SCREENING; LDL GOAL LESS THAN 160 10/31/2010     Priority: Medium     FAMILY HISTORY OF ENDOCRINE DISEASE( other endo or metabol) 08/16/2010     Priority: Medium     Essential hypertension, benign 06/21/2007     Priority: Medium     Family history of other cardiovascular diseases 12/12/2006     Priority: Medium     Problem list name updated by automated process. Provider to review and confirm  Problem list name updated by automated process. Provider to review       Allergic rhinitis      Priority: Medium     Problem list name updated by automated process. Provider to review       Injury, other and unspecified, knee, leg, ankle, and foot      Priority: Medium     left ankle injury        Past Medical History:    Past Medical History:    Diagnosis Date     Allergic rhinitis, cause unspecified      Hypertension 8 years?     Injury, other and unspecified, knee, leg, ankle, and foot 8th grade     Invasive ductal carcinoma of breast, female, left (H) 10/26/2018     Unsatisfactory cervical cytology smear 10/06/2021     Past Surgical History:    Past Surgical History:   Procedure Laterality Date     BIOPSY       BREAST LUMPECTOMY, RT/LT  6/23/10    Breast Lumpectomy RT for likely adenomatous lumps     COLONOSCOPY N/A 8/16/2018    Procedure: COLONOSCOPY;  colonoscopy;  Surgeon: Vijay Almanza MD;  Location: [facility]     ESOPHAGOSCOPY, GASTROSCOPY, DUODENOSCOPY (EGD), COMBINED N/A 10/1/2015    Procedure: COMBINED ESOPHAGOSCOPY, GASTROSCOPY, DUODENOSCOPY (EGD);  Surgeon: Vijay Almanza MD;  Location: [facility]     MASTECTOMY SIMPLE BILATERAL, SENTINEL NODE BILATERAL, COMBINED Bilateral 11/8/2018    Procedure: BILATERAL SIMPLE MASTECTOMIES WITH LEFT SENTINEL LYMPH NODE BIOPSY ;  Surgeon: Nabila Hart MD;  Location: [facility]     SOFT TISSUE SURGERY      Ankle surgery 20+ years ago     SURGICAL HISTORY OF -   1989    arthroscopy of Left Ankle     SURGICAL HISTORY OF -   1990    arthroscopy of Left Ankle     SURGICAL HISTORY OF -   1993    4 wisdom teeth extracted     Family History:    Family History   Problem Relation Age of Onset     Hypertension Mother      Allergies Mother      Thyroid Disease Mother         Taking thyroid medication     Obesity Mother      Cerebrovascular Disease Mother         TIA Feb 2017     Hypertension Father      Thyroid Disease Father         two parathyroids removed     Cancer Maternal Grandmother         Bone CA     Allergies Maternal Grandmother      Circulatory Maternal Grandmother      Cerebrovascular Disease Maternal Grandmother      Other Cancer Maternal Grandmother         multiple myeloma     C.A.D. Maternal Grandfather      Respiratory Maternal Grandfather         asthma     Allergies Maternal Grandfather      Cerebrovascular  "Disease Maternal Grandfather      Alzheimer Disease Paternal Grandfather      Neurologic Disorder Paternal Grandfather         Parkinsons     Arthritis Paternal Grandmother      Respiratory Other         Emphysema     Diabetes Other      Asthma Other         generic emphysema     Genetic Disorder Other         genetic emphysema     Breast Cancer Sister         benign lump indicated future risk     Diabetes Other      Coronary Artery Disease Other      Other Cancer Other         multiple myeloma     Colon Cancer Other      Other Cancer Other         Acute Leukemia     Cerebrovascular Disease Other      Breast Cancer Other         told aunt was diagnosed - unsure of type & treatment     Cancer - colorectal No family hx of      Prostate Cancer No family hx of      Social History:  Marital Status:  Single [1]  Social History     Tobacco Use     Smoking status: Never Smoker     Smokeless tobacco: Never Used   Substance Use Topics     Alcohol use: No     Alcohol/week: 0.0 standard drinks     Comment: very rare     Drug use: No      Medications:    azelastine (ASTELIN) 0.1 % nasal spray  calcium carbonate 600 mg-vitamin D 400 units (CALTRATE) 600-400 MG-UNIT per tablet  exemestane (AROMASIN) 25 MG tablet  Fluticasone Propionate (FLONASE ALLERGY RELIEF NA)  lisinopril (ZESTRIL) 30 MG tablet  montelukast (SINGULAIR) 10 MG tablet  vitamin D3 (CHOLECALCIFEROL) 2000 units tablet  ZYRTEC 10 MG OR TABS      Review of Systems  CONSTITUTIONAL:NEGATIVE for fever, chills, change in weight  INTEGUMENTARY/SKIN: NEGATIVE for ecchymosis, lacerations, abrasions  RESP:NEGATIVE for significant cough or SOB  MUSCULOSKELETAL: POSITIVE  for right foot and ankle pain, swelling and NEGATIVE for other concerning arthralgias or myalgias   NEURO: NEGATIVE for numbness, paresthesias   Physical Exam   BP: (!) 158/89  Pulse: 110  Temp: 99.3  F (37.4  C)  Resp: 16  Height: 165.1 cm (5' 5\")  Weight: 70.3 kg (155 lb)  SpO2: 99 %  Physical " Exam  Constitutional:       General: She is not in acute distress.     Appearance: She is not ill-appearing or toxic-appearing.   HENT:      Head: Normocephalic and atraumatic.   Cardiovascular:      Pulses:           Dorsalis pedis pulses are 2+ on the right side.        Posterior tibial pulses are 2+ on the right side.   Musculoskeletal:      Right ankle: Swelling and ecchymosis present. Tenderness present over the lateral malleolus. Decreased range of motion. Anterior drawer test negative. Normal pulse.      Right Achilles Tendon: Normal.      Right foot: Tenderness (minimal ovlyering first metatarsal) present. No swelling or laceration.   Skin:     General: Skin is warm and dry.      Findings: Ecchymosis present. No abrasion, erythema, laceration or rash.   Neurological:      Mental Status: She is alert.      Sensory: No sensory deficit.       ED Course           Procedures       Critical Care time:  none        Results for orders placed or performed during the hospital encounter of 02/10/22 (from the past 24 hour(s))   Foot  XR, G/E 3 views, right    Narrative    FOOT RIGHT THREE OR MORE VIEWS, ANKLE RIGHT THREE OR MORE VIEWS  DATE/TIME: 2/10/2022 4:32 PM    INDICATION: Pain after fall just prior to arrival.  COMPARISON: None available.      Impression    IMPRESSION: Nondisplaced fracture at the inferior margin of the right  lateral malleolus, below the level of the tibial plafond. Adjacent  lateral malleolus ankle soft tissue swelling. No medial or posterior  malleolus fracture identified. Intact ankle mortise and distal  syndesmosis. Ankle joint effusion. No acute right foot fracture or  dislocation. Mild right first MTP joint osteoarthritis.     JOSH LOZADA MD         SYSTEM ID:  [id number]   Ankle XR, G/E 3 views, right    Narrative    FOOT RIGHT THREE OR MORE VIEWS, ANKLE RIGHT THREE OR MORE VIEWS  DATE/TIME: 2/10/2022 4:32 PM    INDICATION: Pain after fall just prior to arrival.  COMPARISON: None  available.      Impression    IMPRESSION: Nondisplaced fracture at the inferior margin of the right  lateral malleolus, below the level of the tibial plafond. Adjacent  lateral malleolus ankle soft tissue swelling. No medial or posterior  malleolus fracture identified. Intact ankle mortise and distal  syndesmosis. Ankle joint effusion. No acute right foot fracture or  dislocation. Mild right first MTP joint osteoarthritis.     JOSH LOZADA MD         SYSTEM ID:  [id number]     Medications - No data to display    Assessments & Plan (with Medical Decision Making)     I have reviewed the nursing notes.  I have reviewed the findings, diagnosis, plan and need for follow up with the patient.     Discharge Medication List as of 2/10/2022  4:53 PM        Final diagnoses:   Fibula fracture     51-year-old female presents to the urgent care with concern over right ankle pain after injury when she sustained a fall on the ice just prior to arrival.  Physical exam findings significant for swelling, tenderness overlying the lateral malleolus, mild ecchymosis in the dorsal surface of the proximal foot.  She had X-ray which did confirm a non-displaced fracture at the inferior margin of the right lateral malleolus below the level of the tibial plafond and intact ankle mortise and distal syndesmosis.  Ankle joint effusion present.  No foot fracture.  She was placed in CAM walker. Symptomatic treatment with rest, ice, elevation.   Discussed expected course of healing 4-6 weeks.  Follow up with PCP or podiatry if no improvement of pain in 7-10 days.  Worrisome reason to return to ER/UC sooner discussed.      Disclaimer: This note consists of symbols derived from keyboarding, dictation, and/or voice recognition software. As a result, there may be errors in the script that have gone undetected.  Please consider this when interpreting information found in the chart.    2/10/2022   [facility] EMERGENCY DEPT     Fluto,  Rola AREVALO PA-C  02/10/22 182

## 2022-02-11 ENCOUNTER — INFUSION THERAPY VISIT (OUTPATIENT)
Dept: INFUSION THERAPY | Facility: CLINIC | Age: 52
End: 2022-02-11
Attending: INTERNAL MEDICINE
Payer: COMMERCIAL

## 2022-02-11 ENCOUNTER — APPOINTMENT (OUTPATIENT)
Dept: LAB | Facility: CLINIC | Age: 52
End: 2022-02-11
Payer: COMMERCIAL

## 2022-02-11 VITALS — TEMPERATURE: 98.3 F | DIASTOLIC BLOOD PRESSURE: 84 MMHG | HEART RATE: 91 BPM | SYSTOLIC BLOOD PRESSURE: 149 MMHG

## 2022-02-11 DIAGNOSIS — Z78.0 MENOPAUSE: Primary | ICD-10-CM

## 2022-02-11 DIAGNOSIS — Z17.0 MALIGNANT NEOPLASM OF BREAST IN FEMALE, ESTROGEN RECEPTOR POSITIVE, UNSPECIFIED LATERALITY, UNSPECIFIED SITE OF BREAST (H): ICD-10-CM

## 2022-02-11 DIAGNOSIS — C50.912 INVASIVE DUCTAL CARCINOMA OF BREAST, FEMALE, LEFT (H): ICD-10-CM

## 2022-02-11 DIAGNOSIS — C50.919 MALIGNANT NEOPLASM OF BREAST IN FEMALE, ESTROGEN RECEPTOR POSITIVE, UNSPECIFIED LATERALITY, UNSPECIFIED SITE OF BREAST (H): ICD-10-CM

## 2022-02-11 DIAGNOSIS — Z79.811 AROMATASE INHIBITOR USE: ICD-10-CM

## 2022-02-11 DIAGNOSIS — M85.852 OSTEOPENIA OF NECK OF LEFT FEMUR: ICD-10-CM

## 2022-02-11 LAB
CALCIUM SERPL-MCNC: 9.9 MG/DL (ref 8.5–10.1)
CREAT SERPL-MCNC: 1.1 MG/DL (ref 0.52–1.04)
GFR SERPL CREATININE-BSD FRML MDRD: 61 ML/MIN/1.73M2

## 2022-02-11 PROCEDURE — 250N000011 HC RX IP 250 OP 636: Performed by: INTERNAL MEDICINE

## 2022-02-11 PROCEDURE — 96402 CHEMO HORMON ANTINEOPL SQ/IM: CPT

## 2022-02-11 PROCEDURE — 82310 ASSAY OF CALCIUM: CPT | Performed by: NURSE PRACTITIONER

## 2022-02-11 PROCEDURE — 96372 THER/PROPH/DIAG INJ SC/IM: CPT | Mod: XS | Performed by: INTERNAL MEDICINE

## 2022-02-11 PROCEDURE — 36415 COLL VENOUS BLD VENIPUNCTURE: CPT | Performed by: NURSE PRACTITIONER

## 2022-02-11 PROCEDURE — 82565 ASSAY OF CREATININE: CPT | Performed by: NURSE PRACTITIONER

## 2022-02-11 RX ORDER — MEPERIDINE HYDROCHLORIDE 25 MG/ML
25 INJECTION INTRAMUSCULAR; INTRAVENOUS; SUBCUTANEOUS EVERY 30 MIN PRN
Status: CANCELLED | OUTPATIENT
Start: 2022-07-24

## 2022-02-11 RX ORDER — DIPHENHYDRAMINE HYDROCHLORIDE 50 MG/ML
50 INJECTION INTRAMUSCULAR; INTRAVENOUS
Status: CANCELLED
Start: 2022-07-24

## 2022-02-11 RX ORDER — EPINEPHRINE 1 MG/ML
0.3 INJECTION, SOLUTION, CONCENTRATE INTRAVENOUS EVERY 5 MIN PRN
Status: CANCELLED | OUTPATIENT
Start: 2022-07-24

## 2022-02-11 RX ORDER — ALBUTEROL SULFATE 0.83 MG/ML
2.5 SOLUTION RESPIRATORY (INHALATION)
Status: CANCELLED | OUTPATIENT
Start: 2022-07-24

## 2022-02-11 RX ORDER — ALBUTEROL SULFATE 90 UG/1
1-2 AEROSOL, METERED RESPIRATORY (INHALATION)
Status: CANCELLED
Start: 2022-07-24

## 2022-02-11 RX ORDER — NALOXONE HYDROCHLORIDE 0.4 MG/ML
0.2 INJECTION, SOLUTION INTRAMUSCULAR; INTRAVENOUS; SUBCUTANEOUS
Status: CANCELLED | OUTPATIENT
Start: 2022-07-24

## 2022-02-11 RX ORDER — METHYLPREDNISOLONE SODIUM SUCCINATE 125 MG/2ML
125 INJECTION, POWDER, LYOPHILIZED, FOR SOLUTION INTRAMUSCULAR; INTRAVENOUS
Status: CANCELLED
Start: 2022-07-24

## 2022-02-11 RX ADMIN — DENOSUMAB 60 MG: 60 INJECTION SUBCUTANEOUS at 15:55

## 2022-02-11 RX ADMIN — GOSERELIN ACETATE 3.6 MG: 3.6 IMPLANT SUBCUTANEOUS at 15:45

## 2022-02-11 NOTE — PROGRESS NOTES
Infusion Nursing Note:  Desi Granado presents today for Prolia & Zoladex.    Patient seen by provider today: No   present during visit today: Not Applicable.    Note: N/A.    Intravenous Access:  Labs drawn without difficulty.    Treatment Conditions:  Lab Results   Component Value Date     01/07/2022    POTASSIUM 3.6 01/07/2022    CR 1.10 (H) 02/11/2022    MARISELA 9.9 02/11/2022    BILITOTAL 0.4 01/07/2022    ALBUMIN 4.0 01/07/2022    ALT 20 01/07/2022    AST 12 01/07/2022     Results reviewed, labs MET treatment parameters, ok to proceed with treatment.      Post Infusion Assessment:  Patient tolerated injection without incident.        Discharge Plan:   Patient discharged in stable condition accompanied by: self.  Departure Mode: Ambulatory. Pt will return 3/11/2022.       Wing Ibqal RN

## 2022-02-16 ENCOUNTER — OFFICE VISIT (OUTPATIENT)
Dept: PODIATRY | Facility: CLINIC | Age: 52
End: 2022-02-16
Payer: COMMERCIAL

## 2022-02-16 VITALS
BODY MASS INDEX: 25.83 KG/M2 | WEIGHT: 155 LBS | DIASTOLIC BLOOD PRESSURE: 90 MMHG | HEIGHT: 65 IN | HEART RATE: 97 BPM | SYSTOLIC BLOOD PRESSURE: 137 MMHG

## 2022-02-16 DIAGNOSIS — S82.61XA AVULSION FRACTURE OF LATERAL MALLEOLUS, RIGHT, CLOSED, INITIAL ENCOUNTER: Primary | ICD-10-CM

## 2022-02-16 PROCEDURE — 99203 OFFICE O/P NEW LOW 30 MIN: CPT | Mod: 57 | Performed by: PODIATRIST

## 2022-02-16 PROCEDURE — 27786 TREATMENT OF ANKLE FRACTURE: CPT | Mod: 55 | Performed by: PODIATRIST

## 2022-02-16 ASSESSMENT — PAIN SCALES - GENERAL: PAINLEVEL: MILD PAIN (2)

## 2022-02-16 NOTE — NURSING NOTE
"Chief Complaint   Patient presents with     Consult     right ankle injury       Initial BP (!) 137/90   Pulse 97   Ht 1.651 m (5' 5\")   Wt 70.3 kg (155 lb)   BMI 25.79 kg/m   Estimated body mass index is 25.79 kg/m  as calculated from the following:    Height as of this encounter: 1.651 m (5' 5\").    Weight as of this encounter: 70.3 kg (155 lb).  Medications and allergies reviewed.      Gladys SAENZ MA    "

## 2022-02-16 NOTE — PROGRESS NOTES
PATIENT HISTORY:  Desi Granado is a 51 year old female who presents with a chief complaint of a painful right ankle.  The patient relates injuring the right ankle on 02/10/2022 while at home.  The patient states that slipped on ice going to get the mail.  The patient relates pain with weight bearing to the right.   The patient relates being seen and treated with ice, elevation, and nonweightbearing with crutches.  The patient denies any numbness to the toes on the left foot.    REVIEW OF SYSTEMS:  Constitutional, HEENT, cardiovascular, pulmonary, GI, , musculoskeletal, neuro, skin, endocrine and psych systems are negative, except as otherwise noted.     PAST MEDICAL HISTORY:   Past Medical History:   Diagnosis Date     Allergic rhinitis, cause unspecified      Hypertension 8 years?     Injury, other and unspecified, knee, leg, ankle, and foot 8th grade    left ankle injury     Invasive ductal carcinoma of breast, female, left (H) 10/26/2018     Unsatisfactory cervical cytology smear 10/06/2021        PAST SURGICAL HISTORY:   Past Surgical History:   Procedure Laterality Date     BIOPSY       BREAST LUMPECTOMY, RT/LT  6/23/10    Breast Lumpectomy RT for likely adenomatous lumps     COLONOSCOPY N/A 8/16/2018    Procedure: COLONOSCOPY;  colonoscopy;  Surgeon: Vijay Almanza MD;  Location: [facility]     ESOPHAGOSCOPY, GASTROSCOPY, DUODENOSCOPY (EGD), COMBINED N/A 10/1/2015    Procedure: COMBINED ESOPHAGOSCOPY, GASTROSCOPY, DUODENOSCOPY (EGD);  Surgeon: Vijay lAmanza MD;  Location: [facility]     MASTECTOMY SIMPLE BILATERAL, SENTINEL NODE BILATERAL, COMBINED Bilateral 11/8/2018    Procedure: BILATERAL SIMPLE MASTECTOMIES WITH LEFT SENTINEL LYMPH NODE BIOPSY ;  Surgeon: Nabila Hart MD;  Location: [facility]     SOFT TISSUE SURGERY      Ankle surgery 20+ years ago     SURGICAL HISTORY OF -   1989    arthroscopy of Left Ankle     SURGICAL HISTORY OF -   1990    arthroscopy of Left Ankle     SURGICAL HISTORY OF -   1993    4  wisdom teeth extracted        MEDICATIONS:   Current Outpatient Medications:      azelastine (ASTELIN) 0.1 % nasal spray, Spray 1 spray into both nostrils 2 times daily, Disp: 90 mL, Rfl: 1     calcium carbonate 600 mg-vitamin D 400 units (CALTRATE) 600-400 MG-UNIT per tablet, Take 1 tablet by mouth 2 times daily, Disp: , Rfl:      exemestane (AROMASIN) 25 MG tablet, Take 1 tablet (25 mg) by mouth daily, Disp: 90 tablet, Rfl: 3     Fluticasone Propionate (FLONASE ALLERGY RELIEF NA), , Disp: , Rfl:      lisinopril (ZESTRIL) 30 MG tablet, Take 1 tablet (30 mg) by mouth daily, Disp: 90 tablet, Rfl: 3     montelukast (SINGULAIR) 10 MG tablet, Take 1 tablet (10 mg) by mouth At Bedtime, Disp: 90 tablet, Rfl: 3     vitamin D3 (CHOLECALCIFEROL) 2000 units tablet, Take 1 tablet by mouth daily, Disp: , Rfl:      ZYRTEC 10 MG OR TABS, 1 TABLET DAILY, Disp: 90, Rfl: 3     ALLERGIES:    Allergies   Allergen Reactions     Aspirin Hives     Dust Mites Unknown     Morphine Nausea        SOCIAL HISTORY:   Social History     Socioeconomic History     Marital status: Single     Spouse name: Not on file     Number of children: 0     Years of education: 18     Highest education level: Not on file   Occupational History     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 No   Social History Narrative     Not on file     Social Determinants of Health     Financial Resource Strain: Not on file   Food Insecurity: Not on file   Transportation Needs: Not on file   Physical Activity: Not on file   Stress: Not on file   Social Connections: Not on file  "  Intimate Partner Violence: Not on file   Housing Stability: Not on file        FAMILY HISTORY:   Family History   Problem Relation Age of Onset     Hypertension Mother      Allergies Mother      Thyroid Disease Mother         Taking thyroid medication     Obesity Mother      Cerebrovascular Disease Mother         TIA Feb 2017     Hypertension Father      Thyroid Disease Father         two parathyroids removed     Cancer Maternal Grandmother         Bone CA     Allergies Maternal Grandmother      Circulatory Maternal Grandmother      Cerebrovascular Disease Maternal Grandmother      Other Cancer Maternal Grandmother         multiple myeloma     C.A.D. Maternal Grandfather      Respiratory Maternal Grandfather         asthma     Allergies Maternal Grandfather      Cerebrovascular Disease Maternal Grandfather      Alzheimer Disease Paternal Grandfather      Neurologic Disorder Paternal Grandfather         Parkinsons     Arthritis Paternal Grandmother      Respiratory Other         Emphysema     Diabetes Other      Asthma Other         generic emphysema     Genetic Disorder Other         genetic emphysema     Breast Cancer Sister         benign lump indicated future risk     Diabetes Other      Coronary Artery Disease Other      Other Cancer Other         multiple myeloma     Colon Cancer Other      Other Cancer Other         Acute Leukemia     Cerebrovascular Disease Other      Breast Cancer Other         told aunt was diagnosed - unsure of type & treatment     Cancer - colorectal No family hx of      Prostate Cancer No family hx of         EXAM:Vitals: BP (!) 137/90   Pulse 97   Ht 1.651 m (5' 5\")   Wt 70.3 kg (155 lb)   BMI 25.79 kg/m    BMI= Body mass index is 25.79 kg/m .     General appearance: Patient is alert and fully cooperative with history & exam.  No sign of distress is noted during the visit.     Psychiatric: Affect is pleasant & appropriate.  Patient appears motivated to improve health.   "   Respiratory: Breathing is regular & unlabored while sitting.     HEENT: Hearing is intact to spoken word.  Speech is clear.  No gross evidence of visual impairment that would impact ambulation.     Dermatologic: Skin is intact with no laceration for fracture blisters.        Vascular: DP & PT pulses are difficult to palpate due to the edema.  CFT and skin temperature is normal to both lower extremities.     Neurologic: Lower extremity sensation is intact to light touch.  No evidence of weakness or contracture in the lower extremities.        Musculoskeletal: One notes decreased ankle joint ROM due to swelling and pain on the right.  Point of maximum tenderness located over the  lateral aspect of the right ankle.  One notes no pain with palpation over the medial deltoid ligament on the right.  Moderate edema noted.  Positive ecchymosis noted.      Radiograph evaluation including AP, lateral and mortise views of the right ankle reveals a small nondisplaced avulsion fracture off the distal aspect of the lateral malleolus.    ASSESSMENT / PLAN:     ICD-10-CM    1. Avulsion fracture of lateral malleolus, right, closed, initial encounter  S82.61XA        I have explained to Desi  about the conditions.  We discussed the nature of the condition as well as the treatment plan and expected length of recovery.  At this point, I am recommending conservative care immobilization of the right ankle fracture.  Patient will return in 1 month for reevaluation and repeat x-rays.    Desi verbalized agreement with and understanding of the rational for the diagnosis and treatment plan.  All questions were answered to best of my ability and the patient's satisfaction. The patient was advised to contact the clinic with any questions that may arise after the clinic visit.      Disclaimer: This note consists of symbols derived from keyboarding, dictation and/or voice recognition software. As a result, there may be errors in the  script that have gone undetected. Please consider this when interpreting information found in this chart.       ANATOLY Cohn D.P.M., [email or web address].

## 2022-02-16 NOTE — LETTER
2/16/2022         RE: Desi Granado  [address] [place] [address]        Dear Colleague,    Thank you for referring your patient, Desi Granado, to the [facility] [place]. Please see a copy of my visit note below.    PATIENT HISTORY:  Desi Granado is a 51 year old female who presents with a chief complaint of a painful right ankle.  The patient relates injuring the right ankle on 02/10/2022 while at home.  The patient states that slipped on ice going to get the mail.  The patient relates pain with weight bearing to the right.   The patient relates being seen and treated with ice, elevation, and nonweightbearing with crutches.  The patient denies any numbness to the toes on the left foot.    REVIEW OF SYSTEMS:  Constitutional, HEENT, cardiovascular, pulmonary, GI, , musculoskeletal, neuro, skin, endocrine and psych systems are negative, except as otherwise noted.     PAST MEDICAL HISTORY:   Past Medical History:   Diagnosis Date     Allergic rhinitis, cause unspecified      Hypertension 8 years?     Injury, other and unspecified, knee, leg, ankle, and foot 8th grade    left ankle injury     Invasive ductal carcinoma of breast, female, left ([facility]) 10/26/2018     Unsatisfactory cervical cytology smear 10/06/2021        PAST SURGICAL HISTORY:   Past Surgical History:   Procedure Laterality Date     BIOPSY       BREAST LUMPECTOMY, RT/LT  6/23/10    Breast Lumpectomy RT for likely adenomatous lumps     COLONOSCOPY N/A 8/16/2018    Procedure: COLONOSCOPY;  colonoscopy;  Surgeon: Vijay Almanza MD;  Location: [facility]     ESOPHAGOSCOPY, GASTROSCOPY, DUODENOSCOPY (EGD), COMBINED N/A 10/1/2015    Procedure: COMBINED ESOPHAGOSCOPY, GASTROSCOPY, DUODENOSCOPY (EGD);  Surgeon: Vijay Almnaza MD;  Location: [facility]     MASTECTOMY SIMPLE BILATERAL, SENTINEL NODE BILATERAL, COMBINED Bilateral 11/8/2018    Procedure: BILATERAL SIMPLE MASTECTOMIES WITH LEFT SENTINEL LYMPH  NODE BIOPSY ;  Surgeon: Nabila Hart MD;  Location: [facility]     SOFT TISSUE SURGERY      Ankle surgery 20+ years ago     SURGICAL HISTORY OF -   1989    arthroscopy of Left Ankle     SURGICAL HISTORY OF - 1990    arthroscopy of Left Ankle     SURGICAL HISTORY OF - 1993    4 wisdom teeth extracted        MEDICATIONS:   Current Outpatient Medications:      azelastine (ASTELIN) 0.1 % nasal spray, Spray 1 spray into both nostrils 2 times daily, Disp: 90 mL, Rfl: 1     calcium carbonate 600 mg-vitamin D 400 units (CALTRATE) 600-400 MG-UNIT per tablet, Take 1 tablet by mouth 2 times daily, Disp: , Rfl:      exemestane (AROMASIN) 25 MG tablet, Take 1 tablet (25 mg) by mouth daily, Disp: 90 tablet, Rfl: 3     Fluticasone Propionate (FLONASE ALLERGY RELIEF NA), , Disp: , Rfl:      lisinopril (ZESTRIL) 30 MG tablet, Take 1 tablet (30 mg) by mouth daily, Disp: 90 tablet, Rfl: 3     montelukast (SINGULAIR) 10 MG tablet, Take 1 tablet (10 mg) by mouth At Bedtime, Disp: 90 tablet, Rfl: 3     vitamin D3 (CHOLECALCIFEROL) 2000 units tablet, Take 1 tablet by mouth daily, Disp: , Rfl:      ZYRTEC 10 MG OR TABS, 1 TABLET DAILY, Disp: 90, Rfl: 3     ALLERGIES:    Allergies   Allergen Reactions     Aspirin Hives     Dust Mites Unknown     Morphine Nausea        SOCIAL HISTORY:   Social History     Socioeconomic History     Marital status: Single     Spouse name: Not on file     Number of children: 0     Years of education: 18     Highest education level: Not on file   Occupational History     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 No  "  Social History Narrative     Not on file     Social Determinants of Health     Financial Resource Strain: Not on file   Food Insecurity: Not on file   Transportation Needs: Not on file   Physical Activity: Not on file   Stress: Not on file   Social Connections: Not on file   Intimate Partner Violence: Not on file   Housing Stability: Not on file        FAMILY HISTORY:   Family History   Problem Relation Age of Onset     Hypertension Mother      Allergies Mother      Thyroid Disease Mother         Taking thyroid medication     Obesity Mother      Cerebrovascular Disease Mother         TIA Feb 2017     Hypertension Father      Thyroid Disease Father         two parathyroids removed     Cancer Maternal Grandmother         Bone CA     Allergies Maternal Grandmother      Circulatory Maternal Grandmother      Cerebrovascular Disease Maternal Grandmother      Other Cancer Maternal Grandmother         multiple myeloma     C.A.D. Maternal Grandfather      Respiratory Maternal Grandfather         asthma     Allergies Maternal Grandfather      Cerebrovascular Disease Maternal Grandfather      Alzheimer Disease Paternal Grandfather      Neurologic Disorder Paternal Grandfather         Parkinsons     Arthritis Paternal Grandmother      Respiratory Other         Emphysema     Diabetes Other      Asthma Other         generic emphysema     Genetic Disorder Other         genetic emphysema     Breast Cancer Sister         benign lump indicated future risk     Diabetes Other      Coronary Artery Disease Other      Other Cancer Other         multiple myeloma     Colon Cancer Other      Other Cancer Other         Acute Leukemia     Cerebrovascular Disease Other      Breast Cancer Other         told aunt was diagnosed - unsure of type & treatment     Cancer - colorectal No family hx of      Prostate Cancer No family hx of         EXAM:Vitals: BP (!) 137/90   Pulse 97   Ht 1.651 m (5' 5\")   Wt 70.3 kg (155 lb)   BMI 25.79 kg/m  "   BMI= Body mass index is 25.79 kg/m .     General appearance: Patient is alert and fully cooperative with history & exam.  No sign of distress is noted during the visit.     Psychiatric: Affect is pleasant & appropriate.  Patient appears motivated to improve health.     Respiratory: Breathing is regular & unlabored while sitting.     HEENT: Hearing is intact to spoken word.  Speech is clear.  No gross evidence of visual impairment that would impact ambulation.     Dermatologic: Skin is intact with no laceration for fracture blisters.        Vascular: DP & PT pulses are difficult to palpate due to the edema.  CFT and skin temperature is normal to both lower extremities.     Neurologic: Lower extremity sensation is intact to light touch.  No evidence of weakness or contracture in the lower extremities.        Musculoskeletal: One notes decreased ankle joint ROM due to swelling and pain on the right.  Point of maximum tenderness located over the  lateral aspect of the right ankle.  One notes no pain with palpation over the medial deltoid ligament on the right.  Moderate edema noted.  Positive ecchymosis noted.      Radiograph evaluation including AP, lateral and mortise views of the right ankle reveals a small nondisplaced avulsion fracture off the distal aspect of the lateral malleolus.    ASSESSMENT / PLAN:     ICD-10-CM    1. Avulsion fracture of lateral malleolus, right, closed, initial encounter  S82.61XA        I have explained to Desi  about the conditions.  We discussed the nature of the condition as well as the treatment plan and expected length of recovery.  At this point, I am recommending conservative care immobilization of the right ankle fracture.  Patient will return in 1 month for reevaluation and repeat x-rays.    Desi verbalized agreement with and understanding of the rational for the diagnosis and treatment plan.  All questions were answered to best of my ability and the patient's  satisfaction. The patient was advised to contact the clinic with any questions that may arise after the clinic visit.      Disclaimer: This note consists of symbols derived from keyboarding, dictation and/or voice recognition software. As a result, there may be errors in the script that have gone undetected. Please consider this when interpreting information found in this chart.       ANATOLY Cohn D.P.M., F.[email or web address].S.        Again, thank you for allowing me to participate in the care of your patient.        Sincerely,        Heriberto Cohn DPM

## 2022-03-09 ENCOUNTER — OFFICE VISIT (OUTPATIENT)
Dept: PODIATRY | Facility: CLINIC | Age: 52
End: 2022-03-09
Payer: COMMERCIAL

## 2022-03-09 ENCOUNTER — ANCILLARY PROCEDURE (OUTPATIENT)
Dept: GENERAL RADIOLOGY | Facility: CLINIC | Age: 52
End: 2022-03-09
Attending: PODIATRIST
Payer: COMMERCIAL

## 2022-03-09 VITALS
HEIGHT: 65 IN | DIASTOLIC BLOOD PRESSURE: 87 MMHG | SYSTOLIC BLOOD PRESSURE: 128 MMHG | HEART RATE: 102 BPM | WEIGHT: 155 LBS | BODY MASS INDEX: 25.83 KG/M2

## 2022-03-09 DIAGNOSIS — S82.61XD AVULSION FRACTURE OF LATERAL MALLEOLUS, RIGHT, CLOSED, WITH ROUTINE HEALING, SUBSEQUENT ENCOUNTER: Primary | ICD-10-CM

## 2022-03-09 PROCEDURE — 99207 PR FRACTURE CARE IN GLOBAL PERIOD: CPT | Performed by: PODIATRIST

## 2022-03-09 PROCEDURE — 73610 X-RAY EXAM OF ANKLE: CPT | Mod: RT | Performed by: RADIOLOGY

## 2022-03-09 RX ORDER — COVID-19 TEST SPECIMEN COLLECT
MISCELLANEOUS MISCELLANEOUS
COMMUNITY
Start: 2021-08-28 | End: 2022-11-22

## 2022-03-09 ASSESSMENT — PAIN SCALES - GENERAL: PAINLEVEL: NO PAIN (1)

## 2022-03-09 NOTE — LETTER
"    3/9/2022         RE: Desi Granado  [address] [place] [address]        Dear Colleague,    Thank you for referring your patient, Desi Granado, to the [facility] [place]. Please see a copy of my visit note below.    Desi returns to the office for reevaluation of the right ankle.  The patient relates following the instructions given at the last visit with noted overall less pain and more improvement in function of the right ankle.   The patient relates no other problems.    Vitals: /87   Pulse 102   Ht 1.651 m (5' 5\")   Wt 70.3 kg (155 lb)   BMI 25.79 kg/m    BMI= Body mass index is 25.79 kg/m .    Lower Extremity Physical Exam:      Neurovascular status remains unchanged.  Muscular exam is within normal limits to major muscle groups.  Integument is intact.      Noted slight pain on palpation over the distal aspect of the lateral malleolus on the right.  Limited ankle range of motion noted.  Mild to moderate edema noted.    Diagnostics:  Radiograph evaluation including three views of the right ankle reveals interval healing with increased trabeculation of the distal lateral malleolus fracture.  I personally evaluated the images as well as reviewed the images with the patient pointing out the findings.      Assessment:     ICD-10-CM    1. Avulsion fracture of lateral malleolus, right, closed, with routine healing, subsequent encounter  S82.61XD XR Ankle Right G/E 3 Views     Ankle/Foot Bracing Supplies DME       Plan:    I have explained to Desi about the progress of the conditions.  At this time, the patient was educated on the importance of offloading supportive shoes and other devices.  I demonstrated to the patient calf stretches to perform every hour daily until symptoms resolve.  After symptoms resolve, the patient was advised to perform the stretches 3 times daily to prevent future recurrence.  The patient was instructed to perform " warm soaks with Epson salt after which to also apply over-the-counter Voltaren gel to deeply massage the injured tissue.  The patient was instructed to do this on a daily basis until symptoms resolve.  The patient may also take over-the-counter NSAID medication, if tolerated, to help further reduce the inflammation tissue.   The patient was advised to take this type of medication with food to prevent stomach irritation and to stop taking the medication if stomach irritation occurs.  The patient was fitted with a Tri-Lock ankle brace that will aid in offloading the tension forces to the soft tissues and prevent further inflammation.  The patient will return in four weeks for reevaluation if the symptoms do not resolve.      Desi verbalized agreement with and understanding of the rational for the diagnosis and treatment plan.  All questions were answered to best of my ability and the patient's satisfaction. The patient was advised to contact the clinic with any questions that may arise after the clinic visit.      Disclaimer: This note consists of symbols derived from keyboarding, dictation and/or voice recognition software. As a result, there may be errors in the script that have gone undetected. Please consider this when interpreting information found in this chart.       ANATOLY Cohn D.P.M., F.A.C.F.A.S.        Again, thank you for allowing me to participate in the care of your patient.        Sincerely,        Heriberto Cohn DPM

## 2022-03-09 NOTE — NURSING NOTE
"Chief Complaint   Patient presents with     Fracture Followup     right ankle \"sore, but getting better\"       Initial /87   Pulse 102   Ht 1.651 m (5' 5\")   Wt 70.3 kg (155 lb)   BMI 25.79 kg/m   Estimated body mass index is 25.79 kg/m  as calculated from the following:    Height as of this encounter: 1.651 m (5' 5\").    Weight as of this encounter: 70.3 kg (155 lb).  Medications and allergies reviewed.      Gladys SAENZ MA    "

## 2022-03-09 NOTE — PATIENT INSTRUCTIONS
Next Steps:      1. Support:  a. Wear supportive shoes, sandals, boots and/or inserts that have a rigid supportive sole.    i. This will offload the majority of tension forces that travel through your feet every step you take.    1. Skechers Max Cushioning Elite/Premier   2. Skechers Relax Fit D'Lux Walker  3. Reebok Walk Ultra 7 DMX MAX   4. Hoka Bondi walking shoes  5. Superfeet inserts ([email or web address])  b. It is important that you also wear supportive shoe wear in the house to continue providing support to your feet.    c. You may always use a cushioned liner for your shoes if that makes your feet feel better.  2. Stretching  a. Calf stretching is essential to offload the tension forces that travel through your feet every step you take  b. Preferred calf stretch is the Runner's Stretch  i. Place one foot behind the other foot, flat against the ground (it is important to keep the heel on the ground).  The back leg is the one that will be stretched.  1. Start with the knee straight and lean your hips into the wall, counter or whatever you are leaning into - count to ten.  2. Next, bend the knee.  You should feel the stretch lower in the calf muscle - count to ten.  c. Repeat this stretch once an hour to start off with.  When symptoms subside, I recommend performing the stretch 3 times daily to prevent any future problems.                3. Tissue Massage  a. It is important that you physically loosen the inflammation tissue to help your body heal the injured tissue.  b. I recommend soaking your foot in warm water to increase the microcirculation to the soft tissues.  You may add Epson salt to the water if you prefer.  c. You may apply an over-the-counter muscle rub, such as Voltaren gel, and deeply massage the injured tissue.  4. Reduce Inflammation  a. You can ice the injured tissue with an ice pack with a light cloth covering or soaking in ice water 20 minutes to reduce any acute inflammation, typically at the  end of the day.  b. If tolerated, you may take a Non-Steroidal Antiinflammatory medication (NSAID), such as Advil or Aleve, to help reduce the inflammation tissue.  This can help the overall healing of the injured tissue.  i. It is important to take food with any NSAID medication as the most common side effect is stomach irritation.  If you encounter any problems when taking NSAID, it is recommended that you stop taking the medication and notify your provider.    It is important to understand that most problems that develop in the foot and ankle are caused by excessive tension that cause microinjury to the soft tissues and inflammation in the foot and ankle.  By addressing the underlying causes with support and stretching as well as treating the current inflammatory conditions with tissue massage and anti-inflammatory treatments, most foot and ankle musculoskeletal conditions will resolve.  This may take time to heal.  However, if symptoms persist past 4 weeks you should return to the office for reevaluation to determine further treatment options.

## 2022-03-09 NOTE — PROGRESS NOTES
"Desi returns to the office for reevaluation of the right ankle.  The patient relates following the instructions given at the last visit with noted overall less pain and more improvement in function of the right ankle.   The patient relates no other problems.    Vitals: /87   Pulse 102   Ht 1.651 m (5' 5\")   Wt 70.3 kg (155 lb)   BMI 25.79 kg/m    BMI= Body mass index is 25.79 kg/m .    Lower Extremity Physical Exam:      Neurovascular status remains unchanged.  Muscular exam is within normal limits to major muscle groups.  Integument is intact.      Noted slight pain on palpation over the distal aspect of the lateral malleolus on the right.  Limited ankle range of motion noted.  Mild to moderate edema noted.    Diagnostics:  Radiograph evaluation including three views of the right ankle reveals interval healing with increased trabeculation of the distal lateral malleolus fracture.  I personally evaluated the images as well as reviewed the images with the patient pointing out the findings.      Assessment:     ICD-10-CM    1. Avulsion fracture of lateral malleolus, right, closed, with routine healing, subsequent encounter  S82.61XD XR Ankle Right G/E 3 Views     Ankle/Foot Bracing Supplies DME       Plan:    I have explained to Desi about the progress of the conditions.  At this time, the patient was educated on the importance of offloading supportive shoes and other devices.  I demonstrated to the patient calf stretches to perform every hour daily until symptoms resolve.  After symptoms resolve, the patient was advised to perform the stretches 3 times daily to prevent future recurrence.  The patient was instructed to perform warm soaks with Epson salt after which to also apply over-the-counter Voltaren gel to deeply massage the injured tissue.  The patient was instructed to do this on a daily basis until symptoms resolve.  The patient may also take over-the-counter NSAID medication, if tolerated, " to help further reduce the inflammation tissue.   The patient was advised to take this type of medication with food to prevent stomach irritation and to stop taking the medication if stomach irritation occurs.  The patient was fitted with a Tri-Lock ankle brace that will aid in offloading the tension forces to the soft tissues and prevent further inflammation.  The patient will return in four weeks for reevaluation if the symptoms do not resolve.      Desi verbalized agreement with and understanding of the rational for the diagnosis and treatment plan.  All questions were answered to best of my ability and the patient's satisfaction. The patient was advised to contact the clinic with any questions that may arise after the clinic visit.      Disclaimer: This note consists of symbols derived from keyboarding, dictation and/or voice recognition software. As a result, there may be errors in the script that have gone undetected. Please consider this when interpreting information found in this chart.       ANATOLY Cohn D.P.M., F.MOHINI.F.A.S.

## 2022-03-11 ENCOUNTER — INFUSION THERAPY VISIT (OUTPATIENT)
Dept: INFUSION THERAPY | Facility: CLINIC | Age: 52
End: 2022-03-11
Attending: INTERNAL MEDICINE
Payer: COMMERCIAL

## 2022-03-11 VITALS — HEART RATE: 96 BPM | TEMPERATURE: 98.3 F | SYSTOLIC BLOOD PRESSURE: 145 MMHG | DIASTOLIC BLOOD PRESSURE: 81 MMHG

## 2022-03-11 DIAGNOSIS — Z17.0 MALIGNANT NEOPLASM OF BREAST IN FEMALE, ESTROGEN RECEPTOR POSITIVE, UNSPECIFIED LATERALITY, UNSPECIFIED SITE OF BREAST (H): Primary | ICD-10-CM

## 2022-03-11 DIAGNOSIS — C50.919 MALIGNANT NEOPLASM OF BREAST IN FEMALE, ESTROGEN RECEPTOR POSITIVE, UNSPECIFIED LATERALITY, UNSPECIFIED SITE OF BREAST (H): Primary | ICD-10-CM

## 2022-03-11 PROCEDURE — 250N000011 HC RX IP 250 OP 636: Performed by: INTERNAL MEDICINE

## 2022-03-11 PROCEDURE — 96402 CHEMO HORMON ANTINEOPL SQ/IM: CPT

## 2022-03-11 RX ADMIN — GOSERELIN ACETATE 3.6 MG: 3.6 IMPLANT SUBCUTANEOUS at 15:40

## 2022-03-11 NOTE — PROGRESS NOTES
Infusion Nursing Note:  Desi Granado presents today for Zoladex.    Patient seen by provider today: No   present during visit today: Not Applicable.    Note: N/A.    Intravenous Access:  No Intravenous access/labs at this visit.    Treatment Conditions:  Not Applicable.      Post Infusion Assessment:  Patient tolerated injection without incident.  Site patent and intact, free from redness, edema or discomfort.       Discharge Plan:   Patient discharged in stable condition accompanied by: self.  Departure Mode: Ambulatory. Pt will return 4/8/2022.       Wing Iqbal RN

## 2022-03-20 ENCOUNTER — HOSPITAL ENCOUNTER (EMERGENCY)
Facility: CLINIC | Age: 52
Discharge: HOME | End: 2022-03-20
Attending: FAMILY MEDICINE | Admitting: FAMILY MEDICINE
Payer: COMMERCIAL

## 2022-03-20 ENCOUNTER — APPOINTMENT (OUTPATIENT)
Dept: CT IMAGING | Facility: CLINIC | Age: 52
End: 2022-03-20
Attending: FAMILY MEDICINE
Payer: COMMERCIAL

## 2022-03-20 VITALS
RESPIRATION RATE: 18 BRPM | HEIGHT: 65 IN | OXYGEN SATURATION: 97 % | HEART RATE: 102 BPM | BODY MASS INDEX: 24.99 KG/M2 | WEIGHT: 150 LBS | DIASTOLIC BLOOD PRESSURE: 91 MMHG | TEMPERATURE: 98.5 F | SYSTOLIC BLOOD PRESSURE: 133 MMHG

## 2022-03-20 DIAGNOSIS — K57.92 ACUTE DIVERTICULITIS: ICD-10-CM

## 2022-03-20 LAB
ALBUMIN SERPL-MCNC: 4 G/DL (ref 3.4–5)
ALP SERPL-CCNC: 96 U/L (ref 40–150)
ALT SERPL W P-5'-P-CCNC: 20 U/L (ref 0–50)
ANION GAP SERPL CALCULATED.3IONS-SCNC: 5 MMOL/L (ref 3–14)
AST SERPL W P-5'-P-CCNC: 6 U/L (ref 0–45)
BASOPHILS # BLD AUTO: 0.1 10E3/UL (ref 0–0.2)
BASOPHILS NFR BLD AUTO: 0 %
BILIRUB SERPL-MCNC: 1 MG/DL (ref 0.2–1.3)
BUN SERPL-MCNC: 16 MG/DL (ref 7–30)
CALCIUM SERPL-MCNC: 9.8 MG/DL (ref 8.5–10.1)
CHLORIDE BLD-SCNC: 105 MMOL/L (ref 94–109)
CO2 SERPL-SCNC: 27 MMOL/L (ref 20–32)
CREAT SERPL-MCNC: 0.88 MG/DL (ref 0.52–1.04)
CRP SERPL-MCNC: 79.9 MG/L (ref 0–8)
EOSINOPHIL # BLD AUTO: 0.1 10E3/UL (ref 0–0.7)
EOSINOPHIL NFR BLD AUTO: 1 %
ERYTHROCYTE [DISTWIDTH] IN BLOOD BY AUTOMATED COUNT: 12.5 % (ref 10–15)
GFR SERPL CREATININE-BSD FRML MDRD: 79 ML/MIN/1.73M2
GLUCOSE BLD-MCNC: 104 MG/DL (ref 70–99)
HCT VFR BLD AUTO: 42.2 % (ref 35–47)
HGB BLD-MCNC: 14.3 G/DL (ref 11.7–15.7)
HOLD SPECIMEN: NORMAL
IMM GRANULOCYTES # BLD: 0.1 10E3/UL
IMM GRANULOCYTES NFR BLD: 1 %
LACTATE SERPL-SCNC: 1.4 MMOL/L (ref 0.7–2)
LIPASE SERPL-CCNC: 133 U/L (ref 73–393)
LYMPHOCYTES # BLD AUTO: 1.6 10E3/UL (ref 0.8–5.3)
LYMPHOCYTES NFR BLD AUTO: 9 %
MCH RBC QN AUTO: 29.3 PG (ref 26.5–33)
MCHC RBC AUTO-ENTMCNC: 33.9 G/DL (ref 31.5–36.5)
MCV RBC AUTO: 87 FL (ref 78–100)
MONOCYTES # BLD AUTO: 1.1 10E3/UL (ref 0–1.3)
MONOCYTES NFR BLD AUTO: 6 %
NEUTROPHILS # BLD AUTO: 15.1 10E3/UL (ref 1.6–8.3)
NEUTROPHILS NFR BLD AUTO: 83 %
NRBC # BLD AUTO: 0 10E3/UL
NRBC BLD AUTO-RTO: 0 /100
PLATELET # BLD AUTO: 390 10E3/UL (ref 150–450)
POTASSIUM BLD-SCNC: 3.7 MMOL/L (ref 3.4–5.3)
PROT SERPL-MCNC: 8.1 G/DL (ref 6.8–8.8)
RBC # BLD AUTO: 4.88 10E6/UL (ref 3.8–5.2)
SODIUM SERPL-SCNC: 137 MMOL/L (ref 133–144)
WBC # BLD AUTO: 18.1 10E3/UL (ref 4–11)

## 2022-03-20 PROCEDURE — 99284 EMERGENCY DEPT VISIT MOD MDM: CPT | Mod: 25

## 2022-03-20 PROCEDURE — 250N000013 HC RX MED GY IP 250 OP 250 PS 637: Performed by: FAMILY MEDICINE

## 2022-03-20 PROCEDURE — 36415 COLL VENOUS BLD VENIPUNCTURE: CPT | Performed by: FAMILY MEDICINE

## 2022-03-20 PROCEDURE — 83605 ASSAY OF LACTIC ACID: CPT | Performed by: FAMILY MEDICINE

## 2022-03-20 PROCEDURE — 85025 COMPLETE CBC W/AUTO DIFF WBC: CPT | Performed by: FAMILY MEDICINE

## 2022-03-20 PROCEDURE — 80053 COMPREHEN METABOLIC PANEL: CPT | Performed by: FAMILY MEDICINE

## 2022-03-20 PROCEDURE — 99284 EMERGENCY DEPT VISIT MOD MDM: CPT | Performed by: FAMILY MEDICINE

## 2022-03-20 PROCEDURE — 83690 ASSAY OF LIPASE: CPT | Performed by: FAMILY MEDICINE

## 2022-03-20 PROCEDURE — 74176 CT ABD & PELVIS W/O CONTRAST: CPT

## 2022-03-20 PROCEDURE — 86140 C-REACTIVE PROTEIN: CPT | Performed by: FAMILY MEDICINE

## 2022-03-20 RX ADMIN — AMOXICILLIN AND CLAVULANATE POTASSIUM 1 TABLET: 875; 125 TABLET, FILM COATED ORAL at 13:31

## 2022-03-20 NOTE — DISCHARGE INSTRUCTIONS
Tylenol if needed for pain.  Augmentin 875 mg p.o. twice daily x10 days.  In approximately a month I would recommend an office consultation with general surgery as we discussed for the reasons we discussed.  Return to the emergency department if worse or changes in the interim.  If you have limited improvement or no improvement over the next 24-48 hours on antibiotic therapy please return to the emergency department.

## 2022-03-20 NOTE — ED NOTES
"Pt arrives via triage accompanied by mother. Pt states she is having left lower abdominal pain that started off as a \"twinge\" yesterday and progressed during the night to a 10/10 sharp pain. Pt states she has a histroy of diverticulitis and this feels similar to the past. Denies having taken any medications for the pain. Pain improved during the car ride here and is now rated 2/10 when pressing on the area.   "

## 2022-03-20 NOTE — ED PROVIDER NOTES
History     Chief Complaint   Patient presents with     Abdominal Pain     Hx of diverticulitis, this feels similar; started yesterday     HPI  Desi Granado is a 51 year old female, past medical history is significant for osteopenia, breast cancer, hypertension, allergic rhinitis, diverticulosis, presents to the emergency department concerns of abdominal pain.  History is obtained from the patient identifies left lower quadrant abdominal pain beginning gradually yesterday, possibly some suspect Mexican food, escalation of pain to around a 10/10 sharp pain today worse with movement, nausea but no vomiting.  3 looser but not diarrheal bowel movements this morning since symptom onset.  No urinary tract symptoms such as urinary frequency, urgency or dysuria.  No hematuria.  This feels similar to 2 separate episodes of diverticulitis that she has had in the past that have resolved with antibiotic therapy.  She has had some consultation with general surgery with respect to this.      Allergies:  Allergies   Allergen Reactions     Aspirin Hives     Dust Mites Unknown     Morphine Nausea     Other reaction(s): GI Intolerance       Problem List:    Patient Active Problem List    Diagnosis Date Noted     Menopause 07/23/2021     Priority: Medium     Aromatase inhibitor use 07/23/2021     Priority: Medium     Osteopenia of neck of femur 07/23/2021     Priority: Medium     Breast cancer (H) 11/08/2018     Priority: Medium     Invasive ductal carcinoma of breast, female, left (H) 10/26/2018     Priority: Medium     CARDIOVASCULAR SCREENING; LDL GOAL LESS THAN 160 10/31/2010     Priority: Medium     FAMILY HISTORY OF ENDOCRINE DISEASE( other endo or metabol) 08/16/2010     Priority: Medium     Essential hypertension, benign 06/21/2007     Priority: Medium     Family history of other cardiovascular diseases 12/12/2006     Priority: Medium     Problem list name updated by automated process. Provider to review and  confirm  Problem list name updated by automated process. Provider to review       Allergic rhinitis      Priority: Medium     Problem list name updated by automated process. Provider to review       Injury, other and unspecified, knee, leg, ankle, and foot      Priority: Medium     left ankle injury          Past Medical History:    Past Medical History:   Diagnosis Date     Allergic rhinitis, cause unspecified      Hypertension 8 years?     Injury, other and unspecified, knee, leg, ankle, and foot 8th grade     Invasive ductal carcinoma of breast, female, left (H) 10/26/2018     Unsatisfactory cervical cytology smear 10/06/2021       Past Surgical History:    Past Surgical History:   Procedure Laterality Date     BIOPSY       BREAST LUMPECTOMY, RT/LT  6/23/10    Breast Lumpectomy RT for likely adenomatous lumps     COLONOSCOPY N/A 8/16/2018    Procedure: COLONOSCOPY;  colonoscopy;  Surgeon: Vijay Almanza MD;  Location: [facility]     ESOPHAGOSCOPY, GASTROSCOPY, DUODENOSCOPY (EGD), COMBINED N/A 10/1/2015    Procedure: COMBINED ESOPHAGOSCOPY, GASTROSCOPY, DUODENOSCOPY (EGD);  Surgeon: Vijay Almanza MD;  Location: [facility]     MASTECTOMY SIMPLE BILATERAL, SENTINEL NODE BILATERAL, COMBINED Bilateral 11/8/2018    Procedure: BILATERAL SIMPLE MASTECTOMIES WITH LEFT SENTINEL LYMPH NODE BIOPSY ;  Surgeon: Nabila Hart MD;  Location: [facility]     SOFT TISSUE SURGERY      Ankle surgery 20+ years ago     SURGICAL HISTORY OF -   1989    arthroscopy of Left Ankle     SURGICAL HISTORY OF -   1990    arthroscopy of Left Ankle     SURGICAL HISTORY OF -   1993    4 wisdom teeth extracted       Family History:    Family History   Problem Relation Age of Onset     Hypertension Mother      Allergies Mother      Thyroid Disease Mother         Taking thyroid medication     Obesity Mother      Cerebrovascular Disease Mother         TIA Feb 2017     Hypertension Father      Thyroid Disease Father         two parathyroids removed     Cancer  Maternal Grandmother         Bone CA     Allergies Maternal Grandmother      Circulatory Maternal Grandmother      Cerebrovascular Disease Maternal Grandmother      Other Cancer Maternal Grandmother         multiple myeloma     C.A.D. Maternal Grandfather      Respiratory Maternal Grandfather         asthma     Allergies Maternal Grandfather      Cerebrovascular Disease Maternal Grandfather      Alzheimer Disease Paternal Grandfather      Neurologic Disorder Paternal Grandfather         Parkinsons     Arthritis Paternal Grandmother      Respiratory Other         Emphysema     Diabetes Other      Asthma Other         generic emphysema     Genetic Disorder Other         genetic emphysema     Breast Cancer Sister         benign lump indicated future risk     Diabetes Other      Coronary Artery Disease Other      Other Cancer Other         multiple myeloma     Colon Cancer Other      Other Cancer Other         Acute Leukemia     Cerebrovascular Disease Other      Breast Cancer Other         told aunt was diagnosed - unsure of type & treatment     Cancer - colorectal No family hx of      Prostate Cancer No family hx of        Social History:  Marital Status:  Single [1]  Social History     Tobacco Use     Smoking status: Never Smoker     Smokeless tobacco: Never Used   Substance Use Topics     Alcohol use: No     Alcohol/week: 0.0 standard drinks     Comment: very rare     Drug use: No        Medications:    amoxicillin-clavulanate (AUGMENTIN) 875-125 MG tablet  azelastine (ASTELIN) 0.1 % nasal spray  calcium carbonate 600 mg-vitamin D 400 units (CALTRATE) 600-400 MG-UNIT per tablet  COVID-19 Specimen Collection KIT  exemestane (AROMASIN) 25 MG tablet  Fluticasone Propionate (FLONASE ALLERGY RELIEF NA)  lisinopril (ZESTRIL) 30 MG tablet  montelukast (SINGULAIR) 10 MG tablet  vitamin D3 (CHOLECALCIFEROL) 2000 units tablet          Review of Systems   All other systems reviewed and are negative.      Physical Exam   BP:  "(!) 140/82  Pulse: 110  Temp: 98.5  F (36.9  C)  Resp: 18  Height: 165.1 cm (5' 5\")  Weight: 68 kg (150 lb)  SpO2: 97 %      Physical Exam  Vitals and nursing note reviewed.   Constitutional:       General: She is not in acute distress.     Appearance: She is well-developed and normal weight. She is not ill-appearing.   HENT:      Head: Normocephalic and atraumatic.      Mouth/Throat:      Mouth: Mucous membranes are moist.      Pharynx: Oropharynx is clear.   Eyes:      Extraocular Movements: Extraocular movements intact.      Pupils: Pupils are equal, round, and reactive to light.   Cardiovascular:      Rate and Rhythm: Normal rate and regular rhythm.      Heart sounds: Normal heart sounds.   Pulmonary:      Effort: Pulmonary effort is normal.      Breath sounds: Normal breath sounds.   Abdominal:      Comments: Soft bowel sounds present, tender left lower quadrant with voluntary guarding, no rebound or referred pain no CVA tenderness.     Skin:     General: Skin is warm and dry.      Capillary Refill: Capillary refill takes less than 2 seconds.   Neurological:      General: No focal deficit present.      Mental Status: She is alert.         ED Course                 Procedures              Critical Care time:  none               Results for orders placed or performed during the hospital encounter of 03/20/22 (from the past 24 hour(s))   [facility] Draw    Narrative    The following orders were created for panel order [facility] Draw.  Procedure                               Abnormality         Status                     ---------                               -----------         ------                     Extra Blue Top Tube[[id number]]                              In process                 Extra Red Top Tube[[id number]]                               In process                 Extra Green Top (Lithium...[[id number]]                      In process                 Extra Purple Top Tube[[id number]]                           "  In process                 Extra Green Top (Lithium...[[id number]]                      In process                   Please view results for these tests on the individual orders.   CBC with platelets differential    Narrative    The following orders were created for panel order CBC with platelets differential.  Procedure                               Abnormality         Status                     ---------                               -----------         ------                     CBC with platelets and d...[[id number]]  Abnormal            Final result                 Please view results for these tests on the individual orders.   CRP inflammation   Result Value Ref Range    CRP Inflammation 79.9 (H) 0.0 - 8.0 mg/L   Comprehensive metabolic panel   Result Value Ref Range    Sodium 137 133 - 144 mmol/L    Potassium 3.7 3.4 - 5.3 mmol/L    Chloride 105 94 - 109 mmol/L    Carbon Dioxide (CO2) 27 20 - 32 mmol/L    Anion Gap 5 3 - 14 mmol/L    Urea Nitrogen 16 7 - 30 mg/dL    Creatinine 0.88 0.52 - 1.04 mg/dL    Calcium 9.8 8.5 - 10.1 mg/dL    Glucose 104 (H) 70 - 99 mg/dL    Alkaline Phosphatase 96 40 - 150 U/L    AST 6 0 - 45 U/L    ALT 20 0 - 50 U/L    Protein Total 8.1 6.8 - 8.8 g/dL    Albumin 4.0 3.4 - 5.0 g/dL    Bilirubin Total 1.0 0.2 - 1.3 mg/dL    GFR Estimate 79 >60 mL/min/1.73m2   Lipase   Result Value Ref Range    Lipase 133 73 - 393 U/L   Lactic acid whole blood   Result Value Ref Range    Lactic Acid 1.4 0.7 - 2.0 mmol/L   CBC with platelets and differential   Result Value Ref Range    WBC Count 18.1 (H) 4.0 - 11.0 10e3/uL    RBC Count 4.88 3.80 - 5.20 10e6/uL    Hemoglobin 14.3 11.7 - 15.7 g/dL    Hematocrit 42.2 35.0 - 47.0 %    MCV 87 78 - 100 fL    MCH 29.3 26.5 - 33.0 pg    MCHC 33.9 31.5 - 36.5 g/dL    RDW 12.5 10.0 - 15.0 %    Platelet Count 390 150 - 450 10e3/uL    % Neutrophils 83 %    % Lymphocytes 9 %    % Monocytes 6 %    % Eosinophils 1 %    % Basophils 0 %    % Immature Granulocytes 1 %     NRBCs per 100 WBC 0 <1 /100    Absolute Neutrophils 15.1 (H) 1.6 - 8.3 10e3/uL    Absolute Lymphocytes 1.6 0.8 - 5.3 10e3/uL    Absolute Monocytes 1.1 0.0 - 1.3 10e3/uL    Absolute Eosinophils 0.1 0.0 - 0.7 10e3/uL    Absolute Basophils 0.1 0.0 - 0.2 10e3/uL    Absolute Immature Granulocytes 0.1 <=0.4 10e3/uL    Absolute NRBCs 0.0 10e3/uL   [place] Draw    Narrative    The following orders were created for panel order [place] Draw.  Procedure                               Abnormality         Status                     ---------                               -----------         ------                     Extra Urine Collection[[id number]]                           In process                   Please view results for these tests on the individual orders.   CT Abdomen Pelvis without Contrast (stone protocol)    Narrative    EXAM: CT ABDOMEN PELVIS W/O CONTRAST  LOCATION: [facility]  DATE/TIME: 3/20/2022 12:51 PM    INDICATION: Left lower quadrant abdominal pain, suspect diverticulitis.  COMPARISON: CT abdomen and pelvis 06/01/2018.  TECHNIQUE: CT scan of the abdomen and pelvis was performed without IV contrast. Multiplanar reformats were obtained. Dose reduction techniques were used.  CONTRAST: None.    FINDINGS:   LOWER CHEST: Normal.    HEPATOBILIARY: Normal.    PANCREAS: Normal.    SPLEEN: Normal.    ADRENAL GLANDS: Normal.    KIDNEYS/BLADDER: No urolithiasis or hydronephrosis. No acute renal abnormality. Bladder is minimally distended.    BOWEL: Acute diverticulitis with moderate inflammatory change and wall thickening involving the distal descending colon left lower quadrant series 3 image 143 and on adjacent images. No abscess. No free air. No bowel obstruction. Normal appendix.    LYMPH NODES: Normal.    VASCULATURE: Unremarkable.    PELVIC ORGANS: Normal.    MUSCULOSKELETAL: Unremarkable.      Impression    IMPRESSION:   1.  Acute diverticulitis at the left lower quadrant proximal  sigmoid colon. No abscess is seen.         Medications   amoxicillin-clavulanate (AUGMENTIN) 875-125 MG per tablet 1 tablet (has no administration in time range)   1:21 PM  Diagnostic evaluation reviewed in the room with the patient and her mother.  I answered the questions.  The patient has no nausea and does not require anything for pain control at this point.  We discussed oral outpatient therapy and the patient actually asked about different antibiotic regimens.  We ultimately settled on Augmentin 875 mg p.o. twice daily x10 days.  Tylenol for pain.  She assures me that she will follow up with  with whom she has had previous interaction with respect to her breast cancer to discuss the possibility of a prophylactic limited bowel resection.    All questions were answered disposition is to home.    Assessments & Plan (with Medical Decision Making)   Assessments and plan with medical decision making at the time stamp above.      Disclaimer: This note consists of symbols derived from keyboarding, dictation and/or voice recognition software. As a result, there may be errors in the script that have gone undetected. Please consider this when interpreting information found in this chart.      I have reviewed the nursing notes.    I have reviewed the findings, diagnosis, plan and need for follow up with the patient.       New Prescriptions    AMOXICILLIN-CLAVULANATE (AUGMENTIN) 875-125 MG TABLET    Take 1 tablet by mouth 2 times daily for 10 days       Final diagnoses:   Acute diverticulitis       3/20/2022   [facility] EMERGENCY DEPT     Galen Womack MD  03/20/22 [id number]

## 2022-04-05 ENCOUNTER — OFFICE VISIT (OUTPATIENT)
Dept: SURGERY | Facility: CLINIC | Age: 52
End: 2022-04-05
Payer: COMMERCIAL

## 2022-04-05 VITALS
WEIGHT: 158 LBS | DIASTOLIC BLOOD PRESSURE: 80 MMHG | TEMPERATURE: 96.7 F | HEIGHT: 65 IN | RESPIRATION RATE: 18 BRPM | BODY MASS INDEX: 26.33 KG/M2 | OXYGEN SATURATION: 98 % | HEART RATE: 110 BPM | SYSTOLIC BLOOD PRESSURE: 120 MMHG

## 2022-04-05 DIAGNOSIS — I10 ESSENTIAL HYPERTENSION, BENIGN: ICD-10-CM

## 2022-04-05 DIAGNOSIS — Z87.19 HISTORY OF COLONIC DIVERTICULITIS: Primary | ICD-10-CM

## 2022-04-05 DIAGNOSIS — C50.912 INVASIVE DUCTAL CARCINOMA OF BREAST, FEMALE, LEFT (H): ICD-10-CM

## 2022-04-05 DIAGNOSIS — Z79.811 AROMATASE INHIBITOR USE: ICD-10-CM

## 2022-04-05 PROCEDURE — 99214 OFFICE O/P EST MOD 30 MIN: CPT | Performed by: SURGERY

## 2022-04-05 ASSESSMENT — PAIN SCALES - GENERAL: PAINLEVEL: NO PAIN (0)

## 2022-04-05 NOTE — LETTER
"    4/5/2022         RE: Desi Granado  [address] [place] [address]        Dear Colleague,    Thank you for referring your patient, Desi Granado, to the [facility]. Please see a copy of my visit note below.          Assessment & Plan   Problem List Items Addressed This Visit     None      Visit Diagnoses     History of colonic diverticulitis    -  Primary    Relevant Orders    Adult Gastro Ref - Procedure Only         Patient recently had a acute episode of sigmoid diverticulitis.  No longer taking antibiotic and feels much better.  We discussed a colonoscopy in 6 to 8 weeks to rule out any underlying causes besides diverticula.  We will discuss the risks, benefits, alternatives on the day of procedure.  No surgical intervention was discussed during this visit.  Surgical intervention is an option but it will be elective and will likely be after the colonoscopy in 6 to 8 weeks.  No clear indication for elective sigmoid resection in uncomplicated diverticulitis .  It is dependent on the patient, the frequency of the recurrence, and the severity of the disease.   I explained to the patient that this elective surgery does not have to be done immediately.  It is difficult to predict when her next recurrent episode is and how severe it will be, however she does not have to have surgery at this time.  Patient is understanding of this.    Review of the result(s) of each unique test - CT abd/pel  25 minutes spent on the date of the encounter doing chart review, history and exam, documentation and further activities per the note       BMI:   Estimated body mass index is 26.29 kg/m  as calculated from the following:    Height as of this encounter: 1.651 m (5' 5\").    Weight as of this encounter: 71.7 kg (158 lb).       No follow-ups on file.    Nabila Hart MD  [facility]    Stephanie Munguia is a 51 year old who presents for the following health " "issues:    Last flare up for diverticulitis was 3/18-3/20.  Pain was intense at [facility].  Still has some tenderness.  Pt went to ED and was placed on Augmentin and just completed that course March 29.  This would be patient's 3rd time for acute diverticulitis.  No hx of complicated diverticulitis; never been hospitalized for it.  This 3rd around, the pain more most intense and came on very quickly.  Went away very quickly.  No melena; no hematochezia.  Never had abdominal surgery.      Last colonoscopy 2018 - no polyps but did have some sigmoid diverticulosis.  Pt no primary famhx of colon cancer; but paternal great grandpa along with several of her paternal great uncles have colon cancer.        Review of Systems   Constitutional, HEENT, cardiovascular, pulmonary, GI, , musculoskeletal, neuro, skin, endocrine and psych systems are negative, except as otherwise noted.      Objective    /80   Pulse 110   Temp (!) 96.7  F (35.9  C) (Temporal)   Resp 18   Ht 1.651 m (5' 5\")   Wt 71.7 kg (158 lb)   SpO2 98%   BMI 26.29 kg/m    Body mass index is 26.29 kg/m .  Physical Exam  Vitals reviewed.   HENT:      Head: Normocephalic.   Eyes:      Conjunctiva/sclera: Conjunctivae normal.   Cardiovascular:      Rate and Rhythm: Normal rate.      Pulses: Normal pulses.   Pulmonary:      Effort: Pulmonary effort is normal.   Abdominal:      General: Abdomen is flat. There is no distension.      Palpations: Abdomen is soft.      Tenderness: There is abdominal tenderness. There is no guarding or rebound.      Hernia: No hernia is present.   Musculoskeletal:         General: Normal range of motion.      Cervical back: Normal range of motion.   Skin:     General: Skin is warm.      Capillary Refill: Capillary refill takes less than 2 seconds.   Neurological:      General: No focal deficit present.      Mental Status: She is alert.   Psychiatric:         Mood and Affect: Mood normal.                    Again, thank you for " allowing me to participate in the care of your patient.        Sincerely,        Nabila Hart MD

## 2022-04-05 NOTE — PROGRESS NOTES
"      Assessment & Plan   Problem List Items Addressed This Visit     None      Visit Diagnoses     History of colonic diverticulitis    -  Primary    Relevant Orders    Adult Gastro Ref - Procedure Only         Patient recently had a acute episode of sigmoid diverticulitis.  No longer taking antibiotic and feels much better.  We discussed a colonoscopy in 6 to 8 weeks to rule out any underlying causes besides diverticula.  We will discuss the risks, benefits, alternatives on the day of procedure.  No surgical intervention was discussed during this visit.  Surgical intervention is an option but it will be elective and will likely be after the colonoscopy in 6 to 8 weeks.  No clear indication for elective sigmoid resection in uncomplicated diverticulitis .  It is dependent on the patient, the frequency of the recurrence, and the severity of the disease.   I explained to the patient that this elective surgery does not have to be done immediately.  It is difficult to predict when her next recurrent episode is and how severe it will be, however she does not have to have surgery at this time.  Patient is understanding of this.    Review of the result(s) of each unique test - CT abd/pel  25 minutes spent on the date of the encounter doing chart review, history and exam, documentation and further activities per the note       BMI:   Estimated body mass index is 26.29 kg/m  as calculated from the following:    Height as of this encounter: 1.651 m (5' 5\").    Weight as of this encounter: 71.7 kg (158 lb).       No follow-ups on file.    Nabila Hart MD  [facility] RK Munguia is a 51 year old who presents for the following health issues:    Last flare up for diverticulitis was 3/18-3/20.  Pain was intense at [facility].  Still has some tenderness.  Pt went to ED and was placed on Augmentin and just completed that course March 29.  This would be patient's 3rd time for acute diverticulitis.  No hx of " "complicated diverticulitis; never been hospitalized for it.  This 3rd around, the pain more most intense and came on very quickly.  Went away very quickly.  No melena; no hematochezia.  Never had abdominal surgery.      Last colonoscopy 2018 - no polyps but did have some sigmoid diverticulosis.  Pt no primary famhx of colon cancer; but paternal great grandpa along with several of her paternal great uncles have colon cancer.        Review of Systems   Constitutional, HEENT, cardiovascular, pulmonary, GI, , musculoskeletal, neuro, skin, endocrine and psych systems are negative, except as otherwise noted.      Objective    /80   Pulse 110   Temp (!) 96.7  F (35.9  C) (Temporal)   Resp 18   Ht 1.651 m (5' 5\")   Wt 71.7 kg (158 lb)   SpO2 98%   BMI 26.29 kg/m    Body mass index is 26.29 kg/m .  Physical Exam  Vitals reviewed.   HENT:      Head: Normocephalic.   Eyes:      Conjunctiva/sclera: Conjunctivae normal.   Cardiovascular:      Rate and Rhythm: Normal rate.      Pulses: Normal pulses.   Pulmonary:      Effort: Pulmonary effort is normal.   Abdominal:      General: Abdomen is flat. There is no distension.      Palpations: Abdomen is soft.      Tenderness: There is abdominal tenderness. There is no guarding or rebound.      Hernia: No hernia is present.   Musculoskeletal:         General: Normal range of motion.      Cervical back: Normal range of motion.   Skin:     General: Skin is warm.      Capillary Refill: Capillary refill takes less than 2 seconds.   Neurological:      General: No focal deficit present.      Mental Status: She is alert.   Psychiatric:         Mood and Affect: Mood normal.                "

## 2022-04-08 ENCOUNTER — INFUSION THERAPY VISIT (OUTPATIENT)
Dept: INFUSION THERAPY | Facility: CLINIC | Age: 52
End: 2022-04-08
Attending: INTERNAL MEDICINE
Payer: COMMERCIAL

## 2022-04-08 VITALS — SYSTOLIC BLOOD PRESSURE: 125 MMHG | TEMPERATURE: 98.1 F | DIASTOLIC BLOOD PRESSURE: 77 MMHG | HEART RATE: 90 BPM

## 2022-04-08 DIAGNOSIS — Z17.0 MALIGNANT NEOPLASM OF BREAST IN FEMALE, ESTROGEN RECEPTOR POSITIVE, UNSPECIFIED LATERALITY, UNSPECIFIED SITE OF BREAST (H): Primary | ICD-10-CM

## 2022-04-08 DIAGNOSIS — C50.919 MALIGNANT NEOPLASM OF BREAST IN FEMALE, ESTROGEN RECEPTOR POSITIVE, UNSPECIFIED LATERALITY, UNSPECIFIED SITE OF BREAST (H): Primary | ICD-10-CM

## 2022-04-08 PROCEDURE — 250N000011 HC RX IP 250 OP 636: Performed by: INTERNAL MEDICINE

## 2022-04-08 PROCEDURE — 96402 CHEMO HORMON ANTINEOPL SQ/IM: CPT

## 2022-04-08 RX ADMIN — GOSERELIN ACETATE 3.6 MG: 3.6 IMPLANT SUBCUTANEOUS at 15:38

## 2022-04-08 NOTE — PROGRESS NOTES
Infusion Nursing Note:  Desi Granado presents today for Zoladex.    Patient seen by provider today: No   present during visit today: Not Applicable.    Note: N/A.    Intravenous Access:  No Intravenous access/labs at this visit.    Treatment Conditions:  Not Applicable.      Post Infusion Assessment:  Patient tolerated injection without incident.  Site patent and intact, free from redness, edema or discomfort.       Discharge Plan:   Patient discharged in stable condition accompanied by: self.  Departure Mode: Ambulatory.      Wing Iqbal RN

## 2022-04-17 DIAGNOSIS — Z11.59 ENCOUNTER FOR SCREENING FOR OTHER VIRAL DISEASES: Primary | ICD-10-CM

## 2022-05-03 ENCOUNTER — MYC MEDICAL ADVICE (OUTPATIENT)
Dept: PODIATRY | Facility: CLINIC | Age: 52
End: 2022-05-03
Payer: COMMERCIAL

## 2022-05-03 DIAGNOSIS — S82.61XD AVULSION FRACTURE OF LATERAL MALLEOLUS, RIGHT, CLOSED, WITH ROUTINE HEALING, SUBSEQUENT ENCOUNTER: Primary | ICD-10-CM

## 2022-05-06 ENCOUNTER — INFUSION THERAPY VISIT (OUTPATIENT)
Dept: INFUSION THERAPY | Facility: CLINIC | Age: 52
End: 2022-05-06
Attending: INTERNAL MEDICINE
Payer: COMMERCIAL

## 2022-05-06 VITALS — SYSTOLIC BLOOD PRESSURE: 133 MMHG | HEART RATE: 95 BPM | TEMPERATURE: 97.7 F | DIASTOLIC BLOOD PRESSURE: 72 MMHG

## 2022-05-06 DIAGNOSIS — Z17.0 MALIGNANT NEOPLASM OF BREAST IN FEMALE, ESTROGEN RECEPTOR POSITIVE, UNSPECIFIED LATERALITY, UNSPECIFIED SITE OF BREAST (H): Primary | ICD-10-CM

## 2022-05-06 DIAGNOSIS — C50.919 MALIGNANT NEOPLASM OF BREAST IN FEMALE, ESTROGEN RECEPTOR POSITIVE, UNSPECIFIED LATERALITY, UNSPECIFIED SITE OF BREAST (H): Primary | ICD-10-CM

## 2022-05-06 PROCEDURE — 250N000011 HC RX IP 250 OP 636: Performed by: INTERNAL MEDICINE

## 2022-05-06 PROCEDURE — 96402 CHEMO HORMON ANTINEOPL SQ/IM: CPT

## 2022-05-06 RX ADMIN — GOSERELIN ACETATE 3.6 MG: 3.6 IMPLANT SUBCUTANEOUS at 15:47

## 2022-05-06 NOTE — PROGRESS NOTES
Infusion Nursing Note:  Desi Granado presents today for Zoladex    Patient seen by provider today: No   present during visit today: Not Applicable.    Note: N/A.      Intravenous Access:  No Intravenous access/labs at this visit.    Treatment Conditions:  Not Applicable.      Post Infusion Assessment:  Patient tolerated Zoladex injection subcutaneous to the right lower abdomen without incident.       Discharge Plan:   Patient discharged in stable condition accompanied by: self.  Departure Mode: Ambulatory.  Pt to return 6/3/22 at 3:30 pm for next Zoladex injection      Elli Magana RN

## 2022-05-19 RX ORDER — GUAR GUM
PACKET (EA) ORAL
COMMUNITY
End: 2022-10-13

## 2022-05-20 ENCOUNTER — LAB (OUTPATIENT)
Dept: LAB | Facility: CLINIC | Age: 52
End: 2022-05-20
Payer: COMMERCIAL

## 2022-05-20 ENCOUNTER — ANESTHESIA EVENT (OUTPATIENT)
Dept: GASTROENTEROLOGY | Facility: CLINIC | Age: 52
End: 2022-05-20
Payer: COMMERCIAL

## 2022-05-20 DIAGNOSIS — Z11.59 ENCOUNTER FOR SCREENING FOR OTHER VIRAL DISEASES: ICD-10-CM

## 2022-05-20 PROCEDURE — U0003 INFECTIOUS AGENT DETECTION BY NUCLEIC ACID (DNA OR RNA); SEVERE ACUTE RESPIRATORY SYNDROME CORONAVIRUS 2 (SARS-COV-2) (CORONAVIRUS DISEASE [COVID-19]), AMPLIFIED PROBE TECHNIQUE, MAKING USE OF HIGH THROUGHPUT TECHNOLOGIES AS DESCRIBED BY CMS-2020-01-R: HCPCS

## 2022-05-20 PROCEDURE — U0005 INFEC AGEN DETEC AMPLI PROBE: HCPCS

## 2022-05-20 NOTE — ANESTHESIA PREPROCEDURE EVALUATION
Anesthesia Pre-Procedure Evaluation    Patient: Desi Granado   MRN: [id number] : 1970        Procedure : Procedure(s):  COLONOSCOPY          Past Medical History:   Diagnosis Date     Allergic rhinitis, cause unspecified      Hypertension 8 years?     Injury, other and unspecified, knee, leg, ankle, and foot 8th grade    left ankle injury     Invasive ductal carcinoma of breast, female, left (H) 10/26/2018     Unsatisfactory cervical cytology smear 10/06/2021      Past Surgical History:   Procedure Laterality Date     BIOPSY       BREAST LUMPECTOMY, RT/LT  6/23/10    Breast Lumpectomy RT for likely adenomatous lumps     COLONOSCOPY N/A 2018    Procedure: COLONOSCOPY;  colonoscopy;  Surgeon: Vijay Almanza MD;  Location: [facility]     ESOPHAGOSCOPY, GASTROSCOPY, DUODENOSCOPY (EGD), COMBINED N/A 10/1/2015    Procedure: COMBINED ESOPHAGOSCOPY, GASTROSCOPY, DUODENOSCOPY (EGD);  Surgeon: Vijay Almanza MD;  Location: [facility]     MASTECTOMY SIMPLE BILATERAL, SENTINEL NODE BILATERAL, COMBINED Bilateral 2018    Procedure: BILATERAL SIMPLE MASTECTOMIES WITH LEFT SENTINEL LYMPH NODE BIOPSY ;  Surgeon: Nabila Hart MD;  [facility]     SOFT TISSUE SURGERY      Ankle surgery 20+ years ago     SURGICAL HISTORY OF -       arthroscopy of Left Ankle     SURGICAL HISTORY OF -       arthroscopy of Left Ankle     SURGICAL HISTORY OF -       4 wisdom teeth extracted      Allergies   Allergen Reactions     Aspirin Hives     Dust Mites Unknown     Morphine Nausea     Other reaction(s): GI Intolerance      Social History     Tobacco Use     Smoking status: Never Smoker     Smokeless tobacco: Never Used   Substance Use Topics     Alcohol use: No     Alcohol/week: 0.0 standard drinks     Comment: very rare      Wt Readings from Last 1 Encounters:   22 71.7 kg (158 lb)        Anesthesia Evaluation   Pt has had prior anesthetic. Type: General and MAC.    No history of anesthetic complications        ROS/MED HX  ENT/Pulmonary:     (+) allergic rhinitis,     Neurologic:  - neg neurologic ROS     Cardiovascular:     (+) hypertension-----    METS/Exercise Tolerance: >4 METS    Hematologic:  - neg hematologic  ROS     Musculoskeletal:  - neg musculoskeletal ROS     GI/Hepatic:  - neg GI/hepatic ROS     Renal/Genitourinary:  - neg Renal ROS     Endo:  - neg endo ROS     Psychiatric/Substance Use:  - neg psychiatric ROS     Infectious Disease:  - neg infectious disease ROS     Malignancy:   (+) Malignancy, History of Breast.Breast CA status post Surgery.        Other:  - neg other ROS          Physical Exam    Airway        Mallampati: I   TM distance: > 3 FB   Neck ROM: full   Mouth opening: > 3 cm    Respiratory Devices and Support         Dental  no notable dental history         Cardiovascular   cardiovascular exam normal       Rhythm and rate: regular and normal     Pulmonary   pulmonary exam normal        breath sounds clear to auscultation           OUTSIDE LABS:  CBC:   Lab Results   Component Value Date    WBC 18.1 (H) 03/20/2022    WBC 8.0 01/07/2022    HGB 14.3 03/20/2022    HGB 14.2 01/07/2022    HCT 42.2 03/20/2022    HCT 41.6 01/07/2022     03/20/2022     01/07/2022     BMP:   Lab Results   Component Value Date     03/20/2022     01/07/2022    POTASSIUM 3.7 03/20/2022    POTASSIUM 3.6 01/07/2022    CHLORIDE 105 03/20/2022    CHLORIDE 104 01/07/2022    CO2 27 03/20/2022    CO2 28 01/07/2022    BUN 16 03/20/2022    BUN 19 01/07/2022    CR 0.88 03/20/2022    CR 1.10 (H) 02/11/2022     (H) 03/20/2022     (H) 01/07/2022     COAGS: No results found for: PTT, INR, FIBR  POC:   Lab Results   Component Value Date    BGM 86 11/09/2018    HCG Negative 11/08/2018     HEPATIC:   Lab Results   Component Value Date    ALBUMIN 4.0 03/20/2022    PROTTOTAL 8.1 03/20/2022    ALT 20 03/20/2022    AST 6 03/20/2022    ALKPHOS 96 03/20/2022    BILITOTAL 1.0 03/20/2022     OTHER:    Lab Results   Component Value Date    LACT 1.4 03/20/2022    MARISELA 9.8 03/20/2022    LIPASE 133 03/20/2022    TSH 2.89 08/16/2010    CRP 79.9 (H) 03/20/2022       Anesthesia Plan    ASA Status:  2   NPO Status:  NPO Appropriate    Anesthesia Type: General.     - Airway: Native airway   Induction: Intravenous, Propofol.   Maintenance: TIVA.        Consents    Anesthesia Plan(s) and associated risks, benefits, and realistic alternatives discussed. Questions answered and patient/representative(s) expressed understanding.     - Discussed: Risks, Benefits and Alternatives for BOTH SEDATION and the PROCEDURE were discussed     - Discussed with:  Patient      - Extended Intubation/Ventilatory Support Discussed: No.      - Patient is DNR/DNI Status: No    Use of blood products discussed: No .     Postoperative Care            Comments:                Montana Mcfarlane CRNA, APRN CRNA

## 2022-05-21 LAB — SARS-COV-2 RNA RESP QL NAA+PROBE: NEGATIVE

## 2022-05-23 ENCOUNTER — HOSPITAL ENCOUNTER (OUTPATIENT)
Facility: CLINIC | Age: 52
Discharge: HOME | End: 2022-05-23
Attending: SURGERY | Admitting: SURGERY
Payer: COMMERCIAL

## 2022-05-23 ENCOUNTER — ANESTHESIA (OUTPATIENT)
Dept: GASTROENTEROLOGY | Facility: CLINIC | Age: 52
End: 2022-05-23
Payer: COMMERCIAL

## 2022-05-23 VITALS
TEMPERATURE: 97.7 F | OXYGEN SATURATION: 98 % | HEIGHT: 65 IN | DIASTOLIC BLOOD PRESSURE: 84 MMHG | RESPIRATION RATE: 16 BRPM | WEIGHT: 158 LBS | BODY MASS INDEX: 26.33 KG/M2 | SYSTOLIC BLOOD PRESSURE: 126 MMHG | HEART RATE: 99 BPM

## 2022-05-23 LAB — COLONOSCOPY: NORMAL

## 2022-05-23 PROCEDURE — 250N000009 HC RX 250: Performed by: SURGERY

## 2022-05-23 PROCEDURE — 258N000003 HC RX IP 258 OP 636: Performed by: SURGERY

## 2022-05-23 PROCEDURE — 45385 COLONOSCOPY W/LESION REMOVAL: CPT | Performed by: SURGERY

## 2022-05-23 PROCEDURE — 250N000009 HC RX 250: Performed by: NURSE ANESTHETIST, CERTIFIED REGISTERED

## 2022-05-23 PROCEDURE — 370N000017 HC ANESTHESIA TECHNICAL FEE, PER MIN: Performed by: SURGERY

## 2022-05-23 PROCEDURE — 250N000011 HC RX IP 250 OP 636: Performed by: NURSE ANESTHETIST, CERTIFIED REGISTERED

## 2022-05-23 PROCEDURE — 45380 COLONOSCOPY AND BIOPSY: CPT | Mod: XU

## 2022-05-23 PROCEDURE — 88305 TISSUE EXAM BY PATHOLOGIST: CPT | Mod: TC | Performed by: SURGERY

## 2022-05-23 PROCEDURE — 45380 COLONOSCOPY AND BIOPSY: CPT | Mod: 59 | Performed by: SURGERY

## 2022-05-23 RX ORDER — SODIUM CHLORIDE, SODIUM LACTATE, POTASSIUM CHLORIDE, CALCIUM CHLORIDE 600; 310; 30; 20 MG/100ML; MG/100ML; MG/100ML; MG/100ML
INJECTION, SOLUTION INTRAVENOUS CONTINUOUS
Status: DISCONTINUED | OUTPATIENT
Start: 2022-05-23 | End: 2022-05-23 | Stop reason: HOSPADM

## 2022-05-23 RX ORDER — PROPOFOL 10 MG/ML
INJECTION, EMULSION INTRAVENOUS PRN
Status: DISCONTINUED | OUTPATIENT
Start: 2022-05-23 | End: 2022-05-23

## 2022-05-23 RX ORDER — PROPOFOL 10 MG/ML
INJECTION, EMULSION INTRAVENOUS CONTINUOUS PRN
Status: DISCONTINUED | OUTPATIENT
Start: 2022-05-23 | End: 2022-05-23

## 2022-05-23 RX ORDER — LIDOCAINE HYDROCHLORIDE 10 MG/ML
INJECTION, SOLUTION EPIDURAL; INFILTRATION; INTRACAUDAL; PERINEURAL PRN
Status: DISCONTINUED | OUTPATIENT
Start: 2022-05-23 | End: 2022-05-23

## 2022-05-23 RX ORDER — LIDOCAINE 40 MG/G
CREAM TOPICAL
Status: DISCONTINUED | OUTPATIENT
Start: 2022-05-23 | End: 2022-05-23 | Stop reason: HOSPADM

## 2022-05-23 RX ADMIN — LIDOCAINE HYDROCHLORIDE 0.1 ML: 10 INJECTION, SOLUTION EPIDURAL; INFILTRATION; INTRACAUDAL; PERINEURAL at 08:50

## 2022-05-23 RX ADMIN — PROPOFOL 200 MCG/KG/MIN: 10 INJECTION, EMULSION INTRAVENOUS at 09:20

## 2022-05-23 RX ADMIN — LIDOCAINE HYDROCHLORIDE 50 MG: 10 INJECTION, SOLUTION EPIDURAL; INFILTRATION; INTRACAUDAL; PERINEURAL at 09:20

## 2022-05-23 RX ADMIN — PROPOFOL 100 MG: 10 INJECTION, EMULSION INTRAVENOUS at 09:20

## 2022-05-23 RX ADMIN — SODIUM CHLORIDE, POTASSIUM CHLORIDE, SODIUM LACTATE AND CALCIUM CHLORIDE: 600; 310; 30; 20 INJECTION, SOLUTION INTRAVENOUS at 08:49

## 2022-05-23 NOTE — ANESTHESIA CARE TRANSFER NOTE
Patient: Desi Granado    Procedure: Procedure(s):  COLONOSCOPY, FLEXIBLE, WITH LESION REMOVAL USING SNARE and biopsy       Diagnosis: History of diverticulitis [Z87.19]  Diagnosis Additional Information: No value filed.    Anesthesia Type:   General     Note:    Oropharynx: oropharynx clear of all foreign objects and spontaneously breathing  Level of Consciousness: awake  Oxygen Supplementation: room air    Independent Airway: airway patency satisfactory and stable  Dentition: dentition unchanged  Vital Signs Stable: post-procedure vital signs reviewed and stable  Report to RN Given: handoff report given  Patient transferred to: Phase II    Handoff Report: Identifed the Patient, Identified the Reponsible Provider, Reviewed the pertinent medical history, Discussed the surgical course, Reviewed Intra-OP anesthesia mangement and issues during anesthesia, Set expectations for post-procedure period and Allowed opportunity for questions and acknowledgement of understanding      Vitals:  Vitals Value Taken Time   BP     Temp     Pulse     Resp     SpO2         Electronically Signed By: MEE Villalpando CRNA  May 23, 2022  9:40 AM

## 2022-05-23 NOTE — H&P
[facility]    Pre-Endoscopy History and Physical     Desi Granado MRN# [id number]   YOB: 1970 Age: 51 year old     Date of Procedure: 5/23/2022  Primary care provider: Pearl Grover  Type of Endoscopy: Colonoscopy with possible biopsy, possible polypectomy  Reason for Procedure: screening; hx of diverticulitis  Type of Anesthesia Anticipated: MAC    HPI:    Desi is a 51 year old female who will be undergoing the above procedure.  last colonoscopy 2018 - no polyps; no famhx of colon cancer; multiple diverticula.  hx of diverticulitis    A history and physical has been performed. The patient's medications and allergies have been reviewed. The risks and benefits of the procedure and the sedation options and risks were discussed with the patient.  All questions were answered and informed consent was obtained.      She denies a personal or family history of anesthesia complications or bleeding disorders.     Patient Active Problem List   Diagnosis     Allergic rhinitis     Injury, other and unspecified, knee, leg, ankle, and foot     Family history of other cardiovascular diseases     Essential hypertension, benign     FAMILY HISTORY OF ENDOCRINE DISEASE( other endo or metabol)     CARDIOVASCULAR SCREENING; LDL GOAL LESS THAN 160     Invasive ductal carcinoma of breast, female, left (H)     Breast cancer (H)     Menopause     Aromatase inhibitor use     Osteopenia of neck of femur        Past Medical History:   Diagnosis Date     Allergic rhinitis, cause unspecified      Hypertension 8 years?     Injury, other and unspecified, knee, leg, ankle, and foot 8th grade    left ankle injury     Invasive ductal carcinoma of breast, female, left (H) 10/26/2018     Unsatisfactory cervical cytology smear 10/06/2021        Past Surgical History:   Procedure Laterality Date     BIOPSY       BREAST LUMPECTOMY, RT/LT  6/23/10    Breast Lumpectomy RT for likely adenomatous lumps      COLONOSCOPY N/A 8/16/2018    Procedure: COLONOSCOPY;  colonoscopy;  Surgeon: Vijay Almanza MD;  Location: [facility]     ESOPHAGOSCOPY, GASTROSCOPY, DUODENOSCOPY (EGD), COMBINED N/A 10/1/2015    Procedure: COMBINED ESOPHAGOSCOPY, GASTROSCOPY, DUODENOSCOPY (EGD);  Surgeon: Vijay Almanza MD;  Location: [facility]     MASTECTOMY SIMPLE BILATERAL, SENTINEL NODE BILATERAL, COMBINED Bilateral 11/8/2018    Procedure: BILATERAL SIMPLE MASTECTOMIES WITH LEFT SENTINEL LYMPH NODE BIOPSY ;  Surgeon: Nabila Hart MD;  [facility]     SOFT TISSUE SURGERY      Ankle surgery 20+ years ago     SURGICAL HISTORY OF -   1989    arthroscopy of Left Ankle     SURGICAL HISTORY OF -   1990    arthroscopy of Left Ankle     SURGICAL HISTORY OF -   1993    4 wisdom teeth extracted       Social History     Tobacco Use     Smoking status: Never Smoker     Smokeless tobacco: Never Used   Substance Use Topics     Alcohol use: No     Alcohol/week: 0.0 standard drinks     Comment: very rare       Family History   Problem Relation Age of Onset     Hypertension Mother      Allergies Mother      Thyroid Disease Mother         Taking thyroid medication     Obesity Mother      Cerebrovascular Disease Mother         TIA Feb 2017     Hypertension Father      Thyroid Disease Father         two parathyroids removed     Cancer Maternal Grandmother         Bone CA     Allergies Maternal Grandmother      Circulatory Maternal Grandmother      Cerebrovascular Disease Maternal Grandmother      Other Cancer Maternal Grandmother         multiple myeloma     C.A.D. Maternal Grandfather      Respiratory Maternal Grandfather         asthma     Allergies Maternal Grandfather      Cerebrovascular Disease Maternal Grandfather      Alzheimer Disease Paternal Grandfather      Neurologic Disorder Paternal Grandfather         Parkinsons     Arthritis Paternal Grandmother      Respiratory Other         Emphysema     Diabetes Other      Asthma Other         generic emphysema      "Genetic Disorder Other         genetic emphysema     Breast Cancer Sister         benign lump indicated future risk     Diabetes Other      Coronary Artery Disease Other      Other Cancer Other         multiple myeloma     Colon Cancer Other      Other Cancer Other         Acute Leukemia     Cerebrovascular Disease Other      Breast Cancer Other         told aunt was diagnosed - unsure of type & treatment     Cancer - colorectal No family hx of      Prostate Cancer No family hx of        Prior to Admission medications    Medication Sig Start Date End Date Taking? Authorizing Provider   azelastine (ASTELIN) 0.1 % nasal spray Spray 1 spray into both nostrils 2 times daily 1/27/22  Yes Pearl Grover MD   calcium carbonate 600 mg-vitamin D 400 units (CALTRATE) 600-400 MG-UNIT per tablet Take 1 tablet by mouth 2 times daily   Yes Reported, Patient   COVID-19 Specimen Collection KIT TEST AS DIRECTED 8/28/21  Yes Reported, Patient   exemestane (AROMASIN) 25 MG tablet Take 1 tablet (25 mg) by mouth daily 8/17/21  Yes Pearl Grover MD   Fluticasone Propionate (FLONASE ALLERGY RELIEF NA)    Yes Reported, Patient   Guar Gum (FIBER MODULAR, NUTRISOURCE FIBER,) packet 3 times daily (with meals)   Yes Reported, Patient   lisinopril (ZESTRIL) 30 MG tablet Take 1 tablet (30 mg) by mouth daily 8/17/21  Yes Pearl Grover MD   montelukast (SINGULAIR) 10 MG tablet Take 1 tablet (10 mg) by mouth At Bedtime 8/17/21  Yes Pearl Grover MD   vitamin D3 (CHOLECALCIFEROL) 2000 units tablet Take 1 tablet by mouth daily   Yes Reported, Patient       Allergies   Allergen Reactions     Aspirin Hives     Dust Mites Unknown     Morphine Nausea     Other reaction(s): GI Intolerance        REVIEW OF SYSTEMS:   5 point ROS negative except as noted above in HPI, including Gen., Resp., CV, GI &  system review.    PHYSICAL EXAM:   BP (!) 136/94 (BP Location: Right arm)   Temp 97.7  F (36.5  C) (Oral)   Resp 16   Ht 1.651 m (5' 5\")   " "Wt 71.7 kg (158 lb)   LMP 03/14/2019   SpO2 99%   BMI 26.29 kg/m   Estimated body mass index is 26.29 kg/m  as calculated from the following:    Height as of this encounter: 1.651 m (5' 5\").    Weight as of this encounter: 71.7 kg (158 lb).   Constitutional: Awake, alert, no acute distress.  Eyes: No scleral icterus.  Conjunctiva are without injection.  ENMT: Mucous membranes moist, dentition and gums are intact.   Neck: Soft, supple, trachea midline.    Endocrine: n/a   Lymphatic: There is no cervical, submandibularadenopathy.  Respiratory: normal effortgs   Cardiovascular: S1, S2  Abdomen: Non-distended, non-tender,  No masses,  Musculoskeletal: No spinal or CVA tenderness. Full range of motion in the upper and lower extremities.    Skin: No skin rashes or lesions to inspection.  No petechia.    Neurologic: alerted and oriented 3x  Psychiatric: The patient's affect is not blunted and mood is appropriate.  DIAGNOSTICS:    Not indicated    IMPRESSION   ASA Class 2 - Mild systemic disease    PLAN:   Plan for Colonoscopy with possible biopsy, possible polypectomy. We discussed the risks, benefits and alternatives and the patient wished to proceed.  Patient is cleared for the above procedure.    The above has been forwarded to the consulting provider.    [facility], [facility]        "

## 2022-05-23 NOTE — ANESTHESIA POSTPROCEDURE EVALUATION
Patient: Desi Granado    Procedure: Procedure(s):  COLONOSCOPY, FLEXIBLE, WITH LESION REMOVAL USING SNARE and biopsy       Anesthesia Type:  General    Note:  Disposition: Outpatient   Postop Pain Control: Uneventful            Sign Out: Well controlled pain   PONV: No   Neuro/Psych: Uneventful            Sign Out: Acceptable/Baseline neuro status   Airway/Respiratory: Uneventful            Sign Out: Acceptable/Baseline resp. status   CV/Hemodynamics: Uneventful            Sign Out: Acceptable CV status; No obvious hypovolemia; No obvious fluid overload   Other NRE: NONE   DID A NON-ROUTINE EVENT OCCUR? No           Last vitals:  Vitals Value Taken Time   /84 05/23/22 0945   Temp     Pulse 99 05/23/22 0941   Resp 16 05/23/22 0945   SpO2 97 % 05/23/22 0953   Vitals shown include unvalidated device data.    Electronically Signed By: Montana Mcfarlane CRNA, APRN CRNA  May 23, 2022  9:54 AM

## 2022-05-23 NOTE — LETTER
Desi Granado  [address] [place] [address]    May 29, 2022    Dear Desi,  This letter is written to inform you of the results of your recent colonoscopy.  Your examination showed polyp(s) in your cecum and sigmoid colon. All polyps were removed in their entirety and sent for review by a pathologist. As you will see on the pathology report below, the tissue(s) were inflamed normal polyp and normal colon mucosa. Your examination also showed diverticulosis in the sigmoid colon as expected.    Final Diagnosis   A.  Colon, sigmoid: Polypectomy:  - Inflammatory type polyp     B.  Colon, cecum: Polypectomy:  - Non-dysplastic colonic mucosa   - Multiple additional levels examined         Given these findings,  I recommend that you undergo a repeat colonoscopy in ten years for screening. We will enter you into a recall system so you receive a reminder closer to the time that you are due for repeat examination.     Please remember that this recommendation is made with the understanding that you are not experiencing persistent changes in bowel function, bleeding per rectum, and/or significant abdominal pain. If you experience these symptoms, please contact your primary care provider for a further evaluation.     If you have any questions or concerns about the results of your colonoscopy or the appropriate follow-up, please contact my assistant at [phone].    Sincerely,        [facility]o,   [place] [facility]  ___

## 2022-05-25 LAB
PATH REPORT.COMMENTS IMP SPEC: NORMAL
PATH REPORT.COMMENTS IMP SPEC: NORMAL
PATH REPORT.FINAL DX SPEC: NORMAL
PATH REPORT.GROSS SPEC: NORMAL
PATH REPORT.MICROSCOPIC SPEC OTHER STN: NORMAL
PATH REPORT.RELEVANT HX SPEC: NORMAL
PHOTO IMAGE: NORMAL

## 2022-05-25 PROCEDURE — 88305 TISSUE EXAM BY PATHOLOGIST: CPT | Mod: 26 | Performed by: PATHOLOGY

## 2022-06-03 ENCOUNTER — INFUSION THERAPY VISIT (OUTPATIENT)
Dept: INFUSION THERAPY | Facility: CLINIC | Age: 52
End: 2022-06-03
Attending: INTERNAL MEDICINE
Payer: COMMERCIAL

## 2022-06-03 VITALS — TEMPERATURE: 98.1 F | SYSTOLIC BLOOD PRESSURE: 141 MMHG | DIASTOLIC BLOOD PRESSURE: 77 MMHG | HEART RATE: 89 BPM

## 2022-06-03 DIAGNOSIS — C50.919 MALIGNANT NEOPLASM OF BREAST IN FEMALE, ESTROGEN RECEPTOR POSITIVE, UNSPECIFIED LATERALITY, UNSPECIFIED SITE OF BREAST (H): Primary | ICD-10-CM

## 2022-06-03 DIAGNOSIS — Z17.0 MALIGNANT NEOPLASM OF BREAST IN FEMALE, ESTROGEN RECEPTOR POSITIVE, UNSPECIFIED LATERALITY, UNSPECIFIED SITE OF BREAST (H): Primary | ICD-10-CM

## 2022-06-03 PROCEDURE — 250N000011 HC RX IP 250 OP 636: Performed by: INTERNAL MEDICINE

## 2022-06-03 PROCEDURE — 96402 CHEMO HORMON ANTINEOPL SQ/IM: CPT

## 2022-06-03 RX ADMIN — GOSERELIN ACETATE 3.6 MG: 3.6 IMPLANT SUBCUTANEOUS at 15:43

## 2022-06-03 NOTE — PROGRESS NOTES
Infusion Nursing Note:  Desi Granado presents today for Zoladex.    Patient seen by provider today: No   present during visit today: Not Applicable.    Note: N/A.    Intravenous Access:  No Intravenous access/labs at this visit.    Treatment Conditions:  Not Applicable.      Post Infusion Assessment:  Patient tolerated injection without incident.  Site intact, free from redness, edema or discomfort.       Discharge Plan:   Patient discharged in stable condition accompanied by: self.  Departure Mode: Ambulatory.      Wing Iqbal RN

## 2022-06-20 ENCOUNTER — VIRTUAL VISIT (OUTPATIENT)
Dept: FAMILY MEDICINE | Facility: CLINIC | Age: 52
End: 2022-06-20
Payer: COMMERCIAL

## 2022-06-20 ENCOUNTER — MYC MEDICAL ADVICE (OUTPATIENT)
Dept: FAMILY MEDICINE | Facility: CLINIC | Age: 52
End: 2022-06-20
Payer: COMMERCIAL

## 2022-06-20 DIAGNOSIS — F41.0 ANXIETY ATTACK: Primary | ICD-10-CM

## 2022-06-20 DIAGNOSIS — C50.912 INVASIVE DUCTAL CARCINOMA OF BREAST, FEMALE, LEFT (H): ICD-10-CM

## 2022-06-20 DIAGNOSIS — F41.9 ANXIETY: ICD-10-CM

## 2022-06-20 DIAGNOSIS — I10 ESSENTIAL HYPERTENSION, BENIGN: ICD-10-CM

## 2022-06-20 PROCEDURE — 96127 BRIEF EMOTIONAL/BEHAV ASSMT: CPT | Mod: GT | Performed by: FAMILY MEDICINE

## 2022-06-20 PROCEDURE — 99214 OFFICE O/P EST MOD 30 MIN: CPT | Mod: GT | Performed by: FAMILY MEDICINE

## 2022-06-20 RX ORDER — PROPRANOLOL HYDROCHLORIDE 10 MG/1
10-20 TABLET ORAL 3 TIMES DAILY PRN
Qty: 60 TABLET | Refills: 3 | Status: SHIPPED | OUTPATIENT
Start: 2022-06-20 | End: 2023-12-11

## 2022-06-20 RX ORDER — LORAZEPAM 0.5 MG/1
0.5 TABLET ORAL DAILY PRN
Qty: 10 TABLET | Refills: 0 | Status: SHIPPED | OUTPATIENT
Start: 2022-06-20 | End: 2022-10-13

## 2022-06-20 RX ORDER — HYDROXYZINE HYDROCHLORIDE 25 MG/1
25-50 TABLET, FILM COATED ORAL 3 TIMES DAILY PRN
Qty: 60 TABLET | Refills: 1 | Status: SHIPPED | OUTPATIENT
Start: 2022-06-20 | End: 2022-06-20

## 2022-06-20 ASSESSMENT — ANXIETY QUESTIONNAIRES
6. BECOMING EASILY ANNOYED OR IRRITABLE: MORE THAN HALF THE DAYS
1. FEELING NERVOUS, ANXIOUS, OR ON EDGE: MORE THAN HALF THE DAYS
7. FEELING AFRAID AS IF SOMETHING AWFUL MIGHT HAPPEN: MORE THAN HALF THE DAYS
GAD7 TOTAL SCORE: 14
GAD7 TOTAL SCORE: 14
3. WORRYING TOO MUCH ABOUT DIFFERENT THINGS: MORE THAN HALF THE DAYS
2. NOT BEING ABLE TO STOP OR CONTROL WORRYING: MORE THAN HALF THE DAYS
4. TROUBLE RELAXING: MORE THAN HALF THE DAYS
8. IF YOU CHECKED OFF ANY PROBLEMS, HOW DIFFICULT HAVE THESE MADE IT FOR YOU TO DO YOUR WORK, TAKE CARE OF THINGS AT HOME, OR GET ALONG WITH OTHER PEOPLE?: SOMEWHAT DIFFICULT
GAD7 TOTAL SCORE: 14
5. BEING SO RESTLESS THAT IT IS HARD TO SIT STILL: MORE THAN HALF THE DAYS
7. FEELING AFRAID AS IF SOMETHING AWFUL MIGHT HAPPEN: MORE THAN HALF THE DAYS

## 2022-06-20 NOTE — PROGRESS NOTES
Nilda is a 51 year old who is being evaluated via a billable video visit.      How would you like to obtain your AVS? MyChart  If the video visit is dropped, the invitation should be resent by: Text to cell phone: [phone]  Will anyone else be joining your video visit? No        Assessment & Plan     Anxiety attack- situational stress  Dad in 70's need stress echocardiogram and she is the only child and has very close relationship with parents  Plan:dont use hydoxyzine with zoladex    - propranolol (INDERAL) 10 MG tablet; Take 1-2 tablets (10-20 mg) by mouth 3 times daily as needed (anxiety)     reviewed- no concerns  - LORazepam (ATIVAN) 0.5 MG tablet; Take 1 tablet (0.5 mg) by mouth daily as needed for anxiety    - Adult Mental Health  Referral; Future      Anxiety  - EMOTIONAL / BEHAVIORAL ASSESSMENT  CA-7 SCORE 3/18/2020 6/20/2022 6/20/2022   Total Score 15 (severe anxiety) - 14 (moderate anxiety)   Total Score 15 14 14   encouraged to start counseling   Follow up with primary care physicain to consider another selective serotonin reuptake inhibitor- if on going anxiety attacks. (sertraline - bad reaction ,becamse more anxious )    Potential medication side effects were discussed with the patient; let me know if any occur.      Essential hypertension, benign  Previous Blood pressure slightly high in emergency department- due to diverticulitis  She is on lisinopril and advised to follow up with primary care physicain in a few weeks    Invasive ductal carcinoma of breast, female, left (H)  - considered this problem when making today's plans  - no interventions today       -followed by specialist.      Total time spent is 32 mins      Return in about 2 weeks (around 7/4/2022) for follow up with PCP.    Erika Garcia MD  [facility]    Subjective   Nilda is a 51 year old, presenting for the following health issues:  Anxiety      History of Present Illness       Mental Health  Follow-up:  Patient presents to follow-up on Anxiety.    Patient's anxiety since last visit has been:  Bad  The patient is not having other symptoms associated with anxiety.  Any significant life events: other  Patient is feeling anxious or having panic attacks.  Patient has no concerns about alcohol or drug use.    She eats 0-1 servings of fruits and vegetables daily.She consumes 0 sweetened beverage(s) daily.She exercises with enough effort to increase her heart rate 9 or less minutes per day.  She exercises with enough effort to increase her heart rate 3 or less days per week.   She is taking medications regularly.  Today's CA-7 Score: 14        Anxiety attacks  Multiple episodes  throughout the day  Related to dad's health- stress echo-this Wednesday .  Wants some medications to help her through this time  Worries about worse case scenarios  Mother also always worried.  Very close to parents, single child.    Talking about dad needing stress echo- choked her up multiple times during the consult today     2020- sertraline- made her more anxious.  Was able to calm down on own  hydroxine expied in 04/2020  Takes 3 tabs as needed  - a nikolai;e less anxious  Took twice daily thoday      Review of Systems   Constitutional, HEENT, cardiovascular, pulmonary, GI, , musculoskeletal, neuro, skin, endocrine and psych systems are negative, except as otherwise noted.      Objective           Vitals:  No vitals were obtained today due to virtual visit.    Physical Exam   GENERAL: Healthy, alert and no distress  EYES: Eyes grossly normal to inspection.  No discharge or erythema, or obvious scleral/conjunctival abnormalities.  RESP: No audible wheeze, cough, or visible cyanosis.  No visible retractions or increased work of breathing.    SKIN: Visible skin clear. No significant rash, abnormal pigmentation or lesions.  NEURO: Cranial nerves grossly intact.  Mentation and speech appropriate for age.  PSYCH: Mentation appears  normal, affect normal/bright, judgement and insight intact, normal speech and appearance well-groomed.    She is on zoladex injection            Video-Visit Details    Video Start Time: 3:11 PM    Type of service:  Video Visit    Video End Time:3:41 PM    Originating Location (pt. Location): Home    Distant Location (provider location):  [facility]     Platform used for Video Visit: Clyde Cline.

## 2022-06-20 NOTE — PATIENT INSTRUCTIONS
1. Anxiety attack    - EMOTIONAL / BEHAVIORAL ASSESSMENT  - propranolol (INDERAL) 10 MG tablet; Take 1-2 tablets (10-20 mg) by mouth 3 times daily as needed (anxiety)  Dispense: 60 tablet; Refill: 3  - Adult Mental Health  Referral; Future  - LORazepam (ATIVAN) 0.5 MG tablet; Take 1 tablet (0.5 mg) by mouth daily as needed for anxiety  Dispense: 10 tablet; Refill: 0    2. Anxiety    Start counseling  Consider another selective serotonin reuptake inhibitor- if recurrent anxiety attacks      3. Essential hypertension, benign  Recheck at primary care physicain clinic.

## 2022-06-24 ENCOUNTER — MYC MEDICAL ADVICE (OUTPATIENT)
Dept: ONCOLOGY | Facility: CLINIC | Age: 52
End: 2022-06-24

## 2022-06-24 DIAGNOSIS — Z79.811 AROMATASE INHIBITOR USE: ICD-10-CM

## 2022-06-24 DIAGNOSIS — C50.912 INVASIVE DUCTAL CARCINOMA OF BREAST, FEMALE, LEFT (H): Primary | ICD-10-CM

## 2022-06-24 DIAGNOSIS — M85.852 OSTEOPENIA OF NECK OF LEFT FEMUR: ICD-10-CM

## 2022-07-01 ENCOUNTER — INFUSION THERAPY VISIT (OUTPATIENT)
Dept: INFUSION THERAPY | Facility: CLINIC | Age: 52
End: 2022-07-01
Attending: INTERNAL MEDICINE
Payer: COMMERCIAL

## 2022-07-01 VITALS — DIASTOLIC BLOOD PRESSURE: 70 MMHG | SYSTOLIC BLOOD PRESSURE: 121 MMHG | HEART RATE: 78 BPM

## 2022-07-01 DIAGNOSIS — Z17.0 MALIGNANT NEOPLASM OF BREAST IN FEMALE, ESTROGEN RECEPTOR POSITIVE, UNSPECIFIED LATERALITY, UNSPECIFIED SITE OF BREAST (H): Primary | ICD-10-CM

## 2022-07-01 DIAGNOSIS — C50.919 MALIGNANT NEOPLASM OF BREAST IN FEMALE, ESTROGEN RECEPTOR POSITIVE, UNSPECIFIED LATERALITY, UNSPECIFIED SITE OF BREAST (H): Primary | ICD-10-CM

## 2022-07-01 PROCEDURE — 96402 CHEMO HORMON ANTINEOPL SQ/IM: CPT

## 2022-07-01 PROCEDURE — 250N000011 HC RX IP 250 OP 636: Performed by: INTERNAL MEDICINE

## 2022-07-01 RX ADMIN — GOSERELIN ACETATE 3.6 MG: 3.6 IMPLANT SUBCUTANEOUS at 15:48

## 2022-07-01 NOTE — PROGRESS NOTES
Infusion Nursing Note:  Desi Granado presents today for Zoladex injection.    Patient seen by provider today: No   present during visit today: Not Applicable.    Note: N/A.    Intravenous Access:  No Intravenous access/labs at this visit.    Treatment Conditions:  Not Applicable.      Post Infusion Assessment:  Patient tolerated injection without incident.       Discharge Plan:   AVS to patient via MYCHART.  Patient will return 7/29 for next appointment.   Patient discharged in stable condition accompanied by: self.  Departure Mode: Ambulatory.    Maribell Pulliam RN

## 2022-07-03 DIAGNOSIS — Z78.0 MENOPAUSE: Primary | ICD-10-CM

## 2022-07-03 DIAGNOSIS — M85.852 OSTEOPENIA OF NECK OF LEFT FEMUR: ICD-10-CM

## 2022-07-03 DIAGNOSIS — C50.912 INVASIVE DUCTAL CARCINOMA OF BREAST, FEMALE, LEFT (H): ICD-10-CM

## 2022-07-03 DIAGNOSIS — Z79.811 AROMATASE INHIBITOR USE: ICD-10-CM

## 2022-07-09 ASSESSMENT — ANXIETY QUESTIONNAIRES
GAD7 TOTAL SCORE: 11
5. BEING SO RESTLESS THAT IT IS HARD TO SIT STILL: NOT AT ALL
GAD7 TOTAL SCORE: 11
1. FEELING NERVOUS, ANXIOUS, OR ON EDGE: NEARLY EVERY DAY
7. FEELING AFRAID AS IF SOMETHING AWFUL MIGHT HAPPEN: MORE THAN HALF THE DAYS
7. FEELING AFRAID AS IF SOMETHING AWFUL MIGHT HAPPEN: MORE THAN HALF THE DAYS
GAD7 TOTAL SCORE: 11
3. WORRYING TOO MUCH ABOUT DIFFERENT THINGS: MORE THAN HALF THE DAYS
2. NOT BEING ABLE TO STOP OR CONTROL WORRYING: MORE THAN HALF THE DAYS
4. TROUBLE RELAXING: MORE THAN HALF THE DAYS
6. BECOMING EASILY ANNOYED OR IRRITABLE: NOT AT ALL
8. IF YOU CHECKED OFF ANY PROBLEMS, HOW DIFFICULT HAVE THESE MADE IT FOR YOU TO DO YOUR WORK, TAKE CARE OF THINGS AT HOME, OR GET ALONG WITH OTHER PEOPLE?: SOMEWHAT DIFFICULT

## 2022-07-11 ENCOUNTER — VIRTUAL VISIT (OUTPATIENT)
Dept: FAMILY MEDICINE | Facility: CLINIC | Age: 52
End: 2022-07-11
Payer: COMMERCIAL

## 2022-07-11 DIAGNOSIS — F41.1 GENERALIZED ANXIETY DISORDER: Primary | ICD-10-CM

## 2022-07-11 PROCEDURE — 99213 OFFICE O/P EST LOW 20 MIN: CPT | Mod: TEL | Performed by: FAMILY MEDICINE

## 2022-07-11 RX ORDER — FLUOXETINE 10 MG/1
10 CAPSULE ORAL DAILY
Qty: 30 CAPSULE | Refills: 3 | Status: SHIPPED | OUTPATIENT
Start: 2022-07-11 | End: 2022-10-13 | Stop reason: SINTOL

## 2022-07-11 ASSESSMENT — ANXIETY QUESTIONNAIRES: GAD7 TOTAL SCORE: 11

## 2022-07-11 NOTE — PATIENT INSTRUCTIONS
Try the prozac if you are more anxious on this we can try a different med. Take the propranolol as needed and use the xanax sparingly

## 2022-07-11 NOTE — PROGRESS NOTES
"Nilda is a 51 year old who is being evaluated via a billable video visit.      How would you like to obtain your AVS? MyChart  If the video visit is dropped, the invitation should be resent by: Send to e-mail at: [email or web address]  Will anyone else be joining your video visit? No          Assessment & Plan     Generalized anxiety disorder  She will try this   - FLUoxetine (PROZAC) 10 MG capsule; Take 1 capsule (10 mg) by mouth daily         BMI:   Estimated body mass index is 26.29 kg/m  as calculated from the following:    Height as of 5/23/22: 1.651 m (5' 5\").    Weight as of 5/23/22: 71.7 kg (158 lb).       Patient Instructions   Try the prozac if you are more anxious on this we can try a different med. Take the propranolol as needed and use the xanax sparingly      No follow-ups on file.    Pearl Grover MD  [facility]    Stephanie Munguia is a 51 year old, presenting for the following health issues:  No chief complaint on file.      History of Present Illness       Mental Health Follow-up:  Patient presents to follow-up on Anxiety.    Patient's anxiety since last visit has been:  No change  The patient is not having other symptoms associated with anxiety.  Any significant life events: other  Patient is feeling anxious or having panic attacks.  Patient has no concerns about alcohol or drug use.She consumes 0 sweetened beverage(s) daily.She exercises with enough effort to increase her heart rate 9 or less minutes per day.  She exercises with enough effort to increase her heart rate 3 or less days per week.   She is taking medications regularly.  Today's CA-7 Score: 11       Depression and Anxiety Follow-Up      Social History     Tobacco Use     Smoking status: Never Smoker     Smokeless tobacco: Never Used   Substance Use Topics     Alcohol use: No     Alcohol/week: 0.0 standard drinks     Comment: very rare     Drug use: No     PHQ 3/21/2019   PHQ-9 Total Score 1   Q9: Thoughts of better " off dead/self-harm past 2 weeks Not at all     CA-7 SCORE 6/20/2022 6/20/2022 7/9/2022   Total Score - 14 (moderate anxiety) 11 (moderate anxiety)   Total Score 14 14 11     Last PHQ-9 3/21/2019   1.  Little interest or pleasure in doing things 0   2.  Feeling down, depressed, or hopeless 0   3.  Trouble falling or staying asleep, or sleeping too much 0   4.  Feeling tired or having little energy 1   5.  Poor appetite or overeating 0   6.  Feeling bad about yourself 0   7.  Trouble concentrating 0   8.  Moving slowly or restless 0   Q9: Thoughts of better off dead/self-harm past 2 weeks 0   PHQ-9 Total Score 1   Difficulty at work, home, or with people Not difficult at all     CA-7  7/9/2022   1. Feeling nervous, anxious, or on edge 3   2. Not being able to stop or control worrying 2   3. Worrying too much about different things 2   4. Trouble relaxing 2   5. Being so restless that it is hard to sit still 0   6. Becoming easily annoyed or irritable 0   7. Feeling afraid, as if something awful might happen 2   CA-7 Total Score 11   If you checked any problems, how difficult have they made it for you to do your work, take care of things at home, or get along with other people? -     As above see appointment note   She had been taking the propranolol she had been taking the medications and she is doing ok   She had increased anxiety with the zoloft   Has not tried anything else daily th epropranolol             Review of Systems   Constitutional, HEENT, cardiovascular, pulmonary, gi and gu systems are negative, except as otherwise noted.      Objective           Vitals:  No vitals were obtained today due to virtual visit.    Physical Exam   GENERAL: Healthy, alert and no distress  EYES: Eyes grossly normal to inspection.  No discharge or erythema, or obvious scleral/conjunctival abnormalities.  RESP: No audible wheeze, cough, or visible cyanosis.  No visible retractions or increased work of breathing.    SKIN:  Visible skin clear. No significant rash, abnormal pigmentation or lesions.  NEURO: Cranial nerves grossly intact.  Mentation and speech appropriate for age.  PSYCH: Mentation appears normal, affect normal/bright, judgement and insight intact, normal speech and appearance well-groomed.                Video-Visit Details    Video Start Time:     Type of service:  Video Visit    Video End Time:9:21 AM      Originating Location (pt. Location): Home    Distant Location (provider location):  [facility]     Platform used for Video Visit: Unable to complete video visit    .  ..

## 2022-07-12 ENCOUNTER — MYC MEDICAL ADVICE (OUTPATIENT)
Dept: FAMILY MEDICINE | Facility: CLINIC | Age: 52
End: 2022-07-12

## 2022-07-12 DIAGNOSIS — J30.89 ALLERGIC RHINITIS DUE TO OTHER ALLERGIC TRIGGER, UNSPECIFIED SEASONALITY: ICD-10-CM

## 2022-07-12 DIAGNOSIS — I10 ESSENTIAL HYPERTENSION, BENIGN: ICD-10-CM

## 2022-07-13 RX ORDER — MONTELUKAST SODIUM 10 MG/1
10 TABLET ORAL AT BEDTIME
Qty: 90 TABLET | Refills: 0 | Status: SHIPPED | OUTPATIENT
Start: 2022-07-13 | End: 2022-10-13

## 2022-07-13 RX ORDER — LISINOPRIL 30 MG/1
30 TABLET ORAL DAILY
Qty: 90 TABLET | Refills: 3 | Status: SHIPPED | OUTPATIENT
Start: 2022-07-13 | End: 2022-11-08

## 2022-07-13 NOTE — TELEPHONE ENCOUNTER
Routing Montekulast and lisinopril refill request to provider for review/approval because:  Drug interaction warning  Routing exemetstane refill request to Provider because it is not on the RN refill list and  needs to go to a different Pharmacy ([facility]).  Thank you.  Azam Erazo, RN

## 2022-07-25 ENCOUNTER — ONCOLOGY VISIT (OUTPATIENT)
Dept: ONCOLOGY | Facility: CLINIC | Age: 52
End: 2022-07-25
Attending: INTERNAL MEDICINE
Payer: COMMERCIAL

## 2022-07-25 VITALS
SYSTOLIC BLOOD PRESSURE: 129 MMHG | HEART RATE: 85 BPM | DIASTOLIC BLOOD PRESSURE: 80 MMHG | TEMPERATURE: 97.9 F | BODY MASS INDEX: 25.96 KG/M2 | WEIGHT: 156 LBS | OXYGEN SATURATION: 99 %

## 2022-07-25 DIAGNOSIS — I10 ESSENTIAL HYPERTENSION, BENIGN: ICD-10-CM

## 2022-07-25 DIAGNOSIS — C50.912 INVASIVE DUCTAL CARCINOMA OF BREAST, FEMALE, LEFT (H): ICD-10-CM

## 2022-07-25 DIAGNOSIS — Z78.0 MENOPAUSE: ICD-10-CM

## 2022-07-25 DIAGNOSIS — M85.852 OSTEOPENIA OF NECK OF LEFT FEMUR: Primary | ICD-10-CM

## 2022-07-25 PROCEDURE — 99214 OFFICE O/P EST MOD 30 MIN: CPT | Performed by: INTERNAL MEDICINE

## 2022-07-25 PROCEDURE — G0463 HOSPITAL OUTPT CLINIC VISIT: HCPCS

## 2022-07-25 ASSESSMENT — PAIN SCALES - GENERAL: PAINLEVEL: NO PAIN (0)

## 2022-07-25 NOTE — PROGRESS NOTES
Oncology Follow-Up Note  July 25, 2022    Ms. Granado is seen in follow up of her left breast cancer.     Oncologic History:  Invasive ductal carcinoma of breast, female, left (H)     Desi Granado was found to have developing focal asymmetry in the left breast at approximately 3 o'clock position about 7-8 cm from the nipple on the routine mammogram done on October 30, 2018. Subsequently she went on to have diagnostic mammogram and ultrasound. Directed sonogram at the site of mammographic finding showed a 1.6 cm irregular solid lesion. Subsequently the patient went on to have ultrasound-guided needle core biopsy done on October 17, 2018. The biopsy came back positive for invasive ductal carcinoma with papillary features. There is grade 2 out of 3. Angiolymphatic invasion was not seen. Ductal carcinoma in situ was not seen. The tumor was estrogen receptor positive about 100% and progesterone receptor +100% strong nuclear staining. HER-2/tamara came back negative for amplification. She had bilateral mastectomy. Surgical pathology revealed simple mastectomy sample of the left showing solid papillary carcinoma 22 mm. Grade 2. Margins were clear of involvement. 3 sentinel lymph nodes shows no evidence of metastatic disease. Lymphovascular invasion was not identified. The tumor is pT2pN0. Right breast shows simple mastectomy without any evidence of malignancy. Oncotype came back 0. She established care with Dr. Peña and was then put on Exemestane and Zoladex.    Interval History:  Nilda is doing well on treatment.  She is happy to be seen in person.      Overall, she feels like she is doing well.  She recently started a job at [employer], and has been really busy adjusting into this new role.      She has no f/c.  No chest pain or shortness of breath.  No n/v/d/c.  No nodules or masses across her chest.    She has mild hot flashes and fatigue.      She is wondering about an oophorectomy so she does not need to  keep doing zoladex shots.    Review Of Systems:  All other ROS negative unless mentioned in interval history.     Past medical, social, surgical, and family histories reviewed.     Allergies:        Allergies as of 07/09/2021 - Reviewed 07/09/2021   Allergen Reaction Noted     Aspirin Hives 08/03/2009     Dust mites Unknown 07/03/2009     Morphine Nausea 06/23/2008      Current Medications:  Current Outpatient Medications   Medication     lisinopril (ZESTRIL) 30 MG tablet     montelukast (SINGULAIR) 10 MG tablet     azelastine (ASTELIN) 0.1 % nasal spray     calcium carbonate 600 mg-vitamin D 400 units (CALTRATE) 600-400 MG-UNIT per tablet     COVID-19 Specimen Collection KIT     exemestane (AROMASIN) 25 MG tablet     FLUoxetine (PROZAC) 10 MG capsule     Fluticasone Propionate (FLONASE ALLERGY RELIEF NA)     Guar Gum (FIBER MODULAR, NUTRISOURCE FIBER,) packet     LORazepam (ATIVAN) 0.5 MG tablet     propranolol (INDERAL) 10 MG tablet     vitamin D3 (CHOLECALCIFEROL) 2000 units tablet     No current facility-administered medications for this visit.     She gets semi-annual Prolia and monthly Zolodex at [facility].       Physical Exam:  /80   Pulse 85   Temp 97.9  F (36.6  C) (Oral)   Wt 70.8 kg (156 lb)   LMP 03/14/2019   SpO2 99%   BMI 25.96 kg/m      GENERAL: Healthy, alert and no distress  EYES: Eyes grossly normal to inspection. No discharge or erythema, or obvious scleral/conjunctival abnormalities.  CV: rrr  Lungs: clear  Abd; soft, nt, nd + bs  Ext: no edema  Breast: s/p bilateral mastectomy, no nodules or masses, no axillary adenopathy  PSYCH: Mentation appears normal, affect normal/bright, judgement and insight intact, normal speech and appearance well-groomed.    Laboratory/Imaging Studies:        DEXA scan 7/22/21  IMPRESSION:  1. Mild-moderate osteopenia in the left femoral neck, worse than on  the previous exam.  2. Mild osteopenia in the right femoral neck, mildly worse.  3. In the lumbar spine  there is at least mild-moderate, if not  prominent, osteopenia. This is worse than on the previous exam.     Assessment and plan:  Ms. Granado is a 51-year-old female with left solid papillary carcinoma, Grade 2, ER+/NE+/HER2-. She is status post bilateral mastectomy and left sentinel node biopsy. Her disease is pT2N0(sn). Oncotype score is 0. She is doing well on adjuvant AI. There is no clinical evidence of breast cancer recurrence.     Left breast cancer: she will continue on exemestane as well as Zoladex injections monthly. Will plan for at least 5 years of AI. We discussed continuing Zoladex for ovarian suppression vs. Oophorectomy.  After a lot of discussion, she is going to plan on BSO.  She is scheduled to receive zoladex  Through the end of the year; if she has the BSO, these can be discontinued.    Osteopenia: continue semi-annual Prolia.  She is tolerating without a lot of side effects.    Essential HTN: on lisinopril    We discussed continuing every six months appts.    Elvia Grijalva MD

## 2022-07-25 NOTE — LETTER
7/25/2022         RE: Desi Granado  [address] [place] [address]        Dear Colleague,    Thank you for referring your patient, Desi Grnaado, to the [facility]. Please see a copy of my visit note below.        Oncology Follow-Up Note  July 25, 2022    Ms. Granado is seen in follow up of her left breast cancer.     Oncologic History:  Invasive ductal carcinoma of breast, female, left (H)     Desi Granado was found to have developing focal asymmetry in the left breast at approximately 3 o'clock position about 7-8 cm from the nipple on the routine mammogram done on October 30, 2018. Subsequently she went on to have diagnostic mammogram and ultrasound. Directed sonogram at the site of mammographic finding showed a 1.6 cm irregular solid lesion. Subsequently the patient went on to have ultrasound-guided needle core biopsy done on October 17, 2018. The biopsy came back positive for invasive ductal carcinoma with papillary features. There is grade 2 out of 3. Angiolymphatic invasion was not seen. Ductal carcinoma in situ was not seen. The tumor was estrogen receptor positive about 100% and progesterone receptor +100% strong nuclear staining. HER-2/tamara came back negative for amplification. She had bilateral mastectomy. Surgical pathology revealed simple mastectomy sample of the left showing solid papillary carcinoma 22 mm. Grade 2. Margins were clear of involvement. 3 sentinel lymph nodes shows no evidence of metastatic disease. Lymphovascular invasion was not identified. The tumor is pT2pN0. Right breast shows simple mastectomy without any evidence of malignancy. Oncotype came back 0. She established care with Dr. Peña and was then put on Exemestane and Zoladex.    Interval History:  Nilda is doing well on treatment.  She is happy to be seen in person.      Overall, she feels like she is doing well.  She recently started a job at [employer], and  has been really busy adjusting into this new role.      She has no f/c.  No chest pain or shortness of breath.  No n/v/d/c.  No nodules or masses across her chest.    She has mild hot flashes and fatigue.      She is wondering about an oophorectomy so she does not need to keep doing zoladex shots.    Review Of Systems:  All other ROS negative unless mentioned in interval history.     Past medical, social, surgical, and family histories reviewed.     Allergies:        Allergies as of 07/09/2021 - Reviewed 07/09/2021   Allergen Reaction Noted     Aspirin Hives 08/03/2009     Dust mites Unknown 07/03/2009     Morphine Nausea 06/23/2008      Current Medications:  Current Outpatient Medications   Medication     lisinopril (ZESTRIL) 30 MG tablet     montelukast (SINGULAIR) 10 MG tablet     azelastine (ASTELIN) 0.1 % nasal spray     calcium carbonate 600 mg-vitamin D 400 units (CALTRATE) 600-400 MG-UNIT per tablet     COVID-19 Specimen Collection KIT     exemestane (AROMASIN) 25 MG tablet     FLUoxetine (PROZAC) 10 MG capsule     Fluticasone Propionate (FLONASE ALLERGY RELIEF NA)     Guar Gum (FIBER MODULAR, NUTRISOURCE FIBER,) packet     LORazepam (ATIVAN) 0.5 MG tablet     propranolol (INDERAL) 10 MG tablet     vitamin D3 (CHOLECALCIFEROL) 2000 units tablet     No current facility-administered medications for this visit.     She gets semi-annual Prolia and monthly Zolodex at [facility].       Physical Exam:  /80   Pulse 85   Temp 97.9  F (36.6  C) (Oral)   Wt 70.8 kg (156 lb)   LMP 03/14/2019   SpO2 99%   BMI 25.96 kg/m      GENERAL: Healthy, alert and no distress  EYES: Eyes grossly normal to inspection. No discharge or erythema, or obvious scleral/conjunctival abnormalities.  CV: rrr  Lungs: clear  Abd; soft, nt, nd + bs  Ext: no edema  Breast: s/p bilateral mastectomy, no nodules or masses, no axillary adenopathy  PSYCH: Mentation appears normal, affect normal/bright, judgement and insight intact, normal speech  and appearance well-groomed.    Laboratory/Imaging Studies:        DEXA scan 7/22/21  IMPRESSION:  1. Mild-moderate osteopenia in the left femoral neck, worse than on  the previous exam.  2. Mild osteopenia in the right femoral neck, mildly worse.  3. In the lumbar spine there is at least mild-moderate, if not  prominent, osteopenia. This is worse than on the previous exam.     Assessment and plan:  Ms. Granado is a 51-year-old female with left solid papillary carcinoma, Grade 2, ER+/SD+/HER2-. She is status post bilateral mastectomy and left sentinel node biopsy. Her disease is pT2N0(sn). Oncotype score is 0. She is doing well on adjuvant AI. There is no clinical evidence of breast cancer recurrence.     Left breast cancer: she will continue on exemestane as well as Zoladex injections monthly. Will plan for at least 5 years of AI. We discussed continuing Zoladex for ovarian suppression vs. Oophorectomy.  After a lot of discussion, she is going to plan on BSO.  She is scheduled to receive zoladex  Through the end of the year; if she has the BSO, these can be discontinued.    Osteopenia: continue semi-annual Prolia.  She is tolerating without a lot of side effects.    Essential HTN: on lisinopril    We discussed continuing every six months appts.    Elvia Grijalva MD

## 2022-07-25 NOTE — NURSING NOTE
"Oncology Rooming Note    July 25, 2022 11:56 AM   Desi Granado is a 51 year old female who presents for:    Chief Complaint   Patient presents with     Oncology Clinic Visit     Rtn for breast cancer     Initial Vitals: /80   Pulse 85   Temp 97.9  F (36.6  C) (Oral)   Wt 70.8 kg (156 lb)   LMP 03/14/2019   SpO2 99%   BMI 25.96 kg/m   Estimated body mass index is 25.96 kg/m  as calculated from the following:    Height as of 5/23/22: 1.651 m (5' 5\").    Weight as of this encounter: 70.8 kg (156 lb). Body surface area is 1.8 meters squared.  No Pain (0) Comment: Data Unavailable   Patient's last menstrual period was 03/14/2019.  Allergies reviewed: Yes  Medications reviewed: Yes    Medications: MEDICATION REFILLS NEEDED TODAY. Provider was notified.     Exemestane  [facility]    Pharmacy name entered into [employer]:    [facility]  [facility] [place] [place] - [address] [place] [address] [place] [id number] - [place], [place] - [address]  [facility] - [place], [place] - [address] [facility] - [place], [place] - [address]    Clinical concerns: none       Yvette Power, EMT            "

## 2022-07-29 ENCOUNTER — INFUSION THERAPY VISIT (OUTPATIENT)
Dept: INFUSION THERAPY | Facility: CLINIC | Age: 52
End: 2022-07-29
Attending: FAMILY MEDICINE
Payer: COMMERCIAL

## 2022-07-29 ENCOUNTER — APPOINTMENT (OUTPATIENT)
Dept: LAB | Facility: CLINIC | Age: 52
End: 2022-07-29
Payer: COMMERCIAL

## 2022-07-29 VITALS — DIASTOLIC BLOOD PRESSURE: 83 MMHG | HEART RATE: 84 BPM | SYSTOLIC BLOOD PRESSURE: 151 MMHG

## 2022-07-29 DIAGNOSIS — Z17.0 MALIGNANT NEOPLASM OF BREAST IN FEMALE, ESTROGEN RECEPTOR POSITIVE, UNSPECIFIED LATERALITY, UNSPECIFIED SITE OF BREAST (H): Primary | ICD-10-CM

## 2022-07-29 DIAGNOSIS — Z78.0 MENOPAUSE: ICD-10-CM

## 2022-07-29 DIAGNOSIS — M85.852 OSTEOPENIA OF NECK OF LEFT FEMUR: ICD-10-CM

## 2022-07-29 DIAGNOSIS — Z79.811 AROMATASE INHIBITOR USE: ICD-10-CM

## 2022-07-29 DIAGNOSIS — C50.912 INVASIVE DUCTAL CARCINOMA OF BREAST, FEMALE, LEFT (H): ICD-10-CM

## 2022-07-29 DIAGNOSIS — C50.919 MALIGNANT NEOPLASM OF BREAST IN FEMALE, ESTROGEN RECEPTOR POSITIVE, UNSPECIFIED LATERALITY, UNSPECIFIED SITE OF BREAST (H): Primary | ICD-10-CM

## 2022-07-29 LAB
CALCIUM SERPL-MCNC: 10.2 MG/DL (ref 8.5–10.1)
CREAT SERPL-MCNC: 1 MG/DL (ref 0.52–1.04)
GFR SERPL CREATININE-BSD FRML MDRD: 68 ML/MIN/1.73M2

## 2022-07-29 PROCEDURE — 82565 ASSAY OF CREATININE: CPT | Performed by: INTERNAL MEDICINE

## 2022-07-29 PROCEDURE — 36415 COLL VENOUS BLD VENIPUNCTURE: CPT | Performed by: INTERNAL MEDICINE

## 2022-07-29 PROCEDURE — 96401 CHEMO ANTI-NEOPL SQ/IM: CPT

## 2022-07-29 PROCEDURE — 96402 CHEMO HORMON ANTINEOPL SQ/IM: CPT

## 2022-07-29 PROCEDURE — 82310 ASSAY OF CALCIUM: CPT | Performed by: INTERNAL MEDICINE

## 2022-07-29 PROCEDURE — 250N000011 HC RX IP 250 OP 636: Performed by: INTERNAL MEDICINE

## 2022-07-29 RX ORDER — ALBUTEROL SULFATE 90 UG/1
1-2 AEROSOL, METERED RESPIRATORY (INHALATION)
Status: CANCELLED
Start: 2022-08-10

## 2022-07-29 RX ORDER — MEPERIDINE HYDROCHLORIDE 25 MG/ML
25 INJECTION INTRAMUSCULAR; INTRAVENOUS; SUBCUTANEOUS EVERY 30 MIN PRN
Status: CANCELLED | OUTPATIENT
Start: 2022-08-10

## 2022-07-29 RX ORDER — NALOXONE HYDROCHLORIDE 0.4 MG/ML
0.2 INJECTION, SOLUTION INTRAMUSCULAR; INTRAVENOUS; SUBCUTANEOUS
Status: CANCELLED | OUTPATIENT
Start: 2022-08-10

## 2022-07-29 RX ORDER — DIPHENHYDRAMINE HYDROCHLORIDE 50 MG/ML
50 INJECTION INTRAMUSCULAR; INTRAVENOUS
Status: CANCELLED
Start: 2022-08-10

## 2022-07-29 RX ORDER — ALBUTEROL SULFATE 0.83 MG/ML
2.5 SOLUTION RESPIRATORY (INHALATION)
Status: CANCELLED | OUTPATIENT
Start: 2022-08-10

## 2022-07-29 RX ORDER — METHYLPREDNISOLONE SODIUM SUCCINATE 125 MG/2ML
125 INJECTION, POWDER, LYOPHILIZED, FOR SOLUTION INTRAMUSCULAR; INTRAVENOUS
Status: CANCELLED
Start: 2022-08-10

## 2022-07-29 RX ORDER — EPINEPHRINE 1 MG/ML
0.3 INJECTION, SOLUTION, CONCENTRATE INTRAVENOUS EVERY 5 MIN PRN
Status: CANCELLED | OUTPATIENT
Start: 2022-08-10

## 2022-07-29 RX ADMIN — GOSERELIN ACETATE 3.6 MG: 3.6 IMPLANT SUBCUTANEOUS at 15:32

## 2022-07-29 NOTE — PROGRESS NOTES
Infusion Nursing Note:  Desi Granado presents today for labs and Zoladex   Patient seen by provider today: No   present during visit today: Not Applicable.    Note: Pt had labs drawn today for her Prolia scheduled on 8/26/22.     Intravenous Access:  Labs drawn without difficulty.    Treatment Conditions:  Not Applicable.    Post Infusion Assessment:  Patient tolerated Zoladex injection subcutaneous to the right lower abdomen without incident.     Discharge Plan:   Patient discharged in stable condition accompanied by: self.  Departure Mode: Ambulatory.  Pt to return on 8/26/22 at 3:30 pm for Prolia and Zoladex.       Elli Magana RN

## 2022-08-04 ENCOUNTER — MYC MEDICAL ADVICE (OUTPATIENT)
Dept: FAMILY MEDICINE | Facility: CLINIC | Age: 52
End: 2022-08-04

## 2022-08-04 DIAGNOSIS — F41.9 ANXIETY: Primary | ICD-10-CM

## 2022-08-18 ENCOUNTER — MYC MEDICAL ADVICE (OUTPATIENT)
Dept: FAMILY MEDICINE | Facility: CLINIC | Age: 52
End: 2022-08-18

## 2022-08-23 ENCOUNTER — TELEPHONE (OUTPATIENT)
Dept: FAMILY MEDICINE | Facility: CLINIC | Age: 52
End: 2022-08-23

## 2022-08-23 NOTE — TELEPHONE ENCOUNTER
Patient is calling with concerns about Dr Grover not being in network for [facility]. Please call and assure patient this has been resolved. Patient has an appt next week.       Carrol IRAHETA  Station

## 2022-08-26 ENCOUNTER — INFUSION THERAPY VISIT (OUTPATIENT)
Dept: INFUSION THERAPY | Facility: CLINIC | Age: 52
End: 2022-08-26
Attending: FAMILY MEDICINE
Payer: COMMERCIAL

## 2022-08-26 VITALS
DIASTOLIC BLOOD PRESSURE: 78 MMHG | TEMPERATURE: 98.4 F | HEART RATE: 86 BPM | SYSTOLIC BLOOD PRESSURE: 126 MMHG | RESPIRATION RATE: 16 BRPM

## 2022-08-26 DIAGNOSIS — Z79.811 AROMATASE INHIBITOR USE: ICD-10-CM

## 2022-08-26 DIAGNOSIS — Z17.0 MALIGNANT NEOPLASM OF BREAST IN FEMALE, ESTROGEN RECEPTOR POSITIVE, UNSPECIFIED LATERALITY, UNSPECIFIED SITE OF BREAST (H): ICD-10-CM

## 2022-08-26 DIAGNOSIS — M85.852 OSTEOPENIA OF NECK OF LEFT FEMUR: ICD-10-CM

## 2022-08-26 DIAGNOSIS — Z78.0 MENOPAUSE: Primary | ICD-10-CM

## 2022-08-26 DIAGNOSIS — C50.912 INVASIVE DUCTAL CARCINOMA OF BREAST, FEMALE, LEFT (H): ICD-10-CM

## 2022-08-26 DIAGNOSIS — C50.919 MALIGNANT NEOPLASM OF BREAST IN FEMALE, ESTROGEN RECEPTOR POSITIVE, UNSPECIFIED LATERALITY, UNSPECIFIED SITE OF BREAST (H): ICD-10-CM

## 2022-08-26 PROCEDURE — 250N000011 HC RX IP 250 OP 636: Performed by: INTERNAL MEDICINE

## 2022-08-26 PROCEDURE — 96372 THER/PROPH/DIAG INJ SC/IM: CPT | Performed by: INTERNAL MEDICINE

## 2022-08-26 PROCEDURE — 96402 CHEMO HORMON ANTINEOPL SQ/IM: CPT

## 2022-08-26 RX ORDER — EPINEPHRINE 1 MG/ML
0.3 INJECTION, SOLUTION, CONCENTRATE INTRAVENOUS EVERY 5 MIN PRN
Status: CANCELLED | OUTPATIENT
Start: 2023-02-06

## 2022-08-26 RX ORDER — MEPERIDINE HYDROCHLORIDE 25 MG/ML
25 INJECTION INTRAMUSCULAR; INTRAVENOUS; SUBCUTANEOUS EVERY 30 MIN PRN
Status: CANCELLED | OUTPATIENT
Start: 2023-02-06

## 2022-08-26 RX ORDER — METHYLPREDNISOLONE SODIUM SUCCINATE 125 MG/2ML
125 INJECTION, POWDER, LYOPHILIZED, FOR SOLUTION INTRAMUSCULAR; INTRAVENOUS
Status: CANCELLED
Start: 2023-02-06

## 2022-08-26 RX ORDER — ALBUTEROL SULFATE 90 UG/1
1-2 AEROSOL, METERED RESPIRATORY (INHALATION)
Status: CANCELLED
Start: 2023-02-06

## 2022-08-26 RX ORDER — DIPHENHYDRAMINE HYDROCHLORIDE 50 MG/ML
50 INJECTION INTRAMUSCULAR; INTRAVENOUS
Status: CANCELLED
Start: 2023-02-06

## 2022-08-26 RX ORDER — NALOXONE HYDROCHLORIDE 0.4 MG/ML
0.2 INJECTION, SOLUTION INTRAMUSCULAR; INTRAVENOUS; SUBCUTANEOUS
Status: CANCELLED | OUTPATIENT
Start: 2023-02-06

## 2022-08-26 RX ORDER — ALBUTEROL SULFATE 0.83 MG/ML
2.5 SOLUTION RESPIRATORY (INHALATION)
Status: CANCELLED | OUTPATIENT
Start: 2023-02-06

## 2022-08-26 RX ADMIN — DENOSUMAB 60 MG: 60 INJECTION SUBCUTANEOUS at 15:56

## 2022-08-26 RX ADMIN — GOSERELIN ACETATE 3.6 MG: 3.6 IMPLANT SUBCUTANEOUS at 15:51

## 2022-08-26 ASSESSMENT — PAIN SCALES - GENERAL: PAINLEVEL: NO PAIN (0)

## 2022-08-26 NOTE — PROGRESS NOTES
Infusion Nursing Note:  Desi Granado presents today for Zoladex & Prolia.    Patient seen by provider today: No   present during visit today: Not Applicable.    Note: N/A.    Intravenous Access:  No Intravenous access/labs at this visit.    Treatment Conditions:  Results reviewed, labs MET treatment parameters, ok to proceed with treatment.    Post Infusion Assessment:  Patient tolerated injections without incident.     Discharge Plan:   Patient discharged in stable condition accompanied by: self.  Departure Mode: Ambulatory.      Kendrick Le RN

## 2022-09-23 ENCOUNTER — INFUSION THERAPY VISIT (OUTPATIENT)
Dept: INFUSION THERAPY | Facility: CLINIC | Age: 52
End: 2022-09-23
Attending: FAMILY MEDICINE
Payer: COMMERCIAL

## 2022-09-23 VITALS
HEART RATE: 83 BPM | DIASTOLIC BLOOD PRESSURE: 70 MMHG | RESPIRATION RATE: 18 BRPM | SYSTOLIC BLOOD PRESSURE: 107 MMHG | TEMPERATURE: 98 F

## 2022-09-23 DIAGNOSIS — Z17.0 MALIGNANT NEOPLASM OF BREAST IN FEMALE, ESTROGEN RECEPTOR POSITIVE, UNSPECIFIED LATERALITY, UNSPECIFIED SITE OF BREAST (H): Primary | ICD-10-CM

## 2022-09-23 DIAGNOSIS — C50.919 MALIGNANT NEOPLASM OF BREAST IN FEMALE, ESTROGEN RECEPTOR POSITIVE, UNSPECIFIED LATERALITY, UNSPECIFIED SITE OF BREAST (H): Primary | ICD-10-CM

## 2022-09-23 PROCEDURE — 250N000011 HC RX IP 250 OP 636: Performed by: INTERNAL MEDICINE

## 2022-09-23 PROCEDURE — 96402 CHEMO HORMON ANTINEOPL SQ/IM: CPT

## 2022-09-23 RX ADMIN — GOSERELIN ACETATE 3.6 MG: 3.6 IMPLANT SUBCUTANEOUS at 15:24

## 2022-09-23 NOTE — PROGRESS NOTES
Infusion Nursing Note:  Desi Granado presents today for Zoladex C35D1.    Patient seen by provider today: No   present during visit today: Not Applicable.    Note: N/A.    Intravenous Access:  No Intravenous access/labs at this visit.    Treatment Conditions:  Not Applicable.    Post Infusion Assessment:  Patient tolerated injection without incident.  Site patent and intact, free from redness, edema or discomfort.  No evidence of extravasations.     Discharge Plan:   Discharge instructions reviewed with: Patient.  Patient and/or family verbalized understanding of discharge instructions and all questions answered.  AVS to patient via [employer].  Patient will return 10/21/2022 for next appointment.   Patient discharged in stable condition accompanied by: self.  Departure Mode: Ambulatory.    Ashley Schwarz RN

## 2022-10-07 ASSESSMENT — ENCOUNTER SYMPTOMS
DIARRHEA: 0
COUGH: 0
MYALGIAS: 0
FREQUENCY: 0
CHILLS: 0
HEARTBURN: 0
NAUSEA: 0
HEMATURIA: 0
NERVOUS/ANXIOUS: 0
DYSURIA: 0
EYE PAIN: 0
SORE THROAT: 0
FEVER: 0
PALPITATIONS: 0
JOINT SWELLING: 0
ABDOMINAL PAIN: 0
ARTHRALGIAS: 0
HEMATOCHEZIA: 0
HEADACHES: 0
BREAST MASS: 0
SHORTNESS OF BREATH: 0
PARESTHESIAS: 0
CONSTIPATION: 0
DIZZINESS: 0
WEAKNESS: 0

## 2022-10-13 ENCOUNTER — OFFICE VISIT (OUTPATIENT)
Dept: FAMILY MEDICINE | Facility: CLINIC | Age: 52
End: 2022-10-13
Payer: COMMERCIAL

## 2022-10-13 VITALS
SYSTOLIC BLOOD PRESSURE: 104 MMHG | OXYGEN SATURATION: 98 % | BODY MASS INDEX: 25.33 KG/M2 | WEIGHT: 152 LBS | TEMPERATURE: 97.7 F | DIASTOLIC BLOOD PRESSURE: 78 MMHG | HEIGHT: 65 IN | HEART RATE: 88 BPM

## 2022-10-13 DIAGNOSIS — E78.5 HYPERLIPIDEMIA LDL GOAL <100: ICD-10-CM

## 2022-10-13 DIAGNOSIS — Z01.84 IMMUNITY STATUS TESTING: ICD-10-CM

## 2022-10-13 DIAGNOSIS — J30.89 ALLERGIC RHINITIS DUE TO OTHER ALLERGIC TRIGGER, UNSPECIFIED SEASONALITY: ICD-10-CM

## 2022-10-13 DIAGNOSIS — Z23 NEED FOR INFLUENZA VACCINATION: ICD-10-CM

## 2022-10-13 DIAGNOSIS — Z12.2 ENCOUNTER FOR SCREENING FOR MALIGNANT NEOPLASM OF LUNG: ICD-10-CM

## 2022-10-13 DIAGNOSIS — Z00.00 ROUTINE GENERAL MEDICAL EXAMINATION AT A HEALTH CARE FACILITY: Primary | ICD-10-CM

## 2022-10-13 DIAGNOSIS — I10 BENIGN ESSENTIAL HTN: ICD-10-CM

## 2022-10-13 LAB
CHOLEST SERPL-MCNC: 207 MG/DL
FASTING STATUS PATIENT QL REPORTED: YES
GLUCOSE SERPL-MCNC: 95 MG/DL (ref 70–99)
HDLC SERPL-MCNC: 64 MG/DL
LDLC SERPL CALC-MCNC: 121 MG/DL
NONHDLC SERPL-MCNC: 143 MG/DL
TRIGL SERPL-MCNC: 109 MG/DL

## 2022-10-13 PROCEDURE — 36415 COLL VENOUS BLD VENIPUNCTURE: CPT | Performed by: FAMILY MEDICINE

## 2022-10-13 PROCEDURE — 90471 IMMUNIZATION ADMIN: CPT | Performed by: FAMILY MEDICINE

## 2022-10-13 PROCEDURE — 99396 PREV VISIT EST AGE 40-64: CPT | Mod: 25 | Performed by: FAMILY MEDICINE

## 2022-10-13 PROCEDURE — 86735 MUMPS ANTIBODY: CPT | Performed by: FAMILY MEDICINE

## 2022-10-13 PROCEDURE — 82947 ASSAY GLUCOSE BLOOD QUANT: CPT | Performed by: FAMILY MEDICINE

## 2022-10-13 PROCEDURE — 86765 RUBEOLA ANTIBODY: CPT | Performed by: FAMILY MEDICINE

## 2022-10-13 PROCEDURE — 80061 LIPID PANEL: CPT | Performed by: FAMILY MEDICINE

## 2022-10-13 PROCEDURE — 90682 RIV4 VACC RECOMBINANT DNA IM: CPT | Performed by: FAMILY MEDICINE

## 2022-10-13 RX ORDER — MONTELUKAST SODIUM 10 MG/1
10 TABLET ORAL AT BEDTIME
Qty: 90 TABLET | Refills: 3 | Status: SHIPPED | OUTPATIENT
Start: 2022-10-13 | End: 2022-10-18

## 2022-10-13 ASSESSMENT — ENCOUNTER SYMPTOMS
WEAKNESS: 0
NAUSEA: 0
JOINT SWELLING: 0
DIZZINESS: 0
SHORTNESS OF BREATH: 0
EYE PAIN: 0
HEMATURIA: 0
COUGH: 0
PALPITATIONS: 0
PARESTHESIAS: 0
HEADACHES: 0
ABDOMINAL PAIN: 0
BREAST MASS: 0
NERVOUS/ANXIOUS: 0
MYALGIAS: 0
DIARRHEA: 0
FREQUENCY: 0
FEVER: 0
ARTHRALGIAS: 0
HEMATOCHEZIA: 0
SORE THROAT: 0
DYSURIA: 0
CONSTIPATION: 0
HEARTBURN: 0
CHILLS: 0

## 2022-10-13 NOTE — PROGRESS NOTES
SUBJECTIVE:   CC: Nilda is an 52 year old who presents for preventive health visit.     Patient has been advised of split billing requirements and indicates understanding: Yes  Healthy Habits:     Getting at least 3 servings of Calcium per day:  NO    Bi-annual eye exam:  Yes    Dental care twice a year:  Yes    Sleep apnea or symptoms of sleep apnea:  None    Diet:  Regular (no restrictions)    Frequency of exercise:  None    Taking medications regularly:  Yes    Medication side effects:  Significant flushing    PHQ-2 Total Score: 0    Additional concerns today:  No          Today's PHQ-2 Score:   PHQ-2 ( 1999 [employer]) 10/7/2022   Q1: Little interest or pleasure in doing things 0   Q2: Feeling down, depressed or hopeless 0   PHQ-2 Score 0   PHQ-2 Total Score (12-17 Years)- Positive if 3 or more points; Administer PHQ-A if positive -   Q1: Little interest or pleasure in doing things Not at all   Q2: Feeling down, depressed or hopeless Not at all   PHQ-2 Score 0       Abuse: Current or Past (Physical, Sexual or Emotional) - No  Do you feel safe in your environment? Yes        Social History     Tobacco Use     Smoking status: Never     Smokeless tobacco: Never   Substance Use Topics     Alcohol use: No     Alcohol/week: 0.0 standard drinks     Comment: very rare     If you drink alcohol do you typically have >3 drinks per day or >7 drinks per week? No    Alcohol Use 10/7/2022   Prescreen: >3 drinks/day or >7 drinks/week? No   Prescreen: >3 drinks/day or >7 drinks/week? -   No flowsheet data found.    Reviewed orders with patient.  Reviewed health maintenance and updated orders accordingly - Yes  Lab work is in process    Breast Cancer Screening:    FHS-7: No flowsheet data found.  click delete button to remove this line now  Mammogram Screening: Recommended annual mammography  Pertinent mammograms are reviewed under the imaging tab.    History of abnormal Pap smear: NO - age 30-65 PAP every 5 years with negative HPV  "co-testing recommended  PAP / HPV Latest Ref Rng & Units 12/9/2021 10/6/2021 9/8/2016   PAP   Negative for Intraepithelial Lesion or Malignancy (NILM) Unsatisfactory for evaluation -   PAP (Historical) - - - NIL   HPV16 Negative Negative Negative Negative   HPV18 Negative Negative Negative Negative   HRHPV Negative Negative Negative Negative     Reviewed and updated as needed this visit by clinical staff                  Reviewed and updated as needed this visit by Provider                     Review of Systems   Constitutional: Negative for chills and fever.   HENT: Negative for congestion, ear pain, hearing loss and sore throat.    Eyes: Negative for pain and visual disturbance.   Respiratory: Negative for cough and shortness of breath.    Cardiovascular: Negative for chest pain, palpitations and peripheral edema.   Gastrointestinal: Negative for abdominal pain, constipation, diarrhea, heartburn, hematochezia and nausea.   Breasts:  Negative for tenderness, breast mass and discharge.   Genitourinary: Negative for dysuria, frequency, genital sores, hematuria, pelvic pain, urgency, vaginal bleeding and vaginal discharge.   Musculoskeletal: Negative for arthralgias, joint swelling and myalgias.   Skin: Negative for rash.   Neurological: Negative for dizziness, weakness, headaches and paresthesias.   Psychiatric/Behavioral: Negative for mood changes. The patient is not nervous/anxious.             OBJECTIVE:   /78   Pulse 88   Temp 97.7  F (36.5  C) (Tympanic)   Ht 1.651 m (5' 5\")   Wt 68.9 kg (152 lb)   LMP 03/14/2019   SpO2 98%   BMI 25.29 kg/m    Physical Exam  GENERAL: healthy, alert and no distress  EYES: Eyes grossly normal to inspection, PERRL and conjunctivae and sclerae normal  HENT: ear canals and TM's normal, nose and mouth without ulcers or lesions  NECK: no adenopathy, no asymmetry, masses, or scars and thyroid normal to palpation  RESP: lungs clear to auscultation - no rales, rhonchi or " "wheezes  BREAST: no palpable axillary masses or adenopathy and mastectomy scars   CV: regular rate and rhythm, normal S1 S2, no S3 or S4, no murmur, click or rub, no peripheral edema and peripheral pulses strong  ABDOMEN: soft, nontender, no hepatosplenomegaly, no masses and bowel sounds normal  MS: no gross musculoskeletal defects noted, no edema  SKIN: no suspicious lesions or rashes  NEURO: Normal strength and tone, mentation intact and speech normal  PSYCH: mentation appears normal, affect normal/bright    Diagnostic Test Results:  Labs reviewed in Epic    ASSESSMENT/PLAN:   (Z00.00) Routine general medical examination at a health care facility  (primary encounter diagnosis)  Comment:   Plan:     (J30.89) Allergic rhinitis due to other allergic trigger, unspecified seasonality  Comment:   Plan: montelukast (SINGULAIR) 10 MG tablet        Refilled great control    (Z01.84) Immunity status testing  Comment:   Plan: Rubeola Antibody IgG, Mumps Immune Status, IgG        She has lost immunity in the past we will check and make sure the MMR that was given 10 years ago is still giving her enough coverage    (I10) Benign essential HTN  Comment:     Plan: Glucose              (Z12.2) Encounter for screening for malignant neoplasm of lung  Comment:Plan:    (E78.5) Hyperlipidemia LDL goal <100  Comment:   Plan: Lipid panel reflex to direct LDL Fasting        Check     (Z23) Need for influenza vaccination  Comment:     Plan: INFLUENZA QUAD, RECOMBINANT, P-FREE (RIV4)         (FLUBLOK)                    COUNSELING:  Reviewed preventive health counseling, as reflected in patient instructions    Estimated body mass index is 25.29 kg/m  as calculated from the following:    Height as of this encounter: 1.651 m (5' 5\").    Weight as of this encounter: 68.9 kg (152 lb).        She reports that she has never smoked. She has never used smokeless tobacco.      Counseling Resources:  ATP IV Guidelines  Pooled Cohorts Equation " Calculator  Breast Cancer Risk Calculator  BRCA-Related Cancer Risk Assessment: FHS-7 Tool  FRAX Risk Assessment  ICSI Preventive Guidelines  Dietary Guidelines for Americans, 2010  USDA's MyPlate  ASA Prophylaxis  Lung CA Screening    Pearl Grover MD  [facility]

## 2022-10-14 LAB
MEV IGG SER IA-ACNC: >300 AU/ML
MEV IGG SER IA-ACNC: POSITIVE
MUMPS ANTIBODY IGG INSTRUMENT VALUE: 21.9 AU/ML
MUV IGG SER QL IA: POSITIVE

## 2022-10-17 NOTE — PROGRESS NOTES
FOLLOW UP NOTE    PATIENT:                                                      Ajith Gallego  MEDICAL RECORD #:                        [id number]  :                                                          1945  COMPLETION DATE:   2021  DIAGNOSIS:     Squamous cell carcinoma, ear, left  - Stage IV (cT1, cN2b(U), cM0)      BRIEF HISTORY:  Mr. 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  He underwent superficial parotidectomy and lymph node dissection, and then due to multiple risk factors, he underwent a course of adjuvant radiation therapy to a cumulative dose of 66 Gray, which he completed on 2021.  Following treatment, he has recovered rather well.  His post treatment scans have been clear.  He saw Dr. Patten last month, when he had a normal exam.  Aside from some symptoms of dry mouth, particularly at night time, Mr. Gallego denies complaints and is quite pleased.    MEDICATIONS: Medication reconciliation for the patient was reviewed and confirmed in the electronic medical record.    Review of Systems   All other systems reviewed and are negative.      Karnofsky score: 80     Physical Exam  Vitals and nursing note reviewed.   Constitutional:       General: He is not in acute distress.     Appearance: He is well-developed.   HENT:      Head: Normocephalic and atraumatic.      Mouth/Throat:      Comments: Appropriate moisture.  Upper partial in place.  No aggressive masses or nodules.  Base of tongue, buccal mucosa, and floor of mouth are soft.  Eyes:      Conjunctiva/sclera: Conjunctivae normal.      Pupils: Pupils are equal, round, and reactive to light.   Neck:      Comments: Post radiation and surgery changes in the left neck.  Fibrosis with tethering of the left SCM is present.  No palpable adenopathy or masses.  Cardiovascular:       Nilda is a 51 year old who is being evaluated via a billable video visit.      How would you like to obtain your AVS? [employer]  If the video visit is dropped, the invitation should be resent by: Text to cell phone: [phone]  Will anyone else be joining your video visit? [employer]man[employer]    Video Start Time: 10:31 AM  Video-Visit Details    Type of service:  Video Visit    Video End Time:11:16 AM    Originating Location (pt. Location): Home    Distant Location (provider location):  [facility]     Platform used for Video Visit: [facility]      Oncology Follow-Up Note  Jan 13, 2022    Ms. Granado is reached over video conference in follow up of her left breast cancer.     Oncologic History:  Invasive ductal carcinoma of breast, female, left (H)     Desi Granado was found to have developing focal asymmetry in the left breast at approximately 3 o'clock position about 7-8 cm from the nipple on the routine mammogram done on October 30, 2018. Subsequently she went on to have diagnostic mammogram and ultrasound. Directed sonogram at the site of mammographic finding showed a 1.6 cm irregular solid lesion. Subsequently the patient went on to have ultrasound-guided needle core biopsy done on October 17, 2018. The biopsy came back positive for invasive ductal carcinoma with papillary features. There is grade 2 out of 3. Angiolymphatic invasion was not seen. Ductal carcinoma in situ was not seen. The tumor was estrogen receptor positive about 100% and progesterone receptor +100% strong nuclear staining. HER-2/tamara came back negative for amplification. She had bilateral mastectomy. Surgical pathology revealed simple mastectomy sample of the left showing solid papillary carcinoma 22 mm. Grade 2. Margins were clear of involvement. 3 sentinel lymph nodes shows no evidence of metastatic disease. Lymphovascular invasion was not identified. The tumor is pT2pN0. Right breast shows simple mastectomy  "Rate and Rhythm: Normal rate and regular rhythm.      Heart sounds: No murmur heard.    No friction rub.   Pulmonary:      Effort: Pulmonary effort is normal.      Breath sounds: Normal breath sounds. No wheezing.   Abdominal:      General: Bowel sounds are normal. There is no distension.      Palpations: Abdomen is soft. There is no mass.      Tenderness: There is no abdominal tenderness.   Musculoskeletal:         General: Normal range of motion.      Cervical back: Normal range of motion and neck supple.   Lymphadenopathy:      Cervical: No cervical adenopathy.   Skin:     General: Skin is warm and dry.   Neurological:      Mental Status: He is alert and oriented to person, place, and time.   Psychiatric:         Behavior: Behavior normal.         Thought Content: Thought content normal.         Judgment: Judgment normal.         VITAL SIGNS:   Vitals:    10/17/22 1426   BP: 148/70   Pulse: 72   Resp: 16   Temp: 96.9 °F (36.1 °C)   SpO2: 96%  Comment: RA   Weight: 90.7 kg (199 lb 14.4 oz)   Height: 170.2 cm (67\")   PainSc: 0-No pain             Karnofsky score: 80     The following portions of the patient's history were reviewed and updated as appropriate: allergies, current medications, past family history, past medical history, past social history, past surgical history and problem list.        IMAGING  I have personally reviewed the relevant imaging studies, as follows:  CT Soft Tissue Neck With Contrast    Result Date: 10/7/2022  Redemonstrated postoperative changes of the left neck as above, without evidence of new soft tissue mass or pathologic adenopathy concerning for recurrent or metastatic disease.  This report was finalized on 10/7/2022 9:01 AM by Nguyễn Milton.          Diagnoses and all orders for this visit:    1. Squamous cell carcinoma, ear, left (Primary)         IMPRESSION:  Mr. Gallego is a 77 year old gentleman with a history of a recurrent cutaneous squamous cell carcinoma.  He is now 1 1/2 " without any evidence of malignancy. Oncotype came back 0. She established care with Dr. Peña and was then put on Exemestane and Zoladex.    Interval History:  Nilda is doing well on treatment. She saw her breast surgeon last week and her clinical examination was negative for any signs of disease recurrence. She has episodic hot flash, arthralgia in her hands. She denies fatigue or mood swing.    Review Of Systems:  All other ROS negative unless mentioned in interval history.     Past medical, social, surgical, and family histories reviewed.     Allergies:        Allergies as of 07/09/2021 - Reviewed 07/09/2021   Allergen Reaction Noted     Aspirin Hives 08/03/2009     Dust mites Unknown 07/03/2009     Morphine Nausea 06/23/2008      Current Medications:  Current Outpatient Medications   Medication     calcium carbonate 600 mg-vitamin D 400 units (CALTRATE) 600-400 MG-UNIT per tablet     exemestane (AROMASIN) 25 MG tablet     Fluticasone Propionate (FLONASE ALLERGY RELIEF NA)     lisinopril (ZESTRIL) 30 MG tablet     montelukast (SINGULAIR) 10 MG tablet     vitamin D3 (CHOLECALCIFEROL) 2000 units tablet     ZYRTEC 10 MG OR TABS     No current facility-administered medications for this visit.     She gets semi-annual Prolia and monthly Zolodex at [facility].       Physical Exam:  GENERAL: Healthy, alert and no distress  EYES: Eyes grossly normal to inspection. No discharge or erythema, or obvious scleral/conjunctival abnormalities.  PSYCH: Mentation appears normal, affect normal/bright, judgement and insight intact, normal speech and appearance well-groomed.    Laboratory/Imaging Studies:  CBC RESULTS: Recent Labs   Lab Test 01/07/22  1306   WBC 8.0   RBC 4.76   HGB 14.2   HCT 41.6   MCV 87   MCH 29.8   MCHC 34.1   RDW 12.9        Recent Labs   Lab Test 01/07/22  1306 12/09/21  0904    140   POTASSIUM 3.6 4.0   CHLORIDE 104 107   CO2 28 29   ANIONGAP 5 4   * 92   BUN 19 17   CR 0.79 0.87   MARISELA 9.8 9.4      CA 27-29   Date Value Ref Range Status   01/07/2022 9 0 - 39 U/mL Final   07/01/2021 8 0 - 39 U/mL Final     Comment:     Assay Method:  Chemiluminescence using Siemens Centaur XP     DEXA scan 7/22/21  IMPRESSION:  1. Mild-moderate osteopenia in the left femoral neck, worse than on  the previous exam.  2. Mild osteopenia in the right femoral neck, mildly worse.  3. In the lumbar spine there is at least mild-moderate, if not  prominent, osteopenia. This is worse than on the previous exam.     Assessment and plan:  Ms. Granado is a 51-year-old female with left solid papillary carcinoma, Grade 2, ER+/TN+/HER2-. She is status post bilateral mastectomy and left sentinel node biopsy. Her disease is pT2N0(sn). Oncotype score is 0. She is doing well on adjuvant AI. There is no clinical evidence of breast cancer recurrence.     Left breast cancer: she will continue on exemestane as well as Zoladex injections monthly. Will plan for at least 5 years of AI. We discussed continuing Zoladex for ovarian suppression vs. oophorectomy, and reached an agreement to continue Zoladex. Will plan for follow up in 6 months.     Osteopenia: continue semi-annual Prolia.    Essential HTN: on lisinopril    The patient was seen and discussed with staff, Dr. Grijalva.    Lili Vasquez MD  Resident, PGY-4  Department of Radiation Oncology  [facility]    Addendum:  Pt was seen and evaluated by me with Dr. Vasquez.  I reviewed the above to reflect my evaluation.    It was a pleasure to meet Ms. Granado.  She is doing well on her current regimen of zoladex and exemestane.  I discussed that I would continue the current course.  We discussed proceeding with this for a total of five years and then rediscussing the pros/cons of prolonged endocrine therapy.    She will continue prolia for her bone health.    We discussed many aspects of survivorship, and put this information in her checkout materials regarding exercise, diet and weight  years out from completion of adjuvant radiation therapy.  Clinically he continues to do well without any evidence of recurrent disease.  I counseled him regarding the ongoing survivorship model and follow-up appointments.  He is also being followed closely by Dr. Patten, Dermatology, and Dr. Snow.  I gave him exercises and stretches, including massage to perform to help improve the range of motion in the left side of his neck.  I will see him back in 1 year.    RECOMMENDATIONS:  Return to clinic in 1 year    I spent a total of 30 minutes on todays visit, with more than 20 minutes in direct face to face communication, and the remainder of the time spent in reviewing the relevant history, records, available imaging, and for documentation.    Return in about 1 year (around 10/17/2023) for Office Visit.    Juan Francisco Francois MD       management.    She has a primary care provider, and I encouraged yearly visits, at a minimum.    Elvia Grijalva MD

## 2022-10-18 ENCOUNTER — MYC MEDICAL ADVICE (OUTPATIENT)
Dept: FAMILY MEDICINE | Facility: CLINIC | Age: 52
End: 2022-10-18

## 2022-10-18 DIAGNOSIS — J30.89 ALLERGIC RHINITIS DUE TO OTHER ALLERGIC TRIGGER, UNSPECIFIED SEASONALITY: ICD-10-CM

## 2022-10-18 RX ORDER — MONTELUKAST SODIUM 10 MG/1
10 TABLET ORAL AT BEDTIME
Qty: 90 TABLET | Refills: 3 | Status: SHIPPED | OUTPATIENT
Start: 2022-10-18 | End: 2023-06-13

## 2022-10-21 ENCOUNTER — INFUSION THERAPY VISIT (OUTPATIENT)
Dept: INFUSION THERAPY | Facility: CLINIC | Age: 52
End: 2022-10-21
Attending: FAMILY MEDICINE
Payer: COMMERCIAL

## 2022-10-21 VITALS — DIASTOLIC BLOOD PRESSURE: 86 MMHG | HEART RATE: 93 BPM | SYSTOLIC BLOOD PRESSURE: 134 MMHG | TEMPERATURE: 98.2 F

## 2022-10-21 DIAGNOSIS — C50.919 MALIGNANT NEOPLASM OF BREAST IN FEMALE, ESTROGEN RECEPTOR POSITIVE, UNSPECIFIED LATERALITY, UNSPECIFIED SITE OF BREAST (H): Primary | ICD-10-CM

## 2022-10-21 DIAGNOSIS — Z17.0 MALIGNANT NEOPLASM OF BREAST IN FEMALE, ESTROGEN RECEPTOR POSITIVE, UNSPECIFIED LATERALITY, UNSPECIFIED SITE OF BREAST (H): Primary | ICD-10-CM

## 2022-10-21 PROCEDURE — 250N000011 HC RX IP 250 OP 636: Performed by: INTERNAL MEDICINE

## 2022-10-21 PROCEDURE — 96402 CHEMO HORMON ANTINEOPL SQ/IM: CPT

## 2022-10-21 RX ADMIN — GOSERELIN ACETATE 3.6 MG: 3.6 IMPLANT SUBCUTANEOUS at 15:22

## 2022-10-21 NOTE — PROGRESS NOTES
Infusion Nursing Note:  Desi Granado presents today for Zoladex    Patient seen by provider today: No   present during visit today: Not Applicable.    Note: N/A.    Intravenous Access:  No Intravenous access/labs at this visit.    Treatment Conditions:  Not Applicable.    Post Infusion Assessment:  Patient tolerated Zoladex injection subcutaneous to the right lower abdomen without incident.     Discharge Plan:   Patient discharged in stable condition accompanied by: self.  Departure Mode: Ambulatory.  Pt to return on 11/18/22 at 3:30 pm for next Zoladex injection.       Elli Magana RN

## 2022-11-18 ENCOUNTER — INFUSION THERAPY VISIT (OUTPATIENT)
Dept: INFUSION THERAPY | Facility: CLINIC | Age: 52
End: 2022-11-18
Attending: FAMILY MEDICINE
Payer: COMMERCIAL

## 2022-11-18 VITALS — DIASTOLIC BLOOD PRESSURE: 69 MMHG | SYSTOLIC BLOOD PRESSURE: 123 MMHG | RESPIRATION RATE: 16 BRPM | HEART RATE: 100 BPM

## 2022-11-18 DIAGNOSIS — Z17.0 MALIGNANT NEOPLASM OF BREAST IN FEMALE, ESTROGEN RECEPTOR POSITIVE, UNSPECIFIED LATERALITY, UNSPECIFIED SITE OF BREAST (H): Primary | ICD-10-CM

## 2022-11-18 DIAGNOSIS — C50.919 MALIGNANT NEOPLASM OF BREAST IN FEMALE, ESTROGEN RECEPTOR POSITIVE, UNSPECIFIED LATERALITY, UNSPECIFIED SITE OF BREAST (H): Primary | ICD-10-CM

## 2022-11-18 PROCEDURE — 250N000011 HC RX IP 250 OP 636: Performed by: INTERNAL MEDICINE

## 2022-11-18 PROCEDURE — 96402 CHEMO HORMON ANTINEOPL SQ/IM: CPT

## 2022-11-18 RX ADMIN — GOSERELIN ACETATE 3.6 MG: 3.6 IMPLANT SUBCUTANEOUS at 15:16

## 2022-11-18 NOTE — PROGRESS NOTES
Infusion Nursing Note:  Desi Granado presents today for Zoladex.    Patient seen by provider today: No   present during visit today: Not Applicable.    Note: Pt denies any health changes or health concerns.    Intravenous Access:  N/A.    Treatment Conditions:  Not Applicable.    Post Infusion Assessment:  Patient tolerated injection without incident.     Discharge Plan:   Discharge instructions reviewed with: Patient.  Patient and/or family verbalized understanding of discharge instructions and all questions answered.  Patient discharged in stable condition accompanied by: self.  Departure Mode: Ambulatory.      Yanelis Goodman RN

## 2022-11-22 ENCOUNTER — OFFICE VISIT (OUTPATIENT)
Dept: SURGERY | Facility: CLINIC | Age: 52
End: 2022-11-22
Payer: COMMERCIAL

## 2022-11-22 VITALS
TEMPERATURE: 97.2 F | DIASTOLIC BLOOD PRESSURE: 80 MMHG | WEIGHT: 157 LBS | SYSTOLIC BLOOD PRESSURE: 130 MMHG | BODY MASS INDEX: 26.13 KG/M2

## 2022-11-22 DIAGNOSIS — Z17.0 MALIGNANT NEOPLASM OF BREAST IN FEMALE, ESTROGEN RECEPTOR POSITIVE, UNSPECIFIED LATERALITY, UNSPECIFIED SITE OF BREAST (H): ICD-10-CM

## 2022-11-22 DIAGNOSIS — C50.919 MALIGNANT NEOPLASM OF BREAST IN FEMALE, ESTROGEN RECEPTOR POSITIVE, UNSPECIFIED LATERALITY, UNSPECIFIED SITE OF BREAST (H): ICD-10-CM

## 2022-11-22 DIAGNOSIS — Z79.811 AROMATASE INHIBITOR USE: ICD-10-CM

## 2022-11-22 DIAGNOSIS — C50.912 INVASIVE DUCTAL CARCINOMA OF BREAST, FEMALE, LEFT (H): Primary | ICD-10-CM

## 2022-11-22 PROCEDURE — 99214 OFFICE O/P EST MOD 30 MIN: CPT | Performed by: SURGERY

## 2022-11-22 ASSESSMENT — PAIN SCALES - GENERAL: PAINLEVEL: NO PAIN (0)

## 2022-11-22 NOTE — LETTER
11/22/2022         RE: Desi Granado  [address] [place] [address]        Dear Colleague,    Thank you for referring your patient, Desi Granado, to the [facility]. Please see a copy of my visit note below.      Assessment & Plan     Invasive ductal carcinoma of breast, female, left (H)    Malignant neoplasm of breast in female, left, estrogen receptor positive, unspecified site of breast (H)    Aromatase inhibitor use    4 years S/p double mastectomy and doing well.   No plans for reconstruction.   Tolerating AI well.   No recent changes or concerns.  All questions were answered.        FUTURE APPOINTMENTS:       - Follow-up visit in 1 year    Debi Escobar, MS3  [employer]    Provider Disclosure:  I agree with above History, Review of Systems, Physical exam and Plan. I have reviewed the content of the documentation and have edited it as needed. I have personally performed the services documented here and the documentation accurately represents those services and the decisions I have made.    Sabino-Julienne Hart MD  [facility]    25 mins visit, more than 50% of face to face time was spent in counseling and coordinating care as discussed above.        Stephanie Munguia is a 52 year old woman, presenting for the following health issues:  RECHECK      4 years S/p double mastectomy for L sided IDC with papillary features  No issues  tolerating aromatase inhibitor well (exemestane).   Eating well  Normal BM  No issues urinating  No F/C/N/V     Final path: 2.2cm IDC with papillary 0/3 +LNs s/p B/L mastectomy with left SLNB 11/8/2018    She follows with Dr. Grijalva for oncology. Monthly Zometa infusions. Tolerating AI exemestane well. No new concerns or questions.     Left job at [employer] after 8 months, now working at [employer]. Still working remotely.     Review of Systems   N/a     Objective    /80    Temp 97.2  F (36.2  C) (Temporal)   Wt 71.2 kg (157 lb)   LMP 03/14/2019   BMI 26.13 kg/m    Body mass index is 26.13 kg/m .     Physical Exam   BREAST: Absent breast tissue secondary to double mastectomy. Palpable chest wall bilaterally with no abnormalities. No rash, erythema, or skin puckering. No palpable cervical, supraclavicular, or axillary lymph nodes bilaterally.              Again, thank you for allowing me to participate in the care of your patient.        Sincerely,        Nabila Hart MD

## 2022-11-22 NOTE — PROGRESS NOTES
Assessment & Plan     Invasive ductal carcinoma of breast, female, left (H)    Malignant neoplasm of breast in female, left, estrogen receptor positive, unspecified site of breast (H)    Aromatase inhibitor use    4 years S/p double mastectomy and doing well.   No plans for reconstruction.   Tolerating AI well.   No recent changes or concerns.  All questions were answered.        FUTURE APPOINTMENTS:       - Follow-up visit in 1 year    Debi Escobar, MS3  [employer] [employer]    Provider Disclosure:  I agree with above History, Review of Systems, Physical exam and Plan. I have reviewed the content of the documentation and have edited it as needed. I have personally performed the services documented here and the documentation accurately represents those services and the decisions I have made.    [facility] Hart MD  [facility]    25 mins visit, more than 50% of face to face time was spent in counseling and coordinating care as discussed above.        Stephanie Munguia is a 52 year old woman, presenting for the following health issues:  RECHECK      4 years S/p double mastectomy for L sided IDC with papillary features  No issues  tolerating aromatase inhibitor well (exemestane).   Eating well  Normal BM  No issues urinating  No F/C/N/V     Final path: 2.2cm IDC with papillary 0/3 +LNs s/p B/L mastectomy with left SLNB 11/8/2018    She follows with Dr. Grijalva for oncology. Monthly Zometa infusions. Tolerating AI exemestane well. No new concerns or questions.     Left job at [employer] after 8 months, now working at [employer]. Still working remotely.     Review of Systems   N/a      Objective    /80   Temp 97.2  F (36.2  C) (Temporal)   Wt 71.2 kg (157 lb)   LMP 03/14/2019   BMI 26.13 kg/m    Body mass index is 26.13 kg/m .     Physical Exam   BREAST: Absent breast tissue secondary to double mastectomy. Palpable chest wall bilaterally with no abnormalities. No rash,  erythema, or skin puckering. No palpable cervical, supraclavicular, or axillary lymph nodes bilaterally.

## 2022-11-23 ENCOUNTER — TELEPHONE (OUTPATIENT)
Dept: FAMILY MEDICINE | Facility: CLINIC | Age: 52
End: 2022-11-23

## 2022-11-23 NOTE — TELEPHONE ENCOUNTER
Received call from Patient  States that she thinks she might be getting a flair up in diverticulitis  States that she gets a twinge in her side which happened in February or March.   Would like a prescription for antibiotic in case it does flair tomorrow  Was given Augmentin in ER on 3/20/22   Patient states that it worked well for her  Deejay Combs RN

## 2022-11-25 NOTE — TELEPHONE ENCOUNTER
Call placed to Patient  Relayed Dr Kuhn message  Patient states that she did not have a flair and is doing fine right now.  Verbalized understanding about going to ED if flairs over weekend  Deejay Combs RN

## 2022-11-25 NOTE — TELEPHONE ENCOUNTER
Early treatment of diverticulitis does not include antibiotics.  She should be eating a soft / liquid bland diet for the next few days.  If she does start running a fever having a great increase in her pain she should be seen in urgent care or the ER.  Recent studies have shown that people who actually take the antibiotics are admitted to the hospital more often. Pearl Grover M.D.

## 2022-12-16 ENCOUNTER — INFUSION THERAPY VISIT (OUTPATIENT)
Dept: INFUSION THERAPY | Facility: CLINIC | Age: 52
End: 2022-12-16
Attending: FAMILY MEDICINE
Payer: COMMERCIAL

## 2022-12-16 DIAGNOSIS — C50.919 MALIGNANT NEOPLASM OF BREAST IN FEMALE, ESTROGEN RECEPTOR POSITIVE, UNSPECIFIED LATERALITY, UNSPECIFIED SITE OF BREAST (H): Primary | ICD-10-CM

## 2022-12-16 DIAGNOSIS — Z17.0 MALIGNANT NEOPLASM OF BREAST IN FEMALE, ESTROGEN RECEPTOR POSITIVE, UNSPECIFIED LATERALITY, UNSPECIFIED SITE OF BREAST (H): Primary | ICD-10-CM

## 2022-12-16 PROCEDURE — 96402 CHEMO HORMON ANTINEOPL SQ/IM: CPT

## 2022-12-16 PROCEDURE — 250N000011 HC RX IP 250 OP 636: Performed by: INTERNAL MEDICINE

## 2022-12-16 RX ADMIN — GOSERELIN ACETATE 3.6 MG: 3.6 IMPLANT SUBCUTANEOUS at 15:43

## 2022-12-16 NOTE — PROGRESS NOTES
Faslodex injection given today.  Pt tolerated injection in right lower abdominal tissue without incident.  Pt is scheduled to see MD regarding possible surgeon in Jan 2023.  Maribell Pulliam RN

## 2022-12-29 ENCOUNTER — TELEPHONE (OUTPATIENT)
Dept: OBGYN | Facility: CLINIC | Age: 52
End: 2022-12-29

## 2022-12-29 ENCOUNTER — OFFICE VISIT (OUTPATIENT)
Dept: OBGYN | Facility: CLINIC | Age: 52
End: 2022-12-29
Payer: COMMERCIAL

## 2022-12-29 ENCOUNTER — PREP FOR PROCEDURE (OUTPATIENT)
Dept: OBGYN | Facility: CLINIC | Age: 52
End: 2022-12-29

## 2022-12-29 VITALS
RESPIRATION RATE: 16 BRPM | BODY MASS INDEX: 26.99 KG/M2 | WEIGHT: 162 LBS | HEART RATE: 86 BPM | DIASTOLIC BLOOD PRESSURE: 94 MMHG | TEMPERATURE: 98.3 F | SYSTOLIC BLOOD PRESSURE: 136 MMHG | HEIGHT: 65 IN

## 2022-12-29 DIAGNOSIS — Z85.3 HISTORY OF BREAST CANCER: Primary | ICD-10-CM

## 2022-12-29 DIAGNOSIS — Z40.02 PROPHYLACTIC OVARY REMOVAL: Primary | ICD-10-CM

## 2022-12-29 PROCEDURE — 99203 OFFICE O/P NEW LOW 30 MIN: CPT | Performed by: OBSTETRICS & GYNECOLOGY

## 2022-12-29 NOTE — TELEPHONE ENCOUNTER
"[id number]  Desi Granado    You are now scheduled for surgery at [facility].  Below are the details for your surgery.  Please read the \"Preparing for Your Surgery\" instructions and let us know if you have any questions.    Type of surgery: LAPAROSCOPIC BILATERAL salpingo-oophorectomy (Bilateral)     Surgeon:  Elida Fay MD  Location of surgery: [facility] OR    Date of surgery: 2/7/23    Time: 7:30 am   Arrival Time: 6:30 am    Time can change, to be confirmed a couple of days prior by pre-op surgery nurse.    Pre-Op Appt Date: Patient to schedule with a PCP or Family Practice Provider within 30 days to the surgery.      Packet sent out: Yes  Pre-cert/Authorization completed:  TBD by Financial Securing Office.   MA Sterilization/Hysterectomy Acknowledgment Consent signed:       [facility]  [phone]    Fax: [phone]  Same Day Surgery [phone]  Fax: [phone]  [facility] [phone]    " no diabetes and no thyroid trouble.

## 2022-12-29 NOTE — PROGRESS NOTES
[facility]  OB/GYN Clinic   Gynecology Consult Note    CC:    Chief Complaint   Patient presents with     Consult     Surgery consult       HPI: Ms. Granado is a 52 year old  being seen for GYN consultation for ovary. She is on a medication called zoladex and if she has her ovaries removed, she can stop this monthly injection. She has ER+ breast cancer.  Additionally, she notes that since starting her breast cancer treatment, she sas intense pain with PAP smears and pelvic exams.   She had genetic testing done and does NOT carry a hereditary cancer syndrome.     GYN Hx: Started roladex for 4 years after breast surgery. This is a monthly injection. Wants to have ovaries out to be able to stop zoladex.   Never had abnormal PAP smears. No STIs. Not sexually active.     ROS: A 10 pt ROS was completed and found to be otherwise negative unless mentioned in the HPI.     PMH:  Past Medical History:   Diagnosis Date     Allergic rhinitis, cause unspecified      Hypertension 8 years?     Injury, other and unspecified, knee, leg, ankle, and foot 8th grade    left ankle injury     Invasive ductal carcinoma of breast, female, left (H) 10/26/2018     Unsatisfactory cervical cytology smear 10/06/2021       PSHx:   Past Surgical History:   Procedure Laterality Date     BIOPSY       BREAST LUMPECTOMY, RT/LT  6/23/10    Breast Lumpectomy RT for likely adenomatous lumps     COLONOSCOPY N/A 2018    Procedure: COLONOSCOPY;  colonoscopy;  Surgeon: Vijay Almanza MD;  Location: [facility]     COLONOSCOPY N/A 2022    Procedure: COLONOSCOPY, FLEXIBLE, WITH LESION REMOVAL USING SNARE and biopsy;  Surgeon: Nabila Hart MD;  Location: [facility]     ESOPHAGOSCOPY, GASTROSCOPY, DUODENOSCOPY (EGD), COMBINED N/A 10/1/2015    Procedure: COMBINED ESOPHAGOSCOPY, GASTROSCOPY, DUODENOSCOPY (EGD);  Surgeon: Vijay Almanza MD;  Location: [facility]     MASTECTOMY SIMPLE BILATERAL, SENTINEL NODE BILATERAL, COMBINED Bilateral  2018    Procedure: BILATERAL SIMPLE MASTECTOMIES WITH LEFT SENTINEL LYMPH NODE BIOPSY ;  Surgeon: Nabila Hart MD;  Location: [facility]     SOFT TISSUE SURGERY      Ankle surgery 20+ years ago     SURGICAL HISTORY OF -       arthroscopy of Left Ankle     SURGICAL HISTORY OF 1990    arthroscopy of Left Ankle     SURGICAL HISTORY OF -       4 wisdom teeth extracted       OBHx:   OB History    Para Term  AB Living   0 0 0 0 0 0   SAB IAB Ectopic Multiple Live Births   0 0 0 0 0       Medications:   calcium carbonate 600 mg-vitamin D 400 units (CALTRATE) 600-400 MG-UNIT per tablet, Take 1 tablet by mouth 2 times daily  exemestane (AROMASIN) 25 MG tablet, Take 1 tablet (25 mg) by mouth daily  Fluticasone Propionate (FLONASE ALLERGY RELIEF NA),   lisinopril (ZESTRIL) 30 MG tablet, Take 1 tablet (30 mg) by mouth daily  montelukast (SINGULAIR) 10 MG tablet, Take 1 tablet (10 mg) by mouth At Bedtime  propranolol (INDERAL) 10 MG tablet, Take 1-2 tablets (10-20 mg) by mouth 3 times daily as needed (anxiety)  vitamin D3 (CHOLECALCIFEROL) 2000 units tablet, Take 1 tablet by mouth daily    No current facility-administered medications on file prior to visit.      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 No       Family History:   Family History   Problem Relation Age of Onset     Hypertension Mother      Allergies Mother      Thyroid Disease Mother         Taking thyroid medication     Obesity Mother      Cerebrovascular Disease Mother         TIA Feb 2017     Hypertension Father      Thyroid Disease Father         two parathyroids removed     Cancer Maternal Grandmother         Bone CA     Allergies Maternal Grandmother      Circulatory Maternal Grandmother      Cerebrovascular Disease Maternal Grandmother      Other Cancer Maternal Grandmother         multiple myeloma     C.A.D. Maternal Grandfather      Respiratory Maternal Grandfather         asthma     Allergies Maternal Grandfather      Cerebrovascular Disease Maternal Grandfather      Alzheimer Disease Paternal Grandfather      Neurologic Disorder Paternal Grandfather      "    Parkinsons     Arthritis Paternal Grandmother      Respiratory Other         Emphysema     Diabetes Other      Asthma Other         generic emphysema     Genetic Disorder Other         genetic emphysema     Breast Cancer Sister         benign lump indicated future risk     Diabetes Other      Coronary Artery Disease Other      Other Cancer Other         multiple myeloma     Colon Cancer Other      Other Cancer Other         Acute Leukemia     Cerebrovascular Disease Other      Breast Cancer Other         told aunt was diagnosed - unsure of type & treatment     Cancer - colorectal No family hx of      Prostate Cancer No family hx of        Physical Exam:   Vitals:    12/29/22 1444   BP: (!) 136/94   BP Location: Left arm   Patient Position: Chair   Cuff Size: Adult Regular   Pulse: 86   Resp: 16   Temp: 98.3  F (36.8  C)   TempSrc: Tympanic   Weight: 73.5 kg (162 lb)   Height: 1.651 m (5' 5\")      Estimated body mass index is 26.96 kg/m  as calculated from the following:    Height as of this encounter: 1.651 m (5' 5\").    Weight as of this encounter: 73.5 kg (162 lb).    Gen: Pleasant, talkative female in no apparent distress   Respiratory: breathing comfortably on room air   Cardiac: Regular rate, warm and well-perfused.   MSK: Grossly normal movement of all four extremities  Psych: mood and affect bright     Labs/Imaging:   EXAM: CT ABDOMEN PELVIS W/O CONTRAST  LOCATION: [facility]  DATE/TIME: 3/20/2022 12:51 PM     INDICATION: Left lower quadrant abdominal pain, suspect diverticulitis.  COMPARISON: CT abdomen and pelvis 06/01/2018.  TECHNIQUE: CT scan of the abdomen and pelvis was performed without IV contrast. Multiplanar reformats were obtained. Dose reduction techniques were used.  CONTRAST: None.     FINDINGS:   LOWER CHEST: Normal.     HEPATOBILIARY: Normal.     PANCREAS: Normal.     SPLEEN: Normal.     ADRENAL GLANDS: Normal.     KIDNEYS/BLADDER: No urolithiasis or " hydronephrosis. No acute renal abnormality. Bladder is minimally distended.     BOWEL: Acute diverticulitis with moderate inflammatory change and wall thickening involving the distal descending colon left lower quadrant series 3 image 143 and on adjacent images. No abscess. No free air. No bowel obstruction. Normal appendix.     LYMPH NODES: Normal.     VASCULATURE: Unremarkable.     PELVIC ORGANS: Normal.     MUSCULOSKELETAL: Unremarkable.                                                                      IMPRESSION:   1.  Acute diverticulitis at the left lower quadrant proximal sigmoid colon. No abscess is seen    A&P: 51 yo F with hx of ER+ breast cancer who presents to discuss option of ovary removal so she can stop zoladex.   I did review the steps of a laparoscopic bilateral salpingo-oophorectomy and the rationale for additional removal of the fallopian tubes. Did consider the option of concurrent hysterectomy, but this would not be necessary without a cancer hereditary syndrome. Could be considered in the setting of tamoxifen use as well as painful pelvic exams/PAP smears (as these would no longer be indicated). We did compare the recovery of the two, and she prefers BSO at this time. Reviewed the risks of bleeding, infection and intraabdominal injury (both recognized and unrecognized).     Elida Fay MD  OB/GYN  12/29/2022

## 2022-12-29 NOTE — NURSING NOTE
"Initial BP (!) 136/94 (BP Location: Left arm, Patient Position: Chair, Cuff Size: Adult Regular)   Pulse 86   Temp 98.3  F (36.8  C) (Tympanic)   Resp 16   Ht 1.651 m (5' 5\")   Wt 73.5 kg (162 lb)   LMP 03/14/2019   BMI 26.96 kg/m   Estimated body mass index is 26.96 kg/m  as calculated from the following:    Height as of this encounter: 1.651 m (5' 5\").    Weight as of this encounter: 73.5 kg (162 lb). .      "

## 2022-12-30 NOTE — TELEPHONE ENCOUNTER
Pt called and asked to be rescheduled to 2-14-22.  Surgery moved.  SDS informed.  [place] updated.    -Jovana Sanders  Clinic Station

## 2023-01-09 ENCOUNTER — ONCOLOGY VISIT (OUTPATIENT)
Dept: ONCOLOGY | Facility: CLINIC | Age: 53
End: 2023-01-09
Attending: INTERNAL MEDICINE
Payer: COMMERCIAL

## 2023-01-09 VITALS
BODY MASS INDEX: 26.98 KG/M2 | HEART RATE: 95 BPM | RESPIRATION RATE: 14 BRPM | DIASTOLIC BLOOD PRESSURE: 93 MMHG | OXYGEN SATURATION: 99 % | WEIGHT: 161.9 LBS | HEIGHT: 65 IN | SYSTOLIC BLOOD PRESSURE: 149 MMHG

## 2023-01-09 DIAGNOSIS — Z78.0 MENOPAUSE: ICD-10-CM

## 2023-01-09 DIAGNOSIS — M85.852 OSTEOPENIA OF NECK OF LEFT FEMUR: ICD-10-CM

## 2023-01-09 DIAGNOSIS — Z79.811 AROMATASE INHIBITOR USE: ICD-10-CM

## 2023-01-09 DIAGNOSIS — C50.912 INVASIVE DUCTAL CARCINOMA OF BREAST, FEMALE, LEFT (H): Primary | ICD-10-CM

## 2023-01-09 PROCEDURE — G0463 HOSPITAL OUTPT CLINIC VISIT: HCPCS

## 2023-01-09 PROCEDURE — 99213 OFFICE O/P EST LOW 20 MIN: CPT | Performed by: INTERNAL MEDICINE

## 2023-01-09 RX ORDER — EXEMESTANE 25 MG/1
25 TABLET ORAL DAILY
Qty: 90 TABLET | Refills: 3 | Status: SHIPPED | OUTPATIENT
Start: 2023-01-09 | End: 2023-11-01

## 2023-01-09 NOTE — NURSING NOTE
"Oncology Rooming Note    January 9, 2023 3:08 PM   Desi Granado is a 52 year old female who presents for:    Chief Complaint   Patient presents with     Follow Up     Left Breast Cancer      Initial Vitals: BP (!) 149/93 (BP Location: Right arm, Patient Position: Sitting, Cuff Size: Adult Regular)   Pulse 95   Resp 14   Ht 1.651 m (5' 5\")   Wt 73.4 kg (161 lb 14.4 oz)   LMP 03/14/2019   SpO2 99%   BMI 26.94 kg/m   Estimated body mass index is 26.94 kg/m  as calculated from the following:    Height as of this encounter: 1.651 m (5' 5\").    Weight as of this encounter: 73.4 kg (161 lb 14.4 oz). Body surface area is 1.83 meters squared.  Data Unavailable Comment: Data Unavailable   Patient's last menstrual period was 03/14/2019.  Allergies reviewed: Yes  Medications reviewed: Yes    Medications: Medication refills not needed today.  Pharmacy name entered into [employer]:    [facility] [place] - [address] [address] [facility] [id number] - [place], [place] - [address]  [facility] - [place], [place] - [facility] [address] [facility] [employer] [facility] - [place], [place] - [address]    Clinical concerns: None      Alonso Lo RN              "

## 2023-01-09 NOTE — PROGRESS NOTES
Oncology Follow-Up Note  January 9, 2023    Ms. Granado is seen in follow up of her left breast cancer.     Oncologic History:  Invasive ductal carcinoma of breast, female, left (H)     Desi Granado was found to have developing focal asymmetry in the left breast at approximately 3 o'clock position about 7-8 cm from the nipple on the routine mammogram done on October 30, 2018. Subsequently she went on to have diagnostic mammogram and ultrasound. Directed sonogram at the site of mammographic finding showed a 1.6 cm irregular solid lesion. Subsequently the patient went on to have ultrasound-guided needle core biopsy done on October 17, 2018. The biopsy came back positive for invasive ductal carcinoma with papillary features. There is grade 2 out of 3. Angiolymphatic invasion was not seen. Ductal carcinoma in situ was not seen. The tumor was estrogen receptor positive about 100% and progesterone receptor +100% strong nuclear staining. HER-2/tamara came back negative for amplification. She had bilateral mastectomy. Surgical pathology revealed simple mastectomy sample of the left showing solid papillary carcinoma 22 mm. Grade 2. Margins were clear of involvement. 3 sentinel lymph nodes shows no evidence of metastatic disease. Lymphovascular invasion was not identified. The tumor is pT2pN0. Right breast shows simple mastectomy without any evidence of malignancy. Oncotype came back 0. She established care with Dr. Peña and was then put on Exemestane and Zoladex.    Interval History:  Nilda is doing well on treatment.  She is on exemestane as well as Zoladex injections monthly.  She is scheduled for BSO in early February so she doesn't have to do zoladex injections anymore.      Overall, she feels like she is doing well.      She has no f/c.  No chest pain or shortness of breath.  No n/v/d/c.  No nodules or masses across her chest.    She has mild hot flashes and fatigue.       Review Of Systems:  All other ROS negative  "unless mentioned in interval history.     Past medical, social, surgical, and family histories reviewed.     Allergies:        Allergies as of 07/09/2021 - Reviewed 07/09/2021   Allergen Reaction Noted     Aspirin Hives 08/03/2009     Dust mites Unknown 07/03/2009     Morphine Nausea 06/23/2008      Current Medications:  Current Outpatient Medications   Medication     calcium carbonate 600 mg-vitamin D 400 units (CALTRATE) 600-400 MG-UNIT per tablet     exemestane (AROMASIN) 25 MG tablet     Fluticasone Propionate (FLONASE ALLERGY RELIEF NA)     lisinopril (ZESTRIL) 30 MG tablet     montelukast (SINGULAIR) 10 MG tablet     propranolol (INDERAL) 10 MG tablet     vitamin D3 (CHOLECALCIFEROL) 2000 units tablet     No current facility-administered medications for this visit.     She gets semi-annual Prolia and monthly Zolodex at [facility].       Physical Exam:  Ht 1.651 m (5' 5\")   Wt 73.4 kg (161 lb 14.4 oz)   LMP 03/14/2019   BMI 26.94 kg/m      GENERAL: Healthy, alert and no distress  EYES: Eyes grossly normal to inspection. No discharge or erythema, or obvious scleral/conjunctival abnormalities.  CV: rrr  Lungs: clear  Abd; soft, nt, nd + bs  Ext: no edema  Breast: s/p bilateral mastectomy, no nodules or masses, no axillary adenopathy  PSYCH: Mentation appears normal, affect normal/bright, judgement and insight intact, normal speech and appearance well-groomed.    Laboratory/Imaging Studies:    DEXA scan 7/22/21  IMPRESSION:  1. Mild-moderate osteopenia in the left femoral neck, worse than on  the previous exam.  2. Mild osteopenia in the right femoral neck, mildly worse.  3. In the lumbar spine there is at least mild-moderate, if not  prominent, osteopenia. This is worse than on the previous exam.     Assessment and plan:  Ms. Granado is a 52-year-old female with left solid papillary carcinoma, Grade 2, ER+/PA+/HER2-. She is status post bilateral mastectomy and left sentinel node biopsy. Her disease is pT2N0(sn). " Oncotype score is 0. She is doing well on adjuvant AI. There is no clinical evidence of breast cancer recurrence.     Left breast cancer: she will continue on exemestane as well as Zoladex injections monthly. Will plan for at least 5 years of AI.  Zoladex will be discontinued after today assuming she undergoes BSO in February as is scheduled.      Osteopenia: continue semi-annual Prolia.  She is tolerating without a lot of side effects.    Essential HTN: on lisinopril    We discussed continuing every six months appts.    Elvia Grijalva MD     20 min were spent in reviewing records, counseling and coordinating care.

## 2023-01-09 NOTE — LETTER
1/9/2023         RE: Desi Granado  [address] [place] [address]        Dear Colleague,    Thank you for referring your patient, Desi Granado, to the [facility]. Please see a copy of my visit note below.        Oncology Follow-Up Note  January 9, 2023    Ms. Granado is seen in follow up of her left breast cancer.     Oncologic History:  Invasive ductal carcinoma of breast, female, left (H)     Desi Granado was found to have developing focal asymmetry in the left breast at approximately 3 o'clock position about 7-8 cm from the nipple on the routine mammogram done on October 30, 2018. Subsequently she went on to have diagnostic mammogram and ultrasound. Directed sonogram at the site of mammographic finding showed a 1.6 cm irregular solid lesion. Subsequently the patient went on to have ultrasound-guided needle core biopsy done on October 17, 2018. The biopsy came back positive for invasive ductal carcinoma with papillary features. There is grade 2 out of 3. Angiolymphatic invasion was not seen. Ductal carcinoma in situ was not seen. The tumor was estrogen receptor positive about 100% and progesterone receptor +100% strong nuclear staining. HER-2/tamara came back negative for amplification. She had bilateral mastectomy. Surgical pathology revealed simple mastectomy sample of the left showing solid papillary carcinoma 22 mm. Grade 2. Margins were clear of involvement. 3 sentinel lymph nodes shows no evidence of metastatic disease. Lymphovascular invasion was not identified. The tumor is pT2pN0. Right breast shows simple mastectomy without any evidence of malignancy. Oncotype came back 0. She established care with Dr. Peña and was then put on Exemestane and Zoladex.    Interval History:  Nilda is doing well on treatment.  She is on exemestane as well as Zoladex injections monthly.  She is scheduled for BSO in early February so she doesn't have to do  "zoladex injections anymore.      Overall, she feels like she is doing well.      She has no f/c.  No chest pain or shortness of breath.  No n/v/d/c.  No nodules or masses across her chest.    She has mild hot flashes and fatigue.       Review Of Systems:  All other ROS negative unless mentioned in interval history.     Past medical, social, surgical, and family histories reviewed.     Allergies:        Allergies as of 07/09/2021 - Reviewed 07/09/2021   Allergen Reaction Noted     Aspirin Hives 08/03/2009     Dust mites Unknown 07/03/2009     Morphine Nausea 06/23/2008      Current Medications:  Current Outpatient Medications   Medication     calcium carbonate 600 mg-vitamin D 400 units (CALTRATE) 600-400 MG-UNIT per tablet     exemestane (AROMASIN) 25 MG tablet     Fluticasone Propionate (FLONASE ALLERGY RELIEF NA)     lisinopril (ZESTRIL) 30 MG tablet     montelukast (SINGULAIR) 10 MG tablet     propranolol (INDERAL) 10 MG tablet     vitamin D3 (CHOLECALCIFEROL) 2000 units tablet     No current facility-administered medications for this visit.     She gets semi-annual Prolia and [facility]x at [facility].       Physical Exam:  Ht 1.651 m (5' 5\")   Wt 73.4 kg (161 lb 14.4 oz)   LMP 03/14/2019   BMI 26.94 kg/m      GENERAL: Healthy, alert and no distress  EYES: Eyes grossly normal to inspection. No discharge or erythema, or obvious scleral/conjunctival abnormalities.  CV: rrr  Lungs: clear  Abd; soft, nt, nd + bs  Ext: no edema  Breast: s/p bilateral mastectomy, no nodules or masses, no axillary adenopathy  PSYCH: Mentation appears normal, affect normal/bright, judgement and insight intact, normal speech and appearance well-groomed.    Laboratory/Imaging Studies:    DEXA scan 7/22/21  IMPRESSION:  1. Mild-moderate osteopenia in the left femoral neck, worse than on  the previous exam.  2. Mild osteopenia in the right femoral neck, mildly worse.  3. In the lumbar spine there is at least mild-moderate, if " not  prominent, osteopenia. This is worse than on the previous exam.     Assessment and plan:  Ms. Granado is a 52-year-old female with left solid papillary carcinoma, Grade 2, ER+/LA+/HER2-. She is status post bilateral mastectomy and left sentinel node biopsy. Her disease is pT2N0(sn). Oncotype score is 0. She is doing well on adjuvant AI. There is no clinical evidence of breast cancer recurrence.     Left breast cancer: she will continue on exemestane as well as Zoladex injections monthly. Will plan for at least 5 years of AI.  Zoladex will be discontinued after today assuming she undergoes BSO in February as is scheduled.      Osteopenia: continue semi-annual Prolia.  She is tolerating without a lot of side effects.    Essential HTN: on lisinopril    We discussed continuing every six months appts.    Elvia Grijalva MD     20 min were spent in reviewing records, counseling and coordinating care.

## 2023-01-12 ENCOUNTER — MYC MEDICAL ADVICE (OUTPATIENT)
Dept: FAMILY MEDICINE | Facility: CLINIC | Age: 53
End: 2023-01-12

## 2023-01-12 ENCOUNTER — TELEPHONE (OUTPATIENT)
Dept: OBGYN | Facility: CLINIC | Age: 53
End: 2023-01-12

## 2023-01-12 NOTE — TELEPHONE ENCOUNTER
Pt calling to see if we could help her get a prior auth started for her surgery that she is having 2/14/23 with Dr. Fay. I did explain the process to her. I sent Desi Katz ([employer]so Auth team) an e-mail to reach out to patient.       Génesis Milton   Clinic Station [place]   [facility]  [phone]

## 2023-01-12 NOTE — TELEPHONE ENCOUNTER
BP Readings from Last 6 Encounters:   01/09/23 (!) 149/93   12/29/22 (!) 136/94   11/22/22 130/80   11/18/22 123/69   10/21/22 134/86   10/13/22 104/78

## 2023-01-13 ENCOUNTER — INFUSION THERAPY VISIT (OUTPATIENT)
Dept: INFUSION THERAPY | Facility: CLINIC | Age: 53
End: 2023-01-13
Attending: INTERNAL MEDICINE
Payer: COMMERCIAL

## 2023-01-13 VITALS
DIASTOLIC BLOOD PRESSURE: 83 MMHG | HEART RATE: 87 BPM | RESPIRATION RATE: 14 BRPM | SYSTOLIC BLOOD PRESSURE: 136 MMHG | TEMPERATURE: 98.9 F

## 2023-01-13 DIAGNOSIS — Z17.0 MALIGNANT NEOPLASM OF BREAST IN FEMALE, ESTROGEN RECEPTOR POSITIVE, UNSPECIFIED LATERALITY, UNSPECIFIED SITE OF BREAST (H): Primary | ICD-10-CM

## 2023-01-13 DIAGNOSIS — C50.919 MALIGNANT NEOPLASM OF BREAST IN FEMALE, ESTROGEN RECEPTOR POSITIVE, UNSPECIFIED LATERALITY, UNSPECIFIED SITE OF BREAST (H): Primary | ICD-10-CM

## 2023-01-13 PROCEDURE — 250N000011 HC RX IP 250 OP 636: Performed by: INTERNAL MEDICINE

## 2023-01-13 PROCEDURE — 96402 CHEMO HORMON ANTINEOPL SQ/IM: CPT

## 2023-01-13 RX ADMIN — GOSERELIN ACETATE 3.6 MG: 3.6 IMPLANT SUBCUTANEOUS at 15:40

## 2023-01-13 ASSESSMENT — PAIN SCALES - GENERAL: PAINLEVEL: NO PAIN (0)

## 2023-01-13 NOTE — PROGRESS NOTES
Infusion Nursing Note:  Desi Granado presents today for Zoladex.    Patient seen by provider today: No   present during visit today: Not Applicable.    Note: N/A.    Intravenous Access:  No Intravenous access/labs at this visit.    Treatment Conditions:  Results reviewed, labs MET treatment parameters, ok to proceed with treatment.    Post Infusion Assessment:  Patient tolerated injection without incident.     Discharge Plan:   Patient discharged in stable condition accompanied by: self.  Departure Mode: Ambulatory.      Kendrick Le RN

## 2023-02-03 ENCOUNTER — TELEPHONE (OUTPATIENT)
Dept: FAMILY MEDICINE | Facility: CLINIC | Age: 53
End: 2023-02-03
Payer: COMMERCIAL

## 2023-02-03 NOTE — TELEPHONE ENCOUNTER
Patient calling to leave a message with Dr. Hart regarding the below. She was told by Dr. Hart if she had a flare up within a year to call her and let her know. It hasn't been a year yet and she's having a flare up now. Uses [facility] in [place]. Has used Augementin in the past and this works good for her. Pleases call [phone]

## 2023-02-03 NOTE — TELEPHONE ENCOUNTER
Call placed to Patient.   States that she has had this at least 4 times before.  Pain is in the same spot as before  Started eating a soft diet  Denies temp   Denies vomiting   Eat small portions   Having surgery on 2/14/23  No appointments open today  Recommended urgent care  Discussed red flag symptoms with Patient  Patient states that she is going to call Dr Hart first.   Deejay Combs RN

## 2023-02-03 NOTE — TELEPHONE ENCOUNTER
Reason for Call:  Diverticulitis     Detailed comments: Pain Lower left Abd. Pt said she has had this before. Pt is wondering if she can get something for this.This bout started last night.  Pt is having her Ovaries removed Surgery 2/14/23  Please Advise.    Phone Number Patient can be reached at: Home number on file [phone] (home)    Best Time: Any Time      Can we leave a detailed message on this number? YES    Call taken on 2/3/2023 at 8:31 AM by Basilia Medina

## 2023-02-03 NOTE — TELEPHONE ENCOUNTER
Per Dr. Hart, patient to try ibuprofen 600mg Q6hr with food for the next 24-48hrs.  If it doesn't get better, to let Dr. Hart know or the surgeon on call know for further work up.  The standard for uncomplicated diverticulitis is to try NSAIDs before antibiotic.     Patient updated. Patient also asked about resection of colon since she had a flare up within 1 year of last visit. I advised she she how she responds to NSAIDS, then schedule a f/u with Dr. Hart to discuss.    Tawny Means RN on 2/3/2023 at 11:14 AM

## 2023-02-09 ENCOUNTER — OFFICE VISIT (OUTPATIENT)
Dept: FAMILY MEDICINE | Facility: CLINIC | Age: 53
End: 2023-02-09
Payer: COMMERCIAL

## 2023-02-09 VITALS
DIASTOLIC BLOOD PRESSURE: 88 MMHG | BODY MASS INDEX: 26.66 KG/M2 | OXYGEN SATURATION: 98 % | HEIGHT: 65 IN | SYSTOLIC BLOOD PRESSURE: 139 MMHG | HEART RATE: 77 BPM | TEMPERATURE: 98.5 F | WEIGHT: 160 LBS

## 2023-02-09 DIAGNOSIS — Z01.818 PREOP GENERAL PHYSICAL EXAM: Primary | ICD-10-CM

## 2023-02-09 DIAGNOSIS — Z79.811 AROMATASE INHIBITOR USE: ICD-10-CM

## 2023-02-09 PROCEDURE — 99213 OFFICE O/P EST LOW 20 MIN: CPT | Performed by: FAMILY MEDICINE

## 2023-02-09 PROCEDURE — 93000 ELECTROCARDIOGRAM COMPLETE: CPT | Performed by: FAMILY MEDICINE

## 2023-02-09 NOTE — H&P (VIEW-ONLY)
[facility]  [address] [facility] [address]  Phone: [phone]  Primary Provider: Pearl Lay  Pre-op Performing Provider: PEARL LAY      PREOPERATIVE EVALUATION:  Today's date: 2/9/2023    Desi Granado is a 52 year old female who presents for a preoperative evaluation.    Surgical Information:  Surgery/Procedure: LAPAROSCOPIC BILATERAL salpingo-oophorectomy  Surgery Location: [facility]  Surgeon: Elida Fay MD  Surgery Date: 2/14/23  Time of Surgery: 9:30am  Where patient plans to recover: At home with family  Fax number for surgical facility: Note does not need to be faxed, will be available electronically in Epic.    Type of Anesthesia Anticipated: General    Assessment & Plan     The proposed surgical procedure is considered INTERMEDIATE risk.    Preop general physical exam    - EKG 12-lead complete w/read - Clinics    Aromatase inhibitor use  Reason for the surgery              Risks and Recommendations:  The patient has the following additional risks and recommendations for perioperative complications:   - No identified additional risk factors other than previously addressed    Medication Instructions:  Patient is to take all scheduled medications on the day of surgery    RECOMMENDATION:  APPROVAL GIVEN to proceed with proposed procedure, without further diagnostic evaluation.        Subjective     HPI related to upcoming procedure: needs them out to cont breast cancer therapy     Preop Questions 2/2/2023   1. Have you ever had a heart attack or stroke? No   2. Have you ever had surgery on your heart or blood vessels, such as a stent placement, a coronary artery bypass, or surgery on an artery in your head, neck, heart, or legs? No   3. Do you have chest pain with activity? No   4. Do you have a history of  heart failure? No   5. Do you currently have a cold, bronchitis or symptoms of other infection? No   6. Do you have a cough,  shortness of breath, or wheezing? No   7. Do you or anyone in your family have previous history of blood clots? YES - uncles    8. Do you or does anyone in your family have a serious bleeding problem such as prolonged bleeding following surgeries or cuts? No   9. Have you ever had problems with anemia or been told to take iron pills? No   10. Have you had any abnormal blood loss such as black, tarry or bloody stools, or abnormal vaginal bleeding? No   11. Have you ever had a blood transfusion? No   12. Are you willing to have a blood transfusion if it is medically needed before, during, or after your surgery? Yes   13. Have you or any of your relatives ever had problems with anesthesia? No   14. Do you have sleep apnea, excessive snoring or daytime drowsiness? No   15. Do you have any artifical heart valves or other implanted medical devices like a pacemaker, defibrillator, or continuous glucose monitor? No   16. Do you have artificial joints? No   17. Are you allergic to latex? No   18. Is there any chance that you may be pregnant? No       Health Care Directive:  Patient has a Health Care Directive on file      Preoperative Review of :   reviewed - controlled substances reflected in medication list.      Status of Chronic Conditions:  See problem list for active medical problems.  Problems all longstanding and stable, except as noted/documented.  See ROS for pertinent symptoms related to these conditions.      Review of Systems  Constitutional, neuro, ENT, endocrine, pulmonary, cardiac, gastrointestinal, genitourinary, musculoskeletal, integument and psychiatric systems are negative, except as otherwise noted.    Patient Active Problem List    Diagnosis Date Noted     Menopause 07/23/2021     Priority: Medium     Aromatase inhibitor use 07/23/2021     Priority: Medium     Osteopenia of neck of femur 07/23/2021     Priority: Medium     Breast cancer (H) 11/08/2018     Priority: Medium     Invasive ductal  carcinoma of breast, female, left (H) 10/26/2018     Priority: Medium     CARDIOVASCULAR SCREENING; LDL GOAL LESS THAN 160 10/31/2010     Priority: Medium     FAMILY HISTORY OF ENDOCRINE DISEASE( other endo or metabol) 08/16/2010     Priority: Medium     Essential hypertension, benign 06/21/2007     Priority: Medium     Family history of other cardiovascular diseases 12/12/2006     Priority: Medium     Problem list name updated by automated process. Provider to review and confirm  Problem list name updated by automated process. Provider to review       Allergic rhinitis      Priority: Medium     Problem list name updated by automated process. Provider to review       Injury, other and unspecified, knee, leg, ankle, and foot      Priority: Medium     left ankle injury        Past Medical History:   Diagnosis Date     Allergic rhinitis, cause unspecified      Hypertension 8 years?     Injury, other and unspecified, knee, leg, ankle, and foot 8th grade    left ankle injury     Invasive ductal carcinoma of breast, female, left (H) 10/26/2018     Unsatisfactory cervical cytology smear 10/06/2021     Past Surgical History:   Procedure Laterality Date     BIOPSY       BREAST LUMPECTOMY, RT/LT  6/23/10    Breast Lumpectomy RT for likely adenomatous lumps     COLONOSCOPY N/A 8/16/2018    Procedure: COLONOSCOPY;  colonoscopy;  Surgeon: Vijay Almanza MD;  Location: [facility]     COLONOSCOPY N/A 5/23/2022    Procedure: COLONOSCOPY, FLEXIBLE, WITH LESION REMOVAL USING SNARE and biopsy;  Surgeon: Nabila aHrt MD;  Location: [facility]     ESOPHAGOSCOPY, GASTROSCOPY, DUODENOSCOPY (EGD), COMBINED N/A 10/1/2015    Procedure: COMBINED ESOPHAGOSCOPY, GASTROSCOPY, DUODENOSCOPY (EGD);  Surgeon: Vijay Almanza MD;  Location: [facility]     MASTECTOMY SIMPLE BILATERAL, SENTINEL NODE BILATERAL, COMBINED Bilateral 11/8/2018    Procedure: BILATERAL SIMPLE MASTECTOMIES WITH LEFT SENTINEL LYMPH NODE BIOPSY ;  Surgeon: Nabila Hart MD;  Location:  OR  "    SOFT TISSUE SURGERY      Ankle surgery 20+ years ago     SURGICAL HISTORY OF -   1989    arthroscopy of Left Ankle     SURGICAL HISTORY OF -   1990    arthroscopy of Left Ankle     SURGICAL HISTORY OF -   1993    4 wisdom teeth extracted     Current Outpatient Medications   Medication Sig Dispense Refill     calcium carbonate 600 mg-vitamin D 400 units (CALTRATE) 600-400 MG-UNIT per tablet Take 1 tablet by mouth 2 times daily       exemestane (AROMASIN) 25 MG tablet Take 1 tablet (25 mg) by mouth daily 90 tablet 3     Fluticasone Propionate (FLONASE ALLERGY RELIEF NA)        lisinopril (ZESTRIL) 30 MG tablet Take 1 tablet (30 mg) by mouth daily 90 tablet 3     montelukast (SINGULAIR) 10 MG tablet Take 1 tablet (10 mg) by mouth At Bedtime 90 tablet 3     propranolol (INDERAL) 10 MG tablet Take 1-2 tablets (10-20 mg) by mouth 3 times daily as needed (anxiety) 60 tablet 3     vitamin D3 (CHOLECALCIFEROL) 2000 units tablet Take 1 tablet by mouth daily         Allergies   Allergen Reactions     Aspirin Hives     Dust Mites Unknown     Morphine Nausea     Other reaction(s): GI Intolerance        Social History     Tobacco Use     Smoking status: Never     Smokeless tobacco: Never   Substance Use Topics     Alcohol use: No     Alcohol/week: 0.0 standard drinks     Comment: very rare     History   Drug Use No         Objective     /88 (BP Location: Right arm, Patient Position: Sitting, Cuff Size: Adult Regular)   Pulse 77   Temp 98.5  F (36.9  C) (Tympanic)   Ht 1.651 m (5' 5\")   Wt 72.6 kg (160 lb)   LMP 03/14/2019   SpO2 98%   BMI 26.63 kg/m      Physical Exam    GENERAL APPEARANCE: healthy, alert and no distress     EYES: EOMI, PERRL     HENT: ear canals and TM's normal and nose and mouth without ulcers or lesions     NECK: no adenopathy, no asymmetry, masses, or scars and thyroid normal to palpation     RESP: lungs clear to auscultation - no rales, rhonchi or wheezes     CV: regular rates and rhythm, " normal S1 S2, no S3 or S4 and no murmur, click or rub     MS: extremities normal- no gross deformities noted, no evidence of inflammation in joints, FROM in all extremities.     SKIN: no suspicious lesions or rashes     NEURO: Normal strength and tone, sensory exam grossly normal, mentation intact and speech normal     PSYCH: mentation appears normal. and affect normal/bright     LYMPHATICS: No cervical adenopathy    Recent Labs   Lab Test 07/29/22  1529 03/20/22  1210 02/11/22  1518 01/07/22  1306   HGB  --  14.3  --  14.2   PLT  --  390  --  287   NA  --  137  --  137   POTASSIUM  --  3.7  --  3.6   CR 1.00 0.88   < > 0.79    < > = values in this interval not displayed.        Diagnostics:  Labs pending at this time.  Results will be reviewed when available.   EKG required for htn and not completed in the last 90 days.     Revised Cardiac Risk Index (RCRI):  The patient has the following serious cardiovascular risks for perioperative complications:   - No serious cardiac risks = 0 points     RCRI Interpretation: 0 points: Class I (very low risk - 0.4% complication rate)           Signed Electronically by: Pearl Grover MD  Copy of this evaluation report is provided to requesting physician.

## 2023-02-09 NOTE — PATIENT INSTRUCTIONS
For informational purposes only. Not to replace the advice of your health care provider. Copyright   2003,  [employer] [facility]. All rights reserved. Clinically reviewed by Cony Archer MD. [employer] [id number] - REV .  Preparing for Your Surgery  Getting started  A nurse will call you to review your health history and instructions. They will give you an arrival time based on your scheduled surgery time. Please be ready to share:    Your doctor's clinic name and phone number    Your medical, surgical, and anesthesia history    A list of allergies and sensitivities    A list of medicines, including herbal treatments and over-the-counter drugs    Whether the patient has a legal guardian (ask how to send us the papers in advance)  Please tell us if you're pregnant--or if there's any chance you might be pregnant. Some surgeries may injure a fetus (unborn baby), so they require a pregnancy test. Surgeries that are safe for a fetus don't always need a test, and you can choose whether to have one.   If you have a child who's having surgery, please ask for a copy of Preparing for Your Child's Surgery.    Preparing for surgery    Within 10 to 30 days of surgery: Have a pre-op exam (sometimes called an H&P, or History and Physical). This can be done at a clinic or pre-operative center.  ? If you're having a , you may not need this exam. Talk to your care team.    At your pre-op exam, talk to your care team about all medicines you take. If you need to stop any medicines before surgery, ask when to start taking them again.  ? We do this for your safety. Many medicines can make you bleed too much during surgery. Some change how well surgery (anesthesia) drugs work.    Call your insurance company to let them know you're having surgery. (If you don't have insurance, call [phone].)    Call your clinic if there's any change in your health. This includes signs of a cold or flu (sore throat, runny nose,  cough, rash, fever). It also includes a scrape or scratch near the surgery site.    If you have questions on the day of surgery, call your hospital or surgery center.  Eating and drinking guidelines  For your safety: Unless your surgeon tells you otherwise, follow the guidelines below.    Eat and drink as usual until 8 hours before you arrive for surgery. After that, no food or milk.    Drink clear liquids until 2 hours before you arrive. These are liquids you can see through, like water, Gatorade, and Propel Water. They also include plain black coffee and tea (no cream or milk), candy, and breath mints. You can spit out gum when you arrive.    If you drink alcohol: Stop drinking it the night before surgery.    If your care team tells you to take medicine on the morning of surgery, it's okay to take it with a sip of water.  Preventing infection    Shower or bathe the night before and morning of your surgery. Follow the instructions your clinic gave you. (If no instructions, use regular soap.)    Don't shave or clip hair near your surgery site. We'll remove the hair if needed.    Don't smoke or vape the morning of surgery. You may chew nicotine gum up to 2 hours before surgery. A nicotine patch is okay.  ? Note: Some surgeries require you to completely quit smoking and nicotine. Check with your surgeon.    Your care team will make every effort to keep you safe from infection. We will:  ? Clean our hands often with soap and water (or an alcohol-based hand rub).  ? Clean the skin at your surgery site with a special soap that kills germs.  ? Give you a special gown to keep you warm. (Cold raises the risk of infection.)  ? Wear special hair covers, masks, gowns and gloves during surgery.  ? Give antibiotic medicine, if prescribed. Not all surgeries need antibiotics.  What to bring on the day of surgery    Photo ID and insurance card    Copy of your health care directive, if you have one    Glasses and hearing aids (bring  cases)  ? You can't wear contacts during surgery    Inhaler and eye drops, if you use them (tell us about these when you arrive)    CPAP machine or breathing device, if you use them    A few personal items, if spending the night    If you have . . .  ? A pacemaker, ICD (cardiac defibrillator) or other implant: Bring the ID card.  ? An implanted stimulator: Bring the remote control.  ? A legal guardian: Bring a copy of the certified (court-stamped) guardianship papers.  Please remove any jewelry, including body piercings. Leave jewelry and other valuables at home.  If you're going home the day of surgery    You must have a responsible adult drive you home. They should stay with you overnight as well.    If you don't have someone to stay with you, and you aren't safe to go home alone, we may keep you overnight. Insurance often won't pay for this.  After surgery  If it's hard to control your pain or you need more pain medicine, please call your surgeon's office.  Questions?   If you have any questions for your care team, list them here: _________________________________________________________________________________________________________________________________________________________________________ ____________________________________ ____________________________________ ____________________________________

## 2023-02-09 NOTE — PROGRESS NOTES
[facility]  [address] [facility] [address]  Phone: [phone]  Primary Provider: Pearl Lay  Pre-op Performing Provider: PEARL LAY      PREOPERATIVE EVALUATION:  Today's date: 2/9/2023    Desi Granado is a 52 year old female who presents for a preoperative evaluation.    Surgical Information:  Surgery/Procedure: LAPAROSCOPIC BILATERAL salpingo-oophorectomy  Surgery Location: [facility]  Surgeon: Elida Fay MD  Surgery Date: 2/14/23  Time of Surgery: 9:30am  Where patient plans to recover: At home with family  Fax number for surgical facility: Note does not need to be faxed, will be available electronically in Epic.    Type of Anesthesia Anticipated: General    Assessment & Plan     The proposed surgical procedure is considered INTERMEDIATE risk.    Preop general physical exam    - EKG 12-lead complete w/read - Clinics    Aromatase inhibitor use  Reason for the surgery              Risks and Recommendations:  The patient has the following additional risks and recommendations for perioperative complications:   - No identified additional risk factors other than previously addressed    Medication Instructions:  Patient is to take all scheduled medications on the day of surgery    RECOMMENDATION:  APPROVAL GIVEN to proceed with proposed procedure, without further diagnostic evaluation.        Subjective     HPI related to upcoming procedure: needs them out to cont breast cancer therapy     Preop Questions 2/2/2023   1. Have you ever had a heart attack or stroke? No   2. Have you ever had surgery on your heart or blood vessels, such as a stent placement, a coronary artery bypass, or surgery on an artery in your head, neck, heart, or legs? No   3. Do you have chest pain with activity? No   4. Do you have a history of  heart failure? No   5. Do you currently have a cold, bronchitis or symptoms of other infection? No   6. Do you have a cough,  shortness of breath, or wheezing? No   7. Do you or anyone in your family have previous history of blood clots? YES - uncles    8. Do you or does anyone in your family have a serious bleeding problem such as prolonged bleeding following surgeries or cuts? No   9. Have you ever had problems with anemia or been told to take iron pills? No   10. Have you had any abnormal blood loss such as black, tarry or bloody stools, or abnormal vaginal bleeding? No   11. Have you ever had a blood transfusion? No   12. Are you willing to have a blood transfusion if it is medically needed before, during, or after your surgery? Yes   13. Have you or any of your relatives ever had problems with anesthesia? No   14. Do you have sleep apnea, excessive snoring or daytime drowsiness? No   15. Do you have any artifical heart valves or other implanted medical devices like a pacemaker, defibrillator, or continuous glucose monitor? No   16. Do you have artificial joints? No   17. Are you allergic to latex? No   18. Is there any chance that you may be pregnant? No       Health Care Directive:  Patient has a Health Care Directive on file      Preoperative Review of :   reviewed - controlled substances reflected in medication list.      Status of Chronic Conditions:  See problem list for active medical problems.  Problems all longstanding and stable, except as noted/documented.  See ROS for pertinent symptoms related to these conditions.      Review of Systems  Constitutional, neuro, ENT, endocrine, pulmonary, cardiac, gastrointestinal, genitourinary, musculoskeletal, integument and psychiatric systems are negative, except as otherwise noted.    Patient Active Problem List    Diagnosis Date Noted     Menopause 07/23/2021     Priority: Medium     Aromatase inhibitor use 07/23/2021     Priority: Medium     Osteopenia of neck of femur 07/23/2021     Priority: Medium     Breast cancer (H) 11/08/2018     Priority: Medium     Invasive ductal  carcinoma of breast, female, left (H) 10/26/2018     Priority: Medium     CARDIOVASCULAR SCREENING; LDL GOAL LESS THAN 160 10/31/2010     Priority: Medium     FAMILY HISTORY OF ENDOCRINE DISEASE( other endo or metabol) 08/16/2010     Priority: Medium     Essential hypertension, benign 06/21/2007     Priority: Medium     Family history of other cardiovascular diseases 12/12/2006     Priority: Medium     Problem list name updated by automated process. Provider to review and confirm  Problem list name updated by automated process. Provider to review       Allergic rhinitis      Priority: Medium     Problem list name updated by automated process. Provider to review       Injury, other and unspecified, knee, leg, ankle, and foot      Priority: Medium     left ankle injury        Past Medical History:   Diagnosis Date     Allergic rhinitis, cause unspecified      Hypertension 8 years?     Injury, other and unspecified, knee, leg, ankle, and foot 8th grade    left ankle injury     Invasive ductal carcinoma of breast, female, left (H) 10/26/2018     Unsatisfactory cervical cytology smear 10/06/2021     Past Surgical History:   Procedure Laterality Date     BIOPSY       BREAST LUMPECTOMY, RT/LT  6/23/10    Breast Lumpectomy RT for likely adenomatous lumps     COLONOSCOPY N/A 8/16/2018    Procedure: COLONOSCOPY;  colonoscopy;  Surgeon: Vijay Almanza MD;  Location: [facility]     COLONOSCOPY N/A 5/23/2022    Procedure: COLONOSCOPY, FLEXIBLE, WITH LESION REMOVAL USING SNARE and biopsy;  Surgeon: Nabila Hart MD;  Location: [facility]     ESOPHAGOSCOPY, GASTROSCOPY, DUODENOSCOPY (EGD), COMBINED N/A 10/1/2015    Procedure: COMBINED ESOPHAGOSCOPY, GASTROSCOPY, DUODENOSCOPY (EGD);  Surgeon: Vijay Almanza MD;  Location: [facility]     MASTECTOMY SIMPLE BILATERAL, SENTINEL NODE BILATERAL, COMBINED Bilateral 11/8/2018    Procedure: BILATERAL SIMPLE MASTECTOMIES WITH LEFT SENTINEL LYMPH NODE BIOPSY ;  Surgeon: Nabila Hart MD;  Location:  OR  "    SOFT TISSUE SURGERY      Ankle surgery 20+ years ago     SURGICAL HISTORY OF -   1989    arthroscopy of Left Ankle     SURGICAL HISTORY OF -   1990    arthroscopy of Left Ankle     SURGICAL HISTORY OF -   1993    4 wisdom teeth extracted     Current Outpatient Medications   Medication Sig Dispense Refill     calcium carbonate 600 mg-vitamin D 400 units (CALTRATE) 600-400 MG-UNIT per tablet Take 1 tablet by mouth 2 times daily       exemestane (AROMASIN) 25 MG tablet Take 1 tablet (25 mg) by mouth daily 90 tablet 3     Fluticasone Propionate (FLONASE ALLERGY RELIEF NA)        lisinopril (ZESTRIL) 30 MG tablet Take 1 tablet (30 mg) by mouth daily 90 tablet 3     montelukast (SINGULAIR) 10 MG tablet Take 1 tablet (10 mg) by mouth At Bedtime 90 tablet 3     propranolol (INDERAL) 10 MG tablet Take 1-2 tablets (10-20 mg) by mouth 3 times daily as needed (anxiety) 60 tablet 3     vitamin D3 (CHOLECALCIFEROL) 2000 units tablet Take 1 tablet by mouth daily         Allergies   Allergen Reactions     Aspirin Hives     Dust Mites Unknown     Morphine Nausea     Other reaction(s): GI Intolerance        Social History     Tobacco Use     Smoking status: Never     Smokeless tobacco: Never   Substance Use Topics     Alcohol use: No     Alcohol/week: 0.0 standard drinks     Comment: very rare     History   Drug Use No         Objective     /88 (BP Location: Right arm, Patient Position: Sitting, Cuff Size: Adult Regular)   Pulse 77   Temp 98.5  F (36.9  C) (Tympanic)   Ht 1.651 m (5' 5\")   Wt 72.6 kg (160 lb)   LMP 03/14/2019   SpO2 98%   BMI 26.63 kg/m      Physical Exam    GENERAL APPEARANCE: healthy, alert and no distress     EYES: EOMI, PERRL     HENT: ear canals and TM's normal and nose and mouth without ulcers or lesions     NECK: no adenopathy, no asymmetry, masses, or scars and thyroid normal to palpation     RESP: lungs clear to auscultation - no rales, rhonchi or wheezes     CV: regular rates and rhythm, " normal S1 S2, no S3 or S4 and no murmur, click or rub     MS: extremities normal- no gross deformities noted, no evidence of inflammation in joints, FROM in all extremities.     SKIN: no suspicious lesions or rashes     NEURO: Normal strength and tone, sensory exam grossly normal, mentation intact and speech normal     PSYCH: mentation appears normal. and affect normal/bright     LYMPHATICS: No cervical adenopathy    Recent Labs   Lab Test 07/29/22  1529 03/20/22  1210 02/11/22  1518 01/07/22  1306   HGB  --  14.3  --  14.2   PLT  --  390  --  287   NA  --  137  --  137   POTASSIUM  --  3.7  --  3.6   CR 1.00 0.88   < > 0.79    < > = values in this interval not displayed.        Diagnostics:  Labs pending at this time.  Results will be reviewed when available.   EKG required for htn and not completed in the last 90 days.     Revised Cardiac Risk Index (RCRI):  The patient has the following serious cardiovascular risks for perioperative complications:   - No serious cardiac risks = 0 points     RCRI Interpretation: 0 points: Class I (very low risk - 0.4% complication rate)           Signed Electronically by: Pearl Grover MD  Copy of this evaluation report is provided to requesting physician.

## 2023-02-13 ENCOUNTER — ANESTHESIA EVENT (OUTPATIENT)
Dept: SURGERY | Facility: CLINIC | Age: 53
End: 2023-02-13
Payer: COMMERCIAL

## 2023-02-13 NOTE — ANESTHESIA PREPROCEDURE EVALUATION
Anesthesia Pre-Procedure Evaluation    Patient: Desi Granado   MRN: [id number] : 1970        Procedure : Procedure(s):  LAPAROSCOPIC BILATERAL salpingo-oophorectomy          Past Medical History:   Diagnosis Date     Allergic rhinitis, cause unspecified      Hypertension 8 years?     Injury, other and unspecified, knee, leg, ankle, and foot 8th grade    left ankle injury     Invasive ductal carcinoma of breast, female, left (H) 10/26/2018     Unsatisfactory cervical cytology smear 10/06/2021      Past Surgical History:   Procedure Laterality Date     BIOPSY       BREAST LUMPECTOMY, RT/LT  6/23/10    Breast Lumpectomy RT for likely adenomatous lumps     COLONOSCOPY N/A 2018    Procedure: COLONOSCOPY;  colonoscopy;  Surgeon: Vijay Almanza MD;  Location: [facility]     COLONOSCOPY N/A 2022    Procedure: COLONOSCOPY, FLEXIBLE, WITH LESION REMOVAL USING SNARE and biopsy;  Surgeon: Nabila Hart MD;  Location: [facility]     ESOPHAGOSCOPY, GASTROSCOPY, DUODENOSCOPY (EGD), COMBINED N/A 10/1/2015    Procedure: COMBINED ESOPHAGOSCOPY, GASTROSCOPY, DUODENOSCOPY (EGD);  Surgeon: Vijay Almanza MD;  Location: [facility]     MASTECTOMY SIMPLE BILATERAL, SENTINEL NODE BILATERAL, COMBINED Bilateral 2018    Procedure: BILATERAL SIMPLE MASTECTOMIES WITH LEFT SENTINEL LYMPH NODE BIOPSY ;  Surgeon: Nabila Hart MD;  Location: [facility]     SOFT TISSUE SURGERY      Ankle surgery 20+ years ago     SURGICAL HISTORY OF -       arthroscopy of Left Ankle     SURGICAL HISTORY OF -       arthroscopy of Left Ankle     SURGICAL HISTORY OF -       4 wisdom teeth extracted      Allergies   Allergen Reactions     Aspirin Hives     Dust Mites Unknown     Morphine Nausea     Other reaction(s): GI Intolerance      Social History     Tobacco Use     Smoking status: Never     Smokeless tobacco: Never   Substance Use Topics     Alcohol use: No     Alcohol/week: 0.0 standard drinks     Comment: very rare      Wt Readings from Last  1 Encounters:   02/09/23 72.6 kg (160 lb)        Anesthesia Evaluation   Pt has had prior anesthetic. Type: General.    No history of anesthetic complications       ROS/MED HX  ENT/Pulmonary:  - neg pulmonary ROS   (+) allergic rhinitis,     Neurologic:  - neg neurologic ROS     Cardiovascular:  - neg cardiovascular ROS   (+) hypertension-----    METS/Exercise Tolerance: >4 METS    Hematologic:  - neg hematologic  ROS     Musculoskeletal:  - neg musculoskeletal ROS     GI/Hepatic:  - neg GI/hepatic ROS     Renal/Genitourinary:  - neg Renal ROS     Endo:  - neg endo ROS     Psychiatric/Substance Use:  - neg psychiatric ROS     Infectious Disease:  - neg infectious disease ROS     Malignancy:  - neg malignancy ROS     Other:  - neg other ROS          Physical Exam    Airway  airway exam normal      Mallampati: I   TM distance: > 3 FB   Neck ROM: full   Mouth opening: > 3 cm    Respiratory Devices and Support         Dental       (+) Minor Abnormalities - some fillings, tiny chips      Cardiovascular   cardiovascular exam normal       Rhythm and rate: regular and normal     Pulmonary   pulmonary exam normal        breath sounds clear to auscultation           OUTSIDE LABS:  CBC:   Lab Results   Component Value Date    WBC 18.1 (H) 03/20/2022    WBC 8.0 01/07/2022    HGB 14.3 03/20/2022    HGB 14.2 01/07/2022    HCT 42.2 03/20/2022    HCT 41.6 01/07/2022     03/20/2022     01/07/2022     BMP:   Lab Results   Component Value Date     03/20/2022     01/07/2022    POTASSIUM 3.7 03/20/2022    POTASSIUM 3.6 01/07/2022    CHLORIDE 105 03/20/2022    CHLORIDE 104 01/07/2022    CO2 27 03/20/2022    CO2 28 01/07/2022    BUN 16 03/20/2022    BUN 19 01/07/2022    CR 1.00 07/29/2022    CR 0.88 03/20/2022    GLC 95 10/13/2022     (H) 03/20/2022     COAGS: No results found for: PTT, INR, FIBR  POC:   Lab Results   Component Value Date    BGM 86 11/09/2018    HCG Negative 11/08/2018     HEPATIC:   Lab  Results   Component Value Date    ALBUMIN 4.0 03/20/2022    PROTTOTAL 8.1 03/20/2022    ALT 20 03/20/2022    AST 6 03/20/2022    ALKPHOS 96 03/20/2022    BILITOTAL 1.0 03/20/2022     OTHER:   Lab Results   Component Value Date    LACT 1.4 03/20/2022    MARISELA 10.2 (H) 07/29/2022    LIPASE 133 03/20/2022    TSH 2.89 08/16/2010    CRP 79.9 (H) 03/20/2022       Anesthesia Plan    ASA Status:  2   NPO Status:  NPO Appropriate    Anesthesia Type: General.     - Airway: ETT   Induction: Intravenous, Propofol.   Maintenance: TIVA.        Consents    Anesthesia Plan(s) and associated risks, benefits, and realistic alternatives discussed. Questions answered and patient/representative(s) expressed understanding.     - Discussed: Risks, Benefits and Alternatives for BOTH SEDATION and the PROCEDURE were discussed     - Discussed with:  Patient      - Extended Intubation/Ventilatory Support Discussed: No.      - Patient is DNR/DNI Status: No    Use of blood products discussed: No .     Postoperative Care    Pain management: Oral pain medications, IV analgesics.   PONV prophylaxis: Ondansetron (or other 5HT-3), Dexamethasone or Solumedrol, Background Propofol Infusion     Comments:                MEE Freeman CRNA

## 2023-02-14 ENCOUNTER — HOSPITAL ENCOUNTER (OUTPATIENT)
Facility: CLINIC | Age: 53
Discharge: HOME | End: 2023-02-14
Attending: OBSTETRICS & GYNECOLOGY | Admitting: OBSTETRICS & GYNECOLOGY
Payer: COMMERCIAL

## 2023-02-14 ENCOUNTER — ANESTHESIA (OUTPATIENT)
Dept: SURGERY | Facility: CLINIC | Age: 53
End: 2023-02-14
Payer: COMMERCIAL

## 2023-02-14 VITALS
HEART RATE: 80 BPM | HEIGHT: 65 IN | RESPIRATION RATE: 14 BRPM | TEMPERATURE: 97.4 F | DIASTOLIC BLOOD PRESSURE: 78 MMHG | WEIGHT: 160 LBS | BODY MASS INDEX: 26.66 KG/M2 | SYSTOLIC BLOOD PRESSURE: 124 MMHG | OXYGEN SATURATION: 97 %

## 2023-02-14 DIAGNOSIS — Z98.890 S/P LAPAROSCOPY: Primary | ICD-10-CM

## 2023-02-14 LAB
ABO/RH(D): NORMAL
ANTIBODY SCREEN: NEGATIVE
BASOPHILS # BLD AUTO: 0.1 10E3/UL (ref 0–0.2)
BASOPHILS NFR BLD AUTO: 1 %
EOSINOPHIL # BLD AUTO: 0.1 10E3/UL (ref 0–0.7)
EOSINOPHIL NFR BLD AUTO: 1 %
ERYTHROCYTE [DISTWIDTH] IN BLOOD BY AUTOMATED COUNT: 12.1 % (ref 10–15)
HCT VFR BLD AUTO: 38.6 % (ref 35–47)
HGB BLD-MCNC: 12.9 G/DL (ref 11.7–15.7)
IMM GRANULOCYTES # BLD: 0.1 10E3/UL
IMM GRANULOCYTES NFR BLD: 1 %
LYMPHOCYTES # BLD AUTO: 1.8 10E3/UL (ref 0.8–5.3)
LYMPHOCYTES NFR BLD AUTO: 30 %
MCH RBC QN AUTO: 28.7 PG (ref 26.5–33)
MCHC RBC AUTO-ENTMCNC: 33.4 G/DL (ref 31.5–36.5)
MCV RBC AUTO: 86 FL (ref 78–100)
MONOCYTES # BLD AUTO: 0.4 10E3/UL (ref 0–1.3)
MONOCYTES NFR BLD AUTO: 7 %
NEUTROPHILS # BLD AUTO: 3.5 10E3/UL (ref 1.6–8.3)
NEUTROPHILS NFR BLD AUTO: 60 %
NRBC # BLD AUTO: 0 10E3/UL
NRBC BLD AUTO-RTO: 0 /100
PLATELET # BLD AUTO: 312 10E3/UL (ref 150–450)
RBC # BLD AUTO: 4.5 10E6/UL (ref 3.8–5.2)
SPECIMEN EXPIRATION DATE: NORMAL
WBC # BLD AUTO: 5.8 10E3/UL (ref 4–11)

## 2023-02-14 PROCEDURE — 710N000009 HC RECOVERY PHASE 1, LEVEL 1, PER MIN: Performed by: OBSTETRICS & GYNECOLOGY

## 2023-02-14 PROCEDURE — 250N000009 HC RX 250: Performed by: NURSE ANESTHETIST, CERTIFIED REGISTERED

## 2023-02-14 PROCEDURE — 36415 COLL VENOUS BLD VENIPUNCTURE: CPT | Performed by: OBSTETRICS & GYNECOLOGY

## 2023-02-14 PROCEDURE — 250N000011 HC RX IP 250 OP 636: Performed by: NURSE ANESTHETIST, CERTIFIED REGISTERED

## 2023-02-14 PROCEDURE — 710N000012 HC RECOVERY PHASE 2, PER MINUTE: Performed by: OBSTETRICS & GYNECOLOGY

## 2023-02-14 PROCEDURE — 271N000001 HC OR GENERAL SUPPLY NON-STERILE: Performed by: OBSTETRICS & GYNECOLOGY

## 2023-02-14 PROCEDURE — 250N000013 HC RX MED GY IP 250 OP 250 PS 637: Performed by: NURSE ANESTHETIST, CERTIFIED REGISTERED

## 2023-02-14 PROCEDURE — 360N000077 HC SURGERY LEVEL 4, PER MIN: Performed by: OBSTETRICS & GYNECOLOGY

## 2023-02-14 PROCEDURE — 86901 BLOOD TYPING SEROLOGIC RH(D): CPT | Performed by: OBSTETRICS & GYNECOLOGY

## 2023-02-14 PROCEDURE — 58661 LAPAROSCOPY REMOVE ADNEXA: CPT | Mod: 50 | Performed by: OBSTETRICS & GYNECOLOGY

## 2023-02-14 PROCEDURE — 85014 HEMATOCRIT: CPT | Performed by: OBSTETRICS & GYNECOLOGY

## 2023-02-14 PROCEDURE — 370N000017 HC ANESTHESIA TECHNICAL FEE, PER MIN: Performed by: OBSTETRICS & GYNECOLOGY

## 2023-02-14 PROCEDURE — 58661 LAPAROSCOPY REMOVE ADNEXA: CPT | Mod: AS | Performed by: PHYSICIAN ASSISTANT

## 2023-02-14 PROCEDURE — 258N000003 HC RX IP 258 OP 636: Performed by: NURSE ANESTHETIST, CERTIFIED REGISTERED

## 2023-02-14 PROCEDURE — 85041 AUTOMATED RBC COUNT: CPT | Performed by: OBSTETRICS & GYNECOLOGY

## 2023-02-14 PROCEDURE — 999N000141 HC STATISTIC PRE-PROCEDURE NURSING ASSESSMENT: Performed by: OBSTETRICS & GYNECOLOGY

## 2023-02-14 PROCEDURE — 272N000001 HC OR GENERAL SUPPLY STERILE: Performed by: OBSTETRICS & GYNECOLOGY

## 2023-02-14 PROCEDURE — 250N000009 HC RX 250: Performed by: OBSTETRICS & GYNECOLOGY

## 2023-02-14 PROCEDURE — 88305 TISSUE EXAM BY PATHOLOGIST: CPT | Mod: TC | Performed by: OBSTETRICS & GYNECOLOGY

## 2023-02-14 PROCEDURE — 86850 RBC ANTIBODY SCREEN: CPT | Performed by: OBSTETRICS & GYNECOLOGY

## 2023-02-14 RX ORDER — FENTANYL CITRATE 50 UG/ML
25 INJECTION, SOLUTION INTRAMUSCULAR; INTRAVENOUS
Status: DISCONTINUED | OUTPATIENT
Start: 2023-02-14 | End: 2023-02-14 | Stop reason: HOSPADM

## 2023-02-14 RX ORDER — FENTANYL CITRATE 50 UG/ML
INJECTION, SOLUTION INTRAMUSCULAR; INTRAVENOUS PRN
Status: DISCONTINUED | OUTPATIENT
Start: 2023-02-14 | End: 2023-02-14

## 2023-02-14 RX ORDER — FENTANYL CITRATE 50 UG/ML
25 INJECTION, SOLUTION INTRAMUSCULAR; INTRAVENOUS EVERY 5 MIN PRN
Status: DISCONTINUED | OUTPATIENT
Start: 2023-02-14 | End: 2023-02-14 | Stop reason: HOSPADM

## 2023-02-14 RX ORDER — NALOXONE HYDROCHLORIDE 0.4 MG/ML
0.4 INJECTION, SOLUTION INTRAMUSCULAR; INTRAVENOUS; SUBCUTANEOUS
Status: DISCONTINUED | OUTPATIENT
Start: 2023-02-14 | End: 2023-02-14 | Stop reason: HOSPADM

## 2023-02-14 RX ORDER — IBUPROFEN 400 MG/1
800 TABLET, FILM COATED ORAL ONCE
Status: DISCONTINUED | OUTPATIENT
Start: 2023-02-14 | End: 2023-02-14 | Stop reason: HOSPADM

## 2023-02-14 RX ORDER — NALOXONE HYDROCHLORIDE 0.4 MG/ML
0.2 INJECTION, SOLUTION INTRAMUSCULAR; INTRAVENOUS; SUBCUTANEOUS
Status: DISCONTINUED | OUTPATIENT
Start: 2023-02-14 | End: 2023-02-14 | Stop reason: HOSPADM

## 2023-02-14 RX ORDER — ACETAMINOPHEN 325 MG/1
975 TABLET ORAL ONCE
Status: COMPLETED | OUTPATIENT
Start: 2023-02-14 | End: 2023-02-14

## 2023-02-14 RX ORDER — ACETAMINOPHEN 325 MG/1
975 TABLET ORAL ONCE
Status: DISCONTINUED | OUTPATIENT
Start: 2023-02-14 | End: 2023-02-14

## 2023-02-14 RX ORDER — DEXAMETHASONE SODIUM PHOSPHATE 4 MG/ML
INJECTION, SOLUTION INTRA-ARTICULAR; INTRALESIONAL; INTRAMUSCULAR; INTRAVENOUS; SOFT TISSUE PRN
Status: DISCONTINUED | OUTPATIENT
Start: 2023-02-14 | End: 2023-02-14

## 2023-02-14 RX ORDER — AMOXICILLIN 250 MG
1-2 CAPSULE ORAL 2 TIMES DAILY
Qty: 30 TABLET | Refills: 0 | Status: SHIPPED | OUTPATIENT
Start: 2023-02-14 | End: 2023-05-05

## 2023-02-14 RX ORDER — PROPOFOL 10 MG/ML
INJECTION, EMULSION INTRAVENOUS PRN
Status: DISCONTINUED | OUTPATIENT
Start: 2023-02-14 | End: 2023-02-14

## 2023-02-14 RX ORDER — GABAPENTIN 300 MG/1
300 CAPSULE ORAL
Status: DISCONTINUED | OUTPATIENT
Start: 2023-02-14 | End: 2023-02-14 | Stop reason: HOSPADM

## 2023-02-14 RX ORDER — SODIUM CHLORIDE, SODIUM LACTATE, POTASSIUM CHLORIDE, CALCIUM CHLORIDE 600; 310; 30; 20 MG/100ML; MG/100ML; MG/100ML; MG/100ML
INJECTION, SOLUTION INTRAVENOUS CONTINUOUS
Status: DISCONTINUED | OUTPATIENT
Start: 2023-02-14 | End: 2023-02-14 | Stop reason: HOSPADM

## 2023-02-14 RX ORDER — PROPOFOL 10 MG/ML
INJECTION, EMULSION INTRAVENOUS CONTINUOUS PRN
Status: DISCONTINUED | OUTPATIENT
Start: 2023-02-14 | End: 2023-02-14

## 2023-02-14 RX ORDER — OXYCODONE HYDROCHLORIDE 5 MG/1
10 TABLET ORAL EVERY 4 HOURS PRN
Status: DISCONTINUED | OUTPATIENT
Start: 2023-02-14 | End: 2023-02-14 | Stop reason: HOSPADM

## 2023-02-14 RX ORDER — HYDROMORPHONE HCL IN WATER/PF 6 MG/30 ML
0.2 PATIENT CONTROLLED ANALGESIA SYRINGE INTRAVENOUS EVERY 5 MIN PRN
Status: DISCONTINUED | OUTPATIENT
Start: 2023-02-14 | End: 2023-02-14 | Stop reason: HOSPADM

## 2023-02-14 RX ORDER — ACETAMINOPHEN 325 MG/1
975 TABLET ORAL ONCE
Status: DISCONTINUED | OUTPATIENT
Start: 2023-02-14 | End: 2023-02-14 | Stop reason: HOSPADM

## 2023-02-14 RX ORDER — OXYCODONE HYDROCHLORIDE 5 MG/1
5-10 TABLET ORAL EVERY 4 HOURS PRN
Qty: 16 TABLET | Refills: 0 | Status: SHIPPED | OUTPATIENT
Start: 2023-02-14 | End: 2023-05-05

## 2023-02-14 RX ORDER — ONDANSETRON 2 MG/ML
INJECTION INTRAMUSCULAR; INTRAVENOUS PRN
Status: DISCONTINUED | OUTPATIENT
Start: 2023-02-14 | End: 2023-02-14

## 2023-02-14 RX ORDER — OXYCODONE HYDROCHLORIDE 5 MG/1
5-10 TABLET ORAL
Status: DISCONTINUED | OUTPATIENT
Start: 2023-02-14 | End: 2023-02-14 | Stop reason: HOSPADM

## 2023-02-14 RX ORDER — OXYCODONE HYDROCHLORIDE 5 MG/1
5 TABLET ORAL EVERY 4 HOURS PRN
Status: DISCONTINUED | OUTPATIENT
Start: 2023-02-14 | End: 2023-02-14 | Stop reason: HOSPADM

## 2023-02-14 RX ORDER — ONDANSETRON 2 MG/ML
4 INJECTION INTRAMUSCULAR; INTRAVENOUS EVERY 30 MIN PRN
Status: DISCONTINUED | OUTPATIENT
Start: 2023-02-14 | End: 2023-02-14 | Stop reason: HOSPADM

## 2023-02-14 RX ORDER — LIDOCAINE 40 MG/G
CREAM TOPICAL
Status: DISCONTINUED | OUTPATIENT
Start: 2023-02-14 | End: 2023-02-14 | Stop reason: HOSPADM

## 2023-02-14 RX ORDER — IBUPROFEN 800 MG/1
800 TABLET, FILM COATED ORAL EVERY 6 HOURS PRN
Qty: 30 TABLET | Refills: 0 | Status: SHIPPED | OUTPATIENT
Start: 2023-02-14 | End: 2023-05-05

## 2023-02-14 RX ORDER — FENTANYL CITRATE 50 UG/ML
50 INJECTION, SOLUTION INTRAMUSCULAR; INTRAVENOUS EVERY 5 MIN PRN
Status: DISCONTINUED | OUTPATIENT
Start: 2023-02-14 | End: 2023-02-14 | Stop reason: HOSPADM

## 2023-02-14 RX ORDER — BUPIVACAINE HYDROCHLORIDE AND EPINEPHRINE 2.5; 5 MG/ML; UG/ML
INJECTION, SOLUTION INFILTRATION; PERINEURAL PRN
Status: DISCONTINUED | OUTPATIENT
Start: 2023-02-14 | End: 2023-02-14 | Stop reason: HOSPADM

## 2023-02-14 RX ORDER — HYDROMORPHONE HCL IN WATER/PF 6 MG/30 ML
0.4 PATIENT CONTROLLED ANALGESIA SYRINGE INTRAVENOUS EVERY 5 MIN PRN
Status: DISCONTINUED | OUTPATIENT
Start: 2023-02-14 | End: 2023-02-14 | Stop reason: HOSPADM

## 2023-02-14 RX ORDER — ONDANSETRON 4 MG/1
4 TABLET, ORALLY DISINTEGRATING ORAL EVERY 30 MIN PRN
Status: DISCONTINUED | OUTPATIENT
Start: 2023-02-14 | End: 2023-02-14 | Stop reason: HOSPADM

## 2023-02-14 RX ADMIN — ACETAMINOPHEN 975 MG: 325 TABLET, FILM COATED ORAL at 08:41

## 2023-02-14 RX ADMIN — FENTANYL CITRATE 25 MCG: 50 INJECTION INTRAMUSCULAR; INTRAVENOUS at 11:45

## 2023-02-14 RX ADMIN — FENTANYL CITRATE 100 MCG: 50 INJECTION, SOLUTION INTRAMUSCULAR; INTRAVENOUS at 09:40

## 2023-02-14 RX ADMIN — PROPOFOL 200 MG: 10 INJECTION, EMULSION INTRAVENOUS at 09:41

## 2023-02-14 RX ADMIN — ONDANSETRON 4 MG: 2 INJECTION INTRAMUSCULAR; INTRAVENOUS at 09:41

## 2023-02-14 RX ADMIN — SODIUM CHLORIDE, POTASSIUM CHLORIDE, SODIUM LACTATE AND CALCIUM CHLORIDE: 600; 310; 30; 20 INJECTION, SOLUTION INTRAVENOUS at 08:50

## 2023-02-14 RX ADMIN — OXYCODONE HYDROCHLORIDE 5 MG: 5 TABLET ORAL at 11:57

## 2023-02-14 RX ADMIN — DEXAMETHASONE SODIUM PHOSPHATE 8 MG: 4 INJECTION, SOLUTION INTRA-ARTICULAR; INTRALESIONAL; INTRAMUSCULAR; INTRAVENOUS; SOFT TISSUE at 09:41

## 2023-02-14 RX ADMIN — LIDOCAINE HYDROCHLORIDE 0.3 ML: 10 INJECTION, SOLUTION EPIDURAL; INFILTRATION; INTRACAUDAL; PERINEURAL at 08:50

## 2023-02-14 RX ADMIN — FENTANYL CITRATE 25 MCG: 50 INJECTION INTRAMUSCULAR; INTRAVENOUS at 11:28

## 2023-02-14 RX ADMIN — ROCURONIUM BROMIDE 30 MG: 50 INJECTION, SOLUTION INTRAVENOUS at 09:41

## 2023-02-14 RX ADMIN — MIDAZOLAM 2 MG: 1 INJECTION INTRAMUSCULAR; INTRAVENOUS at 09:37

## 2023-02-14 RX ADMIN — SUGAMMADEX 200 MG: 100 INJECTION, SOLUTION INTRAVENOUS at 10:42

## 2023-02-14 RX ADMIN — PROPOFOL 200 MCG/KG/MIN: 10 INJECTION, EMULSION INTRAVENOUS at 09:41

## 2023-02-14 ASSESSMENT — ACTIVITIES OF DAILY LIVING (ADL)
ADLS_ACUITY_SCORE: 39

## 2023-02-14 NOTE — INTERVAL H&P NOTE
"I have reviewed the surgical (or preoperative) H&P that is linked to this encounter, and examined the patient. There are no significant changes    Clinical Conditions Present on Arrival:  Clinically Significant Risk Factors Present on Admission                    # Overweight: Estimated body mass index is 26.63 kg/m  as calculated from the following:    Height as of 2/9/23: 1.651 m (5' 5\").    Weight as of 2/9/23: 72.6 kg (160 lb).       "

## 2023-02-14 NOTE — ANESTHESIA CARE TRANSFER NOTE
Patient: Desi Granado    Procedure: Procedure(s):  LAPAROSCOPIC BILATERAL salpingo-oophorectomy       Diagnosis: Prophylactic ovary removal [Z40.02]  Diagnosis Additional Information: No value filed.    Anesthesia Type:   General     Note:    Oropharynx: oropharynx clear of all foreign objects  Level of Consciousness: drowsy  Oxygen Supplementation: face mask        Vital Signs Stable: post-procedure vital signs reviewed and stable  Report to RN Given: handoff report given  Patient transferred to: PACU    Handoff Report: Identifed the Patient, Identified the Reponsible Provider, Reviewed the pertinent medical history, Discussed the surgical course, Reviewed Intra-OP anesthesia mangement and issues during anesthesia, Set expectations for post-procedure period and Allowed opportunity for questions and acknowledgement of understanding      Vitals:  Vitals Value Taken Time   /55 02/14/23 1102   Temp     Pulse 75 02/14/23 1102   Resp 42 02/14/23 1103   SpO2 98 % 02/14/23 1103   Vitals shown include unvalidated device data.    Electronically Signed By: MEE Bear CRNA  February 14, 2023  11:04 AM

## 2023-02-14 NOTE — ANESTHESIA POSTPROCEDURE EVALUATION
Patient: Desi Granado    Procedure: Procedure(s):  LAPAROSCOPIC BILATERAL salpingo-oophorectomy       Anesthesia Type:  General    Note:  Disposition: Outpatient   Postop Pain Control: Uneventful            Sign Out: Well controlled pain   PONV: No   Neuro/Psych: Uneventful            Sign Out: Acceptable/Baseline neuro status   Airway/Respiratory: Uneventful            Sign Out: Acceptable/Baseline resp. status   CV/Hemodynamics: Uneventful            Sign Out: Acceptable CV status; No obvious hypovolemia; No obvious fluid overload   Other NRE: NONE   DID A NON-ROUTINE EVENT OCCUR? No           Last vitals:  Vitals Value Taken Time   /82 02/14/23 1145   Temp 36.8  C (98.2  F) 02/14/23 1100   Pulse 75 02/14/23 1146   Resp 14 02/14/23 1146   SpO2 98 % 02/14/23 1146   Vitals shown include unvalidated device data.    Electronically Signed By: MEE Bear CRNA  February 14, 2023  11:47 AM

## 2023-02-14 NOTE — OP NOTE
[facility]  Gynecologic Operative Note     Desi Granado  [id number]  2/14/2023    Preoperative Diagnosis:   History of ER+ breast cancer  Desires risk-reducing bilateral salpingo-oophorectomy      Postoperative Diagnosis:   Same      Procedure:   Laparoscopic bilateral salpingo-oophorectomy     Surgeon: Elida Fay MD     Assist: Jensen Scruggs- PAC; his assistance was required given the filmy but significant adhesions between the sigmoid colon, pelvic side wall and left adnexa    Anesthesia: general endotracheal     Complications: none apparetn     EBL: 5 mL   IVF: see anesthesia record     Findings: Exam under anesthesia revealed significant vulvovaginal atrophy and narrowed intoritus.  Normal liver, stomach, small bowel and appendix. Significant but filmy adhesions of the sigmoid colon to the left pelvic side wall and left adnexa. Small uterus with 2x small pedunculated fibroids. Otherwise normal fallopian tubes and ovaries.     Indications: 53 yo F with hx of ER+ breast cancer who presents to discuss option of ovary removal so she can stop zoladex.   I did review the steps of a laparoscopic bilateral salpingo-oophorectomy and the rationale for additional removal of the fallopian tubes. Reviewed the risks of bleeding, infection and intraabdominal injury (both recognized and unrecognized). Written informed consent was obtained and she was agreeable to a blood transfusion if indicated.     Technique:  The patient was taken to the operating room where she was placed in the dorsal lithotomy position with feet in yellow fin stirrups. General endotracheal anesthesia was administered. An exam under anesthesia was performed. The patient was then prepped and draped in the usual sterile fashion. A sponge stick was placed in the vagina.Attention was then turned to the abdomen where 0.5% bupivicaine was used to infiltrate the inferior aspect of the umbilicus. An 11-blade scalpel was used to make a 5 mm vertical  incision in the umbilicus and a Marshall used to expand the incision. A Veress needle was used to access the peritoneum through the umbilical incision, and saline syringe returned no blood or stool and saline dripped rapidly into needle. CO2 gas was attached to the needle and opening pressure was less than 5 mmHg, and flow was increased to 20 L/min. Pneumoperitoneum was achieved with good tympany of the abdomen. A 5 mm trocar was used to place the first port. The 5 mm scope was placed in the port and visualized the abdomen which was free of any injury. 5mm ports were placed in the LLQ and RLQ of the abdomen under direct visualization after injection of local anesthetic. The atraumatic grapser was used to peel away the filmy adhesions of the sigmoid colon from the left adnexa. The ligasure device was then used to serially cauterize and ligate the right and left adnexa. The specimen was placed in a 5mm endocatch bag and removed through the LLQ port, which was expanded to 10mm. Hemostasis was assured. The LLQ port fascia was closed with vicryl suture and all ports and instruments were removed. The skin incisions were closed using 4-0 vicryl and dermabond. The sponge stick was removed.     Instrument, sponge, and needle counts were correct times 2. The patient was extubated in the operating room and transferred to the PACU in stable condition.    Elida Fay MD  2/14/2023 10:45 AM

## 2023-02-14 NOTE — DISCHARGE INSTRUCTIONS
Same Day Surgery Discharge Instructions  Special Precautions After Surgery - Adult    It is not unusual to feel lightheaded or faint, up to 24 hours after surgery or while taking pain medication.  If you have these symptoms; sit for a few minutes before standing and have someone assist you when getting up.  You should rest and relax for the next 24 hours and must have someone stay with you for at least 24 hours after your discharge.  DO NOT DRIVE any vehicle or operate mechanical equipment for 24 hours following the end of your surgery.  DO NOT DRIVE while taking narcotic pain medications that have been prescribed by your physician.  If you had a limb operated on, you must be able to use it fully to drive.  DO NOT drink alcoholic beverages for 24 hours following surgery or while taking prescription pain medication.  Drink clear liquids (apple juice, ginger ale, broth, 7-Up, etc.).  Progress to your regular diet as you feel able.  Any questions call your physician and do not make important decisions for 24 hours.    __________________________________________________________________________________________________________________________________  IMPORTANT NUMBERS:    [facility] – [place], [place] Main Number:  [phone], [phone]  Pharmacy:  [phone]  Same Day Surgery:  [phone], Monday - Friday until 8:30 p.m.    Clinic:  [phone]

## 2023-02-14 NOTE — ANESTHESIA PROCEDURE NOTES
Airway         Procedure Start/Stop Times: 2/14/2023 9:43 AM  Staff -        CRNA: Joy Berry APRN CRNA       Performed By: CRNA  Consent for Airway        Urgency: elective      Final Airway Details       Final airway type: endotracheal airway       Successful airway: ETT - single  Endotracheal Airway Details        ETT size (mm): 7.0       Cuffed: yes       Cuff volume (mL): 5       Successful intubation technique: direct laryngoscopy       Grade View of Cords: 1       Adjucts: stylet       Position: Right       Measured from: lips       Secured at (cm): 22       Bite block used: None    Post intubation assessment        Placement verified by: capnometry, equal breath sounds and chest rise        Number of attempts at approach: 1       Secured with: plastic tape       Ease of procedure: easy       Dentition: Intact    Medication(s) Administered   Medication Administration Time: 2/14/2023 9:43 AM

## 2023-02-15 ENCOUNTER — MYC MEDICAL ADVICE (OUTPATIENT)
Dept: SURGERY | Facility: CLINIC | Age: 53
End: 2023-02-15
Payer: COMMERCIAL

## 2023-02-15 PROCEDURE — 88305 TISSUE EXAM BY PATHOLOGIST: CPT | Mod: 26 | Performed by: PATHOLOGY

## 2023-02-15 NOTE — TELEPHONE ENCOUNTER
Sent [employer] message to patient to call to schedule appointment with Dr. Hart.  Blessing Sung RN on 2/15/2023 at 12:04 PM

## 2023-02-15 NOTE — TELEPHONE ENCOUNTER
Have her come in to see me for a 30min consult.  We can talk about the surgery.  She can come to [place] or [place].  It's up to her.     Thanks     Julienne

## 2023-02-20 NOTE — TELEPHONE ENCOUNTER
Pt would like to do consult for diverticulitis as a virtual due to weather on Wednesday. Is this possible? Please advise.  Reyna CASTRO RN BSN PHN  Specialty Clinics

## 2023-02-22 ENCOUNTER — VIRTUAL VISIT (OUTPATIENT)
Dept: SURGERY | Facility: CLINIC | Age: 53
End: 2023-02-22
Payer: COMMERCIAL

## 2023-02-22 VITALS — HEIGHT: 65 IN | WEIGHT: 160 LBS | BODY MASS INDEX: 26.66 KG/M2

## 2023-02-22 DIAGNOSIS — Z87.19 HISTORY OF DIVERTICULITIS OF COLON: Primary | ICD-10-CM

## 2023-02-22 PROCEDURE — 99213 OFFICE O/P EST LOW 20 MIN: CPT | Mod: VID | Performed by: SURGERY

## 2023-02-22 NOTE — PROGRESS NOTES
Nilda is a 52 year old who is being evaluated via a billable video visit.      How would you like to obtain your AVS? MyChart  If the video visit is dropped, the invitation should be resent by: Text to cell phone: [phone]  Will anyone else be joining your video visit? No        Assessment & Plan   Problem List Items Addressed This Visit    None  Visit Diagnoses     History of diverticulitis of colon    -  Primary    BMI 26.0-26.9,adult             52-year-old female with recurrent sigmoid diverticulitis.  -She will need an elective sigmoidectomy at some point.  -I recommend waiting at least 2 to 3 months for the inflammation to completely subside.  -She is to come see me for a in person visit so we can discuss the risks, benefits, alternatives of the procedure itself and to discuss the Hardin prep before the surgery.  -Patient is understanding of this.    25 minutes spent on the date of the encounter doing chart review, patient visit and documentation         No follow-ups on file.    Sabino-Julienne Hart MD  [facility]    Subjective   Nilda is a 52 year old, presenting for the following health issues:  RECHECK (Diverticulitis - flare)      Had another episode of sigmoid diverticulitis - first episode for 2023  Did call in for possible abx but inflammation improves with NSAIDs after a few days  last colonoscopy 2018, may 2022 - no polyps; no famhx of colon cancer; multiple diverticula.  hx of diverticulitis;   Had acute episode of diverticulitis 10 days after her laparoscopic BSO; still has her uterus.    Pain started beginning of feb.   Pain is in the exact same place.         Review of Systems   Constitutional, HEENT, cardiovascular, pulmonary, gi and gu systems are negative, except as otherwise noted.      Objective           Vitals:  No vitals were obtained today due to virtual visit.    Physical Exam   GENERAL: Healthy, alert and no distress  EYES: Eyes grossly normal to inspection.  No  discharge or erythema, or obvious scleral/conjunctival abnormalities.  RESP: No audible wheeze, cough, or visible cyanosis.  No visible retractions or increased work of breathing.    SKIN: Visible skin clear. No significant rash, abnormal pigmentation or lesions.  NEURO: Cranial nerves grossly intact.  Mentation and speech appropriate for age.  PSYCH: Mentation appears normal, affect normal/bright, judgement and insight intact, normal speech and appearance well-groomed.          Video-Visit Details    Type of service:  Video Visit     Originating Location (pt. Location): Home    Distant Location (provider location):  On-site  Platform [facility] for Video Visit[facility]

## 2023-02-22 NOTE — LETTER
2/22/2023         RE: Desi Granado  [address] [place] [address]        Dear Colleague,    Thank you for referring your patient, Desi Granado, to the [facility]. Please see a copy of my visit note below.    Nilda is a 52 year old who is being evaluated via a billable video visit.      How would you like to obtain your AVS? MyChart  If the video visit is dropped, the invitation should be resent by: Text to cell phone: [phone]  Will anyone else be joining your video visit? No        Assessment & Plan   Problem List Items Addressed This Visit    None  Visit Diagnoses     History of diverticulitis of colon    -  Primary    BMI 26.0-26.9,adult             52-year-old female with recurrent sigmoid diverticulitis.  -She will need an elective sigmoidectomy at some point.  -I recommend waiting at least 2 to 3 months for the inflammation to completely subside.  -She is to come see me for a in person visit so we can discuss the risks, benefits, alternatives of the procedure itself and to discuss the Hardin prep before the surgery.  -Patient is understanding of this.    25 minutes spent on the date of the encounter doing chart review, patient visit and documentation         No follow-ups on file.    Sabino-Julienne Hart MD  [facility]    Subjective   Nilda is a 52 year old, presenting for the following health issues:  RECHECK (Diverticulitis - flare)      Had another episode of sigmoid diverticulitis - first episode for 2023  Did call in for possible abx but inflammation improves with NSAIDs after a few days  last colonoscopy 2018, may 2022 - no polyps; no famhx of colon cancer; multiple diverticula.  hx of diverticulitis;   Had acute episode of diverticulitis 10 days after her laparoscopic BSO; still has her uterus.    Pain started beginning of feb.   Pain is in the exact same place.         Review of Systems   Constitutional, HEENT, cardiovascular,  pulmonary, gi and gu systems are negative, except as otherwise noted.     Objective           Vitals:  No vitals were obtained today due to virtual visit.    Physical Exam   GENERAL: Healthy, alert and no distress  EYES: Eyes grossly normal to inspection.  No discharge or erythema, or obvious scleral/conjunctival abnormalities.  RESP: No audible wheeze, cough, or visible cyanosis.  No visible retractions or increased work of breathing.    SKIN: Visible skin clear. No significant rash, abnormal pigmentation or lesions.  NEURO: Cranial nerves grossly intact.  Mentation and speech appropriate for age.  PSYCH: Mentation appears normal, affect normal/bright, judgement and insight intact, normal speech and appearance well-groomed.         Video-Visit Details    Type of service:  Video Visit     Originating Location (pt. Location): Home    Distant Location (provider location):  On-site  Platform used for Video Visit: Eun        Again, thank you for allowing me to participate in the care of your patient.        Sincerely,        Nabila Hart MD

## 2023-02-24 ENCOUNTER — APPOINTMENT (OUTPATIENT)
Dept: LAB | Facility: CLINIC | Age: 53
End: 2023-02-24
Payer: COMMERCIAL

## 2023-02-24 ENCOUNTER — INFUSION THERAPY VISIT (OUTPATIENT)
Dept: INFUSION THERAPY | Facility: CLINIC | Age: 53
End: 2023-02-24
Attending: INTERNAL MEDICINE
Payer: COMMERCIAL

## 2023-02-24 VITALS
DIASTOLIC BLOOD PRESSURE: 73 MMHG | HEART RATE: 80 BPM | TEMPERATURE: 98.7 F | RESPIRATION RATE: 20 BRPM | SYSTOLIC BLOOD PRESSURE: 115 MMHG

## 2023-02-24 DIAGNOSIS — M85.852 OSTEOPENIA OF NECK OF LEFT FEMUR: ICD-10-CM

## 2023-02-24 DIAGNOSIS — C50.912 INVASIVE DUCTAL CARCINOMA OF BREAST, FEMALE, LEFT (H): ICD-10-CM

## 2023-02-24 DIAGNOSIS — Z79.811 AROMATASE INHIBITOR USE: ICD-10-CM

## 2023-02-24 DIAGNOSIS — Z78.0 MENOPAUSE: Primary | ICD-10-CM

## 2023-02-24 LAB
CALCIUM SERPL-MCNC: 10.2 MG/DL (ref 8.6–10)
CREAT SERPL-MCNC: 1.14 MG/DL (ref 0.51–0.95)
GFR SERPL CREATININE-BSD FRML MDRD: 58 ML/MIN/1.73M2

## 2023-02-24 PROCEDURE — 82310 ASSAY OF CALCIUM: CPT | Performed by: INTERNAL MEDICINE

## 2023-02-24 PROCEDURE — 36415 COLL VENOUS BLD VENIPUNCTURE: CPT | Performed by: INTERNAL MEDICINE

## 2023-02-24 PROCEDURE — 82565 ASSAY OF CREATININE: CPT | Performed by: INTERNAL MEDICINE

## 2023-02-24 PROCEDURE — 96372 THER/PROPH/DIAG INJ SC/IM: CPT | Performed by: INTERNAL MEDICINE

## 2023-02-24 PROCEDURE — 250N000011 HC RX IP 250 OP 636: Performed by: INTERNAL MEDICINE

## 2023-02-24 RX ORDER — MEPERIDINE HYDROCHLORIDE 25 MG/ML
25 INJECTION INTRAMUSCULAR; INTRAVENOUS; SUBCUTANEOUS EVERY 30 MIN PRN
Status: CANCELLED | OUTPATIENT
Start: 2023-08-05

## 2023-02-24 RX ORDER — ALBUTEROL SULFATE 90 UG/1
1-2 AEROSOL, METERED RESPIRATORY (INHALATION)
Status: CANCELLED
Start: 2023-08-05

## 2023-02-24 RX ORDER — ALBUTEROL SULFATE 0.83 MG/ML
2.5 SOLUTION RESPIRATORY (INHALATION)
Status: CANCELLED | OUTPATIENT
Start: 2023-08-05

## 2023-02-24 RX ORDER — DIPHENHYDRAMINE HYDROCHLORIDE 50 MG/ML
50 INJECTION INTRAMUSCULAR; INTRAVENOUS
Status: CANCELLED
Start: 2023-08-05

## 2023-02-24 RX ORDER — EPINEPHRINE 1 MG/ML
0.3 INJECTION, SOLUTION, CONCENTRATE INTRAVENOUS EVERY 5 MIN PRN
Status: CANCELLED | OUTPATIENT
Start: 2023-08-05

## 2023-02-24 RX ORDER — NALOXONE HYDROCHLORIDE 0.4 MG/ML
0.2 INJECTION, SOLUTION INTRAMUSCULAR; INTRAVENOUS; SUBCUTANEOUS
Status: CANCELLED | OUTPATIENT
Start: 2023-08-05

## 2023-02-24 RX ORDER — METHYLPREDNISOLONE SODIUM SUCCINATE 125 MG/2ML
125 INJECTION, POWDER, LYOPHILIZED, FOR SOLUTION INTRAMUSCULAR; INTRAVENOUS
Status: CANCELLED
Start: 2023-08-05

## 2023-02-24 RX ADMIN — DENOSUMAB 60 MG: 60 INJECTION SUBCUTANEOUS at 15:54

## 2023-02-24 NOTE — PROGRESS NOTES
Infusion Nursing Note:  Desi Granado presents today for Prolia.    Patient seen by provider today: No   present during visit today: Not Applicable.    Note: N/A.    Intravenous Access:  No Intravenous access/labs at this visit.    Treatment Conditions:  Results reviewed, labs MET treatment parameters, ok to proceed with treatment.    Post Infusion Assessment:  Patient tolerated injection without incident.     Discharge Plan:   Patient discharged in stable condition accompanied by: self. Ambulatory      Naomi Calixto RN

## 2023-05-05 ENCOUNTER — OFFICE VISIT (OUTPATIENT)
Dept: SURGERY | Facility: CLINIC | Age: 53
End: 2023-05-05
Payer: COMMERCIAL

## 2023-05-05 VITALS
WEIGHT: 159 LBS | BODY MASS INDEX: 26.46 KG/M2 | TEMPERATURE: 98.1 F | DIASTOLIC BLOOD PRESSURE: 81 MMHG | SYSTOLIC BLOOD PRESSURE: 146 MMHG | HEART RATE: 118 BPM

## 2023-05-05 DIAGNOSIS — K57.30 SIGMOID DIVERTICULOSIS: Primary | ICD-10-CM

## 2023-05-05 PROCEDURE — 99214 OFFICE O/P EST MOD 30 MIN: CPT | Performed by: SURGERY

## 2023-05-05 NOTE — PROGRESS NOTES
"  Assessment & Plan   Problem List Items Addressed This Visit    None  Visit Diagnoses     Sigmoid diverticulosis    -  Primary    BMI 26.0-26.9,adult             52-year-old female with recurrent sigmoid diverticulitis; left lower quadrant pain  -No active infection at this time  -She is concerned regarding the intermittent left lower quadrant pain.  -I recommend that she gets an elective sigmoidectomy at some point.  -Reassured the patient that unless she has an active episode of diverticulitis, it is normal to have intermittent left lower quadrant pain especially when you have had diverticulitis in the past.  -We discussed signs and symptoms of when to get evaluated in the ER for antibiotics.  Recommend that she tries NSAIDs for the first 48 hours of her pain.  This does not not improve her pain and she becomes more febrile or has worsening pain, she is to come to the ED for further evaluation.  -We discussed in details the risks, benefits, alternatives of an elective colectomy including bleeding, infection, anastomotic breakdown, second surgery, ostomy, anesthesia risks.  -She is not ready for surgery as of yet.  She has a vacation planned sometimes in September.  Will think about this and may do the surgery sometimes in the fall.  Understands she will have to come back so we can discuss the Hardin prep and review the risks, benefits, alternatives once again.  -All of her questions were answered.      25 minutes spent by me on the date of the encounter doing chart review, patient visit and documentation        BMI:   Estimated body mass index is 26.46 kg/m  as calculated from the following:    Height as of 2/22/23: 1.651 m (5' 5\").    Weight as of this encounter: 72.1 kg (159 lb).       No follow-ups on file.    SabinoAmy Hart MD  [facility] KINGSLEY Munguia is a 52 year old, presenting for the following health issues:  RECHECK    Still having intermittent left lower quadrant pain  Did " have a bout of blood in her stool.  It does not happen often.  No fevers.  No chills.  She just wants to know when she should come to the ER when she has these bouts of abdominal pain.  She is confused of to when she can start antibiotics for diverticulitis.  No additional abdominal surgery besides the recent laparoscopic oophorectomy, bilateral  Passing gas.  Having bowel movements.  No issues with eating.       Review of Systems   Constitutional, HEENT, cardiovascular, pulmonary, gi and gu systems are negative, except as otherwise noted.      Objective    BP (!) 146/81   Pulse 118   Temp 98.1  F (36.7  C) (Tympanic)   Wt 72.1 kg (159 lb)   LMP 03/14/2019   BMI 26.46 kg/m    Body mass index is 26.46 kg/m .  Physical Exam   Purely discussion visit.

## 2023-05-05 NOTE — LETTER
"    5/5/2023         RE: Desi Granado  [address] [place] [address]        Dear Colleague,    Thank you for referring your patient, Desi Granado, to the [facility]. Please see a copy of my visit note below.      Assessment & Plan   Problem List Items Addressed This Visit    None  Visit Diagnoses     Sigmoid diverticulosis    -  Primary    BMI 26.0-26.9,adult             52-year-old female with recurrent sigmoid diverticulitis; left lower quadrant pain  -No active infection at this time  -She is concerned regarding the intermittent left lower quadrant pain.  -I recommend that she gets an elective sigmoidectomy at some point.  -Reassured the patient that unless she has an active episode of diverticulitis, it is normal to have intermittent left lower quadrant pain especially when you have had diverticulitis in the past.  -We discussed signs and symptoms of when to get evaluated in the ER for antibiotics.  Recommend that she tries NSAIDs for the first 48 hours of her pain.  This does not not improve her pain and she becomes more febrile or has worsening pain, she is to come to the ED for further evaluation.  -We discussed in details the risks, benefits, alternatives of an elective colectomy including bleeding, infection, anastomotic breakdown, second surgery, ostomy, anesthesia risks.  -She is not ready for surgery as of yet.  She has a vacation planned sometimes in September.  Will think about this and may do the surgery sometimes in the fall.  Understands she will have to come back so we can discuss the Hardin prep and review the risks, benefits, alternatives once again.  -All of her questions were answered.      25 minutes spent by me on the date of the encounter doing chart review, patient visit and documentation        BMI:   Estimated body mass index is 26.46 kg/m  as calculated from the following:    Height as of 2/22/23: 1.651 m (5' 5\").    Weight as of " this encounter: 72.1 kg (159 lb).       No follow-ups on file.    Nabila Hart MD  [facility] is a 52 year old, presenting for the following health issues:  RECHECK    Still having intermittent left lower quadrant pain  Did have a bout of blood in her stool.  It does not happen often.  No fevers.  No chills.  She just wants to know when she should come to the ER when she has these bouts of abdominal pain.  She is confused of to when she can start antibiotics for diverticulitis.  No additional abdominal surgery besides the recent laparoscopic oophorectomy, bilateral  Passing gas.  Having bowel movements.  No issues with eating.       Review of Systems   Constitutional, HEENT, cardiovascular, pulmonary, gi and gu systems are negative, except as otherwise noted.     Objective    BP (!) 146/81   Pulse 118   Temp 98.1  F (36.7  C) (Tympanic)   Wt 72.1 kg (159 lb)   LMP 03/14/2019   BMI 26.46 kg/m    Body mass index is 26.46 kg/m .  Physical Exam   Purely discussion visit.                   Again, thank you for allowing me to participate in the care of your patient.        Sincerely,        Nabila Hart MD

## 2023-05-05 NOTE — NURSING NOTE
"Initial Temp 98.1  F (36.7  C) (Tympanic)   Wt 72.1 kg (159 lb)   LMP 03/14/2019   BMI 26.46 kg/m   Estimated body mass index is 26.46 kg/m  as calculated from the following:    Height as of 2/22/23: 1.651 m (5' 5\").    Weight as of this encounter: 72.1 kg (159 lb). .    Roberta Montalvo LPN on 5/5/2023 at 1:48 PM    "

## 2023-05-25 ENCOUNTER — MYC MEDICAL ADVICE (OUTPATIENT)
Dept: FAMILY MEDICINE | Facility: CLINIC | Age: 53
End: 2023-05-25
Payer: COMMERCIAL

## 2023-05-25 DIAGNOSIS — K57.32 DIVERTICULITIS OF COLON: Primary | ICD-10-CM

## 2023-05-31 NOTE — TELEPHONE ENCOUNTER
Diagnosis, Referred by & from: Diverticulitis of Colon   Appt date: 2023   NOTES STATUS DETAILS   OFFICE NOTE from referring provider [facility]:  23 - MyChart referral from Dr. Grover   OFFICE NOTE from other specialist [facility]:  23, 23 - SURG OV with Dr. Hart   DISCHARGE SUMMARY from hospital N/A    DISCHARGE REPORT from the ER [facility]:  3/20/22 - ED OV with Dr. Womack   OPERATIVE REPORT [facility]:  23 - OP Note for LAPAROSCOPIC BILATERAL SALPINGO-OOPHORECTOMY with Dr. Fay   MEDICATION LIST Internal    LABS     BIOPSIES/PATHOLOGY RELATED TO DIAGNOSIS [facility]:  22 - Colon Biopsy (Case: [id number])   DIAGNOSTIC PROCEDURES     COLONOSCOPY [facility]:  22 - Colonoscopy  18 - Colonoscopy   UPPER ENDOSCOPY (EGD) [facility]:  10/1/15 - EGD   IMAGING (DISC & REPORT)      CT [facility]:  3/20/22 - CT Abd/Pelvis  18 - CT Abd/Pelvis

## 2023-06-12 ENCOUNTER — MYC MEDICAL ADVICE (OUTPATIENT)
Dept: FAMILY MEDICINE | Facility: CLINIC | Age: 53
End: 2023-06-12
Payer: COMMERCIAL

## 2023-06-12 DIAGNOSIS — J30.89 ALLERGIC RHINITIS DUE TO OTHER ALLERGIC TRIGGER, UNSPECIFIED SEASONALITY: ICD-10-CM

## 2023-06-12 DIAGNOSIS — I10 ESSENTIAL HYPERTENSION, BENIGN: ICD-10-CM

## 2023-06-12 NOTE — TELEPHONE ENCOUNTER
Routing refill request to provider for review/approval because:  Drug interaction warning  PCP to determine refill     Reese Gamboa RN

## 2023-06-13 RX ORDER — LISINOPRIL 30 MG/1
30 TABLET ORAL DAILY
Qty: 90 TABLET | Refills: 3 | Status: SHIPPED | OUTPATIENT
Start: 2023-06-13 | End: 2024-09-04

## 2023-06-13 RX ORDER — MONTELUKAST SODIUM 10 MG/1
10 TABLET ORAL AT BEDTIME
Qty: 90 TABLET | Refills: 3 | Status: SHIPPED | OUTPATIENT
Start: 2023-06-13 | End: 2024-09-04

## 2023-06-27 NOTE — PROGRESS NOTES
"Colon and Rectal Surgery Clinic Note    RE: Desi Granado.  : 1970.  LOKESH: 2023.    Reason for visit: second opinion for diverticulitis     HPI: Desi \"Lanette Granado is a 52 year old female who presents today for a second opinion of her diverticulitis. She has a past medical history of HTN and invasive ductal carcinoma of the left breast ( ER+ and progesterone receptor +100% strong nuclear staining s/p bilateral mastectomy, pT2pN0 then s/p preventative bilateral BSO in ). She has had two documented diverticulitis attacks since  and a total of 5 reported. Her last colonoscopy on 2022 demonstrated multiple small-mouthed diverticula from 20 to 50 cm proximal to the anus. In addition there were two 4mm polyps in the cecum and sigmoid colon that were removed which were negative. Her first opinion was with Dr. Nabila Hart who recommended a sigmoidectomy.     Today Nilda feels fine. She \is reporting mild pain once per year. No issue with diet or BMs. She reports some bleeding per rectum. She is on a fiber supplement.         CT Abdomen/Pelvis (2018):  IMPRESSION: Sigmoid diverticulitis. No perforation or abscess.      CT Abdomen/Pelvis (3/20/2022):  IMPRESSION:   1.  Acute diverticulitis at the left lower quadrant proximal sigmoid colon. No abscess is seen.       Colonoscopy (2022):  Findings:        The perianal and digital rectal examinations were normal. Pertinent        negatives include normal sphincter tone.        Multiple small-mouthed diverticula were found from 20 to 50 cm proximal        to the anus.        A 4 mm polyp was found in the cecum. The polyp was sessile. The polyp        was removed with a cold snare. Resection and retrieval were complete.        Verification of patient identification for the specimen was done by the        physician, nurse and technician using the patient's name, birth date and        medical record number. Estimated blood loss was minimal.     "    A 4 mm polyp was found in the sigmoid colon. The polyp was sessile. The        polyp was removed with a jumbo cold forceps. Resection and retrieval        were complete. Verification of patient identification for the specimen        was done by the physician, nurse and technician using the patient's        name, birth date and medical record number. Estimated blood loss was        minimal.        External and internal hemorrhoids were found during retroflexion. The        hemorrhoids were mild and Grade I (internal hemorrhoids that do not        prolapse).                                                                                     Impression:            - Diverticulosis from 20 to 50 cm proximal to the anus.                          - One 4 mm polyp in the cecum, removed with a cold                          snare. Resected and retrieved.                          - One 4 mm polyp in the sigmoid colon, removed with a                          jumbo cold forceps. Resected and retrieved.                          - External and internal hemorrhoids.              Surgical Pathology (5/23/2022):  A.  Colon, sigmoid: Polypectomy:  - Inflammatory type polyp     B.  Colon, cecum: Polypectomy:  - Non-dysplastic colonic mucosa   - Multiple additional levels examined      Medical history:  1. HTN  2. Invasive ductal carcinoma of the left breast     Surgical history:  1. Breast lumpectomy in 2010  2. Bilateral BSO in 2023  3. Bilateral mastectomy in 2018     Family history:  Family History   Problem Relation Age of Onset     Hypertension Mother      Allergies Mother      Thyroid Disease Mother         Taking thyroid medication     Obesity Mother      Cerebrovascular Disease Mother         TIA Feb 2017     Hypertension Father      Thyroid Disease Father         two parathyroids removed     Cancer Maternal Grandmother         Bone CA     Allergies Maternal Grandmother      Circulatory Maternal Grandmother       Cerebrovascular Disease Maternal Grandmother      Other Cancer Maternal Grandmother         multiple myeloma     C.A.D. Maternal Grandfather      Respiratory Maternal Grandfather         asthma     Allergies Maternal Grandfather      Cerebrovascular Disease Maternal Grandfather      Alzheimer Disease Paternal Grandfather      Neurologic Disorder Paternal Grandfather         Parkinsons     Arthritis Paternal Grandmother      Respiratory Other         Emphysema     Diabetes Other      Asthma Other         generic emphysema     Genetic Disorder Other         genetic emphysema     Breast Cancer Sister         benign lump indicated future risk     Diabetes Other      Coronary Artery Disease Other      Other Cancer Other         multiple myeloma     Colon Cancer Other      Other Cancer Other         Acute Leukemia     Cerebrovascular Disease Other      Breast Cancer Other         told aunt was diagnosed - unsure of type & treatment     Cancer - colorectal No family hx of      Prostate Cancer No family hx of      Medications:  Current Outpatient Medications   Medication Sig Dispense Refill     calcium carbonate 600 mg-vitamin D 400 units (CALTRATE) 600-400 MG-UNIT per tablet Take 1 tablet by mouth 2 times daily       exemestane (AROMASIN) 25 MG tablet Take 1 tablet (25 mg) by mouth daily 90 tablet 3     Fluticasone Propionate (FLONASE ALLERGY RELIEF NA)        lisinopril (ZESTRIL) 30 MG tablet Take 1 tablet (30 mg) by mouth daily 90 tablet 3     montelukast (SINGULAIR) 10 MG tablet Take 1 tablet (10 mg) by mouth At Bedtime 90 tablet 3     propranolol (INDERAL) 10 MG tablet Take 1-2 tablets (10-20 mg) by mouth 3 times daily as needed (anxiety) 60 tablet 3     vitamin D3 (CHOLECALCIFEROL) 2000 units tablet Take 1 tablet by mouth daily         Allergies:  Allergies   Allergen Reactions     Aspirin Hives     Dust Mites Unknown     Morphine Nausea     Other reaction(s): GI Intolerance       Social history:   Social History      Tobacco Use     Smoking status: Never     Smokeless tobacco: Never   Substance Use Topics     Alcohol use: No     Alcohol/week: 0.0 standard drinks of alcohol     Comment: very rare     Marital status: single.    ROS:  A complete review of systems was performed with the patient and all systems negative except as per HPI.    Physical Examination:  Exam was chaperoned by Patrick Henson EMT-P  /85 (BP Location: Left arm, Patient Position: Sitting, Cuff Size: Adult Regular)   Pulse 78   LMP 03/14/2019   SpO2 96%   General: Well hydrated. No acute distress.  CV: RRR  Lung: Non-labored breathing on RA  Abdomen: Soft, NT, no masses. No inguinal adenopathy palpated.  BENJA: deferred    ASSESSMENT    This is a 52 year old F with a h/o breast cancer s/p mastectomy, now with 5 episodes of diverticulitis since 2018. A long conversation was held with the pt regarding management options, which include observation vs partial colectomy. We discussed how recent data do not support significant dietary changes other than promoting a healthy diet or the notion that previous number or intensity of episodes of diverticulitis impact recurrence rates or outcomes. Risks, benefits, and alternatives of operative treatment were thoroughly discussed with the patient, she understands these well and agrees to proceed with observation. Given her previous cancer history, it is reasonable to do a repeat colonoscopy in 1-2 months if the bleeding continues.    All pertinent labs and imaging were personally reviewed by me.      PLAN  - No acute surgical indications  - Healthy life style and weight loss were encouraged  - Next colonoscopy in 2025 or in 1-2 months if ongoing bleeding  - Continue fiber supplementation    45 minutes spent on the date of the encounter doing chart review, history and exam, imaging review, documentation and further activities as noted above.      Rahul Stewart MD    Division of Colon and Rectal  Surgery  [facility]    Referring Provider:  Pearl Grover MD  [id number] [id number]     Primary Care Provider:  Pearl Grover

## 2023-07-04 ASSESSMENT — ENCOUNTER SYMPTOMS
BLOOD IN STOOL: 1
NAUSEA: 0
VOMITING: 0
ABDOMINAL PAIN: 0
BLOATING: 0
CONSTIPATION: 0
DIARRHEA: 0
JAUNDICE: 0
RECTAL PAIN: 0
BOWEL INCONTINENCE: 0
HEARTBURN: 1

## 2023-07-11 ENCOUNTER — OFFICE VISIT (OUTPATIENT)
Dept: SURGERY | Facility: CLINIC | Age: 53
End: 2023-07-11
Payer: COMMERCIAL

## 2023-07-11 ENCOUNTER — PRE VISIT (OUTPATIENT)
Dept: SURGERY | Facility: CLINIC | Age: 53
End: 2023-07-11

## 2023-07-11 VITALS — OXYGEN SATURATION: 96 % | DIASTOLIC BLOOD PRESSURE: 85 MMHG | SYSTOLIC BLOOD PRESSURE: 130 MMHG | HEART RATE: 78 BPM

## 2023-07-11 DIAGNOSIS — C50.919 MALIGNANT NEOPLASM OF BREAST IN FEMALE, ESTROGEN RECEPTOR POSITIVE, UNSPECIFIED LATERALITY, UNSPECIFIED SITE OF BREAST (H): ICD-10-CM

## 2023-07-11 DIAGNOSIS — I10 HYPERTENSION, UNSPECIFIED TYPE: ICD-10-CM

## 2023-07-11 DIAGNOSIS — Z17.0 MALIGNANT NEOPLASM OF BREAST IN FEMALE, ESTROGEN RECEPTOR POSITIVE, UNSPECIFIED LATERALITY, UNSPECIFIED SITE OF BREAST (H): ICD-10-CM

## 2023-07-11 DIAGNOSIS — K57.30 DIVERTICULOSIS OF LARGE INTESTINE WITHOUT HEMORRHAGE: Primary | ICD-10-CM

## 2023-07-11 DIAGNOSIS — K57.32 DIVERTICULITIS OF COLON: ICD-10-CM

## 2023-07-11 DIAGNOSIS — Z86.0100 HISTORY OF COLONIC POLYPS: ICD-10-CM

## 2023-07-11 PROCEDURE — 99215 OFFICE O/P EST HI 40 MIN: CPT | Performed by: SURGERY

## 2023-07-11 ASSESSMENT — PAIN SCALES - GENERAL: PAINLEVEL: NO PAIN (0)

## 2023-07-11 NOTE — LETTER
"2023       RE: Desi Granado  [address] [place] [address]       Dear Colleague,    Thank you for referring your patient, Desi Granado, to the [facility] COLON AND RECTAL SURGERY CLINIC [place] at [facility]. Please see a copy of my visit note below.    Colon and Rectal Surgery Clinic Note    RE: Desi Granado.  : 1970.  LOKESH: 2023.    Reason for visit: second opinion for diverticulitis     HPI: Desi \"Lanette Granado is a 52 year old female who presents today for a second opinion of her diverticulitis. She has a past medical history of HTN and invasive ductal carcinoma of the left breast ( ER+ and progesterone receptor +100% strong nuclear staining s/p bilateral mastectomy, pT2pN0 then s/p preventative bilateral BSO in ). She has had two documented diverticulitis attacks since  and a total of 5 reported. Her last colonoscopy on 2022 demonstrated multiple small-mouthed diverticula from 20 to 50 cm proximal to the anus. In addition there were two 4mm polyps in the cecum and sigmoid colon that were removed which were negative. Her first opinion was with Dr. [facility]o who recommended a sigmoidectomy.     Today Nilda feels fine. She \is reporting mild pain once per year. No issue with diet or BMs. She reports some bleeding per rectum. She is on a fiber supplement.       CT Abdomen/Pelvis (2018):  IMPRESSION: Sigmoid diverticulitis. No perforation or abscess.    CT Abdomen/Pelvis (3/20/2022):  IMPRESSION:   1.  Acute diverticulitis at the left lower quadrant proximal sigmoid colon. No abscess is seen.     Colonoscopy (2022):  Findings:        The perianal and digital rectal examinations were normal. Pertinent        negatives include normal sphincter tone.        Multiple small-mouthed diverticula were found from 20 to 50 cm proximal        to the anus.        A 4 mm polyp was found in the " cecum. The polyp was sessile. The polyp        was removed with a cold snare. Resection and retrieval were complete.        Verification of patient identification for the specimen was done by the        physician, nurse and technician using the patient's name, birth date and        medical record number. Estimated blood loss was minimal.        A 4 mm polyp was found in the sigmoid colon. The polyp was sessile. The        polyp was removed with a jumbo cold forceps. Resection and retrieval        were complete. Verification of patient identification for the specimen        was done by the physician, nurse and technician using the patient's        name, birth date and medical record number. Estimated blood loss was        minimal.        External and internal hemorrhoids were found during retroflexion. The        hemorrhoids were mild and Grade I (internal hemorrhoids that do not        prolapse).                                                                                     Impression:            - Diverticulosis from 20 to 50 cm proximal to the anus.                          - One 4 mm polyp in the cecum, removed with a cold                          snare. Resected and retrieved.                          - One 4 mm polyp in the sigmoid colon, removed with a                          jumbo cold forceps. Resected and retrieved.                          - External and internal hemorrhoids.            Surgical Pathology (5/23/2022):  A.  Colon, sigmoid: Polypectomy:  - Inflammatory type polyp     B.  Colon, cecum: Polypectomy:  - Non-dysplastic colonic mucosa   - Multiple additional levels examined      Medical history:  1. HTN  2. Invasive ductal carcinoma of the left breast     Surgical history:  1. Breast lumpectomy in 2010  2. Bilateral BSO in 2023  3. Bilateral mastectomy in 2018     Family history:  Family History   Problem Relation Age of Onset    Hypertension Mother     Allergies Mother     Thyroid Disease  Mother         Taking thyroid medication    Obesity Mother     Cerebrovascular Disease Mother         TIA Feb 2017    Hypertension Father     Thyroid Disease Father         two parathyroids removed    Cancer Maternal Grandmother         Bone CA    Allergies Maternal Grandmother     Circulatory Maternal Grandmother     Cerebrovascular Disease Maternal Grandmother     Other Cancer Maternal Grandmother         multiple myeloma    C.A.D. Maternal Grandfather     Respiratory Maternal Grandfather         asthma    Allergies Maternal Grandfather     Cerebrovascular Disease Maternal Grandfather     Alzheimer Disease Paternal Grandfather     Neurologic Disorder Paternal Grandfather         Parkinsons    Arthritis Paternal Grandmother     Respiratory Other         Emphysema    Diabetes Other     Asthma Other         generic emphysema    Genetic Disorder Other         genetic emphysema    Breast Cancer Sister         benign lump indicated future risk    Diabetes Other     Coronary Artery Disease Other     Other Cancer Other         multiple myeloma    Colon Cancer Other     Other Cancer Other         Acute Leukemia    Cerebrovascular Disease Other     Breast Cancer Other         told aunt was diagnosed - unsure of type & treatment    Cancer - colorectal No family hx of     Prostate Cancer No family hx of      Medications:  Current Outpatient Medications   Medication Sig Dispense Refill    calcium carbonate 600 mg-vitamin D 400 units (CALTRATE) 600-400 MG-UNIT per tablet Take 1 tablet by mouth 2 times daily      exemestane (AROMASIN) 25 MG tablet Take 1 tablet (25 mg) by mouth daily 90 tablet 3    Fluticasone Propionate (FLONASE ALLERGY RELIEF NA)       lisinopril (ZESTRIL) 30 MG tablet Take 1 tablet (30 mg) by mouth daily 90 tablet 3    montelukast (SINGULAIR) 10 MG tablet Take 1 tablet (10 mg) by mouth At Bedtime 90 tablet 3    propranolol (INDERAL) 10 MG tablet Take 1-2 tablets (10-20 mg) by mouth 3 times daily as needed  (anxiety) 60 tablet 3    vitamin D3 (CHOLECALCIFEROL) 2000 units tablet Take 1 tablet by mouth daily         Allergies:  Allergies   Allergen Reactions    Aspirin Hives    Dust Mites Unknown    Morphine Nausea     Other reaction(s): GI Intolerance       Social history:   Social History     Tobacco Use    Smoking status: Never    Smokeless tobacco: Never   Substance Use Topics    Alcohol use: No     Alcohol/week: 0.0 standard drinks of alcohol     Comment: very rare     Marital status: single.    ROS:  A complete review of systems was performed with the patient and all systems negative except as per HPI.    Physical Examination:  Exam was chaperoned by Patrick Henson, EMT-P  /85 (BP Location: Left arm, Patient Position: Sitting, Cuff Size: Adult Regular)   Pulse 78   LMP 03/14/2019   SpO2 96%   General: Well hydrated. No acute distress.  CV: RRR  Lung: Non-labored breathing on RA  Abdomen: Soft, NT, no masses. No inguinal adenopathy palpated.  BENJA: deferred    ASSESSMENT    This is a 52 year old F with a h/o breast cancer s/p mastectomy, now with 5 episodes of diverticulitis since 2018. A long conversation was held with the pt regarding management options, which include observation vs partial colectomy. We discussed how recent data do not support significant dietary changes other than promoting a healthy diet or the notion that previous number or intensity of episodes of diverticulitis impact recurrence rates or outcomes. Risks, benefits, and alternatives of operative treatment were thoroughly discussed with the patient, she understands these well and agrees to proceed with observation. Given her previous cancer history, it is reasonable to do a repeat colonoscopy in 1-2 months if the bleeding continues.    All pertinent labs and imaging were personally reviewed by me.      PLAN  - No acute surgical indications  - Healthy life style and weight loss were encouraged  - Next colonoscopy in 2025 or in 1-2 months  if ongoing bleeding  - Continue fiber supplementation    45 minutes spent on the date of the encounter doing chart review, history and exam, imaging review, documentation and further activities as noted above.      Referring Provider:  Pearl Grover MD  [id number] [place] [address]     Primary Care Provider:  Pearl Grover        Again, thank you for allowing me to participate in the care of your patient.      Sincerely,    Rahul Stewart MD

## 2023-07-11 NOTE — NURSING NOTE
Chief Complaint   Patient presents with     Consult       Vitals:    07/11/23 1042   BP: 130/85   BP Location: Left arm   Patient Position: Sitting   Cuff Size: Adult Regular   Pulse: 78   SpO2: 96%       There is no height or weight on file to calculate BMI.    Patrick Henson EMT-P

## 2023-07-11 NOTE — PATIENT INSTRUCTIONS
Follow up as needed   Notify us if your bleeding does not improve and you can have a colonoscopy starting Sept 2023    Beth CARUSO [phone]

## 2023-08-24 ENCOUNTER — APPOINTMENT (OUTPATIENT)
Dept: LAB | Facility: CLINIC | Age: 53
End: 2023-08-24
Attending: INTERNAL MEDICINE
Payer: COMMERCIAL

## 2023-08-24 ENCOUNTER — ONCOLOGY VISIT (OUTPATIENT)
Dept: ONCOLOGY | Facility: CLINIC | Age: 53
End: 2023-08-24
Attending: INTERNAL MEDICINE
Payer: COMMERCIAL

## 2023-08-24 ENCOUNTER — ANCILLARY PROCEDURE (OUTPATIENT)
Dept: BONE DENSITY | Facility: CLINIC | Age: 53
End: 2023-08-24
Attending: INTERNAL MEDICINE
Payer: COMMERCIAL

## 2023-08-24 VITALS
BODY MASS INDEX: 26.81 KG/M2 | DIASTOLIC BLOOD PRESSURE: 86 MMHG | SYSTOLIC BLOOD PRESSURE: 120 MMHG | HEART RATE: 91 BPM | TEMPERATURE: 98.4 F | WEIGHT: 161.1 LBS | OXYGEN SATURATION: 96 % | RESPIRATION RATE: 16 BRPM

## 2023-08-24 DIAGNOSIS — Z79.811 AROMATASE INHIBITOR USE: ICD-10-CM

## 2023-08-24 DIAGNOSIS — M85.852 OSTEOPENIA OF NECK OF LEFT FEMUR: ICD-10-CM

## 2023-08-24 DIAGNOSIS — C50.912 INVASIVE DUCTAL CARCINOMA OF BREAST, FEMALE, LEFT (H): Primary | ICD-10-CM

## 2023-08-24 DIAGNOSIS — C50.912 INVASIVE DUCTAL CARCINOMA OF BREAST, FEMALE, LEFT (H): ICD-10-CM

## 2023-08-24 DIAGNOSIS — Z78.0 MENOPAUSE: ICD-10-CM

## 2023-08-24 LAB
ALBUMIN SERPL BCG-MCNC: 4.6 G/DL (ref 3.5–5.2)
ALP SERPL-CCNC: 79 U/L (ref 35–104)
ALT SERPL W P-5'-P-CCNC: 11 U/L (ref 0–50)
ANION GAP SERPL CALCULATED.3IONS-SCNC: 8 MMOL/L (ref 7–15)
AST SERPL W P-5'-P-CCNC: 14 U/L (ref 0–45)
BASOPHILS # BLD AUTO: 0 10E3/UL (ref 0–0.2)
BASOPHILS NFR BLD AUTO: 1 %
BILIRUB SERPL-MCNC: 0.5 MG/DL
BUN SERPL-MCNC: 18.6 MG/DL (ref 6–20)
CALCIUM SERPL-MCNC: 10.3 MG/DL (ref 8.6–10)
CHLORIDE SERPL-SCNC: 101 MMOL/L (ref 98–107)
CREAT SERPL-MCNC: 1.25 MG/DL (ref 0.51–0.95)
DEPRECATED CALCIDIOL+CALCIFEROL SERPL-MC: 49 UG/L (ref 20–75)
DEPRECATED HCO3 PLAS-SCNC: 29 MMOL/L (ref 22–29)
EOSINOPHIL # BLD AUTO: 0.1 10E3/UL (ref 0–0.7)
EOSINOPHIL NFR BLD AUTO: 1 %
ERYTHROCYTE [DISTWIDTH] IN BLOOD BY AUTOMATED COUNT: 12.3 % (ref 10–15)
GFR SERPL CREATININE-BSD FRML MDRD: 51 ML/MIN/1.73M2
GLUCOSE SERPL-MCNC: 95 MG/DL (ref 70–99)
HCT VFR BLD AUTO: 41.5 % (ref 35–47)
HGB BLD-MCNC: 14.3 G/DL (ref 11.7–15.7)
HOLD SPECIMEN: NORMAL
HOLD SPECIMEN: NORMAL
IMM GRANULOCYTES # BLD: 0 10E3/UL
IMM GRANULOCYTES NFR BLD: 1 %
LYMPHOCYTES # BLD AUTO: 1.2 10E3/UL (ref 0.8–5.3)
LYMPHOCYTES NFR BLD AUTO: 20 %
MCH RBC QN AUTO: 29.4 PG (ref 26.5–33)
MCHC RBC AUTO-ENTMCNC: 34.5 G/DL (ref 31.5–36.5)
MCV RBC AUTO: 85 FL (ref 78–100)
MONOCYTES # BLD AUTO: 0.5 10E3/UL (ref 0–1.3)
MONOCYTES NFR BLD AUTO: 7 %
NEUTROPHILS # BLD AUTO: 4.4 10E3/UL (ref 1.6–8.3)
NEUTROPHILS NFR BLD AUTO: 70 %
NRBC # BLD AUTO: 0 10E3/UL
NRBC BLD AUTO-RTO: 0 /100
PLATELET # BLD AUTO: 306 10E3/UL (ref 150–450)
POTASSIUM SERPL-SCNC: 4.2 MMOL/L (ref 3.4–5.3)
PROT SERPL-MCNC: 7.1 G/DL (ref 6.4–8.3)
RBC # BLD AUTO: 4.87 10E6/UL (ref 3.8–5.2)
SODIUM SERPL-SCNC: 138 MMOL/L (ref 136–145)
WBC # BLD AUTO: 6.2 10E3/UL (ref 4–11)

## 2023-08-24 PROCEDURE — 250N000011 HC RX IP 250 OP 636: Mod: JZ | Performed by: INTERNAL MEDICINE

## 2023-08-24 PROCEDURE — 96372 THER/PROPH/DIAG INJ SC/IM: CPT | Performed by: INTERNAL MEDICINE

## 2023-08-24 PROCEDURE — 99213 OFFICE O/P EST LOW 20 MIN: CPT | Performed by: INTERNAL MEDICINE

## 2023-08-24 PROCEDURE — 77080 DXA BONE DENSITY AXIAL: CPT | Performed by: INTERNAL MEDICINE

## 2023-08-24 PROCEDURE — G0463 HOSPITAL OUTPT CLINIC VISIT: HCPCS | Performed by: INTERNAL MEDICINE

## 2023-08-24 PROCEDURE — 80053 COMPREHEN METABOLIC PANEL: CPT | Performed by: INTERNAL MEDICINE

## 2023-08-24 PROCEDURE — 36415 COLL VENOUS BLD VENIPUNCTURE: CPT | Performed by: INTERNAL MEDICINE

## 2023-08-24 PROCEDURE — 85025 COMPLETE CBC W/AUTO DIFF WBC: CPT | Performed by: INTERNAL MEDICINE

## 2023-08-24 PROCEDURE — 82306 VITAMIN D 25 HYDROXY: CPT | Performed by: INTERNAL MEDICINE

## 2023-08-24 RX ORDER — METHYLPREDNISOLONE SODIUM SUCCINATE 125 MG/2ML
125 INJECTION, POWDER, LYOPHILIZED, FOR SOLUTION INTRAMUSCULAR; INTRAVENOUS
Status: CANCELLED
Start: 2024-02-19

## 2023-08-24 RX ORDER — NALOXONE HYDROCHLORIDE 0.4 MG/ML
0.2 INJECTION, SOLUTION INTRAMUSCULAR; INTRAVENOUS; SUBCUTANEOUS
Status: CANCELLED | OUTPATIENT
Start: 2024-02-19

## 2023-08-24 RX ORDER — ALBUTEROL SULFATE 0.83 MG/ML
2.5 SOLUTION RESPIRATORY (INHALATION)
Status: CANCELLED | OUTPATIENT
Start: 2024-02-19

## 2023-08-24 RX ORDER — MEPERIDINE HYDROCHLORIDE 25 MG/ML
25 INJECTION INTRAMUSCULAR; INTRAVENOUS; SUBCUTANEOUS EVERY 30 MIN PRN
Status: CANCELLED | OUTPATIENT
Start: 2024-02-19

## 2023-08-24 RX ORDER — EPINEPHRINE 1 MG/ML
0.3 INJECTION, SOLUTION INTRAMUSCULAR; SUBCUTANEOUS EVERY 5 MIN PRN
Status: CANCELLED | OUTPATIENT
Start: 2024-02-19

## 2023-08-24 RX ORDER — ALBUTEROL SULFATE 90 UG/1
1-2 AEROSOL, METERED RESPIRATORY (INHALATION)
Status: CANCELLED
Start: 2024-02-19

## 2023-08-24 RX ORDER — DIPHENHYDRAMINE HYDROCHLORIDE 50 MG/ML
50 INJECTION INTRAMUSCULAR; INTRAVENOUS
Status: CANCELLED
Start: 2024-02-19

## 2023-08-24 RX ADMIN — DENOSUMAB 60 MG: 60 INJECTION SUBCUTANEOUS at 11:56

## 2023-08-24 ASSESSMENT — PAIN SCALES - GENERAL: PAINLEVEL: NO PAIN (0)

## 2023-08-24 NOTE — LETTER
8/24/2023         RE: Desi Granado  [address] [place] [address]        Dear Colleague,    Thank you for referring your patient, Desi Granado, to the [facility] CANCER [facility]INIC. Please see a copy of my visit note below.    Oncology Follow-Up Note  8/24/23    Ms. Granado is seen in follow up of her left breast cancer.     Oncologic History:  Invasive ductal carcinoma of breast, female, left (H)     Desi Granado was found to have developing focal asymmetry in the left breast at approximately 3 o'clock position about 7-8 cm from the nipple on the routine mammogram done on October 30, 2018. Subsequently she went on to have diagnostic mammogram and ultrasound. Directed sonogram at the site of mammographic finding showed a 1.6 cm irregular solid lesion. Subsequently the patient went on to have ultrasound-guided needle core biopsy done on October 17, 2018. The biopsy came back positive for invasive ductal carcinoma with papillary features. There is grade 2 out of 3. Angiolymphatic invasion was not seen. Ductal carcinoma in situ was not seen. The tumor was estrogen receptor positive about 100% and progesterone receptor +100% strong nuclear staining. HER-2/tamara came back negative for amplification. She had bilateral mastectomy. Surgical pathology revealed simple mastectomy sample of the left showing solid papillary carcinoma 22 mm. Grade 2. Margins were clear of involvement. 3 sentinel lymph nodes shows no evidence of metastatic disease. Lymphovascular invasion was not identified. The tumor is pT2pN0. Right breast shows simple mastectomy without any evidence of malignancy. Oncotype came back 0. She established care with Dr. Peña, started on tamoxifen 12/2018 and switched to  Exemestane and Zoladex 06/2019 upon insurance approval.  She underwent BSO in 2/2023. Since then only on exemestane    Interval History:  Nilda is doing well on treatment.  She is s/p BSO in early  February without any complications. Due to recurrent diverticulitis history, on left ovary was adhered to bowel, which required separation.     Overall, she feels like she is doing well.      She has no f/c.  No chest pain or shortness of breath.  No n/v/d/c.  No nodules or masses across her chest.     She has mild hot flashes and fatigue.      She is busy at desk job, unable to work out much.   She follows primary care physician annually and no new medications started since last visit.     Review Of Systems:  All other ROS negative unless mentioned in interval history.     Past medical, social, surgical, and family histories reviewed.     Allergies:        Allergies as of 07/09/2021 - Reviewed 07/09/2021   Allergen Reaction Noted    Aspirin Hives 08/03/2009    Dust mites Unknown 07/03/2009    Morphine Nausea 06/23/2008      Current Medications:  Current Outpatient Medications   Medication    calcium carbonate 600 mg-vitamin D 400 units (CALTRATE) 600-400 MG-UNIT per tablet    exemestane (AROMASIN) 25 MG tablet    Fluticasone Propionate (FLONASE ALLERGY RELIEF NA)    lisinopril (ZESTRIL) 30 MG tablet    montelukast (SINGULAIR) 10 MG tablet    propranolol (INDERAL) 10 MG tablet    vitamin D3 (CHOLECALCIFEROL) 2000 units tablet     No current facility-administered medications for this visit.     She gets semi-annual Prolia       Physical Exam:  /86   Pulse 91   Temp 98.4  F (36.9  C) (Oral)   Resp 16   Wt 73.1 kg (161 lb 1.6 oz)   LMP 03/14/2019   SpO2 96%   BMI 26.81 kg/m      GENERAL: Healthy, alert and no distress  EYES: Eyes grossly normal to inspection. No discharge or erythema, or obvious scleral/conjunctival abnormalities.  CV: rrr  Lungs: clear  Abd; soft, nt, nd + bs  Ext: no edema  Breast: s/p bilateral mastectomy, no nodules or masses, no axillary adenopathy  PSYCH: Mentation appears normal, affect normal/bright, judgement and insight intact, normal speech and appearance  well-groomed.    Laboratory/Imaging Studies:    DEXA scan 7/22/21  IMPRESSION:  1. Mild-moderate osteopenia in the left femoral neck, worse than on  the previous exam.  2. Mild osteopenia in the right femoral neck, mildly worse.  3. In the lumbar spine there is at least mild-moderate, if not  prominent, osteopenia. This is worse than on the previous exam.     Today's DEXA scan pending read.     Assessment and plan:  Ms. Granado is a 53-year-old female with left solid papillary carcinoma, Grade 2, ER+/VT+/HER2-. She is status post bilateral mastectomy and left sentinel node biopsy. Her disease is pT2N0(sn). Oncotype score is 0. She is doing well on adjuvant AI. There is no clinical evidence of breast cancer recurrence.     Left breast cancer: She was started on tamoxifen  on 12/2018 switched to exemestane as well as Zoladex injections monthly since 6/2019. Zoladex discontinued after BSO in 2/2023. She is currently 4 and half years on endocrine therapy. She is tolerating exemestane well. She is willing to continue beyond 5 years given good tolerance. Will aim for 7 years of AI.  Continue Exemestane. If osteopenia worsens or more side effects, will weigh risks vs benefits to AI discontinuation    Osteopenia: continue semi-annual Prolia.  She is tolerating without a lot of side effects. Dose due today.  DEXA scan done today.  Pending read.    Mild MAME: Unclear cause. Per pt she is likely dehydrated could also be 2/2 hypercalcemia. Recommend repeat BMP check.  She has appointment with PCP in 10/2023 when she plans to get rechecked.    Hypercalcemia: given on prolia, instructed ok to hold PO calcium for now    Essential HTN: on lisinopril    We discussed continuing every six months appts.    20 min were spent in reviewing records, counseling and coordinating care.    Patient seen and examined with attending, Dr. Grijalva.     Irma Diana MD  Hematology and Medical Oncology Fellow  [facility]  Pager:  [phone]    Addendum:  Pt was seen and evaluated by me with Dr Diana.  Ms. Granado has no s/s of recurrent breast cancer.  She is doing well on exemestane.  Continue with a goal of 7 years of AI.    Osteopenia - we reviewed her dexa scan.  Continue prolia for her osteopenia in conjunction with AI usage.    We reviewed her creatinine is elevated today.  We discussed getting addiitonal labs for an initial workup.  She is going to follow up with her PCP in another month or so.      Elvia Grijalva MD

## 2023-08-24 NOTE — PROGRESS NOTES
Oncology Follow-Up Note  8/24/23    Ms. Granado is seen in follow up of her left breast cancer.     Oncologic History:  Invasive ductal carcinoma of breast, female, left (H)     Desi Granado was found to have developing focal asymmetry in the left breast at approximately 3 o'clock position about 7-8 cm from the nipple on the routine mammogram done on October 30, 2018. Subsequently she went on to have diagnostic mammogram and ultrasound. Directed sonogram at the site of mammographic finding showed a 1.6 cm irregular solid lesion. Subsequently the patient went on to have ultrasound-guided needle core biopsy done on October 17, 2018. The biopsy came back positive for invasive ductal carcinoma with papillary features. There is grade 2 out of 3. Angiolymphatic invasion was not seen. Ductal carcinoma in situ was not seen. The tumor was estrogen receptor positive about 100% and progesterone receptor +100% strong nuclear staining. HER-2/tamara came back negative for amplification. She had bilateral mastectomy. Surgical pathology revealed simple mastectomy sample of the left showing solid papillary carcinoma 22 mm. Grade 2. Margins were clear of involvement. 3 sentinel lymph nodes shows no evidence of metastatic disease. Lymphovascular invasion was not identified. The tumor is pT2pN0. Right breast shows simple mastectomy without any evidence of malignancy. Oncotype came back 0. She established care with Dr. Peña, started on tamoxifen 12/2018 and switched to  Exemestane and Zoladex 06/2019 upon insurance approval.  She underwent BSO in 2/2023. Since then only on exemestane    Interval History:  Nilda is doing well on treatment.  She is s/p BSO in early February without any complications. Due to recurrent diverticulitis history, on left ovary was adhered to bowel, which required separation.     Overall, she feels like she is doing well.      She has no f/c.  No chest pain or shortness of breath.  No n/v/d/c.  No nodules or  masses across her chest.     She has mild hot flashes and fatigue.      She is busy at desk job, unable to work out much.   She follows primary care physician annually and no new medications started since last visit.     Review Of Systems:  All other ROS negative unless mentioned in interval history.     Past medical, social, surgical, and family histories reviewed.     Allergies:        Allergies as of 07/09/2021 - Reviewed 07/09/2021   Allergen Reaction Noted     Aspirin Hives 08/03/2009     Dust mites Unknown 07/03/2009     Morphine Nausea 06/23/2008      Current Medications:  Current Outpatient Medications   Medication     calcium carbonate 600 mg-vitamin D 400 units (CALTRATE) 600-400 MG-UNIT per tablet     exemestane (AROMASIN) 25 MG tablet     Fluticasone Propionate (FLONASE ALLERGY RELIEF NA)     lisinopril (ZESTRIL) 30 MG tablet     montelukast (SINGULAIR) 10 MG tablet     propranolol (INDERAL) 10 MG tablet     vitamin D3 (CHOLECALCIFEROL) 2000 units tablet     No current facility-administered medications for this visit.     She gets semi-annual Prolia       Physical Exam:  /86   Pulse 91   Temp 98.4  F (36.9  C) (Oral)   Resp 16   Wt 73.1 kg (161 lb 1.6 oz)   LMP 03/14/2019   SpO2 96%   BMI 26.81 kg/m      GENERAL: Healthy, alert and no distress  EYES: Eyes grossly normal to inspection. No discharge or erythema, or obvious scleral/conjunctival abnormalities.  CV: rrr  Lungs: clear  Abd; soft, nt, nd + bs  Ext: no edema  Breast: s/p bilateral mastectomy, no nodules or masses, no axillary adenopathy  PSYCH: Mentation appears normal, affect normal/bright, judgement and insight intact, normal speech and appearance well-groomed.    Laboratory/Imaging Studies:    DEXA scan 7/22/21  IMPRESSION:  1. Mild-moderate osteopenia in the left femoral neck, worse than on  the previous exam.  2. Mild osteopenia in the right femoral neck, mildly worse.  3. In the lumbar spine there is at least mild-moderate, if  not  prominent, osteopenia. This is worse than on the previous exam.     Today's DEXA scan pending read.     Assessment and plan:  Ms. Granado is a 53-year-old female with left solid papillary carcinoma, Grade 2, ER+/KS+/HER2-. She is status post bilateral mastectomy and left sentinel node biopsy. Her disease is pT2N0(sn). Oncotype score is 0. She is doing well on adjuvant AI. There is no clinical evidence of breast cancer recurrence.     Left breast cancer: She was started on tamoxifen  on 12/2018 switched to exemestane as well as Zoladex injections monthly since 6/2019. Zoladex discontinued after BSO in 2/2023. She is currently 4 and half years on endocrine therapy. She is tolerating exemestane well. She is willing to continue beyond 5 years given good tolerance. Will aim for 7 years of AI.  Continue Exemestane. If osteopenia worsens or more side effects, will weigh risks vs benefits to AI discontinuation    Osteopenia: continue semi-annual Prolia.  She is tolerating without a lot of side effects. Dose due today.  DEXA scan done today.  Pending read.    Mild MAME: Unclear cause. Per pt she is likely dehydrated could also be 2/2 hypercalcemia. Recommend repeat BMP check.  She has appointment with PCP in 10/2023 when she plans to get rechecked.    Hypercalcemia: given on prolia, instructed ok to hold PO calcium for now    Essential HTN: on lisinopril    We discussed continuing every six months appts.    20 min were spent in reviewing records, counseling and coordinating care.    Patient seen and examined with attending, Dr. Grijalva.     Irma Diana MD  Hematology and Medical Oncology Fellow  [facility]  Pager: [phone]    Addendum:  Pt was seen and evaluated by me with Dr Diana.  Ms. Granado has no s/s of recurrent breast cancer.  She is doing well on exemestane.  Continue with a goal of 7 years of AI.    Osteopenia - we reviewed her dexa scan.  Continue prolia for her osteopenia in conjunction  with AI usage.    We reviewed her creatinine is elevated today.  We discussed getting addiitonal labs for an initial workup.  She is going to follow up with her PCP in another month or so.      Elvia Grijalva MD

## 2023-08-24 NOTE — NURSING NOTE
Prolia administered without complication per orders from Elvia Grijalva. Patient tolerated well and was discharged. See MAR for details.        Nehemiah Fuentes on 8/24/2023 at 11:57 AM

## 2023-08-24 NOTE — NURSING NOTE
"Oncology Rooming Note    August 24, 2023 9:27 AM   Desi Granado is a 53 year old female who presents for:    Chief Complaint   Patient presents with    Blood Draw     No lab orders. Vitals taken and appointment arrived    Oncology Clinic Visit     Invasive ductal carcinoma of breast, female, left     Initial Vitals: /86   Pulse 91   Temp 98.4  F (36.9  C) (Oral)   Resp 16   Wt 73.1 kg (161 lb 1.6 oz)   LMP 03/14/2019   SpO2 96%   BMI 26.81 kg/m   Estimated body mass index is 26.81 kg/m  as calculated from the following:    Height as of 2/22/23: 1.651 m (5' 5\").    Weight as of this encounter: 73.1 kg (161 lb 1.6 oz). Body surface area is 1.83 meters squared.  No Pain (0) Comment: Data Unavailable   Patient's last menstrual period was 03/14/2019.  Allergies reviewed: Yes  Medications reviewed: Yes    Medications: Medication refills not needed today.  Pharmacy name entered into [employer]:    [facility] [place] - [address] [place] [address]  [facility]IV[facility] - [address]  (INACTIVE SEE [facility] [id number]) [facility] - [place], [place] - [address]    Clinical concerns: none      Shannan Crook, EMT  8/24/2023            "

## 2023-08-24 NOTE — NURSING NOTE
Chief Complaint   Patient presents with    Blood Draw     No lab orders. Vitals taken and appointment arrived   JI tubes drawn today  Milena Blanco RN

## 2023-10-12 ASSESSMENT — ENCOUNTER SYMPTOMS
COUGH: 0
NERVOUS/ANXIOUS: 0
DIARRHEA: 0
MYALGIAS: 0
EYE PAIN: 0
HEMATOCHEZIA: 1
JOINT SWELLING: 0
CONSTIPATION: 0
FREQUENCY: 0
SHORTNESS OF BREATH: 0
CHILLS: 0
PARESTHESIAS: 0
ABDOMINAL PAIN: 0
SORE THROAT: 0
HEMATURIA: 0
HEADACHES: 0
BREAST MASS: 0
NAUSEA: 0
DIZZINESS: 0
WEAKNESS: 0
ARTHRALGIAS: 0
PALPITATIONS: 0
DYSURIA: 0
FEVER: 0
HEARTBURN: 1

## 2023-10-17 ENCOUNTER — OFFICE VISIT (OUTPATIENT)
Dept: FAMILY MEDICINE | Facility: CLINIC | Age: 53
End: 2023-10-17
Payer: COMMERCIAL

## 2023-10-17 VITALS
RESPIRATION RATE: 16 BRPM | HEART RATE: 86 BPM | OXYGEN SATURATION: 99 % | WEIGHT: 165.5 LBS | HEIGHT: 65 IN | DIASTOLIC BLOOD PRESSURE: 84 MMHG | TEMPERATURE: 97.6 F | BODY MASS INDEX: 27.57 KG/M2 | SYSTOLIC BLOOD PRESSURE: 124 MMHG

## 2023-10-17 DIAGNOSIS — Z79.811 AROMATASE INHIBITOR USE: ICD-10-CM

## 2023-10-17 DIAGNOSIS — N18.31 STAGE 3A CHRONIC KIDNEY DISEASE (H): ICD-10-CM

## 2023-10-17 DIAGNOSIS — Z85.3 HISTORY OF INVASIVE DUCTAL CARCINOMA OF BREAST: ICD-10-CM

## 2023-10-17 DIAGNOSIS — Z13.220 SCREENING FOR CHOLESTEROL LEVEL: ICD-10-CM

## 2023-10-17 DIAGNOSIS — M85.852 OSTEOPENIA OF NECK OF LEFT FEMUR: ICD-10-CM

## 2023-10-17 DIAGNOSIS — S39.012D STRAIN OF LUMBAR REGION, SUBSEQUENT ENCOUNTER: ICD-10-CM

## 2023-10-17 DIAGNOSIS — I10 ESSENTIAL HYPERTENSION, BENIGN: ICD-10-CM

## 2023-10-17 DIAGNOSIS — Z00.00 ROUTINE GENERAL MEDICAL EXAMINATION AT A HEALTH CARE FACILITY: Primary | ICD-10-CM

## 2023-10-17 DIAGNOSIS — L71.0 PERIORAL DERMATITIS: ICD-10-CM

## 2023-10-17 LAB
ANION GAP SERPL CALCULATED.3IONS-SCNC: 14 MMOL/L (ref 7–15)
BUN SERPL-MCNC: 17.3 MG/DL (ref 6–20)
CALCIUM SERPL-MCNC: 9.9 MG/DL (ref 8.6–10)
CHLORIDE SERPL-SCNC: 100 MMOL/L (ref 98–107)
CHOLEST SERPL-MCNC: 200 MG/DL
CREAT SERPL-MCNC: 1.03 MG/DL (ref 0.51–0.95)
DEPRECATED HCO3 PLAS-SCNC: 22 MMOL/L (ref 22–29)
EGFRCR SERPLBLD CKD-EPI 2021: 65 ML/MIN/1.73M2
GLUCOSE SERPL-MCNC: 93 MG/DL (ref 70–99)
HDLC SERPL-MCNC: 59 MG/DL
LDLC SERPL CALC-MCNC: 124 MG/DL
NONHDLC SERPL-MCNC: 141 MG/DL
POTASSIUM SERPL-SCNC: 4.4 MMOL/L (ref 3.4–5.3)
PTH-INTACT SERPL-MCNC: 30 PG/ML (ref 15–65)
SODIUM SERPL-SCNC: 136 MMOL/L (ref 135–145)
TRIGL SERPL-MCNC: 84 MG/DL

## 2023-10-17 PROCEDURE — 99214 OFFICE O/P EST MOD 30 MIN: CPT | Mod: 25 | Performed by: FAMILY MEDICINE

## 2023-10-17 PROCEDURE — 36415 COLL VENOUS BLD VENIPUNCTURE: CPT | Performed by: FAMILY MEDICINE

## 2023-10-17 PROCEDURE — 80061 LIPID PANEL: CPT | Performed by: FAMILY MEDICINE

## 2023-10-17 PROCEDURE — 80048 BASIC METABOLIC PNL TOTAL CA: CPT | Performed by: FAMILY MEDICINE

## 2023-10-17 PROCEDURE — 99396 PREV VISIT EST AGE 40-64: CPT | Performed by: FAMILY MEDICINE

## 2023-10-17 PROCEDURE — 83970 ASSAY OF PARATHORMONE: CPT | Performed by: FAMILY MEDICINE

## 2023-10-17 RX ORDER — TRIAMCINOLONE ACETONIDE 1 MG/G
CREAM TOPICAL 2 TIMES DAILY
Qty: 15 G | Refills: 1 | Status: SHIPPED | OUTPATIENT
Start: 2023-10-17 | End: 2024-09-04

## 2023-10-17 RX ORDER — CYCLOBENZAPRINE HCL 10 MG
10 TABLET ORAL 3 TIMES DAILY PRN
Qty: 10 TABLET | Refills: 1 | Status: SHIPPED | OUTPATIENT
Start: 2023-10-17 | End: 2023-11-07

## 2023-10-17 ASSESSMENT — ENCOUNTER SYMPTOMS
ARTHRALGIAS: 0
FREQUENCY: 0
WEAKNESS: 0
MYALGIAS: 0
HEARTBURN: 1
SHORTNESS OF BREATH: 0
DIZZINESS: 0
CHILLS: 0
DIARRHEA: 0
SORE THROAT: 0
COUGH: 0
ABDOMINAL PAIN: 0
HEMATURIA: 0
PALPITATIONS: 0
HEMATOCHEZIA: 1
NERVOUS/ANXIOUS: 0
EYE PAIN: 0
NAUSEA: 0
BREAST MASS: 0
HEADACHES: 0
PARESTHESIAS: 0
DYSURIA: 0
CONSTIPATION: 0
JOINT SWELLING: 0
FEVER: 0

## 2023-10-17 ASSESSMENT — PAIN SCALES - GENERAL: PAINLEVEL: MILD PAIN (2)

## 2023-10-17 NOTE — PROGRESS NOTES
SUBJECTIVE:   CC: Nilda is an 53 year old who presents for preventive health visit.       10/17/2023     8:15 AM   Additional Questions   Roomed by Yana Vaca CMA       Healthy Habits:     Getting at least 3 servings of Calcium per day:  NO    Bi-annual eye exam:  Yes    Dental care twice a year:  Yes    Sleep apnea or symptoms of sleep apnea:  None    Diet:  Regular (no restrictions)    Frequency of exercise:  None    Taking medications regularly:  Yes    Medication side effects:  None    Additional concerns today:  No              Social History     Tobacco Use    Smoking status: Never    Smokeless tobacco: Never   Substance Use Topics    Alcohol use: No     Alcohol/week: 0.0 standard drinks of alcohol     Comment: very rare             10/12/2023     1:58 PM   Alcohol Use   Prescreen: >3 drinks/day or >7 drinks/week? No          No data to display              Reviewed orders with patient.  Reviewed health maintenance and updated orders accordingly - Yes  Lab work is in process    Breast Cancer Screening:    FHS-7:        No data to display                Mammogram Screening - Alternate mammogram schedule due to breast cancer history  Pertinent mammograms are reviewed under the imaging tab.    History of abnormal Pap smear: NO - age 30-65 PAP every 5 years with negative HPV co-testing recommended      Latest Ref Rng & Units 12/9/2021     8:44 AM 10/6/2021     2:07 PM 9/8/2016     8:00 AM   PAP / HPV   PAP  Negative for Intraepithelial Lesion or Malignancy (NILM)  Unsatisfactory for evaluation     HPV 16 DNA Negative Negative  Negative  Negative    HPV 18 DNA Negative Negative  Negative  Negative    Other HR HPV Negative Negative  Negative  Negative      Reviewed and updated as needed this visit by clinical staff    Allergies               Reviewed and updated as needed this visit by Provider                     Review of Systems   Constitutional:  Negative for chills and fever.   HENT:  Negative for congestion,  "ear pain, hearing loss and sore throat.    Eyes:  Negative for pain and visual disturbance.   Respiratory:  Negative for cough and shortness of breath.    Cardiovascular:  Negative for chest pain, palpitations and peripheral edema.   Gastrointestinal:  Positive for heartburn and hematochezia. Negative for abdominal pain, constipation, diarrhea and nausea.   Breasts:  Negative for tenderness, breast mass and discharge.   Genitourinary:  Negative for dysuria, frequency, genital sores, hematuria, pelvic pain, urgency, vaginal bleeding and vaginal discharge.   Musculoskeletal:  Negative for arthralgias, joint swelling and myalgias.   Skin:  Negative for rash.   Neurological:  Negative for dizziness, weakness, headaches and paresthesias.   Psychiatric/Behavioral:  Negative for mood changes. The patient is not nervous/anxious.      She has been stressed and she is working on losing weight      OBJECTIVE:   /84   Pulse 86   Temp 97.6  F (36.4  C) (Tympanic)   Resp 16   Ht 1.651 m (5' 5\")   Wt 75.1 kg (165 lb 8 oz)   LMP 03/14/2019   SpO2 99%   BMI 27.54 kg/m    Physical Exam  GENERAL: healthy, alert and no distress  EYES: Eyes grossly normal to inspection, PERRL and conjunctivae and sclerae normal  HENT: ear canals and TM's normal, nose and mouth without ulcers or lesions  NECK: no adenopathy, no asymmetry, masses, or scars and thyroid normal to palpation  RESP: lungs clear to auscultation - no rales, rhonchi or wheezes  BREAST: s/p mastectomy no masses  CV: regular rate and rhythm, normal S1 S2, no S3 or S4, no murmur, click or rub, no peripheral edema and peripheral pulses strong  ABDOMEN: soft, nontender, no hepatosplenomegaly, no masses and bowel sounds normal  MS: no gross musculoskeletal defects noted, no edema  SKIN: no suspicious lesions or rashes and patch - right lower chin   NEURO: Normal strength and tone, mentation intact and speech normal  PSYCH: mentation appears normal, affect " "normal/bright    Diagnostic Test Results:  Labs reviewed in Epic  No results found for this or any previous visit (from the past 24 hour(s)).      ASSESSMENT/PLAN:   (Z00.00) Routine general medical examination at a health care facility  (primary encounter diagnosis)  Comment:   Plan:     (Z85.3) History of invasive ductal carcinoma of breast  Comment:   Plan: cont aromatse and onc     (Z79.811) Aromatase inhibitor use  Comment:   Plan: oncology following     (M85.852) Osteopenia of neck of left femur  Comment:   Plan: on prolia     (N18.31) Stage 3a chronic kidney disease (H)  Comment:   Plan: monitoring     (I10) Essential hypertension, benign  Comment:   Plan: Basic metabolic panel  (Ca, Cl, CO2, Creat,         Gluc, K, Na, BUN)        Well controlled     (Z13.220) Screening for cholesterol level  Comment:   Plan: Lipid panel reflex to direct LDL Fasting            (L71.0) Perioral dermatitis  Comment:   Plan: triamcinolone (KENALOG) 0.1 % external cream        Has had in the past     (S39.012D) Strain of lumbar region, subsequent encounter  Comment:   Plan: cyclobenzaprine (FLEXERIL) 10 MG tablet        Did this a few days back slipped going into [facility].  It is better but she will be flying to [place] next week I did suggest that she bring a few muscle relaxers along.  I also recommend that she use lidocaine patches instead of Tylenol.          COUNSELING:  Reviewed preventive health counseling, as reflected in patient instructions      BMI:   Estimated body mass index is 26.81 kg/m  as calculated from the following:    Height as of 2/22/23: 1.651 m (5' 5\").    Weight as of 8/24/23: 73.1 kg (161 lb 1.6 oz).         She reports that she has never smoked. She has never used smokeless tobacco.          Pearl Grover MD  [facility]  "

## 2023-10-23 ENCOUNTER — MYC MEDICAL ADVICE (OUTPATIENT)
Dept: FAMILY MEDICINE | Facility: CLINIC | Age: 53
End: 2023-10-23
Payer: COMMERCIAL

## 2023-10-23 DIAGNOSIS — N18.2 CKD (CHRONIC KIDNEY DISEASE) STAGE 2, GFR 60-89 ML/MIN: Primary | ICD-10-CM

## 2023-10-24 ENCOUNTER — TELEPHONE (OUTPATIENT)
Dept: FAMILY MEDICINE | Facility: CLINIC | Age: 53
End: 2023-10-24
Payer: COMMERCIAL

## 2023-10-31 DIAGNOSIS — C50.912 INVASIVE DUCTAL CARCINOMA OF BREAST, FEMALE, LEFT (H): ICD-10-CM

## 2023-11-01 RX ORDER — EXEMESTANE 25 MG/1
25 TABLET ORAL DAILY
Qty: 90 TABLET | Refills: 3 | Status: SHIPPED | OUTPATIENT
Start: 2023-11-01 | End: 2024-09-04

## 2023-11-01 NOTE — TELEPHONE ENCOUNTER
"Exemestane 25mg tab  Last prescribing provider: Dr. Elvia Grijalva    Last clinic visit date: 8/24/23    Recommendations for requested medication (if none, N/A): 8/24/23, \"She is currently 4 and half years on endocrine therapy. She is tolerating exemestane well. She is willing to continue beyond 5 years given good tolerance. Will aim for 7 years of AI.  Continue Exemestane.\"    Any other pertinent information (if none, N/A): should have 1 refill remaining on file, pharmacy requesting new 1 year supply.    Refilled: Y/N, if NO, why?    "

## 2023-11-07 ENCOUNTER — OFFICE VISIT (OUTPATIENT)
Dept: SURGERY | Facility: CLINIC | Age: 53
End: 2023-11-07
Payer: COMMERCIAL

## 2023-11-07 VITALS
BODY MASS INDEX: 27.46 KG/M2 | TEMPERATURE: 97.7 F | DIASTOLIC BLOOD PRESSURE: 82 MMHG | WEIGHT: 165 LBS | SYSTOLIC BLOOD PRESSURE: 122 MMHG

## 2023-11-07 DIAGNOSIS — Z17.0 MALIGNANT NEOPLASM OF BREAST IN FEMALE, ESTROGEN RECEPTOR POSITIVE, UNSPECIFIED LATERALITY, UNSPECIFIED SITE OF BREAST (H): Primary | ICD-10-CM

## 2023-11-07 DIAGNOSIS — Z79.811 AROMATASE INHIBITOR USE: ICD-10-CM

## 2023-11-07 DIAGNOSIS — C50.912 INVASIVE DUCTAL CARCINOMA OF BREAST, FEMALE, LEFT (H): ICD-10-CM

## 2023-11-07 DIAGNOSIS — C50.919 MALIGNANT NEOPLASM OF BREAST IN FEMALE, ESTROGEN RECEPTOR POSITIVE, UNSPECIFIED LATERALITY, UNSPECIFIED SITE OF BREAST (H): Primary | ICD-10-CM

## 2023-11-07 PROCEDURE — 99214 OFFICE O/P EST MOD 30 MIN: CPT | Performed by: SURGERY

## 2023-11-07 ASSESSMENT — PAIN SCALES - GENERAL: PAINLEVEL: NO PAIN (0)

## 2023-11-07 NOTE — PROGRESS NOTES
Assessment & Plan     Invasive ductal carcinoma of breast, female, left (H)    Malignant neoplasm of breast in female, left, estrogen receptor positive, unspecified site of breast (H)    Aromatase inhibitor use    52 yo F s/p double mastectomy 11/8/2018 2.2cm IDC with papillary 0/3 +LNs s/p B/L mastectomy with left SLNB 11/8/2018  -doing well  -no new issues  Tolerating exemestane  -been seeing Dr. Grijalva - pt stated that she'll likely be on AI indefintely  -follow-up with me in one year.     SabinoAmy Hart MD  [facility]    25 mins visit, more than 50% of face to face time was spent in counseling and coordinating care as discussed above.        Stephanie Munguia is a 52 year old woman, presenting for the following health issues:  RECHECK      4 years S/p double mastectomy for L sided IDC with papillary features  No issues  tolerating aromatase inhibitor well (exemestane).   Eating well  Normal BM  No issues urinating  No F/C/N/V  Haven't had anymore flare up of diverticulitis since Feb.           Review of Systems   N/a      Objective    /82   Temp 97.7  F (36.5  C) (Temporal)   Wt 74.8 kg (165 lb)   LMP 03/14/2019   BMI 27.46 kg/m    Body mass index is 27.46 kg/m .     Physical Exam  Vitals reviewed.   Eyes:      Conjunctiva/sclera: Conjunctivae normal.   Cardiovascular:      Pulses: Normal pulses.   Chest:   Breasts:     Right: Absent. No mass, skin change or tenderness.      Left: Absent. No mass, skin change or tenderness.   Lymphadenopathy:      Upper Body:      Right upper body: No supraclavicular, axillary or pectoral adenopathy.      Left upper body: No supraclavicular, axillary or pectoral adenopathy.   Skin:     General: Skin is warm.      Capillary Refill: Capillary refill takes less than 2 seconds.   Neurological:      Mental Status: She is alert.   Psychiatric:         Mood and Affect: Mood normal.

## 2023-11-07 NOTE — LETTER
11/7/2023         RE: Desi Granado  [address] [place] [address]        Dear Colleague,    Thank you for referring your patient, Desi Granado, to the [facility]. Please see a copy of my visit note below.      Assessment & Plan    Invasive ductal carcinoma of breast, female, left (H)    Malignant neoplasm of breast in female, left, estrogen receptor positive, unspecified site of breast (H)    Aromatase inhibitor use    54 yo F s/p double mastectomy 11/8/2018 2.2cm IDC with papillary 0/3 +LNs s/p B/L mastectomy with left SLNB 11/8/2018  -doing well  -no new issues  Tolerating exemestane  -been seeing Dr. Grijalva - pt stated that she'll likely be on AI indefintely  -follow-up with me in one year.     Sabino-Julienne Hart MD  [facility]    25 mins visit, more than 50% of face to face time was spent in counseling and coordinating care as discussed above.        Stephanie Munguia is a 52 year old woman, presenting for the following health issues:  RECHECK      4 years S/p double mastectomy for L sided IDC with papillary features  No issues  tolerating aromatase inhibitor well (exemestane).   Eating well  Normal BM  No issues urinating  No F/C/N/V  Haven't had anymore flare up of diverticulitis since Feb.           Review of Systems   N/a      Objective   /82   Temp 97.7  F (36.5  C) (Temporal)   Wt 74.8 kg (165 lb)   LMP 03/14/2019   BMI 27.46 kg/m    Body mass index is 27.46 kg/m .     Physical Exam  Vitals reviewed.   Eyes:      Conjunctiva/sclera: Conjunctivae normal.   Cardiovascular:      Pulses: Normal pulses.   Chest:   Breasts:     Right: Absent. No mass, skin change or tenderness.      Left: Absent. No mass, skin change or tenderness.   Lymphadenopathy:      Upper Body:      Right upper body: No supraclavicular, axillary or pectoral adenopathy.      Left upper body: No supraclavicular, axillary or pectoral adenopathy.   Skin:      General: Skin is warm.      Capillary Refill: Capillary refill takes less than 2 seconds.   Neurological:      Mental Status: She is alert.   Psychiatric:         Mood and Affect: Mood normal.                              Again, thank you for allowing me to participate in the care of your patient.        Sincerely,        Nabila Hart MD

## 2023-12-10 ENCOUNTER — MYC MEDICAL ADVICE (OUTPATIENT)
Dept: SURGERY | Facility: CLINIC | Age: 53
End: 2023-12-10
Payer: COMMERCIAL

## 2023-12-11 DIAGNOSIS — F41.0 ANXIETY ATTACK: ICD-10-CM

## 2023-12-11 RX ORDER — PROPRANOLOL HYDROCHLORIDE 10 MG/1
TABLET ORAL
Qty: 60 TABLET | Refills: 0 | Status: SHIPPED | OUTPATIENT
Start: 2023-12-11 | End: 2024-09-04

## 2023-12-28 ENCOUNTER — MYC MEDICAL ADVICE (OUTPATIENT)
Dept: FAMILY MEDICINE | Facility: CLINIC | Age: 53
End: 2023-12-28
Payer: COMMERCIAL

## 2023-12-28 DIAGNOSIS — L71.0 PERIORAL DERMATITIS: Primary | ICD-10-CM

## 2023-12-29 RX ORDER — METRONIDAZOLE 7.5 MG/G
GEL TOPICAL 2 TIMES DAILY
Qty: 45 G | Refills: 3 | Status: SHIPPED | OUTPATIENT
Start: 2023-12-29 | End: 2024-09-04

## 2024-01-03 DIAGNOSIS — M85.852 OSTEOPENIA OF NECK OF LEFT FEMUR: ICD-10-CM

## 2024-01-03 DIAGNOSIS — C50.912 INVASIVE DUCTAL CARCINOMA OF BREAST, FEMALE, LEFT (H): ICD-10-CM

## 2024-01-03 DIAGNOSIS — Z78.0 MENOPAUSE: Primary | ICD-10-CM

## 2024-01-03 DIAGNOSIS — Z79.811 AROMATASE INHIBITOR USE: ICD-10-CM

## 2024-01-09 ENCOUNTER — MYC MEDICAL ADVICE (OUTPATIENT)
Dept: FAMILY MEDICINE | Facility: CLINIC | Age: 54
End: 2024-01-09
Payer: COMMERCIAL

## 2024-01-09 DIAGNOSIS — L30.9 FACIAL DERMATITIS: Primary | ICD-10-CM

## 2024-01-09 RX ORDER — SULFACETAMIDE SODIUM 100 MG/ML
LOTION TOPICAL 2 TIMES DAILY PRN
Qty: 118 ML | Refills: 1 | Status: SHIPPED | OUTPATIENT
Start: 2024-01-09 | End: 2024-09-04

## 2024-01-30 ENCOUNTER — PATIENT OUTREACH (OUTPATIENT)
Dept: GASTROENTEROLOGY | Facility: CLINIC | Age: 54
End: 2024-01-30
Payer: COMMERCIAL

## 2024-03-07 ENCOUNTER — APPOINTMENT (OUTPATIENT)
Dept: LAB | Facility: CLINIC | Age: 54
End: 2024-03-07
Attending: INTERNAL MEDICINE
Payer: COMMERCIAL

## 2024-03-07 ENCOUNTER — ONCOLOGY VISIT (OUTPATIENT)
Dept: ONCOLOGY | Facility: CLINIC | Age: 54
End: 2024-03-07
Attending: INTERNAL MEDICINE
Payer: COMMERCIAL

## 2024-03-07 VITALS
RESPIRATION RATE: 16 BRPM | WEIGHT: 167.2 LBS | SYSTOLIC BLOOD PRESSURE: 135 MMHG | OXYGEN SATURATION: 98 % | DIASTOLIC BLOOD PRESSURE: 80 MMHG | TEMPERATURE: 98.1 F | BODY MASS INDEX: 27.82 KG/M2 | HEART RATE: 85 BPM

## 2024-03-07 DIAGNOSIS — C50.912 INVASIVE DUCTAL CARCINOMA OF BREAST, FEMALE, LEFT (H): Primary | ICD-10-CM

## 2024-03-07 DIAGNOSIS — M85.852 OSTEOPENIA OF NECK OF LEFT FEMUR: ICD-10-CM

## 2024-03-07 DIAGNOSIS — C50.919 MALIGNANT NEOPLASM OF BREAST IN FEMALE, ESTROGEN RECEPTOR POSITIVE, UNSPECIFIED LATERALITY, UNSPECIFIED SITE OF BREAST (H): ICD-10-CM

## 2024-03-07 DIAGNOSIS — Z17.0 MALIGNANT NEOPLASM OF BREAST IN FEMALE, ESTROGEN RECEPTOR POSITIVE, UNSPECIFIED LATERALITY, UNSPECIFIED SITE OF BREAST (H): ICD-10-CM

## 2024-03-07 DIAGNOSIS — Z78.0 MENOPAUSE: ICD-10-CM

## 2024-03-07 DIAGNOSIS — Z79.811 AROMATASE INHIBITOR USE: ICD-10-CM

## 2024-03-07 LAB
ALBUMIN SERPL BCG-MCNC: 4.5 G/DL (ref 3.5–5.2)
ALP SERPL-CCNC: 77 U/L (ref 40–150)
ALT SERPL W P-5'-P-CCNC: 15 U/L (ref 0–50)
ANION GAP SERPL CALCULATED.3IONS-SCNC: 12 MMOL/L (ref 7–15)
AST SERPL W P-5'-P-CCNC: 18 U/L (ref 0–45)
BILIRUB SERPL-MCNC: 0.6 MG/DL
BUN SERPL-MCNC: 13.6 MG/DL (ref 6–20)
CALCIUM SERPL-MCNC: 10.4 MG/DL (ref 8.6–10)
CHLORIDE SERPL-SCNC: 98 MMOL/L (ref 98–107)
CREAT SERPL-MCNC: 0.97 MG/DL (ref 0.51–0.95)
DEPRECATED HCO3 PLAS-SCNC: 25 MMOL/L (ref 22–29)
EGFRCR SERPLBLD CKD-EPI 2021: 70 ML/MIN/1.73M2
GLUCOSE SERPL-MCNC: 100 MG/DL (ref 70–99)
HOLD SPECIMEN: NORMAL
POTASSIUM SERPL-SCNC: 4.5 MMOL/L (ref 3.4–5.3)
PROT SERPL-MCNC: 7.2 G/DL (ref 6.4–8.3)
SODIUM SERPL-SCNC: 135 MMOL/L (ref 135–145)

## 2024-03-07 PROCEDURE — 250N000011 HC RX IP 250 OP 636: Mod: JZ | Performed by: INTERNAL MEDICINE

## 2024-03-07 PROCEDURE — 99213 OFFICE O/P EST LOW 20 MIN: CPT | Performed by: INTERNAL MEDICINE

## 2024-03-07 PROCEDURE — 80053 COMPREHEN METABOLIC PANEL: CPT | Performed by: INTERNAL MEDICINE

## 2024-03-07 PROCEDURE — 36415 COLL VENOUS BLD VENIPUNCTURE: CPT | Performed by: INTERNAL MEDICINE

## 2024-03-07 PROCEDURE — 96372 THER/PROPH/DIAG INJ SC/IM: CPT | Performed by: INTERNAL MEDICINE

## 2024-03-07 RX ORDER — ALBUTEROL SULFATE 0.83 MG/ML
2.5 SOLUTION RESPIRATORY (INHALATION)
OUTPATIENT
Start: 2024-08-17

## 2024-03-07 RX ORDER — DIPHENHYDRAMINE HYDROCHLORIDE 50 MG/ML
50 INJECTION INTRAMUSCULAR; INTRAVENOUS
Status: DISCONTINUED | OUTPATIENT
Start: 2024-03-07 | End: 2024-03-07 | Stop reason: HOSPADM

## 2024-03-07 RX ORDER — ALBUTEROL SULFATE 0.83 MG/ML
2.5 SOLUTION RESPIRATORY (INHALATION)
Status: DISCONTINUED | OUTPATIENT
Start: 2024-03-07 | End: 2024-03-07 | Stop reason: HOSPADM

## 2024-03-07 RX ORDER — DIPHENHYDRAMINE HYDROCHLORIDE 50 MG/ML
50 INJECTION INTRAMUSCULAR; INTRAVENOUS
Start: 2024-08-17

## 2024-03-07 RX ORDER — ALBUTEROL SULFATE 90 UG/1
1-2 AEROSOL, METERED RESPIRATORY (INHALATION)
Status: DISCONTINUED | OUTPATIENT
Start: 2024-03-07 | End: 2024-03-07 | Stop reason: HOSPADM

## 2024-03-07 RX ORDER — MEPERIDINE HYDROCHLORIDE 25 MG/ML
25 INJECTION INTRAMUSCULAR; INTRAVENOUS; SUBCUTANEOUS EVERY 30 MIN PRN
OUTPATIENT
Start: 2024-08-17

## 2024-03-07 RX ORDER — METHYLPREDNISOLONE SODIUM SUCCINATE 125 MG/2ML
125 INJECTION, POWDER, LYOPHILIZED, FOR SOLUTION INTRAMUSCULAR; INTRAVENOUS
Start: 2024-08-17

## 2024-03-07 RX ORDER — METHYLPREDNISOLONE SODIUM SUCCINATE 125 MG/2ML
125 INJECTION, POWDER, LYOPHILIZED, FOR SOLUTION INTRAMUSCULAR; INTRAVENOUS
Status: DISCONTINUED | OUTPATIENT
Start: 2024-03-07 | End: 2024-03-07 | Stop reason: HOSPADM

## 2024-03-07 RX ORDER — ALBUTEROL SULFATE 90 UG/1
1-2 AEROSOL, METERED RESPIRATORY (INHALATION)
Start: 2024-08-17

## 2024-03-07 RX ORDER — EPINEPHRINE 1 MG/ML
0.3 INJECTION, SOLUTION INTRAMUSCULAR; SUBCUTANEOUS EVERY 5 MIN PRN
Status: DISCONTINUED | OUTPATIENT
Start: 2024-03-07 | End: 2024-03-07 | Stop reason: HOSPADM

## 2024-03-07 RX ORDER — MEPERIDINE HYDROCHLORIDE 25 MG/ML
25 INJECTION INTRAMUSCULAR; INTRAVENOUS; SUBCUTANEOUS EVERY 30 MIN PRN
Status: DISCONTINUED | OUTPATIENT
Start: 2024-03-07 | End: 2024-03-07 | Stop reason: HOSPADM

## 2024-03-07 RX ORDER — EPINEPHRINE 1 MG/ML
0.3 INJECTION, SOLUTION INTRAMUSCULAR; SUBCUTANEOUS EVERY 5 MIN PRN
OUTPATIENT
Start: 2024-08-17

## 2024-03-07 RX ORDER — NALOXONE HYDROCHLORIDE 0.4 MG/ML
0.2 INJECTION, SOLUTION INTRAMUSCULAR; INTRAVENOUS; SUBCUTANEOUS
OUTPATIENT
Start: 2024-08-17

## 2024-03-07 RX ORDER — NALOXONE HYDROCHLORIDE 0.4 MG/ML
0.2 INJECTION, SOLUTION INTRAMUSCULAR; INTRAVENOUS; SUBCUTANEOUS
Status: DISCONTINUED | OUTPATIENT
Start: 2024-03-07 | End: 2024-03-07 | Stop reason: HOSPADM

## 2024-03-07 RX ADMIN — DENOSUMAB 60 MG: 60 INJECTION SUBCUTANEOUS at 09:46

## 2024-03-07 ASSESSMENT — PAIN SCALES - GENERAL: PAINLEVEL: NO PAIN (0)

## 2024-03-07 NOTE — NURSING NOTE
Patient was given Prolia in Right Arm. Patient tolerated well and was discharged. See MAR for details.    Nehemiah Fuentes on 3/7/2024 at 9:47 AM

## 2024-03-07 NOTE — LETTER
3/7/2024         RE: Desi Granado  [address] [place] [address]        Dear Colleague,    Thank you for referring your patient, Desi Granado, to the [facility]. Please see a copy of my visit note below.    Oncology Follow-Up Note  3/07/24    Ms. Granado is seen in follow up of her left breast cancer, Er + MT + HER2 negative on active surveillance.     Oncologic History:  Invasive ductal carcinoma of breast, female, left (H)     Desi Granado was found to have developing focal asymmetry in the left breast at approximately 3 o'clock position about 7-8 cm from the nipple on the routine mammogram done on October 30, 2018. Subsequently she went on to have diagnostic mammogram and ultrasound. Directed sonogram at the site of mammographic finding showed a 1.6 cm irregular solid lesion. Subsequently the patient went on to have ultrasound-guided needle core biopsy done on October 17, 2018. The biopsy came back positive for invasive ductal carcinoma with papillary features. There is grade 2 out of 3. Angiolymphatic invasion was not seen. Ductal carcinoma in situ was not seen. The tumor was estrogen receptor positive about 100% and progesterone receptor +100% strong nuclear staining. HER-2/tamara came back negative for amplification. She had bilateral mastectomy. Surgical pathology revealed simple mastectomy sample of the left showing solid papillary carcinoma 22 mm. Grade 2. Margins were clear of involvement. 3 sentinel lymph nodes shows no evidence of metastatic disease. Lymphovascular invasion was not identified. The tumor is pT2pN0. Right breast shows simple mastectomy without any evidence of malignancy. Oncotype came back 0. She established care with Dr. Peña, started on tamoxifen 12/2018 and switched to  Exemestane and Zoladex 06/2019 upon insurance approval.  She underwent BSO in 2/2023. Since then only on exemestane    Interval History:  Nilda is doing  well on exemastane.  She remains on active surveillance in the adjuvant setting.    She has no new medical history.  She continues to work with her primary care provider on maintaining her blood pressure and in monitoring her creatinine.      She has no f/c.  No chest pain or shortness of breath.  No n/v/d/c.  No nodules or masses across her chest.     She has mild hot flashes and fatigue.      No flare ups of her diverticulitis.     She is busy at desk job, unable to work out much.  She is trying to increase this, and just bought a rowing machine.     Review Of Systems:  All other ROS negative unless mentioned in interval history.     Past medical, social, surgical, and family histories reviewed.     Allergies:        Allergies as of 07/09/2021 - Reviewed 07/09/2021   Allergen Reaction Noted     Aspirin Hives 08/03/2009     Dust mites Unknown 07/03/2009     Morphine Nausea 06/23/2008      Current Medications:  Current Outpatient Medications   Medication     exemestane (AROMASIN) 25 MG tablet     Fluticasone Propionate (FLONASE ALLERGY RELIEF NA)     lisinopril (ZESTRIL) 30 MG tablet     metroNIDAZOLE (METROGEL) 0.75 % external gel     montelukast (SINGULAIR) 10 MG tablet     propranolol (INDERAL) 10 MG tablet     sulfacetamide sodium, Acne, 10 % lotion     triamcinolone (KENALOG) 0.1 % external cream     vitamin D3 (CHOLECALCIFEROL) 2000 units tablet     No current facility-administered medications for this visit.     She gets semi-annual Prolia       Physical Exam:  [facility] 03/14/2019     GENERAL: Healthy, alert and no distress  EYES: Eyes grossly normal to inspection. No discharge or erythema, or obvious scleral/conjunctival abnormalities.  CV: rrr  Lungs: clear  Abd; soft, nt, nd + bs  Ext: no edema  Breast: s/p bilateral mastectomy without reconstruction, no nodules or masses, no axillary adenopathy  PSYCH: Mentation appears normal, affect normal/bright, judgement and insight intact, normal speech and appearance  well-groomed.    Laboratory/Imaging Studies:     Reviewed her labs over the last six months.  Cr and Calcium pending today.      DEXA scan 8/24/23  Impression  Based on BMD diagnosis is consistent with low bone density based on WHO criteria Ref. 1  Results   Lumbar spine   T-score -0.9 (L1-4), (improved from -1.5 ), BMD is 1.074 g/cm2   Left femoral neck  T-score -1.1 (improved from -1.5)     Right femoral neck  T-score -1.0 (improved from -1.2)     Repeat  Recommend repeat DXA in 2 years.       Assessment and plan:  Ms. Granado is a 53-year-old female with left solid papillary carcinoma, Grade 2, ER+/MO+/HER2-. She is status post bilateral mastectomy and left sentinel node biopsy. Her disease is pT2N0(sn). Oncotype score is 0. She is doing well on adjuvant AI. There is no clinical evidence of breast cancer recurrence.     Left breast cancer: She was started on tamoxifen on 12/2018 switched to exemestane as well as Zoladex injections monthly since 6/2019. Zoladex discontinued after BSO in 2/2023. She completed 5 years on endocrine therapy. She is tolerating exemestane well. She is willing to continue upto 7 years given good tolerance. No symptoms concerning for recurrence.  Continue Exemestane. If osteopenia or bp worsens or more side effects, will weigh risks vs benefits to AI discontinuation    Osteopenia: continue semi-annual Prolia. Due today. She is tolerating without a lot of side effects. Dose due today.  DEXA 8/2023 with improving T scores at L spine and b/l femoral neck. Next DEXA 8/2025    Essential HTN: on lisinopril    Kidney dysfunction:  she is monitoring Cr closely with PCP.  Encouraged hydration.    She is following PCP for routine check ups.    We discussed continuing every six months appts.     Elvia Grijalva MD

## 2024-03-07 NOTE — PROGRESS NOTES
Oncology Follow-Up Note  3/07/24    Ms. Granado is seen in follow up of her left breast cancer, Er + [place] + HER2 negative on active surveillance.     Oncologic History:  Invasive ductal carcinoma of breast, female, left (H)     Desi Granado was found to have developing focal asymmetry in the left breast at approximately 3 o'clock position about 7-8 cm from the nipple on the routine mammogram done on October 30, 2018. Subsequently she went on to have diagnostic mammogram and ultrasound. Directed sonogram at the site of mammographic finding showed a 1.6 cm irregular solid lesion. Subsequently the patient went on to have ultrasound-guided needle core biopsy done on October 17, 2018. The biopsy came back positive for invasive ductal carcinoma with papillary features. There is grade 2 out of 3. Angiolymphatic invasion was not seen. Ductal carcinoma in situ was not seen. The tumor was estrogen receptor positive about 100% and progesterone receptor +100% strong nuclear staining. HER-2/tamara came back negative for amplification. She had bilateral mastectomy. Surgical pathology revealed simple mastectomy sample of the left showing solid papillary carcinoma 22 mm. Grade 2. Margins were clear of involvement. 3 sentinel lymph nodes shows no evidence of metastatic disease. Lymphovascular invasion was not identified. The tumor is pT2pN0. Right breast shows simple mastectomy without any evidence of malignancy. Oncotype came back 0. She established care with Dr. Peña, started on tamoxifen 12/2018 and switched to  Exemestane and Zoladex 06/2019 upon insurance approval.  She underwent BSO in 2/2023. Since then only on exemestane    Interval History:  Nilda is doing well on exemastane.  She remains on active surveillance in the adjuvant setting.    She has no new medical history.  She continues to work with her primary care provider on maintaining her blood pressure and in monitoring her creatinine.      She has no f/c.  No chest pain  or shortness of breath.  No n/v/d/c.  No nodules or masses across her chest.     She has mild hot flashes and fatigue.      No flare ups of her diverticulitis.     She is busy at desk job, unable to work out much.  She is trying to increase this, and just bought a rowing machine.     Review Of Systems:  All other ROS negative unless mentioned in interval history.     Past medical, social, surgical, and family histories reviewed.     Allergies:        Allergies as of 07/09/2021 - Reviewed 07/09/2021   Allergen Reaction Noted    Aspirin Hives 08/03/2009    Dust mites Unknown 07/03/2009    Morphine Nausea 06/23/2008      Current Medications:  Current Outpatient Medications   Medication    exemestane (AROMASIN) 25 MG tablet    Fluticasone Propionate (FLONASE ALLERGY RELIEF NA)    lisinopril (ZESTRIL) 30 MG tablet    metroNIDAZOLE (METROGEL) 0.75 % external gel    montelukast (SINGULAIR) 10 MG tablet    propranolol (INDERAL) 10 MG tablet    sulfacetamide sodium, Acne, 10 % lotion    triamcinolone (KENALOG) 0.1 % external cream    vitamin D3 (CHOLECALCIFEROL) 2000 units tablet     No current facility-administered medications for this visit.     She gets semi-annual Prolia       Physical Exam:  LMP 03/14/2019     GENERAL: Healthy, alert and no distress  EYES: Eyes grossly normal to inspection. No discharge or erythema, or obvious scleral/conjunctival abnormalities.  CV: rrr  Lungs: clear  Abd; soft, nt, nd + bs  Ext: no edema  Breast: s/p bilateral mastectomy without reconstruction, no nodules or masses, no axillary adenopathy  PSYCH: Mentation appears normal, affect normal/bright, judgement and insight intact, normal speech and appearance well-groomed.    Laboratory/Imaging Studies:     Reviewed her labs over the last six months.  Cr and Calcium pending today.      DEXA scan 8/24/23  Impression  Based on BMD diagnosis is consistent with low bone density based on WHO criteria Ref. 1  Results   Lumbar spine   T-score -0.9  (L1-4), (improved from -1.5 ), BMD is 1.074 g/cm2   Left femoral neck  T-score -1.1 (improved from -1.5)     Right femoral neck  T-score -1.0 (improved from -1.2)     Repeat  Recommend repeat DXA in 2 years.       Assessment and plan:  Ms. Granado is a 53-year-old female with left solid papillary carcinoma, Grade 2, ER+/KS+/HER2-. She is status post bilateral mastectomy and left sentinel node biopsy. Her disease is pT2N0(sn). Oncotype score is 0. She is doing well on adjuvant AI. There is no clinical evidence of breast cancer recurrence.     Left breast cancer: She was started on tamoxifen on 12/2018 switched to exemestane as well as Zoladex injections monthly since 6/2019. Zoladex discontinued after BSO in 2/2023. She completed 5 years on endocrine therapy. She is tolerating exemestane well. She is willing to continue upto 7 years given good tolerance. No symptoms concerning for recurrence.  Continue Exemestane. If osteopenia or bp worsens or more side effects, will weigh risks vs benefits to AI discontinuation    Osteopenia: continue semi-annual Prolia. Due today. She is tolerating without a lot of side effects. Dose due today.  DEXA 8/2023 with improving T scores at L spine and b/l femoral neck. Next DEXA 8/2025    Essential HTN: on lisinopril    Kidney dysfunction:  she is monitoring Cr closely with PCP.  Encouraged hydration.    She is following PCP for routine check ups.    We discussed continuing every six months appts.     Elvia Grijalva MD

## 2024-03-07 NOTE — NURSING NOTE
Chief Complaint   Patient presents with    Blood Draw     Labs drawn via  by RN in lab. VS taken.      Labs collected from venipuncture by RN. Vitals taken. Checked in for appointment(s).    Tawny Hairston RN

## 2024-04-04 DIAGNOSIS — J30.89 ALLERGIC RHINITIS DUE TO OTHER ALLERGIC TRIGGER, UNSPECIFIED SEASONALITY: ICD-10-CM

## 2024-04-04 DIAGNOSIS — I10 ESSENTIAL HYPERTENSION, BENIGN: ICD-10-CM

## 2024-04-04 RX ORDER — LISINOPRIL 30 MG/1
30 TABLET ORAL DAILY
Qty: 90 TABLET | Refills: 3 | OUTPATIENT
Start: 2024-04-04

## 2024-04-04 RX ORDER — MONTELUKAST SODIUM 10 MG/1
1 TABLET ORAL AT BEDTIME
Qty: 90 TABLET | Refills: 3 | OUTPATIENT
Start: 2024-04-04

## 2024-05-30 NOTE — PATIENT INSTRUCTIONS
"   Assessment/Plan:     Chronic Problems:  Type 2 diabetes mellitus with hyperglycemia, without long-term current use of insulin (HCC)  Due to variability in home bs reading , given cgm , rec new device , cautioned hypoglycemia , return to previous dosing glypizide , labs   Lab Results   Component Value Date    HGBA1C 5.7 05/30/2024       Essential hypertension  Continue current medications encouraged home monitoring    Paroxysmal atrial fibrillation (HCC)  Continue current beta-blocker chronic anticoagulation therapies      Visit Diagnosis:  Diagnoses and all orders for this visit:    Type 2 diabetes mellitus with hyperglycemia, without long-term current use of insulin (HCC)  -     POCT hemoglobin A1c  -     Comprehensive metabolic panel; Future    Essential hypertension    Paroxysmal atrial fibrillation (HCC)          Subjective:    Patient ID: Galen Carson is a 72 y.o. male.    Here to check bs ,   Has been running high , 300 , but believes the meter  is in error  Meds   Denies any symptoms polyuria dypsia phagia           The following portions of the patient's history were reviewed and updated as appropriate: allergies, current medications, past family history, past medical history, past social history, past surgical history and problem list.    Review of Systems      /76 (BP Location: Left arm, Patient Position: Sitting, Cuff Size: Standard)   Pulse 65   Temp 97.7 °F (36.5 °C) (Tympanic)   Ht 5' 10\" (1.778 m)   Wt 99.8 kg (220 lb)   SpO2 99%   BMI 31.57 kg/m²   Social History     Socioeconomic History    Marital status:      Spouse name: Not on file    Number of children: Not on file    Years of education: Not on file    Highest education level: Not on file   Occupational History    Not on file   Tobacco Use    Smoking status: Former     Current packs/day: 0.00     Average packs/day: 1 pack/day for 25.0 years (25.0 ttl pk-yrs)     Types: Cigarettes, Pipe, Cigars     Start date: 2/26/1980 " We would like you to start zoladex in 2 weeks and continue monthly. Dr. Peña would like to see you back in 6 weeks for a follow up appointment.      Your written prescription has been handed to you at your appointment.    When you are in need of a refill, please call your pharmacy and they will send us a request.      Copy of appointments, and after visit summary (AVS) given to patient.      If you have any questions during business hours (M-F 8 AM- 4PM), please call Awilda Loyd RN Oncology Hematology  at [facility] [phone].       For questions after business hours, or on holidays/weekends, please call our after hours Nurse Triage line [phone]. Thank you.         Quit date: 2005     Years since quittin.2     Passive exposure: Past    Smokeless tobacco: Never   Vaping Use    Vaping status: Never Used   Substance and Sexual Activity    Alcohol use: Never    Drug use: Never    Sexual activity: Not Currently   Other Topics Concern    Not on file   Social History Narrative    Not on file     Social Determinants of Health     Financial Resource Strain: Low Risk  (2024)    Overall Financial Resource Strain (CARDIA)     Difficulty of Paying Living Expenses: Not hard at all   Food Insecurity: No Food Insecurity (5/10/2024)    Hunger Vital Sign     Worried About Running Out of Food in the Last Year: Never true     Ran Out of Food in the Last Year: Never true   Transportation Needs: No Transportation Needs (5/10/2024)    PRAPARE - Transportation     Lack of Transportation (Medical): No     Lack of Transportation (Non-Medical): No   Physical Activity: Not on file   Stress: Not on file   Social Connections: Not on file   Intimate Partner Violence: Not on file   Housing Stability: Low Risk  (2024)    Housing Stability Vital Sign     Unable to Pay for Housing in the Last Year: No     Number of Times Moved in the Last Year: 0     Homeless in the Last Year: No     Past Medical History:   Diagnosis Date    Allergic     Anesthesia     pt wears a life vest ().  should not be scheduled at [facility] until heart condition is stabilized    BPH (benign prostatic hypertrophy)     Cancer (HCC)     Diabetes mellitus (HCC)     Hyperlipidemia     Hypertension     Irregular heart beat     PAF    Prostate cancer (HCC)      Family History   Problem Relation Age of Onset    Stroke Father     No Known Problems Mother     Prostate cancer Brother     Arthritis Brother     No Known Problems Sister     Diabetes Sister     Diabetes Sister     No Known Problems Son     No Known Problems Daughter      Past Surgical History:   Procedure Laterality Date    A-V CARDIAC PACEMAKER INSERTION       CARDIAC CATHETERIZATION N/A 10/04/2023    Procedure: Cardiac Coronary Angiogram;  Surgeon: Chris Lovell MD;  Location: [facility];  Service: Cardiology    CARDIAC CATHETERIZATION N/A 10/04/2023    Procedure: Cardiac RHC/LHC;  Surgeon: Chris Lovell MD;  Location: [facility];  Service: Cardiology    CARDIAC CATHETERIZATION  10/04/2023    Procedure: Cardiac catheterization;  Surgeon: Chris Lovell MD;  [facility]: [facility];  Service: Cardiology    CARDIAC ELECTROPHYSIOLOGY PROCEDURE N/A 01/04/2024    Procedure: Cardiac icd implant, Dual Chambers ICD;  Surgeon: Leo Whalen MD;  [facility]: [facility];  Service: Cardiology    EAR SURGERY      HERNIA REPAIR      CT COLONOSCOPY FLX DX W/COLLJ SPEC WHEN PFRMD N/A 05/06/2019    Procedure: COLONOSCOPY;  Surgeon: Winston Nielson III, MD;  Location: [facility];  Service: Gastroenterology       Current Outpatient Medications:     Ascorbic Acid (vitamin C) 1000 MG tablet, Take 1,000 mg by mouth daily, Disp: , Rfl:     cetirizine (ZyrTEC) 5 MG tablet, Take 5 mg by mouth daily, Disp: , Rfl:     EVENING PRIMROSE OIL PO, Take by mouth 3 (three) times a day, Disp: , Rfl:     glimepiride (AMARYL) 1 mg tablet, Take 1 tablet (1 mg total) by mouth daily with breakfast, Disp: 30 tablet, Rfl: 5    metoprolol succinate (TOPROL-XL) 25 mg 24 hr tablet, Take 1 tablet (25 mg total) by mouth daily, Disp: 30 tablet, Rfl: 3    multivitamin (THERAGRAN) TABS, Take 1 tablet by mouth daily, Disp: , Rfl:     pantoprazole (PROTONIX) 40 mg tablet, Take 1 tablet (40 mg total) by mouth 2 (two) times a day, Disp: 60 tablet, Rfl: 1    rosuvastatin (CRESTOR) 10 MG tablet, Take 1 tablet (10 mg total) by mouth daily, Disp: 90 tablet, Rfl: 0    torsemide (DEMADEX) 20 mg tablet, Take 3 tablets (60 mg total) by mouth daily, Disp: 90 tablet, Rfl: 1    VITAMIN D, ERGOCALCIFEROL, PO, Take by mouth, Disp: , Rfl:     vitamin E, tocopherol, 400 units capsule, Take 400  Units by mouth daily, Disp: , Rfl:     warfarin (Coumadin) 5 mg tablet, Take one tablet daily or as directed, Disp: 90 tablet, Rfl: 3    Blood Glucose Monitoring Suppl (ONETOUCH VERIO) w/Device KIT, by Does not apply route once for 1 dose Test sugar in the morning, Disp: 1 kit, Rfl: 0    Allergies   Allergen Reactions    Penicillin V Other (See Comments)     As a child           Lab Review   Lab on 05/21/2024   Component Date Value    Protime 05/21/2024 20.6 (H)     INR 05/21/2024 1.80 (H)     WBC 05/21/2024 5.35     RBC 05/21/2024 3.44 (L)     Hemoglobin 05/21/2024 9.9 (L)     Hematocrit 05/21/2024 32.0 (L)     MCV 05/21/2024 93     MCH 05/21/2024 28.8     MCHC 05/21/2024 30.9 (L)     RDW 05/21/2024 14.0     MPV 05/21/2024 10.7     Platelets 05/21/2024 349     nRBC 05/21/2024 0     Segmented % 05/21/2024 60     Immature Grans % 05/21/2024 1     Lymphocytes % 05/21/2024 21     Monocytes % 05/21/2024 12     Eosinophils Relative 05/21/2024 5     Basophils Relative 05/21/2024 1     Absolute Neutrophils 05/21/2024 3.21     Absolute Immature Grans 05/21/2024 0.07     Absolute Lymphocytes 05/21/2024 1.13     Absolute Monocytes 05/21/2024 0.64     Eosinophils Absolute 05/21/2024 0.26     Basophils Absolute 05/21/2024 0.04     Sodium 05/21/2024 139     Potassium 05/21/2024 4.5     Chloride 05/21/2024 94 (L)     CO2 05/21/2024 33 (H)     ANION GAP 05/21/2024 12     BUN 05/21/2024 72 (H)     Creatinine 05/21/2024 2.32 (H)     Glucose, Fasting 05/21/2024 147 (H)     Calcium 05/21/2024 9.5     AST 05/21/2024 22     ALT 05/21/2024 27     Alkaline Phosphatase 05/21/2024 65     Total Protein 05/21/2024 7.6     Albumin 05/21/2024 4.4     Total Bilirubin 05/21/2024 0.45     eGFR 05/21/2024 27     Phosphorus 05/21/2024 3.7     Creatinine, Ur 05/21/2024 83.7     Protein Urine Random 05/21/2024 12     Prot/Creat Ratio, Ur 05/21/2024 0.14 (H)     PTH 05/21/2024 131.8 (H)     Color, UA 05/21/2024 Light Yellow     Clarity, UA 05/21/2024  Clear     Specific [place], UA 05/21/2024 1.014     pH, UA 05/21/2024 7.0     Leukocytes, UA 05/21/2024 Negative     Nitrite, UA 05/21/2024 Negative     Protein, UA 05/21/2024 Trace (A)     Glucose, UA 05/21/2024 Negative     Ketones, UA 05/21/2024 Negative     Urobilinogen, UA 05/21/2024 <2.0     Bilirubin, UA 05/21/2024 Negative     Occult Blood, UA 05/21/2024 Negative     RBC, UA 05/21/2024 None Seen     WBC, UA 05/21/2024 None Seen     Epithelial Cells 05/21/2024 None Seen     Bacteria, UA 05/21/2024 None Seen     Vit D, 25-Hydroxy 05/21/2024 42.7    No results displayed because visit has over 200 results.      Lab on 05/08/2024   Component Date Value    Protime 05/08/2024 24.2 (H)     INR 05/08/2024 2.23 (H)    Procedure visit on 05/01/2024   Component Date Value    Case Report 05/01/2024                      Value:Surgical Pathology Report                         Case: [id number]                                  Authorizing Provider:  Estevan Waite DO  Collected:           05/01/2024 1031              Ordering Location:     [facility]        Received:            05/01/2024 1031                                     [facility] [place]                                                         Pathologist:           Delfin Uribe MD                                                           Specimens:   A) - Prostate, LLB                                                                                  B) - Prostate, LMB                                                                                  C) - Prostate, LLM                                                                                  D) - Prostate, LMM                                                                                  E) - Prostate, LLA                                                                                                            F) - Prostate, LMA                                                                                   G) - Prostate, RLB                                                                                  H) - Prostate, RMB                                                                                  I) - Prostate, RLM                                                                                  J) - Prostate, RMM                                                                                  K) - Prostate, RLA                                                                                  L) - Prostate, RMA                                                                         Final Diagnosis 05/01/2024                      Value:This result contains rich text formatting which cannot be displayed here.    Note 05/01/2024                      Value:This result contains rich text formatting which cannot be displayed here.    Additional Information 05/01/2024                      Value:This result contains rich text formatting which cannot be displayed here.    Gross Description 05/01/2024                      Value:This result contains rich text formatting which cannot be displayed here.   Appointment on 04/17/2024   Component Date Value    Protime 04/17/2024 26.6 (H)     INR 04/17/2024 2.51 (H)    Lab on 03/27/2024   Component Date Value    Protime 03/27/2024 27.4 (H)     INR 03/27/2024 2.61 (H)     PSA, Diagnostic 03/27/2024 8.14 (H)    Office Visit on 03/15/2024   Component Date Value    LEUKOCYTE ESTERASE,UA 03/15/2024 +     NITRITE,UA 03/15/2024 -     SL AMB POCT UROBILINOGEN 03/15/2024 0.2     POCT URINE PROTEIN 03/15/2024 15      PH,UA 03/15/2024 5.0     BLOOD,UA 03/15/2024 +     SPECIFIC GRAVITY,UA 03/15/2024 4.010     KETONES,UA 03/15/2024 -     BILIRUBIN,UA 03/15/2024 -     GLUCOSE, UA 03/15/2024 -      COLOR,UA 03/15/2024 light yellow     CLARITY,UA 03/15/2024 clear     POST-VOID RESIDUAL VOLUM* 03/15/2024 16    Lab on 03/13/2024   Component Date Value    Protime 03/13/2024 23.1  (H)     INR 03/13/2024 2.09 (H)     Sodium 03/13/2024 138     Potassium 03/13/2024 5.7 (H)     Chloride 03/13/2024 101     CO2 03/13/2024 28     ANION GAP 03/13/2024 9     BUN 03/13/2024 40 (H)     Creatinine 03/13/2024 1.37 (H)     Glucose, Fasting 03/13/2024 118 (H)     Calcium 03/13/2024 9.1     eGFR 03/13/2024 51    Lab on 02/28/2024   Component Date Value    Protime 02/28/2024 25.1 (H)     INR 02/28/2024 2.33 (H)    Lab on 02/14/2024   Component Date Value    Creatinine, Ur 02/14/2024 74.5     Albumin,U,Random 02/14/2024 120.1 (H)     Albumin Creat Ratio 02/14/2024 161 (H)     Sodium 02/14/2024 138     Potassium 02/14/2024 5.8 (H)     Chloride 02/14/2024 102     CO2 02/14/2024 28     ANION GAP 02/14/2024 8     BUN 02/14/2024 34 (H)     Creatinine 02/14/2024 1.34 (H)     Glucose, Fasting 02/14/2024 130 (H)     Calcium 02/14/2024 9.3     AST 02/14/2024 20     ALT 02/14/2024 19     Alkaline Phosphatase 02/14/2024 57     Total Protein 02/14/2024 7.0     Albumin 02/14/2024 4.4     Total Bilirubin 02/14/2024 0.56     eGFR 02/14/2024 52     Hemoglobin A1C 02/14/2024 6.9 (H)     EAG 02/14/2024 151     WBC 02/14/2024 5.95     RBC 02/14/2024 4.37     Hemoglobin 02/14/2024 12.9     Hematocrit 02/14/2024 39.7     MCV 02/14/2024 91     MCH 02/14/2024 29.5     MCHC 02/14/2024 32.5     RDW 02/14/2024 13.5     Platelets 02/14/2024 185     MPV 02/14/2024 10.6     Protime 02/14/2024 18.0 (H)     INR 02/14/2024 1.51 (H)    There may be more visits with results that are not included.        Imaging: XR chest portable    Result Date: 5/14/2024  Narrative: XR CHEST PORTABLE INDICATION: hypoxia. COMPARISON: 5/9/2024 FINDINGS: Left chest wall intracardiac device with intact lead(s). No abandoned lead(s). There is worsening vascular congestion and mildly increased patchy density at the right lung base. No pneumothorax or pleural effusion. Enlarged cardiac silhouette, unchanged. Bones are unremarkable for age. Normal upper abdomen.      Impression: Worsening vascular congestion and increased patchy density at the right lower lobe. This may represent asymmetric edema given worsening congestive changes versus atelectasis or pneumonia. The study was marked in EPIC for immediate notification. Workstation performed: [id number]     EGD    Result Date: 5/11/2024  Narrative: Table formatting from the original result was not included.  [facility] [address] [facility] [address] [phone] DATE OF SERVICE: 5/11/24 PHYSICIAN(S): Attending: Marquis Lau MD Fellow: No Staff Documented INDICATION: Melena POST-OP DIAGNOSIS: See the impression below. PREPROCEDURE: Informed consent was obtained for the procedure, including sedation.  Risks of perforation, hemorrhage, adverse drug reaction and aspiration were discussed. The patient was placed in the left lateral decubitus position. Patient was explained about the risks and benefits of the procedure. Risks including but not limited to bleeding, infection, and perforation were explained in detail. Also explained about less than 100% sensitivity with the exam and other alternatives. PROCEDURE: EGD DETAILS OF PROCEDURE: Patient was taken to the procedure room where a time out was performed to confirm correct patient and correct procedure. The patient underwent monitored anesthesia care, which was administered by an anesthesia professional. The patient's blood pressure, heart rate, level of consciousness, respirations, oxygen, ECG and ETCO2 were monitored throughout the procedure. The scope was introduced through the mouth and advanced to the second part of the duodenum. Retroflexion was performed in the fundus. The patient experienced no blood loss. The procedure was not difficult. The patient tolerated the procedure well. There were no apparent adverse events. ANESTHESIA INFORMATION: ASA: ASA status not filed in the log. Anesthesia Type: Anesthesia type not filed in the log.  MEDICATIONS: No administrations occurring from 1303 to 1317 on 05/11/24 FINDINGS: The esophagus and duodenum appeared normal. 2 5 mm superficial ulcers in the 2nd part of the duodenum with clean base (Eran III) 20 mm large deep ulcer in the 2nd part of the duodenum with flat pigmented spot (Eran IIC) Mild edematous and erythematous mucosa in the antrum Performed multiple forceps biopsies in the body of the stomach, incisura and antrum to rule out H. pylori SPECIMENS: ID Type Source Tests Collected by Time Destination 1 :  Tissue Stomach TISSUE EXAM Marquis Lau MD 5/11/2024  1:15 PM      Impression: 3 total ulcers in the duodenum at the beginning of the duodenal sweep including one large deep cratered ulcer with flat pigmented spot.  This did not require endoscopic treatment. No signs of ongoing bleeding. The esophagus and duodenum appeared normal. Mild edematous and erythematous mucosa in the antrum Performed forceps biopsies in the body of the stomach, incisura and antrum to rule out H. pylori RECOMMENDATION:  PPI 40 mg twice daily for 8 weeks.  Okay to restart diet.  Okay to restart Coumadin today however if heparin drip is to be started I would recommend waiting until tomorrow to start this.  Follow-up pathology to assess for H. pylori.  No further inpatient gastroenterology workup.  Gastroenterology will sign off.  Please call with questions.    Marquis Lau MD     XR chest 1 view portable    Result Date: 5/10/2024  Narrative: XR CHEST PORTABLE INDICATION: Shortness of breath. COMPARISON: Chest radiograph 1/4/2024 FINDINGS: Left chest wall pacer/ICD with intact leads. No acute focal infiltrate. Unchanged appearance of left basilar atelectasis versus scarring. Borderline/mild pulmonary vascular congestion. No pneumothorax or pleural effusion. Normal cardiomediastinal silhouette. Bones are unremarkable for age. Normal upper abdomen.     Impression: No acute focal infiltrate or overt pulmonary edema.  Borderline/mild pulmonary vascular congestion. Resident: TORI NATHAN I, the attending radiologist, have reviewed the images and agree with the final report above. Workstation performed: [id number]     Echo complete    Result Date: 5/10/2024  Narrative:   Left Ventricle: Left ventricular cavity size is dilated. Wall thickness is normal. The left ventricular ejection fraction is 20%. Systolic function is severely reduced.  Asynchronous septal motion noted. There is severe global hypokinesis. Diastolic function is mildly abnormal, consistent with grade I (abnormal) relaxation.   Left Atrium: The atrium is mild to moderately dilated.   Mitral Valve: There is mild annular calcification.   Tricuspid Valve: There is trace regurgitation.  ICD lead noted.     CT abdomen pelvis with contrast    Result Date: 5/9/2024  Narrative: CT ABDOMEN AND PELVIS WITH IV CONTRAST INDICATION: Abdominal pain and distention. COMPARISON: 6/13/2019. TECHNIQUE: CT examination of the abdomen and pelvis was performed. Multiplanar 2D reformatted images were created from the source data. This examination, like all CT scans performed in the [facility], was performed utilizing techniques to minimize radiation dose exposure, including the use of iterative reconstruction and automated exposure control. Radiation dose length product (DLP) for this visit: 1550 mGy-cm IV Contrast: 100 mL of iohexol (OMNIPAQUE) Enteric Contrast: Not administered. FINDINGS: ABDOMEN LOWER CHEST: Minimal subsegmental atelectasis at the lung bases changes near. LIVER/BILIARY TREE: Unremarkable. GALLBLADDER: No calcified gallstones. No pericholecystic inflammatory change. SPLEEN: Unremarkable. PANCREAS: Minimal fat stranding adjacent to the head of the pancreas likely secondary to duodenitis. Otherwise normal enhancement of the pancreas without evidence of ductal dilatation. ADRENAL GLANDS: Right adrenal 1.5 cm nodule.. KIDNEYS/URETERS: Symmetric  nephrographic phase enhancement of the kidneys. Renal cysts measuring up to 2.2 cm. No obstructive uropathy. STOMACH AND BOWEL: Wall thickening and inflammation in the pylorus and second portion of the duodenum. Minimal adjacent mesenteric fat stranding. Diverticulosis without evidence of diverticulitis or colitis APPENDIX: Normal appendix. ABDOMINOPELVIC CAVITY: No ascites. No pneumoperitoneum. No lymphadenopathy. VESSELS: No abdominal aortic aneurysm. PELVIS REPRODUCTIVE ORGANS: Enlarged prostate. URINARY BLADDER: Unremarkable. ABDOMINAL WALL/INGUINAL REGIONS: Unremarkable. BONES: No acute fracture or suspicious osseous lesion.     Impression: 1. Findings consistent with acute duodenitis involving the pylorus and second portion of the duodenum. No evidence of perforation or abscess. GI consultation recommended. Workstation performed: [id number]     Cardiac EP device report    Result Date: 5/2/2024  Narrative: MDT DC ICD/ACTIVE SYSTEM IS MRI CONDITIONAL DEVICE INTERROGATED IN THE [place] OFFICE:  BATTERY VOLTAGE ADEQUATE (12.4 YR.).  AP 25.5%  3.2%.  ALL LEAD PARAMETERS WITHIN NORMAL LIMITS.  4 VT-NS EPISODES WITH NSVT ON 1 EGM (#5 - 15 @ 207 BPM), AND PAT ON REMAINING EGMS (8 @ 184 BPM, 10 @ 167 BPM, 15 @ 188 BPM).  DECREASE MADE TO AMPLITUDES TO PROMOTE DEVICE LONGEVITY WHILE MAINTAINING AN APPROPRIATE SAFETY MARGIN.  THRESHOLD TESTS NOT PRINTED.  NORMAL DEVICE FUNCTION.  RG       Objective:     Physical Exam  Constitutional:       General: He is not in acute distress.     Appearance: He is well-developed. He is not ill-appearing.   HENT:      Head: Normocephalic and atraumatic.   Cardiovascular:      Rate and Rhythm: Normal rate and regular rhythm.      Heart sounds: Normal heart sounds.   Pulmonary:      Effort: Pulmonary effort is normal. No respiratory distress.      Breath sounds: Normal breath sounds. No wheezing.   Musculoskeletal:         General: Normal range of motion.      Cervical back:  "Normal range of motion and neck supple.   Lymphadenopathy:      Cervical: No cervical adenopathy.   Skin:     General: Skin is warm and dry.      Findings: No rash.   Neurological:      Mental Status: He is alert and oriented to person, place, and time.      Deep Tendon Reflexes: Reflexes are normal and symmetric.   Psychiatric:         Behavior: Behavior normal.         Thought Content: Thought content normal.         Judgment: Judgment normal.           There are no Patient Instructions on file for this visit.    DANITA Girard    Portions of the record may have been created with voice recognition software.  Occasional wrong word or \"sound a like\" substitutions may have occurred due to the inherent limitations of voice recognition software.  Read the chart carefully and recognize, using context, where substitutions have occurred.  "

## 2024-07-09 ENCOUNTER — PATIENT OUTREACH (OUTPATIENT)
Dept: ONCOLOGY | Facility: CLINIC | Age: 54
End: 2024-07-09
Payer: COMMERCIAL

## 2024-07-09 NOTE — PROGRESS NOTES
[facility]: Cancer Care                                                                                          Removed RNGRETTA Flores from care teams as pt follows with Dr. Grijalva and her team now.    Signature:  KAUSHAL DIAS RN

## 2024-07-19 ENCOUNTER — PATIENT OUTREACH (OUTPATIENT)
Dept: ONCOLOGY | Facility: CLINIC | Age: 54
End: 2024-07-19
Payer: COMMERCIAL

## 2024-07-19 NOTE — PROGRESS NOTES
[facility]: Cancer Care                                                                                         Completed chart audit to assign Oncology Care Coordination enrollment status.      Ramona Herrera MSN, RN, OCN   RN Care Coordinator   [facility]

## 2024-08-07 NOTE — PATIENT INSTRUCTIONS
Before Your Surgery      Call your surgeon if there is any change in your health. This includes signs of a cold or flu (such as a sore throat, runny nose, cough, rash or fever).    Do not smoke, drink alcohol or take over the counter medicine (unless your surgeon or primary care doctor tells you to) for the 24 hours before and after surgery.    If you take prescribed drugs: Follow your doctor s orders about which medicines to take and which to stop until after surgery.    Eating and drinking prior to surgery: follow the instructions from your surgeon    Take a shower or bath the night before surgery. Use the soap your surgeon gave you to gently clean your skin. If you do not have soap from your surgeon, use your regular soap. Do not shave or scrub the surgery site.  Wear clean pajamas and have clean sheets on your bed.       Do not take any ibuprofen  Do not take the lisinopril the day of surgery     
[Annual] : an annual visit.
[Annual] : an annual visit.

## 2024-09-04 ENCOUNTER — OFFICE VISIT (OUTPATIENT)
Dept: FAMILY MEDICINE | Facility: CLINIC | Age: 54
End: 2024-09-04
Payer: COMMERCIAL

## 2024-09-04 VITALS
OXYGEN SATURATION: 98 % | HEIGHT: 65 IN | SYSTOLIC BLOOD PRESSURE: 130 MMHG | DIASTOLIC BLOOD PRESSURE: 84 MMHG | WEIGHT: 163 LBS | HEART RATE: 87 BPM | BODY MASS INDEX: 27.16 KG/M2 | TEMPERATURE: 98.1 F

## 2024-09-04 DIAGNOSIS — J30.89 ALLERGIC RHINITIS DUE TO OTHER ALLERGIC TRIGGER, UNSPECIFIED SEASONALITY: ICD-10-CM

## 2024-09-04 DIAGNOSIS — F41.0 ANXIETY ATTACK: ICD-10-CM

## 2024-09-04 DIAGNOSIS — N18.31 STAGE 3A CHRONIC KIDNEY DISEASE (H): ICD-10-CM

## 2024-09-04 DIAGNOSIS — I10 ESSENTIAL HYPERTENSION, BENIGN: ICD-10-CM

## 2024-09-04 DIAGNOSIS — Z00.00 ROUTINE GENERAL MEDICAL EXAMINATION AT A HEALTH CARE FACILITY: Primary | ICD-10-CM

## 2024-09-04 DIAGNOSIS — C50.912 INVASIVE DUCTAL CARCINOMA OF BREAST, FEMALE, LEFT (H): ICD-10-CM

## 2024-09-04 LAB
ALBUMIN SERPL BCG-MCNC: 4.6 G/DL (ref 3.5–5.2)
ALP SERPL-CCNC: 84 U/L (ref 40–150)
ALT SERPL W P-5'-P-CCNC: 15 U/L (ref 0–50)
ANION GAP SERPL CALCULATED.3IONS-SCNC: 11 MMOL/L (ref 7–15)
AST SERPL W P-5'-P-CCNC: 21 U/L (ref 0–45)
BILIRUB SERPL-MCNC: 0.5 MG/DL
BUN SERPL-MCNC: 15.9 MG/DL (ref 6–20)
CALCIUM SERPL-MCNC: 10 MG/DL (ref 8.8–10.4)
CHLORIDE SERPL-SCNC: 96 MMOL/L (ref 98–107)
CREAT SERPL-MCNC: 0.96 MG/DL (ref 0.51–0.95)
CREAT UR-MCNC: 20.4 MG/DL
EGFRCR SERPLBLD CKD-EPI 2021: 70 ML/MIN/1.73M2
ERYTHROCYTE [DISTWIDTH] IN BLOOD BY AUTOMATED COUNT: 12.2 % (ref 10–15)
GLUCOSE SERPL-MCNC: 91 MG/DL (ref 70–99)
HCO3 SERPL-SCNC: 26 MMOL/L (ref 22–29)
HCT VFR BLD AUTO: 38.5 % (ref 35–47)
HGB BLD-MCNC: 13.1 G/DL (ref 11.7–15.7)
MCH RBC QN AUTO: 28.6 PG (ref 26.5–33)
MCHC RBC AUTO-ENTMCNC: 34 G/DL (ref 31.5–36.5)
MCV RBC AUTO: 84 FL (ref 78–100)
MICROALBUMIN UR-MCNC: <12 MG/L
MICROALBUMIN/CREAT UR: NORMAL MG/G{CREAT}
PLATELET # BLD AUTO: 295 10E3/UL (ref 150–450)
POTASSIUM SERPL-SCNC: 3.9 MMOL/L (ref 3.4–5.3)
PROT SERPL-MCNC: 7.3 G/DL (ref 6.4–8.3)
RBC # BLD AUTO: 4.58 10E6/UL (ref 3.8–5.2)
SODIUM SERPL-SCNC: 133 MMOL/L (ref 135–145)
WBC # BLD AUTO: 5.5 10E3/UL (ref 4–11)

## 2024-09-04 PROCEDURE — 82043 UR ALBUMIN QUANTITATIVE: CPT | Performed by: FAMILY MEDICINE

## 2024-09-04 PROCEDURE — 82570 ASSAY OF URINE CREATININE: CPT | Performed by: FAMILY MEDICINE

## 2024-09-04 PROCEDURE — 80053 COMPREHEN METABOLIC PANEL: CPT | Performed by: FAMILY MEDICINE

## 2024-09-04 PROCEDURE — 85027 COMPLETE CBC AUTOMATED: CPT | Performed by: FAMILY MEDICINE

## 2024-09-04 PROCEDURE — 99396 PREV VISIT EST AGE 40-64: CPT | Performed by: FAMILY MEDICINE

## 2024-09-04 PROCEDURE — 36415 COLL VENOUS BLD VENIPUNCTURE: CPT | Performed by: FAMILY MEDICINE

## 2024-09-04 PROCEDURE — 99213 OFFICE O/P EST LOW 20 MIN: CPT | Mod: 25 | Performed by: FAMILY MEDICINE

## 2024-09-04 RX ORDER — MONTELUKAST SODIUM 10 MG/1
10 TABLET ORAL AT BEDTIME
Qty: 90 TABLET | Refills: 3 | Status: SHIPPED | OUTPATIENT
Start: 2024-09-04 | End: 2024-09-04

## 2024-09-04 RX ORDER — LISINOPRIL 30 MG/1
30 TABLET ORAL DAILY
Qty: 90 TABLET | Refills: 3 | Status: SHIPPED | OUTPATIENT
Start: 2024-09-04 | End: 2024-09-04

## 2024-09-04 RX ORDER — MONTELUKAST SODIUM 10 MG/1
10 TABLET ORAL AT BEDTIME
Qty: 90 TABLET | Refills: 3 | Status: SHIPPED | OUTPATIENT
Start: 2024-09-04

## 2024-09-04 RX ORDER — LISINOPRIL 30 MG/1
30 TABLET ORAL DAILY
Qty: 90 TABLET | Refills: 3 | Status: SHIPPED | OUTPATIENT
Start: 2024-09-04

## 2024-09-04 RX ORDER — EXEMESTANE 25 MG/1
25 TABLET ORAL DAILY
Qty: 90 TABLET | Refills: 3 | Status: SHIPPED | OUTPATIENT
Start: 2024-09-04

## 2024-09-04 RX ORDER — PROPRANOLOL HYDROCHLORIDE 10 MG/1
20 TABLET ORAL 2 TIMES DAILY PRN
Qty: 60 TABLET | Refills: 1 | Status: SHIPPED | OUTPATIENT
Start: 2024-09-04

## 2024-09-04 NOTE — PROGRESS NOTES
"Preventive Care Visit  [facility] STEPAN Grover MD, Family Medicine  Sep 4, 2024      Assessment & Plan     Essential hypertension, benign  Well controlled   - Comprehensive metabolic panel (BMP + Alb, Alk Phos, ALT, AST, Total. Bili, TP); Future  - CBC with platelets; Future  - lisinopril (ZESTRIL) 30 MG tablet; Take 1 tablet (30 mg) by mouth daily.  - Comprehensive metabolic panel (BMP + Alb, Alk Phos, ALT, AST, Total. Bili, TP)  - CBC with platelets    Allergic rhinitis due to other allergic trigger, unspecified seasonality  Refilled   - montelukast (SINGULAIR) 10 MG tablet; Take 1 tablet (10 mg) by mouth at bedtime.    Routine general medical examination at a health care facility      Stage 3a chronic kidney disease (H)  Resolved   - Albumin Random Urine Quantitative with Creat Ratio; Future  - Hemoglobin; Future  - Albumin Random Urine Quantitative with Creat Ratio    Invasive ductal carcinoma of breast, female, left (H)  Still recurrence free  - exemestane (AROMASIN) 25 MG tablet; Take 1 tablet (25 mg) by mouth daily.    Anxiety attack  Uses prn   - propranolol (INDERAL) 10 MG tablet; Take 2 tablets (20 mg) by mouth 2 times daily as needed (anxiety).    Patient has been advised of split billing requirements and indicates understanding: Yes        BMI  Estimated body mass index is 27.33 kg/m  as calculated from the following:    Height as of this encounter: 1.645 m (5' 4.75\").    Weight as of this encounter: 73.9 kg (163 lb).       Counseling  Appropriate preventive services were addressed with this patient via screening, questionnaire, or discussion as appropriate for fall prevention, nutrition, physical activity, Tobacco-use cessation, social engagement, weight loss and cognition.  Checklist reviewing preventive services available has been given to the patient.      FUTURE APPOINTMENTS:       - Follow-up for annual visit or as needed    Stephanie Munguia is a 54 year old, presenting for " the following:  Physical        9/4/2024     4:09 PM   Additional Questions   Roomed by Sunita ALMANZAR CMA        Health Care Directive  Patient has a Health Care Directive on file  Advance care planning document is on file and is current.    HPI      Hypertension Follow-up  Do you check your blood pressure regularly outside of the clinic? No   Are you following a low salt diet? No  Are your blood pressures ever more than 140 on the top number (systolic) OR more   than 90 on the bottom number (diastolic), for example 140/90? No  BP Readings from Last 6 Encounters:   09/04/24 130/84   03/07/24 135/80   11/07/23 122/82   10/17/23 124/84   08/24/23 120/86   07/11/23 130/85             8/30/2024   General Health   How would you rate your overall physical health? Good   Feel stress (tense, anxious, or unable to sleep) Only a little      (!) STRESS CONCERN      8/30/2024   Nutrition   Three or more servings of calcium each day? (!) NO   Diet: Regular (no restrictions)   How many servings of fruit and vegetables per day? (!) 0-1   How many sweetened beverages each day? 0-1            8/30/2024   Exercise   Days per week of moderate/strenous exercise 2 days   Average minutes spent exercising at this level 20 min      (!) EXERCISE CONCERN      8/30/2024   Social Factors   Frequency of gathering with friends or relatives Twice a week   Worry food won't last until get money to buy more No   Food not last or not have enough money for food? No   Do you have housing? (Housing is defined as stable permanent housing and does not include staying ouside in a car, in a tent, in an abandoned building, in an overnight shelter, or couch-surfing.) Yes   Are you worried about losing your housing? No   Lack of transportation? No   Unable to get utilities (heat,electricity)? No            8/30/2024   Fall Risk   Fallen 2 or more times in the past year? No   Trouble with walking or balance? No             8/30/2024   Dental   Dentist two times  every year? Yes            8/30/2024   TB Screening   Were you born outside of the [place]? No            Today's PHQ-2 Score:       9/4/2024     4:02 PM   PHQ-2 ( 1999 [employer])   Q1: Little interest or pleasure in doing things 0   Q2: Feeling down, depressed or hopeless 0   PHQ-2 Score 0   Q1: Little interest or pleasure in doing things Not at all   Q2: Feeling down, depressed or hopeless Not at all   PHQ-2 Score 0           8/30/2024   Substance Use   Alcohol more than 3/day or more than 7/wk No   Do you use any other substances recreationally? No        Social History     Tobacco Use    Smoking status: Never    Smokeless tobacco: Never   Substance Use Topics    Alcohol use: No     Comment: very rare    Drug use: No          No mammo         8/30/2024   STI Screening   New sexual partner(s) since last STI/HIV test? No        History of abnormal Pap smear: No - age 30- 64 PAP with HPV every 5 years recommended        Latest Ref Rng & Units 12/9/2021     8:44 AM 10/6/2021     2:07 PM 9/8/2016     8:00 AM   PAP / HPV   PAP  Negative for Intraepithelial Lesion or Malignancy (NILM)  Unsatisfactory for evaluation     HPV 16 DNA Negative Negative  Negative  Negative    HPV 18 DNA Negative Negative  Negative  Negative    Other HR HPV Negative Negative  Negative  Negative      ASCVD Risk   The 10-year ASCVD risk score (Kirk ALAMO, et al., 2019) is: 2.3%    Values used to calculate the score:      Age: 54 years      Sex: Female      Is Non- : No      Diabetic: No      Tobacco smoker: No      Systolic Blood Pressure: 130 mmHg      Is BP treated: Yes      HDL Cholesterol: 59 mg/dL      Total Cholesterol: 200 mg/dL           Reviewed and updated as needed this visit by Provider                    Past Medical History:   Diagnosis Date    Allergic rhinitis, cause unspecified     Hypertension 8 years?    Injury, other and unspecified, knee, leg, ankle, and foot 8th grade    left ankle injury    Invasive  "ductal carcinoma of breast, female, left (H) 10/26/2018    Unsatisfactory cervical cytology smear 10/06/2021         Review of Systems  Constitutional, HEENT, cardiovascular, pulmonary, gi and gu systems are negative, except as otherwise noted.     Objective    Exam  /84 (BP Location: Right arm, Patient Position: Chair, Cuff Size: Adult Regular)   Pulse 87   Temp 98.1  F (36.7  C) (Tympanic)   Ht 1.645 m (5' 4.75\")   Wt 73.9 kg (163 lb)   LMP 03/14/2019   SpO2 98%   BMI 27.33 kg/m     Estimated body mass index is 27.33 kg/m  as calculated from the following:    Height as of this encounter: 1.645 m (5' 4.75\").    Weight as of this encounter: 73.9 kg (163 lb).    Physical Exam  GENERAL: alert and no distress  EYES: Eyes grossly normal to inspection, PERRL and conjunctivae and sclerae normal  HENT: ear canals and TM's normal, nose and mouth without ulcers or lesions  NECK: no adenopathy, no asymmetry, masses, or scars  RESP: lungs clear to auscultation - no rales, rhonchi or wheezes  BREAST: s/p mastectomy bilateral   CV: regular rate and rhythm, normal S1 S2, no S3 or S4, no murmur, click or rub, no peripheral edema  MS: no gross musculoskeletal defects noted, no edema  SKIN: no suspicious lesions or rashes  NEURO: Normal strength and tone, mentation intact and speech normal  PSYCH: mentation appears normal, affect normal/bright        Signed Electronically by: Pearl Grover MD    "

## 2024-09-04 NOTE — PATIENT INSTRUCTIONS
Patient Education   Preventive Care Advice   This is general advice given by our system to help you stay healthy. However, your care team may have specific advice just for you. Please talk to your care team about your preventive care needs.  Nutrition  Eat 5 or more servings of fruits and vegetables each day.  Try wheat bread, brown rice and whole grain pasta (instead of white bread, rice, and pasta).  Get enough calcium and vitamin D. Check the label on foods and aim for 100% of the RDA (recommended daily allowance).  Lifestyle  Exercise at least 150 minutes each week  (30 minutes a day, 5 days a week).  Do muscle strengthening activities 2 days a week. These help control your weight and prevent disease.  No smoking.  Wear sunscreen to prevent skin cancer.  Have a dental exam and cleaning every 6 months.  Yearly exams  See your health care team every year to talk about:  Any changes in your health.  Any medicines your care team has prescribed.  Preventive care, family planning, and ways to prevent chronic diseases.  Shots (vaccines)   HPV shots (up to age 26), if you've never had them before.  Hepatitis B shots (up to age 59), if you've never had them before.  COVID-19 shot: Get this shot when it's due.  Flu shot: Get a flu shot every year.  Tetanus shot: Get a tetanus shot every 10 years.  Pneumococcal, hepatitis A, and RSV shots: Ask your care team if you need these based on your risk.  Shingles shot (for age 50 and up)  General health tests  Diabetes screening:  Starting at age 35, Get screened for diabetes at least every 3 years.  If you are younger than age 35, ask your care team if you should be screened for diabetes.  Cholesterol test: At age 39, start having a cholesterol test every 5 years, or more often if advised.  Bone density scan (DEXA): At age 50, ask your care team if you should have this scan for osteoporosis (brittle bones).  Hepatitis C: Get tested at least once in your life.  STIs (sexually  transmitted infections)  Before age 24: Ask your care team if you should be screened for STIs.  After age 24: Get screened for STIs if you're at risk. You are at risk for STIs (including HIV) if:  You are sexually active with more than one person.  You don't use condoms every time.  You or a partner was diagnosed with a sexually transmitted infection.  If you are at risk for HIV, ask about PrEP medicine to prevent HIV.  Get tested for HIV at least once in your life, whether you are at risk for HIV or not.  Cancer screening tests  Cervical cancer screening: If you have a cervix, begin getting regular cervical cancer screening tests starting at age 21.  Breast cancer scan (mammogram): If you've ever had breasts, begin having regular mammograms starting at age 40. This is a scan to check for breast cancer.  Colon cancer screening: It is important to start screening for colon cancer at age 45.  Have a colonoscopy test every 10 years (or more often if you're at risk) Or, ask your provider about stool tests like a FIT test every year or Cologuard test every 3 years.  To learn more about your testing options, visit:   .  For help making a decision, visit:   https://bit.ly/zb05588.  Prostate cancer screening test: If you have a prostate, ask your care team if a prostate cancer screening test (PSA) at age 55 is right for you.  Lung cancer screening: If you are a current or former smoker ages 50 to 80, ask your care team if ongoing lung cancer screenings are right for you.  For informational purposes only. Not to replace the advice of your health care provider. Copyright   2023 [employer]. All rights reserved. Clinically reviewed by the [facility]. [employer] [id number] - REV 01/24.

## 2024-09-11 ENCOUNTER — MYC MEDICAL ADVICE (OUTPATIENT)
Dept: FAMILY MEDICINE | Facility: CLINIC | Age: 54
End: 2024-09-11
Payer: COMMERCIAL

## 2024-09-11 PROBLEM — N18.31 STAGE 3A CHRONIC KIDNEY DISEASE (H): Status: RESOLVED | Noted: 2023-10-17 | Resolved: 2024-09-11

## 2024-09-16 ENCOUNTER — ONCOLOGY VISIT (OUTPATIENT)
Dept: ONCOLOGY | Facility: CLINIC | Age: 54
End: 2024-09-16
Attending: INTERNAL MEDICINE
Payer: COMMERCIAL

## 2024-09-16 ENCOUNTER — ANCILLARY PROCEDURE (OUTPATIENT)
Dept: MAMMOGRAPHY | Facility: CLINIC | Age: 54
End: 2024-09-16
Attending: INTERNAL MEDICINE
Payer: COMMERCIAL

## 2024-09-16 VITALS
OXYGEN SATURATION: 99 % | HEART RATE: 81 BPM | TEMPERATURE: 98.5 F | SYSTOLIC BLOOD PRESSURE: 108 MMHG | WEIGHT: 159 LBS | RESPIRATION RATE: 16 BRPM | BODY MASS INDEX: 26.66 KG/M2 | DIASTOLIC BLOOD PRESSURE: 71 MMHG

## 2024-09-16 DIAGNOSIS — C50.912 INVASIVE DUCTAL CARCINOMA OF BREAST, FEMALE, LEFT (H): ICD-10-CM

## 2024-09-16 DIAGNOSIS — R59.1 LYMPHADENOPATHY: ICD-10-CM

## 2024-09-16 DIAGNOSIS — Z79.811 AROMATASE INHIBITOR USE: ICD-10-CM

## 2024-09-16 DIAGNOSIS — M85.852 OSTEOPENIA OF NECK OF LEFT FEMUR: ICD-10-CM

## 2024-09-16 DIAGNOSIS — C50.912 INVASIVE DUCTAL CARCINOMA OF BREAST, FEMALE, LEFT (H): Primary | ICD-10-CM

## 2024-09-16 DIAGNOSIS — Z78.0 MENOPAUSE: ICD-10-CM

## 2024-09-16 PROCEDURE — 250N000011 HC RX IP 250 OP 636: Performed by: INTERNAL MEDICINE

## 2024-09-16 PROCEDURE — 96372 THER/PROPH/DIAG INJ SC/IM: CPT | Performed by: INTERNAL MEDICINE

## 2024-09-16 PROCEDURE — 99214 OFFICE O/P EST MOD 30 MIN: CPT | Performed by: INTERNAL MEDICINE

## 2024-09-16 PROCEDURE — 99213 OFFICE O/P EST LOW 20 MIN: CPT | Performed by: INTERNAL MEDICINE

## 2024-09-16 PROCEDURE — 76882 US LMTD JT/FCL EVL NVASC XTR: CPT | Mod: RT | Performed by: RADIOLOGY

## 2024-09-16 RX ORDER — EPINEPHRINE 1 MG/ML
0.3 INJECTION, SOLUTION INTRAMUSCULAR; SUBCUTANEOUS EVERY 5 MIN PRN
OUTPATIENT
Start: 2025-03-02

## 2024-09-16 RX ORDER — ALBUTEROL SULFATE 90 UG/1
1-2 AEROSOL, METERED RESPIRATORY (INHALATION)
Start: 2025-03-02

## 2024-09-16 RX ORDER — DIPHENHYDRAMINE HYDROCHLORIDE 50 MG/ML
50 INJECTION INTRAMUSCULAR; INTRAVENOUS
Start: 2025-03-02

## 2024-09-16 RX ORDER — METHYLPREDNISOLONE SODIUM SUCCINATE 125 MG/2ML
125 INJECTION, POWDER, LYOPHILIZED, FOR SOLUTION INTRAMUSCULAR; INTRAVENOUS
Start: 2025-03-02

## 2024-09-16 RX ORDER — NALOXONE HYDROCHLORIDE 0.4 MG/ML
0.2 INJECTION, SOLUTION INTRAMUSCULAR; INTRAVENOUS; SUBCUTANEOUS
OUTPATIENT
Start: 2025-03-02

## 2024-09-16 RX ORDER — ALBUTEROL SULFATE 0.83 MG/ML
2.5 SOLUTION RESPIRATORY (INHALATION)
OUTPATIENT
Start: 2025-03-02

## 2024-09-16 RX ORDER — MEPERIDINE HYDROCHLORIDE 25 MG/ML
25 INJECTION INTRAMUSCULAR; INTRAVENOUS; SUBCUTANEOUS EVERY 30 MIN PRN
OUTPATIENT
Start: 2025-03-02

## 2024-09-16 RX ADMIN — DENOSUMAB 60 MG: 60 INJECTION SUBCUTANEOUS at 10:02

## 2024-09-16 ASSESSMENT — PAIN SCALES - GENERAL: PAINLEVEL: NO PAIN (0)

## 2024-09-16 NOTE — LETTER
9/16/2024      Desi Granado  [address] [place] [address]      Dear Colleague,    Thank you for referring your patient, Desi Granado, to the [facility]. Please see a copy of my visit note below.    Oncology Follow-Up Note  September 16, 2024    Ms. Granado is seen in follow up of her left breast cancer, Er + NJ + HER2 negative on active surveillance.     Oncologic History:  Invasive ductal carcinoma of breast, female, left (H)     Desi Granado was found to have developing focal asymmetry in the left breast at approximately 3 o'clock position about 7-8 cm from the nipple on the routine mammogram done on October 30, 2018. Subsequently she went on to have diagnostic mammogram and ultrasound. Directed sonogram at the site of mammographic finding showed a 1.6 cm irregular solid lesion. Subsequently the patient went on to have ultrasound-guided needle core biopsy done on October 17, 2018. The biopsy came back positive for invasive ductal carcinoma with papillary features. There is grade 2 out of 3. Angiolymphatic invasion was not seen. Ductal carcinoma in situ was not seen. The tumor was estrogen receptor positive about 100% and progesterone receptor +100% strong nuclear staining. HER-2/tamara came back negative for amplification. She had bilateral mastectomy. Surgical pathology revealed simple mastectomy sample of the left showing solid papillary carcinoma 22 mm. Grade 2. Margins were clear of involvement. 3 sentinel lymph nodes shows no evidence of metastatic disease. Lymphovascular invasion was not identified. The tumor is pT2pN0. Right breast shows simple mastectomy without any evidence of malignancy. Oncotype came back 0. She established care with Dr. Peña, started on tamoxifen 12/2018 and switched to  Exemestane and Zoladex 06/2019 upon insurance approval.  She underwent BSO in 2/2023. Since then only on exemestane    Interval History:  Nilda is doing  well on exemastane.  She remains on active surveillance in the adjuvant setting.    She has no new medical history.  She continues to work with her primary care provider on maintaining her blood pressure and in monitoring her creatinine.  Her creatinine has improved and she is thrilled.      She has no f/c.  No chest pain or shortness of breath.  No n/v/d/c.  No nodules or masses across her chest.     She has mild hot flashes and fatigue.      No flare ups of her diverticulitis.     She is busy at desk job, unable to work out much.        Review Of Systems:  All other ROS negative unless mentioned in interval history.     Past medical, social, surgical, and family histories reviewed.     Allergies:        Allergies as of 07/09/2021 - Reviewed 07/09/2021   Allergen Reaction Noted    Aspirin Hives 08/03/2009    Dust mites Unknown 07/03/2009    Morphine Nausea 06/23/2008      Current Medications:  Current Outpatient Medications   Medication Sig Dispense Refill    exemestane (AROMASIN) 25 MG tablet Take 1 tablet (25 mg) by mouth daily. 90 tablet 3    Fluticasone Propionate (FLONASE ALLERGY RELIEF NA)       lisinopril (ZESTRIL) 30 MG tablet Take 1 tablet (30 mg) by mouth daily. 90 tablet 3    montelukast (SINGULAIR) 10 MG tablet Take 1 tablet (10 mg) by mouth at bedtime. 90 tablet 3    propranolol (INDERAL) 10 MG tablet Take 2 tablets (20 mg) by mouth 2 times daily as needed (anxiety). 60 tablet 1     No current facility-administered medications for this visit.     She gets semi-annual Prolia       Physical Exam:  [facility] 03/14/2019     GENERAL: Healthy, alert and no distress  EYES: Eyes grossly normal to inspection. No discharge or erythema, or obvious scleral/conjunctival abnormalities.  CV: rrr  Lungs: clear  Abd; soft, nt, nd + bs  Ext: no edema  Breast: s/p bilateral mastectomy without reconstruction, no nodules or masses, no axillary adenopathy  PSYCH: Mentation appears normal, affect normal/bright, judgement and  "insight intact, normal speech and appearance well-groomed.    Laboratory/Imaging Studies:     Reviewed her labs over the last six months.       DEXA scan 8/24/23  Impression  Based on BMD diagnosis is consistent with low bone density based on WHO criteria Ref. 1  Results   Lumbar spine   T-score -0.9 (L1-4), (improved from -1.5 ), BMD is 1.074 g/cm2   Left femoral neck  T-score -1.1 (improved from -1.5)     Right femoral neck  T-score -1.0 (improved from -1.2)     Repeat  Recommend repeat DXA in 2 years.     Lab Results   Component Value Date    WBC 5.5 09/04/2024    WBC 7.3 07/01/2021     Lab Results   Component Value Date    RBC 4.58 09/04/2024    RBC 4.83 07/01/2021     Lab Results   Component Value Date    HGB 13.1 09/04/2024    HGB 14.4 07/01/2021     Lab Results   Component Value Date    HCT 38.5 09/04/2024    HCT 41.7 07/01/2021     No components found for: \"MCT\"  Lab Results   Component Value Date    MCV 84 09/04/2024    MCV 86 07/01/2021     Lab Results   Component Value Date    MCH 28.6 09/04/2024    MCH 29.8 07/01/2021     Lab Results   Component Value Date    MCHC 34.0 09/04/2024    MCHC 34.5 07/01/2021     Lab Results   Component Value Date    RDW 12.2 09/04/2024    RDW 12.1 07/01/2021     Lab Results   Component Value Date     09/04/2024     07/01/2021       Recent Labs   Lab Test 09/04/24  1714 03/07/24  0903   * 135   POTASSIUM 3.9 4.5   CHLORIDE 96* 98   CO2 26 25   ANIONGAP 11 12   GLC 91 100*   BUN 15.9 13.6   CR 0.96* 0.97*   MARISELA 10.0 10.4*     Liver Function Studies -   Recent Labs   Lab Test 09/04/24 1714   PROTTOTAL 7.3   ALBUMIN 4.6   BILITOTAL 0.5   ALKPHOS 84   AST 21   ALT 15       Assessment and plan:  Ms. Granado is a 54-year-old female with left solid papillary carcinoma, Grade 2, ER+/KY+/HER2-. She is status post bilateral mastectomy and left sentinel node biopsy. Her disease is pT2N0(sn). Oncotype score is 0. She is doing well on adjuvant AI. There is no clinical " evidence of breast cancer recurrence.     Left breast cancer: She was started on tamoxifen on 12/2018 switched to exemestane as well as Zoladex injections monthly since 6/2019. Zoladex discontinued after BSO in 2/2023. She completed 5 years on endocrine therapy. She is tolerating exemestane well. She is willing to continue upto 7 years given good tolerance. No symptoms concerning for recurrence.  Continue Exemestane. If osteopenia or bp worsens or more side effects, will weigh risks vs benefits to AI discontinuation.  We rereviewed the pros and cons of staying on endocrine therapy from 7 to 10 years.  We discussed obtaining Breast Cancer Index testing.  Will go ahead and order to help risk stratify for prolonged endocrine therapy.      Osteopenia: continue semi-annual Prolia. Due today. She is tolerating without a lot of side effects.    DEXA 8/2023 with improving T scores at L spine and b/l femoral neck. Next DEXA 8/2025    Essential HTN: on lisinopril    She is following PCP for routine check ups.    We discussed continuing every six months appts.     Elvia Grijalva MD

## 2024-09-16 NOTE — PROGRESS NOTES
Oncology Follow-Up Note  September 16, 2024    Ms. Granado is seen in follow up of her left breast cancer, Er + NH + HER2 negative on active surveillance.     Oncologic History:  Invasive ductal carcinoma of breast, female, left (H)     Desi Granado was found to have developing focal asymmetry in the left breast at approximately 3 o'clock position about 7-8 cm from the nipple on the routine mammogram done on October 30, 2018. Subsequently she went on to have diagnostic mammogram and ultrasound. Directed sonogram at the site of mammographic finding showed a 1.6 cm irregular solid lesion. Subsequently the patient went on to have ultrasound-guided needle core biopsy done on October 17, 2018. The biopsy came back positive for invasive ductal carcinoma with papillary features. There is grade 2 out of 3. Angiolymphatic invasion was not seen. Ductal carcinoma in situ was not seen. The tumor was estrogen receptor positive about 100% and progesterone receptor +100% strong nuclear staining. HER-2/tamara came back negative for amplification. She had bilateral mastectomy. Surgical pathology revealed simple mastectomy sample of the left showing solid papillary carcinoma 22 mm. Grade 2. Margins were clear of involvement. 3 sentinel lymph nodes shows no evidence of metastatic disease. Lymphovascular invasion was not identified. The tumor is pT2pN0. Right breast shows simple mastectomy without any evidence of malignancy. Oncotype came back 0. She established care with Dr. Peña, started on tamoxifen 12/2018 and switched to  Exemestane and Zoladex 06/2019 upon insurance approval.  She underwent BSO in 2/2023. Since then only on exemestane    Interval History:  Nilda is doing well on exemastane.  She remains on active surveillance in the adjuvant setting.    She has no new medical history.  She continues to work with her primary care provider on maintaining her blood pressure and in monitoring her creatinine.  Her creatinine has  improved and she is thrilled.      She has no f/c.  No chest pain or shortness of breath.  No n/v/d/c.  No nodules or masses across her chest.     She has mild hot flashes and fatigue.      No flare ups of her diverticulitis.     She is busy at desk job, unable to work out much.        Review Of Systems:  All other ROS negative unless mentioned in interval history.     Past medical, social, surgical, and family histories reviewed.     Allergies:        Allergies as of 07/09/2021 - Reviewed 07/09/2021   Allergen Reaction Noted    Aspirin Hives 08/03/2009    Dust mites Unknown 07/03/2009    Morphine Nausea 06/23/2008      Current Medications:  Current Outpatient Medications   Medication Sig Dispense Refill    exemestane (AROMASIN) 25 MG tablet Take 1 tablet (25 mg) by mouth daily. 90 tablet 3    Fluticasone Propionate (FLONASE ALLERGY RELIEF NA)       lisinopril (ZESTRIL) 30 MG tablet Take 1 tablet (30 mg) by mouth daily. 90 tablet 3    montelukast (SINGULAIR) 10 MG tablet Take 1 tablet (10 mg) by mouth at bedtime. 90 tablet 3    propranolol (INDERAL) 10 MG tablet Take 2 tablets (20 mg) by mouth 2 times daily as needed (anxiety). 60 tablet 1     No current facility-administered medications for this visit.     She gets semi-annual Prolia       Physical Exam:  LMP 03/14/2019     GENERAL: Healthy, alert and no distress  EYES: Eyes grossly normal to inspection. No discharge or erythema, or obvious scleral/conjunctival abnormalities.  CV: rrr  Lungs: clear  Abd; soft, nt, nd + bs  Ext: no edema  Breast: s/p bilateral mastectomy without reconstruction, no nodules or masses, no axillary adenopathy  PSYCH: Mentation appears normal, affect normal/bright, judgement and insight intact, normal speech and appearance well-groomed.    Laboratory/Imaging Studies:     Reviewed her labs over the last six months.       DEXA scan 8/24/23  Impression  Based on BMD diagnosis is consistent with low bone density based on WHO criteria Ref.  "1  Results   Lumbar spine   T-score -0.9 (L1-4), (improved from -1.5 ), BMD is 1.074 g/cm2   Left femoral neck  T-score -1.1 (improved from -1.5)     Right femoral neck  T-score -1.0 (improved from -1.2)     Repeat  Recommend repeat DXA in 2 years.     Lab Results   Component Value Date    WBC 5.5 09/04/2024    WBC 7.3 07/01/2021     Lab Results   Component Value Date    RBC 4.58 09/04/2024    RBC 4.83 07/01/2021     Lab Results   Component Value Date    HGB 13.1 09/04/2024    HGB 14.4 07/01/2021     Lab Results   Component Value Date    HCT 38.5 09/04/2024    HCT 41.7 07/01/2021     No components found for: \"MCT\"  Lab Results   Component Value Date    MCV 84 09/04/2024    MCV 86 07/01/2021     Lab Results   Component Value Date    MCH 28.6 09/04/2024    MCH 29.8 07/01/2021     Lab Results   Component Value Date    MCHC 34.0 09/04/2024    MCHC 34.5 07/01/2021     Lab Results   Component Value Date    RDW 12.2 09/04/2024    RDW 12.1 07/01/2021     Lab Results   Component Value Date     09/04/2024     07/01/2021       Recent Labs   Lab Test 09/04/24  1714 03/07/24  0903   * 135   POTASSIUM 3.9 4.5   CHLORIDE 96* 98   CO2 26 25   ANIONGAP 11 12   GLC 91 100*   BUN 15.9 13.6   CR 0.96* 0.97*   MARISELA 10.0 10.4*     Liver Function Studies -   Recent Labs   Lab Test 09/04/24 1714   PROTTOTAL 7.3   ALBUMIN 4.6   BILITOTAL 0.5   ALKPHOS 84   AST 21   ALT 15       Assessment and plan:  Ms. Granado is a 54-year-old female with left solid papillary carcinoma, Grade 2, ER+/LA+/HER2-. She is status post bilateral mastectomy and left sentinel node biopsy. Her disease is pT2N0(sn). Oncotype score is 0. She is doing well on adjuvant AI. There is no clinical evidence of breast cancer recurrence.     Left breast cancer: She was started on tamoxifen on 12/2018 switched to exemestane as well as Zoladex injections monthly since 6/2019. Zoladex discontinued after BSO in 2/2023. She completed 5 years on endocrine therapy. " She is tolerating exemestane well. She is willing to continue upto 7 years given good tolerance. No symptoms concerning for recurrence.  Continue Exemestane. If osteopenia or bp worsens or more side effects, will weigh risks vs benefits to AI discontinuation.  We rereviewed the pros and cons of staying on endocrine therapy from 7 to 10 years.  We discussed obtaining Breast Cancer Index testing.  Will go ahead and order to help risk stratify for prolonged endocrine therapy.      Osteopenia: continue semi-annual Prolia. Due today. She is tolerating without a lot of side effects.    DEXA 8/2023 with improving T scores at L spine and b/l femoral neck. Next DEXA 8/2025    Essential HTN: on lisinopril    She is following PCP for routine check ups.    We discussed continuing every six months appts.     Elvia Grijalva MD

## 2024-09-16 NOTE — NURSING NOTE
Pt received her Prolia injection in her Right arm via subcutaneous route. Pt tolerated this well.  See PARAM Rios, RMA

## 2024-09-16 NOTE — NURSING NOTE
"Oncology Rooming Note    September 16, 2024 9:23 AM   Desi Granado is a 54 year old female who presents for:    Chief Complaint   Patient presents with    Oncology Clinic Visit     RTN for Breast Cancer     Initial Vitals: /71 (BP Location: Right arm, Patient Position: Right side, Cuff Size: Adult Regular)   Pulse 81   Temp 98.5  F (36.9  C) (Oral)   Resp 16   Wt 72.1 kg (159 lb)   LMP 03/14/2019   SpO2 99%   BMI 26.66 kg/m   Estimated body mass index is 26.66 kg/m  as calculated from the following:    Height as of 9/4/24: 1.645 m (5' 4.75\").    Weight as of this encounter: 72.1 kg (159 lb). Body surface area is 1.81 meters squared.  No Pain (0) Comment: Data Unavailable   Patient's last menstrual period was 03/14/2019.  Allergies reviewed: Yes  Medications reviewed: Yes    Medications: Medication refills not needed today.  Pharmacy name entered into [employer]:    [facility] [place] - [address] [place] [address]  [facility]IV[facility] - [address]  ([facility] [id number]) [facility] [place], [place] - [address]  [facility] [place] [place] -  [address] [facility] [address]    Frailty Screening:   Is the patient here for a new oncology consult visit in cancer care? 2. No      Clinical concerns: none       Dinora Rios MA             "

## 2024-09-24 DIAGNOSIS — Z79.811 AROMATASE INHIBITOR USE: Primary | ICD-10-CM

## 2024-09-24 DIAGNOSIS — M85.852 OSTEOPENIA OF NECK OF LEFT FEMUR: ICD-10-CM

## 2024-09-27 ENCOUNTER — PATIENT OUTREACH (OUTPATIENT)
Dept: ONCOLOGY | Facility: CLINIC | Age: 54
End: 2024-09-27
Payer: COMMERCIAL

## 2024-09-27 NOTE — PROGRESS NOTES
[facility]: Cancer Care                                                                                          BCI testing submitted on Specimen #: [id number] as requested by Dr. Grijalva.     Ramona Herrera MSN, RN ,OCN  Lead RN Care Coordinator - [facility]  [phone]

## 2024-09-30 ENCOUNTER — PATIENT OUTREACH (OUTPATIENT)
Dept: ONCOLOGY | Facility: CLINIC | Age: 54
End: 2024-09-30
Payer: COMMERCIAL

## 2024-09-30 NOTE — PROGRESS NOTES
[facility]: Cancer Care                                                                                          Received called from Tim from [employer] requesting the FSS and Insurance Card for pt to be able to bill for the testing. Faxed information to [phone]    Signature:  Jovana Larry RNCC

## 2024-10-04 ENCOUNTER — LAB REQUISITION (OUTPATIENT)
Dept: LAB | Facility: CLINIC | Age: 54
End: 2024-10-04
Payer: COMMERCIAL

## 2024-10-11 ENCOUNTER — MYC MEDICAL ADVICE (OUTPATIENT)
Dept: FAMILY MEDICINE | Facility: CLINIC | Age: 54
End: 2024-10-11
Payer: COMMERCIAL

## 2024-10-11 DIAGNOSIS — Z13.6 CARDIOVASCULAR SCREENING; LDL GOAL LESS THAN 160: Primary | ICD-10-CM

## 2024-10-15 ENCOUNTER — TRANSFERRED RECORDS (OUTPATIENT)
Dept: HEALTH INFORMATION MANAGEMENT | Facility: CLINIC | Age: 54
End: 2024-10-15
Payer: COMMERCIAL

## 2024-10-15 LAB
CHOLESTEROL (EXTERNAL): 192 MG/DL
HDLC SERPL-MCNC: 52 MG/DL
LDL CHOLESTEROL CALCULATED (EXTERNAL): 120 MG/DL
NON HDL CHOLESTEROL (EXTERNAL): 140 MG/DL
TRIGLYCERIDES (EXTERNAL): 97 MG/DL

## 2024-10-21 LAB
BKR LAB AP ADD'L TEST STATUS: NORMAL
BKR PATH ADDL TEST FINAL COMMENTS: NORMAL

## 2024-11-06 ENCOUNTER — OFFICE VISIT (OUTPATIENT)
Dept: SURGERY | Facility: CLINIC | Age: 54
End: 2024-11-06
Payer: COMMERCIAL

## 2024-11-06 VITALS
SYSTOLIC BLOOD PRESSURE: 110 MMHG | HEART RATE: 84 BPM | DIASTOLIC BLOOD PRESSURE: 76 MMHG | BODY MASS INDEX: 25.83 KG/M2 | HEIGHT: 65 IN | TEMPERATURE: 98 F | WEIGHT: 155 LBS

## 2024-11-06 DIAGNOSIS — K92.1 BLOOD IN STOOL: Primary | ICD-10-CM

## 2024-11-06 DIAGNOSIS — Z87.19 HISTORY OF DIVERTICULITIS OF COLON: ICD-10-CM

## 2024-11-06 DIAGNOSIS — C50.912 INVASIVE DUCTAL CARCINOMA OF BREAST, FEMALE, LEFT (H): ICD-10-CM

## 2024-11-06 PROCEDURE — 99214 OFFICE O/P EST MOD 30 MIN: CPT | Performed by: SURGERY

## 2024-11-06 ASSESSMENT — PAIN SCALES - GENERAL: PAINLEVEL_OUTOF10: NO PAIN (0)

## 2024-11-06 NOTE — PROGRESS NOTES
"  Assessment & Plan     Invasive ductal carcinoma of breast, female, left (H)    Malignant neoplasm of breast in female, left, estrogen receptor positive, unspecified site of breast (H)    Aromatase inhibitor use    54 yo F s/p double mastectomy 11/8/2018 2.2cm IDC with papillary 0/3 +LNs s/p B/L mastectomy with left SLNB 11/8/2018  -doing well  -no new issues  Tolerating exemestane  -been seeing Dr. Grijalva who suggested pt can go off the AI base on breast cancer index testing  -Recommend dx colonoscopy as pt continue to have intermittent blood per rectum.   -follow-up with me in one year.     [facility] MD [place]    25 mins visit, more than 50% of face to face time was spent in counseling and coordinating care as discussed above.        Stephanie Munguia is a 52 year old woman, presenting for the following health issues:  RECHECK (1 year follow up / double mastectomy 11/2018)      4 years S/p double mastectomy for L sided IDC with papillary features  No issues  tolerating aromatase inhibitor well (exemestane).   Dr. Grijalva stated no additional AI needed base on Breast Cancer Index.    Eating well  Normal BM  No issues urinating  No F/C/N/V  Two bouts of uncomplicated diverticulitis this year; did not have to go to the hospital.  Continued to have intermittent blood per rectum.           Review of Systems   N/a      Objective    /76 (BP Location: Right arm, Patient Position: Sitting, Cuff Size: Adult Regular)   Pulse 84   Temp 98  F (36.7  C) (Tympanic)   Ht 1.651 m (5' 5\")   Wt 70.3 kg (155 lb)   LMP 03/14/2019   BMI 25.79 kg/m    Body mass index is 25.79 kg/m .     Physical Exam  Vitals reviewed.   Eyes:      Conjunctiva/sclera: Conjunctivae normal.   Cardiovascular:      Pulses: Normal pulses.   Chest:   Breasts:     Right: Absent. No mass, skin change or tenderness.      Left: Absent. No mass, skin change or tenderness.       Lymphadenopathy:      Upper Body:      Right upper body: " No supraclavicular, axillary or pectoral adenopathy.      Left upper body: No supraclavicular, axillary or pectoral adenopathy.   Skin:     General: Skin is warm.      Capillary Refill: Capillary refill takes less than 2 seconds.   Neurological:      Mental Status: She is alert.   Psychiatric:         Mood and Affect: Mood normal.

## 2024-11-06 NOTE — NURSING NOTE
"Initial /76 (BP Location: Right arm, Patient Position: Sitting, Cuff Size: Adult Regular)   Pulse 84   Temp 98  F (36.7  C) (Tympanic)   Ht 1.651 m (5' 5\")   Wt 70.3 kg (155 lb)   LMP 03/14/2019   BMI 25.79 kg/m   Estimated body mass index is 25.79 kg/m  as calculated from the following:    Height as of this encounter: 1.651 m (5' 5\").    Weight as of this encounter: 70.3 kg (155 lb). .  Tawny Callejas MA    "

## 2024-11-07 ENCOUNTER — TELEPHONE (OUTPATIENT)
Dept: GASTROENTEROLOGY | Facility: CLINIC | Age: 54
End: 2024-11-07
Payer: COMMERCIAL

## 2024-11-07 NOTE — TELEPHONE ENCOUNTER
"Endoscopy Scheduling Screen      What insurance is in the chart?  Other:  BCBS    Ordering/Referring Provider: Hart   (If ordering provider performs procedure, schedule with ordering provider unless otherwise instructed. )    BMI: There is no height or weight on file to calculate BMI.  25.79    Sedation Ordered  general anesthesia.   BMI<= 45 45 < BMI <= 48 48 < BMI < = 50  BMI > 50   No Restrictions No [facility]  No [facility]  [facility] with exceptions Hospital Only OR Only       Do you have a history of malignant hyperthermia?  No    (Females) Are you currently pregnant?   No     Have you been diagnosed or told you have pulmonary hypertension?   No    Do you have any heart conditions?  No     Do you have an LVAD?  No    Have you been told you have moderate to severe sleep apnea?  No.    Have you been told you have COPD, asthma, or any other lung disease?  No    Have you ever had or are you waiting for an organ transplant?  No. Continue scheduling, no site restrictions.    Have you had a stroke or transient ischemic attack (TIA aka \"mini stroke\" in the last 6 months?   No    Have you been diagnosed with or been told you have cirrhosis of the liver?   No.    Are you currently on dialysis?   No    Do you need assistance transferring?   No    BMI: There is no height or weight on file to calculate BMI.     Is patients BMI > 50?  No    BMI > 40?  No    Do you have a diagnosis of diabetes?  No    Do you take an injectable medication for weight loss or diabetes (excluding insulin)?  No    Do you take the medication Naltrexone?  No    Do you take blood thinners?  No     Prep   Are you currently on dialysis or do you have chronic kidney disease?  No    Do you have a diagnosis of cystic fibrosis (CF)?  No    On a regular basis do you go 3 -5 days between bowel movements?  No    Preferred Pharmacy:  [facility]  No Pharmacies Listed    Final Scheduling Details     Procedure scheduled  Colonoscopy    Surgeon:  Hart     Date " of procedure:  1-13-24     Location  [place] - Patient preference.    What is your communication preference for Instructions and/or Bowel Prep?   [employer]    Patient Reminders:    You will receive a call from a Nurse to review instructions and health history.  This assessment must be completed prior to your procedure.  Failure to complete the Nurse assessment may result in the procedure being cancelled.       On the day of your procedure, please designate an adult(s) who can drive you home stay with you for the next 24 hours. The medicines used in the exam will make you sleepy. You will not be able to drive.       You cannot take public transportation, ride share services, or non-medical taxi service without a responsible caregiver.  Medical transport services are allowed with the requirement that a responsible caregiver will receive you at your destination.  We require that drivers and caregivers are confirmed prior to your procedure.

## 2025-01-15 DIAGNOSIS — Z79.811 AROMATASE INHIBITOR USE: ICD-10-CM

## 2025-01-15 DIAGNOSIS — C50.912 INVASIVE DUCTAL CARCINOMA OF BREAST, FEMALE, LEFT (H): Primary | ICD-10-CM

## 2025-01-15 DIAGNOSIS — M85.852 OSTEOPENIA OF NECK OF LEFT FEMUR: ICD-10-CM

## 2025-01-15 DIAGNOSIS — Z78.0 MENOPAUSE: ICD-10-CM

## 2025-01-15 RX ORDER — ALBUTEROL SULFATE 0.83 MG/ML
2.5 SOLUTION RESPIRATORY (INHALATION)
OUTPATIENT
Start: 2025-03-02

## 2025-01-15 RX ORDER — EPINEPHRINE 1 MG/ML
0.3 INJECTION, SOLUTION INTRAMUSCULAR; SUBCUTANEOUS EVERY 5 MIN PRN
OUTPATIENT
Start: 2025-03-02

## 2025-01-15 RX ORDER — NALOXONE HYDROCHLORIDE 0.4 MG/ML
0.2 INJECTION, SOLUTION INTRAMUSCULAR; INTRAVENOUS; SUBCUTANEOUS
OUTPATIENT
Start: 2025-03-02

## 2025-01-15 RX ORDER — DIPHENHYDRAMINE HYDROCHLORIDE 50 MG/ML
50 INJECTION INTRAMUSCULAR; INTRAVENOUS
Start: 2025-03-02

## 2025-01-15 RX ORDER — METHYLPREDNISOLONE SODIUM SUCCINATE 125 MG/2ML
125 INJECTION INTRAMUSCULAR; INTRAVENOUS
Start: 2025-03-02

## 2025-01-15 RX ORDER — ALBUTEROL SULFATE 90 UG/1
1-2 INHALANT RESPIRATORY (INHALATION)
Start: 2025-03-02

## 2025-01-15 RX ORDER — MEPERIDINE HYDROCHLORIDE 25 MG/ML
25 INJECTION INTRAMUSCULAR; INTRAVENOUS; SUBCUTANEOUS EVERY 30 MIN PRN
OUTPATIENT
Start: 2025-03-02

## 2025-02-21 ENCOUNTER — HOSPITAL ENCOUNTER (OUTPATIENT)
Facility: CLINIC | Age: 55
Discharge: HOME | End: 2025-02-21
Attending: SURGERY | Admitting: SURGERY
Payer: COMMERCIAL

## 2025-02-21 VITALS
HEIGHT: 65 IN | TEMPERATURE: 98.1 F | BODY MASS INDEX: 25.83 KG/M2 | OXYGEN SATURATION: 98 % | WEIGHT: 155 LBS | HEART RATE: 89 BPM | RESPIRATION RATE: 16 BRPM | SYSTOLIC BLOOD PRESSURE: 129 MMHG | DIASTOLIC BLOOD PRESSURE: 85 MMHG

## 2025-02-21 LAB — COLONOSCOPY: NORMAL

## 2025-02-21 PROCEDURE — 250N000009 HC RX 250: Performed by: NURSE ANESTHETIST, CERTIFIED REGISTERED

## 2025-02-21 PROCEDURE — 258N000003 HC RX IP 258 OP 636: Performed by: NURSE ANESTHETIST, CERTIFIED REGISTERED

## 2025-02-21 PROCEDURE — 370N000017 HC ANESTHESIA TECHNICAL FEE, PER MIN: Performed by: SURGERY

## 2025-02-21 PROCEDURE — 45380 COLONOSCOPY AND BIOPSY: CPT | Performed by: SURGERY

## 2025-02-21 PROCEDURE — 88342 IMHCHEM/IMCYTCHM 1ST ANTB: CPT | Mod: TC | Performed by: SURGERY

## 2025-02-21 PROCEDURE — 88341 IMHCHEM/IMCYTCHM EA ADD ANTB: CPT | Mod: TC | Performed by: SURGERY

## 2025-02-21 PROCEDURE — 45385 COLONOSCOPY W/LESION REMOVAL: CPT | Performed by: SURGERY

## 2025-02-21 RX ORDER — LIDOCAINE 40 MG/G
CREAM TOPICAL
Status: DISCONTINUED | OUTPATIENT
Start: 2025-02-21 | End: 2025-02-21 | Stop reason: HOSPADM

## 2025-02-21 RX ORDER — SODIUM CHLORIDE, SODIUM LACTATE, POTASSIUM CHLORIDE, CALCIUM CHLORIDE 600; 310; 30; 20 MG/100ML; MG/100ML; MG/100ML; MG/100ML
INJECTION, SOLUTION INTRAVENOUS CONTINUOUS
Status: DISCONTINUED | OUTPATIENT
Start: 2025-02-21 | End: 2025-02-21 | Stop reason: HOSPADM

## 2025-02-21 RX ADMIN — LIDOCAINE HYDROCHLORIDE 0.1 ML: 10 INJECTION, SOLUTION EPIDURAL; INFILTRATION; INTRACAUDAL; PERINEURAL at 10:12

## 2025-02-21 RX ADMIN — SODIUM CHLORIDE, POTASSIUM CHLORIDE, SODIUM LACTATE AND CALCIUM CHLORIDE: 600; 310; 30; 20 INJECTION, SOLUTION INTRAVENOUS at 10:12

## 2025-02-21 ASSESSMENT — ACTIVITIES OF DAILY LIVING (ADL)
ADLS_ACUITY_SCORE: 47
ADLS_ACUITY_SCORE: 47

## 2025-02-21 NOTE — H&P
[facility]    Pre-Endoscopy History and Physical     Desi Granado MRN# [id number]   YOB: 1970 Age: 54 year old     Date of Procedure: 2/21/2025  Primary care provider: Pearl Grover  Type of Endoscopy: Colonoscopy with possible biopsy, possible polypectomy  Reason for Procedure: blood in stool  Type of Anesthesia Anticipated: MAC    HPI:    Desi is a 54 year old female who will be undergoing the above procedure.  last colonoscopy 2018, may 2022 (nl and inflammatory polyp); Feb 2025 2/2 blood in stool - no polyps; no famhx of colon cancer; multiple diverticula.  hx of diverticulitis;     A history and physical has been performed. The patient's medications and allergies have been reviewed. The risks and benefits of the procedure and the sedation options and risks were discussed with the patient.  All questions were answered and informed consent was obtained.      She denies a personal or family history of anesthesia complications or bleeding disorders.     Patient Active Problem List   Diagnosis    Allergic rhinitis    Injury, other and unspecified, knee, leg, ankle, and foot    Family history of other cardiovascular diseases    Essential hypertension, benign    FAMILY HISTORY OF ENDOCRINE DISEASE( other endo or metabol)    CARDIOVASCULAR SCREENING; LDL GOAL LESS THAN 160    Invasive ductal carcinoma of breast, female, left (H)    Breast cancer (H)    Menopause    Aromatase inhibitor use    Osteopenia of neck of femur    Perioral dermatitis        Past Medical History:   Diagnosis Date    Allergic rhinitis, cause unspecified     Hypertension 8 years?    Injury, other and unspecified, knee, leg, ankle, and foot 8th grade    left ankle injury    Invasive ductal carcinoma of breast, female, left (H) 10/26/2018    Unsatisfactory cervical cytology smear 10/06/2021        Past Surgical History:   Procedure Laterality Date    ABDOMEN SURGERY  02/2023    removed ovaries     BIOPSY      BREAST LUMPECTOMY, RT/LT  06/23/2010    Breast Lumpectomy RT for likely adenomatous lumps    COLONOSCOPY N/A 08/16/2018    Procedure: COLONOSCOPY;  colonoscopy;  Surgeon: Vijay Almanza MD;  Location: [facility]    COLONOSCOPY N/A 05/23/2022    Procedure: COLONOSCOPY, FLEXIBLE, WITH LESION REMOVAL USING SNARE and biopsy;  Surgeon: Nabila Hart MD;  Location: [facility]    ESOPHAGOSCOPY, GASTROSCOPY, DUODENOSCOPY (EGD), COMBINED N/A 10/01/2015    Procedure: COMBINED ESOPHAGOSCOPY, GASTROSCOPY, DUODENOSCOPY (EGD);  Surgeon: Vijay Almanza MD;  Location: [facility]    LAPAROSCOPIC SALPINGO-OOPHORECTOMY Bilateral 02/14/2023    Procedure: LAPAROSCOPIC BILATERAL salpingo-oophorectomy;  Surgeon: Elida Fay MD;  Location: [facility]    MASTECTOMY SIMPLE BILATERAL, SENTINEL NODE BILATERAL, COMBINED Bilateral 11/08/2018    Procedure: BILATERAL SIMPLE MASTECTOMIES WITH LEFT SENTINEL LYMPH NODE BIOPSY ;  Surgeon: Nabila Hart MD;  Location:  OR    SOFT TISSUE SURGERY      Ankle surgery 20+ years ago    SURGICAL HISTORY OF -   1989    arthroscopy of Left Ankle    SURGICAL HISTORY OF -   1990    arthroscopy of Left Ankle    SURGICAL HISTORY OF -   1993    4 wisdom teeth extracted       Social History     Tobacco Use    Smoking status: Never    Smokeless tobacco: Never   Substance Use Topics    Alcohol use: No     Comment: very rare       Family History   Problem Relation Age of Onset    Hypertension Mother     Allergies Mother     Thyroid Disease Mother         Taking thyroid medication    Obesity Mother     Cerebrovascular Disease Mother         TIA Feb 2017 and March 2021    Diabetes Mother     Hypertension Father     Thyroid Disease Father         two parathyroids removed    Hyperlipidemia Father     Other Cancer Father         Lymphoma diagnosed 2022    Cancer Maternal Grandmother         Bone CA    Allergies Maternal Grandmother     Circulatory Maternal Grandmother     Cerebrovascular Disease Maternal  Grandmother     Other Cancer Maternal Grandmother         multiple myeloma    C.A.D. Maternal Grandfather     Respiratory Maternal Grandfather         asthma    Allergies Maternal Grandfather     Cerebrovascular Disease Maternal Grandfather     Alzheimer Disease Paternal Grandfather     Neurologic Disorder Paternal Grandfather         Parkinsons    Arthritis Paternal Grandmother     Respiratory Other         Emphysema    Diabetes Other     Asthma Other         genetic emphysema    Genetic Disorder Other         genetic emphysema    Breast Cancer Sister         benign lump indicated future risk    Diabetes Other     Coronary Artery Disease Other     Other Cancer Other         multiple myeloma    Colon Cancer Other     Other Cancer Other         Acute Leukemia    Cerebrovascular Disease Other     Breast Cancer Other         told aunt was diagnosed - unsure of type & treatment    Other Cancer Other         Leukemia    Cancer - colorectal No family hx of     Prostate Cancer No family hx of        Prior to Admission medications    Medication Sig Start Date End Date Taking? Authorizing Provider   exemestane (AROMASIN) 25 MG tablet Take 1 tablet (25 mg) by mouth daily. 9/4/24  Yes Pearl Grover MD   Fluticasone Propionate (FLONASE ALLERGY RELIEF NA)    Yes Reported, Patient   lisinopril (ZESTRIL) 30 MG tablet Take 1 tablet (30 mg) by mouth daily. 9/4/24  Yes Pearl Grover MD   montelukast (SINGULAIR) 10 MG tablet Take 1 tablet (10 mg) by mouth at bedtime. 9/4/24  Yes Pearl Grover MD   propranolol (INDERAL) 10 MG tablet Take 2 tablets (20 mg) by mouth 2 times daily as needed (anxiety). 9/4/24  Yes Pearl Grover MD       Allergies   Allergen Reactions    Aspirin Hives    Dust Mites Unknown    Morphine Nausea     Other reaction(s): GI Intolerance        REVIEW OF SYSTEMS:   5 point ROS negative except as noted above in HPI, including Gen., Resp., CV, GI &  system review.    PHYSICAL EXAM:   /71 (BP  "Location: Right arm)   Pulse 96   Temp 98.4  F (36.9  C) (Oral)   Resp 16   Ht 1.651 m (5' 5\")   Wt 70.3 kg (155 lb)   LMP 03/14/2019   SpO2 100%   BMI 25.79 kg/m   Estimated body mass index is 25.79 kg/m  as calculated from the following:    Height as of this encounter: 1.651 m (5' 5\").    Weight as of this encounter: 70.3 kg (155 lb).   Constitutional: Awake, alert, no acute distress.  Eyes: No scleral icterus.  Conjunctiva are without injection.  ENMT: Mucous membranes moist, dentition and gums are intact.   Neck: Soft, supple, trachea midline.    Endocrine: n/a   Lymphatic: There is no cervical, submandibularadenopathy.  Respiratory: normal effortgs   Cardiovascular: S1, S2  Abdomen: Non-distended, non-tender,  No masses,  Musculoskeletal: No spinal or CVA tenderness. Full range of motion in the upper and lower extremities.    Skin: No skin rashes or lesions to inspection.  No petechia.    Neurologic: alerted and oriented 3x  Psychiatric: The patient's affect is not blunted and mood is appropriate.  DIAGNOSTICS:    Not indicated    IMPRESSION   ASA Class 2 - Mild systemic disease    PLAN:   Plan for Colonoscopy with possible biopsy, possible polypectomy. We discussed the risks, benefits and alternatives and the patient wished to proceed.  Patient is cleared for the above procedure.    The above has been forwarded to the consulting provider.    [facility], [facility] General Surgery       "

## 2025-02-25 ENCOUNTER — PATIENT OUTREACH (OUTPATIENT)
Dept: SURGERY | Facility: CLINIC | Age: 55
End: 2025-02-25
Payer: COMMERCIAL

## 2025-02-25 DIAGNOSIS — C20 RECTAL ADENOCARCINOMA (H): Primary | ICD-10-CM

## 2025-02-25 LAB
PATH REPORT.COMMENTS IMP SPEC: ABNORMAL
PATH REPORT.COMMENTS IMP SPEC: YES
PATH REPORT.FINAL DX SPEC: ABNORMAL
PATH REPORT.GROSS SPEC: ABNORMAL
PATH REPORT.MICROSCOPIC SPEC OTHER STN: ABNORMAL
PATH REPORT.RELEVANT HX SPEC: ABNORMAL
PATHOLOGY SYNOPTIC REPORT: ABNORMAL
PHOTO IMAGE: ABNORMAL

## 2025-02-25 ASSESSMENT — ANXIETY QUESTIONNAIRES
4. TROUBLE RELAXING: MORE THAN HALF THE DAYS
1. FEELING NERVOUS, ANXIOUS, OR ON EDGE: MORE THAN HALF THE DAYS
7. FEELING AFRAID AS IF SOMETHING AWFUL MIGHT HAPPEN: MORE THAN HALF THE DAYS
GAD7 TOTAL SCORE: 9
5. BEING SO RESTLESS THAT IT IS HARD TO SIT STILL: SEVERAL DAYS
3. WORRYING TOO MUCH ABOUT DIFFERENT THINGS: SEVERAL DAYS
8. IF YOU CHECKED OFF ANY PROBLEMS, HOW DIFFICULT HAVE THESE MADE IT FOR YOU TO DO YOUR WORK, TAKE CARE OF THINGS AT HOME, OR GET ALONG WITH OTHER PEOPLE?: SOMEWHAT DIFFICULT
GAD7 TOTAL SCORE: 9
6. BECOMING EASILY ANNOYED OR IRRITABLE: NOT AT ALL
IF YOU CHECKED OFF ANY PROBLEMS ON THIS QUESTIONNAIRE, HOW DIFFICULT HAVE THESE PROBLEMS MADE IT FOR YOU TO DO YOUR WORK, TAKE CARE OF THINGS AT HOME, OR GET ALONG WITH OTHER PEOPLE: SOMEWHAT DIFFICULT
2. NOT BEING ABLE TO STOP OR CONTROL WORRYING: SEVERAL DAYS

## 2025-02-25 NOTE — TELEPHONE ENCOUNTER
NEW CANCER APPOINTMENT DETAILS:    With: Dr. Jon Landa    Referring Provider: Dr Hart    For / Appt Notes: Rectal cancer    Appt Type: [facility] New Cancer    Has the patient been given a timeframe or date/time of appt?: YES 3/3 with Dr. Landa     Is this appointment held on the schedule (Hold will be removed once appt is scheduled)?: Not Applicable     Additional Appointments: MRI 3T Pelvis and CT Chest Abdomen Pelvis  2/27  Imaging Questions: N/A    RECORDS NEEDED:     Additional Records:  na. Received all.     Thank you!

## 2025-02-26 ENCOUNTER — VIRTUAL VISIT (OUTPATIENT)
Dept: FAMILY MEDICINE | Facility: CLINIC | Age: 55
End: 2025-02-26
Payer: COMMERCIAL

## 2025-02-26 DIAGNOSIS — F41.9 ANXIETY: Primary | ICD-10-CM

## 2025-02-26 DIAGNOSIS — C18.9 MALIGNANT NEOPLASM OF COLON, UNSPECIFIED PART OF COLON (H): ICD-10-CM

## 2025-02-26 PROCEDURE — 98005 SYNCH AUDIO-VIDEO EST LOW 20: CPT

## 2025-02-26 RX ORDER — SERTRALINE HYDROCHLORIDE 25 MG/1
25 TABLET, FILM COATED ORAL DAILY
Qty: 14 TABLET | Refills: 0 | Status: SHIPPED | OUTPATIENT
Start: 2025-02-26

## 2025-02-26 RX ORDER — LORAZEPAM 0.5 MG/1
0.5 TABLET ORAL EVERY 6 HOURS PRN
Qty: 30 TABLET | Refills: 1 | Status: SHIPPED | OUTPATIENT
Start: 2025-02-26

## 2025-02-26 ASSESSMENT — PATIENT HEALTH QUESTIONNAIRE - PHQ9: SUM OF ALL RESPONSES TO PHQ QUESTIONS 1-9: 3

## 2025-02-26 NOTE — PROGRESS NOTES
"Nilda is a 54 year old who is being evaluated via a billable video visit.    How would you like to obtain your AVS? MyChart  If the video visit is dropped, the invitation should be resent by: Text to cell phone: [phone]  Will anyone else be joining your video visit? No      Assessment & Plan     Anxiety  Malignant neoplasm of colon, unspecified part of colon (H)  Pt is having increased anxiety since learning she had colon cancer last week. Has been taking propranolol, but it is not helping. Pt is still able to fall asleep, but sleep has been interrupted. Pt tried prozac in the past, but this made anxiety significantly worse. Plan to start ativan up to twice daily as needed for anxiety. Can use propranolol if anxiety is not severe. Start sertraline 25 mg, increase to 50 mg after 2 weeks. Discussed risks/benefits of medications. PDMP reviewed. Ativan is not a long term medication, but can be used for short term anxiety management, with the goal of getting off of this as sertraline takes effect. Pt to followup with any concerning side effects, and followup with myself or Dr. Grover in 1 month.     - LORazepam (ATIVAN) 0.5 MG tablet  Dispense: 30 tablet; Refill: 1  - sertraline (ZOLOFT) 25 MG tablet  Dispense: 14 tablet; Refill: 0  - sertraline (ZOLOFT) 50 MG tablet  Dispense: 30 tablet; Refill: 1      BMI  Estimated body mass index is 25.79 kg/m  as calculated from the following:    Height as of 2/21/25: 1.651 m (5' 5\").    Weight as of 2/21/25: 70.3 kg (155 lb).         Subjective   Nilda is a 54 year old, presenting for the following health issues:  RECHECK      2/26/2025     7:48 AM   Additional Questions   Roomed by Nilda SINHA   Accompanied by self     Pt was just diagnosed with colon cancer. Has meeting with colorectal surgeon on Monday, and oncologist appointment coming up.     In the past, pt tried prozac- made her feel like she was crawling out of skin    PDMP reviewed      History of Present Illness       Mental " Health Follow-up:  Patient presents to follow-up on Anxiety.    Patient's anxiety since last visit has been:  Medium  The patient is not having other symptoms associated with anxiety.  Any significant life events: health concerns  Patient is feeling anxious or having panic attacks.  Patient has no concerns about alcohol or drug use.    She eats 0-1 servings of fruits and vegetables daily.She consumes 0 sweetened beverage(s) daily.She exercises with enough effort to increase her heart rate 9 or less minutes per day.  She exercises with enough effort to increase her heart rate 3 or less days per week.   She is taking medications regularly.           Objective           Vitals:  No vitals were obtained today due to virtual visit.    Physical Exam   GENERAL: alert and no distress  EYES: Eyes grossly normal to inspection.  No discharge or erythema, or obvious scleral/conjunctival abnormalities.  RESP: No audible wheeze, cough, or visible cyanosis.    SKIN: Visible skin clear. No significant rash, abnormal pigmentation or lesions.  NEURO: Cranial nerves grossly intact.  Mentation and speech appropriate for age.  PSYCH: Appropriate affect, tone, and pace of words          Video-Visit Details    Type of service:  Video Visit   Originating Location (pt. Location): Home    Distant Location (provider location):  On-site  Platform used for Video Visit: [employer]  Signed Electronically by: MEE Trejo CNP

## 2025-02-27 ENCOUNTER — LAB (OUTPATIENT)
Dept: LAB | Facility: CLINIC | Age: 55
End: 2025-02-27
Payer: COMMERCIAL

## 2025-02-27 ENCOUNTER — ANCILLARY PROCEDURE (OUTPATIENT)
Dept: CT IMAGING | Facility: CLINIC | Age: 55
End: 2025-02-27
Attending: COLON & RECTAL SURGERY
Payer: COMMERCIAL

## 2025-02-27 ENCOUNTER — ANCILLARY PROCEDURE (OUTPATIENT)
Dept: MRI IMAGING | Facility: CLINIC | Age: 55
End: 2025-02-27
Attending: COLON & RECTAL SURGERY
Payer: COMMERCIAL

## 2025-02-27 ENCOUNTER — LAB REQUISITION (OUTPATIENT)
Dept: LAB | Facility: CLINIC | Age: 55
End: 2025-02-27
Payer: COMMERCIAL

## 2025-02-27 DIAGNOSIS — C20 RECTAL ADENOCARCINOMA (H): ICD-10-CM

## 2025-02-27 LAB — CEA SERPL-MCNC: 2.2 NG/ML

## 2025-02-27 PROCEDURE — 74177 CT ABD & PELVIS W/CONTRAST: CPT

## 2025-02-27 PROCEDURE — 250N000011 HC RX IP 250 OP 636: Performed by: COLON & RECTAL SURGERY

## 2025-02-27 PROCEDURE — A9585 GADOBUTROL INJECTION: HCPCS | Performed by: COLON & RECTAL SURGERY

## 2025-02-27 PROCEDURE — 255N000002 HC RX 255 OP 636: Performed by: COLON & RECTAL SURGERY

## 2025-02-27 PROCEDURE — 72197 MRI PELVIS W/O & W/DYE: CPT

## 2025-02-27 PROCEDURE — 88341 IMHCHEM/IMCYTCHM EA ADD ANTB: CPT | Mod: TC | Performed by: COLON & RECTAL SURGERY

## 2025-02-27 PROCEDURE — 250N000009 HC RX 250: Performed by: COLON & RECTAL SURGERY

## 2025-02-27 RX ORDER — GADOBUTROL 604.72 MG/ML
7 INJECTION INTRAVENOUS ONCE
Status: COMPLETED | OUTPATIENT
Start: 2025-02-27 | End: 2025-02-27

## 2025-02-27 RX ORDER — IOPAMIDOL 755 MG/ML
81 INJECTION, SOLUTION INTRAVASCULAR ONCE
Status: COMPLETED | OUTPATIENT
Start: 2025-02-27 | End: 2025-02-27

## 2025-02-27 RX ADMIN — IOPAMIDOL 81 ML: 755 INJECTION, SOLUTION INTRAVENOUS at 14:15

## 2025-02-27 RX ADMIN — GADOBUTROL 7 ML: 604.72 INJECTION INTRAVENOUS at 15:52

## 2025-02-27 RX ADMIN — SODIUM CHLORIDE 40 ML: 9 INJECTION, SOLUTION INTRAVENOUS at 14:15

## 2025-02-28 NOTE — PROGRESS NOTES
"Colon and Rectal Surgery Clinic Note    RE: Desi Granado.  : 1970.  LOKESH: 3/3/2025.    Reason for visit: mT2N1 mid rectal adenocarcinoma.    HPI:   54-year-old female who underwent diagnostic colonoscopy (complete) for intermittent hematochezia 1 year duration on 25 by Dr. Hart:  Findings:       The perianal and digital rectal examinations were normal. Pertinent        negatives include normal sphincter tone.        Three sessile polyps were found in the sigmoid colon and transverse        colon. The polyps were 2 to 3 mm in size. These polyps were removed with        a jumbo cold forceps. Resection and retrieval were complete.        Verification of patient identification for the specimen was done by the        physician, nurse and technician using the patient's name, birth date and        medical record number. Estimated blood loss was minimal.        A fungating non-obstructing medium-sized mass was found from 9 to 12 cm        proximal to the anus. The mass was non-circumferential. The mass        measured three cm in length. In addition, its diameter measured        twenty-seven mm. No bleeding was present. This was biopsied with a cold        jumbo forceps for histology. Verification of patient identification for        the specimen was done by the physician, nurse and technician using the        patient's name, birth date and medical record number. Estimated blood        loss was minimal.   Final Diagnosis   A(1).  Colon, Transverse, polyp, polypectomy:  -Tubular adenoma  -Negative for high-grade dysplasia and malignancy.   B(2).   Colon, Sigmoid, polyp, polypectomy:  -Tubular adenoma  -Negative for high-grade dysplasia and malignancy.   C(3).  Colon, sigmoid/rectal, \"mass,\" biopsies:  -Invasive moderately differentiating adenocarcinoma  Mismatch Repair     Immunohistochemistry (IHC) Testing for Mismatch Repair (MMR) Proteins     MLH1 Result  Intact nuclear expression   Immunohistochemistry " (IHC) Testing for Mismatch Repair (MMR) Proteins     MSH2 Result  Intact nuclear expression   Immunohistochemistry (IHC) Testing for Mismatch Repair (MMR) Proteins     MSH6 Result  Intact nuclear expression   Immunohistochemistry (IHC) Testing for Mismatch Repair (MMR) Proteins     PMS2 Result  Intact nuclear expression   Immunohistochemistry (IHC) Testing for Mismatch Repair (MMR) Proteins  Background nonneoplastic tissue / internal control with intact nuclear expression   IHC Interpretation  No loss of nuclear expression of MMR proteins: low probability of MSI-H      Path consult pending      CT CAP 2/27/25:  IMPRESSION:  1.  Patient's known rectosigmoid mass is not well seen. If further characterization/staging is desired, recommend rectal MRI.  2.  Enlarged right mesorectal lymph node, consistent with regional lymph node metastatic disease in this patient with history of biopsy-proven rectosigmoid adenocarcinoma.  3.  No evidence of distant metastatic disease.  4.  Colonic diverticulosis without findings specific for diverticulitis.  MR Rectum 2/27/25 ([facility]):  IMPRESSION:   1.  Primary tumor location: Mid rectum  2.  MRI Stage: T2 N1  3.  Sphincter involvement: None  4.  MRF Status: Negative (T2).  5.  EMVI: Nonvisualized.  CEA 2.2  Saw Dr. Stewart in 2023 for second opinion of diverticulitis and decided on observation with repeat colonoscopy 1-2 months if bleeding continued.   Colonoscopy (5/23/2022):  Findings:        The perianal and digital rectal examinations were normal. Pertinent        negatives include normal sphincter tone.        Multiple small-mouthed diverticula were found from 20 to 50 cm proximal        to the anus.        A 4 mm polyp was found in the cecum. The polyp was sessile. The polyp        was removed with a cold snare. Resection and retrieval were complete.        Verification of patient identification for the specimen was done by the        physician, nurse and technician using the  patient's name, birth date and        medical record number. Estimated blood loss was minimal.        A 4 mm polyp was found in the sigmoid colon. The polyp was sessile. The        polyp was removed with a jumbo cold forceps. Resection and retrieval        were complete. Verification of patient identification for the specimen        was done by the physician, nurse and technician using the patient's        name, birth date and medical record number. Estimated blood loss was        minimal.        External and internal hemorrhoids were found during retroflexion. The        hemorrhoids were mild and Grade I (internal hemorrhoids that do not        prolapse).                                                                                     Impression:     - Diverticulosis from 20 to 50 cm proximal to the anus.                          - One 4 mm polyp in the cecum, removed with a cold                          snare. Resected and retrieved.                          - One 4 mm polyp in the sigmoid colon, removed with a                          jumbo cold forceps. Resected and retrieved.                          - External and internal hemorrhoids.    Currently doing well and denies significant changes in bowel habits.  Second and third degree relatives on her father side with a history of colorectal cancer but she is unsure of this.  No previous anorectal operations.  PMH invasive ductal carcinoma of the left breast (ER+ and progesterone receptor +100% strong nuclear staining s/p bilateral mastectomy, pT2pN0 then s/p preventative bilateral BSO in 2023).    Medical history:  Past Medical History:   Diagnosis Date    Allergic rhinitis, cause unspecified     Hypertension 8 years?    Injury, other and unspecified, knee, leg, ankle, and foot 8th grade    left ankle injury    Invasive ductal carcinoma of breast, female, left (H) 10/26/2018    Unsatisfactory cervical cytology smear 10/06/2021       Surgical history:  Past  Surgical History:   Procedure Laterality Date    ABDOMEN SURGERY  02/2023    removed ovaries    BIOPSY      BREAST LUMPECTOMY, RT/LT  06/23/2010    Breast Lumpectomy RT for likely adenomatous lumps    COLONOSCOPY N/A 08/16/2018    Procedure: COLONOSCOPY;  colonoscopy;  Surgeon: Vijay Almanza MD;  Location: [facility]    COLONOSCOPY N/A 05/23/2022    Procedure: COLONOSCOPY, FLEXIBLE, WITH LESION REMOVAL USING SNARE and biopsy;  Surgeon: Nabila Hart MD;  Location: [facility]    COLONOSCOPY N/A 2/21/2025    Procedure: COLONOSCOPY, FLEXIBLE, WITH LESION REMOVAL USING SNARE;  Surgeon: Nabila Hart MD;  Location: [facility]    ESOPHAGOSCOPY, GASTROSCOPY, DUODENOSCOPY (EGD), COMBINED N/A 10/01/2015    Procedure: COMBINED ESOPHAGOSCOPY, GASTROSCOPY, DUODENOSCOPY (EGD);  Surgeon: Vijay Almanza MD;  Location: [facility]    LAPAROSCOPIC SALPINGO-OOPHORECTOMY Bilateral 02/14/2023    Procedure: LAPAROSCOPIC BILATERAL salpingo-oophorectomy;  Surgeon: Elida Fay MD;  Location: [facility]    MASTECTOMY SIMPLE BILATERAL, SENTINEL NODE BILATERAL, COMBINED Bilateral 11/08/2018    Procedure: BILATERAL SIMPLE MASTECTOMIES WITH LEFT SENTINEL LYMPH NODE BIOPSY ;  Surgeon: Nabila Hart MD;  Location:  OR    SOFT TISSUE SURGERY      Ankle surgery 20+ years ago    SURGICAL HISTORY OF -   1989    arthroscopy of Left Ankle    SURGICAL HISTORY OF -   1990    arthroscopy of Left Ankle    SURGICAL HISTORY OF -   1993    4 wisdom teeth extracted       Family history:  Family History   Problem Relation Age of Onset    Hypertension Mother     Allergies Mother     Thyroid Disease Mother         Taking thyroid medication    Obesity Mother     Cerebrovascular Disease Mother         TIA Feb 2017 and March 2021    Diabetes Mother     Hypertension Father     Thyroid Disease Father         two parathyroids removed    Hyperlipidemia Father     Other Cancer Father         Lymphoma diagnosed 2022    Cancer Maternal Grandmother         Bone CA    Allergies  Maternal Grandmother     Circulatory Maternal Grandmother     Cerebrovascular Disease Maternal Grandmother     Other Cancer Maternal Grandmother         multiple myeloma    C.A.D. Maternal Grandfather     Respiratory Maternal Grandfather         asthma    Allergies Maternal Grandfather     Cerebrovascular Disease Maternal Grandfather     Alzheimer Disease Paternal Grandfather     Neurologic Disorder Paternal Grandfather         Parkinsons    Arthritis Paternal Grandmother     Respiratory Other         Emphysema    Diabetes Other     Asthma Other         genetic emphysema    Genetic Disorder Other         genetic emphysema    Breast Cancer Sister         benign lump indicated future risk    Diabetes Other     Coronary Artery Disease Other     Other Cancer Other         multiple myeloma    Colon Cancer Other     Other Cancer Other         Acute Leukemia    Cerebrovascular Disease Other     Breast Cancer Other         told aunt was diagnosed - unsure of type & treatment    Other Cancer Other         Leukemia    Cancer - colorectal No family hx of     Prostate Cancer No family hx of        Medications:  Current Outpatient Medications   Medication Sig Dispense Refill    exemestane (AROMASIN) 25 MG tablet Take 1 tablet (25 mg) by mouth daily. 90 tablet 3    Fluticasone Propionate (FLONASE ALLERGY RELIEF NA)       lisinopril (ZESTRIL) 30 MG tablet Take 1 tablet (30 mg) by mouth daily. 90 tablet 3    LORazepam (ATIVAN) 0.5 MG tablet Take 1 tablet (0.5 mg) by mouth every 6 hours as needed for anxiety. 30 tablet 1    montelukast (SINGULAIR) 10 MG tablet Take 1 tablet (10 mg) by mouth at bedtime. 90 tablet 3    propranolol (INDERAL) 10 MG tablet Take 2 tablets (20 mg) by mouth 2 times daily as needed (anxiety). 60 tablet 1    sertraline (ZOLOFT) 25 MG tablet Take 1 tablet (25 mg) by mouth daily. 14 tablet 0    sertraline (ZOLOFT) 50 MG tablet Take 1 tablet (50 mg) by mouth daily. 30 tablet 1       Allergies:  Allergies  "  Allergen Reactions    Aspirin Hives    Dust Mites Unknown    Morphine Nausea     Other reaction(s): GI Intolerance       Social history:   Social History     Tobacco Use    Smoking status: Never    Smokeless tobacco: Never   Substance Use Topics    Alcohol use: No     Comment: very rare     Marital status: single.    Physical Examination:  BP (!) 152/91 (BP Location: Right arm, Patient Position: Sitting, Cuff Size: Adult Regular)   Pulse 90   Ht 1.651 m (5' 5\")   Wt 70.3 kg (155 lb)   LMP 03/14/2019   SpO2 98%   BMI 25.79 kg/m    General: Well hydrated. No acute distress.  Abdomen: Soft, NT. No inguinal adenopathy palpated.  Perianal external examination:  Perianal skin: intact.  Lesions: No.  Eversion of buttocks: There was not evidence of an anal fissure. Details: N/A.  Skin tags or external hemorrhoids: No.  Digital rectal examination: Was performed.   Sphincter tone: Good.  Palpable lesions: No.  Other: None.  Bimanual examination: was not performed.    Procedures:  Flexible sigmoidoscopy: after obtaining informed consent and performing a \"time out\", an adult flexible sigmoidoscope was introduced through the anus and passed up to the mid sigmoid colon. The quality of the prep was fair. Findings: 3 cm fungating ulcerated mass located at 7 cm from the anal verge at the right posterolateral aspect of the mid rectum. No additional abnormalities were seen. Total scope time: 10 minutes. The patient tolerated the procedure well.    ASSESSMENT  54-year-old female with newly diagnosed and biopsy-proven mT2N1 mid rectal adenocarcinoma.  No high risk features including EMVI or threatened margins.  No clear evidence of M1 disease.  We had a long discussion with regards to the treatment of stage III rectal cancer.  My best recommendation would be to start with neoadjuvant systemic chemotherapy (PROSPECT) and if a good response, would follow with TME which is associated with better function and lower chance of having " even a diverting loop ileostomy.  A second option, would include LUZMA which at the [facility] would involve long course chemoradiotherapy followed by consolidation systemic chemotherapy with an approximate 35-40% chance of a clinical complete response.  We did discuss that if this is not a cCR and she requires subsequent TME, postoperative outcomes are associated with worse anorectal function and a higher chance of having a diverting loop ileostomy. Risks, benefits, and alternatives of operative treatment were thoroughly discussed with the patient, he/she understands these well and agrees to proceed.    PLAN  Will discuss at rectal cancer tumor board.    Refer to medical oncology for neoadjuvant systemic chemotherapy.  Return to clinic after neoadjuvant chemotherapy for restaging.    50 minutes spent on the date of encounter performing chart review, history and exam, documentation and further activities as noted above with an additional 10 minutes for flexible sigmoidoscopy.     Jon Landa MD, MSc, FACS, FASCRS.  Professor and Chief, Division of Colon & Rectal Surgery  Stanley M. Goldberg, MD Endowed Chair, Colon & Rectal Surgery  Department of Surgery, [facility]      Referring Provider:  Nabila Hart MD    Primary Care Provider:  Pearl Grover    CC:  Elvia Grijalva MD

## 2025-03-03 ENCOUNTER — PATIENT OUTREACH (OUTPATIENT)
Dept: ONCOLOGY | Facility: CLINIC | Age: 55
End: 2025-03-03

## 2025-03-03 ENCOUNTER — OFFICE VISIT (OUTPATIENT)
Dept: SURGERY | Facility: CLINIC | Age: 55
End: 2025-03-03
Payer: COMMERCIAL

## 2025-03-03 VITALS
OXYGEN SATURATION: 98 % | DIASTOLIC BLOOD PRESSURE: 91 MMHG | BODY MASS INDEX: 25.83 KG/M2 | WEIGHT: 155 LBS | HEART RATE: 90 BPM | HEIGHT: 65 IN | SYSTOLIC BLOOD PRESSURE: 152 MMHG

## 2025-03-03 DIAGNOSIS — C20 RECTAL CANCER (H): Primary | ICD-10-CM

## 2025-03-03 PROCEDURE — 3080F DIAST BP >= 90 MM HG: CPT | Performed by: COLON & RECTAL SURGERY

## 2025-03-03 PROCEDURE — 3077F SYST BP >= 140 MM HG: CPT | Performed by: COLON & RECTAL SURGERY

## 2025-03-03 PROCEDURE — 99215 OFFICE O/P EST HI 40 MIN: CPT | Mod: 25 | Performed by: COLON & RECTAL SURGERY

## 2025-03-03 PROCEDURE — 1126F AMNT PAIN NOTED NONE PRSNT: CPT | Performed by: COLON & RECTAL SURGERY

## 2025-03-03 PROCEDURE — 45330 DIAGNOSTIC SIGMOIDOSCOPY: CPT | Performed by: COLON & RECTAL SURGERY

## 2025-03-03 ASSESSMENT — PAIN SCALES - GENERAL: PAINLEVEL_OUTOF10: NO PAIN (0)

## 2025-03-03 NOTE — PROGRESS NOTES
"New Patient Oncology Nurse Navigator Note     Referring provider: Dr Landa, Colorectal Surgery    Referring Clinic/Organization: [facility]     Referred to: Medical Oncology    Requested provider (if applicable): First available - did not specify     Referral Received: 03/03/25       Evaluation for : rectal adenocarcinoma     Clinical History (per Nurse review of records provided):      2/21/2025 colonoscopy at [facility] by Dr Hart  Final Diagnosis   A(1).  Colon, Transverse, polyp, polypectomy:  -Tubular adenoma  -Negative for high-grade dysplasia and malignancy.   B(2).   Colon, Sigmoid, polyp, polypectomy:  -Tubular adenoma  -Negative for high-grade dysplasia and malignancy.   C(3).  Colon, sigmoid/rectal, \"mass,\" biopsies:  -Invasive moderately differentiating adenocarcinoma  Mismatch Repair       Immunohistochemistry (IHC) Testing for Mismatch Repair (MMR) Proteins       MLH1 Result   Intact nuclear expression   Immunohistochemistry (IHC) Testing for Mismatch Repair (MMR) Proteins       MSH2 Result   Intact nuclear expression   Immunohistochemistry (IHC) Testing for Mismatch Repair (MMR) Proteins       MSH6 Result   Intact nuclear expression   Immunohistochemistry (IHC) Testing for Mismatch Repair (MMR) Proteins       PMS2 Result   Intact nuclear expression   Immunohistochemistry (IHC) Testing for Mismatch Repair (MMR) Proteins   Background nonneoplastic tissue / internal control with intact nuclear expression   IHC Interpretation   No loss of nuclear expression of MMR proteins: low probability of MSI-H          CT CAP 2/27/25:  IMPRESSION:  1.  Patient's known rectosigmoid mass is not well seen. If further characterization/staging is desired, recommend rectal MRI.  2.  Enlarged right mesorectal lymph node, consistent with regional lymph node metastatic disease in this patient with history of biopsy-proven rectosigmoid adenocarcinoma.  3.  No evidence of distant metastatic disease.  4.  Colonic " diverticulosis without findings specific for diverticulitis.  MR Rectum 2/27/25 ([facility]):  IMPRESSION:   1.  Primary tumor location: Mid rectum  2.  MRI Stage: T2 N1  3.  Sphincter involvement: None  4.  MRF Status: Negative (T2).  5.  EMVI: Nonvisualized.      3/3/2025 Pt met w/ Dr Landa:  PLAN  Will discuss at rectal cancer tumor board.    Refer to medical oncology for neoadjuvant systemic chemotherapy.  Return to clinic after neoadjuvant chemotherapy for restaging.      Clinical Assessment / Barriers to Care (Per Nurse):    Pt will be meeting w/[place] this week for 2nd opinion as well. Dr Landa is referring pt to Med Onc for LUZMA rectal cancer. We will offer next available Onc appt (Dr Herbert has availability at [facility] 3/13) or  per pt preference.       Records Location: [facility] Everywhere     Records Needed:     N/A    Additional testing needed prior to consult:     N/A        Juliocesar Watkins, RN, BSN, OCN  Oncology New Patient Nurse Navigator   [facility]  [phone]

## 2025-03-03 NOTE — LETTER
3/3/2025       RE: Desi Granado  [address] [place] [address]     Dear Colleague,    Thank you for referring your patient, Desi Granado, to the [facility] COLON AND RECTAL SURGERY CLINIC [place] at [facility]. Please see a copy of my visit note below.    Colon and Rectal Surgery Clinic Note    RE: Desi Granado.  : 1970.  LOKEHS: 3/3/2025.    Reason for visit: mT2N1 mid rectal adenocarcinoma.    HPI:   54-year-old female who underwent diagnostic colonoscopy (complete) for intermittent hematochezia 1 year duration on 25 by Dr. Hart:  Findings:       The perianal and digital rectal examinations were normal. Pertinent        negatives include normal sphincter tone.        Three sessile polyps were found in the sigmoid colon and transverse        colon. The polyps were 2 to 3 mm in size. These polyps were removed with        a jumbo cold forceps. Resection and retrieval were complete.        Verification of patient identification for the specimen was done by the        physician, nurse and technician using the patient's name, birth date and        medical record number. Estimated blood loss was minimal.        A fungating non-obstructing medium-sized mass was found from 9 to 12 cm        proximal to the anus. The mass was non-circumferential. The mass        measured three cm in length. In addition, its diameter measured        twenty-seven mm. No bleeding was present. This was biopsied with a cold        jumbo forceps for histology. Verification of patient identification for        the specimen was done by the physician, nurse and technician using the        patient's name, birth date and medical record number. Estimated blood        loss was minimal.   Final Diagnosis   A(1).  Colon, Transverse, polyp, polypectomy:  -Tubular adenoma  -Negative for high-grade dysplasia and malignancy.   B(2).   Colon, Sigmoid, polyp,  "polypectomy:  -Tubular adenoma  -Negative for high-grade dysplasia and malignancy.   C(3).  Colon, sigmoid/rectal, \"mass,\" biopsies:  -Invasive moderately differentiating adenocarcinoma  Mismatch Repair     Immunohistochemistry (IHC) Testing for Mismatch Repair (MMR) Proteins     MLH1 Result  Intact nuclear expression   Immunohistochemistry (IHC) Testing for Mismatch Repair (MMR) Proteins     MSH2 Result  Intact nuclear expression   Immunohistochemistry (IHC) Testing for Mismatch Repair (MMR) Proteins     MSH6 Result  Intact nuclear expression   Immunohistochemistry (IHC) Testing for Mismatch Repair (MMR) Proteins     PMS2 Result  Intact nuclear expression   Immunohistochemistry (IHC) Testing for Mismatch Repair (MMR) Proteins  Background nonneoplastic tissue / internal control with intact nuclear expression   IHC Interpretation  No loss of nuclear expression of MMR proteins: low probability of MSI-H      Path consult pending      CT CAP 2/27/25:  IMPRESSION:  1.  Patient's known rectosigmoid mass is not well seen. If further characterization/staging is desired, recommend rectal MRI.  2.  Enlarged right mesorectal lymph node, consistent with regional lymph node metastatic disease in this patient with history of biopsy-proven rectosigmoid adenocarcinoma.  3.  No evidence of distant metastatic disease.  4.  Colonic diverticulosis without findings specific for diverticulitis.  MR Rectum 2/27/25 ([facility] SD):  IMPRESSION:   1.  Primary tumor location: Mid rectum  2.  MRI Stage: T2 N1  3.  Sphincter involvement: None  4.  MRF Status: Negative (T2).  5.  EMVI: Nonvisualized.  CEA 2.2  Saw Dr. Stewart in 2023 for second opinion of diverticulitis and decided on observation with repeat colonoscopy 1-2 months if bleeding continued.   Colonoscopy (5/23/2022):  Findings:        The perianal and digital rectal examinations were normal. Pertinent        negatives include normal sphincter tone.        Multiple small-mouthed " diverticula were found from 20 to 50 cm proximal        to the anus.        A 4 mm polyp was found in the cecum. The polyp was sessile. The polyp        was removed with a cold snare. Resection and retrieval were complete.        Verification of patient identification for the specimen was done by the        physician, nurse and technician using the patient's name, birth date and        medical record number. Estimated blood loss was minimal.        A 4 mm polyp was found in the sigmoid colon. The polyp was sessile. The        polyp was removed with a jumbo cold forceps. Resection and retrieval        were complete. Verification of patient identification for the specimen        was done by the physician, nurse and technician using the patient's        name, birth date and medical record number. Estimated blood loss was        minimal.        External and internal hemorrhoids were found during retroflexion. The        hemorrhoids were mild and Grade I (internal hemorrhoids that do not        prolapse).                                                                                     Impression:     - Diverticulosis from 20 to 50 cm proximal to the anus.                          - One 4 mm polyp in the cecum, removed with a cold                          snare. Resected and retrieved.                          - One 4 mm polyp in the sigmoid colon, removed with a                          jumbo cold forceps. Resected and retrieved.                          - External and internal hemorrhoids.    Currently doing well and denies significant changes in bowel habits.  Second and third degree relatives on her father side with a history of colorectal cancer but she is unsure of this.  No previous anorectal operations.  PMH invasive ductal carcinoma of the left breast (ER+ and progesterone receptor +100% strong nuclear staining s/p bilateral mastectomy, pT2pN0 then s/p preventative bilateral BSO in 2023).    Medical  history:  Past Medical History:   Diagnosis Date     Allergic rhinitis, cause unspecified      Hypertension 8 years?     Injury, other and unspecified, knee, leg, ankle, and foot 8th grade    left ankle injury     Invasive ductal carcinoma of breast, female, left (H) 10/26/2018     Unsatisfactory cervical cytology smear 10/06/2021       Surgical history:  Past Surgical History:   Procedure Laterality Date     ABDOMEN SURGERY  02/2023    removed ovaries     BIOPSY       BREAST LUMPECTOMY, RT/LT  06/23/2010    Breast Lumpectomy RT for likely adenomatous lumps     COLONOSCOPY N/A 08/16/2018    Procedure: COLONOSCOPY;  colonoscopy;  Surgeon: Vijay Almanza MD;  Location: [facility]     COLONOSCOPY N/A 05/23/2022    Procedure: COLONOSCOPY, FLEXIBLE, WITH LESION REMOVAL USING SNARE and biopsy;  Surgeon: Nabila Hart MD;  Location: [facility]     COLONOSCOPY N/A 2/21/2025    Procedure: COLONOSCOPY, FLEXIBLE, WITH LESION REMOVAL USING SNARE;  Surgeon: Nabila Hart MD;  Location: [facility]     ESOPHAGOSCOPY, GASTROSCOPY, DUODENOSCOPY (EGD), COMBINED N/A 10/01/2015    Procedure: COMBINED ESOPHAGOSCOPY, GASTROSCOPY, DUODENOSCOPY (EGD);  Surgeon: Vijay Almanza MD;  Location: [facility]     LAPAROSCOPIC SALPINGO-OOPHORECTOMY Bilateral 02/14/2023    Procedure: LAPAROSCOPIC BILATERAL salpingo-oophorectomy;  Surgeon: Elida Fay MD;  Location: [facility]     MASTECTOMY SIMPLE BILATERAL, SENTINEL NODE BILATERAL, COMBINED Bilateral 11/08/2018    Procedure: BILATERAL SIMPLE MASTECTOMIES WITH LEFT SENTINEL LYMPH NODE BIOPSY ;  Surgeon: Nabila Hart MD;  Location:  OR     SOFT TISSUE SURGERY      Ankle surgery 20+ years ago     SURGICAL HISTORY OF -   1989    arthroscopy of Left Ankle     SURGICAL HISTORY OF -   1990    arthroscopy of Left Ankle     SURGICAL HISTORY OF -   1993    4 wisdom teeth extracted       Family history:  Family History   Problem Relation Age of Onset     Hypertension Mother      Allergies Mother      Thyroid  Disease Mother         Taking thyroid medication     Obesity Mother      Cerebrovascular Disease Mother         TIA Feb 2017 and March 2021     Diabetes Mother      Hypertension Father      Thyroid Disease Father         two parathyroids removed     Hyperlipidemia Father      Other Cancer Father         Lymphoma diagnosed 2022     Cancer Maternal Grandmother         Bone CA     Allergies Maternal Grandmother      Circulatory Maternal Grandmother      Cerebrovascular Disease Maternal Grandmother      Other Cancer Maternal Grandmother         multiple myeloma     C.A.D. Maternal Grandfather      Respiratory Maternal Grandfather         asthma     Allergies Maternal Grandfather      Cerebrovascular Disease Maternal Grandfather      Alzheimer Disease Paternal Grandfather      Neurologic Disorder Paternal Grandfather         Parkinsons     Arthritis Paternal Grandmother      Respiratory Other         Emphysema     Diabetes Other      Asthma Other         genetic emphysema     Genetic Disorder Other         genetic emphysema     Breast Cancer Sister         benign lump indicated future risk     Diabetes Other      Coronary Artery Disease Other      Other Cancer Other         multiple myeloma     Colon Cancer Other      Other Cancer Other         Acute Leukemia     Cerebrovascular Disease Other      Breast Cancer Other         told aunt was diagnosed - unsure of type & treatment     Other Cancer Other         Leukemia     Cancer - colorectal No family hx of      Prostate Cancer No family hx of        Medications:  Current Outpatient Medications   Medication Sig Dispense Refill     exemestane (AROMASIN) 25 MG tablet Take 1 tablet (25 mg) by mouth daily. 90 tablet 3     Fluticasone Propionate (FLONASE ALLERGY RELIEF NA)        lisinopril (ZESTRIL) 30 MG tablet Take 1 tablet (30 mg) by mouth daily. 90 tablet 3     LORazepam (ATIVAN) 0.5 MG tablet Take 1 tablet (0.5 mg) by mouth every 6 hours as needed for anxiety. 30 tablet 1  "    montelukast (SINGULAIR) 10 MG tablet Take 1 tablet (10 mg) by mouth at bedtime. 90 tablet 3     propranolol (INDERAL) 10 MG tablet Take 2 tablets (20 mg) by mouth 2 times daily as needed (anxiety). 60 tablet 1     sertraline (ZOLOFT) 25 MG tablet Take 1 tablet (25 mg) by mouth daily. 14 tablet 0     sertraline (ZOLOFT) 50 MG tablet Take 1 tablet (50 mg) by mouth daily. 30 tablet 1       Allergies:  Allergies   Allergen Reactions     Aspirin Hives     Dust Mites Unknown     Morphine Nausea     Other reaction(s): GI Intolerance       Social history:   Social History     Tobacco Use     Smoking status: Never     Smokeless tobacco: Never   Substance Use Topics     Alcohol use: No     Comment: very rare     Marital status: single.    Physical Examination:  BP (!) 152/91 (BP Location: Right arm, Patient Position: Sitting, Cuff Size: Adult Regular)   Pulse 90   Ht 1.651 m (5' 5\")   Wt 70.3 kg (155 lb)   LMP 03/14/2019   SpO2 98%   BMI 25.79 kg/m    General: Well hydrated. No acute distress.  Abdomen: Soft, NT. No inguinal adenopathy palpated.  Perianal external examination:  Perianal skin: intact.  Lesions: No.  Eversion of buttocks: There was not evidence of an anal fissure. Details: N/A.  Skin tags or external hemorrhoids: No.  Digital rectal examination: Was performed.   Sphincter tone: Good.  Palpable lesions: No.  Other: None.  Bimanual examination: was not performed.    Procedures:  Flexible sigmoidoscopy: after obtaining informed consent and performing a \"time out\", an adult flexible sigmoidoscope was introduced through the anus and passed up to the mid sigmoid colon. The quality of the prep was fair. Findings: 3 cm fungating ulcerated mass located at 7 cm from the anal verge at the right posterolateral aspect of the mid rectum. No additional abnormalities were seen. Total scope time: 10 minutes. The patient tolerated the procedure well.    ASSESSMENT  54-year-old female with newly diagnosed and " biopsy-proven mT2N1 mid rectal adenocarcinoma.  No high risk features including EMVI or threatened margins.  No clear evidence of M1 disease.  We had a long discussion with regards to the treatment of stage III rectal cancer.  My best recommendation would be to start with neoadjuvant systemic chemotherapy (PROSPECT) and if a good response, would follow with TME which is associated with better function and lower chance of having even a diverting loop ileostomy.  A second option, would include LUZMA which at the [facility] would involve long course chemoradiotherapy followed by consolidation systemic chemotherapy with an approximate 35-40% chance of a clinical complete response.  We did discuss that if this is not a cCR and she requires subsequent TME, postoperative outcomes are associated with worse anorectal function and a higher chance of having a diverting loop ileostomy. Risks, benefits, and alternatives of operative treatment were thoroughly discussed with the patient, he/she understands these well and agrees to proceed.    PLAN  Will discuss at rectal cancer tumor board.    Refer to medical oncology for neoadjuvant systemic chemotherapy.  Return to clinic after neoadjuvant chemotherapy for restaging.    50 minutes spent on the date of encounter performing chart review, history and exam, documentation and further activities as noted above with an additional 10 minutes for flexible sigmoidoscopy.     Jon Landa MD, MSc, FACS, FASCRS.  Professor and Chief, Division of Colon & Rectal Surgery  Stanley M. Goldberg, MD Endowed Chair, Colon & Rectal Surgery  Department of Surgery, [facility]      Referring Provider:  Nabila Hart MD    Primary Care Provider:  Pearl Grover    CC:  Elvia Grijalva MD      Again, thank you for allowing me to participate in the care of your patient.      Sincerely,    Jon Landa MD

## 2025-03-03 NOTE — PATIENT INSTRUCTIONS
Follow up:  Oncology referral  Return to clinic following neoadjuvant chemotherapy with restaging scans      Please call with any questions or concerns regarding your clinic visit today.    It is a pleasure being involved in your health care.    Contacts post-consultation depending on your need:    Radiology Appointments [phone]    Schedule Clinic Appointments [phone] # 1   M-F 7:30 - 5 pm    [facility] [phone]    Clinic Fax Number [phone]    Surgery Scheduling [phone]    My Chart is available 24 hours a day and is a secure way to access your records and communicate with your care team.  I strongly recommend signing up if you haven't already done so, if you are comfortable with computers.  If you would like to inquire about this or are having problems with My Chart access, you may call [phone] or go online at [email or web address].  Please allow at least 24 hours for a response and extra time on weekends and Holidays.

## 2025-03-03 NOTE — NURSING NOTE
"Chief Complaint   Patient presents with    Cancer     Rectal Adenocarcinoma       Vitals:    03/03/25 0828   BP: (!) 152/91   BP Location: Right arm   Patient Position: Sitting   Cuff Size: Adult Regular   Pulse: 90   SpO2: 98%   Weight: 155 lb   Height: 5' 5\"       Body mass index is 25.79 kg/m .    Patience Manuel CMA    "

## 2025-03-04 LAB
PATH REPORT.COMMENTS IMP SPEC: ABNORMAL
PATH REPORT.COMMENTS IMP SPEC: YES
PATH REPORT.FINAL DX SPEC: ABNORMAL
PATH REPORT.GROSS SPEC: ABNORMAL
PATH REPORT.MICROSCOPIC SPEC OTHER STN: ABNORMAL
PATH REPORT.RELEVANT HX SPEC: ABNORMAL
PATH REPORT.RELEVANT HX SPEC: ABNORMAL
PATH REPORT.SITE OF ORIGIN SPEC: ABNORMAL

## 2025-03-05 ENCOUNTER — MYC MEDICAL ADVICE (OUTPATIENT)
Dept: FAMILY MEDICINE | Facility: CLINIC | Age: 55
End: 2025-03-05
Payer: COMMERCIAL

## 2025-03-05 DIAGNOSIS — F41.9 ANXIETY: Primary | ICD-10-CM

## 2025-03-05 RX ORDER — ESCITALOPRAM OXALATE 5 MG/1
TABLET ORAL
Qty: 49 TABLET | Refills: 0 | Status: SHIPPED | OUTPATIENT
Start: 2025-03-05 | End: 2025-03-05

## 2025-03-05 RX ORDER — BUSPIRONE HYDROCHLORIDE 5 MG/1
TABLET ORAL
Qty: 147 TABLET | Refills: 1 | Status: SHIPPED | OUTPATIENT
Start: 2025-03-05 | End: 2025-04-02

## 2025-03-10 ENCOUNTER — TUMOR CONFERENCE (OUTPATIENT)
Dept: ONCOLOGY | Facility: CLINIC | Age: 55
End: 2025-03-10
Payer: COMMERCIAL

## 2025-03-12 ENCOUNTER — PATIENT OUTREACH (OUTPATIENT)
Dept: GASTROENTEROLOGY | Facility: CLINIC | Age: 55
End: 2025-03-12
Payer: COMMERCIAL

## 2025-03-17 ENCOUNTER — MYC MEDICAL ADVICE (OUTPATIENT)
Dept: ONCOLOGY | Facility: CLINIC | Age: 55
End: 2025-03-17
Payer: COMMERCIAL

## 2025-03-20 ENCOUNTER — ONCOLOGY VISIT (OUTPATIENT)
Dept: ONCOLOGY | Facility: CLINIC | Age: 55
End: 2025-03-20
Attending: COLON & RECTAL SURGERY
Payer: COMMERCIAL

## 2025-03-20 ENCOUNTER — PATIENT OUTREACH (OUTPATIENT)
Dept: ONCOLOGY | Facility: CLINIC | Age: 55
End: 2025-03-20

## 2025-03-20 VITALS
BODY MASS INDEX: 24.72 KG/M2 | HEART RATE: 102 BPM | RESPIRATION RATE: 14 BRPM | HEIGHT: 65 IN | DIASTOLIC BLOOD PRESSURE: 81 MMHG | OXYGEN SATURATION: 97 % | TEMPERATURE: 98.2 F | SYSTOLIC BLOOD PRESSURE: 129 MMHG | WEIGHT: 148.4 LBS

## 2025-03-20 DIAGNOSIS — C20 RECTAL ADENOCARCINOMA (H): Primary | ICD-10-CM

## 2025-03-20 DIAGNOSIS — C20 RECTAL CANCER (H): ICD-10-CM

## 2025-03-20 PROCEDURE — 99213 OFFICE O/P EST LOW 20 MIN: CPT | Performed by: INTERNAL MEDICINE

## 2025-03-20 RX ORDER — DIPHENHYDRAMINE HYDROCHLORIDE 50 MG/ML
25 INJECTION, SOLUTION INTRAMUSCULAR; INTRAVENOUS
Start: 2025-03-20

## 2025-03-20 RX ORDER — DIPHENHYDRAMINE HYDROCHLORIDE 50 MG/ML
50 INJECTION, SOLUTION INTRAMUSCULAR; INTRAVENOUS
Start: 2025-03-20

## 2025-03-20 RX ORDER — ONDANSETRON 2 MG/ML
8 INJECTION INTRAMUSCULAR; INTRAVENOUS ONCE
OUTPATIENT
Start: 2025-03-20

## 2025-03-20 RX ORDER — LORAZEPAM 2 MG/ML
0.5 INJECTION INTRAMUSCULAR EVERY 4 HOURS PRN
OUTPATIENT
Start: 2025-03-20

## 2025-03-20 RX ORDER — HEPARIN SODIUM,PORCINE 10 UNIT/ML
5-20 VIAL (ML) INTRAVENOUS DAILY PRN
OUTPATIENT
Start: 2025-03-20

## 2025-03-20 RX ORDER — FLUOROURACIL 50 MG/ML
400 INJECTION, SOLUTION INTRAVENOUS ONCE
OUTPATIENT
Start: 2025-03-20

## 2025-03-20 RX ORDER — MEPERIDINE HYDROCHLORIDE 25 MG/ML
25 INJECTION INTRAMUSCULAR; INTRAVENOUS; SUBCUTANEOUS
OUTPATIENT
Start: 2025-03-20

## 2025-03-20 RX ORDER — HEPARIN SODIUM (PORCINE) LOCK FLUSH IV SOLN 100 UNIT/ML 100 UNIT/ML
5 SOLUTION INTRAVENOUS
OUTPATIENT
Start: 2025-03-20

## 2025-03-20 RX ORDER — ALBUTEROL SULFATE 0.83 MG/ML
2.5 SOLUTION RESPIRATORY (INHALATION)
OUTPATIENT
Start: 2025-03-20

## 2025-03-20 RX ORDER — HEPARIN SODIUM (PORCINE) LOCK FLUSH IV SOLN 100 UNIT/ML 100 UNIT/ML
5 SOLUTION INTRAVENOUS
OUTPATIENT
Start: 2025-03-22

## 2025-03-20 RX ORDER — ALBUTEROL SULFATE 90 UG/1
1-2 INHALANT RESPIRATORY (INHALATION)
Start: 2025-03-20

## 2025-03-20 RX ORDER — METHYLPREDNISOLONE SODIUM SUCCINATE 40 MG/ML
40 INJECTION INTRAMUSCULAR; INTRAVENOUS
Start: 2025-03-20

## 2025-03-20 RX ORDER — EPINEPHRINE 1 MG/ML
0.3 INJECTION, SOLUTION INTRAMUSCULAR; SUBCUTANEOUS EVERY 5 MIN PRN
OUTPATIENT
Start: 2025-03-20

## 2025-03-20 RX ORDER — HEPARIN SODIUM,PORCINE 10 UNIT/ML
5-20 VIAL (ML) INTRAVENOUS DAILY PRN
OUTPATIENT
Start: 2025-03-22

## 2025-03-20 ASSESSMENT — PAIN SCALES - GENERAL: PAINLEVEL_OUTOF10: NO PAIN (0)

## 2025-03-20 NOTE — PROGRESS NOTES
Oncology initial visit:  Date on this visit: 3/20/2025    Desi Granado  is referred by Dr.Wolfgang Jose Landa for an oncology consultation. She requires evaluation for rectal adenocarcinoma.    Primary Physician: Pearl Grover     History Of Present Illness:  Ms. Granado is a 54 year old female who presents with rectal adenocarcinoma.    Patient is here with her sister.  Her parents are available on the telephone.    I have reviewed previous notes from oncologist Dr. Elvia Grijalva because she was being followed for breast cancer.  I have also reviewed notes from colorectal surgeon Dr. Landa.    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  It was pT2 N0 lesion.  Cancer was ER/% positive and HER2/tamara FISH negative.  Oncotype DX score was 0.  She was initially started on tamoxifen in December 2018 and then switched to exemestane and Zoladex in June 2019 and had bilateral salpingo-oophorectomy in February 2023.  After that she remained on exemestane and stopped in early March 2025.  Breast cancer index did not show that she will benefit from extended hormone therapy.    She was having intermittent hematochezia for about 1 year and then on 2/21/2025 she had colonoscopy which showed a fungating nonobstructing medium-sized mass from 9 to 12 cm proximal to the anus.  Mass was noncircumferential and measured 3 cm in length.  This was biopsied.  3 sessile polyps were found in the sigmoid colon and transverse colon measuring 2 to 3 mm in size.  These were resected.  The biopsy from the sigmoid/rectal colon mass showed moderately differentiated invasive adenocarcinoma, MMR IHC intact.  Biopsy from the sessile polyps was tubular adenoma without any high-grade dysplasia or malignancy.    2/27/2025.  CT chest abdomen and pelvis was done which did not show the rectosigmoid mass but it showed enlarged  right mesorectal lymph node consistent with metastatic spread.  No evidence of distant metastasis.    MRI of the rectum showed a T2 N1 disease in the mid rectum.    CEA was 2.2.    She met with Dr. Landa on 3/14/2025 who did a flexible sigmoidoscopy and it showed a 3 cm fungating ulcerated mass located at 7 cm from the anal verge at the right posterolateral aspect of the mid rectum.    Option of neoadjuvant chemotherapy on the basis of PROSPECT trial was discussed.  LUZMA approach was also discussed.  Her case was also discussed in the tumor board with recommendation being starting systemic chemotherapy based on PROSPECT trial followed by surgery and avoiding radiation if possible.      Apart from mild constipation and intermittent hematochezia, she has been feeling well.  Denies any nausea vomiting.  She has been trying to lose weight and has lost a few pounds over the last several months.  There has been no drastic weight loss.  No neuropathy.  No fevers infection shortness of breath.  Energy is good.  Remains fully functional.      ECOG 0    ROS:  A comprehensive ROS was otherwise neg    Past Medical/Surgical History:  Past Medical History:   Diagnosis Date    Allergic rhinitis, cause unspecified     Hypertension 8 years?    Injury, other and unspecified, knee, leg, ankle, and foot 8th grade    left ankle injury    Invasive ductal carcinoma of breast, female, left (H) 10/26/2018    Unsatisfactory cervical cytology smear 10/06/2021     Past Surgical History:   Procedure Laterality Date    ABDOMEN SURGERY  02/2023    removed ovaries    BIOPSY      BREAST LUMPECTOMY, RT/LT  06/23/2010    Breast Lumpectomy RT for likely adenomatous lumps    COLONOSCOPY N/A 08/16/2018    Procedure: COLONOSCOPY;  colonoscopy;  Surgeon: Vijay Almanza MD;  Location: [facility]    COLONOSCOPY N/A 05/23/2022    Procedure: COLONOSCOPY, FLEXIBLE, WITH LESION REMOVAL USING SNARE and biopsy;  Surgeon: Nabila Hart MD;  Location: [facility]     COLONOSCOPY N/A 2/21/2025    Procedure: COLONOSCOPY, FLEXIBLE, WITH LESION REMOVAL USING SNARE;  Surgeon: Nabila Hart MD;  Location: [facility]    ESOPHAGOSCOPY, GASTROSCOPY, DUODENOSCOPY (EGD), COMBINED N/A 10/01/2015    Procedure: COMBINED ESOPHAGOSCOPY, GASTROSCOPY, DUODENOSCOPY (EGD);  Surgeon: Vijay Almanza MD;  Location: [facility]    LAPAROSCOPIC SALPINGO-OOPHORECTOMY Bilateral 02/14/2023    Procedure: LAPAROSCOPIC BILATERAL salpingo-oophorectomy;  Surgeon: Elida Fay MD;  Location: [facility]    MASTECTOMY SIMPLE BILATERAL, SENTINEL NODE BILATERAL, COMBINED Bilateral 11/08/2018    Procedure: BILATERAL SIMPLE MASTECTOMIES WITH LEFT SENTINEL LYMPH NODE BIOPSY ;  Surgeon: Nabila Hart MD;  Location:  OR    SOFT TISSUE SURGERY      Ankle surgery 20+ years ago    SURGICAL HISTORY OF -   1989    arthroscopy of Left Ankle    SURGICAL HISTORY OF -   1990    arthroscopy of Left Ankle    SURGICAL HISTORY OF -   1993    4 wisdom teeth extracted     Cancer History:   As above    Allergies:  Allergies as of 03/20/2025 - Reviewed 03/20/2025   Allergen Reaction Noted    Aspirin Hives 08/03/2009    Dust mites Unknown 07/03/2009    Morphine Nausea 06/23/2008    Zoloft [sertraline] Anxiety 03/05/2025     Current Medications:  Current Outpatient Medications   Medication Sig Dispense Refill    Fluticasone Propionate (FLONASE ALLERGY RELIEF NA) Spray 1 spray in nostril 2 times daily.      lisinopril (ZESTRIL) 30 MG tablet Take 1 tablet (30 mg) by mouth daily. 90 tablet 3    LORazepam (ATIVAN) 0.5 MG tablet Take 1 tablet (0.5 mg) by mouth every 6 hours as needed for anxiety. 30 tablet 1    montelukast (SINGULAIR) 10 MG tablet Take 1 tablet (10 mg) by mouth at bedtime. 90 tablet 3    propranolol (INDERAL) 10 MG tablet Take 2 tablets (20 mg) by mouth 2 times daily as needed (anxiety). 60 tablet 1    busPIRone (BUSPAR) 5 MG tablet Take 1 tablet (5 mg) by mouth 3 times daily for 7 days, THEN 2 tablets (10 mg) 3 times daily  for 21 days. (Patient not taking: Reported on 3/20/2025) 147 tablet 1    denosumab (PROLIA) 60 mg/mL injection Inject 60 mg subcutaneously every 6 months.      exemestane (AROMASIN) 25 MG tablet Take 1 tablet (25 mg) by mouth daily. (Patient not taking: Reported on 3/20/2025) 90 tablet 3    sertraline (ZOLOFT) 25 MG tablet Take 1 tablet (25 mg) by mouth daily. 14 tablet 0    sertraline (ZOLOFT) 50 MG tablet Take 1 tablet (50 mg) by mouth daily. 30 tablet 1      Family History:  Family History   Problem Relation Age of Onset    Hypertension Mother     Allergies Mother     Thyroid Disease Mother         Taking thyroid medication    Obesity Mother     Cerebrovascular Disease Mother         TIA Feb 2017 and March 2021    Diabetes Mother     Hypertension Father     Thyroid Disease Father         two parathyroids removed    Hyperlipidemia Father     Other Cancer Father         Lymphoma diagnosed 2022    Cancer Maternal Grandmother         Bone CA    Allergies Maternal Grandmother     Circulatory Maternal Grandmother     Cerebrovascular Disease Maternal Grandmother     Other Cancer Maternal Grandmother         multiple myeloma    C.A.D. Maternal Grandfather     Respiratory Maternal Grandfather         asthma    Allergies Maternal Grandfather     Cerebrovascular Disease Maternal Grandfather     Alzheimer Disease Paternal Grandfather     Neurologic Disorder Paternal Grandfather         Parkinsons    Arthritis Paternal Grandmother     Respiratory Other         Emphysema    Diabetes Other     Asthma Other         genetic emphysema    Genetic Disorder Other         genetic emphysema    Breast Cancer Sister         benign lump indicated future risk    Diabetes Other     Coronary Artery Disease Other     Other Cancer Other         multiple myeloma    Colon Cancer Other     Other Cancer Other         Acute Leukemia    Cerebrovascular Disease Other     Breast Cancer Other         told aunt was diagnosed - unsure of type & treatment     Other Cancer Other         Leukemia    Cancer - colorectal No family hx of     Prostate Cancer No family hx of    Paternal Great grand father and 2 paternal great uncles with colon cancer.    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 No   Social History Narrative    Not on file     Social Drivers of Health     Financial Resource Strain: Low Risk  (8/30/2024)    Financial Resource Strain     Within the past 12 months, have you or your family members you live with been unable to get utilities (heat, electricity) when it was really needed?: No   Food Insecurity: No Food Insecurity (3/6/2025)    Received from [facility]    Hunger Vital Sign     Worried About Running Out of Food in the Last Year: Never true     Ran Out of Food in the Last Year: Never true   Transportation Needs: No Transportation Needs (3/6/2025)    Received from [facility]    PRAPARE - Transportation     Lack of Transportation (Medical): No     Lack of Transportation (Non-Medical): No   Physical Activity: Inactive (2/27/2025)    Received from [facility]    Exercise Vital Sign     Days of Exercise per Week: 0 days     Minutes of Exercise per Session: 0 min   Stress: No Stress Concern Present (8/30/2024)    [employer] - Occupational Stress Questionnaire     Feeling of Stress : Only a little   Social Connections: Unknown (8/30/2024)    Social Connection and  "Isolation Panel [NHANES]     Frequency of Communication with Friends and Family: Not on file     Frequency of Social Gatherings with Friends and Family: Twice a week     Attends [employer] Services: Not on file     Active Member of Clubs or Organizations: Not on file     Attends Club or Organization Meetings: Not on file     Marital Status: Not on file   Interpersonal Safety: Low Risk  (2/21/2025)    Interpersonal Safety     Do you feel physically and emotionally safe where you currently live?: Yes     Within the past 12 months, have you been hit, slapped, kicked or otherwise physically hurt by someone?: No     Within the past 12 months, have you been humiliated or emotionally abused in other ways by your partner or ex-partner?: No   Housing Stability: Low Risk  (3/6/2025)    Received from [facility]    Housing Stability     What is your living situation today?: I have a steady place to live     Physical Exam:  /81 (BP Location: Right arm, Patient Position: Sitting, Cuff Size: Adult Regular)   Pulse 102   Temp 98.2  F (36.8  C) (Oral)   Resp 14   Ht 1.649 m (5' 4.92\")   Wt 67.3 kg (148 lb 6.4 oz)   LMP 03/14/2019   SpO2 97%   BMI 24.76 kg/m    Wt Readings from Last 4 Encounters:   03/20/25 67.3 kg (148 lb 6.4 oz)   03/03/25 70.3 kg (155 lb)   02/21/25 70.3 kg (155 lb)   11/06/24 70.3 kg (155 lb)     CONSTITUTIONAL: no acute distress  EYES: PERRLA, no palor or icterus.   ENT/MOUTH: no mouth lesions. Ears normal  CVS: s1s2 no m r g .   RESPIRATORY: clear to auscultation b/l  GI: soft non tender no hepatosplenomegaly  NEURO: AAOX3  Grossly non focal neuro exam  INTEGUMENT: no obvious rashes  LYMPHATIC: no palpable cervical, supraclavicular, axillary or inguinal LAD  MUSCULOSKELETAL: Unremarkable. No bony tenderness.   EXTREMITIES: no edema  PSYCH: Mentation, mood and affect are normal. Decision making capacity is intact    Laboratory/Imaging Studies    Reviewed    9/4/2024  CBC unremarkable  CMP shows " sodium 133.  Chloride 96.  Creatinine 0.96.    CEA was 2.2 on 2/27/2025    CT chest abdomen and pelvis.  2/27/2025.  I reviewed it myself and also reviewed it with the radiologist.  LUNGS AND PLEURA: A tiny 3 mm pulmonary nodule is seen in the lateral right lower lobe (4-208), which is of doubtful concern and can be monitored on future surveillance imaging. A couple tiny 1 to 2 mm benign calcified pulmonary granulomas are seen in   the anterolateral left upper lobe. No pulmonary masses or airspace consolidation. No pleural fluid. Large airways are patent.     MEDIASTINUM/AXILLAE: Heart size is normal. No pericardial fluid. Thoracic aorta is normal in course and caliber. No enlarged thoracic lymph nodes. Status post double mastectomy.     CORONARY ARTERY CALCIFICATION: Mild.     HEPATOBILIARY: Normal.     PANCREAS: Normal.     SPLEEN: Normal.     ADRENAL GLANDS: Normal.     KIDNEYS/BLADDER: Normal.     BOWEL: Colonic diverticulosis is present without findings specific for diverticulitis. There is a short segment of nonspecific wall thickening involving the sigmoid colon (3-252 and 6-42), which could represent hypertrophy versus patient's known   malignancy. However, hypertrophy may be more likely as the location is thought to be more proximal than the location of the rectosigmoid mass on colonoscopy. Otherwise, patient's known rectosigmoid mass is not well seen on this study. No signs of bowel   obstruction.     LYMPH NODES: An enlarged right-sided mesorectal lymph node measuring 11 x 10 mm (3-271) is highly suspicious for metastatic disease. A couple additional nonenlarged right mesorectal lymph nodes are noted (3-263 and 2-68), which are indeterminate.   Otherwise, no other enlarged abdominal or pelvic lymph nodes are seen.     VASCULATURE: Normal.     PELVIC ORGANS: Normal.     MUSCULOSKELETAL: Normal.                                                                      IMPRESSION:  1.  Patient's known  rectosigmoid mass is not well seen. If further characterization/staging is desired, recommend rectal MRI.  2.  Enlarged right mesorectal lymph node, consistent with regional lymph node metastatic disease in this patient with history of biopsy-proven rectosigmoid adenocarcinoma.  3.  No evidence of distant metastatic disease.  4.  Colonic diverticulosis without findings specific for diverticulitis.    MRI rectum 2/27/2025  FINDINGS:  Ill-defined low signal semicircumferential mass is suggested at the mid rectum.     PRIMARY TUMOR: MORPHOLOGY, LOCATION, AND CHARACTERISTICS:  Distance to the anal verge: 9-10 cm  Distance to the top of sphincter complex/anorectal junction: 6 cm  Relationship to anterior peritoneal reflection: Below the reflection.  Craniocaudal length: 2.2 cm, series 5 image 31.  Tumor location: [Mid rectum.  Morphology: Partly annular  Mucinous composition: No mucin     MR-T CATEGORY:  T2.     EMVI: None visualized.     LYMPH NODES:  Mesorectal/superior rectal lymph nodes and/or tumor deposits:  Enlarged irregular right mesorectal lymph node measuring 11 x 6 mm series 6 image 14. A few other tiny lymph nodes without suspicious features.     OTHER: None.                                                                      IMPRESSION:   1.  Primary tumor location: Mid rectum  2.  MRI Stage: T2 N1  3.  Sphincter involvement: None  4.  MRF Status: Negative (T2).  5.  EMVI: Nonvisualized.    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    We discussed the situation in detail.    I would recommend starting with neoadjuvant chemotherapy based on the PROSPECT trial . She will be treated with neoadjuvant chemotherapy and selective use of CRT. I would recommend 6 cycles of neoadjuvant FOLFOX followed by restaging with 3T MRI rectum and CT chest abdomen and pelvis and repeat flexible sigmoidoscopy.      If we see a good response of 20% or more  on repeat imaging, then potentially chemoradiation could be avoided and she can be taken to the surgery.        After the surgery, once the patient has recovered, we will give additional chemotherapy to complete a total of 6 months of treatment.    If on the other hand after 3 months of FOLFOX, there is less than 20% response of the primary tumor, then we will proceed with neoadjuvant chemoradiation followed by surgery.        We discussed need for port placement and the rationale, schedule and potential side effects of FOLFOX in detail.      Breast cancer.  She has left-sided hormone receptor positive breast cancer and she completed adjuvant exemestane in early March 2025.   Breast cancer index shows that she is unlikely to benefit from extended hormonal therapy.  Being followed by Dr. Grijalva.      See me back before cycle #2 of chemotherapy.    All questions were answered to her satisfaction.  She is agreeable and comfortable with the plan.    Denise Herbert MD     The longitudinal plan of care for the rectal cancer as documented were addressed during this visit. Due to the added complexity in care, I will continue to support Nilda in the subsequent management and with ongoing continuity of care.

## 2025-03-20 NOTE — NURSING NOTE
"Oncology Rooming Note    March 20, 2025 1:55 PM   Desi Granado is a 54 year old female who presents for:    Chief Complaint   Patient presents with    Oncology Clinic Visit     Rectal cancer     Initial Vitals: /81 (BP Location: Right arm, Patient Position: Sitting, Cuff Size: Adult Regular)   Pulse 102   Temp 98.2  F (36.8  C) (Oral)   Resp 14   Ht 1.649 m (5' 4.92\")   Wt 67.3 kg (148 lb 6.4 oz)   LMP 03/14/2019   SpO2 97%   BMI 24.76 kg/m   Estimated body mass index is 24.76 kg/m  as calculated from the following:    Height as of this encounter: 1.649 m (5' 4.92\").    Weight as of this encounter: 67.3 kg (148 lb 6.4 oz). Body surface area is 1.76 meters squared.  No Pain (0) Comment: Data Unavailable   Patient's last menstrual period was 03/14/2019.  Allergies reviewed: Yes  Medications reviewed: Yes    Medications: Medication refills not needed today.  Pharmacy name entered into [facility]:    [facility] [place] - [place], [place] - [address] [place] [address]  [facility]IV[facility] - [address]  (INACTIVE SEE [facility] [id number]) [facility] - [place], [place] - [address]  [facility] - [place] [address]    Frailty Screening:   Is the patient here for a new oncology consult visit in cancer care? 1. Yes. Over the past month, have you experienced difficulty or required a caregiver to assist with:   1. Balance, walking or general mobility (including any falls)? NO  2. Completion of self-care tasks such as bathing, dressing, toileting, grooming/hygiene?  NO  3. Concentration or memory that affects your daily life?  NO     PHQ9:  Did this patient require a PHQ9?: No      Clinical concerns: Patient states no new concerns to discuss with provider.        Cathy Fabian, EMT            "

## 2025-03-20 NOTE — LETTER
3/20/2025      Desi Granado  [address] [place] [address]      Dear Colleague,    Thank you for referring your patient, Desi Granado, to the [facility]. Please see a copy of my visit note below.    Oncology initial visit:  Date on this visit: 3/20/2025    Desi Granado  is referred by Dr.Wolfgang Jose Landa for an oncology consultation. She requires evaluation for rectal adenocarcinoma.    Primary Physician: Pearl Grover     History Of Present Illness:  Ms. Granado is a 54 year old female who presents with rectal adenocarcinoma.    Patient is here with her sister.  Her parents are available on the telephone.    I have reviewed previous notes from oncologist Dr. Elvia Grijalva because she was being followed for breast cancer.  I have also reviewed notes from colorectal surgeon Dr. Landa.    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  It was pT2 N0 lesion.  Cancer was ER/% positive and HER2/tamara FISH negative.  Oncotype DX score was 0.  She was initially started on tamoxifen in December 2018 and then switched to exemestane and Zoladex in June 2019 and had bilateral salpingo-oophorectomy in February 2023.  After that she remained on exemestane and stopped in early March 2025.  Breast cancer index did not show that she will benefit from extended hormone therapy.    She was having intermittent hematochezia for about 1 year and then on 2/21/2025 she had colonoscopy which showed a fungating nonobstructing medium-sized mass from 9 to 12 cm proximal to the anus.  Mass was noncircumferential and measured 3 cm in length.  This was biopsied.  3 sessile polyps were found in the sigmoid colon and transverse colon measuring 2 to 3 mm in size.  These were resected.  The biopsy from the sigmoid/rectal colon mass showed moderately differentiated  invasive adenocarcinoma, MMR IHC intact.  Biopsy from the sessile polyps was tubular adenoma without any high-grade dysplasia or malignancy.    2/27/2025.  CT chest abdomen and pelvis was done which did not show the rectosigmoid mass but it showed enlarged right mesorectal lymph node consistent with metastatic spread.  No evidence of distant metastasis.    MRI of the rectum showed a T2 N1 disease in the mid rectum.    CEA was 2.2.    She met with Dr. Landa on 3/14/2025 who did a flexible sigmoidoscopy and it showed a 3 cm fungating ulcerated mass located at 7 cm from the anal verge at the right posterolateral aspect of the mid rectum.    Option of neoadjuvant chemotherapy on the basis of PROSPECT trial was discussed.  LUZMA approach was also discussed.  Her case was also discussed in the tumor board with recommendation being starting systemic chemotherapy based on PROSPECT trial followed by surgery and avoiding radiation if possible.      Apart from mild constipation and intermittent hematochezia, she has been feeling well.  Denies any nausea vomiting.  She has been trying to lose weight and has lost a few pounds over the last several months.  There has been no drastic weight loss.  No neuropathy.  No fevers infection shortness of breath.  Energy is good.  Remains fully functional.      ECOG 0    ROS:  A comprehensive ROS was otherwise neg    Past Medical/Surgical History:  Past Medical History:   Diagnosis Date     Allergic rhinitis, cause unspecified      Hypertension 8 years?     Injury, other and unspecified, knee, leg, ankle, and foot 8th grade    left ankle injury     Invasive ductal carcinoma of breast, female, left (H) 10/26/2018     Unsatisfactory cervical cytology smear 10/06/2021     Past Surgical History:   Procedure Laterality Date     ABDOMEN SURGERY  02/2023    removed ovaries     BIOPSY       BREAST LUMPECTOMY, RT/LT  06/23/2010    Breast Lumpectomy RT for likely adenomatous lumps     COLONOSCOPY  N/A 08/16/2018    Procedure: COLONOSCOPY;  colonoscopy;  Surgeon: Vijay Almanza MD;  Location: [facility]     COLONOSCOPY N/A 05/23/2022    Procedure: COLONOSCOPY, FLEXIBLE, WITH LESION REMOVAL USING SNARE and biopsy;  Surgeon: Nabila Hart MD;  Location: [facility]     COLONOSCOPY N/A 2/21/2025    Procedure: COLONOSCOPY, FLEXIBLE, WITH LESION REMOVAL USING SNARE;  Surgeon: Nabila Hart MD;  Location: [facility]     ESOPHAGOSCOPY, GASTROSCOPY, DUODENOSCOPY (EGD), COMBINED N/A 10/01/2015    Procedure: COMBINED ESOPHAGOSCOPY, GASTROSCOPY, DUODENOSCOPY (EGD);  Surgeon: Vijay Almanza MD;  Location: [facility]     LAPAROSCOPIC SALPINGO-OOPHORECTOMY Bilateral 02/14/2023    Procedure: LAPAROSCOPIC BILATERAL salpingo-oophorectomy;  Surgeon: Elida Fay MD;  Location: [facility]     MASTECTOMY SIMPLE BILATERAL, SENTINEL NODE BILATERAL, COMBINED Bilateral 11/08/2018    Procedure: BILATERAL SIMPLE MASTECTOMIES WITH LEFT SENTINEL LYMPH NODE BIOPSY ;  Surgeon: Nabila Hart MD;  Location:  OR     SOFT TISSUE SURGERY      Ankle surgery 20+ years ago     SURGICAL HISTORY OF -   1989    arthroscopy of Left Ankle     SURGICAL HISTORY OF -   1990    arthroscopy of Left Ankle     SURGICAL HISTORY OF -   1993    4 wisdom teeth extracted     Cancer History:   As above    Allergies:  Allergies as of 03/20/2025 - Reviewed 03/20/2025   Allergen Reaction Noted     Aspirin Hives 08/03/2009     Dust mites Unknown 07/03/2009     Morphine Nausea 06/23/2008     Zoloft [sertraline] Anxiety 03/05/2025     Current Medications:  Current Outpatient Medications   Medication Sig Dispense Refill     Fluticasone Propionate (FLONASE ALLERGY RELIEF NA) Spray 1 spray in nostril 2 times daily.       lisinopril (ZESTRIL) 30 MG tablet Take 1 tablet (30 mg) by mouth daily. 90 tablet 3     LORazepam (ATIVAN) 0.5 MG tablet Take 1 tablet (0.5 mg) by mouth every 6 hours as needed for anxiety. 30 tablet 1     montelukast (SINGULAIR) 10 MG tablet Take 1 tablet (10 mg)  by mouth at bedtime. 90 tablet 3     propranolol (INDERAL) 10 MG tablet Take 2 tablets (20 mg) by mouth 2 times daily as needed (anxiety). 60 tablet 1     busPIRone (BUSPAR) 5 MG tablet Take 1 tablet (5 mg) by mouth 3 times daily for 7 days, THEN 2 tablets (10 mg) 3 times daily for 21 days. (Patient not taking: Reported on 3/20/2025) 147 tablet 1     denosumab (PROLIA) 60 mg/mL injection Inject 60 mg subcutaneously every 6 months.       exemestane (AROMASIN) 25 MG tablet Take 1 tablet (25 mg) by mouth daily. (Patient not taking: Reported on 3/20/2025) 90 tablet 3     sertraline (ZOLOFT) 25 MG tablet Take 1 tablet (25 mg) by mouth daily. 14 tablet 0     sertraline (ZOLOFT) 50 MG tablet Take 1 tablet (50 mg) by mouth daily. 30 tablet 1      Family History:  Family History   Problem Relation Age of Onset     Hypertension Mother      Allergies Mother      Thyroid Disease Mother         Taking thyroid medication     Obesity Mother      Cerebrovascular Disease Mother         TIA Feb 2017 and March 2021     Diabetes Mother      Hypertension Father      Thyroid Disease Father         two parathyroids removed     Hyperlipidemia Father      Other Cancer Father         Lymphoma diagnosed 2022     Cancer Maternal Grandmother         Bone CA     Allergies Maternal Grandmother      Circulatory Maternal Grandmother      Cerebrovascular Disease Maternal Grandmother      Other Cancer Maternal Grandmother         multiple myeloma     C.A.D. Maternal Grandfather      Respiratory Maternal Grandfather         asthma     Allergies Maternal Grandfather      Cerebrovascular Disease Maternal Grandfather      Alzheimer Disease Paternal Grandfather      Neurologic Disorder Paternal Grandfather         Parkinsons     Arthritis Paternal Grandmother      Respiratory Other         Emphysema     Diabetes Other      Asthma Other         genetic emphysema     Genetic Disorder Other         genetic emphysema     Breast Cancer Sister         benign  lump indicated future risk     Diabetes Other      Coronary Artery Disease Other      Other Cancer Other         multiple myeloma     Colon Cancer Other      Other Cancer Other         Acute Leukemia     Cerebrovascular Disease Other      Breast Cancer Other         told aunt was diagnosed - unsure of type & treatment     Other Cancer Other         Leukemia     Cancer - colorectal No family hx of      Prostate Cancer No family hx of    Paternal Great grand father and 2 paternal great uncles with colon cancer.    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 No   Social History Narrative     Not on file     Social Drivers of Health     Financial Resource Strain: Low Risk  (8/30/2024)    Financial Resource Strain      Within the past 12 months, have you or your family members you live with been unable to get utilities (heat, electricity) when it was really needed?: No   Food Insecurity: No Food Insecurity (3/6/2025)    Received from [facility]    Hunger Vital Sign      Worried About Running Out of Food in the Last Year: Never true      Ran Out of Food in the Last Year: Never true   Transportation Needs: No Transportation Needs (3/6/2025)    Received from [facility]    PRAPARE - Transportation      Lack of Transportation (Medical): No      Lack of Transportation (Non-Medical): No   Physical Activity: Inactive (2/27/2025)    Received from [facility]    Exercise Vital Sign      Days of Exercise per Week: 0 days      Minutes of Exercise per Session: 0 min   Stress: No Stress Concern Present (8/30/2024)    [employer] - Occupational Stress Questionnaire      Feeling of Stress : Only a little   Social Connections: Unknown (8/30/2024)    Social Connection and Isolation Panel [NHANES]      Frequency of Communication with Friends and Family: Not on file      Frequency of Social Gatherings with Friends and Family: Twice a week      Attends Roman [employer] Services: Not on file      Active Member of Clubs or Organizations: Not on file      Attends Club or Organization Meetings: Not on file      Marital Status: Not on file   Interpersonal Safety: Low Risk  (2/21/2025)    Interpersonal Safety      Do you feel physically and emotionally safe where you currently live?: Yes      Within the past 12 months, have you been hit, slapped, kicked or otherwise physically hurt by someone?: No      Within the past 12 months, have you been humiliated or emotionally abused in other ways by your partner or ex-partner?: No   Housing Stability: Low Risk  (3/6/2025)    Received from [facility]    Housing Stability      What is your living situation today?: I have a steady place to live     Physical Exam:  /81 (BP Location: Right arm, Patient Position: Sitting, Cuff Size: Adult Regular)   Pulse 102   Temp 98.2  F (36.8  C) (Oral)   Resp 14   Ht 1.649 m (5' 4.92\")   Wt 67.3 kg (148 lb 6.4 oz)   LMP 03/14/2019   SpO2 97%   BMI 24.76 kg/m    Wt Readings from Last 4 Encounters:   03/20/25 67.3 kg (148 lb 6.4 oz)   03/03/25 70.3 kg (155 lb)   02/21/25 70.3 kg (155 lb)   11/06/24 70.3 kg (155 lb)     CONSTITUTIONAL: no acute distress  EYES: PERRLA, no palor or icterus.   ENT/MOUTH: no mouth lesions. Ears normal  CVS: s1s2 no m r g .   RESPIRATORY: clear to auscultation b/l  GI: soft non tender no hepatosplenomegaly  NEURO: AAOX3  Grossly non focal neuro " exam  INTEGUMENT: no obvious rashes  LYMPHATIC: no palpable cervical, supraclavicular, axillary or inguinal LAD  MUSCULOSKELETAL: Unremarkable. No bony tenderness.   EXTREMITIES: no edema  PSYCH: Mentation, mood and affect are normal. Decision making capacity is intact    Laboratory/Imaging Studies    Reviewed    9/4/2024  CBC unremarkable  CMP shows sodium 133.  Chloride 96.  Creatinine 0.96.    CEA was 2.2 on 2/27/2025    CT chest abdomen and pelvis.  2/27/2025.  I reviewed it myself and also reviewed it with the radiologist.  LUNGS AND PLEURA: A tiny 3 mm pulmonary nodule is seen in the lateral right lower lobe (4-208), which is of doubtful concern and can be monitored on future surveillance imaging. A couple tiny 1 to 2 mm benign calcified pulmonary granulomas are seen in   the anterolateral left upper lobe. No pulmonary masses or airspace consolidation. No pleural fluid. Large airways are patent.     MEDIASTINUM/AXILLAE: Heart size is normal. No pericardial fluid. Thoracic aorta is normal in course and caliber. No enlarged thoracic lymph nodes. Status post double mastectomy.     CORONARY ARTERY CALCIFICATION: Mild.     HEPATOBILIARY: Normal.     PANCREAS: Normal.     SPLEEN: Normal.     ADRENAL GLANDS: Normal.     KIDNEYS/BLADDER: Normal.     BOWEL: Colonic diverticulosis is present without findings specific for diverticulitis. There is a short segment of nonspecific wall thickening involving the sigmoid colon (3-252 and 6-42), which could represent hypertrophy versus patient's known   malignancy. However, hypertrophy may be more likely as the location is thought to be more proximal than the location of the rectosigmoid mass on colonoscopy. Otherwise, patient's known rectosigmoid mass is not well seen on this study. No signs of bowel   obstruction.     LYMPH NODES: An enlarged right-sided mesorectal lymph node measuring 11 x 10 mm (3-271) is highly suspicious for metastatic disease. A couple additional  nonenlarged right mesorectal lymph nodes are noted (3-263 and 2-68), which are indeterminate.   Otherwise, no other enlarged abdominal or pelvic lymph nodes are seen.     VASCULATURE: Normal.     PELVIC ORGANS: Normal.     MUSCULOSKELETAL: Normal.                                                                      IMPRESSION:  1.  Patient's known rectosigmoid mass is not well seen. If further characterization/staging is desired, recommend rectal MRI.  2.  Enlarged right mesorectal lymph node, consistent with regional lymph node metastatic disease in this patient with history of biopsy-proven rectosigmoid adenocarcinoma.  3.  No evidence of distant metastatic disease.  4.  Colonic diverticulosis without findings specific for diverticulitis.    MRI rectum 2/27/2025  FINDINGS:  Ill-defined low signal semicircumferential mass is suggested at the mid rectum.     PRIMARY TUMOR: MORPHOLOGY, LOCATION, AND CHARACTERISTICS:  Distance to the anal verge: 9-10 cm  Distance to the top of sphincter complex/anorectal junction: 6 cm  Relationship to anterior peritoneal reflection: Below the reflection.  Craniocaudal length: 2.2 cm, series 5 image 31.  Tumor location: [Mid rectum.  Morphology: Partly annular  Mucinous composition: No mucin     MR-T CATEGORY:  T2.     EMVI: None visualized.     LYMPH NODES:  Mesorectal/superior rectal lymph nodes and/or tumor deposits:  Enlarged irregular right mesorectal lymph node measuring 11 x 6 mm series 6 image 14. A few other tiny lymph nodes without suspicious features.     OTHER: None.                                                                      IMPRESSION:   1.  Primary tumor location: Mid rectum  2.  MRI Stage: T2 N1  3.  Sphincter involvement: None  4.  MRF Status: Negative (T2).  5.  EMVI: Nonvisualized.    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    We discussed the situation in detail.    I would  recommend starting with neoadjuvant chemotherapy based on the PROSPECT trial . She will be treated with neoadjuvant chemotherapy and selective use of CRT. I would recommend 6 cycles of neoadjuvant FOLFOX followed by restaging with 3T MRI rectum and CT chest abdomen and pelvis and repeat flexible sigmoidoscopy.      If we see a good response of 20% or more on repeat imaging, then potentially chemoradiation could be avoided and she can be taken to the surgery.        After the surgery, once the patient has recovered, we will give additional chemotherapy to complete a total of 6 months of treatment.    If on the other hand after 3 months of FOLFOX, there is less than 20% response of the primary tumor, then we will proceed with neoadjuvant chemoradiation followed by surgery.        We discussed need for port placement and the rationale, schedule and potential side effects of FOLFOX in detail.      Breast cancer.  She has left-sided hormone receptor positive breast cancer and she completed adjuvant exemestane in early March 2025.   Breast cancer index shows that she is unlikely to benefit from extended hormonal therapy.  Being followed by Dr. Grijalva.      See me back before cycle #2 of chemotherapy.    All questions were answered to her satisfaction.  She is agreeable and comfortable with the plan.    Denise Herbert MD     The longitudinal plan of care for the rectal cancer as documented were addressed during this visit. Due to the added complexity in care, I will continue to support Nilda in the subsequent management and with ongoing continuity of care.        Again, thank you for allowing me to participate in the care of your patient.        Sincerely,        Denise Herbert MD    Electronically signed

## 2025-03-20 NOTE — PROGRESS NOTES
"[facility]: Cancer Care Plan of Care Education Note                                    Discussion with Patient:                                                      Met with Nilda to discuss chemotherapy and resources available at the [facility]. Provided patient with \"My Cancer Guidebook\", and [facility] printouts on FOLFOX. Reviewed administration, side effects (including myelosuppression, nausea/vomiting, diarrhea/constipation, hair loss, mouth sores) and ongoing symptom management by APPs in clinic. Provided phone numbers for triage and after hours care. Highlighted steps to expect when getting chemotherapy (check in, labs, pre- meds, infusion), Discussed that chemo treatment may be delayed a day or two due to blood counts, infusion schedule or patient's need to modify. Included a one page resource of symptoms and when to contact the Cancer Clinic with questions or concerns.  Reviewed chemotherapy safety guidelines.    Discussed utilizing MyChart to assist in managing appointments and asking future questions of health care team. Requested it not be used for symptom and side effect management. Instructed Desi  to call our Nurse Triage Team. Contact information provided.    Discussed ports and handout given.  IR will contact her to schedule and educate.     Nilda will plan on getting chemotherapy and seeing Dr. Herbert at the [facility].  Handoff given to ALEX Maldonado who will take over coordination after today.        Answered all Nilda's questions to her stated satisfaction.                 Goals          General    Other     Notes - Note created  3/20/2025  3:16 PM by Radha Ness, SHADY    Goal Statement: I will use my clinic and care team resources as directed.  Date Goal set: 3/20/2025  Barriers: multiple diagnoses  Strengths: support, coping, and health awareness  Date to Achieve By: ongoing  Patient expressed understanding of goal: Yes  Action steps to achieve this " goal:  I will contact triage with new, worsening or uncontrolled symptoms.   I will contact triage with temperature over 100.4  I will not send urgent or symptomatic messages through [employer].                Assessment:                                                      Assessment completed with:: Patient    Plan of Care Education   Yearly learning assessment completed?: Yes (see Education tab)  Diagnosis:: Rectal Cancer  Does patient understand diagnosis?: Yes  Tx plan/regimen:: FOLFOX  Does patient understand treatment plan/regimen?: Yes  Preparing for treatment:: Reviewed treatment preparation information with patient (vascular access, day of chemo, visitor policy, what to bring, etc.)  Vascular access education provided for:: Port  Side effect education:: Diarrhea/Constipation, Fatigue, Infection, Neuropathy, Nausea/Vomiting, Mylosuppression, Mouth sores, Lab value monitoring (anemia, neutropenia, thrombocytopenia), Skin changes  Supportive services education provided for:: Home infusion  Safety/self care at home reviewed with patient:: Yes  Coping - concerns/fears reviewed with patient:: Yes  Plan of Care:: Treatment schedule, MD follow-up appointment  When to call provider:: Bleeding, Uncontrolled nausea/vomiting, Increased shortness of breath, New/worsening pain, Shaking chills, Temperature >100.4F, Uncontrolled diarrhea/constipation  Reasons for deferring treatment reviewed with patient:: Yes    Evaluation of Learning  Patient Education Provided: Yes  Readiness:: Acceptance  Method:: Explanation, Booklet/Handout  Response:: Verbalizes understanding      Intervention/Education provided during outreach:                                                       Follow up call in 1-2 weeks  Patient to follow up as scheduled at next appt  Patient to [email or web address] message with updates  Confirmed patient has clinic and triage numbers    Radha Ness, RN, BSN, OCN  RN Care Coordinator  [facility]

## 2025-03-24 ENCOUNTER — ONCOLOGY VISIT (OUTPATIENT)
Dept: ONCOLOGY | Facility: CLINIC | Age: 55
End: 2025-03-24
Attending: INTERNAL MEDICINE
Payer: COMMERCIAL

## 2025-03-24 ENCOUNTER — ANCILLARY PROCEDURE (OUTPATIENT)
Dept: BONE DENSITY | Facility: CLINIC | Age: 55
End: 2025-03-24
Attending: INTERNAL MEDICINE
Payer: COMMERCIAL

## 2025-03-24 VITALS
BODY MASS INDEX: 24.94 KG/M2 | WEIGHT: 149.5 LBS | TEMPERATURE: 98 F | HEART RATE: 85 BPM | SYSTOLIC BLOOD PRESSURE: 108 MMHG | OXYGEN SATURATION: 98 % | RESPIRATION RATE: 18 BRPM | DIASTOLIC BLOOD PRESSURE: 63 MMHG

## 2025-03-24 DIAGNOSIS — C20 RECTAL CANCER (H): ICD-10-CM

## 2025-03-24 DIAGNOSIS — C50.912 INVASIVE DUCTAL CARCINOMA OF BREAST, FEMALE, LEFT (H): Primary | ICD-10-CM

## 2025-03-24 DIAGNOSIS — M85.852 OSTEOPENIA OF NECK OF LEFT FEMUR: ICD-10-CM

## 2025-03-24 DIAGNOSIS — C50.912 INVASIVE DUCTAL CARCINOMA OF BREAST, FEMALE, LEFT (H): ICD-10-CM

## 2025-03-24 DIAGNOSIS — Z79.811 AROMATASE INHIBITOR USE: ICD-10-CM

## 2025-03-24 DIAGNOSIS — C20 RECTAL ADENOCARCINOMA (H): Primary | ICD-10-CM

## 2025-03-24 DIAGNOSIS — Z78.0 MENOPAUSE: Primary | ICD-10-CM

## 2025-03-24 LAB
ALBUMIN SERPL BCG-MCNC: 4.3 G/DL (ref 3.5–5.2)
ALP SERPL-CCNC: 89 U/L (ref 40–150)
ALT SERPL W P-5'-P-CCNC: 20 U/L (ref 0–50)
ANION GAP SERPL CALCULATED.3IONS-SCNC: 10 MMOL/L (ref 7–15)
AST SERPL W P-5'-P-CCNC: 21 U/L (ref 0–45)
BASOPHILS # BLD AUTO: 0 10E3/UL (ref 0–0.2)
BASOPHILS NFR BLD AUTO: 1 %
BILIRUB SERPL-MCNC: 0.4 MG/DL
BUN SERPL-MCNC: 11.7 MG/DL (ref 6–20)
CALCIUM SERPL-MCNC: 10.2 MG/DL (ref 8.8–10.4)
CEA SERPL-MCNC: 2.1 NG/ML
CHLORIDE SERPL-SCNC: 101 MMOL/L (ref 98–107)
CREAT SERPL-MCNC: 0.89 MG/DL (ref 0.51–0.95)
EGFRCR SERPLBLD CKD-EPI 2021: 77 ML/MIN/1.73M2
EOSINOPHIL # BLD AUTO: 0.2 10E3/UL (ref 0–0.7)
EOSINOPHIL NFR BLD AUTO: 4 %
ERYTHROCYTE [DISTWIDTH] IN BLOOD BY AUTOMATED COUNT: 12.4 % (ref 10–15)
GLUCOSE SERPL-MCNC: 94 MG/DL (ref 70–99)
HCO3 SERPL-SCNC: 26 MMOL/L (ref 22–29)
HCT VFR BLD AUTO: 40.3 % (ref 35–47)
HGB BLD-MCNC: 13.7 G/DL (ref 11.7–15.7)
IMM GRANULOCYTES # BLD: 0 10E3/UL
IMM GRANULOCYTES NFR BLD: 1 %
LYMPHOCYTES # BLD AUTO: 0.9 10E3/UL (ref 0.8–5.3)
LYMPHOCYTES NFR BLD AUTO: 18 %
MCH RBC QN AUTO: 28.9 PG (ref 26.5–33)
MCHC RBC AUTO-ENTMCNC: 34 G/DL (ref 31.5–36.5)
MCV RBC AUTO: 85 FL (ref 78–100)
MONOCYTES # BLD AUTO: 0.4 10E3/UL (ref 0–1.3)
MONOCYTES NFR BLD AUTO: 9 %
NEUTROPHILS # BLD AUTO: 3.3 10E3/UL (ref 1.6–8.3)
NEUTROPHILS NFR BLD AUTO: 68 %
NRBC # BLD AUTO: 0 10E3/UL
NRBC BLD AUTO-RTO: 0 /100
PLATELET # BLD AUTO: 258 10E3/UL (ref 150–450)
POTASSIUM SERPL-SCNC: 4.3 MMOL/L (ref 3.4–5.3)
PROT SERPL-MCNC: 7 G/DL (ref 6.4–8.3)
RBC # BLD AUTO: 4.74 10E6/UL (ref 3.8–5.2)
SODIUM SERPL-SCNC: 137 MMOL/L (ref 135–145)
WBC # BLD AUTO: 4.8 10E3/UL (ref 4–11)

## 2025-03-24 PROCEDURE — 36415 COLL VENOUS BLD VENIPUNCTURE: CPT | Performed by: INTERNAL MEDICINE

## 2025-03-24 PROCEDURE — 96372 THER/PROPH/DIAG INJ SC/IM: CPT | Performed by: INTERNAL MEDICINE

## 2025-03-24 PROCEDURE — 77080 DXA BONE DENSITY AXIAL: CPT | Performed by: INTERNAL MEDICINE

## 2025-03-24 PROCEDURE — 85025 COMPLETE CBC W/AUTO DIFF WBC: CPT | Performed by: INTERNAL MEDICINE

## 2025-03-24 PROCEDURE — 80053 COMPREHEN METABOLIC PANEL: CPT | Performed by: INTERNAL MEDICINE

## 2025-03-24 PROCEDURE — 99214 OFFICE O/P EST MOD 30 MIN: CPT | Performed by: INTERNAL MEDICINE

## 2025-03-24 PROCEDURE — 250N000011 HC RX IP 250 OP 636: Mod: JZ | Performed by: INTERNAL MEDICINE

## 2025-03-24 PROCEDURE — 99213 OFFICE O/P EST LOW 20 MIN: CPT | Performed by: INTERNAL MEDICINE

## 2025-03-24 PROCEDURE — 82378 CARCINOEMBRYONIC ANTIGEN: CPT | Performed by: INTERNAL MEDICINE

## 2025-03-24 RX ORDER — DIPHENHYDRAMINE HYDROCHLORIDE 50 MG/ML
50 INJECTION, SOLUTION INTRAMUSCULAR; INTRAVENOUS
Status: DISCONTINUED | OUTPATIENT
Start: 2025-03-24 | End: 2025-03-24 | Stop reason: HOSPADM

## 2025-03-24 RX ORDER — ALBUTEROL SULFATE 0.83 MG/ML
2.5 SOLUTION RESPIRATORY (INHALATION)
Status: DISCONTINUED | OUTPATIENT
Start: 2025-03-24 | End: 2025-03-24 | Stop reason: HOSPADM

## 2025-03-24 RX ORDER — METHYLPREDNISOLONE SODIUM SUCCINATE 125 MG/2ML
125 INJECTION INTRAMUSCULAR; INTRAVENOUS
Start: 2025-08-29

## 2025-03-24 RX ORDER — METHYLPREDNISOLONE SODIUM SUCCINATE 125 MG/2ML
125 INJECTION INTRAMUSCULAR; INTRAVENOUS
Status: DISCONTINUED | OUTPATIENT
Start: 2025-03-24 | End: 2025-03-24 | Stop reason: HOSPADM

## 2025-03-24 RX ORDER — EPINEPHRINE 1 MG/ML
0.3 INJECTION, SOLUTION INTRAMUSCULAR; SUBCUTANEOUS EVERY 5 MIN PRN
Status: DISCONTINUED | OUTPATIENT
Start: 2025-03-24 | End: 2025-03-24 | Stop reason: HOSPADM

## 2025-03-24 RX ORDER — ALBUTEROL SULFATE 90 UG/1
1-2 INHALANT RESPIRATORY (INHALATION)
Status: DISCONTINUED | OUTPATIENT
Start: 2025-03-24 | End: 2025-03-24 | Stop reason: HOSPADM

## 2025-03-24 RX ORDER — NALOXONE HYDROCHLORIDE 0.4 MG/ML
0.2 INJECTION, SOLUTION INTRAMUSCULAR; INTRAVENOUS; SUBCUTANEOUS
Status: DISCONTINUED | OUTPATIENT
Start: 2025-03-24 | End: 2025-03-24 | Stop reason: HOSPADM

## 2025-03-24 RX ORDER — MEPERIDINE HYDROCHLORIDE 25 MG/ML
25 INJECTION INTRAMUSCULAR; INTRAVENOUS; SUBCUTANEOUS EVERY 30 MIN PRN
Status: DISCONTINUED | OUTPATIENT
Start: 2025-03-24 | End: 2025-03-24 | Stop reason: HOSPADM

## 2025-03-24 RX ORDER — ALBUTEROL SULFATE 0.83 MG/ML
2.5 SOLUTION RESPIRATORY (INHALATION)
OUTPATIENT
Start: 2025-08-29

## 2025-03-24 RX ORDER — NALOXONE HYDROCHLORIDE 0.4 MG/ML
0.2 INJECTION, SOLUTION INTRAMUSCULAR; INTRAVENOUS; SUBCUTANEOUS
OUTPATIENT
Start: 2025-08-29

## 2025-03-24 RX ORDER — ALBUTEROL SULFATE 90 UG/1
1-2 INHALANT RESPIRATORY (INHALATION)
Start: 2025-08-29

## 2025-03-24 RX ORDER — MEPERIDINE HYDROCHLORIDE 25 MG/ML
25 INJECTION INTRAMUSCULAR; INTRAVENOUS; SUBCUTANEOUS EVERY 30 MIN PRN
OUTPATIENT
Start: 2025-08-29

## 2025-03-24 RX ORDER — DIPHENHYDRAMINE HYDROCHLORIDE 50 MG/ML
50 INJECTION, SOLUTION INTRAMUSCULAR; INTRAVENOUS
Start: 2025-08-29

## 2025-03-24 RX ORDER — EPINEPHRINE 1 MG/ML
0.3 INJECTION, SOLUTION INTRAMUSCULAR; SUBCUTANEOUS EVERY 5 MIN PRN
OUTPATIENT
Start: 2025-08-29

## 2025-03-24 RX ADMIN — DENOSUMAB 60 MG: 60 INJECTION SUBCUTANEOUS at 10:59

## 2025-03-24 ASSESSMENT — PAIN SCALES - GENERAL: PAINLEVEL_OUTOF10: NO PAIN (0)

## 2025-03-24 NOTE — NURSING NOTE
"Oncology Rooming Note    March 24, 2025 10:17 AM   Desi Granado is a 54 year old female who presents for:    Chief Complaint   Patient presents with    Blood Draw     Labs drawn via  by RN in lab, vitals taken.     Oncology Clinic Visit    Breast Cancer     Invasive ductal carcinoma of breast, female, left      Initial Vitals: /63 (BP Location: Right arm, Patient Position: Sitting, Cuff Size: Adult Regular)   Pulse 85   Temp 98  F (36.7  C) (Oral)   Resp 18   Wt 67.8 kg (149 lb 8 oz)   LMP 03/14/2019   SpO2 98%   BMI 24.94 kg/m   Estimated body mass index is 24.94 kg/m  as calculated from the following:    Height as of 3/20/25: 1.649 m (5' 4.92\").    Weight as of this encounter: 67.8 kg (149 lb 8 oz). Body surface area is 1.76 meters squared.  No Pain (0) Comment: Data Unavailable   Patient's last menstrual period was 03/14/2019.  Allergies reviewed: Yes  Medications reviewed: Yes    Medications: Medication refills not needed today.  Pharmacy name entered into [facility]:    [facility]  [facility] [place] - [place], [place] - [address] [place] [address]  [facility]IV[facility] - [address]  (INACTIVE [facility] [id number]) [facility] - [place], [place] - [address]  [facility] - [place], [place] - [address]    Frailty Screening:   Is the patient here for a new oncology consult visit in cancer care? 2. No      Clinical concerns: Pt reports no new concerns today.       Dilia Royal, EMT     "

## 2025-03-24 NOTE — LETTER
3/24/2025      Desi Granado  [address] [place] [address]      Dear Colleague,    Thank you for referring your patient, Desi Granado, to the [facility]. Please see a copy of my visit note below.    Oncology Follow-Up Note  March 24, 2025    Ms. Granado is seen in follow up of her left breast cancer, Er + SC + HER2 negative on active surveillance.   She unfortunately has a new rectal cancer and is going to see Dr Landa and Dr Herbert.    Oncologic History:  Invasive ductal carcinoma of breast, female, left (H)     Desi Granado was found to have developing focal asymmetry in the left breast at approximately 3 o'clock position about 7-8 cm from the nipple on the routine mammogram done on October 30, 2018. Subsequently she went on to have diagnostic mammogram and ultrasound. Directed sonogram at the site of mammographic finding showed a 1.6 cm irregular solid lesion. Subsequently the patient went on to have ultrasound-guided needle core biopsy done on October 17, 2018. The biopsy came back positive for invasive ductal carcinoma with papillary features. There is grade 2 out of 3. Angiolymphatic invasion was not seen. Ductal carcinoma in situ was not seen. The tumor was estrogen receptor positive about 100% and progesterone receptor +100% strong nuclear staining. HER-2/tamara came back negative for amplification. She had bilateral mastectomy. Surgical pathology revealed simple mastectomy sample of the left showing solid papillary carcinoma 22 mm. Grade 2. Margins were clear of involvement. 3 sentinel lymph nodes shows no evidence of metastatic disease. Lymphovascular invasion was not identified. The tumor is pT2pN0. Right breast shows simple mastectomy without any evidence of malignancy. Oncotype came back 0. She established care with Dr. Peña, started on tamoxifen 12/2018 and switched to  Exemestane and Zoladex 06/2019 upon insurance approval.  She  underwent BSO in 2/2023. Since then only on exemestane    Interval History:  Nilda stopped her exemestane given her new rectal cancer diagnosis.  She and I had previously discussed her breast cancer index testing, and her not getting much additional benefit from antiestrogen therapy.      She has a new rectal cancer.  Prior colonoscopy < 3 years ago did not reveal any masses.  She had new BRBPR after her BSO, and ultimately was referred for colonoscopy.    Colby disease.  Scheduled for FOLFOX neoadjuvant and then determining surgery vs radiation.      She continues to work with her primary care provider on maintaining her blood pressure and in monitoring her creatinine.  Her creatinine has improved and she is thrilled.      She has no f/c.  No chest pain or shortness of breath.  No n/v/d/c.  No nodules or masses across her chest.     She has mild hot flashes and fatigue.      No flare ups of her diverticulitis.     She is busy at desk job, unable to work out much.        Review Of Systems:  All other ROS negative unless mentioned in interval history.     Past medical, social, surgical, and family histories reviewed.      Current Medications:  Current Outpatient Medications   Medication Sig Dispense Refill     denosumab (PROLIA) 60 mg/mL injection Inject 60 mg subcutaneously every 6 months.       Fluticasone Propionate (FLONASE ALLERGY RELIEF NA) Spray 1 spray in nostril 2 times daily.       lisinopril (ZESTRIL) 30 MG tablet Take 1 tablet (30 mg) by mouth daily. 90 tablet 3     LORazepam (ATIVAN) 0.5 MG tablet Take 1 tablet (0.5 mg) by mouth every 6 hours as needed for anxiety. 30 tablet 1     montelukast (SINGULAIR) 10 MG tablet Take 1 tablet (10 mg) by mouth at bedtime. 90 tablet 3     propranolol (INDERAL) 10 MG tablet Take 2 tablets (20 mg) by mouth 2 times daily as needed (anxiety). 60 tablet 1     exemestane (AROMASIN) 25 MG tablet Take 1 tablet (25 mg) by mouth daily. (Patient not taking: Reported on 3/24/2025)  90 tablet 3     Current Facility-Administered Medications   Medication Dose Route Frequency Provider Last Rate Last Admin     albuterol (PROVENTIL HFA/VENTOLIN HFA) inhaler  1-2 puff Inhalation Once PRN Elvia Grijalva MD         albuterol (PROVENTIL) neb solution 2.5 mg  2.5 mg Nebulization Once PRN Elvia Grijalva MD         denosumab (PROLIA) injection 60 mg  60 mg Subcutaneous Once Elvia Grijalva MD         diphenhydrAMINE (BENADRYL) injection 50 mg  50 mg Intravenous Once PRN Elvia Grijalva MD         EPINEPHrine (Anaphylaxis) (ADRENALIN) injection (vial) 0.3 mg  0.3 mg Intramuscular Q5 Min PRN Elvia Grijalva MD         famotidine (PEPCID) injection 20 mg  20 mg Intravenous Once PRN Elvia Grijalva MD         meperidine (DEMEROL) injection 25 mg  25 mg Intravenous Q30 Min PRN Elvia Grijalva MD         methylPREDNISolone Na Suc (solu-MEDROL) injection 125 mg  125 mg Intravenous Once PRN Elvia Grijalva MD         naloxone (NARCAN) injection 0.2 mg  0.2 mg Intravenous Q2 Min PRN Elvia Grijalva MD         sodium chloride 0.9% BOLUS 500 mL  500 mL Intravenous Once PRN Elvia Grijalva MD         She gets semi-annual Prolia       Physical Exam:  /63 (BP Location: Right arm, Patient Position: Sitting, Cuff Size: Adult Regular)   Pulse 85   Temp 98  F (36.7  C) (Oral)   Resp 18   Wt 67.8 kg (149 lb 8 oz)   LMP 03/14/2019   SpO2 98%   BMI 24.94 kg/m      GENERAL: Healthy, alert and no distress  EYES: Eyes grossly normal to inspection. No discharge or erythema, or obvious scleral/conjunctival abnormalities.  CV: rrr  Lungs: clear  Abd; soft, nt, nd + bs  Ext: no edema  Breast: s/p bilateral mastectomy without reconstruction, no nodules or masses, no axillary adenopathy  PSYCH: Mentation appears normal, affect normal/bright, judgement and insight intact, normal speech and appearance well-groomed.    Laboratory/Imaging Studies:     Reviewed her labs over the last  six months.     Reviewed her new imaging and pathology.  Reviewed her bone density scan; final report pending.  Overall stable disease.    Assessment and plan:  Ms. Granado is a 54-year-old female with left solid papillary carcinoma, Grade 2, ER+/VT+/HER2-. She is status post bilateral mastectomy and left sentinel node biopsy. Her disease is pT2N0(sn). Oncotype score is 0. She completed seven years of endocrine therapy. There is no clinical evidence of breast cancer recurrence.   She has a new rectal cancer and is scheduled to start neoadjuvant chemotherapy.    Left breast cancer: She was started on tamoxifen on 12/2018 switched to exemestane as well as Zoladex injections monthly since 6/2019. Zoladex discontinued after BSO in 2/2023. She completed 7 years on endocrine therapy. Given her breast cancer index testing showing no benefit and her new rectal cancer, we discussed remaining off of endocrine therapy.  PRN breast cancer follow up.    Osteopenia: continue semi-annual Prolia while on AI. Due today. She is tolerating without a lot of side effects.  We reviewed her dexa demonstrating stable bone density.  She will be monitored off of endocrine therapy now.  Repeat dexa due spring 2027.    Rectal cancer - developed between colonoscopies.  She has seen genetics (workup negative thus far).  Starting neoadjuvant chemo.  Will see Dr Herbert and Dr Landa.      Essential HTN: on lisinopril    She is following PCP for routine check ups.    PRN follow up with me.     Elvia Grijalva MD       Again, thank you for allowing me to participate in the care of your patient.        Sincerely,        Elvia Grijalva MD    Electronically signed

## 2025-03-24 NOTE — PROGRESS NOTES
Oncology Follow-Up Note  March 24, 2025    Ms. Granado is seen in follow up of her left breast cancer, Er + GA + HER2 negative on active surveillance.   She unfortunately has a new rectal cancer and is going to see Dr Landa and Dr Herbert.    Oncologic History:  Invasive ductal carcinoma of breast, female, left (H)     Desi Granado was found to have developing focal asymmetry in the left breast at approximately 3 o'clock position about 7-8 cm from the nipple on the routine mammogram done on October 30, 2018. Subsequently she went on to have diagnostic mammogram and ultrasound. Directed sonogram at the site of mammographic finding showed a 1.6 cm irregular solid lesion. Subsequently the patient went on to have ultrasound-guided needle core biopsy done on October 17, 2018. The biopsy came back positive for invasive ductal carcinoma with papillary features. There is grade 2 out of 3. Angiolymphatic invasion was not seen. Ductal carcinoma in situ was not seen. The tumor was estrogen receptor positive about 100% and progesterone receptor +100% strong nuclear staining. HER-2/tamara came back negative for amplification. She had bilateral mastectomy. Surgical pathology revealed simple mastectomy sample of the left showing solid papillary carcinoma 22 mm. Grade 2. Margins were clear of involvement. 3 sentinel lymph nodes shows no evidence of metastatic disease. Lymphovascular invasion was not identified. The tumor is pT2pN0. Right breast shows simple mastectomy without any evidence of malignancy. Oncotype came back 0. She established care with Dr. Peña, started on tamoxifen 12/2018 and switched to  Exemestane and Zoladex 06/2019 upon insurance approval.  She underwent BSO in 2/2023. Since then only on exemestane    Interval History:  Nilda stopped her exemestane given her new rectal cancer diagnosis.  She and I had previously discussed her breast cancer index testing, and her not getting much additional benefit from  antiestrogen therapy.      She has a new rectal cancer.  Prior colonoscopy < 3 years ago did not reveal any masses.  She had new BRBPR after her BSO, and ultimately was referred for colonoscopy.    Colby disease.  Scheduled for FOLFOX neoadjuvant and then determining surgery vs radiation.      She continues to work with her primary care provider on maintaining her blood pressure and in monitoring her creatinine.  Her creatinine has improved and she is thrilled.      She has no f/c.  No chest pain or shortness of breath.  No n/v/d/c.  No nodules or masses across her chest.     She has mild hot flashes and fatigue.      No flare ups of her diverticulitis.     She is busy at desk job, unable to work out much.        Review Of Systems:  All other ROS negative unless mentioned in interval history.     Past medical, social, surgical, and family histories reviewed.      Current Medications:  Current Outpatient Medications   Medication Sig Dispense Refill    denosumab (PROLIA) 60 mg/mL injection Inject 60 mg subcutaneously every 6 months.      Fluticasone Propionate (FLONASE ALLERGY RELIEF NA) Spray 1 spray in nostril 2 times daily.      lisinopril (ZESTRIL) 30 MG tablet Take 1 tablet (30 mg) by mouth daily. 90 tablet 3    LORazepam (ATIVAN) 0.5 MG tablet Take 1 tablet (0.5 mg) by mouth every 6 hours as needed for anxiety. 30 tablet 1    montelukast (SINGULAIR) 10 MG tablet Take 1 tablet (10 mg) by mouth at bedtime. 90 tablet 3    propranolol (INDERAL) 10 MG tablet Take 2 tablets (20 mg) by mouth 2 times daily as needed (anxiety). 60 tablet 1    exemestane (AROMASIN) 25 MG tablet Take 1 tablet (25 mg) by mouth daily. (Patient not taking: Reported on 3/24/2025) 90 tablet 3     Current Facility-Administered Medications   Medication Dose Route Frequency Provider Last Rate Last Admin    albuterol (PROVENTIL HFA/VENTOLIN HFA) inhaler  1-2 puff Inhalation Once PRN Elvia Grijalva MD        albuterol (PROVENTIL) neb solution  2.5 mg  2.5 mg Nebulization Once PRN Elvia Grijalva MD        denosumab (PROLIA) injection 60 mg  60 mg Subcutaneous Once Elvia Grijalva MD        diphenhydrAMINE (BENADRYL) injection 50 mg  50 mg Intravenous Once PRN Elvia Grijalva MD        EPINEPHrine (Anaphylaxis) (ADRENALIN) injection (vial) 0.3 mg  0.3 mg Intramuscular Q5 Min PRN Elvia Grijalva MD        famotidine (PEPCID) injection 20 mg  20 mg Intravenous Once PRN Elvia Grijalva MD        meperidine (DEMEROL) injection 25 mg  25 mg Intravenous Q30 Min PRN Elvia Grijalva MD        methylPREDNISolone Na Suc (solu-MEDROL) injection 125 mg  125 mg Intravenous Once PRN Elvia Grijalva MD        naloxone (NARCAN) injection 0.2 mg  0.2 mg Intravenous Q2 Min PRN Elvia Grijalva MD        sodium chloride 0.9% BOLUS 500 mL  500 mL Intravenous Once PRN Elvia Grijalva MD         She gets semi-annual Prolia       Physical Exam:  /63 (BP Location: Right arm, Patient Position: Sitting, Cuff Size: Adult Regular)   Pulse 85   Temp 98  F (36.7  C) (Oral)   Resp 18   Wt 67.8 kg (149 lb 8 oz)   LMP 03/14/2019   SpO2 98%   BMI 24.94 kg/m      GENERAL: Healthy, alert and no distress  EYES: Eyes grossly normal to inspection. No discharge or erythema, or obvious scleral/conjunctival abnormalities.  CV: rrr  Lungs: clear  Abd; soft, nt, nd + bs  Ext: no edema  Breast: s/p bilateral mastectomy without reconstruction, no nodules or masses, no axillary adenopathy  PSYCH: Mentation appears normal, affect normal/bright, judgement and insight intact, normal speech and appearance well-groomed.    Laboratory/Imaging Studies:     Reviewed her labs over the last six months.     Reviewed her new imaging and pathology.  Reviewed her bone density scan; final report pending.  Overall stable disease.    Assessment and plan:  Ms. Granado is a 54-year-old female with left solid papillary carcinoma, Grade 2, ER+/MO+/HER2-. She is status post  bilateral mastectomy and left sentinel node biopsy. Her disease is pT2N0(sn). Oncotype score is 0. She completed seven years of endocrine therapy. There is no clinical evidence of breast cancer recurrence.   She has a new rectal cancer and is scheduled to start neoadjuvant chemotherapy.    Left breast cancer: She was started on tamoxifen on 12/2018 switched to exemestane as well as Zoladex injections monthly since 6/2019. Zoladex discontinued after BSO in 2/2023. She completed 7 years on endocrine therapy. Given her breast cancer index testing showing no benefit and her new rectal cancer, we discussed remaining off of endocrine therapy.  PRN breast cancer follow up.    Osteopenia: continue semi-annual Prolia while on AI. Due today. She is tolerating without a lot of side effects.  We reviewed her dexa demonstrating stable bone density.  She will be monitored off of endocrine therapy now.  Repeat dexa due spring 2027.    Rectal cancer - developed between colonoscopies.  She has seen genetics (workup negative thus far).  Starting neoadjuvant chemo.  Will see Dr Herbert and Dr Landa.      Essential HTN: on lisinopril    She is following PCP for routine check ups.    PRN follow up with me.     Elvia Grijalva MD

## 2025-03-24 NOTE — NURSING NOTE
Chief Complaint   Patient presents with    Blood Draw     Labs drawn via  by RN in lab, vitals taken.      Labs collected from venipuncture by RN. Vitals taken. Pt to 1st floor for imagining appointment.    Debi Lutz RN

## 2025-03-26 ENCOUNTER — HOME INFUSION (OUTPATIENT)
Dept: HOME HEALTH SERVICES | Facility: HOME HEALTH | Age: 55
End: 2025-03-26
Payer: COMMERCIAL

## 2025-03-26 DIAGNOSIS — C20 RECTAL ADENOCARCINOMA (H): ICD-10-CM

## 2025-03-27 ENCOUNTER — PATIENT OUTREACH (OUTPATIENT)
Dept: ONCOLOGY | Facility: CLINIC | Age: 55
End: 2025-03-27
Payer: COMMERCIAL

## 2025-03-27 NOTE — TELEPHONE ENCOUNTER
FMLA/Disability leave paperwork filled out and faxed to [employer] . Copy placed in scanning.    Angela Tapia, RN, BSN  RN Care Coordinator - Oncology/Hematology  [facility]

## 2025-03-28 ENCOUNTER — ANCILLARY PROCEDURE (OUTPATIENT)
Dept: RADIOLOGY | Facility: AMBULATORY SURGERY CENTER | Age: 55
End: 2025-03-28
Attending: INTERNAL MEDICINE
Payer: COMMERCIAL

## 2025-03-28 ENCOUNTER — HOSPITAL ENCOUNTER (OUTPATIENT)
Facility: AMBULATORY SURGERY CENTER | Age: 55
Discharge: HOME | End: 2025-03-28
Attending: RADIOLOGY | Admitting: RADIOLOGY
Payer: COMMERCIAL

## 2025-03-28 ENCOUNTER — HOME INFUSION BILLING (OUTPATIENT)
Dept: HOME HEALTH SERVICES | Facility: HOME HEALTH | Age: 55
End: 2025-03-28
Payer: COMMERCIAL

## 2025-03-28 VITALS
TEMPERATURE: 97.4 F | SYSTOLIC BLOOD PRESSURE: 110 MMHG | DIASTOLIC BLOOD PRESSURE: 59 MMHG | WEIGHT: 145 LBS | HEART RATE: 77 BPM | HEIGHT: 65 IN | BODY MASS INDEX: 24.16 KG/M2 | RESPIRATION RATE: 16 BRPM | OXYGEN SATURATION: 98 %

## 2025-03-28 DIAGNOSIS — C20 RECTAL ADENOCARCINOMA (H): ICD-10-CM

## 2025-03-28 PROCEDURE — 77001 FLUOROGUIDE FOR VEIN DEVICE: CPT | Mod: 26 | Performed by: RADIOLOGY

## 2025-03-28 PROCEDURE — 36561 INSERT TUNNELED CV CATH: CPT | Mod: RT | Performed by: RADIOLOGY

## 2025-03-28 PROCEDURE — A9270 NON-COVERED ITEM OR SERVICE: HCPCS

## 2025-03-28 PROCEDURE — 36561 INSERT TUNNELED CV CATH: CPT | Mod: RT

## 2025-03-28 PROCEDURE — A4452 WATERPROOF TAPE: HCPCS

## 2025-03-28 PROCEDURE — A4245 ALCOHOL WIPES PER BOX: HCPCS

## 2025-03-28 PROCEDURE — 99152 MOD SED SAME PHYS/QHP 5/>YRS: CPT | Performed by: RADIOLOGY

## 2025-03-28 PROCEDURE — 76937 US GUIDE VASCULAR ACCESS: CPT | Mod: 26 | Performed by: RADIOLOGY

## 2025-03-28 DEVICE — SMARTPORT PLASTIC LOW PROFILE PORT WITH VORTEX TECHNOLOGY
Type: IMPLANTABLE DEVICE | Site: CHEST | Status: FUNCTIONAL
Brand: BIOFLO VORTEX

## 2025-03-28 RX ORDER — NALOXONE HYDROCHLORIDE 0.4 MG/ML
0.4 INJECTION, SOLUTION INTRAMUSCULAR; INTRAVENOUS; SUBCUTANEOUS
Status: DISCONTINUED | OUTPATIENT
Start: 2025-03-28 | End: 2025-03-29 | Stop reason: HOSPADM

## 2025-03-28 RX ORDER — HEPARIN SODIUM,PORCINE 10 UNIT/ML
5-10 VIAL (ML) INTRAVENOUS
Status: DISCONTINUED | OUTPATIENT
Start: 2025-03-28 | End: 2025-03-29 | Stop reason: HOSPADM

## 2025-03-28 RX ORDER — FENTANYL CITRATE 50 UG/ML
25-50 INJECTION, SOLUTION INTRAMUSCULAR; INTRAVENOUS EVERY 5 MIN PRN
Status: DISCONTINUED | OUTPATIENT
Start: 2025-03-28 | End: 2025-03-29 | Stop reason: HOSPADM

## 2025-03-28 RX ORDER — CETIRIZINE HYDROCHLORIDE 5 MG/1
5 TABLET ORAL DAILY
COMMUNITY

## 2025-03-28 RX ORDER — HEPARIN SODIUM,PORCINE 10 UNIT/ML
5-10 VIAL (ML) INTRAVENOUS EVERY 24 HOURS
Status: DISCONTINUED | OUTPATIENT
Start: 2025-03-28 | End: 2025-03-29 | Stop reason: HOSPADM

## 2025-03-28 RX ORDER — CEFAZOLIN SODIUM 2 G/50ML
2 SOLUTION INTRAVENOUS
Status: COMPLETED | OUTPATIENT
Start: 2025-03-28 | End: 2025-03-28

## 2025-03-28 RX ORDER — NALOXONE HYDROCHLORIDE 0.4 MG/ML
0.2 INJECTION, SOLUTION INTRAMUSCULAR; INTRAVENOUS; SUBCUTANEOUS
Status: DISCONTINUED | OUTPATIENT
Start: 2025-03-28 | End: 2025-03-29 | Stop reason: HOSPADM

## 2025-03-28 RX ORDER — FENTANYL CITRATE 50 UG/ML
INJECTION, SOLUTION INTRAMUSCULAR; INTRAVENOUS PRN
Status: DISCONTINUED | OUTPATIENT
Start: 2025-03-28 | End: 2025-03-28 | Stop reason: HOSPADM

## 2025-03-28 RX ORDER — FLUMAZENIL 0.1 MG/ML
0.2 INJECTION, SOLUTION INTRAVENOUS
Status: DISCONTINUED | OUTPATIENT
Start: 2025-03-28 | End: 2025-03-29 | Stop reason: HOSPADM

## 2025-03-28 RX ORDER — HEPARIN SODIUM (PORCINE) LOCK FLUSH IV SOLN 100 UNIT/ML 100 UNIT/ML
5-10 SOLUTION INTRAVENOUS
Status: DISCONTINUED | OUTPATIENT
Start: 2025-03-28 | End: 2025-03-29 | Stop reason: HOSPADM

## 2025-03-28 RX ADMIN — CEFAZOLIN SODIUM 2 G: 2 SOLUTION INTRAVENOUS at 09:25

## 2025-03-28 NOTE — DISCHARGE INSTRUCTIONS
A collaboration between [facility] and [facility]  Experts in minimally invasive, targeted treatments performed using imaging guidance    Venous Access Device,  Port Catheter or Tunneled or Non-Tunneled Central Line Placement    Today you had a procedure today to install a venous access device; either a tunneled central vein catheter or a subcutaneous port catheter.    After you go home:  Drink plenty of fluids.  Generally 6-8 (8 ounce) glasses a day is recommended.  Resume your regular diet unless otherwise ordered by a medical provider.  Keep any applied tape/gauze dressings clean and dry.  Change tape/gauze dressings if they get wet or soiled.  You may shower the following day after procedure, however cover and protect from moisture any tape/gauze dressings.  You may let water hit and run over dried skin glue, but do not scrub.  Pat the area dry after showering.  Port placement incisions are closed with absorbable suture, meaning they do not need to be removed at a later date, and a topical skin adhesive (skin glue).  This glue will wear off in 7-14 days.  Do not remove before this time.  If 14 days have passed and residual glue is present, you may gently remove it.  Do not apply gels, lotions, or ointments to the glue site for the first 10 days as this may cause the glue to prematurely soften and fail.  Do not perform strenuous activities or lift greater than 10 pounds for the next three days.  If there is bleeding or oozing from the procedure site, apply firm pressure to the area for 5-10 minutes.  If the bleeding continues seek medical advice at the numbers below.  Mild procedure site discomfort can be treated with an ice pack and over-the-counter pain relievers.        For 24 hours after any sedation used:  Relax and take it easy.  No strenuous activities.  Do not drive or operate machines at home or at work.  No alcohol consumption.  Do not make any  important or legal decisions.    Call our Interventional Radiology (IR) service if:  If you start bleeding from the procedure site.  If you do start to bleed from the site, lie down and hold some pressure on the site.  Our radiology provider can help you decide if you need to return to the hospital.  If you have new or worsening pain related to the procedure.  If you have concerning swelling at the procedure site.  If you develop persistent nausea or vomiting.  If you develop hives or a rash or any unexplained itching.  If you have a fever of greater than 100.5  F and chills in the first 5 days after procedure.  Any other concerns related to your procedure.      [facility]  Interventional Radiology (IR)  [address]  [address] [facility] [place] [address]    Contact Number:  [phone]  (IR Nurse Triage)  Monday - Friday 7am - 4pm    After hours for urgent concerns:  [phone]  After 4pm Monday - Friday, Weekends and Holidays.   Ask for Interventional Radiology on-call.  Someone is available 24 hours a day.  [facility] toll free number:  [phone]

## 2025-03-28 NOTE — H&P
Interventional Radiology Pre-Procedure Sedation Assessment   Time of Assessment: 8:46 AM    Expected Level: Moderate Sedation    Indication: Sedation is required for the following type of Procedure:  port placement    Sedation and procedural consent: Risks, benefits and alternatives were discussed with Patient    PO Intake: Appropriately NPO for procedure    ASA Class: Class 2 - MILD SYSTEMIC DISEASE, NO ACUTE PROBLEMS, NO FUNCTIONAL LIMITATIONS.    Mallampati: Grade 2:  Soft palate, base of uvula, tonsillar pillars, and portion of posterior pharyngeal wall visible    Lungs: Lungs Clear with good breath sounds bilaterally    Heart: Normal heart sounds and rate    History and physical reviewed and no updates needed. I have reviewed the lab findings, diagnostic data, medications, and the plan for sedation. I have determined this patient to be an appropriate candidate for the planned sedation and procedure and have reassessed the patient IMMEDIATELY PRIOR to sedation and procedure.    Ciaran Carrington MD

## 2025-03-28 NOTE — BRIEF OP NOTE
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  Complications: None.  Implants:   Implant Name Type Inv. Item Serial No.  Lot No. LRB No. Used Action   IMP SMART PORT LOW PROFILE 300 PSI [id number] - [id number] Port IMP SMART PORT LOW PROFILE 300 PSI [id number]  [employer] [id number] Right 1 Implanted           6 [place] 21.5 cm single lumen AngioDynamics Plastic CT Smart Port placed via right internal jugular vein. Tip in the high right atrium.. Heparin locked and ready for use.    3 mg Versed and 100 mcg Fentanyl IV.    Sedation time: 23 minutes.

## 2025-04-02 RX ORDER — ONDANSETRON 8 MG/1
8 TABLET, FILM COATED ORAL EVERY 8 HOURS PRN
Qty: 30 TABLET | Refills: 2 | Status: SHIPPED | OUTPATIENT
Start: 2025-04-02

## 2025-04-02 RX ORDER — DEXAMETHASONE 4 MG/1
8 TABLET ORAL DAILY
Qty: 4 TABLET | Refills: 2 | Status: SHIPPED | OUTPATIENT
Start: 2025-04-02

## 2025-04-02 RX ORDER — PROCHLORPERAZINE MALEATE 10 MG
10 TABLET ORAL EVERY 6 HOURS PRN
Qty: 30 TABLET | Refills: 2 | Status: SHIPPED | OUTPATIENT
Start: 2025-04-02

## 2025-04-03 ENCOUNTER — INFUSION THERAPY VISIT (OUTPATIENT)
Dept: INFUSION THERAPY | Facility: CLINIC | Age: 55
End: 2025-04-03
Attending: INTERNAL MEDICINE
Payer: COMMERCIAL

## 2025-04-03 VITALS
WEIGHT: 147.9 LBS | RESPIRATION RATE: 16 BRPM | SYSTOLIC BLOOD PRESSURE: 132 MMHG | BODY MASS INDEX: 24.67 KG/M2 | TEMPERATURE: 97.7 F | DIASTOLIC BLOOD PRESSURE: 78 MMHG | HEART RATE: 83 BPM | OXYGEN SATURATION: 98 %

## 2025-04-03 DIAGNOSIS — Z17.0 MALIGNANT NEOPLASM OF BREAST IN FEMALE, ESTROGEN RECEPTOR POSITIVE, UNSPECIFIED LATERALITY, UNSPECIFIED SITE OF BREAST (H): Primary | ICD-10-CM

## 2025-04-03 DIAGNOSIS — C20 RECTAL ADENOCARCINOMA (H): Primary | ICD-10-CM

## 2025-04-03 DIAGNOSIS — C50.919 MALIGNANT NEOPLASM OF BREAST IN FEMALE, ESTROGEN RECEPTOR POSITIVE, UNSPECIFIED LATERALITY, UNSPECIFIED SITE OF BREAST (H): Primary | ICD-10-CM

## 2025-04-03 LAB
ALBUMIN SERPL BCG-MCNC: 4.5 G/DL (ref 3.5–5.2)
ALP SERPL-CCNC: 90 U/L (ref 40–150)
ALT SERPL W P-5'-P-CCNC: 23 U/L (ref 0–50)
ANION GAP SERPL CALCULATED.3IONS-SCNC: 9 MMOL/L (ref 7–15)
AST SERPL W P-5'-P-CCNC: 25 U/L (ref 0–45)
BASOPHILS # BLD AUTO: 0 10E3/UL (ref 0–0.2)
BASOPHILS NFR BLD AUTO: 1 %
BILIRUB SERPL-MCNC: 0.3 MG/DL
BUN SERPL-MCNC: 14.3 MG/DL (ref 6–20)
CALCIUM SERPL-MCNC: 9.6 MG/DL (ref 8.8–10.4)
CHLORIDE SERPL-SCNC: 103 MMOL/L (ref 98–107)
CREAT SERPL-MCNC: 0.85 MG/DL (ref 0.51–0.95)
EGFRCR SERPLBLD CKD-EPI 2021: 81 ML/MIN/1.73M2
EOSINOPHIL # BLD AUTO: 0.1 10E3/UL (ref 0–0.7)
EOSINOPHIL NFR BLD AUTO: 3 %
ERYTHROCYTE [DISTWIDTH] IN BLOOD BY AUTOMATED COUNT: 12.4 % (ref 10–15)
GLUCOSE SERPL-MCNC: 98 MG/DL (ref 70–99)
HCO3 SERPL-SCNC: 24 MMOL/L (ref 22–29)
HCT VFR BLD AUTO: 36.6 % (ref 35–47)
HGB BLD-MCNC: 12.8 G/DL (ref 11.7–15.7)
IMM GRANULOCYTES # BLD: 0 10E3/UL
IMM GRANULOCYTES NFR BLD: 1 %
LYMPHOCYTES # BLD AUTO: 1.1 10E3/UL (ref 0.8–5.3)
LYMPHOCYTES NFR BLD AUTO: 22 %
MCH RBC QN AUTO: 29.6 PG (ref 26.5–33)
MCHC RBC AUTO-ENTMCNC: 35 G/DL (ref 31.5–36.5)
MCV RBC AUTO: 85 FL (ref 78–100)
MONOCYTES # BLD AUTO: 0.3 10E3/UL (ref 0–1.3)
MONOCYTES NFR BLD AUTO: 6 %
NEUTROPHILS # BLD AUTO: 3.3 10E3/UL (ref 1.6–8.3)
NEUTROPHILS NFR BLD AUTO: 68 %
NRBC # BLD AUTO: 0 10E3/UL
NRBC BLD AUTO-RTO: 0 /100
PLATELET # BLD AUTO: 269 10E3/UL (ref 150–450)
POTASSIUM SERPL-SCNC: 4.2 MMOL/L (ref 3.4–5.3)
PROT SERPL-MCNC: 6.9 G/DL (ref 6.4–8.3)
RBC # BLD AUTO: 4.32 10E6/UL (ref 3.8–5.2)
SODIUM SERPL-SCNC: 136 MMOL/L (ref 135–145)
WBC # BLD AUTO: 4.9 10E3/UL (ref 4–11)

## 2025-04-03 PROCEDURE — 258N000003 HC RX IP 258 OP 636: Performed by: INTERNAL MEDICINE

## 2025-04-03 PROCEDURE — 84155 ASSAY OF PROTEIN SERUM: CPT | Performed by: INTERNAL MEDICINE

## 2025-04-03 PROCEDURE — 85025 COMPLETE CBC W/AUTO DIFF WBC: CPT | Performed by: INTERNAL MEDICINE

## 2025-04-03 PROCEDURE — S9330 HIT CONT CHEM DIEM: HCPCS

## 2025-04-03 PROCEDURE — 250N000011 HC RX IP 250 OP 636: Performed by: INTERNAL MEDICINE

## 2025-04-03 PROCEDURE — 84132 ASSAY OF SERUM POTASSIUM: CPT | Performed by: INTERNAL MEDICINE

## 2025-04-03 PROCEDURE — 84460 ALANINE AMINO (ALT) (SGPT): CPT | Performed by: INTERNAL MEDICINE

## 2025-04-03 PROCEDURE — 36591 DRAW BLOOD OFF VENOUS DEVICE: CPT | Performed by: INTERNAL MEDICINE

## 2025-04-03 RX ORDER — HEPARIN SODIUM (PORCINE) LOCK FLUSH IV SOLN 100 UNIT/ML 100 UNIT/ML
5 SOLUTION INTRAVENOUS
OUTPATIENT
Start: 2025-04-03

## 2025-04-03 RX ORDER — METHYLPREDNISOLONE SODIUM SUCCINATE 40 MG/ML
40 INJECTION INTRAMUSCULAR; INTRAVENOUS
Status: DISCONTINUED | OUTPATIENT
Start: 2025-04-03 | End: 2025-04-03 | Stop reason: HOSPADM

## 2025-04-03 RX ORDER — EPINEPHRINE 1 MG/ML
0.3 INJECTION, SOLUTION, CONCENTRATE INTRAVENOUS EVERY 5 MIN PRN
Status: DISCONTINUED | OUTPATIENT
Start: 2025-04-03 | End: 2025-04-03 | Stop reason: HOSPADM

## 2025-04-03 RX ORDER — ONDANSETRON 2 MG/ML
8 INJECTION INTRAMUSCULAR; INTRAVENOUS ONCE
Status: COMPLETED | OUTPATIENT
Start: 2025-04-03 | End: 2025-04-03

## 2025-04-03 RX ORDER — HEPARIN SODIUM (PORCINE) LOCK FLUSH IV SOLN 100 UNIT/ML 100 UNIT/ML
5 SOLUTION INTRAVENOUS
Status: CANCELLED | OUTPATIENT
Start: 2025-04-03

## 2025-04-03 RX ORDER — FLUOROURACIL 50 MG/ML
400 INJECTION, SOLUTION INTRAVENOUS ONCE
Status: COMPLETED | OUTPATIENT
Start: 2025-04-03 | End: 2025-04-03

## 2025-04-03 RX ADMIN — FAMOTIDINE 20 MG: 10 INJECTION, SOLUTION INTRAVENOUS at 09:36

## 2025-04-03 RX ADMIN — FLUOROURACIL 720 MG: 50 INJECTION, SOLUTION INTRAVENOUS at 12:23

## 2025-04-03 RX ADMIN — FOSAPREPITANT: 150 INJECTION, POWDER, LYOPHILIZED, FOR SOLUTION INTRAVENOUS at 09:52

## 2025-04-03 RX ADMIN — OXALIPLATIN 150 MG: 5 INJECTION, SOLUTION INTRAVENOUS at 10:17

## 2025-04-03 RX ADMIN — DEXTROSE 250 ML: 5 SOLUTION INTRAVENOUS at 09:34

## 2025-04-03 RX ADMIN — ONDANSETRON 8 MG: 2 INJECTION INTRAMUSCULAR; INTRAVENOUS at 09:34

## 2025-04-03 RX ADMIN — LEUCOVORIN CALCIUM 650 MG: 350 INJECTION, POWDER, LYOPHILIZED, FOR SUSPENSION INTRAMUSCULAR; INTRAVENOUS at 10:21

## 2025-04-03 ASSESSMENT — PAIN SCALES - GENERAL: PAINLEVEL_OUTOF10: NO PAIN (0)

## 2025-04-03 NOTE — PROGRESS NOTES
Infusion Nursing Note:  Desi Granado presents today for port labs.    Patient seen by provider today: No   present during visit today: Not Applicable.    Note: N/A.      Intravenous Access:  Labs drawn without difficulty.  Implanted Port.      Dania Perez RN

## 2025-04-03 NOTE — PROGRESS NOTES
"Infusion Nursing Note:  Desi Granado presents today for C1D1 Modified FOLFAX.    Patient seen by provider today: No   present during visit today: Not Applicable.    Note: Today is Nilda's first visit to [facility]. Oriented her to our unit, discussed meds, side effects, labs, etc. She had brst cancer about 7-8 years ago with mastectomy, but has never had to have chemo. Nilda used iced booties/hand mitts throughout oxaliplatin infusion.      Intravenous Access:  Implanted Port.    Treatment Conditions:  Lab Results   Component Value Date    HGB 12.8 04/03/2025    WBC 4.9 04/03/2025    ANEU 4.9 07/01/2021    ANEUTAUTO 3.3 04/03/2025     04/03/2025        Lab Results   Component Value Date     04/03/2025    POTASSIUM 4.2 04/03/2025    CR 0.85 04/03/2025    MARISELA 9.6 04/03/2025    BILITOTAL 0.3 04/03/2025    ALBUMIN 4.5 04/03/2025    ALT 23 04/03/2025    AST 25 04/03/2025       Results reviewed, labs MET treatment parameters, ok to proceed with treatment.    Chemo Double Check    Protocol verified?: Yes  Height and weight verified?: Yes  BSA confirmed?: Yes  Meets treatment parameters?: Yes  Initial RN verification: FLACA Perez RN  Second RN verification: LENORA Amaro RN    Post Infusion Assessment:  Patient tolerated infusion without incident.   Blood return checked q 3-5 mL during 5FU IVP.    Prior to discharge: Port is secured in place with tegaderm and flushed with 10cc NS with positive blood return noted.    Continuous home infusion Dosi-Fuser pump connected.    All connectors secured in place and clamps taped open.    Pump started, \"running\" noted on display (CADD): Not Applicable.   Capillary element taped to pt's skin per protocol.  Pump Connection double checked with ROWENA Amaro RN.  Patient instructed to call our clinic or [facility] with any questions or concerns at home.  Patient verbalized understanding.    Patient set up for pump disconnect at home with [facility] on Saturday, 4/5 at 1042.        Discharge Plan:   Patient discharged in stable condition accompanied by: self.  Departure Mode: Ambulatory.      Dania Perez RN

## 2025-04-03 NOTE — PROGRESS NOTES
Received C1D1 of Fluorouracil in the [facility] on 4/3/2025 with Fluorouracil hooked up to run at home over 46 hours via C series pump which [facility] nurse will disconnect at home.  Spoke with pt who verifies they have Welcome Folder with [facility] [employer] with 24/7 phone number.  Verbalizes understanding of 24/7 coverage with pharmacist and nurse. They will sign pharmacy and nursing consent and return it in the supplied self-addressed/stamped envelop. Verifies that they have a chemo spill kit in the home. Reviewed potential side effects of chemo drugs and antiemetics. SOC visit scheduled in [facility] on 4/5/25 10:45am.  All questions answered.

## 2025-04-04 PROCEDURE — S9330 HIT CONT CHEM DIEM: HCPCS

## 2025-04-05 ENCOUNTER — HOME CARE VISIT (OUTPATIENT)
Dept: HOME HEALTH SERVICES | Facility: HOME HEALTH | Age: 55
End: 2025-04-05
Payer: COMMERCIAL

## 2025-04-05 VITALS
RESPIRATION RATE: 18 BRPM | DIASTOLIC BLOOD PRESSURE: 78 MMHG | OXYGEN SATURATION: 98 % | HEART RATE: 78 BPM | SYSTOLIC BLOOD PRESSURE: 122 MMHG | TEMPERATURE: 98.9 F

## 2025-04-05 PROCEDURE — 99601 HOME NFS VISIT <2 HRS: CPT

## 2025-04-05 NOTE — PROGRESS NOTES
Nursing Visit Note:  Nurse visit today for SOC and chemo dc for Desi Granado.     present during visit today: Not Applicable.    Intravenous Access:  Implanted Port.    Patient seen today by [facility] for chemotherapy disconnect of  Fluorouracil.    IV Fluids administered: No    Neulasta OnPro/injection administered: No    Note: SOC complete for chemo dc. Consent forms previously signed at infusion center. Discussed 24 hour triage line, welcome folder, chemo spill kit, appointments for disconnect, and went to call. patient did not have any questions.    Patient reports feeling fatigued today, denies pain. Patient denies nausea, has some constipation, but is not concerned about it.    Saline administered: 20 (ml)    Supply Check:   Does the patient have all the supplies they need for the next visit?  Yes    Next visit plan: per chemo cycle    Gladys Kay RN 4/5/2025

## 2025-04-09 ENCOUNTER — TELEPHONE (OUTPATIENT)
Dept: ONCOLOGY | Facility: CLINIC | Age: 55
End: 2025-04-09
Payer: COMMERCIAL

## 2025-04-09 ENCOUNTER — HOME INFUSION (OUTPATIENT)
Dept: HOME HEALTH SERVICES | Facility: HOME HEALTH | Age: 55
End: 2025-04-09
Payer: COMMERCIAL

## 2025-04-09 ENCOUNTER — PATIENT OUTREACH (OUTPATIENT)
Dept: ONCOLOGY | Facility: CLINIC | Age: 55
End: 2025-04-09
Payer: COMMERCIAL

## 2025-04-09 NOTE — TELEPHONE ENCOUNTER
ADA Medical Assessment Form filled out and faxed to [facility] [phone]. Copy placed in scanning.    Angela Tapia, RN, BSN  RN Care Coordinator - Oncology/Hematology  [facility]

## 2025-04-09 NOTE — TELEPHONE ENCOUNTER
Denise Herbert MD  You; Korina Tapia RN44 minutes ago (12:18 PM)       I think try miralax today as well and see if it helps- alongwith senokot     Pt was updated with recommendations per Dr Herbert. This was previously discussed with pt also.   Pt states that she has had another bowel movement today that was a bit larger and was not hard to pass. She will continue with senokot-S and miralax as directed.   Reviewed adjusting the doses of senokot and miralax based on the consistency of her stools.   Reviewed emergent symptoms that would warrant return call to clinic or evaluation in ER.  Patient verbalized understanding and agreement with plan.  Patient was instructed to call the clinic with any questions, concerns, or worsening symptoms.    Araceli Jefferson RN on 4/9/2025 at 1:24 PM

## 2025-04-09 NOTE — TELEPHONE ENCOUNTER
"Oncology Nurse Triage    Situation:   Desi reporting the following symptoms: constipation     Background:   Treating Provider:   Dr Herbert    Date of last office visit: 3/20/2025 with Dr Herbert     Recent Treatments:4/3/2025: C1D1 Modified FOLFOX    Assessment:     Pt is calling to report constipation. States that her last normal bowel  movement was on 4/4/2025. She has been having daily bowel movements since then, but the amount of stool passed has been very small, like \"tiny pieces\" per pt. Her last bowel movement was this morning around 6 am and looked like this also. No blood in stool. Denies abdominal bloating and has been passing gas. States that she does have mild pain in the LLQ of her abdomen over the past few days that she describes as feeling like a gas pain. Was sore when she pushed on the area this morning. Pt states that she does have a history of diverticulitis, and her symptoms in the past have always been pain in the LLQ.   Denies vomiting, but states that she has been nauseated at times. Zofran  has provided relief.   Only had about 30 ounces of water yesterday. Has been eating small amounts of bland foods throughout the day.   Denies chest pain, shortness of breath, fever/chills, weakness, dizziness, or other urgent symptoms.   Took one senokot-S on 4/7 in the evening and one on 4/8 in the evening.      Recommendations:     Advised pt to take one senokot-S tablet this morning. Continue with one tablet BID. Can also add miralax one capful daily.  Discussed adjusting dose of senokot-S and miralax based on stool consistency with goal of one soft and formed bowel movement daily.  Work to increase fluids. Encouraged mild activity. Continue with small bland snacks throughout the day.  Reviewed emergent symptoms that would warrant return call to clinic or evaluation in ER.  Patient verbalized understanding and agreement with plan.  Patient was instructed to call the clinic with any questions, concerns, " or worsening symptoms.    Will route to Dr Herbert for review given history of diverticulitis.  OK to leave message if unable to reach patient.  Araceli Jefferson RN on 4/9/2025 at 8:34 AM

## 2025-04-10 ENCOUNTER — MYC MEDICAL ADVICE (OUTPATIENT)
Dept: ONCOLOGY | Facility: CLINIC | Age: 55
End: 2025-04-10
Payer: COMMERCIAL

## 2025-04-10 DIAGNOSIS — C20 RECTAL CANCER (H): Primary | ICD-10-CM

## 2025-04-10 RX ORDER — LORAZEPAM 1 MG/1
1 TABLET ORAL EVERY 6 HOURS PRN
Qty: 30 TABLET | Refills: 0 | Status: SHIPPED | OUTPATIENT
Start: 2025-04-10

## 2025-04-14 ENCOUNTER — HOME INFUSION BILLING (OUTPATIENT)
Dept: HOME HEALTH SERVICES | Facility: HOME HEALTH | Age: 55
End: 2025-04-14
Payer: COMMERCIAL

## 2025-04-14 PROCEDURE — A9270 NON-COVERED ITEM OR SERVICE: HCPCS

## 2025-04-15 ENCOUNTER — VIRTUAL VISIT (OUTPATIENT)
Dept: ONCOLOGY | Facility: CLINIC | Age: 55
End: 2025-04-15
Attending: INTERNAL MEDICINE
Payer: COMMERCIAL

## 2025-04-15 VITALS — BODY MASS INDEX: 24.61 KG/M2 | HEIGHT: 65 IN

## 2025-04-15 DIAGNOSIS — C20 RECTAL ADENOCARCINOMA (H): ICD-10-CM

## 2025-04-15 DIAGNOSIS — C20 RECTAL CANCER (H): Primary | ICD-10-CM

## 2025-04-15 PROCEDURE — 1126F AMNT PAIN NOTED NONE PRSNT: CPT | Performed by: INTERNAL MEDICINE

## 2025-04-15 PROCEDURE — G2211 COMPLEX E/M VISIT ADD ON: HCPCS | Performed by: INTERNAL MEDICINE

## 2025-04-15 PROCEDURE — 98006 SYNCH AUDIO-VIDEO EST MOD 30: CPT | Performed by: INTERNAL MEDICINE

## 2025-04-15 RX ORDER — DIPHENHYDRAMINE HYDROCHLORIDE 50 MG/ML
25 INJECTION, SOLUTION INTRAMUSCULAR; INTRAVENOUS
Status: CANCELLED
Start: 2025-04-17

## 2025-04-15 RX ORDER — HEPARIN SODIUM,PORCINE 10 UNIT/ML
5-20 VIAL (ML) INTRAVENOUS DAILY PRN
Status: CANCELLED | OUTPATIENT
Start: 2025-04-17

## 2025-04-15 RX ORDER — FLUOROURACIL 50 MG/ML
400 INJECTION, SOLUTION INTRAVENOUS ONCE
Status: CANCELLED | OUTPATIENT
Start: 2025-04-17

## 2025-04-15 RX ORDER — DIPHENHYDRAMINE HYDROCHLORIDE 50 MG/ML
50 INJECTION, SOLUTION INTRAMUSCULAR; INTRAVENOUS
Status: CANCELLED
Start: 2025-04-17

## 2025-04-15 RX ORDER — MEPERIDINE HYDROCHLORIDE 25 MG/ML
25 INJECTION INTRAMUSCULAR; INTRAVENOUS; SUBCUTANEOUS
Status: CANCELLED | OUTPATIENT
Start: 2025-04-17

## 2025-04-15 RX ORDER — METHYLPREDNISOLONE SODIUM SUCCINATE 40 MG/ML
40 INJECTION INTRAMUSCULAR; INTRAVENOUS
Status: CANCELLED
Start: 2025-04-17

## 2025-04-15 RX ORDER — ONDANSETRON 2 MG/ML
8 INJECTION INTRAMUSCULAR; INTRAVENOUS ONCE
Status: CANCELLED | OUTPATIENT
Start: 2025-04-17

## 2025-04-15 RX ORDER — HEPARIN SODIUM (PORCINE) LOCK FLUSH IV SOLN 100 UNIT/ML 100 UNIT/ML
5 SOLUTION INTRAVENOUS
Status: CANCELLED | OUTPATIENT
Start: 2025-04-17

## 2025-04-15 RX ORDER — HEPARIN SODIUM,PORCINE 10 UNIT/ML
5-20 VIAL (ML) INTRAVENOUS DAILY PRN
OUTPATIENT
Start: 2025-04-19

## 2025-04-15 RX ORDER — ALBUTEROL SULFATE 90 UG/1
1-2 INHALANT RESPIRATORY (INHALATION)
Status: CANCELLED
Start: 2025-04-17

## 2025-04-15 RX ORDER — HEPARIN SODIUM (PORCINE) LOCK FLUSH IV SOLN 100 UNIT/ML 100 UNIT/ML
5 SOLUTION INTRAVENOUS
OUTPATIENT
Start: 2025-04-19

## 2025-04-15 RX ORDER — EPINEPHRINE 1 MG/ML
0.3 INJECTION, SOLUTION INTRAMUSCULAR; SUBCUTANEOUS EVERY 5 MIN PRN
Status: CANCELLED | OUTPATIENT
Start: 2025-04-17

## 2025-04-15 RX ORDER — ALBUTEROL SULFATE 0.83 MG/ML
2.5 SOLUTION RESPIRATORY (INHALATION)
Status: CANCELLED | OUTPATIENT
Start: 2025-04-17

## 2025-04-15 ASSESSMENT — PAIN SCALES - GENERAL: PAINLEVEL_OUTOF10: NO PAIN (0)

## 2025-04-15 NOTE — PROGRESS NOTES
Virtual Visit Details    Type of service:  Video Visit     Originating Location (pt. Location): Home    Distant Location (provider location):  On-site  Platform used for Video Visit: Vaccibody  Video start time. 4:03 PM  Video stop time. 4:15 PM       Oncology follow-up visit:  Date on this visit: 4/15/25     Desi Granado  is referred by Dr.Wolfgang Jose Landa for an oncology consultation. She requires evaluation for rectal adenocarcinoma.    Primary Physician: Pearl Grover     History Of Present Illness:  Ms. Granado is a 54 year old female who presents with rectal adenocarcinoma.    I initially saw her on 3/20/2025.  Please see my previous note for details.  I have copied and updated from prior notes.      I have reviewed previous notes from oncologist Dr. Elvia Grijalva because she was being followed for breast cancer.  I have also reviewed notes from colorectal surgeon Dr. Landa.    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  It was pT2 N0 lesion.  Cancer was ER/% positive and HER2/tamara FISH negative.  Oncotype DX score was 0.  She was initially started on tamoxifen in December 2018 and then switched to exemestane and Zoladex in June 2019 and had bilateral salpingo-oophorectomy in February 2023.  After that she remained on exemestane and stopped in early March 2025.  Breast cancer index did not show that she will benefit from extended hormone therapy.    She was having intermittent hematochezia for about 1 year and then on 2/21/2025 she had colonoscopy which showed a fungating nonobstructing medium-sized mass from 9 to 12 cm proximal to the anus.  Mass was noncircumferential and measured 3 cm in length.  This was biopsied.  3 sessile polyps were found in the sigmoid colon and transverse colon measuring 2 to 3 mm in size.  These were resected.  The biopsy from the sigmoid/rectal colon mass  showed moderately differentiated invasive adenocarcinoma, MMR IHC intact.  Biopsy from the sessile polyps was tubular adenoma without any high-grade dysplasia or malignancy.    2/27/2025.  CT chest abdomen and pelvis was done which did not show the rectosigmoid mass but it showed enlarged right mesorectal lymph node consistent with metastatic spread.  No evidence of distant metastasis.    MRI of the rectum showed a T2 N1 disease in the mid rectum.    CEA was 2.2.    She met with Dr. Landa on 3/14/2025 who did a flexible sigmoidoscopy and it showed a 3 cm fungating ulcerated mass located at 7 cm from the anal verge at the right posterolateral aspect of the mid rectum.    Option of neoadjuvant chemotherapy on the basis of PROSPECT trial was discussed.  LUZMA approach was also discussed.  Her case was also discussed in the tumor board with recommendation being starting systemic chemotherapy based on PROSPECT trial followed by surgery and avoiding radiation if possible.      Interval history.  This is a video visit.  On 4/3/2025 she started cycle #1 FOLFOX.  She mentioned that she felt very fatigued for a couple of days after chemotherapy and then slowly the fatigue started to get better and over the last couple of days she has started to feel really good.  She did ice therapy for 15 minutes before oxaliplatin and then during the infusion and tells me that she did not notice any cold sensitivity or neuropathy.  She felt queasy for the first few days and did not like the food which she was eating although she was hungry.  She took Zofran which helped a little bit.  She was having small bowel movement but not really a feeling of constipation and she has been using Senokot and MiraLAX.  No bleeding.  No fevers infection shortness of breath.      ECOG 0    ROS:  A comprehensive ROS was otherwise neg     I reviewed other history in epic as below.        Past Medical/Surgical History:  Past Medical History:   Diagnosis Date     Allergic rhinitis, cause unspecified     Hypertension 8 years?    Injury, other and unspecified, knee, leg, ankle, and foot 8th grade    left ankle injury    Invasive ductal carcinoma of breast, female, left (H) 10/26/2018    Unsatisfactory cervical cytology smear 10/06/2021     Past Surgical History:   Procedure Laterality Date    ABDOMEN SURGERY  02/2023    removed ovaries    BIOPSY      BREAST LUMPECTOMY, RT/LT  06/23/2010    Breast Lumpectomy RT for likely adenomatous lumps    COLONOSCOPY N/A 08/16/2018    Procedure: COLONOSCOPY;  colonoscopy;  Surgeon: Vijay Almanza MD;  Location: [facility]    COLONOSCOPY N/A 05/23/2022    Procedure: COLONOSCOPY, FLEXIBLE, WITH LESION REMOVAL USING SNARE and biopsy;  Surgeon: Nabila Hart MD;  Location: [facility]    COLONOSCOPY N/A 2/21/2025    Procedure: COLONOSCOPY, FLEXIBLE, WITH LESION REMOVAL USING SNARE;  Surgeon: Nabila Hart MD;  Location: [facility]    ESOPHAGOSCOPY, GASTROSCOPY, DUODENOSCOPY (EGD), COMBINED N/A 10/01/2015    Procedure: COMBINED ESOPHAGOSCOPY, GASTROSCOPY, DUODENOSCOPY (EGD);  Surgeon: Vijay Almanza MD;  Location: [facility]    INSERT PORT VASCULAR ACCESS Right 3/28/2025    Procedure: Insert port vascular access;  Surgeon: Ciaran Carrington MD;  Location: [facility] OR    IR CHEST PORT PLACEMENT > 5 YRS OF AGE  3/28/2025    LAPAROSCOPIC SALPINGO-OOPHORECTOMY Bilateral 02/14/2023    Procedure: LAPAROSCOPIC BILATERAL salpingo-oophorectomy;  Surgeon: Elida Fay MD;  Location: [facility]    MASTECTOMY SIMPLE BILATERAL, SENTINEL NODE BILATERAL, COMBINED Bilateral 11/08/2018    Procedure: BILATERAL SIMPLE MASTECTOMIES WITH LEFT SENTINEL LYMPH NODE BIOPSY ;  Surgeon: Nabila Hart MD;  Location:  OR    SOFT TISSUE SURGERY      Ankle surgery 20+ years ago    SURGICAL HISTORY OF -   1989    arthroscopy of Left Ankle    SURGICAL HISTORY OF -   1990    arthroscopy of Left Ankle    SURGICAL HISTORY OF -   1993    4 wisdom teeth extracted     Cancer History:   As  "above    Allergies:  Allergies as of 04/15/2025 - Reviewed 04/15/2025   Allergen Reaction Noted    Aspirin Hives 08/03/2009    Dust mites Unknown 07/03/2009    Morphine Nausea 06/23/2008    Zoloft [sertraline] Anxiety 03/05/2025     Current Medications:  Current Outpatient Medications   Medication Sig Dispense Refill    cetirizine (ZYRTEC) 5 MG tablet Take 5 mg by mouth daily.      Chemo Kit For RN use only. Do not remove items from bag. Contents: 1  sodium chloride 0.9% flush, 4 medium gloves, 1 chemo gown, 1/4 chemo mat, 1 connector female, 1 chemo bag. [id number] kit 0    denosumab (PROLIA) 60 mg/mL injection Inject 60 mg subcutaneously every 6 months.      dexAMETHasone (DECADRON) 4 MG tablet Take 2 tablets (8 mg) by mouth daily. Take for 2 days, starting the day after chemo. Take with food. 4 tablet 2    Emergency Supply Kit, Central, Patient use for emergency only. Contents: 3 sodium chloride 0.9% flushes, 1 dressing kit, 1 microclave ext set 14\", 4 nitrile gloves (med), 6 alcohol prep pads, 1 bacitracin, 1 syringe (10 cc 20 G 1\"). Call [phone] to reorder. [id number] kit 0    exemestane (AROMASIN) 25 MG tablet Take 1 tablet (25 mg) by mouth daily. (Patient not taking: Reported on 3/20/2025) 90 tablet 3    [START ON 4/16/2025] Fluorouracil (ADRUCIL) 4,320 mg in sodium chloride 0.9 % 241 mL via HOMEPUMP C-Series Infuse 4,320 mg at 5.2 mL/hr over 46 hours into the vein once for 1 dose. 174014 mL 0    Fluticasone Propionate (FLONASE ALLERGY RELIEF NA) Spray 1 spray in nostril 2 times daily.      lisinopril (ZESTRIL) 30 MG tablet Take 1 tablet (30 mg) by mouth daily. 90 tablet 3    LORazepam (ATIVAN) 0.5 MG tablet Take 1 tablet (0.5 mg) by mouth every 6 hours as needed for anxiety. 30 tablet 1    LORazepam (ATIVAN) 1 MG tablet Take 1 tablet (1 mg) by mouth every 6 hours as needed for sleep. 30 tablet 0    montelukast (SINGULAIR) 10 MG tablet Take 1 tablet (10 mg) by mouth at bedtime. 90 tablet 3    ondansetron " (ZOFRAN) 8 MG tablet Take 1 tablet (8 mg) by mouth every 8 hours as needed for nausea (vomiting). 30 tablet 2    Port Access Kit For nurse use only.  Do not remove items from bag.  Use for port access.  Do not place syringe on sterile field. [id number] kit 0    prochlorperazine (COMPAZINE) 10 MG tablet Take 1 tablet (10 mg) by mouth every 6 hours as needed for nausea or vomiting. 30 tablet 2    propranolol (INDERAL) 10 MG tablet Take 2 tablets (20 mg) by mouth 2 times daily as needed (anxiety). (Patient not taking: Reported on 4/3/2025) 60 tablet 1    sodium chloride, PF, 0.9% PF flush Inject 10 mLs into the vein as needed for line flush. Flush IV before and after med administration as directed and/or at least every 24 hours, or prior to deaccessing for no further use and/or at least every 4 weeks when not accessed. 487277 mL 0      Family History:  Family History   Problem Relation Age of Onset    Hypertension Mother     Allergies Mother     Thyroid Disease Mother         Taking thyroid medication    Obesity Mother     Cerebrovascular Disease Mother         TIA Feb 2017 and March 2021    Diabetes Mother     Hypertension Father     Thyroid Disease Father         two parathyroids removed    Hyperlipidemia Father     Other Cancer Father         Lymphoma diagnosed 2022    Cancer Maternal Grandmother         Bone CA    Allergies Maternal Grandmother     Circulatory Maternal Grandmother     Cerebrovascular Disease Maternal Grandmother     Other Cancer Maternal Grandmother         multiple myeloma    C.A.D. Maternal Grandfather     Respiratory Maternal Grandfather         asthma    Allergies Maternal Grandfather     Cerebrovascular Disease Maternal Grandfather     Alzheimer Disease Paternal Grandfather     Neurologic Disorder Paternal Grandfather         Parkinsons    Arthritis Paternal Grandmother     Respiratory Other         Emphysema    Diabetes Other     Asthma Other         genetic emphysema    Genetic Disorder Other          genetic emphysema    Breast Cancer Sister         benign lump indicated future risk    Diabetes Other     Coronary Artery Disease Other     Other Cancer Other         multiple myeloma    Colon Cancer Other     Other Cancer Other         Acute Leukemia    Cerebrovascular Disease Other     Breast Cancer Other         told aunt was diagnosed - unsure of type & treatment    Other Cancer Other         Leukemia    Cancer - colorectal No family hx of     Prostate Cancer No family hx of    Paternal Great grand father and 2 paternal great uncles with colon cancer.    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 No   Social History Narrative    Not on file     Social Drivers of Health     Financial Resource Strain: Low Risk  (8/30/2024)    Financial Resource Strain     Within the past 12 months, have you or your family members you live with been unable to get utilities (heat, electricity) when it was really needed?: No   Food Insecurity: No Food Insecurity (3/6/2025)    Received from [facility]    Hunger Vital Sign     Worried About Running Out of Food in the Last Year: Never true     Ran Out of Food in the Last Year: Never true   Transportation Needs: No Transportation Needs (3/6/2025)    Received from [facility]    PRAPARE - Transportation     Lack of Transportation (Medical): No     Lack of Transportation (Non-Medical): No   Physical Activity: Inactive  "(2/27/2025)    Received from [facility]    Exercise Vital Sign     Days of Exercise per Week: 0 days     Minutes of Exercise per Session: 0 min   Stress: No Stress Concern Present (8/30/2024)    [place] [employer] - Occupational Stress Questionnaire     Feeling of Stress : Only a little   Social Connections: Unknown (8/30/2024)    Social Connection and Isolation Panel [NHANES]     Frequency of Communication with Friends and Family: Not on file     Frequency of Social Gatherings with Friends and Family: Twice a week     Attends Yazidism Services: Not on file     Active Member of Clubs or Organizations: Not on file     Attends Club or Organization Meetings: Not on file     Marital Status: Not on file   Interpersonal Safety: Low Risk  (3/28/2025)    Interpersonal Safety     Do you feel physically and emotionally safe where you currently live?: Yes     Within the past 12 months, have you been hit, slapped, kicked or otherwise physically hurt by someone?: No     Within the past 12 months, have you been humiliated or emotionally abused in other ways by your partner or ex-partner?: No   Housing Stability: Low Risk  (3/6/2025)    Received from [facility]    Housing Stability     What is your living situation today?: I have a steady place to live     Physical Exam:  Ht 1.651 m (5' 5\")   LMP 03/14/2019   BMI 24.61 kg/m    Wt Readings from Last 4 Encounters:   04/03/25 67.1 kg (147 lb 14.4 oz)   03/28/25 65.8 kg (145 lb)   03/24/25 67.8 kg (149 lb 8 oz)   03/20/25 67.3 kg (148 lb 6.4 oz)       Constitutional.  Looks well and in no apparent distress.   Eyes.  Without eye redness or apparent jaundice.   Respiratory.  Non labored breathing. Speaking in full sentences.    Skin.  No concerning skin rashes on the skin visualized.   Neurological.  Is alert and oriented.  Psychiatric.  Mood and affect seem appropriate.        Laboratory/Imaging Studies    Reviewed    4/3/2025  CBC unremarkable  CMP " unremarkable    CEA was 2.1 on 3/24/2025.  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    We discussed the situation in detail.    I recommended starting chemotherapy in the neoadjuvant setting based on PROSPECT trial.     Our plan would be to give her 6 cycles of chemotherapy and then repeat  3T MRI rectum and CT chest abdomen and pelvis and repeat flexible sigmoidoscopy.      If we see a good response of 20% or more on repeat imaging, then potentially chemoradiation could be avoided and she can be taken to the surgery.      After the surgery, once the patient has recovered, we will give additional chemotherapy to complete a total of 6 months of treatment.    If on the other hand after 3 months of FOLFOX, there is less than 20% response of the primary tumor, then we will proceed with neoadjuvant chemoradiation followed by surgery.      On 4/3/2025, she started FOLFOX.    We will proceed with cycle #2 FOLFOX tomorrow.    I have ordered scans to be done after cycle #6 of chemotherapy.  She will follow-up with Dr. Landa after that.    Feeling of queasy stomach.  Advised her to take Zofran more regularly to help with this feeling.    Cold sensitivity from oxaliplatin.  She did ice therapy and did not notice any cold sensitivity and plans to do it again.    Altered bowel movement.  She has small bowel movements.  We discussed that she can use Senokot and MiraLAX so that she does not become constipated.        We did not address the following today    Breast cancer.  She has left-sided hormone receptor positive breast cancer and she completed adjuvant exemestane in early March 2025.   Breast cancer index shows that she is unlikely to benefit from extended hormonal therapy.  Being followed by Dr. Grijalva.      RTC as scheduled        All questions were answered to her satisfaction.  She is agreeable and comfortable with  the plan.    Denise Herbert MD     The longitudinal plan of care for the rectal cancer as documented were addressed during this visit. Due to the added complexity in care, I will continue to support Nilda in the subsequent management and with ongoing continuity of care.

## 2025-04-15 NOTE — NURSING NOTE
Current patient location: [address] [place] [address]    Is the patient currently in the state of [place]? YES    Visit mode: VIDEO    If the visit is dropped, the patient can be reconnected by:VIDEO VISIT: Text to cell phone:   Telephone Information:   Mobile [phone]   Mobile [phone]       Will anyone else be joining the visit? NO  (If patient encounters technical issues they should call [phone]    Are changes needed to the allergy or medication list? No, Pt stated no changes to allergies, and Pt stated no med changes    Are refills needed on medications prescribed by this physician? NO    Rooming Documentation:  Questionnaire(s) completed    Reason for visit: RECHECK (Return [facility])    Ashley LUND

## 2025-04-15 NOTE — LETTER
4/15/2025      Desi Granado  [address] [place] [address]      Dear Colleague,    Thank you for referring your patient, Desi Granado, to the [facility]. Please see a copy of my visit note below.    Virtual Visit Details    Type of service:  Video Visit     Originating Location (pt. Location): Home    Distant Location (provider location):  On-site  Platform used for Video Visit: [employer]  Video start time. 4:03 PM  Video stop time. 4:15 PM       Oncology follow-up visit:  Date on this visit: 4/15/25     Desi Granado  is referred by Dr.Wolfgang Jose Landa for an oncology consultation. She requires evaluation for rectal adenocarcinoma.    Primary Physician: Pearl Grover     History Of Present Illness:  Ms. Granado is a 54 year old female who presents with rectal adenocarcinoma.    I initially saw her on 3/20/2025.  Please see my previous note for details.  I have copied and updated from prior notes.      I have reviewed previous notes from oncologist Dr. Elvia Grijalva because she was being followed for breast cancer.  I have also reviewed notes from colorectal surgeon Dr. Landa.    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  It was pT2 N0 lesion.  Cancer was ER/% positive and HER2/tamara FISH negative.  Oncotype DX score was 0.  She was initially started on tamoxifen in December 2018 and then switched to exemestane and Zoladex in June 2019 and had bilateral salpingo-oophorectomy in February 2023.  After that she remained on exemestane and stopped in early March 2025.  Breast cancer index did not show that she will benefit from extended hormone therapy.    She was having intermittent hematochezia for about 1 year and then on 2/21/2025 she had colonoscopy which showed a fungating nonobstructing medium-sized mass from 9 to 12 cm  proximal to the anus.  Mass was noncircumferential and measured 3 cm in length.  This was biopsied.  3 sessile polyps were found in the sigmoid colon and transverse colon measuring 2 to 3 mm in size.  These were resected.  The biopsy from the sigmoid/rectal colon mass showed moderately differentiated invasive adenocarcinoma, MMR IHC intact.  Biopsy from the sessile polyps was tubular adenoma without any high-grade dysplasia or malignancy.    2/27/2025.  CT chest abdomen and pelvis was done which did not show the rectosigmoid mass but it showed enlarged right mesorectal lymph node consistent with metastatic spread.  No evidence of distant metastasis.    MRI of the rectum showed a T2 N1 disease in the mid rectum.    CEA was 2.2.    She met with Dr. Landa on 3/14/2025 who did a flexible sigmoidoscopy and it showed a 3 cm fungating ulcerated mass located at 7 cm from the anal verge at the right posterolateral aspect of the mid rectum.    Option of neoadjuvant chemotherapy on the basis of PROSPECT trial was discussed.  LUZMA approach was also discussed.  Her case was also discussed in the tumor board with recommendation being starting systemic chemotherapy based on PROSPECT trial followed by surgery and avoiding radiation if possible.      Interval history.  This is a video visit.  On 4/3/2025 she started cycle #1 FOLFOX.  She mentioned that she felt very fatigued for a couple of days after chemotherapy and then slowly the fatigue started to get better and over the last couple of days she has started to feel really good.  She did ice therapy for 15 minutes before oxaliplatin and then during the infusion and tells me that she did not notice any cold sensitivity or neuropathy.  She felt queasy for the first few days and did not like the food which she was eating although she was hungry.  She took Zofran which helped a little bit.  She was having small bowel movement but not really a feeling of constipation and she has  been using Senokot and MiraLAX.  No bleeding.  No fevers infection shortness of breath.      ECOG 0    ROS:  A comprehensive ROS was otherwise neg     I reviewed other history in epic as below.        Past Medical/Surgical History:  Past Medical History:   Diagnosis Date     Allergic rhinitis, cause unspecified      Hypertension 8 years?     Injury, other and unspecified, knee, leg, ankle, and foot 8th grade    left ankle injury     Invasive ductal carcinoma of breast, female, left (H) 10/26/2018     Unsatisfactory cervical cytology smear 10/06/2021     Past Surgical History:   Procedure Laterality Date     ABDOMEN SURGERY  02/2023    removed ovaries     BIOPSY       BREAST LUMPECTOMY, RT/LT  06/23/2010    Breast Lumpectomy RT for likely adenomatous lumps     COLONOSCOPY N/A 08/16/2018    Procedure: COLONOSCOPY;  colonoscopy;  Surgeon: Vijay Almanza MD;  Location: [facility]     COLONOSCOPY N/A 05/23/2022    Procedure: COLONOSCOPY, FLEXIBLE, WITH LESION REMOVAL USING SNARE and biopsy;  Surgeon: Nabila Hart MD;  Location: [facility]     COLONOSCOPY N/A 2/21/2025    Procedure: COLONOSCOPY, FLEXIBLE, WITH LESION REMOVAL USING SNARE;  Surgeon: Nabila Hart MD;  Location: [facility]     ESOPHAGOSCOPY, GASTROSCOPY, DUODENOSCOPY (EGD), COMBINED N/A 10/01/2015    Procedure: COMBINED ESOPHAGOSCOPY, GASTROSCOPY, DUODENOSCOPY (EGD);  Surgeon: Vijay Almanza MD;  Location: [facility]     INSERT PORT VASCULAR ACCESS Right 3/28/2025    Procedure: Insert port vascular access;  Surgeon: Ciaran Carrington MD;  Location: [facility]      CHEST PORT PLACEMENT > 5 YRS OF AGE  3/28/2025     LAPAROSCOPIC SALPINGO-OOPHORECTOMY Bilateral 02/14/2023    Procedure: LAPAROSCOPIC BILATERAL salpingo-oophorectomy;  Surgeon: Elida Fay MD;  Location: [facility]     MASTECTOMY SIMPLE BILATERAL, SENTINEL NODE BILATERAL, COMBINED Bilateral 11/08/2018    Procedure: BILATERAL SIMPLE MASTECTOMIES WITH LEFT SENTINEL LYMPH NODE BIOPSY ;  Surgeon: Hailey  "MD Nabila;  Location: [facility]     SOFT TISSUE SURGERY      Ankle surgery 20+ years ago     SURGICAL HISTORY OF -   1989    arthroscopy of Left Ankle     SURGICAL HISTORY OF -   1990    arthroscopy of Left Ankle     SURGICAL HISTORY OF -   1993    4 wisdom teeth extracted     Cancer History:   As above    Allergies:  Allergies as of 04/15/2025 - Reviewed 04/15/2025   Allergen Reaction Noted     Aspirin Hives 08/03/2009     Dust mites Unknown 07/03/2009     Morphine Nausea 06/23/2008     Zoloft [sertraline] Anxiety 03/05/2025     Current Medications:  Current Outpatient Medications   Medication Sig Dispense Refill     cetirizine (ZYRTEC) 5 MG tablet Take 5 mg by mouth daily.       Chemo Kit For RN use only. Do not remove items from bag. Contents: 1  sodium chloride 0.9% flush, 4 medium gloves, 1 chemo gown, 1/4 chemo mat, 1 connector female, 1 chemo bag. [id number] kit 0     denosumab (PROLIA) 60 mg/mL injection Inject 60 mg subcutaneously every 6 months.       dexAMETHasone (DECADRON) 4 MG tablet Take 2 tablets (8 mg) by mouth daily. Take for 2 days, starting the day after chemo. Take with food. 4 tablet 2     Emergency Supply Kit, Central, Patient use for emergency only. Contents: 3 sodium chloride 0.9% flushes, 1 dressing kit, 1 microclave ext set 14\", 4 nitrile gloves (med), 6 alcohol prep pads, 1 bacitracin, 1 syringe (10 cc 20 G 1\"). Call [phone] to reorder. [id number] kit 0     exemestane (AROMASIN) 25 MG tablet Take 1 tablet (25 mg) by mouth daily. (Patient not taking: Reported on 3/20/2025) 90 tablet 3     [START ON 4/16/2025] Fluorouracil (ADRUCIL) 4,320 mg in sodium chloride 0.9 % 241 mL via HOMEPUMP C-Series Infuse 4,320 mg at 5.2 mL/hr over 46 hours into the vein once for 1 dose. 250795 mL 0     Fluticasone Propionate (FLONASE ALLERGY RELIEF NA) Spray 1 spray in nostril 2 times daily.       lisinopril (ZESTRIL) 30 MG tablet Take 1 tablet (30 mg) by mouth daily. 90 tablet 3     LORazepam (ATIVAN) 0.5 MG " tablet Take 1 tablet (0.5 mg) by mouth every 6 hours as needed for anxiety. 30 tablet 1     LORazepam (ATIVAN) 1 MG tablet Take 1 tablet (1 mg) by mouth every 6 hours as needed for sleep. 30 tablet 0     montelukast (SINGULAIR) 10 MG tablet Take 1 tablet (10 mg) by mouth at bedtime. 90 tablet 3     ondansetron (ZOFRAN) 8 MG tablet Take 1 tablet (8 mg) by mouth every 8 hours as needed for nausea (vomiting). 30 tablet 2     Port Access Kit For nurse use only.  Do not remove items from bag.  Use for port access.  Do not place syringe on sterile field. [id number] kit 0     prochlorperazine (COMPAZINE) 10 MG tablet Take 1 tablet (10 mg) by mouth every 6 hours as needed for nausea or vomiting. 30 tablet 2     propranolol (INDERAL) 10 MG tablet Take 2 tablets (20 mg) by mouth 2 times daily as needed (anxiety). (Patient not taking: Reported on 4/3/2025) 60 tablet 1     sodium chloride, PF, 0.9% PF flush Inject 10 mLs into the vein as needed for line flush. Flush IV before and after med administration as directed and/or at least every 24 hours, or prior to deaccessing for no further use and/or at least every 4 weeks when not accessed. 367793 mL 0      Family History:  Family History   Problem Relation Age of Onset     Hypertension Mother      Allergies Mother      Thyroid Disease Mother         Taking thyroid medication     Obesity Mother      Cerebrovascular Disease Mother         TIA Feb 2017 and March 2021     Diabetes Mother      Hypertension Father      Thyroid Disease Father         two parathyroids removed     Hyperlipidemia Father      Other Cancer Father         Lymphoma diagnosed 2022     Cancer Maternal Grandmother         Bone CA     Allergies Maternal Grandmother      Circulatory Maternal Grandmother      Cerebrovascular Disease Maternal Grandmother      Other Cancer Maternal Grandmother         multiple myeloma     C.A.D. Maternal Grandfather      Respiratory Maternal Grandfather         asthma     Allergies  Maternal Grandfather      Cerebrovascular Disease Maternal Grandfather      Alzheimer Disease Paternal Grandfather      Neurologic Disorder Paternal Grandfather         Parkinsons     Arthritis Paternal Grandmother      Respiratory Other         Emphysema     Diabetes Other      Asthma Other         genetic emphysema     Genetic Disorder Other         genetic emphysema     Breast Cancer Sister         benign lump indicated future risk     Diabetes Other      Coronary Artery Disease Other      Other Cancer Other         multiple myeloma     Colon Cancer Other      Other Cancer Other         Acute Leukemia     Cerebrovascular Disease Other      Breast Cancer Other         told aunt was diagnosed - unsure of type & treatment     Other Cancer Other         Leukemia     Cancer - colorectal No family hx of      Prostate Cancer No family hx of    Paternal Great grand father and 2 paternal great uncles with colon cancer.    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 No   Social History Narrative     Not on file     Social Drivers of Health     Financial Resource Strain: Low Risk  (8/30/2024)    Financial Resource Strain      Within the past 12 months, have you or your family members you live with been unable to get utilities (heat, electricity) when it was really needed?: No   Food Insecurity: No Food Insecurity (3/6/2025)    Received from [facility]    Hunger Vital Sign      Worried About Running Out of Food in the Last Year: Never true      Ran Out of Food in the Last Year: Never true   Transportation Needs: No Transportation Needs (3/6/2025)    Received from [facility]    PRAPARE - Transportation      Lack of Transportation (Medical): No      Lack of Transportation (Non-Medical): No   Physical Activity: Inactive (2/27/2025)    Received from [facility]    Exercise Vital Sign      Days of Exercise per Week: 0 days      Minutes of Exercise per Session: 0 min   Stress: No Stress Concern Present (8/30/2024)    [place] [employer] - Occupational Stress Questionnaire      Feeling of Stress : Only a little   Social Connections: Unknown (8/30/2024)    Social Connection and Isolation Panel [NHANES]      Frequency of Communication with Friends and Family: Not on file      Frequency of Social Gatherings with Friends and Family: Twice a week      Attends Islam Services: Not on file      Active Member of Clubs or Organizations: Not on file      Attends Club or Organization Meetings: Not on file      Marital Status: Not on file   Interpersonal Safety: Low Risk  (3/28/2025)    Interpersonal Safety      Do you feel physically and emotionally safe where you currently live?: Yes      Within the past 12 months, have you been hit, slapped, kicked or otherwise physically hurt by someone?: No      Within the past 12 months, have you been humiliated or emotionally abused in other ways by your partner or ex-partner?: No   Housing Stability: Low Risk  (3/6/2025)    Received from [facility]    Housing Stability      What is your living situation today?: I have a steady place to live     Physical Exam:  Ht 1.651 m (5' 5\")   LMP 03/14/2019   BMI 24.61 kg/m    Wt Readings from Last 4 Encounters:   04/03/25 67.1 kg (147 lb 14.4 oz)   03/28/25 65.8 kg (145 lb)   03/24/25 67.8 kg (149 lb 8 oz)   03/20/25 67.3 kg (148 lb 6.4 oz)       Constitutional.  " Looks well and in no apparent distress.   Eyes.  Without eye redness or apparent jaundice.   Respiratory.  Non labored breathing. Speaking in full sentences.    Skin.  No concerning skin rashes on the skin visualized.   Neurological.  Is alert and oriented.  Psychiatric.  Mood and affect seem appropriate.        Laboratory/Imaging Studies    Reviewed    4/3/2025  CBC unremarkable  CMP unremarkable    CEA was 2.1 on 3/24/2025.  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    We discussed the situation in detail.    I recommended starting chemotherapy in the neoadjuvant setting based on PROSPECT trial.     Our plan would be to give her 6 cycles of chemotherapy and then repeat  3T MRI rectum and CT chest abdomen and pelvis and repeat flexible sigmoidoscopy.      If we see a good response of 20% or more on repeat imaging, then potentially chemoradiation could be avoided and she can be taken to the surgery.      After the surgery, once the patient has recovered, we will give additional chemotherapy to complete a total of 6 months of treatment.    If on the other hand after 3 months of FOLFOX, there is less than 20% response of the primary tumor, then we will proceed with neoadjuvant chemoradiation followed by surgery.      On 4/3/2025, she started FOLFOX.    We will proceed with cycle #2 FOLFOX tomorrow.    I have ordered scans to be done after cycle #6 of chemotherapy.  She will follow-up with Dr. Landa after that.    Feeling of queasy stomach.  Advised her to take Zofran more regularly to help with this feeling.    Cold sensitivity from oxaliplatin.  She did ice therapy and did not notice any cold sensitivity and plans to do it again.    Altered bowel movement.  She has small bowel movements.  We discussed that she can use Senokot and MiraLAX so that she does not become constipated.        We did not address the  following today    Breast cancer.  She has left-sided hormone receptor positive breast cancer and she completed adjuvant exemestane in early March 2025.   Breast cancer index shows that she is unlikely to benefit from extended hormonal therapy.  Being followed by Dr. Grijalva.      RTC as scheduled        All questions were answered to her satisfaction.  She is agreeable and comfortable with the plan.    Denise Herbert MD     The longitudinal plan of care for the rectal cancer as documented were addressed during this visit. Due to the added complexity in care, I will continue to support Nilda in the subsequent management and with ongoing continuity of care.        Again, thank you for allowing me to participate in the care of your patient.        Sincerely,        Denise Herbert MD    Electronically signed

## 2025-04-16 ENCOUNTER — INFUSION THERAPY VISIT (OUTPATIENT)
Dept: INFUSION THERAPY | Facility: CLINIC | Age: 55
End: 2025-04-16
Attending: INTERNAL MEDICINE
Payer: COMMERCIAL

## 2025-04-16 ENCOUNTER — DOCUMENTATION ONLY (OUTPATIENT)
Dept: HOME HEALTH SERVICES | Facility: HOME HEALTH | Age: 55
End: 2025-04-16
Payer: COMMERCIAL

## 2025-04-16 VITALS
TEMPERATURE: 97.7 F | RESPIRATION RATE: 16 BRPM | BODY MASS INDEX: 24.13 KG/M2 | DIASTOLIC BLOOD PRESSURE: 70 MMHG | OXYGEN SATURATION: 100 % | SYSTOLIC BLOOD PRESSURE: 138 MMHG | HEART RATE: 95 BPM | WEIGHT: 145 LBS

## 2025-04-16 DIAGNOSIS — C50.919 MALIGNANT NEOPLASM OF BREAST IN FEMALE, ESTROGEN RECEPTOR POSITIVE, UNSPECIFIED LATERALITY, UNSPECIFIED SITE OF BREAST (H): Primary | ICD-10-CM

## 2025-04-16 DIAGNOSIS — Z17.0 MALIGNANT NEOPLASM OF BREAST IN FEMALE, ESTROGEN RECEPTOR POSITIVE, UNSPECIFIED LATERALITY, UNSPECIFIED SITE OF BREAST (H): Primary | ICD-10-CM

## 2025-04-16 DIAGNOSIS — C20 RECTAL ADENOCARCINOMA (H): Primary | ICD-10-CM

## 2025-04-16 LAB
ALBUMIN SERPL BCG-MCNC: 3.9 G/DL (ref 3.5–5.2)
ALP SERPL-CCNC: 111 U/L (ref 40–150)
ALT SERPL W P-5'-P-CCNC: 20 U/L (ref 0–50)
ANION GAP SERPL CALCULATED.3IONS-SCNC: 9 MMOL/L (ref 7–15)
AST SERPL W P-5'-P-CCNC: 16 U/L (ref 0–45)
BASOPHILS # BLD AUTO: 0 10E3/UL (ref 0–0.2)
BASOPHILS NFR BLD AUTO: 1 %
BILIRUB SERPL-MCNC: 0.2 MG/DL
BUN SERPL-MCNC: 9.6 MG/DL (ref 6–20)
CALCIUM SERPL-MCNC: 9.6 MG/DL (ref 8.8–10.4)
CHLORIDE SERPL-SCNC: 100 MMOL/L (ref 98–107)
CREAT SERPL-MCNC: 0.74 MG/DL (ref 0.51–0.95)
EGFRCR SERPLBLD CKD-EPI 2021: >90 ML/MIN/1.73M2
EOSINOPHIL # BLD AUTO: 0.1 10E3/UL (ref 0–0.7)
EOSINOPHIL NFR BLD AUTO: 3 %
ERYTHROCYTE [DISTWIDTH] IN BLOOD BY AUTOMATED COUNT: 12.1 % (ref 10–15)
GLUCOSE SERPL-MCNC: 95 MG/DL (ref 70–99)
HCO3 SERPL-SCNC: 24 MMOL/L (ref 22–29)
HCT VFR BLD AUTO: 30.9 % (ref 35–47)
HGB BLD-MCNC: 10.9 G/DL (ref 11.7–15.7)
IMM GRANULOCYTES # BLD: 0 10E3/UL
IMM GRANULOCYTES NFR BLD: 1 %
LYMPHOCYTES # BLD AUTO: 1.2 10E3/UL (ref 0.8–5.3)
LYMPHOCYTES NFR BLD AUTO: 37 %
MCH RBC QN AUTO: 29.1 PG (ref 26.5–33)
MCHC RBC AUTO-ENTMCNC: 35.3 G/DL (ref 31.5–36.5)
MCV RBC AUTO: 82 FL (ref 78–100)
MONOCYTES # BLD AUTO: 0.4 10E3/UL (ref 0–1.3)
MONOCYTES NFR BLD AUTO: 14 %
NEUTROPHILS # BLD AUTO: 1.4 10E3/UL (ref 1.6–8.3)
NEUTROPHILS NFR BLD AUTO: 45 %
NRBC # BLD AUTO: 0 10E3/UL
NRBC BLD AUTO-RTO: 0 /100
PLATELET # BLD AUTO: 269 10E3/UL (ref 150–450)
POTASSIUM SERPL-SCNC: 4.3 MMOL/L (ref 3.4–5.3)
PROT SERPL-MCNC: 6.4 G/DL (ref 6.4–8.3)
RBC # BLD AUTO: 3.75 10E6/UL (ref 3.8–5.2)
SODIUM SERPL-SCNC: 133 MMOL/L (ref 135–145)
WBC # BLD AUTO: 3.1 10E3/UL (ref 4–11)

## 2025-04-16 PROCEDURE — 36591 DRAW BLOOD OFF VENOUS DEVICE: CPT | Performed by: INTERNAL MEDICINE

## 2025-04-16 PROCEDURE — 250N000011 HC RX IP 250 OP 636: Performed by: INTERNAL MEDICINE

## 2025-04-16 PROCEDURE — S9330 HIT CONT CHEM DIEM: HCPCS

## 2025-04-16 PROCEDURE — 250N000011 HC RX IP 250 OP 636: Mod: JZ | Performed by: INTERNAL MEDICINE

## 2025-04-16 PROCEDURE — 85004 AUTOMATED DIFF WBC COUNT: CPT | Performed by: INTERNAL MEDICINE

## 2025-04-16 PROCEDURE — 82435 ASSAY OF BLOOD CHLORIDE: CPT | Performed by: INTERNAL MEDICINE

## 2025-04-16 PROCEDURE — 82947 ASSAY GLUCOSE BLOOD QUANT: CPT | Performed by: INTERNAL MEDICINE

## 2025-04-16 PROCEDURE — 96374 THER/PROPH/DIAG INJ IV PUSH: CPT

## 2025-04-16 PROCEDURE — 258N000003 HC RX IP 258 OP 636: Performed by: INTERNAL MEDICINE

## 2025-04-16 RX ORDER — FLUOROURACIL 50 MG/ML
400 INJECTION, SOLUTION INTRAVENOUS ONCE
Status: COMPLETED | OUTPATIENT
Start: 2025-04-16 | End: 2025-04-16

## 2025-04-16 RX ORDER — HEPARIN SODIUM (PORCINE) LOCK FLUSH IV SOLN 100 UNIT/ML 100 UNIT/ML
5 SOLUTION INTRAVENOUS
OUTPATIENT
Start: 2025-04-16

## 2025-04-16 RX ORDER — ONDANSETRON 2 MG/ML
8 INJECTION INTRAMUSCULAR; INTRAVENOUS ONCE
Status: COMPLETED | OUTPATIENT
Start: 2025-04-16 | End: 2025-04-16

## 2025-04-16 RX ORDER — HEPARIN SODIUM (PORCINE) LOCK FLUSH IV SOLN 100 UNIT/ML 100 UNIT/ML
5 SOLUTION INTRAVENOUS
Status: CANCELLED | OUTPATIENT
Start: 2025-04-16

## 2025-04-16 RX ADMIN — OXALIPLATIN 150 MG: 5 INJECTION, SOLUTION INTRAVENOUS at 14:10

## 2025-04-16 RX ADMIN — ALTEPLASE 2 MG: 2.2 INJECTION, POWDER, LYOPHILIZED, FOR SOLUTION INTRAVENOUS at 11:00

## 2025-04-16 RX ADMIN — ONDANSETRON 8 MG: 2 INJECTION INTRAMUSCULAR; INTRAVENOUS at 13:01

## 2025-04-16 RX ADMIN — ALTEPLASE 2 MG: 2.2 INJECTION, POWDER, LYOPHILIZED, FOR SOLUTION INTRAVENOUS at 09:49

## 2025-04-16 RX ADMIN — FAMOTIDINE 20 MG: 10 INJECTION, SOLUTION INTRAVENOUS at 13:03

## 2025-04-16 RX ADMIN — FOSAPREPITANT: 150 INJECTION, POWDER, LYOPHILIZED, FOR SOLUTION INTRAVENOUS at 13:21

## 2025-04-16 RX ADMIN — LEUCOVORIN CALCIUM 650 MG: 350 INJECTION, POWDER, LYOPHILIZED, FOR SUSPENSION INTRAMUSCULAR; INTRAVENOUS at 14:15

## 2025-04-16 RX ADMIN — FLUOROURACIL 720 MG: 50 INJECTION, SOLUTION INTRAVENOUS at 16:19

## 2025-04-16 RX ADMIN — DEXTROSE MONOHYDRATE 250 ML: 50 INJECTION, SOLUTION INTRAVENOUS at 12:56

## 2025-04-16 ASSESSMENT — PAIN SCALES - GENERAL: PAINLEVEL_OUTOF10: NO PAIN (0)

## 2025-04-16 NOTE — PROGRESS NOTES
Infusion Nursing Note:  Desi Granado presents today for port labs.    Patient seen by provider today: No. Visit with Pillow yesterday.   present during visit today: Not Applicable.    Note: Difficulty getting port to work today. Used Alteplase X 2.      Intravenous Access:  Labs drawn with some difficulty.  Implanted Port.      Dania Perez RN

## 2025-04-16 NOTE — PROGRESS NOTES
[facility] d/c of Fluorouracil continuous infusion over 46 hours via C series pump.   Scheduled in [facility] for home disconnect on 4/18/25 at 2:30pm.  No Neulasta OnPro/IV fluids needed at this visit.

## 2025-04-16 NOTE — PROGRESS NOTES
"Infusion Nursing Note:  Desi Granado presents today for C2D1 Modified FOLFAX-6.    Patient seen by provider today: No. Visit with Keon yesterday.   present during visit today: Not Applicable.    Note: Doing well. Only significant side effect from first cycle of chemo was increased fatigue. Very please about no neuropathy. She's been using ice on hands/feet during her oxaliplatin infusion.      Intravenous Access:  Implanted Port.    Treatment Conditions:  Lab Results   Component Value Date    HGB 10.9 (L) 04/16/2025    WBC 3.1 (L) 04/16/2025    ANEU 4.9 07/01/2021    ANEUTAUTO 1.4 (L) 04/16/2025     04/16/2025        Lab Results   Component Value Date     (L) 04/16/2025    POTASSIUM 4.3 04/16/2025    CR 0.74 04/16/2025    MARISELA 9.6 04/16/2025    BILITOTAL 0.2 04/16/2025    ALBUMIN 3.9 04/16/2025    ALT 20 04/16/2025    AST 16 04/16/2025       Results reviewed, labs MET treatment parameters, ok to proceed with treatment.      Post Infusion Assessment:  Patient tolerated infusion without incident.     Prior to discharge: Port is secured in place with tegaderm and flushed with 10cc NS with positive blood return noted.    Continuous home infusion Dosi-Fuser pump connected.    All connectors secured in place and clamps taped open.    Pump started, \"running\" noted on display (CADD): Not Applicable.   Capillary element taped to pt's skin per protocol.  Pump Connection double checked with LENORA Amaro RN.  Patient instructed to call our clinic or [facility] with any questions or concerns at home.  Patient verbalized understanding.    Patient set up for pump disconnect at home with [facility] on Friday, April 18 @ 1415.        Discharge Plan:   Patient discharged in stable condition accompanied by: self.  Departure Mode: Ambulatory.      Dania Perez RN  "

## 2025-04-17 PROCEDURE — S9330 HIT CONT CHEM DIEM: HCPCS

## 2025-04-22 ENCOUNTER — MYC MEDICAL ADVICE (OUTPATIENT)
Dept: ONCOLOGY | Facility: CLINIC | Age: 55
End: 2025-04-22
Payer: COMMERCIAL

## 2025-04-22 DIAGNOSIS — C20 RECTAL ADENOCARCINOMA (H): ICD-10-CM

## 2025-04-23 ENCOUNTER — HOME INFUSION (OUTPATIENT)
Dept: HOME HEALTH SERVICES | Facility: HOME HEALTH | Age: 55
End: 2025-04-23
Payer: COMMERCIAL

## 2025-04-23 DIAGNOSIS — C20 RECTAL ADENOCARCINOMA (H): ICD-10-CM

## 2025-04-23 RX ORDER — ONDANSETRON 8 MG/1
8 TABLET, FILM COATED ORAL EVERY 8 HOURS PRN
Qty: 30 TABLET | Refills: 2 | Status: SHIPPED | OUTPATIENT
Start: 2025-04-23

## 2025-04-25 ENCOUNTER — HOME INFUSION BILLING (OUTPATIENT)
Dept: HOME HEALTH SERVICES | Facility: HOME HEALTH | Age: 55
End: 2025-04-25
Payer: COMMERCIAL

## 2025-04-25 PROCEDURE — A9270 NON-COVERED ITEM OR SERVICE: HCPCS

## 2025-04-29 ENCOUNTER — VIRTUAL VISIT (OUTPATIENT)
Dept: ONCOLOGY | Facility: CLINIC | Age: 55
End: 2025-04-29
Attending: NURSE PRACTITIONER
Payer: COMMERCIAL

## 2025-04-29 VITALS — WEIGHT: 142 LBS | BODY MASS INDEX: 23.66 KG/M2 | HEIGHT: 65 IN

## 2025-04-29 DIAGNOSIS — T45.1X5A CHEMOTHERAPY-INDUCED NAUSEA: ICD-10-CM

## 2025-04-29 DIAGNOSIS — C20 RECTAL ADENOCARCINOMA (H): ICD-10-CM

## 2025-04-29 DIAGNOSIS — R63.4 WEIGHT LOSS: Primary | ICD-10-CM

## 2025-04-29 DIAGNOSIS — D63.0 ANEMIA IN NEOPLASTIC DISEASE: ICD-10-CM

## 2025-04-29 DIAGNOSIS — R11.0 CHEMOTHERAPY-INDUCED NAUSEA: ICD-10-CM

## 2025-04-29 PROCEDURE — 1126F AMNT PAIN NOTED NONE PRSNT: CPT | Performed by: NURSE PRACTITIONER

## 2025-04-29 PROCEDURE — 98006 SYNCH AUDIO-VIDEO EST MOD 30: CPT | Performed by: NURSE PRACTITIONER

## 2025-04-29 RX ORDER — METHYLPREDNISOLONE SODIUM SUCCINATE 40 MG/ML
40 INJECTION INTRAMUSCULAR; INTRAVENOUS
Status: CANCELLED
Start: 2025-05-01

## 2025-04-29 RX ORDER — ALBUTEROL SULFATE 90 UG/1
1-2 INHALANT RESPIRATORY (INHALATION)
Status: CANCELLED
Start: 2025-05-01

## 2025-04-29 RX ORDER — FLUOROURACIL 50 MG/ML
400 INJECTION, SOLUTION INTRAVENOUS ONCE
Status: CANCELLED | OUTPATIENT
Start: 2025-05-01

## 2025-04-29 RX ORDER — MEPERIDINE HYDROCHLORIDE 25 MG/ML
25 INJECTION INTRAMUSCULAR; INTRAVENOUS; SUBCUTANEOUS
Status: CANCELLED | OUTPATIENT
Start: 2025-05-01

## 2025-04-29 RX ORDER — HEPARIN SODIUM (PORCINE) LOCK FLUSH IV SOLN 100 UNIT/ML 100 UNIT/ML
5 SOLUTION INTRAVENOUS
Status: CANCELLED | OUTPATIENT
Start: 2025-05-01

## 2025-04-29 RX ORDER — DIPHENHYDRAMINE HYDROCHLORIDE 50 MG/ML
50 INJECTION, SOLUTION INTRAMUSCULAR; INTRAVENOUS
Status: CANCELLED
Start: 2025-05-01

## 2025-04-29 RX ORDER — HEPARIN SODIUM (PORCINE) LOCK FLUSH IV SOLN 100 UNIT/ML 100 UNIT/ML
5 SOLUTION INTRAVENOUS
OUTPATIENT
Start: 2025-05-03

## 2025-04-29 RX ORDER — HEPARIN SODIUM,PORCINE 10 UNIT/ML
5-20 VIAL (ML) INTRAVENOUS DAILY PRN
OUTPATIENT
Start: 2025-05-03

## 2025-04-29 RX ORDER — ONDANSETRON 8 MG/1
8 TABLET, FILM COATED ORAL EVERY 8 HOURS PRN
Qty: 60 TABLET | Refills: 2 | Status: SHIPPED | OUTPATIENT
Start: 2025-04-29

## 2025-04-29 RX ORDER — EPINEPHRINE 1 MG/ML
0.3 INJECTION, SOLUTION INTRAMUSCULAR; SUBCUTANEOUS EVERY 5 MIN PRN
Status: CANCELLED | OUTPATIENT
Start: 2025-05-01

## 2025-04-29 RX ORDER — DIPHENHYDRAMINE HYDROCHLORIDE 50 MG/ML
25 INJECTION, SOLUTION INTRAMUSCULAR; INTRAVENOUS
Status: CANCELLED
Start: 2025-05-01

## 2025-04-29 RX ORDER — ALBUTEROL SULFATE 0.83 MG/ML
2.5 SOLUTION RESPIRATORY (INHALATION)
Status: CANCELLED | OUTPATIENT
Start: 2025-05-01

## 2025-04-29 RX ORDER — HEPARIN SODIUM,PORCINE 10 UNIT/ML
5-20 VIAL (ML) INTRAVENOUS DAILY PRN
Status: CANCELLED | OUTPATIENT
Start: 2025-05-01

## 2025-04-29 RX ORDER — ONDANSETRON 2 MG/ML
8 INJECTION INTRAMUSCULAR; INTRAVENOUS ONCE
Status: CANCELLED | OUTPATIENT
Start: 2025-05-01

## 2025-04-29 ASSESSMENT — PAIN SCALES - GENERAL: PAINLEVEL_OUTOF10: NO PAIN (0)

## 2025-04-29 NOTE — LETTER
4/29/2025      Desi Granado  [address] [place] [address]      Dear Colleague,    Thank you for referring your patient, Desi Granado, to the [facility]. Please see a copy of my visit note below.    Virtual Visit Details    Type of service:  Video Visit     Originating Location (pt. 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  It was pT2 N0 lesion.  Cancer was ER/% positive and HER2/tamara FISH negative.  Oncotype DX score was 0.  She was initially started on tamoxifen in December 2018 and then switched to exemestane and Zoladex in June 2019 and had bilateral salpingo-oophorectomy in February 2023.  After that she remained on exemestane and stopped in early March 2025.  Breast cancer index did not show that she will benefit from extended hormone therapy.    Related to her rectal cancer: she was having intermittent hematochezia for about 1 year and then on 2/21/2025 she had colonoscopy which showed a fungating nonobstructing medium-sized mass from 9 to 12 cm proximal to the anus.  Mass was noncircumferential and measured 3 cm in length.  Biopsy showed 3 sessile polyps were found in the sigmoid colon and transverse colon measuring 2 to 3 mm in size which were resected.  Biopsy from the sigmoid/rectal colon mass showed moderately differentiated invasive adenocarcinoma, MMR IHC intact.  Biopsy from the sessile polyps was tubular adenoma without any high-grade dysplasia or malignancy.    2/27/2025.  CT chest abdomen and pelvis was done  which did not show the rectosigmoid mass but it showed enlarged right mesorectal lymph node consistent with metastatic spread.  No evidence of distant metastasis.    MRI of the rectum showed a T2 N1 disease in the mid rectum.    CEA was 2.2.    She met with Dr. Landa on 3/14/2025 who did a flexible sigmoidoscopy and it showed a 3 cm fungating ulcerated mass located at 7 cm from the anal verge at the right posterolateral aspect of the mid rectum.    Tumor board had recommendation being starting systemic chemotherapy based on PROSPECT trial followed by surgery and avoiding radiation if possible.  Treatment:   4/3/2025 began FOLFOX    Interval History: Ms Granado is seen today by video for review prior to continuation of treatment with FOLFOX C3.   Fatigue- chronic through cycle with first cycle and mildly better with 2nd cycle.   Nausea and gassy- compazine not working- using zofran 3x daily first days of the cycle.   Eating - is eating proteins but does not like sweet- losing weight- dietician visit requested  Constipation- using senna daily with Miralax in evening with good results. Does get a spot in colon that gets irritated with diverticulosis with constipation. Occasional blood on TP  Allergies- using flonase- some blood with blowing nose.   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  Denies pain, fevers, headaches, SOB, cramping.   Rest of comprehensive and complete ROS is reviewed and is negative.   Past Medical History:   Diagnosis Date     Allergic rhinitis, cause unspecified      Hypertension 8 years?     Injury, other and unspecified, knee, leg, ankle, and foot 8th grade    left ankle injury     Invasive ductal carcinoma of breast, female, left (H) 10/26/2018     Unsatisfactory cervical cytology smear 10/06/2021     Current Outpatient Medications   Medication Sig Dispense Refill     cetirizine (ZYRTEC) 5 MG tablet Take 5 mg by mouth daily.       Chemo Kit For RN use only.  "Do not remove items from bag. Contents: 1  sodium chloride 0.9% flush, 4 medium gloves, 1 chemo gown, 1/4 chemo mat, 1 connector female, 1 chemo bag. [id number] kit 0     denosumab (PROLIA) 60 mg/mL injection Inject 60 mg subcutaneously every 6 months.       dexAMETHasone (DECADRON) 4 MG tablet Take 2 tablets (8 mg) by mouth daily. Take for 2 days, starting the day after chemo. Take with food. 4 tablet 2     Emergency Supply Kit, Central, Patient use for emergency only. Contents: 3 sodium chloride 0.9% flushes, 1 dressing kit, 1 microclave ext set 14\", 4 nitrile gloves (med), 6 alcohol prep pads, 1 bacitracin, 1 syringe (10 cc 20 G 1\"). Call [phone] to reorder. [id number] kit 0     exemestane (AROMASIN) 25 MG tablet Take 1 tablet (25 mg) by mouth daily. (Patient not taking: Reported on 3/20/2025) 90 tablet 3     [START ON 4/30/2025] Fluorouracil (ADRUCIL) 4,320 mg in sodium chloride 0.9 % 241 mL via HOMEPUMP C-Series Infuse 4,320 mg at 5.2 mL/hr over 46 hours into the vein once for 1 dose. 042526 mL 0     Fluticasone Propionate (FLONASE ALLERGY RELIEF NA) Spray 1 spray in nostril 2 times daily.       lisinopril (ZESTRIL) 30 MG tablet Take 1 tablet (30 mg) by mouth daily. 90 tablet 3     LORazepam (ATIVAN) 0.5 MG tablet Take 1 tablet (0.5 mg) by mouth every 6 hours as needed for anxiety. 30 tablet 1     LORazepam (ATIVAN) 1 MG tablet Take 1 tablet (1 mg) by mouth every 6 hours as needed for sleep. 30 tablet 0     montelukast (SINGULAIR) 10 MG tablet Take 1 tablet (10 mg) by mouth at bedtime. 90 tablet 3     ondansetron (ZOFRAN) 8 MG tablet Take 1 tablet (8 mg) by mouth every 8 hours as needed for nausea (vomiting). 30 tablet 2     Port Access Kit For nurse use only.  Do not remove items from bag.  Use for port access.  Do not place syringe on sterile field. [id number] kit 0     prochlorperazine (COMPAZINE) 10 MG tablet Take 1 tablet (10 mg) by mouth every 6 hours as needed for nausea or vomiting. 30 tablet 2     " propranolol (INDERAL) 10 MG tablet Take 2 tablets (20 mg) by mouth 2 times daily as needed (anxiety). (Patient not taking: Reported on 4/3/2025) 60 tablet 1     sodium chloride, PF, 0.9% PF flush Inject 10 mLs into the vein as needed for line flush. Flush IV before and after med administration as directed and/or at least every 24 hours, or prior to deaccessing for no further use and/or at least every 4 weeks when not accessed. 812281 mL 0     No current facility-administered medications for this visit.     Allergies   Allergen Reactions     Aspirin Hives     Dust Mites Unknown     Morphine Nausea     Other reaction(s): GI Intolerance     Zoloft [Sertraline] Anxiety       Physical Exam:[facility] 03/14/2019    GENERAL: alert and no distress  EYES: Eyes grossly normal to inspection.  No discharge or erythema, or obvious scleral/conjunctival abnormalities.  RESP: No audible wheeze, cough, or visible cyanosis.    SKIN: Visible skin clear. No significant rash, abnormal pigmentation or lesions.  NEURO: Cranial nerves grossly intact.  Mentation and speech appropriate for age.  PSYCH: Appropriate affect, tone, and pace of words  The rest of a comprehensive physical examination is deferred due to video visit restrictions       Laboratory/Imaging Results:    Latest Reference Range & Units 04/16/25 12:31   Sodium 135 - 145 mmol/L 133 (L)   Potassium 3.4 - 5.3 mmol/L 4.3   Chloride 98 - 107 mmol/L 100   Carbon Dioxide (CO2) 22 - 29 mmol/L 24   Urea Nitrogen 6.0 - 20.0 mg/dL 9.6   Creatinine 0.51 - 0.95 mg/dL 0.74   GFR Estimate >60 mL/min/1.73m2 >90   Calcium 8.8 - 10.4 mg/dL 9.6   Anion Gap 7 - 15 mmol/L 9   Albumin 3.5 - 5.2 g/dL 3.9   Protein Total 6.4 - 8.3 g/dL 6.4   Alkaline Phosphatase 40 - 150 U/L 111   ALT 0 - 50 U/L 20   AST 0 - 45 U/L 16   Bilirubin Total <=1.2 mg/dL 0.2   Glucose 70 - 99 mg/dL 95   WBC 4.0 - 11.0 10e3/uL 3.1 (L)   Hemoglobin 11.7 - 15.7 g/dL 10.9 (L)   Hematocrit 35.0 - 47.0 % 30.9 (L)   Platelet Count 150  - 450 10e3/uL 269   RBC Count 3.80 - 5.20 10e6/uL 3.75 (L)   MCV 78 - 100 fL 82   MCH 26.5 - 33.0 pg 29.1   MCHC 31.5 - 36.5 g/dL 35.3   RDW 10.0 - 15.0 % 12.1   % Neutrophils % 45   % Lymphocytes % 37   % Monocytes % 14   % Eosinophils % 3   % Basophils % 1   % Immature Granulocytes % 1   NRBC/W <1 /100 0   Absolute Neutrophil 1.6 - 8.3 10e3/uL 1.4 (L)   Absolute Lymphocytes 0.8 - 5.3 10e3/uL 1.2   Absolute Monocytes 0.0 - 1.3 10e3/uL 0.4   Absolute Eosinophils 0.0 - 0.7 10e3/uL 0.1   Absolute Basophils 0.0 - 0.2 10e3/uL 0.0   Absolute Immature Granulocytes <=0.4 10e3/uL 0.0   Absolute NRBCs 10e3/uL 0.0   (L): Data is abnormally low    Assessment and Plan:   Rectal Adenocarcinoma- Pt is on neoadjuvant FOLFOX - due for cycle 3.   First cycles with much nausea- had use of Emend previously but will add Aloxi to premed with this plan. Discussed alternative antiemetics since compazine did not work previously. Has lorazepam as well.  Will renew zofran for increased number of pills.aware to hold zofran x 3 days with plan additions. May need  olanzapine..   Anemia- hgb=10.9  acceptable for treatment with last cycle and no excessive fatigue.  Progressive neutropenia- pt was able to meet treatment goals with C2   Discussed we may need to use Neulasta Onpro with plan in future- pt aware of this drug and was used with previous treatment.   Approve start of cycle 3 if meeting goals for treatment with addition of Aloxi.   Weight loss- discussed option of seeing dietician for better nutrition sources with the nausea. Referral placed.     History of Left Breast cancer- Stopped use of AI in 3/2025. No new symptoms.     The total time of this encounter amounted to  30 minutes. This time includedvideo time spent with the patient, prep work, ordering tests, and performing post visit documentation.  Dede Garcia,Cnp      Again, thank you for allowing me to participate in the care of your patient.        Sincerely,        Dede RUVALCABA  DENY Garcia, APRN CNP    Electronically signed

## 2025-04-29 NOTE — PROGRESS NOTES
Virtual Visit Details    Type of service:  Video Visit     Originating Location (pt. 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  It was pT2 N0 lesion.  Cancer was ER/% positive and HER2/tamara FISH negative.  Oncotype DX score was 0.  She was initially started on tamoxifen in December 2018 and then switched to exemestane and Zoladex in June 2019 and had bilateral salpingo-oophorectomy in February 2023.  After that she remained on exemestane and stopped in early March 2025.  Breast cancer index did not show that she will benefit from extended hormone therapy.    Related to her rectal cancer: she was having intermittent hematochezia for about 1 year and then on 2/21/2025 she had colonoscopy which showed a fungating nonobstructing medium-sized mass from 9 to 12 cm proximal to the anus.  Mass was noncircumferential and measured 3 cm in length.  Biopsy showed 3 sessile polyps were found in the sigmoid colon and transverse colon measuring 2 to 3 mm in size which were resected.  Biopsy from the sigmoid/rectal colon mass showed moderately differentiated invasive adenocarcinoma, MMR IHC intact.  Biopsy from the sessile polyps was tubular adenoma without any high-grade dysplasia or malignancy.    2/27/2025.  CT chest abdomen and pelvis was done which did not show the rectosigmoid mass but it showed enlarged right mesorectal lymph node consistent with metastatic spread.  No evidence of distant metastasis.    MRI of the rectum showed a T2 N1 disease in the mid rectum.    CEA was 2.2.    She met with   Gaertner on 3/14/2025 who did a flexible sigmoidoscopy and it showed a 3 cm fungating ulcerated mass located at 7 cm from the anal verge at the right posterolateral aspect of the mid rectum.    Tumor board had recommendation being starting systemic chemotherapy based on PROSPECT trial followed by surgery and avoiding radiation if possible.  Treatment:   4/3/2025 began FOLFOX    Interval History: Ms Granado is seen today by video for review prior to continuation of treatment with FOLFOX C3.   Fatigue- chronic through cycle with first cycle and mildly better with 2nd cycle.   Nausea and gassy- compazine not working- using zofran 3x daily first days of the cycle.   Eating - is eating proteins but does not like sweet- losing weight- dietician visit requested  Constipation- using senna daily with Miralax in evening with good results. Does get a spot in colon that gets irritated with diverticulosis with constipation. Occasional blood on TP  Allergies- using flonase- some blood with blowing nose.   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  Denies pain, fevers, headaches, SOB, cramping.   Rest of comprehensive and complete ROS is reviewed and is negative.   Past Medical History:   Diagnosis Date    Allergic rhinitis, cause unspecified     Hypertension 8 years?    Injury, other and unspecified, knee, leg, ankle, and foot 8th grade    left ankle injury    Invasive ductal carcinoma of breast, female, left (H) 10/26/2018    Unsatisfactory cervical cytology smear 10/06/2021     Current Outpatient Medications   Medication Sig Dispense Refill    cetirizine (ZYRTEC) 5 MG tablet Take 5 mg by mouth daily.      Chemo Kit For RN use only. Do not remove items from bag. Contents: 1  sodium chloride 0.9% flush, 4 medium gloves, 1 chemo gown, 1/4 chemo mat, 1 connector female, 1 chemo bag. [id number] kit 0    denosumab (PROLIA) 60 mg/mL injection Inject 60 mg subcutaneously every 6 months.      dexAMETHasone  "(DECADRON) 4 MG tablet Take 2 tablets (8 mg) by mouth daily. Take for 2 days, starting the day after chemo. Take with food. 4 tablet 2    Emergency Supply Kit, Central, Patient use for emergency only. Contents: 3 sodium chloride 0.9% flushes, 1 dressing kit, 1 microclave ext set 14\", 4 nitrile gloves (med), 6 alcohol prep pads, 1 bacitracin, 1 syringe (10 cc 20 G 1\"). Call [phone] to reorder. [id number] kit 0    exemestane (AROMASIN) 25 MG tablet Take 1 tablet (25 mg) by mouth daily. (Patient not taking: Reported on 3/20/2025) 90 tablet 3    [START ON 4/30/2025] Fluorouracil (ADRUCIL) 4,320 mg in sodium chloride 0.9 % 241 mL via HOMEPUMP C-Series Infuse 4,320 mg at 5.2 mL/hr over 46 hours into the vein once for 1 dose. 847235 mL 0    Fluticasone Propionate (FLONASE ALLERGY RELIEF NA) Spray 1 spray in nostril 2 times daily.      lisinopril (ZESTRIL) 30 MG tablet Take 1 tablet (30 mg) by mouth daily. 90 tablet 3    LORazepam (ATIVAN) 0.5 MG tablet Take 1 tablet (0.5 mg) by mouth every 6 hours as needed for anxiety. 30 tablet 1    LORazepam (ATIVAN) 1 MG tablet Take 1 tablet (1 mg) by mouth every 6 hours as needed for sleep. 30 tablet 0    montelukast (SINGULAIR) 10 MG tablet Take 1 tablet (10 mg) by mouth at bedtime. 90 tablet 3    ondansetron (ZOFRAN) 8 MG tablet Take 1 tablet (8 mg) by mouth every 8 hours as needed for nausea (vomiting). 30 tablet 2    Port Access Kit For nurse use only.  Do not remove items from bag.  Use for port access.  Do not place syringe on sterile field. [id number] kit 0    prochlorperazine (COMPAZINE) 10 MG tablet Take 1 tablet (10 mg) by mouth every 6 hours as needed for nausea or vomiting. 30 tablet 2    propranolol (INDERAL) 10 MG tablet Take 2 tablets (20 mg) by mouth 2 times daily as needed (anxiety). (Patient not taking: Reported on 4/3/2025) 60 tablet 1    sodium chloride, PF, 0.9% PF flush Inject 10 mLs into the vein as needed for line flush. Flush IV before and after med " administration as directed and/or at least every 24 hours, or prior to deaccessing for no further use and/or at least every 4 weeks when not accessed. 314509 mL 0     No current facility-administered medications for this visit.     Allergies   Allergen Reactions    Aspirin Hives    Dust Mites Unknown    Morphine Nausea     Other reaction(s): GI Intolerance    Zoloft [Sertraline] Anxiety       Physical Exam:[facility] 03/14/2019    GENERAL: alert and no distress  EYES: Eyes grossly normal to inspection.  No discharge or erythema, or obvious scleral/conjunctival abnormalities.  RESP: No audible wheeze, cough, or visible cyanosis.    SKIN: Visible skin clear. No significant rash, abnormal pigmentation or lesions.  NEURO: Cranial nerves grossly intact.  Mentation and speech appropriate for age.  PSYCH: Appropriate affect, tone, and pace of words  The rest of a comprehensive physical examination is deferred due to video visit restrictions       Laboratory/Imaging Results:    Latest Reference Range & Units 04/16/25 12:31   Sodium 135 - 145 mmol/L 133 (L)   Potassium 3.4 - 5.3 mmol/L 4.3   Chloride 98 - 107 mmol/L 100   Carbon Dioxide (CO2) 22 - 29 mmol/L 24   Urea Nitrogen 6.0 - 20.0 mg/dL 9.6   Creatinine 0.51 - 0.95 mg/dL 0.74   GFR Estimate >60 mL/min/1.73m2 >90   Calcium 8.8 - 10.4 mg/dL 9.6   Anion Gap 7 - 15 mmol/L 9   Albumin 3.5 - 5.2 g/dL 3.9   Protein Total 6.4 - 8.3 g/dL 6.4   Alkaline Phosphatase 40 - 150 U/L 111   ALT 0 - 50 U/L 20   AST 0 - 45 U/L 16   Bilirubin Total <=1.2 mg/dL 0.2   Glucose 70 - 99 mg/dL 95   WBC 4.0 - 11.0 10e3/uL 3.1 (L)   Hemoglobin 11.7 - 15.7 g/dL 10.9 (L)   Hematocrit 35.0 - 47.0 % 30.9 (L)   Platelet Count 150 - 450 10e3/uL 269   RBC Count 3.80 - 5.20 10e6/uL 3.75 (L)   MCV 78 - 100 fL 82   MCH 26.5 - 33.0 pg 29.1   MCHC 31.5 - 36.5 g/dL 35.3   RDW 10.0 - 15.0 % 12.1   % Neutrophils % 45   % Lymphocytes % 37   % Monocytes % 14   % Eosinophils % 3   % Basophils % 1   % Immature  Granulocytes % 1   NRBC/W <1 /100 0   Absolute Neutrophil 1.6 - 8.3 10e3/uL 1.4 (L)   Absolute Lymphocytes 0.8 - 5.3 10e3/uL 1.2   Absolute Monocytes 0.0 - 1.3 10e3/uL 0.4   Absolute Eosinophils 0.0 - 0.7 10e3/uL 0.1   Absolute Basophils 0.0 - 0.2 10e3/uL 0.0   Absolute Immature Granulocytes <=0.4 10e3/uL 0.0   Absolute NRBCs 10e3/uL 0.0   (L): Data is abnormally low    Assessment and Plan:   Rectal Adenocarcinoma- Pt is on neoadjuvant FOLFOX - due for cycle 3.   First cycles with much nausea- had use of Emend previously but will add Aloxi to premed with this plan. Discussed alternative antiemetics since compazine did not work previously. Has lorazepam as well.  Will renew zofran for increased number of pills.aware to hold zofran x 3 days with plan additions. May need  olanzapine..   Anemia- hgb=10.9  acceptable for treatment with last cycle and no excessive fatigue.  Progressive neutropenia- pt was able to meet treatment goals with C2   Discussed we may need to use Neulasta Onpro with plan in future- pt aware of this drug and was used with previous treatment.   Approve start of cycle 3 if meeting goals for treatment with addition of Aloxi.   Weight loss- discussed option of seeing dietician for better nutrition sources with the nausea. Referral placed.     History of Left Breast cancer- Stopped use of AI in 3/2025. No new symptoms.     The total time of this encounter amounted to  30 minutes. This time includedvideo time spent with the patient, prep work, ordering tests, and performing post visit documentation.  Dede Garcia,Cnp

## 2025-04-29 NOTE — NURSING NOTE
Is the patient currently in the state of [place]? YES    Visit mode: VIDEO    If the visit is dropped, the patient can be reconnected by:VIDEO VISIT: Send to e-mail at: [email or web address]    Will anyone else be joining the visit? NO  (If patient encounters technical issues they should call [phone])    Are changes needed to the allergy or medication list? No    Are refills needed on medications prescribed by this physician? NO    Rooming Documentation:  Questionnaire(s) completed    Reason for visit: Video Visit (Follow Up )    Annette LUND

## 2025-04-30 ENCOUNTER — PATIENT OUTREACH (OUTPATIENT)
Dept: CARE COORDINATION | Facility: CLINIC | Age: 55
End: 2025-04-30
Payer: COMMERCIAL

## 2025-04-30 ENCOUNTER — INFUSION THERAPY VISIT (OUTPATIENT)
Dept: INFUSION THERAPY | Facility: CLINIC | Age: 55
End: 2025-04-30
Attending: INTERNAL MEDICINE
Payer: COMMERCIAL

## 2025-04-30 ENCOUNTER — DOCUMENTATION ONLY (OUTPATIENT)
Dept: HOME HEALTH SERVICES | Facility: HOME HEALTH | Age: 55
End: 2025-04-30
Payer: COMMERCIAL

## 2025-04-30 ENCOUNTER — ENROLLMENT (OUTPATIENT)
Dept: HOME HEALTH SERVICES | Facility: HOME HEALTH | Age: 55
End: 2025-04-30
Payer: COMMERCIAL

## 2025-04-30 VITALS
DIASTOLIC BLOOD PRESSURE: 78 MMHG | BODY MASS INDEX: 23.7 KG/M2 | TEMPERATURE: 98 F | OXYGEN SATURATION: 99 % | RESPIRATION RATE: 18 BRPM | SYSTOLIC BLOOD PRESSURE: 137 MMHG | HEART RATE: 110 BPM | WEIGHT: 142.4 LBS

## 2025-04-30 DIAGNOSIS — C20 RECTAL ADENOCARCINOMA (H): Primary | ICD-10-CM

## 2025-04-30 DIAGNOSIS — C20 RECTAL ADENOCARCINOMA (H): ICD-10-CM

## 2025-04-30 DIAGNOSIS — T45.1X5A CHEMOTHERAPY-INDUCED NEUTROPENIA: Primary | ICD-10-CM

## 2025-04-30 DIAGNOSIS — D70.1 CHEMOTHERAPY-INDUCED NEUTROPENIA: Primary | ICD-10-CM

## 2025-04-30 DIAGNOSIS — C50.919 MALIGNANT NEOPLASM OF BREAST IN FEMALE, ESTROGEN RECEPTOR POSITIVE, UNSPECIFIED LATERALITY, UNSPECIFIED SITE OF BREAST (H): ICD-10-CM

## 2025-04-30 DIAGNOSIS — Z17.0 MALIGNANT NEOPLASM OF BREAST IN FEMALE, ESTROGEN RECEPTOR POSITIVE, UNSPECIFIED LATERALITY, UNSPECIFIED SITE OF BREAST (H): ICD-10-CM

## 2025-04-30 LAB
ALBUMIN SERPL BCG-MCNC: 3.6 G/DL (ref 3.5–5.2)
ALP SERPL-CCNC: 146 U/L (ref 40–150)
ALT SERPL W P-5'-P-CCNC: 35 U/L (ref 0–50)
ANION GAP SERPL CALCULATED.3IONS-SCNC: 7 MMOL/L (ref 7–15)
AST SERPL W P-5'-P-CCNC: 26 U/L (ref 0–45)
BILIRUB SERPL-MCNC: 0.2 MG/DL
BUN SERPL-MCNC: 13.7 MG/DL (ref 6–20)
CALCIUM SERPL-MCNC: 9.4 MG/DL (ref 8.8–10.4)
CHLORIDE SERPL-SCNC: 98 MMOL/L (ref 98–107)
CREAT SERPL-MCNC: 0.68 MG/DL (ref 0.51–0.95)
EGFRCR SERPLBLD CKD-EPI 2021: >90 ML/MIN/1.73M2
GLUCOSE SERPL-MCNC: 100 MG/DL (ref 70–99)
HCO3 SERPL-SCNC: 27 MMOL/L (ref 22–29)
POTASSIUM SERPL-SCNC: 4 MMOL/L (ref 3.4–5.3)
PROT SERPL-MCNC: 6.5 G/DL (ref 6.4–8.3)
SODIUM SERPL-SCNC: 132 MMOL/L (ref 135–145)

## 2025-04-30 PROCEDURE — 96415 CHEMO IV INFUSION ADDL HR: CPT

## 2025-04-30 PROCEDURE — 85004 AUTOMATED DIFF WBC COUNT: CPT | Performed by: NURSE PRACTITIONER

## 2025-04-30 PROCEDURE — 96368 THER/DIAG CONCURRENT INF: CPT

## 2025-04-30 PROCEDURE — 250N000011 HC RX IP 250 OP 636: Performed by: INTERNAL MEDICINE

## 2025-04-30 PROCEDURE — 85007 BL SMEAR W/DIFF WBC COUNT: CPT | Performed by: NURSE PRACTITIONER

## 2025-04-30 PROCEDURE — 82040 ASSAY OF SERUM ALBUMIN: CPT | Performed by: NURSE PRACTITIONER

## 2025-04-30 PROCEDURE — 250N000011 HC RX IP 250 OP 636: Performed by: NURSE PRACTITIONER

## 2025-04-30 PROCEDURE — 96411 CHEMO IV PUSH ADDL DRUG: CPT

## 2025-04-30 PROCEDURE — S9330 HIT CONT CHEM DIEM: HCPCS

## 2025-04-30 PROCEDURE — 258N000003 HC RX IP 258 OP 636: Performed by: NURSE PRACTITIONER

## 2025-04-30 PROCEDURE — 96367 TX/PROPH/DG ADDL SEQ IV INF: CPT

## 2025-04-30 PROCEDURE — G0498 CHEMO EXTEND IV INFUS W/PUMP: HCPCS

## 2025-04-30 PROCEDURE — 36591 DRAW BLOOD OFF VENOUS DEVICE: CPT | Performed by: NURSE PRACTITIONER

## 2025-04-30 PROCEDURE — 96413 CHEMO IV INFUSION 1 HR: CPT

## 2025-04-30 PROCEDURE — 96375 TX/PRO/DX INJ NEW DRUG ADDON: CPT

## 2025-04-30 RX ORDER — HEPARIN SODIUM (PORCINE) LOCK FLUSH IV SOLN 100 UNIT/ML 100 UNIT/ML
5 SOLUTION INTRAVENOUS
OUTPATIENT
Start: 2025-04-30

## 2025-04-30 RX ORDER — PALONOSETRON 0.05 MG/ML
0.25 INJECTION, SOLUTION INTRAVENOUS ONCE
Status: COMPLETED | OUTPATIENT
Start: 2025-04-30 | End: 2025-04-30

## 2025-04-30 RX ORDER — FLUOROURACIL 50 MG/ML
400 INJECTION, SOLUTION INTRAVENOUS ONCE
Status: COMPLETED | OUTPATIENT
Start: 2025-04-30 | End: 2025-04-30

## 2025-04-30 RX ORDER — PALONOSETRON 0.05 MG/ML
0.25 INJECTION, SOLUTION INTRAVENOUS ONCE
Status: CANCELLED
Start: 2025-05-01 | End: 2025-05-01

## 2025-04-30 RX ADMIN — DEXTROSE 250 ML: 5 SOLUTION INTRAVENOUS at 13:27

## 2025-04-30 RX ADMIN — FOSAPREPITANT: 150 INJECTION, POWDER, LYOPHILIZED, FOR SOLUTION INTRAVENOUS at 13:41

## 2025-04-30 RX ADMIN — OXALIPLATIN 150 MG: 5 INJECTION, SOLUTION INTRAVENOUS at 14:12

## 2025-04-30 RX ADMIN — LEUCOVORIN CALCIUM 650 MG: 350 INJECTION, POWDER, LYOPHILIZED, FOR SUSPENSION INTRAMUSCULAR; INTRAVENOUS at 14:13

## 2025-04-30 RX ADMIN — PALONOSETRON HYDROCHLORIDE 0.25 MG: 0.25 INJECTION INTRAVENOUS at 13:29

## 2025-04-30 RX ADMIN — FAMOTIDINE 20 MG: 10 INJECTION, SOLUTION INTRAVENOUS at 13:32

## 2025-04-30 RX ADMIN — FLUOROURACIL 720 MG: 50 INJECTION, SOLUTION INTRAVENOUS at 16:22

## 2025-04-30 NOTE — PROGRESS NOTES
"Patient here for port lab draw prior to chemo.    Intravenous access:     Port needle gauge 20 by 3/4 \".     Labs drawn without difficulty.    green and purple tubes drawn.     Port flushed with 20 ml NS.       Linnette Amaro RN          "

## 2025-04-30 NOTE — PROGRESS NOTES
"Infusion Nursing Note:  Desi SAENZ Loy presents today for C3D1 Modified FOLFOX.    Patient seen by provider yesterday: Yes: Dede Garcia, MELANIE   present during visit today: Not Applicable.    Note: Patient arrived ambulatory to IVO. Doing well. No complaints, except fatigue. VSS, Afebrile.     Message sent to DR. Herbert about ANC. Parameters changed. Plan to go ahead with tx if On Pro Neulasta will be approved per finance for Day 3. Finance approved. Will proceed with tx. [facility] called to inform them of New order for D3.     Neutropenic precautions reviewed with patient and handouts given to her.   Discussed SE of Neulasta. Take claritin and tylenol to help with bone pain.     Intravenous Access:  Implanted Port.    Treatment Conditions:  Lab Results   Component Value Date    HGB 10.9 (L) 04/30/2025    WBC 2.5 (L) 04/30/2025    ANEU 1.0 (L) 04/30/2025     04/30/2025        Lab Results   Component Value Date     (L) 04/30/2025    POTASSIUM 4.0 04/30/2025    CR 0.68 04/30/2025    MARISELA 9.4 04/30/2025    BILITOTAL 0.2 04/30/2025    ALBUMIN 3.6 04/30/2025    ALT 35 04/30/2025    AST 26 04/30/2025       Results reviewed, labs MET treatment parameters, ok to proceed with treatment.  Patient given ice water and ice to have during infusion. Cold packs applied to feet/hands.     Post Infusion Assessment:  Patient tolerated infusion without incident.  Blood return noted pre and post infusion.  Blood return noted during administration every 3 cc.  Site patent and intact, free from redness, edema or discomfort.     Prior to discharge: Port is secured in place with tegaderm and flushed with 10cc NS with positive blood return noted.    Continuous home infusion Dosi-Fuser pump connected.    All connectors secured in place and clamps taped open.    Pump started, \"running\" noted on display (CADD): Not Applicable.   Capillary element taped to pt's skin per protocol.  Pump Connection double checked with Terra " SHADY Collado.  Patient instructed to call our clinic or [facility] with any questions or concerns at home.  Patient verbalized understanding.    Patient set up for pump disconnect at home with [facility] on 5/2 AT 1430. On Pro Neulasta added for D3. InPerfectoreet message sent to [facility] for disconnect. .      Discharge Plan:   Discharge instructions reviewed with: Patient and Family.  AVS to patient via [facility].  Patient will return 2 weeks for next appointment.   Patient discharged in stable condition accompanied by: self, mother, and father.  Departure Mode: Ambulatory.      Linnette Amaro RN

## 2025-04-30 NOTE — PROGRESS NOTES
[facility] d/c of Fluorouracil continuous infusion over 46 hours via C series pump.   Scheduled in [facility] for home disconnect on 5/2/25 at 2:30pm  Fulphila injection being ordered following benefit check: Fulphila and Nyvepria must be tried and failed before alternative used.  Inbasket message was sent to care team to update orders to a preferred product.    5/1/25 0915: Spoke with patient and notified of change from Neulasta Onpro to Fulphila injection. Provided education on side effects/symptom management, insurance requirement, sign upon delivery, refrigeration needs.  Discussed patient and/or mother may learn self-administration and she would like to see what the injection entails first, but may be interested in learning.  Email sent to pharmacy coordinator for delivery of drug/supplies to patient,s parents home address.  [facility] also to be at her parents home address for the injection on 5/3/25.  Scheduled in [facility].

## 2025-05-01 ENCOUNTER — MYC MEDICAL ADVICE (OUTPATIENT)
Dept: ONCOLOGY | Facility: CLINIC | Age: 55
End: 2025-05-01
Payer: COMMERCIAL

## 2025-05-01 DIAGNOSIS — F43.22 ADJUSTMENT DISORDER WITH ANXIOUS MOOD: Primary | ICD-10-CM

## 2025-05-01 DIAGNOSIS — C20 RECTAL CANCER (H): ICD-10-CM

## 2025-05-01 LAB
BASOPHILS # BLD AUTO: 0 10E3/UL (ref 0–0.2)
BASOPHILS # BLD MANUAL: 0 10E3/UL (ref 0–0.2)
BASOPHILS NFR BLD AUTO: 2 %
BASOPHILS NFR BLD MANUAL: 1 %
EOSINOPHIL # BLD AUTO: 0 10E3/UL (ref 0–0.7)
EOSINOPHIL # BLD MANUAL: 0 10E3/UL (ref 0–0.7)
EOSINOPHIL NFR BLD AUTO: 1 %
EOSINOPHIL NFR BLD MANUAL: 1 %
ERYTHROCYTE [DISTWIDTH] IN BLOOD BY AUTOMATED COUNT: 12.3 % (ref 10–15)
HCT VFR BLD AUTO: 31.1 % (ref 35–47)
HGB BLD-MCNC: 10.9 G/DL (ref 11.7–15.7)
IMM GRANULOCYTES # BLD: 0.2 10E3/UL
IMM GRANULOCYTES NFR BLD: 7 %
LYMPHOCYTES # BLD AUTO: 0.9 10E3/UL (ref 0.8–5.3)
LYMPHOCYTES # BLD MANUAL: 0.9 10E3/UL (ref 0.8–5.3)
LYMPHOCYTES NFR BLD AUTO: 37 %
LYMPHOCYTES NFR BLD MANUAL: 37 %
MCH RBC QN AUTO: 28.9 PG (ref 26.5–33)
MCHC RBC AUTO-ENTMCNC: 35 G/DL (ref 31.5–36.5)
MCV RBC AUTO: 83 FL (ref 78–100)
MONOCYTES # BLD AUTO: 0.4 10E3/UL (ref 0–1.3)
MONOCYTES # BLD MANUAL: 0.4 10E3/UL (ref 0–1.3)
MONOCYTES NFR BLD AUTO: 16 %
MONOCYTES NFR BLD MANUAL: 17 %
MYELOCYTES # BLD MANUAL: 0.1 10E3/UL
MYELOCYTES NFR BLD MANUAL: 3 %
NEUTROPHILS # BLD AUTO: 0.9 10E3/UL (ref 1.6–8.3)
NEUTROPHILS # BLD MANUAL: 1 10E3/UL (ref 1.6–8.3)
NEUTROPHILS NFR BLD AUTO: 37 %
NEUTROPHILS NFR BLD MANUAL: 40 %
NRBC # BLD AUTO: 0 10E3/UL
NRBC BLD AUTO-RTO: 0 /100
PATH REV: ABNORMAL
PLAT MORPH BLD: NORMAL
PLATELET # BLD AUTO: 246 10E3/UL (ref 150–450)
PROMYELOCYTES # BLD MANUAL: 0 10E3/UL
PROMYELOCYTES NFR BLD MANUAL: 1 %
RBC # BLD AUTO: 3.77 10E6/UL (ref 3.8–5.2)
RBC MORPH BLD: NORMAL
WBC # BLD AUTO: 2.5 10E3/UL (ref 4–11)

## 2025-05-01 PROCEDURE — S9330 HIT CONT CHEM DIEM: HCPCS

## 2025-05-02 ENCOUNTER — HOME INFUSION BILLING (OUTPATIENT)
Dept: HOME HEALTH SERVICES | Facility: HOME HEALTH | Age: 55
End: 2025-05-02
Payer: COMMERCIAL

## 2025-05-03 ENCOUNTER — HEALTH MAINTENANCE LETTER (OUTPATIENT)
Age: 55
End: 2025-05-03

## 2025-05-03 ENCOUNTER — HOME CARE VISIT (OUTPATIENT)
Dept: HOME HEALTH SERVICES | Facility: HOME HEALTH | Age: 55
End: 2025-05-03
Payer: COMMERCIAL

## 2025-05-03 VITALS
DIASTOLIC BLOOD PRESSURE: 76 MMHG | RESPIRATION RATE: 17 BRPM | SYSTOLIC BLOOD PRESSURE: 108 MMHG | HEART RATE: 102 BPM | TEMPERATURE: 96.9 F | OXYGEN SATURATION: 98 %

## 2025-05-03 PROCEDURE — S9537 HT HEM HORM INJ DIEM: HCPCS

## 2025-05-03 PROCEDURE — 99601 HOME NFS VISIT <2 HRS: CPT

## 2025-05-03 NOTE — PROGRESS NOTES
Nursing Visit Note:  Nurse visit today for fulphila injection for Desi SAENZ Loy.     present during visit today: Not Applicable.    Intravenous Access:  No Intravenous access/labs at this visit.    .    Note: Fulphila subq injection administered behind right arm. pt tolerated well without incident.    Saline administered: Na (ml)    Supply Check:   Does the patient have all the supplies they need for the next visit?  Yes    Next visit plan: per chemo poc    Donna Izquierdo RN 5/3/2025

## 2025-05-05 RX ORDER — LORAZEPAM 1 MG/1
TABLET ORAL
Qty: 45 TABLET | Refills: 1 | Status: SHIPPED | OUTPATIENT
Start: 2025-05-05

## 2025-05-07 ENCOUNTER — HOME INFUSION (OUTPATIENT)
Dept: HOME HEALTH SERVICES | Facility: HOME HEALTH | Age: 55
End: 2025-05-07
Payer: COMMERCIAL

## 2025-05-07 DIAGNOSIS — C20 RECTAL ADENOCARCINOMA (H): ICD-10-CM

## 2025-05-07 RX ORDER — DEXAMETHASONE 4 MG/1
8 TABLET ORAL DAILY
Qty: 4 TABLET | Refills: 2 | Status: SHIPPED | OUTPATIENT
Start: 2025-05-07

## 2025-05-12 ENCOUNTER — HOME INFUSION BILLING (OUTPATIENT)
Dept: HOME HEALTH SERVICES | Facility: HOME HEALTH | Age: 55
End: 2025-05-12
Payer: COMMERCIAL

## 2025-05-12 ENCOUNTER — VIRTUAL VISIT (OUTPATIENT)
Dept: ONCOLOGY | Facility: CLINIC | Age: 55
End: 2025-05-12
Payer: COMMERCIAL

## 2025-05-12 DIAGNOSIS — R11.0 CHEMOTHERAPY-INDUCED NAUSEA: ICD-10-CM

## 2025-05-12 DIAGNOSIS — C20 RECTAL CANCER (H): Primary | ICD-10-CM

## 2025-05-12 DIAGNOSIS — T45.1X5A CHEMOTHERAPY-INDUCED NAUSEA: ICD-10-CM

## 2025-05-12 DIAGNOSIS — D63.0 ANEMIA IN NEOPLASTIC DISEASE: ICD-10-CM

## 2025-05-12 PROCEDURE — 98006 SYNCH AUDIO-VIDEO EST MOD 30: CPT

## 2025-05-12 PROCEDURE — A9270 NON-COVERED ITEM OR SERVICE: HCPCS

## 2025-05-12 PROCEDURE — 1126F AMNT PAIN NOTED NONE PRSNT: CPT

## 2025-05-12 RX ORDER — ALBUTEROL SULFATE 90 UG/1
1-2 INHALANT RESPIRATORY (INHALATION)
Status: CANCELLED
Start: 2025-05-15

## 2025-05-12 RX ORDER — HEPARIN SODIUM,PORCINE 10 UNIT/ML
5-20 VIAL (ML) INTRAVENOUS DAILY PRN
OUTPATIENT
Start: 2025-05-17

## 2025-05-12 RX ORDER — METHYLPREDNISOLONE SODIUM SUCCINATE 40 MG/ML
40 INJECTION INTRAMUSCULAR; INTRAVENOUS
Status: CANCELLED
Start: 2025-05-15

## 2025-05-12 RX ORDER — LORAZEPAM 2 MG/ML
0.5 INJECTION INTRAMUSCULAR EVERY 4 HOURS PRN
Status: CANCELLED | OUTPATIENT
Start: 2025-05-15

## 2025-05-12 RX ORDER — PALONOSETRON 0.05 MG/ML
0.25 INJECTION, SOLUTION INTRAVENOUS ONCE
Status: CANCELLED
Start: 2025-05-15 | End: 2025-05-15

## 2025-05-12 RX ORDER — ALBUTEROL SULFATE 0.83 MG/ML
2.5 SOLUTION RESPIRATORY (INHALATION)
Status: CANCELLED | OUTPATIENT
Start: 2025-05-15

## 2025-05-12 RX ORDER — HEPARIN SODIUM,PORCINE 10 UNIT/ML
5-20 VIAL (ML) INTRAVENOUS DAILY PRN
Status: CANCELLED | OUTPATIENT
Start: 2025-05-15

## 2025-05-12 RX ORDER — DIPHENHYDRAMINE HYDROCHLORIDE 50 MG/ML
50 INJECTION, SOLUTION INTRAMUSCULAR; INTRAVENOUS
Status: CANCELLED
Start: 2025-05-15

## 2025-05-12 RX ORDER — FLUOROURACIL 50 MG/ML
400 INJECTION, SOLUTION INTRAVENOUS ONCE
Status: CANCELLED | OUTPATIENT
Start: 2025-05-15

## 2025-05-12 RX ORDER — HEPARIN SODIUM (PORCINE) LOCK FLUSH IV SOLN 100 UNIT/ML 100 UNIT/ML
5 SOLUTION INTRAVENOUS
OUTPATIENT
Start: 2025-05-17

## 2025-05-12 RX ORDER — MEPERIDINE HYDROCHLORIDE 25 MG/ML
25 INJECTION INTRAMUSCULAR; INTRAVENOUS; SUBCUTANEOUS
Status: CANCELLED | OUTPATIENT
Start: 2025-05-15

## 2025-05-12 RX ORDER — EPINEPHRINE 1 MG/ML
0.3 INJECTION, SOLUTION, CONCENTRATE INTRAVENOUS EVERY 5 MIN PRN
Status: CANCELLED | OUTPATIENT
Start: 2025-05-15

## 2025-05-12 RX ORDER — HEPARIN SODIUM (PORCINE) LOCK FLUSH IV SOLN 100 UNIT/ML 100 UNIT/ML
5 SOLUTION INTRAVENOUS
Status: CANCELLED | OUTPATIENT
Start: 2025-05-15

## 2025-05-12 RX ORDER — DIPHENHYDRAMINE HYDROCHLORIDE 50 MG/ML
25 INJECTION, SOLUTION INTRAMUSCULAR; INTRAVENOUS
Status: CANCELLED
Start: 2025-05-15

## 2025-05-12 NOTE — PROGRESS NOTES
Virtual Oncology Follow-Up Visit: May 12, 2025    Oncologist: Dr. Herbert  PCP: Pearl Grover    Reason for Visit: Pre-treatment follow-up    Diagnosis: Rectal adenocarcioma   She had bilateral mastectomy and left sentinel lymph node biopsy in 2018 for left solid papillary carcinoma measuring 22 mm, grade 2 with negative margins.  It was pT2 N0 lesion.  Cancer was ER/% positive and HER2/tamara FISH negative.  Oncotype DX score was 0.  She was initially started on tamoxifen in December 2018 and then switched to exemestane and Zoladex in June 2019 and had bilateral salpingo-oophorectomy in February 2023.  After that she remained on exemestane and stopped in early March 2025.  Breast cancer index did not show that she will benefit from extended hormone therapy.     She was having intermittent hematochezia for about 1 year and then on 2/21/2025 she had colonoscopy which showed a fungating nonobstructing medium-sized mass from 9 to 12 cm proximal to the anus.  Mass was noncircumferential and measured 3 cm in length.  This was biopsied.  3 sessile polyps were found in the sigmoid colon and transverse colon measuring 2 to 3 mm in size.  These were resected.  The biopsy from the sigmoid/rectal colon mass showed moderately differentiated invasive adenocarcinoma, MMR IHC intact.  Biopsy from the sessile polyps was tubular adenoma without any high-grade dysplasia or malignancy.     2/27/2025.  CT chest abdomen and pelvis was done which did not show the rectosigmoid mass but it showed enlarged right mesorectal lymph node consistent with metastatic spread.  No evidence of distant metastasis.     MRI of the rectum showed a T2 N1 disease in the mid rectum.     CEA was 2.2.     She met with Dr. Landa on 3/14/2025 who did a flexible sigmoidoscopy and it showed a 3 cm fungating ulcerated mass located at 7 cm from the anal verge at the right posterolateral aspect of the mid rectum.    Her case was discussed in the tumor board  with recommendation being starting systemic chemotherapy based on PROSPECT trial followed by surgery and avoiding radiation if possible.    Treatment:  4/3/2025 began FOLFOX     Interval History:  Pt is seen virtually for review prior to treatment. Side effects were better. Not much nausea with consistent use of zofran, did not need compazine. Focused more on protein intake, which also helped. Bowels manageable with daily miralax. Staying well hydrated. Mild numbness to R) finger and ring finger that resolved after a few days. No skin issues, no HFS. Taking precautions to avoid sick people. Taking Claritin daily, no bone aches after Fulphila. No additional questions or concerns.     Patient denies any of the following except if noted above: fevers, chills, difficulty with energy, vision or hearing changes, chest pain, dyspnea, abdominal pain, nausea, vomiting, diarrhea, constipation, urinary concerns, headaches, numbness, tingling, issues with sleep or mood. She also denies lumps, bumps, rashes or skin lesions, bleeding or bruising issues.    Physical Exam: Limited due to virtual visit restrictions.  General: No acute distress. Appearance is well-groomed.  Resp: No respiratory distress. No cough.   Skin: No visible rash or lesions.  Neuro: A/O x 4. Appropriate mentation and speech.  Psych: Appropriate mood.     Laboratory Results:    Latest Reference Range & Units 04/30/25 10:32   Sodium 135 - 145 mmol/L 132 (L)   Potassium 3.4 - 5.3 mmol/L 4.0   Chloride 98 - 107 mmol/L 98   Carbon Dioxide (CO2) 22 - 29 mmol/L 27   Urea Nitrogen 6.0 - 20.0 mg/dL 13.7   Creatinine 0.51 - 0.95 mg/dL 0.68   GFR Estimate >60 mL/min/1.73m2 >90   Calcium 8.8 - 10.4 mg/dL 9.4   Anion Gap 7 - 15 mmol/L 7   Albumin 3.5 - 5.2 g/dL 3.6   Protein Total 6.4 - 8.3 g/dL 6.5   Alkaline Phosphatase 40 - 150 U/L 146   ALT 0 - 50 U/L 35   AST 0 - 45 U/L 26   Bilirubin Total <=1.2 mg/dL 0.2   Glucose 70 - 99 mg/dL 100 (H)   WBC 4.0 - 11.0 10e3/uL 2.5  (L)   Hemoglobin 11.7 - 15.7 g/dL 10.9 (L)   Hematocrit 35.0 - 47.0 % 31.1 (L)   Platelet Count 150 - 450 10e3/uL 246   RBC Count 3.80 - 5.20 10e6/uL 3.77 (L)   MCV 78 - 100 fL 83   MCH 26.5 - 33.0 pg 28.9   MCHC 31.5 - 36.5 g/dL 35.0   RDW 10.0 - 15.0 % 12.3   Pathologist Review Comments (Blood)  Reviewed.  Differential confirmed. [facility], May 1, 2025   % Neutrophils %  % 37  40   % Lymphocytes %  % 37  37   % Monocytes %  % 16  17   % Eosinophils %  % 1  1   % Basophils %  % 2  1   % Immature Granulocytes % 7   % Myelocytes % 3   % Promyelocytes % 1   NRBC/W <1 /100 0   Absolute Neutrophil 1.6 - 8.3 10e3/uL  1.6 - 8.3 10e3/uL 0.9 (L)  1.0 (L)   Absolute Lymphocytes 0.8 - 5.3 10e3/uL  0.8 - 5.3 10e3/uL 0.9  0.9   Absolute Monocytes 0.0 - 1.3 10e3/uL  0.0 - 1.3 10e3/uL 0.4  0.4   Absolute Eosinophils 0.0 - 0.7 10e3/uL  0.0 - 0.7 10e3/uL 0.0  0.0   Absolute Basophils 0.0 - 0.2 10e3/uL  0.0 - 0.2 10e3/uL 0.0  0.0   Absolute Immature Granulocytes <=0.4 10e3/uL 0.2   Absolute Myelocytes <=0.0 10e3/uL 0.1 (H)   Absolute Promyelocytes <=0.0 10e3/uL 0.0   Absolute NRBCs 10e3/uL 0.0   RBC Morphology  Confirmed RBC Indices   Platelet Morphology Automated Count Confirmed. Platelet morphology is normal.  Automated Count Confirmed. Platelet morphology is normal.   (L): Data is abnormally low  (H): Data is abnormally high  I reviewed the above labs today.        Assessment and Plan:   Rectal adenocarcinoma   - On treatment with neoadjuvant folfox  - Tolerating well with manageable side effects.   - Tolerating Fulphila without significant bone pain. Using Claritin appropriately. Reviewed adequate hand hygiene.  - Labs to be drawn prior to treatment. If continuing to meet goals, will proceed with cycle 4 without changes.    - Repeat imaging due 6/2025, followed by review with Dr. Herbert.  - Continue provider / MJ visits prior to treatment for review.  - Patient is in agreement with plan.     Treatment-related nausea, improved   -  Reviewed anti-emeitc dosing and administration  - Continue emend and aloxi w/pre-treatment meds  - Reviewed smaller, more frequent meals w/focus on protein  - Reviewed importance of adequate hydration  - Dietician referral placed previously     Treatment-related neutropenia  - Reviewed neutropenic precautions  - Continue Fulphila per treatment plan, administering at home without issue  - Continue to monitor     Elevated alk phos; LFTs  - Mild elevated LFTs and alk phos  - Continuing to meet treatment parameters  - Will monitor for now      [place] [address]                Virtual Visit Details     Type of service:  Video Visit     Originating Location (pt. Location): Home  Distant Location (provider location): Off-site  Platform used for Video Visit: [employer]      Video Total Time: 15 minutes

## 2025-05-12 NOTE — Clinical Note
5/12/2025      Desi Granado  [address] [place] [address]      Dear Colleague,    Thank you for referring your patient, Desi Granado, to the [facility]. Please see a copy of my visit note below.    Virtual Oncology Follow-Up Visit: May 12, 2025    Oncologist: Dr. Herbert  PCP: Pearl Grover    Reason for Visit: Pre-treatment follow-up    Diagnosis: Rectal adenocarcioma   She had bilateral mastectomy and left sentinel lymph node biopsy in 2018 for left solid papillary carcinoma measuring 22 mm, grade 2 with negative margins.  It was pT2 N0 lesion.  Cancer was ER/% positive and HER2/tamara FISH negative.  Oncotype DX score was 0.  She was initially started on tamoxifen in December 2018 and then switched to exemestane and Zoladex in June 2019 and had bilateral salpingo-oophorectomy in February 2023.  After that she remained on exemestane and stopped in early March 2025.  Breast cancer index did not show that she will benefit from extended hormone therapy.     She was having intermittent hematochezia for about 1 year and then on 2/21/2025 she had colonoscopy which showed a fungating nonobstructing medium-sized mass from 9 to 12 cm proximal to the anus.  Mass was noncircumferential and measured 3 cm in length.  This was biopsied.  3 sessile polyps were found in the sigmoid colon and transverse colon measuring 2 to 3 mm in size.  These were resected.  The biopsy from the sigmoid/rectal colon mass showed moderately differentiated invasive adenocarcinoma, MMR IHC intact.  Biopsy from the sessile polyps was tubular adenoma without any high-grade dysplasia or malignancy.     2/27/2025.  CT chest abdomen and pelvis was done which did not show the rectosigmoid mass but it showed enlarged right mesorectal lymph node consistent with metastatic spread.  No evidence of distant metastasis.     MRI of the rectum showed a T2 N1 disease in the mid rectum.     CEA was  2.2.     She met with Dr. Landa on 3/14/2025 who did a flexible sigmoidoscopy and it showed a 3 cm fungating ulcerated mass located at 7 cm from the anal verge at the right posterolateral aspect of the mid rectum.    Her case was discussed in the tumor board with recommendation being starting systemic chemotherapy based on PROSPECT trial followed by surgery and avoiding radiation if possible.    Treatment:  4/3/2025 began FOLFOX     Interval History:  Pt is seen virtually for review of ***    Patient denies any of the following except if noted above: fevers, chills, difficulty with energy, vision or hearing changes, chest pain, dyspnea, abdominal pain, nausea, vomiting, diarrhea, constipation, urinary concerns, headaches, numbness, tingling, issues with sleep or mood. She also denies lumps, bumps, rashes or skin lesions, bleeding or bruising issues.    Physical Exam: Limited due to virtual visit restrictions.  General: No acute distress. Appearance is well-groomed.  Resp: No respiratory distress. No cough.   Skin: No visible rash or lesions.  Neuro: A/O x 4. Appropriate mentation and speech.  Psych: Appropriate mood.     Laboratory Results:   No results found for any visits on 05/12/25.  I reviewed the above labs today.    Imaging:  IR Chest Port Placement > 5 Yrs of Age  Narrative: PROCEDURE 3/28/2025 9:55 AM: Venous port placement  1. Ultrasound guidance for venous access  2. Tunneled port (age > 5 yrs.) placement  3. Fluoroscopic guidance placement    CLINICAL HISTORY: single lumen chest port placement; Rectal  adenocarcinoma (H). Vascular port placement requested.    COMPARISONS: CT chest abdomen pelvis 2/27/2025    REFERRING PROVIDER: JOSE BAI    Procedural Personnel  Attending physician(s): ANATOLY Carrington MD   Fellow physician(s): None  Resident physician(s): None  Advanced practice provider(s): None    AAYUSH HILLMAN MD, attest that I was present in the procedure room for  the entire procedure.    Procedure  Date (mm/dd/yyyy): 3/28/2025    Pre-procedure diagnosis: single lumen chest port placement; Rectal  adenocarcinoma (H)  Post-procedure diagnosis: Same  Indication: Administration of chemotherapy  Additional clinical history: None    Complications: No immediate complications.  Impression: IMPRESSION:  1. Ultrasound guided right internal jugular venotomy.    2. 6 Albanian 21.5 cm single-lumen Angiodynamics Plastic CT Smart Port  placed. Tip in the high right atrium. Port heparin locked and ready  for use.    _______________________________________________________________    PROCEDURE SUMMARY:  - Venous access with ultrasound guidance  - Tunneled port insertion under fluoroscopic guidance  - Additional procedure(s): None    Pre-procedure  Consent: Informed consent for the procedure including risks, benefits  and alternatives was obtained and time-out was performed prior to the  procedure.  Preparation (MIPS): The site was prepared and draped using all  elements of maximal sterile barrier technique including sterile  gloves, sterile gown, cap, mask, large sterile sheet, sterile  ultrasound probe cover, hand hygiene and cutaneous antisepsis with 2%  chlorhexidine.   Medical reason for site preparation exception (MIPS): Not applicable    Anesthesia/sedation  Moderate sedation administered under my supervision.    The patient was placed on continuous monitoring. Vital signs monitored  and sedation administered by nursing staff under my supervision. 3 mg  Versed and 100 mcg Fentanyl IV. The patient remained stable throughout  the procedure.    ATTENDING FACE-TO-FACE SEDATION TIME: 23 minutes    Access  Local anesthesia was administered. The vessel was sonographically  evaluated and determined to be patent. Real time ultrasound was used  to visualize needle entry into the vessel and a permanent image was  stored.  Laterality: Right  Vein accessed: Internal jugular vein  Access technique: Micropuncture set with 21 gauge  needle    Venography: Not performed    Port placement:  Limited internal jugular ultrasound documented internal jugular vein  patency.    The right neck and upper chest were prepped and draped in the usual  sterile fashion. 1% lidocaine without epinephrine was used for local  anesthesia.     Under ultrasound guidance, right internal jugular venotomy was made  with a micropuncture needle. Ultrasound image documenting internal  jugular venous patency and needle venotomy was saved in the patient's  record.     Under fluoroscopic guidance, the micropuncture needle was exchanged  over guidewire for the micropuncture sheath. Catheter length measured  with the 0.018 guidewire. Micropuncture sheath saline locked. A  subcutaneous pocket was created for the port reservoir over the right  anterior chest using a combination of sharp and blunt dissection. The  pocket was liberally irrigated with sterile saline. Catheter was  subcutaneously tunneled from the right anterior chest pocket to the  right internal jugular venotomy site. Port reservoir placed in the  subcutaneous pocket. Catheter was cut to length (between the 21 and 22  cm markers). Micropuncture sheath exchanged over guidewire for the  peel-away sheath. Catheter placed through the peel-away sheath and  advanced under fluoroscopic guidance to the high right atrium.  Peel-away sheath removed. Port aspirated and flushed adequately. Port  heparin locked with 100:1 heparin solution.     Fluoroscopic image documenting port placement and position was saved  in the patient's record.    The right anterior chest incision was closed with a single 4-0 Vicryl  interrupted subcutaneous suture, a running 4-0 Monocryl subcuticular  suture, and Dermabond tissue adhesive. Right internal jugular venotomy  site closed with Dermabond tissue adhesive. No immediate complication.    Port placed: Angiodynamics Plastic CT Smart Port  Lot number:   Catheter size (Setswana): 6  Lumens:  Single-lumen   Power injectable: Yes  Catheter tip: high right atrium    Catheter flush: Heparin (100 units/mL)    Closure  The access site and incision were closed and sterile dressing(s) were  applied.  Access site closure technique: Tissue adhesive  Incision closure technique: Absorbable suture and tissue adhesive  Patient discharged from procedure suite with device accessed: No    Contrast: None administered.    Radiation Dose  Fluoroscopy time: 17.0 seconds  Dose: 2.26 mGy    Additional Details  Additional description of procedure: None  Registry event: V/3/g  Device used: None  Equipment details: None  Unique Device Identifiers: Not available  Specimens removed: None  Estimated blood loss (mL): Less than 10  Standardized report: SIR_Port_v3.1    AAYUSH AGUILAR MD         SYSTEM ID:  [id number]    I reviewed the above imaging report today.      Assessment and Plan:   Rectal adenocarcinoma   - On treatment with neoadjuvant folfox  - Tolerating *** with manageable side effects.   - Tolerating Fulphila without significant bone pain. Using Claritin appropriately. Reviewed adequate hand hygiene.  - Labs to be drawn prior to treatment. If continuing to meet goals, will proceed with cycle 4 without changes.    - Repeat imaging due 6/2025, followed by review with Dr. Herbert.  - Continue provider / MJ visits prior to treatment for review.  - Patient is in agreement with plan.     Treatment-related nausea  - Reviewed anti-emeitc dosing and administration  - Continue emend and aloxi w/pre-treatment meds  - Reviewed use of olanzapine, pt is interested ***  - Reviewed smaller, more frequent meals  - Reviewed importance of adequate hydration  - Dietician referral placed previously     Treatment-related neutropenia  - Reviewed neutropenic precautions  - Continue Fulphila per treatment plan, administering at home without issue  - Continue to monitor       Charlene CABAN, [address] [address]  [place] [address]                Virtual Visit Details     Type of service:  Video Visit     Originating Location (pt. Location): Home  Distant Location (provider location): Off-site  Platform used for Video Visit: {[email or web address]}      Video Total Time:     Virtual Oncology Follow-Up Visit: May 12, 2025    Oncologist: Dr. Herbert  PCP: Pearl Grover    Reason for Visit: Pre-treatment follow-up    Diagnosis: Rectal adenocarcioma   She had bilateral mastectomy and left sentinel lymph node biopsy in 2018 for left solid papillary carcinoma measuring 22 mm, grade 2 with negative margins.  It was pT2 N0 lesion.  Cancer was ER/% positive and HER2/tamara FISH negative.  Oncotype DX score was 0.  She was initially started on tamoxifen in December 2018 and then switched to exemestane and Zoladex in June 2019 and had bilateral salpingo-oophorectomy in February 2023.  After that she remained on exemestane and stopped in early March 2025.  Breast cancer index did not show that she will benefit from extended hormone therapy.     She was having intermittent hematochezia for about 1 year and then on 2/21/2025 she had colonoscopy which showed a fungating nonobstructing medium-sized mass from 9 to 12 cm proximal to the anus.  Mass was noncircumferential and measured 3 cm in length.  This was biopsied.  3 sessile polyps were found in the sigmoid colon and transverse colon measuring 2 to 3 mm in size.  These were resected.  The biopsy from the sigmoid/rectal colon mass showed moderately differentiated invasive adenocarcinoma, MMR IHC intact.  Biopsy from the sessile polyps was tubular adenoma without any high-grade dysplasia or malignancy.     2/27/2025.  CT chest abdomen and pelvis was done which did not show the rectosigmoid mass but it showed enlarged right mesorectal lymph node consistent with metastatic spread.  No evidence of distant metastasis.     MRI of the rectum showed a T2 N1 disease in the mid  rectum.     CEA was 2.2.     She met with Dr. Landa on 3/14/2025 who did a flexible sigmoidoscopy and it showed a 3 cm fungating ulcerated mass located at 7 cm from the anal verge at the right posterolateral aspect of the mid rectum.    Her case was discussed in the tumor board with recommendation being starting systemic chemotherapy based on PROSPECT trial followed by surgery and avoiding radiation if possible.    Treatment:  4/3/2025 began FOLFOX     Interval History:  Pt is seen virtually for review of ***    Patient denies any of the following except if noted above: fevers, chills, difficulty with energy, vision or hearing changes, chest pain, dyspnea, abdominal pain, nausea, vomiting, diarrhea, constipation, urinary concerns, headaches, numbness, tingling, issues with sleep or mood. She also denies lumps, bumps, rashes or skin lesions, bleeding or bruising issues.    Physical Exam: Limited due to virtual visit restrictions.  General: No acute distress. Appearance is well-groomed.  Resp: No respiratory distress. No cough.   Skin: No visible rash or lesions.  Neuro: A/O x 4. Appropriate mentation and speech.  Psych: Appropriate mood.     Laboratory Results:    Latest Reference Range & Units 04/30/25 10:32   Sodium 135 - 145 mmol/L 132 (L)   Potassium 3.4 - 5.3 mmol/L 4.0   Chloride 98 - 107 mmol/L 98   Carbon Dioxide (CO2) 22 - 29 mmol/L 27   Urea Nitrogen 6.0 - 20.0 mg/dL 13.7   Creatinine 0.51 - 0.95 mg/dL 0.68   GFR Estimate >60 mL/min/1.73m2 >90   Calcium 8.8 - 10.4 mg/dL 9.4   Anion Gap 7 - 15 mmol/L 7   Albumin 3.5 - 5.2 g/dL 3.6   Protein Total 6.4 - 8.3 g/dL 6.5   Alkaline Phosphatase 40 - 150 U/L 146   ALT 0 - 50 U/L 35   AST 0 - 45 U/L 26   Bilirubin Total <=1.2 mg/dL 0.2   Glucose 70 - 99 mg/dL 100 (H)   WBC 4.0 - 11.0 10e3/uL 2.5 (L)   Hemoglobin 11.7 - 15.7 g/dL 10.9 (L)   Hematocrit 35.0 - 47.0 % 31.1 (L)   Platelet Count 150 - 450 10e3/uL 246   RBC Count 3.80 - 5.20 10e6/uL 3.77 (L)   MCV 78 -  100 fL 83   MCH 26.5 - 33.0 pg 28.9   MCHC 31.5 - 36.5 g/dL 35.0   RDW 10.0 - 15.0 % 12.3   Pathologist Review Comments (Blood)  Reviewed.  Differential confirmed. [facility], May 1, 2025   % Neutrophils %  % 37  40   % Lymphocytes %  % 37  37   % Monocytes %  % 16  17   % Eosinophils %  % 1  1   % Basophils %  % 2  1   % Immature Granulocytes % 7   % Myelocytes % 3   % Promyelocytes % 1   NRBC/W <1 /100 0   Absolute Neutrophil 1.6 - 8.3 10e3/uL  1.6 - 8.3 10e3/uL 0.9 (L)  1.0 (L)   Absolute Lymphocytes 0.8 - 5.3 10e3/uL  0.8 - 5.3 10e3/uL 0.9  0.9   Absolute Monocytes 0.0 - 1.3 10e3/uL  0.0 - 1.3 10e3/uL 0.4  0.4   Absolute Eosinophils 0.0 - 0.7 10e3/uL  0.0 - 0.7 10e3/uL 0.0  0.0   Absolute Basophils 0.0 - 0.2 10e3/uL  0.0 - 0.2 10e3/uL 0.0  0.0   Absolute Immature Granulocytes <=0.4 10e3/uL 0.2   Absolute Myelocytes <=0.0 10e3/uL 0.1 (H)   Absolute Promyelocytes <=0.0 10e3/uL 0.0   Absolute NRBCs 10e3/uL 0.0   RBC Morphology  Confirmed RBC Indices   Platelet Morphology Automated Count Confirmed. Platelet morphology is normal.  Automated Count Confirmed. Platelet morphology is normal.   (L): Data is abnormally low  (H): Data is abnormally high  I reviewed the above labs today.        Assessment and Plan:   Rectal adenocarcinoma   - On treatment with neoadjuvant folfox  - Tolerating *** with manageable side effects.   - Tolerating Fulphila without significant bone pain. Using Claritin appropriately. Reviewed adequate hand hygiene.  - Labs to be drawn prior to treatment. If continuing to meet goals, will proceed with cycle 4 without changes.    - Repeat imaging due 6/2025, followed by review with Dr. Herbert.  - Continue provider / MJ visits prior to treatment for review.  - Patient is in agreement with plan.     Treatment-related nausea  - Reviewed anti-emeitc dosing and administration  - Continue emend and aloxi w/pre-treatment meds  - Reviewed use of olanzapine, pt is interested ***  - Reviewed smaller, more frequent  meals  - Reviewed importance of adequate hydration  - Dietician referral placed previously     Treatment-related neutropenia  - Reviewed neutropenic precautions  - Continue Fulphila per treatment plan, administering at home without issue  - Continue to monitor       Charlene CABAN CNP  [address] [address]                Virtual Visit Details     Type of service:  Video Visit     Originating Location (pt. Location): Home  Distant Location (provider location): Off-site  Platform used for Video Visit: {[email or web address]}      Video Total Time:       Again, thank you for allowing me to participate in the care of your patient.        Sincerely,        MEE Harry CNP    Electronically signed

## 2025-05-12 NOTE — Clinical Note
5/12/2025      Desi Granado  [address] [place] [address]      Dear Colleague,    Thank you for referring your patient, Desi Granado, to the [facility]. Please see a copy of my visit note below.    Virtual Oncology Follow-Up Visit: May 12, 2025    Oncologist: Dr. Herbert  PCP: Pearl Grover    Reason for Visit: Pre-treatment follow-up    Diagnosis: Rectal adenocarcioma   She had bilateral mastectomy and left sentinel lymph node biopsy in 2018 for left solid papillary carcinoma measuring 22 mm, grade 2 with negative margins.  It was pT2 N0 lesion.  Cancer was ER/% positive and HER2/tamara FISH negative.  Oncotype DX score was 0.  She was initially started on tamoxifen in December 2018 and then switched to exemestane and Zoladex in June 2019 and had bilateral salpingo-oophorectomy in February 2023.  After that she remained on exemestane and stopped in early March 2025.  Breast cancer index did not show that she will benefit from extended hormone therapy.     She was having intermittent hematochezia for about 1 year and then on 2/21/2025 she had colonoscopy which showed a fungating nonobstructing medium-sized mass from 9 to 12 cm proximal to the anus.  Mass was noncircumferential and measured 3 cm in length.  This was biopsied.  3 sessile polyps were found in the sigmoid colon and transverse colon measuring 2 to 3 mm in size.  These were resected.  The biopsy from the sigmoid/rectal colon mass showed moderately differentiated invasive adenocarcinoma, MMR IHC intact.  Biopsy from the sessile polyps was tubular adenoma without any high-grade dysplasia or malignancy.     2/27/2025.  CT chest abdomen and pelvis was done which did not show the rectosigmoid mass but it showed enlarged right mesorectal lymph node consistent with metastatic spread.  No evidence of distant metastasis.     MRI of the rectum showed a T2 N1 disease in the mid rectum.     CEA was  2.2.     She met with Dr. Landa on 3/14/2025 who did a flexible sigmoidoscopy and it showed a 3 cm fungating ulcerated mass located at 7 cm from the anal verge at the right posterolateral aspect of the mid rectum.    Her case was discussed in the tumor board with recommendation being starting systemic chemotherapy based on PROSPECT trial followed by surgery and avoiding radiation if possible.    Treatment:  4/3/2025 began FOLFOX     Interval History:  Pt is seen virtually for review of ***    Patient denies any of the following except if noted above: fevers, chills, difficulty with energy, vision or hearing changes, chest pain, dyspnea, abdominal pain, nausea, vomiting, diarrhea, constipation, urinary concerns, headaches, numbness, tingling, issues with sleep or mood. She also denies lumps, bumps, rashes or skin lesions, bleeding or bruising issues.    Physical Exam: Limited due to virtual visit restrictions.  General: No acute distress. Appearance is well-groomed.  Resp: No respiratory distress. No cough.   Skin: No visible rash or lesions.  Neuro: A/O x 4. Appropriate mentation and speech.  Psych: Appropriate mood.     Laboratory Results:   No results found for any visits on 05/12/25.  I reviewed the above labs today.    Imaging:  IR Chest Port Placement > 5 Yrs of Age  Narrative: PROCEDURE 3/28/2025 9:55 AM: Venous port placement  1. Ultrasound guidance for venous access  2. Tunneled port (age > 5 yrs.) placement  3. Fluoroscopic guidance placement    CLINICAL HISTORY: single lumen chest port placement; Rectal  adenocarcinoma (H). Vascular port placement requested.    COMPARISONS: CT chest abdomen pelvis 2/27/2025    REFERRING PROVIDER: JOSE BAI    Procedural Personnel  Attending physician(s): ANATOLY Carrington MD   Fellow physician(s): None  Resident physician(s): None  Advanced practice provider(s): None    AAYUSH HILLMAN MD, attest that I was present in the procedure room for  the entire procedure.    Procedure  Date (mm/dd/yyyy): 3/28/2025    Pre-procedure diagnosis: single lumen chest port placement; Rectal  adenocarcinoma (H)  Post-procedure diagnosis: Same  Indication: Administration of chemotherapy  Additional clinical history: None    Complications: No immediate complications.  Impression: IMPRESSION:  1. Ultrasound guided right internal jugular venotomy.    2. 6 Tamazight 21.5 cm single-lumen Angiodynamics Plastic CT Smart Port  placed. Tip in the high right atrium. Port heparin locked and ready  for use.    _______________________________________________________________    PROCEDURE SUMMARY:  - Venous access with ultrasound guidance  - Tunneled port insertion under fluoroscopic guidance  - Additional procedure(s): None    Pre-procedure  Consent: Informed consent for the procedure including risks, benefits  and alternatives was obtained and time-out was performed prior to the  procedure.  Preparation (MIPS): The site was prepared and draped using all  elements of maximal sterile barrier technique including sterile  gloves, sterile gown, cap, mask, large sterile sheet, sterile  ultrasound probe cover, hand hygiene and cutaneous antisepsis with 2%  chlorhexidine.   Medical reason for site preparation exception (MIPS): Not applicable    Anesthesia/sedation  Moderate sedation administered under my supervision.    The patient was placed on continuous monitoring. Vital signs monitored  and sedation administered by nursing staff under my supervision. 3 mg  Versed and 100 mcg Fentanyl IV. The patient remained stable throughout  the procedure.    ATTENDING FACE-TO-FACE SEDATION TIME: 23 minutes    Access  Local anesthesia was administered. The vessel was sonographically  evaluated and determined to be patent. Real time ultrasound was used  to visualize needle entry into the vessel and a permanent image was  stored.  Laterality: Right  Vein accessed: Internal jugular vein  Access technique: Micropuncture set with 21 gauge  needle    Venography: Not performed    Port placement:  Limited internal jugular ultrasound documented internal jugular vein  patency.    The right neck and upper chest were prepped and draped in the usual  sterile fashion. 1% lidocaine without epinephrine was used for local  anesthesia.     Under ultrasound guidance, right internal jugular venotomy was made  with a micropuncture needle. Ultrasound image documenting internal  jugular venous patency and needle venotomy was saved in the patient's  record.     Under fluoroscopic guidance, the micropuncture needle was exchanged  over guidewire for the micropuncture sheath. Catheter length measured  with the 0.018 guidewire. Micropuncture sheath saline locked. A  subcutaneous pocket was created for the port reservoir over the right  anterior chest using a combination of sharp and blunt dissection. The  pocket was liberally irrigated with sterile saline. Catheter was  subcutaneously tunneled from the right anterior chest pocket to the  right internal jugular venotomy site. Port reservoir placed in the  subcutaneous pocket. Catheter was cut to length (between the 21 and 22  cm markers). Micropuncture sheath exchanged over guidewire for the  peel-away sheath. Catheter placed through the peel-away sheath and  advanced under fluoroscopic guidance to the high right atrium.  Peel-away sheath removed. Port aspirated and flushed adequately. Port  heparin locked with 100:1 heparin solution.     Fluoroscopic image documenting port placement and position was saved  in the patient's record.    The right anterior chest incision was closed with a single 4-0 Vicryl  interrupted subcutaneous suture, a running 4-0 Monocryl subcuticular  suture, and Dermabond tissue adhesive. Right internal jugular venotomy  site closed with Dermabond tissue adhesive. No immediate complication.    Port placed: Angiodynamics Plastic CT Smart Port  Lot number:   Catheter size (Chinese): 6  Lumens:  Single-lumen   Power injectable: Yes  Catheter tip: high right atrium    Catheter flush: Heparin (100 units/mL)    Closure  The access site and incision were closed and sterile dressing(s) were  applied.  Access site closure technique: Tissue adhesive  Incision closure technique: Absorbable suture and tissue adhesive  Patient discharged from procedure suite with device accessed: No    Contrast: None administered.    Radiation Dose  Fluoroscopy time: 17.0 seconds  Dose: 2.26 mGy    Additional Details  Additional description of procedure: None  Registry event: V/3/g  Device used: None  Equipment details: None  Unique Device Identifiers: Not available  Specimens removed: None  Estimated blood loss (mL): Less than 10  Standardized report: SIR_Port_v3.1    AAYUSH AGUILAR MD         SYSTEM ID:  [id number]    I reviewed the above imaging report today.      Assessment and Plan:   Rectal adenocarcinoma   - On treatment with neoadjuvant folfox  - Tolerating *** with manageable side effects.   - Tolerating Fulphila without significant bone pain. Using Claritin appropriately. Reviewed adequate hand hygiene.  - Labs to be drawn prior to treatment. If continuing to meet goals, will proceed with cycle 4 without changes.    - Repeat imaging due 6/2025, followed by review with Dr. Herbert.  - Continue provider / MJ visits prior to treatment for review.  - Patient is in agreement with plan.     Treatment-related nausea  - Reviewed anti-emeitc dosing and administration  - Continue emend and aloxi w/pre-treatment meds  - Reviewed use of olanzapine, pt is interested ***  - Reviewed smaller, more frequent meals  - Reviewed importance of adequate hydration  - Dietician referral placed previously     Treatment-related neutropenia  - Reviewed neutropenic precautions  - Continue Fulphila per treatment plan, administering at home without issue  - Continue to monitor       Charlene CABAN, [address] [address]  [place] [address]                Virtual Visit Details     Type of service:  Video Visit     Originating Location (pt. Location): Home  Distant Location (provider location): Off-site  Platform used for Video Visit: {[email or web address]}      Video Total Time:     Virtual Oncology Follow-Up Visit: May 12, 2025    Oncologist: Dr. Herbert  PCP: Pearl Grover    Reason for Visit: Pre-treatment follow-up    Diagnosis: Rectal adenocarcioma   She had bilateral mastectomy and left sentinel lymph node biopsy in 2018 for left solid papillary carcinoma measuring 22 mm, grade 2 with negative margins.  It was pT2 N0 lesion.  Cancer was ER/% positive and HER2/tamara FISH negative.  Oncotype DX score was 0.  She was initially started on tamoxifen in December 2018 and then switched to exemestane and Zoladex in June 2019 and had bilateral salpingo-oophorectomy in February 2023.  After that she remained on exemestane and stopped in early March 2025.  Breast cancer index did not show that she will benefit from extended hormone therapy.     She was having intermittent hematochezia for about 1 year and then on 2/21/2025 she had colonoscopy which showed a fungating nonobstructing medium-sized mass from 9 to 12 cm proximal to the anus.  Mass was noncircumferential and measured 3 cm in length.  This was biopsied.  3 sessile polyps were found in the sigmoid colon and transverse colon measuring 2 to 3 mm in size.  These were resected.  The biopsy from the sigmoid/rectal colon mass showed moderately differentiated invasive adenocarcinoma, MMR IHC intact.  Biopsy from the sessile polyps was tubular adenoma without any high-grade dysplasia or malignancy.     2/27/2025.  CT chest abdomen and pelvis was done which did not show the rectosigmoid mass but it showed enlarged right mesorectal lymph node consistent with metastatic spread.  No evidence of distant metastasis.     MRI of the rectum showed a T2 N1 disease in the mid  rectum.     CEA was 2.2.     She met with Dr. Landa on 3/14/2025 who did a flexible sigmoidoscopy and it showed a 3 cm fungating ulcerated mass located at 7 cm from the anal verge at the right posterolateral aspect of the mid rectum.    Her case was discussed in the tumor board with recommendation being starting systemic chemotherapy based on PROSPECT trial followed by surgery and avoiding radiation if possible.    Treatment:  4/3/2025 began FOLFOX     Interval History:  Pt is seen virtually for review of ***    Patient denies any of the following except if noted above: fevers, chills, difficulty with energy, vision or hearing changes, chest pain, dyspnea, abdominal pain, nausea, vomiting, diarrhea, constipation, urinary concerns, headaches, numbness, tingling, issues with sleep or mood. She also denies lumps, bumps, rashes or skin lesions, bleeding or bruising issues.    Physical Exam: Limited due to virtual visit restrictions.  General: No acute distress. Appearance is well-groomed.  Resp: No respiratory distress. No cough.   Skin: No visible rash or lesions.  Neuro: A/O x 4. Appropriate mentation and speech.  Psych: Appropriate mood.     Laboratory Results:    Latest Reference Range & Units 04/30/25 10:32   Sodium 135 - 145 mmol/L 132 (L)   Potassium 3.4 - 5.3 mmol/L 4.0   Chloride 98 - 107 mmol/L 98   Carbon Dioxide (CO2) 22 - 29 mmol/L 27   Urea Nitrogen 6.0 - 20.0 mg/dL 13.7   Creatinine 0.51 - 0.95 mg/dL 0.68   GFR Estimate >60 mL/min/1.73m2 >90   Calcium 8.8 - 10.4 mg/dL 9.4   Anion Gap 7 - 15 mmol/L 7   Albumin 3.5 - 5.2 g/dL 3.6   Protein Total 6.4 - 8.3 g/dL 6.5   Alkaline Phosphatase 40 - 150 U/L 146   ALT 0 - 50 U/L 35   AST 0 - 45 U/L 26   Bilirubin Total <=1.2 mg/dL 0.2   Glucose 70 - 99 mg/dL 100 (H)   WBC 4.0 - 11.0 10e3/uL 2.5 (L)   Hemoglobin 11.7 - 15.7 g/dL 10.9 (L)   Hematocrit 35.0 - 47.0 % 31.1 (L)   Platelet Count 150 - 450 10e3/uL 246   RBC Count 3.80 - 5.20 10e6/uL 3.77 (L)   MCV 78 -  100 fL 83   MCH 26.5 - 33.0 pg 28.9   MCHC 31.5 - 36.5 g/dL 35.0   RDW 10.0 - 15.0 % 12.3   Pathologist Review Comments (Blood)  Reviewed.  Differential confirmed. [facility], May 1, 2025   % Neutrophils %  % 37  40   % Lymphocytes %  % 37  37   % Monocytes %  % 16  17   % Eosinophils %  % 1  1   % Basophils %  % 2  1   % Immature Granulocytes % 7   % Myelocytes % 3   % Promyelocytes % 1   NRBC/W <1 /100 0   Absolute Neutrophil 1.6 - 8.3 10e3/uL  1.6 - 8.3 10e3/uL 0.9 (L)  1.0 (L)   Absolute Lymphocytes 0.8 - 5.3 10e3/uL  0.8 - 5.3 10e3/uL 0.9  0.9   Absolute Monocytes 0.0 - 1.3 10e3/uL  0.0 - 1.3 10e3/uL 0.4  0.4   Absolute Eosinophils 0.0 - 0.7 10e3/uL  0.0 - 0.7 10e3/uL 0.0  0.0   Absolute Basophils 0.0 - 0.2 10e3/uL  0.0 - 0.2 10e3/uL 0.0  0.0   Absolute Immature Granulocytes <=0.4 10e3/uL 0.2   Absolute Myelocytes <=0.0 10e3/uL 0.1 (H)   Absolute Promyelocytes <=0.0 10e3/uL 0.0   Absolute NRBCs 10e3/uL 0.0   RBC Morphology  Confirmed RBC Indices   Platelet Morphology Automated Count Confirmed. Platelet morphology is normal.  Automated Count Confirmed. Platelet morphology is normal.   (L): Data is abnormally low  (H): Data is abnormally high  I reviewed the above labs today.        Assessment and Plan:   Rectal adenocarcinoma   - On treatment with neoadjuvant folfox  - Tolerating *** with manageable side effects.   - Tolerating Fulphila without significant bone pain. Using Claritin appropriately. Reviewed adequate hand hygiene.  - Labs to be drawn prior to treatment. If continuing to meet goals, will proceed with cycle 4 without changes.    - Repeat imaging due 6/2025, followed by review with Dr. Herbert.  - Continue provider / MJ visits prior to treatment for review.  - Patient is in agreement with plan.     Treatment-related nausea  - Reviewed anti-emeitc dosing and administration  - Continue emend and aloxi w/pre-treatment meds  - Reviewed use of olanzapine, pt is interested ***  - Reviewed smaller, more frequent  meals  - Reviewed importance of adequate hydration  - Dietician referral placed previously     Treatment-related neutropenia  - Reviewed neutropenic precautions  - Continue Fulphila per treatment plan, administering at home without issue  - Continue to monitor       Charlene CABAN CNP  [address] [address]                Virtual Visit Details     Type of service:  Video Visit     Originating Location (pt. Location): Home  Distant Location (provider location): Off-site  Platform used for Video Visit: {[email or web address]}      Video Total Time:       Again, thank you for allowing me to participate in the care of your patient.        Sincerely,        MEE Harry CNP    Electronically signed

## 2025-05-13 DIAGNOSIS — C20 RECTAL CANCER (H): Primary | ICD-10-CM

## 2025-05-14 ENCOUNTER — RESULTS FOLLOW-UP (OUTPATIENT)
Dept: ONCOLOGY | Facility: CLINIC | Age: 55
End: 2025-05-14

## 2025-05-14 ENCOUNTER — INFUSION THERAPY VISIT (OUTPATIENT)
Dept: INFUSION THERAPY | Facility: CLINIC | Age: 55
End: 2025-05-14
Attending: INTERNAL MEDICINE
Payer: COMMERCIAL

## 2025-05-14 ENCOUNTER — DOCUMENTATION ONLY (OUTPATIENT)
Dept: HOME HEALTH SERVICES | Facility: HOME HEALTH | Age: 55
End: 2025-05-14
Payer: COMMERCIAL

## 2025-05-14 VITALS
SYSTOLIC BLOOD PRESSURE: 147 MMHG | OXYGEN SATURATION: 99 % | RESPIRATION RATE: 16 BRPM | HEART RATE: 89 BPM | DIASTOLIC BLOOD PRESSURE: 83 MMHG | WEIGHT: 141.7 LBS | TEMPERATURE: 97.5 F | BODY MASS INDEX: 23.58 KG/M2

## 2025-05-14 DIAGNOSIS — D70.1 CHEMOTHERAPY-INDUCED NEUTROPENIA: ICD-10-CM

## 2025-05-14 DIAGNOSIS — T45.1X5A CHEMOTHERAPY-INDUCED NEUTROPENIA: Primary | ICD-10-CM

## 2025-05-14 DIAGNOSIS — C50.919 MALIGNANT NEOPLASM OF BREAST IN FEMALE, ESTROGEN RECEPTOR POSITIVE, UNSPECIFIED LATERALITY, UNSPECIFIED SITE OF BREAST (H): ICD-10-CM

## 2025-05-14 DIAGNOSIS — C20 RECTAL ADENOCARCINOMA (H): ICD-10-CM

## 2025-05-14 DIAGNOSIS — C20 RECTAL ADENOCARCINOMA (H): Primary | ICD-10-CM

## 2025-05-14 DIAGNOSIS — D70.1 CHEMOTHERAPY-INDUCED NEUTROPENIA: Primary | ICD-10-CM

## 2025-05-14 DIAGNOSIS — T45.1X5A CHEMOTHERAPY-INDUCED NEUTROPENIA: ICD-10-CM

## 2025-05-14 DIAGNOSIS — Z17.0 MALIGNANT NEOPLASM OF BREAST IN FEMALE, ESTROGEN RECEPTOR POSITIVE, UNSPECIFIED LATERALITY, UNSPECIFIED SITE OF BREAST (H): ICD-10-CM

## 2025-05-14 LAB
ALBUMIN SERPL BCG-MCNC: 4.2 G/DL (ref 3.5–5.2)
ALP SERPL-CCNC: 160 U/L (ref 40–150)
ALT SERPL W P-5'-P-CCNC: 137 U/L (ref 0–50)
ANION GAP SERPL CALCULATED.3IONS-SCNC: 12 MMOL/L (ref 7–15)
AST SERPL W P-5'-P-CCNC: 82 U/L (ref 0–45)
BASOPHILS # BLD MANUAL: 0.1 10E3/UL (ref 0–0.2)
BASOPHILS NFR BLD MANUAL: 1 %
BILIRUB SERPL-MCNC: 0.3 MG/DL
BLASTS # BLD MANUAL: 0.1 10E3/UL
BLASTS NFR BLD MANUAL: 1 %
BUN SERPL-MCNC: 12.1 MG/DL (ref 6–20)
CALCIUM SERPL-MCNC: 9.3 MG/DL (ref 8.8–10.4)
CHLORIDE SERPL-SCNC: 104 MMOL/L (ref 98–107)
CREAT SERPL-MCNC: 0.81 MG/DL (ref 0.51–0.95)
EGFRCR SERPLBLD CKD-EPI 2021: 86 ML/MIN/1.73M2
EOSINOPHIL # BLD MANUAL: 0.1 10E3/UL (ref 0–0.7)
EOSINOPHIL NFR BLD MANUAL: 1 %
ERYTHROCYTE [DISTWIDTH] IN BLOOD BY AUTOMATED COUNT: 14.6 % (ref 10–15)
GLUCOSE SERPL-MCNC: 90 MG/DL (ref 70–99)
HCO3 SERPL-SCNC: 22 MMOL/L (ref 22–29)
HCT VFR BLD AUTO: 34.8 % (ref 35–47)
HGB BLD-MCNC: 11.9 G/DL (ref 11.7–15.7)
LYMPHOCYTES # BLD MANUAL: 2 10E3/UL (ref 0.8–5.3)
LYMPHOCYTES NFR BLD MANUAL: 36 %
MCH RBC QN AUTO: 29.1 PG (ref 26.5–33)
MCHC RBC AUTO-ENTMCNC: 34.2 G/DL (ref 31.5–36.5)
MCV RBC AUTO: 85 FL (ref 78–100)
METAMYELOCYTES # BLD MANUAL: 0.1 10E3/UL
METAMYELOCYTES NFR BLD MANUAL: 1 %
MONOCYTES # BLD MANUAL: 0.3 10E3/UL (ref 0–1.3)
MONOCYTES NFR BLD MANUAL: 6 %
NEUTROPHILS # BLD MANUAL: 3 10E3/UL (ref 1.6–8.3)
NEUTROPHILS NFR BLD MANUAL: 53 %
PLAT MORPH BLD: ABNORMAL
PLATELET # BLD AUTO: 129 10E3/UL (ref 150–450)
POLYCHROMASIA BLD QL SMEAR: SLIGHT
POTASSIUM SERPL-SCNC: 4.3 MMOL/L (ref 3.4–5.3)
PROMYELOCYTES # BLD MANUAL: 0.1 10E3/UL
PROMYELOCYTES NFR BLD MANUAL: 1 %
PROT SERPL-MCNC: 6.5 G/DL (ref 6.4–8.3)
RBC # BLD AUTO: 4.09 10E6/UL (ref 3.8–5.2)
RBC MORPH BLD: ABNORMAL
SODIUM SERPL-SCNC: 138 MMOL/L (ref 135–145)
WBC # BLD AUTO: 5.6 10E3/UL (ref 4–11)

## 2025-05-14 PROCEDURE — 96415 CHEMO IV INFUSION ADDL HR: CPT

## 2025-05-14 PROCEDURE — 96413 CHEMO IV INFUSION 1 HR: CPT

## 2025-05-14 PROCEDURE — 80053 COMPREHEN METABOLIC PANEL: CPT

## 2025-05-14 PROCEDURE — G0498 CHEMO EXTEND IV INFUS W/PUMP: HCPCS

## 2025-05-14 PROCEDURE — 85014 HEMATOCRIT: CPT

## 2025-05-14 PROCEDURE — 258N000003 HC RX IP 258 OP 636

## 2025-05-14 PROCEDURE — S9330 HIT CONT CHEM DIEM: HCPCS

## 2025-05-14 PROCEDURE — 96367 TX/PROPH/DG ADDL SEQ IV INF: CPT

## 2025-05-14 PROCEDURE — 36591 DRAW BLOOD OFF VENOUS DEVICE: CPT

## 2025-05-14 PROCEDURE — 96411 CHEMO IV PUSH ADDL DRUG: CPT

## 2025-05-14 PROCEDURE — 250N000011 HC RX IP 250 OP 636

## 2025-05-14 PROCEDURE — 85007 BL SMEAR W/DIFF WBC COUNT: CPT

## 2025-05-14 PROCEDURE — 96375 TX/PRO/DX INJ NEW DRUG ADDON: CPT

## 2025-05-14 PROCEDURE — 96368 THER/DIAG CONCURRENT INF: CPT

## 2025-05-14 RX ORDER — HEPARIN SODIUM (PORCINE) LOCK FLUSH IV SOLN 100 UNIT/ML 100 UNIT/ML
5 SOLUTION INTRAVENOUS
OUTPATIENT
Start: 2025-05-14

## 2025-05-14 RX ORDER — PALONOSETRON 0.05 MG/ML
0.25 INJECTION, SOLUTION INTRAVENOUS ONCE
Status: COMPLETED | OUTPATIENT
Start: 2025-05-14 | End: 2025-05-14

## 2025-05-14 RX ORDER — FLUOROURACIL 50 MG/ML
400 INJECTION, SOLUTION INTRAVENOUS ONCE
Status: COMPLETED | OUTPATIENT
Start: 2025-05-14 | End: 2025-05-14

## 2025-05-14 RX ADMIN — OXALIPLATIN 150 MG: 5 INJECTION, SOLUTION INTRAVENOUS at 11:40

## 2025-05-14 RX ADMIN — LEUCOVORIN CALCIUM 650 MG: 350 INJECTION, POWDER, LYOPHILIZED, FOR SUSPENSION INTRAMUSCULAR; INTRAVENOUS at 11:42

## 2025-05-14 RX ADMIN — FLUOROURACIL 720 MG: 50 INJECTION, SOLUTION INTRAVENOUS at 14:02

## 2025-05-14 RX ADMIN — FOSAPREPITANT: 150 INJECTION, POWDER, LYOPHILIZED, FOR SOLUTION INTRAVENOUS at 10:56

## 2025-05-14 RX ADMIN — DEXTROSE 250 ML: 5 SOLUTION INTRAVENOUS at 10:24

## 2025-05-14 RX ADMIN — PALONOSETRON HYDROCHLORIDE 0.25 MG: 0.25 INJECTION INTRAVENOUS at 10:26

## 2025-05-14 RX ADMIN — FAMOTIDINE 20 MG: 10 INJECTION, SOLUTION INTRAVENOUS at 10:29

## 2025-05-14 ASSESSMENT — PAIN SCALES - GENERAL: PAINLEVEL_OUTOF10: NO PAIN (0)

## 2025-05-14 NOTE — PROGRESS NOTES
"Patient here for port lab draw prior to chemo.    Intravenous access:     Port needle gauge 19 by 3/4 \".     Labs drawn without difficulty.    green and purple tubes drawn.     Port flushed with 20 ml NS.    Linnette Amaro RN            "

## 2025-05-14 NOTE — PROGRESS NOTES
"Infusion Nursing Note:  Desi Granado presents today for C4D1  Modified FOLFOX  Patient seen by provider today: No   present during visit today: Not Applicable.    Note: Patient ambulated to IVO with her parents. Feeling better this past week. Fatigue seems less.VSS. Denies pain.       Intravenous Access:  Implanted Port.    Treatment Conditions:  Lab Results   Component Value Date    HGB 11.9 05/14/2025    WBC 5.6 05/14/2025    ANEU 3.0 05/14/2025     (L) 05/14/2025        Lab Results   Component Value Date     05/14/2025    POTASSIUM 4.3 05/14/2025    CR 0.81 05/14/2025    MARISELA 9.3 05/14/2025    BILITOTAL 0.3 05/14/2025    ALBUMIN 4.2 05/14/2025     (H) 05/14/2025    AST 82 (H) 05/14/2025       Results reviewed, labs MET treatment parameters, ok to proceed with treatment.      Post Infusion Assessment:  Patient tolerated infusion without incident.  Blood return noted pre and post infusion.  Site patent and intact, free from redness, edema or discomfort.   Prior to discharge: Port is secured in place with tegaderm and flushed with 10cc NS with positive blood return noted.    Continuous home infusion Dosi-Fuser pump connected.    All connectors secured in place and clamps taped open.    Pump started, \"running\" noted on display (CADD): Not Applicable.   Capillary element taped to pt's skin per protocol.  Pump Connection double checked with Anh Merritt RN.  Patient instructed to call our clinic or [facility] with any questions or concerns at home.  Patient verbalized understanding.    Patient set up for pump disconnect at home with [facility] on 5/16 AT 12:00PM. Day 4 Fulphila injection. .        Discharge Plan:   Discharge instructions reviewed with: Patient.  AVS to patient via [email or web address].  Patient will return 2 weeks for next appointment.   Patient discharged in stable condition accompanied by: self, mother, and father.  Departure Mode: " Ambulatory.      Linnette Amaro RN

## 2025-05-14 NOTE — PROGRESS NOTES
[facility] d/c of Fluorouracil continuous infusion over 46 hours via C series pump.   Scheduled in [facility] for home disconnect on 5/16/25 at 12pm.  Will require Fulphila injection Day 4.

## 2025-05-15 ENCOUNTER — HOME INFUSION BILLING (OUTPATIENT)
Dept: HOME HEALTH SERVICES | Facility: HOME HEALTH | Age: 55
End: 2025-05-15
Payer: COMMERCIAL

## 2025-05-15 PROCEDURE — S9330 HIT CONT CHEM DIEM: HCPCS

## 2025-05-17 ENCOUNTER — HOME CARE VISIT (OUTPATIENT)
Dept: HOME HEALTH SERVICES | Facility: HOME HEALTH | Age: 55
End: 2025-05-17
Payer: COMMERCIAL

## 2025-05-17 VITALS
RESPIRATION RATE: 16 BRPM | DIASTOLIC BLOOD PRESSURE: 72 MMHG | TEMPERATURE: 97.1 F | HEART RATE: 103 BPM | SYSTOLIC BLOOD PRESSURE: 114 MMHG | OXYGEN SATURATION: 98 %

## 2025-05-17 PROCEDURE — 99601 HOME NFS VISIT <2 HRS: CPT

## 2025-05-17 PROCEDURE — S9537 HT HEM HORM INJ DIEM: HCPCS

## 2025-05-17 NOTE — PROGRESS NOTES
Nursing Visit Note:  Nurse visit today for Fulphila injection for Desi Granado.     present during visit today: Not Applicable.    Intravenous Access:  No Intravenous access/labs at this visit.      Note: Pt states she feels good but endorses increased fatigue which has been her norm post chemo administration.  Fulphila administered in R upper arm per pt request.  No issues or concerns reported.      Saline administered: 0 (ml)    Supply Check:   Does the patient have all the supplies they need for the next visit?  Yes    Next visit plan: Per chemo schedule     Rola White, RN 5/17/2025

## 2025-05-21 ENCOUNTER — HOME INFUSION (OUTPATIENT)
Dept: HOME HEALTH SERVICES | Facility: HOME HEALTH | Age: 55
End: 2025-05-21
Payer: COMMERCIAL

## 2025-05-21 DIAGNOSIS — C20 RECTAL ADENOCARCINOMA (H): ICD-10-CM

## 2025-05-22 NOTE — PROGRESS NOTES
Virtual Visit Details    Type of service:  Video Visit     Originating Location (pt. 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  It was pT2 N0 lesion.  Cancer was ER/% positive and HER2/tamara FISH negative.  Oncotype DX score was 0.  She was initially started on tamoxifen in December 2018 and then switched to exemestane and Zoladex in June 2019 and had bilateral salpingo-oophorectomy in February 2023.  After that she remained on exemestane and stopped in early March 2025.  Breast cancer index did not show that she will benefit from extended hormone therapy.    Related to her rectal cancer: she was having intermittent hematochezia for about 1 year and then on 2/21/2025 she had colonoscopy which showed a fungating nonobstructing medium-sized mass from 9 to 12 cm proximal to the anus.  Mass was noncircumferential and measured 3 cm in length.  Biopsy showed 3 sessile polyps were found in the sigmoid colon and transverse colon measuring 2 to 3 mm in size which were resected.  Biopsy from the sigmoid/rectal colon mass showed moderately differentiated invasive adenocarcinoma, MMR IHC intact.  Biopsy from the sessile polyps was tubular adenoma without any high-grade dysplasia or malignancy.    2/27/2025.  CT chest abdomen and pelvis was done which did not show the rectosigmoid mass but it showed enlarged right mesorectal lymph node consistent with metastatic spread.  No evidence of distant metastasis.    MRI of the rectum showed a T2 N1 disease in the mid rectum.    CEA was 2.2.    She met with   Gaertner on 3/14/2025 who did a flexible sigmoidoscopy and it showed a 3 cm fungating ulcerated mass located at 7 cm from the anal verge at the right posterolateral aspect of the mid rectum.    Tumor board had recommendation being starting systemic chemotherapy based on PROSPECT trial followed by surgery and avoiding radiation if possible.  Treatment:   4/3/2025 began FOLFOX    Interval History: Ms Granado is seen today alone by video for review prior to continuation of treatment with FOLFOX C5. Pt feel she is tolerating well and is exciting that she is in he last 2 cycles she hopes prior to surgery.   She is noting some side effects that continue with plans:   Hair thinning- continues but is limited.   Mucositis- salt and soda rinses are helpful.   Fatigue- chronic - mostly early in plan - feeling good by end of the 2 weeks. Napping most mornings  Nausea - compazine not working- using zofran 3x daily first days of the cycle after ball removed but still not covering all of the nausea- would like another option.   Eating - is eating proteins - craving them- weight is stable.   Constipation- using senna daily with Miralax in evening with good results.   Allergies- using flonase- switched from claritin to new med and is improved.  and using Singulair at night.   Cold neuropathy noted first and second day with arms - icing and was to trial compression but never got the compression garments requested- care coordinator was to send.   Denies pain, fevers, headaches, SOB, cramping.   Rest of comprehensive and complete ROS is reviewed and is negative.   Past Medical History:   Diagnosis Date    Allergic rhinitis, cause unspecified     Hypertension 8 years?    Injury, other and unspecified, knee, leg, ankle, and foot 8th grade    left ankle injury    Invasive ductal carcinoma of breast, female, left (H) 10/26/2018    Unsatisfactory cervical cytology smear 10/06/2021     Current Outpatient Medications   Medication Sig Dispense  "Refill    Chemo Kit For RN use only. Do not remove items from bag. Contents: 1  sodium chloride 0.9% flush, 4 medium gloves, 1 chemo gown, 1/4 chemo mat, 1 connector female, 1 chemo bag. [id number] kit 0    denosumab (PROLIA) 60 mg/mL injection Inject 60 mg subcutaneously every 6 months. (Patient not taking: Reported on 5/12/2025)      dexAMETHasone (DECADRON) 4 MG tablet Take 2 tablets (8 mg) by mouth daily. Take for 2 days, starting the day after chemo. Take with food. 4 tablet 2    Emergency Supply Kit, Central, Patient use for emergency only. Contents: 3 sodium chloride 0.9% flushes, 1 dressing kit, 1 microclave ext set 14\", 4 nitrile gloves (med), 6 alcohol prep pads, 1 bacitracin, 1 syringe (10 cc 20 G 1\"). Call [phone] to reorder. [id number] kit 0    [START ON 5/28/2025] Fluorouracil (ADRUCIL) 4,320 mg in sodium chloride 0.9 % 241 mL via HOMEPUMP C-Series Infuse 4,320 mg at 5.2 mL/hr over 46 hours into the vein once for 1 dose. 270740 mL 0    Fluticasone Propionate (FLONASE ALLERGY RELIEF NA) Spray 1 spray in nostril 2 times daily.      lisinopril (ZESTRIL) 30 MG tablet Take 1 tablet (30 mg) by mouth daily. 90 tablet 3    Loratadine (CLARITIN PO) Take by mouth.      LORazepam (ATIVAN) 1 MG tablet May use 1 tab every 8 hours by day and 2 tabs for night as needed for anxiety or sleep. 45 tablet 1    montelukast (SINGULAIR) 10 MG tablet Take 1 tablet (10 mg) by mouth at bedtime. 90 tablet 3    ondansetron (ZOFRAN) 8 MG tablet Take 1 tablet (8 mg) by mouth every 8 hours as needed for nausea (vomiting). 60 tablet 2    pegfilgrastim-jmdb (FULPHILA) 6 MG/0.6ML injection Inject 0.6 mLs (6 mg) subcutaneously every 2 weeks as needed (24 hours after chemotherapy disconnect per treatment plan). Remove from fridge for at least 30  minutes to allow syringe to reach room temperature. 15 mL 0    Port Access Kit For nurse use only.  Do not remove items from bag.  Use for port access.  Do not place syringe on sterile field. " "[id number] kit 0    prochlorperazine (COMPAZINE) 10 MG tablet Take 1 tablet (10 mg) by mouth every 6 hours as needed for nausea or vomiting. 30 tablet 2    propranolol (INDERAL) 10 MG tablet Take 2 tablets (20 mg) by mouth 2 times daily as needed (anxiety). (Patient not taking: Reported on 5/12/2025) 60 tablet 1    sodium chloride, PF, 0.9% PF flush Inject 10 mLs into the vein as needed for line flush. Flush IV before and after med administration as directed and/or at least every 24 hours, or prior to deaccessing for no further use and/or at least every 4 weeks when not accessed. 838920 mL 0     No current facility-administered medications for this visit.     Allergies   Allergen Reactions    Aspirin Hives    Dust Mites Unknown    Morphine Nausea     Other reaction(s): GI Intolerance    Zoloft [Sertraline] Anxiety   - moved and now building a home and is very busy.     Physical Exam:Ht 1.651 m (5' 5\")   Wt 64.4 kg (142 lb)   LMP 03/14/2019   BMI 23.63 kg/m     GENERAL: alert and no distress  EYES: Eyes grossly normal to inspection.  No discharge or erythema, or obvious scleral/conjunctival abnormalities.  RESP: No audible wheeze, cough, or visible cyanosis.    SKIN: Visible skin clear. No significant rash, abnormal pigmentation or lesions.No obvious hair loss.,   NEURO: Cranial nerves grossly intact.  Mentation and speech appropriate for age.Appears movement is good to hands and arms.   PSYCH: Appropriate affect, tone, and pace of words  The rest of a comprehensive physical examination is deferred due to video visit restrictions       Laboratory/Imaging Results:   Review of last labs:    Latest Reference Range & Units 05/14/25 09:14   Sodium 135 - 145 mmol/L 138   Potassium 3.4 - 5.3 mmol/L 4.3   Chloride 98 - 107 mmol/L 104   Carbon Dioxide (CO2) 22 - 29 mmol/L 22   Urea Nitrogen 6.0 - 20.0 mg/dL 12.1   Creatinine 0.51 - 0.95 mg/dL 0.81   GFR Estimate >60 mL/min/1.73m2 86   Calcium 8.8 - 10.4 mg/dL 9.3   Anion Gap 7 " - 15 mmol/L 12   Albumin 3.5 - 5.2 g/dL 4.2   Protein Total 6.4 - 8.3 g/dL 6.5   Alkaline Phosphatase 40 - 150 U/L 160 (H)   ALT 0 - 50 U/L 137 (H)   AST 0 - 45 U/L 82 (H)   Bilirubin Total <=1.2 mg/dL 0.3   Glucose 70 - 99 mg/dL 90   WBC 4.0 - 11.0 10e3/uL 5.6   Hemoglobin 11.7 - 15.7 g/dL 11.9   Hematocrit 35.0 - 47.0 % 34.8 (L)   Platelet Count 150 - 450 10e3/uL 129 (L)   RBC Count 3.80 - 5.20 10e6/uL 4.09   MCV 78 - 100 fL 85   MCH 26.5 - 33.0 pg 29.1   MCHC 31.5 - 36.5 g/dL 34.2   RDW 10.0 - 15.0 % 14.6   % Neutrophils % 53   % Lymphocytes % 36   % Monocytes % 6   % Eosinophils % 1   % Basophils % 1   % Metamyelocytes % 1   % Promyelocytes % 1   % Blasts % 1   Absolute Neutrophil 1.6 - 8.3 10e3/uL 3.0   Absolute Lymphocytes 0.8 - 5.3 10e3/uL 2.0   Absolute Monocytes 0.0 - 1.3 10e3/uL 0.3   Absolute Eosinophils 0.0 - 0.7 10e3/uL 0.1   Absolute Basophils 0.0 - 0.2 10e3/uL 0.1   Absolute Metamyelocytes <=0.0 10e3/uL 0.1 (H)   Absolute Promyelocytes <=0.0 10e3/uL 0.1 (H)   Absolute Blasts <=0.0 10e3/uL 0.1 (H)   RBC Morphology  Confirmed RBC Indices   Platelet Morphology Automated Count Confirmed. Platelet morphology is normal.  Automated Count Confirmed. Platelet morphology is normal.   Polychromasia None Seen  Slight !   (H): Data is abnormally high  (L): Data is abnormally low  !: Data is abnormal    Assessment and Plan:   Rectal Adenocarcinoma- Pt is on neoadjuvant FOLFOX - due for cycle 5  - expecting 6 cycles with 7th on as needed basis if surgery schedule pushed out.   Nausea- Using Aloxi and Emend as premeds   Discussed alternative antiemetics since compazine did not work previously. Has lorazepam  and  zofran but will add zyprexa but not to be taken with the ativan. She is aware to hold zofran x 3 days with plan additions. Discussed diet. Weight is stable.   Anemia- resolved with last lab.   Hair thinning - noted with plan. Reassured hair should improved post treatment. No need for medications.   Fatigue -  encouraged exercise and healthy eating. Has prednisone pos t treatment but does not feel it would be benificial for extension of this med over another couple days. WIll tolerate the effect since in last 2 cycles of the plan.   Neuropathy- using icing and wanted to start compression to hands and arms but did not receive prior to today from [facility]. NOte sent to Care coordinator. Not currently affecting function.   Pt to be seen prior to each infusion.     Seasonal Allergies- using antihistamine by day and Singlulair at bedtime and     History of Left Breast cancer- Stopped use of AI in 3/2025. No new symptoms. Feels better with supportive drugs.     The total time of this encounter amounted to 35 minutes. This time included video time spent with the patient, prep work, ordering tests, and performing post visit documentation.  Dede Garcia,Cnp

## 2025-05-23 ENCOUNTER — HOME INFUSION BILLING (OUTPATIENT)
Dept: HOME HEALTH SERVICES | Facility: HOME HEALTH | Age: 55
End: 2025-05-23
Payer: COMMERCIAL

## 2025-05-23 PROCEDURE — A9270 NON-COVERED ITEM OR SERVICE: HCPCS

## 2025-05-27 ENCOUNTER — VIRTUAL VISIT (OUTPATIENT)
Dept: ONCOLOGY | Facility: CLINIC | Age: 55
End: 2025-05-27
Attending: INTERNAL MEDICINE
Payer: COMMERCIAL

## 2025-05-27 VITALS — BODY MASS INDEX: 23.66 KG/M2 | WEIGHT: 142 LBS | HEIGHT: 65 IN

## 2025-05-27 DIAGNOSIS — C20 RECTAL ADENOCARCINOMA (H): Primary | ICD-10-CM

## 2025-05-27 DIAGNOSIS — R11.0 CHEMOTHERAPY-INDUCED NAUSEA: ICD-10-CM

## 2025-05-27 DIAGNOSIS — T45.1X5A CHEMOTHERAPY-INDUCED NAUSEA: ICD-10-CM

## 2025-05-27 DIAGNOSIS — G60.8 COLD INDUCED NEUROPATHY: ICD-10-CM

## 2025-05-27 DIAGNOSIS — D64.89 ANEMIA DUE TO OTHER CAUSE, NOT CLASSIFIED: ICD-10-CM

## 2025-05-27 DIAGNOSIS — T73.2XXD FATIGUE DUE TO EXPOSURE, SUBSEQUENT ENCOUNTER: ICD-10-CM

## 2025-05-27 DIAGNOSIS — J30.2 SEASONAL ALLERGIC RHINITIS, UNSPECIFIED TRIGGER: ICD-10-CM

## 2025-05-27 PROCEDURE — 98006 SYNCH AUDIO-VIDEO EST MOD 30: CPT | Performed by: NURSE PRACTITIONER

## 2025-05-27 PROCEDURE — 1126F AMNT PAIN NOTED NONE PRSNT: CPT | Performed by: NURSE PRACTITIONER

## 2025-05-27 RX ORDER — ALBUTEROL SULFATE 0.83 MG/ML
2.5 SOLUTION RESPIRATORY (INHALATION)
Status: CANCELLED | OUTPATIENT
Start: 2025-05-29

## 2025-05-27 RX ORDER — HEPARIN SODIUM (PORCINE) LOCK FLUSH IV SOLN 100 UNIT/ML 100 UNIT/ML
5 SOLUTION INTRAVENOUS
OUTPATIENT
Start: 2025-05-31

## 2025-05-27 RX ORDER — EPINEPHRINE 1 MG/ML
0.3 INJECTION, SOLUTION INTRAMUSCULAR; SUBCUTANEOUS EVERY 5 MIN PRN
Status: CANCELLED | OUTPATIENT
Start: 2025-05-29

## 2025-05-27 RX ORDER — ALBUTEROL SULFATE 90 UG/1
1-2 INHALANT RESPIRATORY (INHALATION)
Status: CANCELLED
Start: 2025-05-29

## 2025-05-27 RX ORDER — FLUOROURACIL 50 MG/ML
400 INJECTION, SOLUTION INTRAVENOUS ONCE
Status: CANCELLED | OUTPATIENT
Start: 2025-05-29

## 2025-05-27 RX ORDER — DIPHENHYDRAMINE HYDROCHLORIDE 50 MG/ML
50 INJECTION, SOLUTION INTRAMUSCULAR; INTRAVENOUS
Status: CANCELLED
Start: 2025-05-29

## 2025-05-27 RX ORDER — HEPARIN SODIUM,PORCINE 10 UNIT/ML
5-20 VIAL (ML) INTRAVENOUS DAILY PRN
OUTPATIENT
Start: 2025-05-31

## 2025-05-27 RX ORDER — OLANZAPINE 2.5 MG/1
2.5 TABLET, FILM COATED ORAL AT BEDTIME
Qty: 30 TABLET | Refills: 1 | Status: SHIPPED | OUTPATIENT
Start: 2025-05-27

## 2025-05-27 RX ORDER — METHYLPREDNISOLONE SODIUM SUCCINATE 40 MG/ML
40 INJECTION INTRAMUSCULAR; INTRAVENOUS
Status: CANCELLED
Start: 2025-05-29

## 2025-05-27 RX ORDER — DIPHENHYDRAMINE HYDROCHLORIDE 50 MG/ML
25 INJECTION, SOLUTION INTRAMUSCULAR; INTRAVENOUS
Status: CANCELLED
Start: 2025-05-29

## 2025-05-27 RX ORDER — HEPARIN SODIUM (PORCINE) LOCK FLUSH IV SOLN 100 UNIT/ML 100 UNIT/ML
5 SOLUTION INTRAVENOUS
Status: CANCELLED | OUTPATIENT
Start: 2025-05-29

## 2025-05-27 RX ORDER — PALONOSETRON 0.05 MG/ML
0.25 INJECTION, SOLUTION INTRAVENOUS ONCE
Status: CANCELLED
Start: 2025-05-29 | End: 2025-05-29

## 2025-05-27 RX ORDER — HEPARIN SODIUM,PORCINE 10 UNIT/ML
5-20 VIAL (ML) INTRAVENOUS DAILY PRN
Status: CANCELLED | OUTPATIENT
Start: 2025-05-29

## 2025-05-27 RX ORDER — MEPERIDINE HYDROCHLORIDE 25 MG/ML
25 INJECTION INTRAMUSCULAR; INTRAVENOUS; SUBCUTANEOUS
Status: CANCELLED | OUTPATIENT
Start: 2025-05-29

## 2025-05-27 ASSESSMENT — PAIN SCALES - GENERAL: PAINLEVEL_OUTOF10: NO PAIN (0)

## 2025-05-27 NOTE — LETTER
5/27/2025      Desi Granado  [address]  [place] [address]      Dear Colleague,    Thank you for referring your patient, Desi Granado, to the [facility]. Please see a copy of my visit note below.    Virtual Visit Details    Type of service:  Video Visit     Originating Location (pt. 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  It was pT2 N0 lesion.  Cancer was ER/% positive and HER2/tamara FISH negative.  Oncotype DX score was 0.  She was initially started on tamoxifen in December 2018 and then switched to exemestane and Zoladex in June 2019 and had bilateral salpingo-oophorectomy in February 2023.  After that she remained on exemestane and stopped in early March 2025.  Breast cancer index did not show that she will benefit from extended hormone therapy.    Related to her rectal cancer: she was having intermittent hematochezia for about 1 year and then on 2/21/2025 she had colonoscopy which showed a fungating nonobstructing medium-sized mass from 9 to 12 cm proximal to the anus.  Mass was noncircumferential and measured 3 cm in length.  Biopsy showed 3 sessile polyps were found in the sigmoid colon and transverse colon measuring 2 to 3 mm in size which were resected.  Biopsy from the sigmoid/rectal colon mass showed moderately differentiated invasive adenocarcinoma, MMR IHC intact.  Biopsy from the sessile polyps was tubular adenoma without any high-grade dysplasia or malignancy.    2/27/2025.  CT chest abdomen and pelvis was done which  did not show the rectosigmoid mass but it showed enlarged right mesorectal lymph node consistent with metastatic spread.  No evidence of distant metastasis.    MRI of the rectum showed a T2 N1 disease in the mid rectum.    CEA was 2.2.    She met with Dr. Landa on 3/14/2025 who did a flexible sigmoidoscopy and it showed a 3 cm fungating ulcerated mass located at 7 cm from the anal verge at the right posterolateral aspect of the mid rectum.    Tumor board had recommendation being starting systemic chemotherapy based on PROSPECT trial followed by surgery and avoiding radiation if possible.  Treatment:   4/3/2025 began FOLFOX    Interval History: Ms Granado is seen today alone by video for review prior to continuation of treatment with FOLFOX C5. Pt feel she is tolerating well and is exciting that she is in he last 2 cycles she hopes prior to surgery.   She is noting some side effects that continue with plans:   Hair thinning- continues but is limited.   Mucositis- salt and soda rinses are helpful.   Fatigue- chronic - mostly early in plan - feeling good by end of the 2 weeks. Napping most mornings  Nausea - compazine not working- using zofran 3x daily first days of the cycle after ball removed but still not covering all of the nausea- would like another option.   Eating - is eating proteins - craving them- weight is stable.   Constipation- using senna daily with Miralax in evening with good results.   Allergies- using flonase- switched from claritin to new med and is improved.  and using Singulair at night.   Cold neuropathy noted first and second day with arms - icing and was to trial compression but never got the compression garments requested- care coordinator was to send.   Denies pain, fevers, headaches, SOB, cramping.   Rest of comprehensive and complete ROS is reviewed and is negative.   Past Medical History:   Diagnosis Date     Allergic rhinitis, cause unspecified      Hypertension 8 years?     Injury,  "other and unspecified, knee, leg, ankle, and foot 8th grade    left ankle injury     Invasive ductal carcinoma of breast, female, left (H) 10/26/2018     Unsatisfactory cervical cytology smear 10/06/2021     Current Outpatient Medications   Medication Sig Dispense Refill     Chemo Kit For RN use only. Do not remove items from bag. Contents: 1  sodium chloride 0.9% flush, 4 medium gloves, 1 chemo gown, 1/4 chemo mat, 1 connector female, 1 chemo bag. [id number] kit 0     denosumab (PROLIA) 60 mg/mL injection Inject 60 mg subcutaneously every 6 months. (Patient not taking: Reported on 5/12/2025)       dexAMETHasone (DECADRON) 4 MG tablet Take 2 tablets (8 mg) by mouth daily. Take for 2 days, starting the day after chemo. Take with food. 4 tablet 2     Emergency Supply Kit, Central, Patient use for emergency only. Contents: 3 sodium chloride 0.9% flushes, 1 dressing kit, 1 microclave ext set 14\", 4 nitrile gloves (med), 6 alcohol prep pads, 1 bacitracin, 1 syringe (10 cc 20 G 1\"). Call [phone] to reorder. [id number] kit 0     [START ON 5/28/2025] Fluorouracil (ADRUCIL) 4,320 mg in sodium chloride 0.9 % 241 mL via HOMEPUMP C-Series Infuse 4,320 mg at 5.2 mL/hr over 46 hours into the vein once for 1 dose. 882051 mL 0     Fluticasone Propionate (FLONASE ALLERGY RELIEF NA) Spray 1 spray in nostril 2 times daily.       lisinopril (ZESTRIL) 30 MG tablet Take 1 tablet (30 mg) by mouth daily. 90 tablet 3     Loratadine (CLARITIN PO) Take by mouth.       LORazepam (ATIVAN) 1 MG tablet May use 1 tab every 8 hours by day and 2 tabs for night as needed for anxiety or sleep. 45 tablet 1     montelukast (SINGULAIR) 10 MG tablet Take 1 tablet (10 mg) by mouth at bedtime. 90 tablet 3     ondansetron (ZOFRAN) 8 MG tablet Take 1 tablet (8 mg) by mouth every 8 hours as needed for nausea (vomiting). 60 tablet 2     pegfilgrastim-jmdb (FULPHILA) 6 MG/0.6ML injection Inject 0.6 mLs (6 mg) subcutaneously every 2 weeks as needed (24 hours after " "chemotherapy disconnect per treatment plan). Remove from fridge for at least 30  minutes to allow syringe to reach room temperature. 15 mL 0     Port Access Kit For nurse use only.  Do not remove items from bag.  Use for port access.  Do not place syringe on sterile field. [id number] kit 0     prochlorperazine (COMPAZINE) 10 MG tablet Take 1 tablet (10 mg) by mouth every 6 hours as needed for nausea or vomiting. 30 tablet 2     propranolol (INDERAL) 10 MG tablet Take 2 tablets (20 mg) by mouth 2 times daily as needed (anxiety). (Patient not taking: Reported on 5/12/2025) 60 tablet 1     sodium chloride, PF, 0.9% PF flush Inject 10 mLs into the vein as needed for line flush. Flush IV before and after med administration as directed and/or at least every 24 hours, or prior to deaccessing for no further use and/or at least every 4 weeks when not accessed. 138945 mL 0     No current facility-administered medications for this visit.     Allergies   Allergen Reactions     Aspirin Hives     Dust Mites Unknown     Morphine Nausea     Other reaction(s): GI Intolerance     Zoloft [Sertraline] Anxiety   - moved and now building a home and is very busy.     Physical Exam:Ht 1.651 m (5' 5\")   Wt 64.4 kg (142 lb)   LMP 03/14/2019   BMI 23.63 kg/m     GENERAL: alert and no distress  EYES: Eyes grossly normal to inspection.  No discharge or erythema, or obvious scleral/conjunctival abnormalities.  RESP: No audible wheeze, cough, or visible cyanosis.    SKIN: Visible skin clear. No significant rash, abnormal pigmentation or lesions.No obvious hair loss.,   NEURO: Cranial nerves grossly intact.  Mentation and speech appropriate for age.Appears movement is good to hands and arms.   PSYCH: Appropriate affect, tone, and pace of words  The rest of a comprehensive physical examination is deferred due to video visit restrictions       Laboratory/Imaging Results:   Review of last labs:    Latest Reference Range & Units 05/14/25 09:14   Sodium " 135 - 145 mmol/L 138   Potassium 3.4 - 5.3 mmol/L 4.3   Chloride 98 - 107 mmol/L 104   Carbon Dioxide (CO2) 22 - 29 mmol/L 22   Urea Nitrogen 6.0 - 20.0 mg/dL 12.1   Creatinine 0.51 - 0.95 mg/dL 0.81   GFR Estimate >60 mL/min/1.73m2 86   Calcium 8.8 - 10.4 mg/dL 9.3   Anion Gap 7 - 15 mmol/L 12   Albumin 3.5 - 5.2 g/dL 4.2   Protein Total 6.4 - 8.3 g/dL 6.5   Alkaline Phosphatase 40 - 150 U/L 160 (H)   ALT 0 - 50 U/L 137 (H)   AST 0 - 45 U/L 82 (H)   Bilirubin Total <=1.2 mg/dL 0.3   Glucose 70 - 99 mg/dL 90   WBC 4.0 - 11.0 10e3/uL 5.6   Hemoglobin 11.7 - 15.7 g/dL 11.9   Hematocrit 35.0 - 47.0 % 34.8 (L)   Platelet Count 150 - 450 10e3/uL 129 (L)   RBC Count 3.80 - 5.20 10e6/uL 4.09   MCV 78 - 100 fL 85   MCH 26.5 - 33.0 pg 29.1   MCHC 31.5 - 36.5 g/dL 34.2   RDW 10.0 - 15.0 % 14.6   % Neutrophils % 53   % Lymphocytes % 36   % Monocytes % 6   % Eosinophils % 1   % Basophils % 1   % Metamyelocytes % 1   % Promyelocytes % 1   % Blasts % 1   Absolute Neutrophil 1.6 - 8.3 10e3/uL 3.0   Absolute Lymphocytes 0.8 - 5.3 10e3/uL 2.0   Absolute Monocytes 0.0 - 1.3 10e3/uL 0.3   Absolute Eosinophils 0.0 - 0.7 10e3/uL 0.1   Absolute Basophils 0.0 - 0.2 10e3/uL 0.1   Absolute Metamyelocytes <=0.0 10e3/uL 0.1 (H)   Absolute Promyelocytes <=0.0 10e3/uL 0.1 (H)   Absolute Blasts <=0.0 10e3/uL 0.1 (H)   RBC Morphology  Confirmed RBC Indices   Platelet Morphology Automated Count Confirmed. Platelet morphology is normal.  Automated Count Confirmed. Platelet morphology is normal.   Polychromasia None Seen  Slight !   (H): Data is abnormally high  (L): Data is abnormally low  !: Data is abnormal    Assessment and Plan:   Rectal Adenocarcinoma- Pt is on neoadjuvant FOLFOX - due for cycle 5  - expecting 6 cycles with 7th on as needed basis if surgery schedule pushed out.   Nausea- Using Aloxi and Emend as premeds   Discussed alternative antiemetics since compazine did not work previously. Has lorazepam  and  zofran but will add zyprexa  but not to be taken with the ativan. She is aware to hold zofran x 3 days with plan additions. Discussed diet. Weight is stable.   Anemia- resolved with last lab.   Hair thinning - noted with plan. Reassured hair should improved post treatment. No need for medications.   Fatigue - encouraged exercise and healthy eating. Has prednisone pos t treatment but does not feel it would be benificial for extension of this med over another couple days. WIll tolerate the effect since in last 2 cycles of the plan.   Neuropathy- using icing and wanted to start compression to hands and arms but did not receive prior to today from [facility]. NOte sent to Care coordinator. Not currently affecting function.   Pt to be seen prior to each infusion.     Seasonal Allergies- using antihistamine by day and Singlulair at bedtime and     History of Left Breast cancer- Stopped use of AI in 3/2025. No new symptoms. Feels better with supportive drugs.     The total time of this encounter amounted to 35 minutes. This time included video time spent with the patient, prep work, ordering tests, and performing post visit documentation.  Dede Garcia Cnp      Again, thank you for allowing me to participate in the care of your patient.        Sincerely,        Dede Garcia, DENY, APRN CNP    Electronically signed

## 2025-05-27 NOTE — NURSING NOTE
Current patient location: [address] [place] [address]    Is the patient currently in the state of [place]? YES    Visit mode: VIDEO    If the visit is dropped, the patient can be reconnected by:VIDEO VISIT: Text to cell phone:   Telephone Information:   Mobile [phone]   Mobile [phone]       Will anyone else be joining the visit? NO  (If patient encounters technical issues they should call [phone])    Are changes needed to the allergy or medication list? Pt stated no changes to allergies and Pt stated no med changes    Are refills needed on medications prescribed by this physician? NO    Rooming Documentation:  Not applicable    Reason for visit: OSMEL LUND

## 2025-05-28 ENCOUNTER — RESULTS FOLLOW-UP (OUTPATIENT)
Dept: ONCOLOGY | Facility: CLINIC | Age: 55
End: 2025-05-28

## 2025-05-28 ENCOUNTER — DOCUMENTATION ONLY (OUTPATIENT)
Dept: HOME HEALTH SERVICES | Facility: HOME HEALTH | Age: 55
End: 2025-05-28
Payer: COMMERCIAL

## 2025-05-28 ENCOUNTER — INFUSION THERAPY VISIT (OUTPATIENT)
Dept: INFUSION THERAPY | Facility: CLINIC | Age: 55
End: 2025-05-28
Attending: INTERNAL MEDICINE
Payer: COMMERCIAL

## 2025-05-28 VITALS
SYSTOLIC BLOOD PRESSURE: 143 MMHG | OXYGEN SATURATION: 99 % | RESPIRATION RATE: 16 BRPM | TEMPERATURE: 98.6 F | WEIGHT: 142.5 LBS | BODY MASS INDEX: 23.71 KG/M2 | DIASTOLIC BLOOD PRESSURE: 85 MMHG | HEART RATE: 105 BPM

## 2025-05-28 DIAGNOSIS — T45.1X5A CHEMOTHERAPY-INDUCED NEUTROPENIA: Primary | ICD-10-CM

## 2025-05-28 DIAGNOSIS — D70.1 CHEMOTHERAPY-INDUCED NEUTROPENIA: ICD-10-CM

## 2025-05-28 DIAGNOSIS — T45.1X5A CHEMOTHERAPY-INDUCED NEUTROPENIA: ICD-10-CM

## 2025-05-28 DIAGNOSIS — D70.1 CHEMOTHERAPY-INDUCED NEUTROPENIA: Primary | ICD-10-CM

## 2025-05-28 DIAGNOSIS — C20 RECTAL CANCER (H): ICD-10-CM

## 2025-05-28 DIAGNOSIS — Z17.0 MALIGNANT NEOPLASM OF BREAST IN FEMALE, ESTROGEN RECEPTOR POSITIVE, UNSPECIFIED LATERALITY, UNSPECIFIED SITE OF BREAST (H): Primary | ICD-10-CM

## 2025-05-28 DIAGNOSIS — C20 RECTAL ADENOCARCINOMA (H): ICD-10-CM

## 2025-05-28 DIAGNOSIS — C50.919 MALIGNANT NEOPLASM OF BREAST IN FEMALE, ESTROGEN RECEPTOR POSITIVE, UNSPECIFIED LATERALITY, UNSPECIFIED SITE OF BREAST (H): Primary | ICD-10-CM

## 2025-05-28 LAB
ALBUMIN SERPL BCG-MCNC: 3.9 G/DL (ref 3.5–5.2)
ALP SERPL-CCNC: 190 U/L (ref 40–150)
ALT SERPL W P-5'-P-CCNC: 252 U/L (ref 0–50)
ANION GAP SERPL CALCULATED.3IONS-SCNC: 13 MMOL/L (ref 7–15)
AST SERPL W P-5'-P-CCNC: 125 U/L (ref 0–45)
BASOPHILS # BLD MANUAL: 0.1 10E3/UL (ref 0–0.2)
BASOPHILS NFR BLD MANUAL: 1 %
BILIRUB SERPL-MCNC: 0.3 MG/DL
BUN SERPL-MCNC: 12.8 MG/DL (ref 6–20)
CALCIUM SERPL-MCNC: 9.5 MG/DL (ref 8.8–10.4)
CHLORIDE SERPL-SCNC: 100 MMOL/L (ref 98–107)
CREAT SERPL-MCNC: 0.73 MG/DL (ref 0.51–0.95)
DACRYOCYTES BLD QL SMEAR: SLIGHT
EGFRCR SERPLBLD CKD-EPI 2021: >90 ML/MIN/1.73M2
EOSINOPHIL # BLD MANUAL: 0.1 10E3/UL (ref 0–0.7)
EOSINOPHIL NFR BLD MANUAL: 1 %
ERYTHROCYTE [DISTWIDTH] IN BLOOD BY AUTOMATED COUNT: 17.2 % (ref 10–15)
GLUCOSE SERPL-MCNC: 112 MG/DL (ref 70–99)
HCO3 SERPL-SCNC: 23 MMOL/L (ref 22–29)
HCT VFR BLD AUTO: 34.9 % (ref 35–47)
HGB BLD-MCNC: 12 G/DL (ref 11.7–15.7)
LYMPHOCYTES # BLD MANUAL: 2.4 10E3/UL (ref 0.8–5.3)
LYMPHOCYTES NFR BLD MANUAL: 26 %
MCH RBC QN AUTO: 29.6 PG (ref 26.5–33)
MCHC RBC AUTO-ENTMCNC: 34.4 G/DL (ref 31.5–36.5)
MCV RBC AUTO: 86 FL (ref 78–100)
METAMYELOCYTES # BLD MANUAL: 0.3 10E3/UL
METAMYELOCYTES NFR BLD MANUAL: 3 %
MONOCYTES # BLD MANUAL: 1.1 10E3/UL (ref 0–1.3)
MONOCYTES NFR BLD MANUAL: 12 %
MYELOCYTES # BLD MANUAL: 0.1 10E3/UL
MYELOCYTES NFR BLD MANUAL: 1 %
NEUTROPHILS # BLD MANUAL: 5.1 10E3/UL (ref 1.6–8.3)
NEUTROPHILS NFR BLD MANUAL: 55 %
PLAT MORPH BLD: ABNORMAL
PLATELET # BLD AUTO: 121 10E3/UL (ref 150–450)
POLYCHROMASIA BLD QL SMEAR: SLIGHT
POTASSIUM SERPL-SCNC: 3.7 MMOL/L (ref 3.4–5.3)
PROMYELOCYTES # BLD MANUAL: 0.1 10E3/UL
PROMYELOCYTES NFR BLD MANUAL: 1 %
PROT SERPL-MCNC: 6.4 G/DL (ref 6.4–8.3)
RBC # BLD AUTO: 4.06 10E6/UL (ref 3.8–5.2)
RBC MORPH BLD: ABNORMAL
SODIUM SERPL-SCNC: 136 MMOL/L (ref 135–145)
WBC # BLD AUTO: 9.2 10E3/UL (ref 4–11)

## 2025-05-28 PROCEDURE — 36591 DRAW BLOOD OFF VENOUS DEVICE: CPT | Performed by: NURSE PRACTITIONER

## 2025-05-28 PROCEDURE — 96411 CHEMO IV PUSH ADDL DRUG: CPT

## 2025-05-28 PROCEDURE — 85007 BL SMEAR W/DIFF WBC COUNT: CPT | Performed by: NURSE PRACTITIONER

## 2025-05-28 PROCEDURE — 96367 TX/PROPH/DG ADDL SEQ IV INF: CPT

## 2025-05-28 PROCEDURE — 258N000003 HC RX IP 258 OP 636: Performed by: NURSE PRACTITIONER

## 2025-05-28 PROCEDURE — 80053 COMPREHEN METABOLIC PANEL: CPT | Performed by: NURSE PRACTITIONER

## 2025-05-28 PROCEDURE — G0498 CHEMO EXTEND IV INFUS W/PUMP: HCPCS

## 2025-05-28 PROCEDURE — 96368 THER/DIAG CONCURRENT INF: CPT

## 2025-05-28 PROCEDURE — 250N000011 HC RX IP 250 OP 636: Performed by: NURSE PRACTITIONER

## 2025-05-28 PROCEDURE — 85014 HEMATOCRIT: CPT | Performed by: NURSE PRACTITIONER

## 2025-05-28 PROCEDURE — 82378 CARCINOEMBRYONIC ANTIGEN: CPT

## 2025-05-28 PROCEDURE — 96415 CHEMO IV INFUSION ADDL HR: CPT

## 2025-05-28 PROCEDURE — 96413 CHEMO IV INFUSION 1 HR: CPT

## 2025-05-28 PROCEDURE — S9330 HIT CONT CHEM DIEM: HCPCS

## 2025-05-28 PROCEDURE — 96375 TX/PRO/DX INJ NEW DRUG ADDON: CPT

## 2025-05-28 RX ORDER — PALONOSETRON 0.05 MG/ML
0.25 INJECTION, SOLUTION INTRAVENOUS ONCE
Status: COMPLETED | OUTPATIENT
Start: 2025-05-28 | End: 2025-05-28

## 2025-05-28 RX ORDER — FLUOROURACIL 50 MG/ML
400 INJECTION, SOLUTION INTRAVENOUS ONCE
Status: COMPLETED | OUTPATIENT
Start: 2025-05-28 | End: 2025-05-28

## 2025-05-28 RX ORDER — HEPARIN SODIUM (PORCINE) LOCK FLUSH IV SOLN 100 UNIT/ML 100 UNIT/ML
5 SOLUTION INTRAVENOUS
OUTPATIENT
Start: 2025-05-28

## 2025-05-28 RX ADMIN — LEUCOVORIN CALCIUM 650 MG: 350 INJECTION, POWDER, LYOPHILIZED, FOR SOLUTION INTRAMUSCULAR; INTRAVENOUS at 11:43

## 2025-05-28 RX ADMIN — OXALIPLATIN 150 MG: 5 INJECTION, SOLUTION INTRAVENOUS at 11:39

## 2025-05-28 RX ADMIN — PALONOSETRON HYDROCHLORIDE 0.25 MG: 0.25 INJECTION INTRAVENOUS at 10:42

## 2025-05-28 RX ADMIN — FOSAPREPITANT: 150 INJECTION, POWDER, LYOPHILIZED, FOR SOLUTION INTRAVENOUS at 10:59

## 2025-05-28 RX ADMIN — FLUOROURACIL 720 MG: 50 INJECTION, SOLUTION INTRAVENOUS at 14:01

## 2025-05-28 RX ADMIN — DEXTROSE 250 ML: 5 SOLUTION INTRAVENOUS at 10:39

## 2025-05-28 RX ADMIN — FAMOTIDINE 20 MG: 10 INJECTION, SOLUTION INTRAVENOUS at 10:41

## 2025-05-28 ASSESSMENT — PAIN SCALES - GENERAL: PAINLEVEL_OUTOF10: NO PAIN (0)

## 2025-05-28 NOTE — PROGRESS NOTES
Infusion Nursing Note:  Desi Granado presents today for C5D1 Port labs.    Patient seen by provider today: No. Oncology Visit with COLIN Garcia yesterday.    present during visit today: Not Applicable.    Note: N/A.      Intravenous Access:  Implanted Port.   Lab draw site R chest wall, Needle type Power, Gauge 19,3/4in.  Labs drawn without difficulty.      Carey Caraballo RN

## 2025-05-28 NOTE — PROGRESS NOTES
[facility] d/c of Fluorouracil continuous infusion over 46 hours via C series pump.   Scheduled in [facility] for home disconnect on 5/30/25 at 12pm.  Will require Fulphila injection given on Day 4.

## 2025-05-28 NOTE — PROGRESS NOTES
"Infusion Nursing Note:  Desi Granado presents today for C5D1 Modified FOLFOX.    Patient seen by provider today: No, Provider visit yesterday.    present during visit today: Not Applicable.    Note: Patient arrives ambulatory, parents in waiting room with her. Nilda is in good spirits. Says she has not been sleeping very well, moderate fatigue level. No fever, cough or chills. Some weakness noticed in her arms a couple days after treatment, better now. Received Fulphila injection at home on Day 4. VSS, afebrile.       Intravenous Access:  Implanted Port.    Treatment Conditions:  Lab Results   Component Value Date    HGB 12.0 05/28/2025    WBC 9.2 05/28/2025    ANEU 5.1 05/28/2025     (L) 05/28/2025        Lab Results   Component Value Date     05/28/2025    POTASSIUM 3.7 05/28/2025    CR 0.73 05/28/2025    MARISELA 9.5 05/28/2025    BILITOTAL 0.3 05/28/2025    ALBUMIN 3.9 05/28/2025     (H) 05/28/2025     (H) 05/28/2025       Results reviewed, labs MET treatment parameters, ok to proceed with treatment.  Checked with Dede Garcia re: today's LFT's since there has been an increase.   We are ok to proceed but msg forwarded to Dr. Herbert to assess and may move up pt's CT scan to evaluate liver status. Patient informed of this.       Post Infusion Assessment:  Patient tolerated infusion without incident. Pt is quite tired after this treatment, unsure if she wants to ambulate to vehicle. No other concerns.   Blood return noted pre and post infusion.  Blood return noted during chemo IVP administration every 3-5 cc.  Site patent and intact, free from redness, edema or discomfort.  No evidence of extravasations.    Prior to discharge: Port is secured in place with tegaderm and flushed with 10cc NS with positive blood return noted.    Continuous home infusion Dosi-Fuser pump connected.    All connectors secured in place and clamps taped open.    Pump started, \"running\" noted on display " (CADD): Not Applicable.   Capillary element taped to pt's skin per protocol.  Pump Connection double checked with Anh Merritt RN.  Patient instructed to call our clinic or [facility] with any questions or concerns at home.  Patient verbalized understanding.    Patient set up for pump disconnect at home with [facility] on 5/30 @ noon.    Pt aware of date/time of disconnect. \[facility]\"" nurse msg'd.    Discharge Plan:   AVS to patient via [employer].  Patient will return 6/11 to [facility] for next appointment.   Patient discharged in stable condition accompanied by: mother and father.  Departure Mode: Wheelchair.      Carey Caraballo, RN

## 2025-05-29 ENCOUNTER — HOME INFUSION BILLING (OUTPATIENT)
Dept: HOME HEALTH SERVICES | Facility: HOME HEALTH | Age: 55
End: 2025-05-29
Payer: COMMERCIAL

## 2025-05-29 LAB — CEA SERPL-MCNC: 4.9 NG/ML

## 2025-05-29 PROCEDURE — S9330 HIT CONT CHEM DIEM: HCPCS

## 2025-05-31 ENCOUNTER — HOME CARE VISIT (OUTPATIENT)
Dept: HOME HEALTH SERVICES | Facility: HOME HEALTH | Age: 55
End: 2025-05-31
Payer: COMMERCIAL

## 2025-05-31 VITALS
HEART RATE: 110 BPM | DIASTOLIC BLOOD PRESSURE: 74 MMHG | TEMPERATURE: 97.1 F | SYSTOLIC BLOOD PRESSURE: 116 MMHG | RESPIRATION RATE: 18 BRPM | OXYGEN SATURATION: 99 %

## 2025-05-31 PROCEDURE — 99601 HOME NFS VISIT <2 HRS: CPT

## 2025-05-31 PROCEDURE — S9537 HT HEM HORM INJ DIEM: HCPCS

## 2025-05-31 NOTE — PROGRESS NOTES
Nursing Visit Note:  Nurse visit today for Fulphila injection for Desi SAENZ Loy.     present during visit today: Not Applicable.    Intravenous Access:  No Intravenous access/labs at this visit.    Fulphila injection subq administered behind right arm lot [id number], exp 02/2028    Note: pt tolerated procedure well without incident     Saline administered: NA  (ml)    Supply Check:   Does the patient have all the supplies they need for the next visit?  Yes    Next visit plan: per chemo poc    Donna Izquierdo RN 5/31/2025

## 2025-06-06 ENCOUNTER — HOME INFUSION BILLING (OUTPATIENT)
Dept: HOME HEALTH SERVICES | Facility: HOME HEALTH | Age: 55
End: 2025-06-06
Payer: COMMERCIAL

## 2025-06-06 PROCEDURE — A9270 NON-COVERED ITEM OR SERVICE: HCPCS

## 2025-06-11 ENCOUNTER — HOME INFUSION (OUTPATIENT)
Dept: HOME HEALTH SERVICES | Facility: HOME HEALTH | Age: 55
End: 2025-06-11
Payer: COMMERCIAL

## 2025-06-11 ENCOUNTER — ONCOLOGY VISIT (OUTPATIENT)
Dept: ONCOLOGY | Facility: CLINIC | Age: 55
End: 2025-06-11
Attending: INTERNAL MEDICINE
Payer: COMMERCIAL

## 2025-06-11 ENCOUNTER — PATIENT OUTREACH (OUTPATIENT)
Dept: ONCOLOGY | Facility: CLINIC | Age: 55
End: 2025-06-11

## 2025-06-11 ENCOUNTER — DOCUMENTATION ONLY (OUTPATIENT)
Dept: HOME HEALTH SERVICES | Facility: HOME HEALTH | Age: 55
End: 2025-06-11
Payer: COMMERCIAL

## 2025-06-11 ENCOUNTER — INFUSION THERAPY VISIT (OUTPATIENT)
Dept: INFUSION THERAPY | Facility: CLINIC | Age: 55
End: 2025-06-11
Attending: INTERNAL MEDICINE
Payer: COMMERCIAL

## 2025-06-11 VITALS
DIASTOLIC BLOOD PRESSURE: 88 MMHG | TEMPERATURE: 98.1 F | WEIGHT: 142.5 LBS | RESPIRATION RATE: 16 BRPM | HEIGHT: 65 IN | HEART RATE: 109 BPM | SYSTOLIC BLOOD PRESSURE: 132 MMHG | OXYGEN SATURATION: 99 % | BODY MASS INDEX: 23.74 KG/M2

## 2025-06-11 DIAGNOSIS — T45.1X5A CHEMOTHERAPY-INDUCED NEUTROPENIA: ICD-10-CM

## 2025-06-11 DIAGNOSIS — T45.1X5A CHEMOTHERAPY-INDUCED NEUTROPENIA: Primary | ICD-10-CM

## 2025-06-11 DIAGNOSIS — C20 RECTAL CANCER (H): ICD-10-CM

## 2025-06-11 DIAGNOSIS — C20 RECTAL ADENOCARCINOMA (H): Primary | ICD-10-CM

## 2025-06-11 DIAGNOSIS — R11.0 CHEMOTHERAPY-INDUCED NAUSEA: ICD-10-CM

## 2025-06-11 DIAGNOSIS — D63.0 ANEMIA IN NEOPLASTIC DISEASE: ICD-10-CM

## 2025-06-11 DIAGNOSIS — D70.1 CHEMOTHERAPY-INDUCED NEUTROPENIA: Primary | ICD-10-CM

## 2025-06-11 DIAGNOSIS — D70.1 CHEMOTHERAPY-INDUCED NEUTROPENIA: ICD-10-CM

## 2025-06-11 DIAGNOSIS — T45.1X5A CHEMOTHERAPY-INDUCED NAUSEA: ICD-10-CM

## 2025-06-11 DIAGNOSIS — C20 RECTAL ADENOCARCINOMA (H): ICD-10-CM

## 2025-06-11 LAB
ACANTHOCYTES BLD QL SMEAR: SLIGHT
ALBUMIN SERPL BCG-MCNC: 3.9 G/DL (ref 3.5–5.2)
ALP SERPL-CCNC: 188 U/L (ref 40–150)
ALT SERPL W P-5'-P-CCNC: 83 U/L (ref 0–50)
ANION GAP SERPL CALCULATED.3IONS-SCNC: 11 MMOL/L (ref 7–15)
AST SERPL W P-5'-P-CCNC: 54 U/L (ref 0–45)
BASOPHILS # BLD MANUAL: 0 10E3/UL (ref 0–0.2)
BASOPHILS NFR BLD MANUAL: 0 %
BILIRUB SERPL-MCNC: 0.3 MG/DL
BUN SERPL-MCNC: 9.9 MG/DL (ref 6–20)
CALCIUM SERPL-MCNC: 9.2 MG/DL (ref 8.8–10.4)
CHLORIDE SERPL-SCNC: 104 MMOL/L (ref 98–107)
CREAT SERPL-MCNC: 0.89 MG/DL (ref 0.51–0.95)
EGFRCR SERPLBLD CKD-EPI 2021: 77 ML/MIN/1.73M2
EOSINOPHIL # BLD MANUAL: 0 10E3/UL (ref 0–0.7)
EOSINOPHIL NFR BLD MANUAL: 0 %
ERYTHROCYTE [DISTWIDTH] IN BLOOD BY AUTOMATED COUNT: 18.2 % (ref 10–15)
GLUCOSE SERPL-MCNC: 97 MG/DL (ref 70–99)
HCO3 SERPL-SCNC: 22 MMOL/L (ref 22–29)
HCT VFR BLD AUTO: 35.3 % (ref 35–47)
HGB BLD-MCNC: 12.1 G/DL (ref 11.7–15.7)
LYMPHOCYTES # BLD MANUAL: 2.2 10E3/UL (ref 0.8–5.3)
LYMPHOCYTES NFR BLD MANUAL: 19 %
MCH RBC QN AUTO: 30.3 PG (ref 26.5–33)
MCHC RBC AUTO-ENTMCNC: 34.3 G/DL (ref 31.5–36.5)
MCV RBC AUTO: 89 FL (ref 78–100)
METAMYELOCYTES # BLD MANUAL: 0.5 10E3/UL
METAMYELOCYTES NFR BLD MANUAL: 4 %
MONOCYTES # BLD MANUAL: 0.8 10E3/UL (ref 0–1.3)
MONOCYTES NFR BLD MANUAL: 7 %
MYELOCYTES # BLD MANUAL: 0.1 10E3/UL
MYELOCYTES NFR BLD MANUAL: 1 %
NEUTROPHILS # BLD MANUAL: 8 10E3/UL (ref 1.6–8.3)
NEUTROPHILS NFR BLD MANUAL: 69 %
PLAT MORPH BLD: ABNORMAL
PLATELET # BLD AUTO: 147 10E3/UL (ref 150–450)
POLYCHROMASIA BLD QL SMEAR: SLIGHT
POTASSIUM SERPL-SCNC: 4 MMOL/L (ref 3.4–5.3)
PROT SERPL-MCNC: 6.3 G/DL (ref 6.4–8.3)
RBC # BLD AUTO: 3.99 10E6/UL (ref 3.8–5.2)
RBC MORPH BLD: ABNORMAL
SODIUM SERPL-SCNC: 137 MMOL/L (ref 135–145)
WBC # BLD AUTO: 11.6 10E3/UL (ref 4–11)

## 2025-06-11 PROCEDURE — 250N000011 HC RX IP 250 OP 636: Performed by: INTERNAL MEDICINE

## 2025-06-11 PROCEDURE — 99213 OFFICE O/P EST LOW 20 MIN: CPT | Performed by: INTERNAL MEDICINE

## 2025-06-11 PROCEDURE — 96368 THER/DIAG CONCURRENT INF: CPT

## 2025-06-11 PROCEDURE — 99207 PR NO CHARGE LOS: CPT

## 2025-06-11 PROCEDURE — 96415 CHEMO IV INFUSION ADDL HR: CPT

## 2025-06-11 PROCEDURE — 82040 ASSAY OF SERUM ALBUMIN: CPT | Performed by: INTERNAL MEDICINE

## 2025-06-11 PROCEDURE — 96411 CHEMO IV PUSH ADDL DRUG: CPT

## 2025-06-11 PROCEDURE — 258N000003 HC RX IP 258 OP 636: Performed by: INTERNAL MEDICINE

## 2025-06-11 PROCEDURE — 36591 DRAW BLOOD OFF VENOUS DEVICE: CPT | Performed by: INTERNAL MEDICINE

## 2025-06-11 PROCEDURE — 96367 TX/PROPH/DG ADDL SEQ IV INF: CPT

## 2025-06-11 PROCEDURE — 85007 BL SMEAR W/DIFF WBC COUNT: CPT | Performed by: INTERNAL MEDICINE

## 2025-06-11 PROCEDURE — 85014 HEMATOCRIT: CPT | Performed by: INTERNAL MEDICINE

## 2025-06-11 PROCEDURE — S9330 HIT CONT CHEM DIEM: HCPCS

## 2025-06-11 PROCEDURE — G0498 CHEMO EXTEND IV INFUS W/PUMP: HCPCS

## 2025-06-11 PROCEDURE — 96413 CHEMO IV INFUSION 1 HR: CPT

## 2025-06-11 PROCEDURE — 96375 TX/PRO/DX INJ NEW DRUG ADDON: CPT

## 2025-06-11 RX ORDER — DIPHENHYDRAMINE HYDROCHLORIDE 50 MG/ML
25 INJECTION, SOLUTION INTRAMUSCULAR; INTRAVENOUS
Status: CANCELLED
Start: 2025-06-11

## 2025-06-11 RX ORDER — EPINEPHRINE 1 MG/ML
0.3 INJECTION, SOLUTION INTRAMUSCULAR; SUBCUTANEOUS EVERY 5 MIN PRN
Status: CANCELLED | OUTPATIENT
Start: 2025-06-11

## 2025-06-11 RX ORDER — HEPARIN SODIUM,PORCINE 10 UNIT/ML
5-20 VIAL (ML) INTRAVENOUS DAILY PRN
Status: CANCELLED | OUTPATIENT
Start: 2025-06-11

## 2025-06-11 RX ORDER — HEPARIN SODIUM (PORCINE) LOCK FLUSH IV SOLN 100 UNIT/ML 100 UNIT/ML
5 SOLUTION INTRAVENOUS
Status: CANCELLED | OUTPATIENT
Start: 2025-06-11

## 2025-06-11 RX ORDER — FLUOROURACIL 50 MG/ML
400 INJECTION, SOLUTION INTRAVENOUS ONCE
Status: COMPLETED | OUTPATIENT
Start: 2025-06-11 | End: 2025-06-11

## 2025-06-11 RX ORDER — FLUOROURACIL 50 MG/ML
400 INJECTION, SOLUTION INTRAVENOUS ONCE
Status: CANCELLED | OUTPATIENT
Start: 2025-06-11

## 2025-06-11 RX ORDER — PALONOSETRON 0.05 MG/ML
0.25 INJECTION, SOLUTION INTRAVENOUS ONCE
Status: CANCELLED
Start: 2025-06-11 | End: 2025-06-11

## 2025-06-11 RX ORDER — MULTIVITAMIN WITH IRON
500 TABLET ORAL DAILY
COMMUNITY

## 2025-06-11 RX ORDER — METHYLPREDNISOLONE SODIUM SUCCINATE 40 MG/ML
40 INJECTION INTRAMUSCULAR; INTRAVENOUS
Status: CANCELLED
Start: 2025-06-11

## 2025-06-11 RX ORDER — ALBUTEROL SULFATE 90 UG/1
1-2 INHALANT RESPIRATORY (INHALATION)
Status: CANCELLED
Start: 2025-06-11

## 2025-06-11 RX ORDER — DIPHENHYDRAMINE HYDROCHLORIDE 50 MG/ML
50 INJECTION, SOLUTION INTRAMUSCULAR; INTRAVENOUS
Status: CANCELLED
Start: 2025-06-11

## 2025-06-11 RX ORDER — MEPERIDINE HYDROCHLORIDE 25 MG/ML
25 INJECTION INTRAMUSCULAR; INTRAVENOUS; SUBCUTANEOUS
Status: CANCELLED | OUTPATIENT
Start: 2025-06-11

## 2025-06-11 RX ORDER — HEPARIN SODIUM (PORCINE) LOCK FLUSH IV SOLN 100 UNIT/ML 100 UNIT/ML
500 SOLUTION INTRAVENOUS EVERY 8 HOURS
Status: DISCONTINUED | OUTPATIENT
Start: 2025-06-11 | End: 2025-06-11 | Stop reason: HOSPADM

## 2025-06-11 RX ORDER — HEPARIN SODIUM (PORCINE) LOCK FLUSH IV SOLN 100 UNIT/ML 100 UNIT/ML
5 SOLUTION INTRAVENOUS
OUTPATIENT
Start: 2025-06-13

## 2025-06-11 RX ORDER — ALBUTEROL SULFATE 0.83 MG/ML
2.5 SOLUTION RESPIRATORY (INHALATION)
Status: CANCELLED | OUTPATIENT
Start: 2025-06-11

## 2025-06-11 RX ORDER — HEPARIN SODIUM,PORCINE 10 UNIT/ML
5-20 VIAL (ML) INTRAVENOUS DAILY PRN
OUTPATIENT
Start: 2025-06-13

## 2025-06-11 RX ORDER — PALONOSETRON 0.05 MG/ML
0.25 INJECTION, SOLUTION INTRAVENOUS ONCE
Status: COMPLETED | OUTPATIENT
Start: 2025-06-11 | End: 2025-06-11

## 2025-06-11 RX ADMIN — DEXAMETHASONE SODIUM PHOSPHATE: 10 INJECTION, SOLUTION INTRAMUSCULAR; INTRAVENOUS at 13:19

## 2025-06-11 RX ADMIN — Medication 500 UNITS: at 10:32

## 2025-06-11 RX ADMIN — FLUOROURACIL 720 MG: 50 INJECTION, SOLUTION INTRAVENOUS at 15:55

## 2025-06-11 RX ADMIN — LEUCOVORIN CALCIUM 650 MG: 350 INJECTION, POWDER, LYOPHILIZED, FOR SUSPENSION INTRAMUSCULAR; INTRAVENOUS at 13:51

## 2025-06-11 RX ADMIN — OXALIPLATIN 150 MG: 5 INJECTION, SOLUTION INTRAVENOUS at 13:51

## 2025-06-11 RX ADMIN — DEXTROSE MONOHYDRATE 100 ML: 5 INJECTION INTRAVENOUS at 13:02

## 2025-06-11 RX ADMIN — PALONOSETRON HYDROCHLORIDE 0.25 MG: 0.25 INJECTION INTRAVENOUS at 13:47

## 2025-06-11 ASSESSMENT — PAIN SCALES - GENERAL: PAINLEVEL_OUTOF10: NO PAIN (0)

## 2025-06-11 NOTE — TELEPHONE ENCOUNTER
ADA Medical Assessment Form filled out and faxed to [facility] [phone]. Copy placed in scanning.     Angela Tapia, RN, BSN  RN Care Coordinator - Oncology/Hematology  [facility]

## 2025-06-11 NOTE — NURSING NOTE
"Oncology Rooming Note    June 11, 2025 11:16 AM   Desi Granado is a 54 year old female who presents for:    Chief Complaint   Patient presents with    Oncology Clinic Visit     Prior to treatment       Initial Vitals: /88   Pulse 109   Temp 98.1  F (36.7  C) (Oral)   Resp 16   Ht 1.651 m (5' 5\")   Wt 64.6 kg (142 lb 8 oz)   LMP 03/14/2019   SpO2 99%   BMI 23.71 kg/m   Estimated body mass index is 23.71 kg/m  as calculated from the following:    Height as of this encounter: 1.651 m (5' 5\").    Weight as of this encounter: 64.6 kg (142 lb 8 oz). Body surface area is 1.72 meters squared.  No Pain (0) Comment: Data Unavailable   Patient's last menstrual period was 03/14/2019.  Allergies reviewed: Yes  Medications reviewed: Yes    Medications: Medication refills not needed today.  Pharmacy name entered into EPIC:    [facility] [place] - [place], [place] - [address] [place] [address]  [facility] - [facility] [address] [place] [address]    Frailty Screening:   Is the patient here for a new oncology consult visit in cancer care? 2. No    PHQ9:  Did this patient require a PHQ9?: No      Clinical concerns: next steps       Yolanda Garcia LPN              "

## 2025-06-11 NOTE — PROGRESS NOTES
"Infusion Nursing Note:  Desi Granado presents today for: C6D1 OP ONC Colorectal Cancer - Modified FOLFOX-6     Patient seen by provider today: Yes: MD   present during visit today: Not Applicable.    Note: ***.    Prior to discharge: Port is secured in place with tegaderm and flushed with 10cc NS with positive blood return noted.    Continuous home infusion Dosi-Fuser pump connected.    All connectors secured in place and clamps taped open.    Pump started, \"running\" noted on display (CADD): Not Applicable.   Capillary element taped to pt's skin per protocol.  Pump Connection double checked with Asha CASPER RN.  Patient instructed to call our clinic or [facility] with any questions or concerns at home.  Patient verbalized understanding.    Patient set up for pump disconnect at home with [facility] on 6/13 - msg sent to [facility] coordinator.        Chemo induced neutropenia. Continue Fulphila.        Elevated liver enzymes.  Likely from chemotherapy.  These are better as compared to 2 weeks ago.  Continue to monitor.  We will repeat scans as mentioned above after completing this chemotherapy.        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  She did ice therapy and did not notice any cold sensitivity but occasionally notices tingling and numbness in the fingers and feet but currently only notices cold feet.  Continue same dose of oxaliplatin.       Loose bowel movements.  She is taking MiraLAX daily and 1 Senokot daily.  Advised her to stop Senokot and see if loose bowel movements become better but I do not want her to become constipated.        We did not address the following today     Breast cancer.  She has left-sided hormone receptor positive breast cancer and she completed adjuvant exemestane in early March 2025.   Breast cancer index shows that she is unlikely to benefit from extended hormonal therapy.  Being followed by Dr. Grijalva.        We will determine the follow-up with " me accordingly.       Intravenous Access:  Implanted Port.    Treatment Conditions:  Results reviewed, labs MET treatment parameters, ok to proceed with treatment.      Post Infusion Assessment:  Patient tolerated infusion {tolerate:890402}  Blood return noted pre and post infusion.       Discharge Plan:   Patient discharged in stable condition accompanied by: mother and father.  Departure Mode: Wheelchair.  ***.      JAYANT SOLITARIO RN

## 2025-06-11 NOTE — PROGRESS NOTES
Oncology follow-up visit:  Date on this visit: 6/11/25     Desi Granado  is referred by Dr.Wolfgang Jose Landa for an oncology consultation. She requires evaluation for rectal adenocarcinoma.    Primary Physician: Pearl Grover     History Of Present Illness:  Ms. Granado is a 54 year old female who presents with rectal adenocarcinoma.    I initially saw her on 3/20/2025.  Please see my previous note for details.  I have copied and updated from prior notes.      I have reviewed previous notes from oncologist Dr. Elvia Grijalva because she was being followed for breast cancer.  I have also reviewed notes from colorectal surgeon Dr. Landa.    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  It was pT2 N0 lesion.  Cancer was ER/% positive and HER2/tamara FISH negative.  Oncotype DX score was 0.  She was initially started on tamoxifen in December 2018 and then switched to exemestane and Zoladex in June 2019 and had bilateral salpingo-oophorectomy in February 2023.  After that she remained on exemestane and stopped in early March 2025.  Breast cancer index did not show that she will benefit from extended hormone therapy.    She was having intermittent hematochezia for about 1 year and then on 2/21/2025 she had colonoscopy which showed a fungating nonobstructing medium-sized mass from 9 to 12 cm proximal to the anus.  Mass was noncircumferential and measured 3 cm in length.  This was biopsied.  3 sessile polyps were found in the sigmoid colon and transverse colon measuring 2 to 3 mm in size.  These were resected.  The biopsy from the sigmoid/rectal colon mass showed moderately differentiated invasive adenocarcinoma, MMR IHC intact.  Biopsy from the sessile polyps was tubular adenoma without any high-grade dysplasia or malignancy.    2/27/2025.  CT chest abdomen and pelvis was done which did not show the  rectosigmoid mass but it showed enlarged right mesorectal lymph node consistent with metastatic spread.  No evidence of distant metastasis.    MRI of the rectum showed a T2 N1 disease in the mid rectum.    CEA was 2.2.    She met with Dr. Landa on 3/14/2025 who did a flexible sigmoidoscopy and it showed a 3 cm fungating ulcerated mass located at 7 cm from the anal verge at the right posterolateral aspect of the mid rectum.    Option of neoadjuvant chemotherapy on the basis of PROSPECT trial was discussed.  LUZMA approach was also discussed.  Her case was also discussed in the tumor board with recommendation being starting systemic chemotherapy based on PROSPECT trial followed by surgery and avoiding radiation if possible.      On 4/3/2025 she started cycle #1 FOLFOX.    5/28/2025.  Cycle #5 FOLFOX    6/11/2025.  Cycle #6 FOLFOX is planned.    Interval history.    She comes in today accompanied by her parents.  She continues to notice fatigue from the chemotherapy which is her biggest complaint.  Otherwise tolerating it well.  Denies any significant nausea and vomiting.  She has been taking Zofran for a week which helps.  She uses MiraLAX and Senokot daily and has loose bowel movements but no bleeding.  She once tried taking MiraLAX every other day and became constipated.  Denies any pain.  She is doing ice therapy so does not get cold sensitivity.  She has noticed occasional tingling and numbness in the fingers and feet.  No fevers or infections or shortness of breath.      ECOG 1    ROS:  A comprehensive ROS was otherwise neg     I reviewed other history in epic as below.        Past Medical/Surgical History:  Past Medical History:   Diagnosis Date    Allergic rhinitis, cause unspecified     Hypertension 8 years?    Injury, other and unspecified, knee, leg, ankle, and foot 8th grade    left ankle injury    Invasive ductal carcinoma of breast, female, left (H) 10/26/2018    Unsatisfactory cervical cytology smear  10/06/2021     Past Surgical History:   Procedure Laterality Date    ABDOMEN SURGERY  02/2023    removed ovaries    BIOPSY      BREAST LUMPECTOMY, RT/LT  06/23/2010    Breast Lumpectomy RT for likely adenomatous lumps    COLONOSCOPY N/A 08/16/2018    Procedure: COLONOSCOPY;  colonoscopy;  Surgeon: Vijay Almanza MD;  Location: [facility]    COLONOSCOPY N/A 05/23/2022    Procedure: COLONOSCOPY, FLEXIBLE, WITH LESION REMOVAL USING SNARE and biopsy;  Surgeon: Nabila Hart MD;  Location: [facility]    COLONOSCOPY N/A 2/21/2025    Procedure: COLONOSCOPY, FLEXIBLE, WITH LESION REMOVAL USING SNARE;  Surgeon: Nabila Hart MD;  Location: [facility]    ESOPHAGOSCOPY, GASTROSCOPY, DUODENOSCOPY (EGD), COMBINED N/A 10/01/2015    Procedure: COMBINED ESOPHAGOSCOPY, GASTROSCOPY, DUODENOSCOPY (EGD);  Surgeon: Vijay Almanza MD;  Location: [facility]    INSERT PORT VASCULAR ACCESS Right 3/28/2025    Procedure: Insert port vascular access;  Surgeon: Ciaran Carrington MD;  [place] [facility] [place] [facility]    IR CHEST PORT PLACEMENT > 5 YRS OF AGE  3/28/2025    LAPAROSCOPIC SALPINGO-OOPHORECTOMY Bilateral 02/14/2023    Procedure: LAPAROSCOPIC BILATERAL salpingo-oophorectomy;  Surgeon: Elida Fay MD;  Location: [facility]    MASTECTOMY SIMPLE BILATERAL, SENTINEL NODE BILATERAL, COMBINED Bilateral 11/08/2018    Procedure: BILATERAL SIMPLE MASTECTOMIES WITH LEFT SENTINEL LYMPH NODE BIOPSY ;  Surgeon: Nabila Hart MD;  Location:  OR    SOFT TISSUE SURGERY      Ankle surgery 20+ years ago    SURGICAL HISTORY OF -   1989    arthroscopy of Left Ankle    SURGICAL HISTORY OF -   1990    arthroscopy of Left Ankle    SURGICAL HISTORY OF -   1993    4 wisdom teeth extracted     Cancer History:   As above    Allergies:  Allergies as of 06/11/2025 - Reviewed 06/11/2025   Allergen Reaction Noted    Aspirin Hives 08/03/2009    Dust mites Unknown 07/03/2009    Morphine Nausea 06/23/2008    Zoloft [sertraline] Anxiety 03/05/2025     Current Medications:  Current  "Outpatient Medications   Medication Sig Dispense Refill    Chemo Kit For RN use only. Do not remove items from bag. Contents: 1  sodium chloride 0.9% flush, 4 medium gloves, 1 chemo gown, 1/4 chemo mat, 1 connector female, 1 chemo bag. [id number] kit 0    dexAMETHasone (DECADRON) 4 MG tablet Take 2 tablets (8 mg) by mouth daily. Take for 2 days, starting the day after chemo. Take with food. 4 tablet 2    Emergency Supply Kit, Central, Patient use for emergency only. Contents: 3 sodium chloride 0.9% flushes, 1 dressing kit, 1 microclave ext set 14\", 4 nitrile gloves (med), 6 alcohol prep pads, 1 bacitracin, 1 syringe (10 cc 20 G 1\"). Call [phone] to reorder. [id number] kit 0    Fluorouracil (ADRUCIL) 4,320 mg in sodium chloride 0.9 % 241 mL via HOMEPUMP C-Series Infuse 4,320 mg at 5.2 mL/hr over 46 hours into the vein once for 1 dose. 612534 mL 0    Fluticasone Propionate (FLONASE ALLERGY RELIEF NA) Spray 1 spray in nostril 2 times daily.      lisinopril (ZESTRIL) 30 MG tablet Take 1 tablet (30 mg) by mouth daily. 90 tablet 3    Loratadine (CLARITIN PO) Take by mouth.      LORazepam (ATIVAN) 1 MG tablet May use 1 tab every 8 hours by day and 2 tabs for night as needed for anxiety or sleep. 45 tablet 1    montelukast (SINGULAIR) 10 MG tablet Take 1 tablet (10 mg) by mouth at bedtime. 90 tablet 3    OLANZapine (ZYPREXA) 2.5 MG tablet Take 1 tablet (2.5 mg) by mouth at bedtime. 30 tablet 1    ondansetron (ZOFRAN) 8 MG tablet Take 1 tablet (8 mg) by mouth every 8 hours as needed for nausea (vomiting). 60 tablet 2    pegfilgrastim-jmdb (FULPHILA) 6 MG/0.6ML injection Inject 0.6 mLs (6 mg) subcutaneously every 2 weeks as needed (24 hours after chemotherapy disconnect per treatment plan). Remove from fridge for at least 30  minutes to allow syringe to reach room temperature. 15 mL 0    Port Access Kit For nurse use only.  Do not remove items from bag.  Use for port access.  Do not place syringe on sterile field. [id number] kit 0 "    prochlorperazine (COMPAZINE) 10 MG tablet Take 1 tablet (10 mg) by mouth every 6 hours as needed for nausea or vomiting. 30 tablet 2    sodium chloride, PF, 0.9% PF flush Inject 10 mLs into the vein as needed for line flush. Flush IV before and after med administration as directed and/or at least every 24 hours, or prior to deaccessing for no further use and/or at least every 4 weeks when not accessed. 566686 mL 0    denosumab (PROLIA) 60 mg/mL injection Inject 60 mg subcutaneously every 6 months. (Patient not taking: Reported on 6/11/2025)      propranolol (INDERAL) 10 MG tablet Take 2 tablets (20 mg) by mouth 2 times daily as needed (anxiety). (Patient not taking: Reported on 6/11/2025) 60 tablet 1      Family History:  Family History   Problem Relation Age of Onset    Hypertension Mother     Allergies Mother     Thyroid Disease Mother         Taking thyroid medication    Obesity Mother     Cerebrovascular Disease Mother         TIA Feb 2017 and March 2021    Diabetes Mother     Hypertension Father     Thyroid Disease Father         two parathyroids removed    Hyperlipidemia Father     Other Cancer Father         Lymphoma diagnosed 2022    Cancer Maternal Grandmother         Bone CA    Allergies Maternal Grandmother     Circulatory Maternal Grandmother     Cerebrovascular Disease Maternal Grandmother     Other Cancer Maternal Grandmother         multiple myeloma    C.A.D. Maternal Grandfather     Respiratory Maternal Grandfather         asthma    Allergies Maternal Grandfather     Cerebrovascular Disease Maternal Grandfather     Alzheimer Disease Paternal Grandfather     Neurologic Disorder Paternal Grandfather         Parkinsons    Arthritis Paternal Grandmother     Respiratory Other         Emphysema    Diabetes Other     Asthma Other         genetic emphysema    Genetic Disorder Other         genetic emphysema    Breast Cancer Sister         benign lump indicated future risk    Diabetes Other     Coronary  Artery Disease Other     Other Cancer Other         multiple myeloma    Colon Cancer Other     Other Cancer Other         Acute Leukemia    Cerebrovascular Disease Other     Breast Cancer Other         told aunt was diagnosed - unsure of type & treatment    Other Cancer Other         Leukemia    Cancer - colorectal No family hx of     Prostate Cancer No family hx of    Paternal Great grand father and 2 paternal great uncles with colon cancer.    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 No   Social History Narrative    Not on file     Social Drivers of Health     Financial Resource Strain: Low Risk  (8/30/2024)    Financial Resource Strain     Within the past 12 months, have you or your family members you live with been unable to get utilities (heat, electricity) when it was really needed?: No   Food Insecurity: No Food Insecurity (3/6/2025)    Received from [facility]    Hunger Vital Sign     Worried About Running Out of Food in the Last Year: Never true     Ran Out of Food in the Last Year: Never true   Transportation Needs: No Transportation Needs (3/6/2025)    Received from [facility]    PRAPARE - Transportation     Lack of Transportation (Medical): No     Lack of Transportation (Non-Medical): No   Physical Activity: Inactive (2/27/2025)    Received from [facility]    Exercise Vital Sign     Days of Exercise per Week: 0 days     Minutes of Exercise per  "Session: 0 min   Stress: No Stress Concern Present (8/30/2024)    [place] [employer] - Occupational Stress Questionnaire     Feeling of Stress : Only a little   Social Connections: Unknown (8/30/2024)    Social Connection and Isolation Panel [NHANES]     Frequency of Communication with Friends and Family: Not on file     Frequency of Social Gatherings with Friends and Family: Twice a week     Attends Religion Services: Not on file     Active Member of Clubs or Organizations: Not on file     Attends Club or Organization Meetings: Not on file     Marital Status: Not on file   Interpersonal Safety: Low Risk  (3/28/2025)    Interpersonal Safety     Do you feel physically and emotionally safe where you currently live?: Yes     Within the past 12 months, have you been hit, slapped, kicked or otherwise physically hurt by someone?: No     Within the past 12 months, have you been humiliated or emotionally abused in other ways by your partner or ex-partner?: No   Housing Stability: Low Risk  (3/6/2025)    Received from [facility]    Housing Stability     What is your living situation today?: I have a steady place to live     Physical Exam:  /88   Pulse 109   Temp 98.1  F (36.7  C) (Oral)   Resp 16   Ht 1.651 m (5' 5\")   Wt 64.6 kg (142 lb 8 oz)   LMP 03/14/2019   SpO2 99%   BMI 23.71 kg/m    Wt Readings from Last 4 Encounters:   06/11/25 64.6 kg (142 lb 8 oz)   05/28/25 64.6 kg (142 lb 8 oz)   05/27/25 64.4 kg (142 lb)   05/14/25 64.3 kg (141 lb 11.2 oz)     CONSTITUTIONAL: no acute distress  EYES: PERRLA, no palor or icterus.   ENT/MOUTH: no mouth lesions. Ears normal  CVS: s1s2 no m r g .   RESPIRATORY: clear to auscultation b/l  GI: soft non tender no hepatosplenomegaly  NEURO: AAOX3  Grossly non focal neuro exam  INTEGUMENT: no obvious rashes  LYMPHATIC: no palpable cervical, supraclavicular, axillary or inguinal LAD  MUSCULOSKELETAL: Unremarkable. No bony tenderness.   EXTREMITIES: " no edema  PSYCH: Mentation, mood and affect are normal. Decision making capacity is intact        Laboratory/Imaging Studies    Reviewed    6/11/2025  CBC shows WBC 11.6.  Hemoglobin 12.1.  Platelets 147.  ANC 8.  Chemistries showed alkaline phosphatase 188.  ALT 83.  AST 54.  Bilirubin 0.3.    CEA was 4.9 on 5/28/2025    CEA was 2.1 on 3/24/2025.  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    We discussed the situation in detail.    I recommended starting chemotherapy in the neoadjuvant setting based on PROSPECT trial.     Our plan would be to give her 6 cycles of chemotherapy and then repeat  3T MRI rectum and CT chest abdomen and pelvis and repeat flexible sigmoidoscopy.      If we see a good response of 20% or more on repeat imaging, then potentially chemoradiation could be avoided and she can be taken to the surgery.      After the surgery, once the patient has recovered, we will give additional chemotherapy to complete a total of 6 months of treatment.    If on the other hand after 3 months of FOLFOX, there is less than 20% response of the primary tumor, then we will proceed with neoadjuvant chemoradiation followed by surgery.      On 4/3/2025, she started FOLFOX.    She has completed 5 cycles of FOLFOX and plan to proceed with cycle #6 FOLFOX today.    She will have repeat CT chest abdomen and pelvis and MRI of the rectum and she will follow-up with Dr. Landa after that.    We discussed that if there is delay in getting to surgery, then we will give her cycle #7 of chemotherapy as scheduled on 6/25/2025.      If we are not seeing a good response from chemotherapy, then potentially we will do radiation with Xeloda.        Chemo induced neutropenia. Continue Fulphila.      Elevated liver enzymes.  Likely from chemotherapy.  These are better as compared to 2 weeks ago.  Continue to monitor.  We will repeat  scans as mentioned above after completing this chemotherapy.      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  She did ice therapy and did not notice any cold sensitivity but occasionally notices tingling and numbness in the fingers and feet but currently only notices cold feet.  Continue same dose of oxaliplatin.      Loose bowel movements.  She is taking MiraLAX daily and 1 Senokot daily.  Advised her to stop Senokot and see if loose bowel movements become better but I do not want her to become constipated.        We did not address the following today    Breast cancer.  She has left-sided hormone receptor positive breast cancer and she completed adjuvant exemestane in early March 2025.   Breast cancer index shows that she is unlikely to benefit from extended hormonal therapy.  Being followed by Dr. Grijalva.      We will determine the follow-up with me accordingly.        All questions were answered to her satisfaction.  She is agreeable and comfortable with the plan.    Denise Herbert MD     The longitudinal plan of care for the rectal cancer as documented were addressed during this visit. Due to the added complexity in care, I will continue to support Nilda in the subsequent management and with ongoing continuity of care.

## 2025-06-11 NOTE — PROGRESS NOTES
[facility]HI d/c of Fluorouracil continuous infusion over 46 hours via C series pump.   Scheduled in [facility] for home disconnect on 6/13/25 at 2pm.  Will require Fulphila injection on Day 4, 6/14/25.

## 2025-06-11 NOTE — LETTER
6/11/2025      Desi Granado  [address]  [place] [address]      Dear Colleague,    Thank you for referring your patient, Desi Granado, to the [facility]. Please see a copy of my visit note below.    Oncology follow-up visit:  Date on this visit: 6/11/25     Desi Granado  is referred by Dr.Wolfgang Jose Landa for an oncology consultation. She requires evaluation for rectal adenocarcinoma.    Primary Physician: Pearl Grover     History Of Present Illness:  Ms. Granado is a 54 year old female who presents with rectal adenocarcinoma.    I initially saw her on 3/20/2025.  Please see my previous note for details.  I have copied and updated from prior notes.      I have reviewed previous notes from oncologist Dr. Elvia Grijalva because she was being followed for breast cancer.  I have also reviewed notes from colorectal surgeon Dr. Landa.    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  It was pT2 N0 lesion.  Cancer was ER/% positive and HER2/tamara FISH negative.  Oncotype DX score was 0.  She was initially started on tamoxifen in December 2018 and then switched to exemestane and Zoladex in June 2019 and had bilateral salpingo-oophorectomy in February 2023.  After that she remained on exemestane and stopped in early March 2025.  Breast cancer index did not show that she will benefit from extended hormone therapy.    She was having intermittent hematochezia for about 1 year and then on 2/21/2025 she had colonoscopy which showed a fungating nonobstructing medium-sized mass from 9 to 12 cm proximal to the anus.  Mass was noncircumferential and measured 3 cm in length.  This was biopsied.  3 sessile polyps were found in the sigmoid colon and transverse colon measuring 2 to 3 mm in size.  These were resected.  The biopsy from the sigmoid/rectal  colon mass showed moderately differentiated invasive adenocarcinoma, MMR IHC intact.  Biopsy from the sessile polyps was tubular adenoma without any high-grade dysplasia or malignancy.    2/27/2025.  CT chest abdomen and pelvis was done which did not show the rectosigmoid mass but it showed enlarged right mesorectal lymph node consistent with metastatic spread.  No evidence of distant metastasis.    MRI of the rectum showed a T2 N1 disease in the mid rectum.    CEA was 2.2.    She met with Dr. Landa on 3/14/2025 who did a flexible sigmoidoscopy and it showed a 3 cm fungating ulcerated mass located at 7 cm from the anal verge at the right posterolateral aspect of the mid rectum.    Option of neoadjuvant chemotherapy on the basis of PROSPECT trial was discussed.  LUZMA approach was also discussed.  Her case was also discussed in the tumor board with recommendation being starting systemic chemotherapy based on PROSPECT trial followed by surgery and avoiding radiation if possible.      On 4/3/2025 she started cycle #1 FOLFOX.    5/28/2025.  Cycle #5 FOLFOX    6/11/2025.  Cycle #6 FOLFOX is planned.    Interval history.    She comes in today accompanied by her parents.  She continues to notice fatigue from the chemotherapy which is her biggest complaint.  Otherwise tolerating it well.  Denies any significant nausea and vomiting.  She has been taking Zofran for a week which helps.  She uses MiraLAX and Senokot daily and has loose bowel movements but no bleeding.  She once tried taking MiraLAX every other day and became constipated.  Denies any pain.  She is doing ice therapy so does not get cold sensitivity.  She has noticed occasional tingling and numbness in the fingers and feet.  No fevers or infections or shortness of breath.      ECOG 1    ROS:  A comprehensive ROS was otherwise neg     I reviewed other history in epic as below.        Past Medical/Surgical History:  Past Medical History:   Diagnosis Date      Allergic rhinitis, cause unspecified      Hypertension 8 years?     Injury, other and unspecified, knee, leg, ankle, and foot 8th grade    left ankle injury     Invasive ductal carcinoma of breast, female, left (H) 10/26/2018     Unsatisfactory cervical cytology smear 10/06/2021     Past Surgical History:   Procedure Laterality Date     ABDOMEN SURGERY  02/2023    removed ovaries     BIOPSY       BREAST LUMPECTOMY, RT/LT  06/23/2010    Breast Lumpectomy RT for likely adenomatous lumps     COLONOSCOPY N/A 08/16/2018    Procedure: COLONOSCOPY;  colonoscopy;  Surgeon: Vijay Almanza MD;  Location: [facility]     COLONOSCOPY N/A 05/23/2022    Procedure: COLONOSCOPY, FLEXIBLE, WITH LESION REMOVAL USING SNARE and biopsy;  Surgeon: Nabila Hart MD;  Location: [facility]     COLONOSCOPY N/A 2/21/2025    Procedure: COLONOSCOPY, FLEXIBLE, WITH LESION REMOVAL USING SNARE;  Surgeon: Nabila Hart MD;  Location: [facility]     ESOPHAGOSCOPY, GASTROSCOPY, DUODENOSCOPY (EGD), COMBINED N/A 10/01/2015    Procedure: COMBINED ESOPHAGOSCOPY, GASTROSCOPY, DUODENOSCOPY (EGD);  Surgeon: Vijay Almanza MD;  Location: [facility]     INSERT PORT VASCULAR ACCESS Right 3/28/2025    Procedure: Insert port vascular access;  Surgeon: Ciaran Carrington MD;  Location: [facility] OR     IR CHEST PORT PLACEMENT > 5 YRS OF AGE  3/28/2025     LAPAROSCOPIC SALPINGO-OOPHORECTOMY Bilateral 02/14/2023    Procedure: LAPAROSCOPIC BILATERAL salpingo-oophorectomy;  Surgeon: Elida Fay MD;  Location: [facility]     MASTECTOMY SIMPLE BILATERAL, SENTINEL NODE BILATERAL, COMBINED Bilateral 11/08/2018    Procedure: BILATERAL SIMPLE MASTECTOMIES WITH LEFT SENTINEL LYMPH NODE BIOPSY ;  Surgeon: Nabila Hart MD;  Location:  OR     SOFT TISSUE SURGERY      Ankle surgery 20+ years ago     SURGICAL HISTORY OF -   1989    arthroscopy of Left Ankle     SURGICAL HISTORY OF -   1990    arthroscopy of Left Ankle     SURGICAL HISTORY OF -   1993    4 wisdom teeth extracted  "    Cancer History:   As above    Allergies:  Allergies as of 06/11/2025 - Reviewed 06/11/2025   Allergen Reaction Noted     Aspirin Hives 08/03/2009     Dust mites Unknown 07/03/2009     Morphine Nausea 06/23/2008     Zoloft [sertraline] Anxiety 03/05/2025     Current Medications:  Current Outpatient Medications   Medication Sig Dispense Refill     Chemo Kit For RN use only. Do not remove items from bag. Contents: 1  sodium chloride 0.9% flush, 4 medium gloves, 1 chemo gown, 1/4 chemo mat, 1 connector female, 1 chemo bag. [id number] kit 0     dexAMETHasone (DECADRON) 4 MG tablet Take 2 tablets (8 mg) by mouth daily. Take for 2 days, starting the day after chemo. Take with food. 4 tablet 2     Emergency Supply Kit, Central, Patient use for emergency only. Contents: 3 sodium chloride 0.9% flushes, 1 dressing kit, 1 microclave ext set 14\", 4 nitrile gloves (med), 6 alcohol prep pads, 1 bacitracin, 1 syringe (10 cc 20 G 1\"). Call [phone] to reorder. [id number] kit 0     Fluorouracil (ADRUCIL) 4,320 mg in sodium chloride 0.9 % 241 mL via HOMEPUMP C-Series Infuse 4,320 mg at 5.2 mL/hr over 46 hours into the vein once for 1 dose. 600072 mL 0     Fluticasone Propionate (FLONASE ALLERGY RELIEF NA) Spray 1 spray in nostril 2 times daily.       lisinopril (ZESTRIL) 30 MG tablet Take 1 tablet (30 mg) by mouth daily. 90 tablet 3     Loratadine (CLARITIN PO) Take by mouth.       LORazepam (ATIVAN) 1 MG tablet May use 1 tab every 8 hours by day and 2 tabs for night as needed for anxiety or sleep. 45 tablet 1     montelukast (SINGULAIR) 10 MG tablet Take 1 tablet (10 mg) by mouth at bedtime. 90 tablet 3     OLANZapine (ZYPREXA) 2.5 MG tablet Take 1 tablet (2.5 mg) by mouth at bedtime. 30 tablet 1     ondansetron (ZOFRAN) 8 MG tablet Take 1 tablet (8 mg) by mouth every 8 hours as needed for nausea (vomiting). 60 tablet 2     pegfilgrastim-jmdb (FULPHILA) 6 MG/0.6ML injection Inject 0.6 mLs (6 mg) subcutaneously every 2 weeks as " needed (24 hours after chemotherapy disconnect per treatment plan). Remove from fridge for at least 30  minutes to allow syringe to reach room temperature. 15 mL 0     Port Access Kit For nurse use only.  Do not remove items from bag.  Use for port access.  Do not place syringe on sterile field. [id number] kit 0     prochlorperazine (COMPAZINE) 10 MG tablet Take 1 tablet (10 mg) by mouth every 6 hours as needed for nausea or vomiting. 30 tablet 2     sodium chloride, PF, 0.9% PF flush Inject 10 mLs into the vein as needed for line flush. Flush IV before and after med administration as directed and/or at least every 24 hours, or prior to deaccessing for no further use and/or at least every 4 weeks when not accessed. 892143 mL 0     denosumab (PROLIA) 60 mg/mL injection Inject 60 mg subcutaneously every 6 months. (Patient not taking: Reported on 6/11/2025)       propranolol (INDERAL) 10 MG tablet Take 2 tablets (20 mg) by mouth 2 times daily as needed (anxiety). (Patient not taking: Reported on 6/11/2025) 60 tablet 1      Family History:  Family History   Problem Relation Age of Onset     Hypertension Mother      Allergies Mother      Thyroid Disease Mother         Taking thyroid medication     Obesity Mother      Cerebrovascular Disease Mother         TIA Feb 2017 and March 2021     Diabetes Mother      Hypertension Father      Thyroid Disease Father         two parathyroids removed     Hyperlipidemia Father      Other Cancer Father         Lymphoma diagnosed 2022     Cancer Maternal Grandmother         Bone CA     Allergies Maternal Grandmother      Circulatory Maternal Grandmother      Cerebrovascular Disease Maternal Grandmother      Other Cancer Maternal Grandmother         multiple myeloma     C.A.D. Maternal Grandfather      Respiratory Maternal Grandfather         asthma     Allergies Maternal Grandfather      Cerebrovascular Disease Maternal Grandfather      Alzheimer Disease Paternal Grandfather      Neurologic  Disorder Paternal Grandfather         Parkinsons     Arthritis Paternal Grandmother      Respiratory Other         Emphysema     Diabetes Other      Asthma Other         genetic emphysema     Genetic Disorder Other         genetic emphysema     Breast Cancer Sister         benign lump indicated future risk     Diabetes Other      Coronary Artery Disease Other      Other Cancer Other         multiple myeloma     Colon Cancer Other      Other Cancer Other         Acute Leukemia     Cerebrovascular Disease Other      Breast Cancer Other         told aunt was diagnosed - unsure of type & treatment     Other Cancer Other         Leukemia     Cancer - colorectal No family hx of      Prostate Cancer No family hx of    Paternal Great grand father and 2 paternal great uncles with colon cancer.    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 No   Social History Narrative     Not on file     Social Drivers of Health     Financial Resource Strain: Low Risk  (8/30/2024)    Financial Resource Strain      Within the past 12 months, have you or your family members you live with been unable to get utilities (heat, electricity) when it was really needed?: No   Food Insecurity: No Food Insecurity (3/6/2025)    Received from [facility]    Hunger Vital Sign      Worried About Running Out of Food in the Last Year: Never true      Ran Out of Food in the Last Year: Never true  "  Transportation Needs: No Transportation Needs (3/6/2025)    Received from [facility]    PRAPARE - Transportation      Lack of Transportation (Medical): No      Lack of Transportation (Non-Medical): No   Physical Activity: Inactive (2/27/2025)    Received from [facility]    Exercise Vital Sign      Days of Exercise per Week: 0 days      Minutes of Exercise per Session: 0 min   Stress: No Stress Concern Present (8/30/2024)    [place] [employer] - Occupational Stress Questionnaire      Feeling of Stress : Only a little   Social Connections: Unknown (8/30/2024)    Social Connection and Isolation Panel [NHANES]      Frequency of Communication with Friends and Family: Not on file      Frequency of Social Gatherings with Friends and Family: Twice a week      Attends Mandaen Services: Not on file      Active Member of Clubs or Organizations: Not on file      Attends Club or Organization Meetings: Not on file      Marital Status: Not on file   Interpersonal Safety: Low Risk  (3/28/2025)    Interpersonal Safety      Do you feel physically and emotionally safe where you currently live?: Yes      Within the past 12 months, have you been hit, slapped, kicked or otherwise physically hurt by someone?: No      Within the past 12 months, have you been humiliated or emotionally abused in other ways by your partner or ex-partner?: No   Housing Stability: Low Risk  (3/6/2025)    Received from [facility]    Housing Stability      What is your living situation today?: I have a steady place to live     Physical Exam:  /88   Pulse 109   Temp 98.1  F (36.7  C) (Oral)   Resp 16   Ht 1.651 m (5' 5\")   Wt 64.6 kg (142 lb 8 oz)   LMP 03/14/2019   SpO2 99%   BMI 23.71 kg/m    Wt Readings from Last 4 Encounters:   06/11/25 64.6 kg (142 lb 8 oz)   05/28/25 64.6 kg (142 lb 8 oz)   05/27/25 64.4 kg (142 lb)   05/14/25 64.3 kg (141 lb 11.2 oz)     CONSTITUTIONAL: no acute distress  EYES: PERRLA, no palor or " icterus.   ENT/MOUTH: no mouth lesions. Ears normal  CVS: s1s2 no m r g .   RESPIRATORY: clear to auscultation b/l  GI: soft non tender no hepatosplenomegaly  NEURO: AAOX3  Grossly non focal neuro exam  INTEGUMENT: no obvious rashes  LYMPHATIC: no palpable cervical, supraclavicular, axillary or inguinal LAD  MUSCULOSKELETAL: Unremarkable. No bony tenderness.   EXTREMITIES: no edema  PSYCH: Mentation, mood and affect are normal. Decision making capacity is intact        Laboratory/Imaging Studies    Reviewed    6/11/2025  CBC shows WBC 11.6.  Hemoglobin 12.1.  Platelets 147.  ANC 8.  Chemistries showed alkaline phosphatase 188.  ALT 83.  AST 54.  Bilirubin 0.3.    CEA was 4.9 on 5/28/2025    CEA was 2.1 on 3/24/2025.  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    We discussed the situation in detail.    I recommended starting chemotherapy in the neoadjuvant setting based on PROSPECT trial.     Our plan would be to give her 6 cycles of chemotherapy and then repeat  3T MRI rectum and CT chest abdomen and pelvis and repeat flexible sigmoidoscopy.      If we see a good response of 20% or more on repeat imaging, then potentially chemoradiation could be avoided and she can be taken to the surgery.      After the surgery, once the patient has recovered, we will give additional chemotherapy to complete a total of 6 months of treatment.    If on the other hand after 3 months of FOLFOX, there is less than 20% response of the primary tumor, then we will proceed with neoadjuvant chemoradiation followed by surgery.      On 4/3/2025, she started FOLFOX.    She has completed 5 cycles of FOLFOX and plan to proceed with cycle #6 FOLFOX today.    She will have repeat CT chest abdomen and pelvis and MRI of the rectum and she will follow-up with Dr. Landa after that.    We discussed that if there is delay in getting to surgery, then  we will give her cycle #7 of chemotherapy as scheduled on 6/25/2025.      If we are not seeing a good response from chemotherapy, then potentially we will do radiation with Xeloda.        Chemo induced neutropenia. Continue Fulphila.      Elevated liver enzymes.  Likely from chemotherapy.  These are better as compared to 2 weeks ago.  Continue to monitor.  We will repeat scans as mentioned above after completing this chemotherapy.      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  She did ice therapy and did not notice any cold sensitivity but occasionally notices tingling and numbness in the fingers and feet but currently only notices cold feet.  Continue same dose of oxaliplatin.      Loose bowel movements.  She is taking MiraLAX daily and 1 Senokot daily.  Advised her to stop Senokot and see if loose bowel movements become better but I do not want her to become constipated.        We did not address the following today    Breast cancer.  She has left-sided hormone receptor positive breast cancer and she completed adjuvant exemestane in early March 2025.   Breast cancer index shows that she is unlikely to benefit from extended hormonal therapy.  Being followed by Dr. Grijalva.      We will determine the follow-up with me accordingly.        All questions were answered to her satisfaction.  She is agreeable and comfortable with the plan.    Denise Herbert MD     The longitudinal plan of care for the rectal cancer as documented were addressed during this visit. Due to the added complexity in care, I will continue to support Nilda in the subsequent management and with ongoing continuity of care.        Again, thank you for allowing me to participate in the care of your patient.        Sincerely,        Denise Herbert MD    Electronically signed

## 2025-06-11 NOTE — PROGRESS NOTES
"Patient's name and  were verified.  See Doc Flowsheet - IV assess for details.  IVAD accessed with 20G 3/\" power needle  blood return positive: YES  Site without redness, tenderness or swelling: YES  flushed with 20cc NS and 5cc 100u/ml heparin  Needle: Left accessed for infusion  Comments: Labs drawn.  Patient tolerated procedure without incident.    Violet Dalton RN, BSN, OCN    "

## 2025-06-12 ENCOUNTER — HOME INFUSION BILLING (OUTPATIENT)
Dept: HOME HEALTH SERVICES | Facility: HOME HEALTH | Age: 55
End: 2025-06-12
Payer: COMMERCIAL

## 2025-06-12 PROCEDURE — S9330 HIT CONT CHEM DIEM: HCPCS

## 2025-06-14 ENCOUNTER — HOME CARE VISIT (OUTPATIENT)
Dept: HOME HEALTH SERVICES | Facility: HOME HEALTH | Age: 55
End: 2025-06-14
Payer: COMMERCIAL

## 2025-06-14 VITALS
HEART RATE: 120 BPM | SYSTOLIC BLOOD PRESSURE: 108 MMHG | RESPIRATION RATE: 14 BRPM | TEMPERATURE: 97.5 F | DIASTOLIC BLOOD PRESSURE: 78 MMHG | OXYGEN SATURATION: 99 %

## 2025-06-14 PROCEDURE — S9537 HT HEM HORM INJ DIEM: HCPCS

## 2025-06-14 PROCEDURE — 99601 HOME NFS VISIT <2 HRS: CPT

## 2025-06-14 NOTE — PROGRESS NOTES
Snv for gcsf injection. Reports s/e s are minimal and manageable. Reviewed s/e management with s/s s to report. Verbalized understanding. Continue with poc.

## 2025-06-17 NOTE — PROGRESS NOTES
"Colon and Rectal Surgery Follow-up Clinic Note    RE: Desi Granado  : 1970  LOKESH: 2025    Reason for visit: mT2N1 mid rectal adenocarcinoma.     HPI:   Original [place]-year-old female who underwent diagnostic colonoscopy (complete) for intermittent hematochezia 1 year duration on 25 by Dr. Hart:  Findings:       The perianal and digital rectal examinations were normal. Pertinent        negatives include normal sphincter tone.        Three sessile polyps were found in the sigmoid colon and transverse        colon. The polyps were 2 to 3 mm in size. These polyps were removed with        a jumbo cold forceps. Resection and retrieval were complete.        Verification of patient identification for the specimen was done by the        physician, nurse and technician using the patient's name, birth date and        medical record number. Estimated blood loss was minimal.        A fungating non-obstructing medium-sized mass was found from 9 to 12 cm        proximal to the anus. The mass was non-circumferential. The mass        measured three cm in length. In addition, its diameter measured        twenty-seven mm. No bleeding was present. This was biopsied with a cold        jumbo forceps for histology. Verification of patient identification for        the specimen was done by the physician, nurse and technician using the        patient's name, birth date and medical record number. Estimated blood        loss was minimal.   Final Diagnosis   A(1).  Colon, Transverse, polyp, polypectomy:  -Tubular adenoma  -Negative for high-grade dysplasia and malignancy.   B(2).   Colon, Sigmoid, polyp, polypectomy:  -Tubular adenoma  -Negative for high-grade dysplasia and malignancy.   C(3).  Colon, sigmoid/rectal, \"mass,\" biopsies:  -Invasive moderately differentiating adenocarcinoma  Mismatch Repair       Immunohistochemistry (IHC) Testing for Mismatch Repair (MMR) Proteins       MLH1 Result   Intact nuclear " expression   Immunohistochemistry (IHC) Testing for Mismatch Repair (MMR) Proteins       MSH2 Result   Intact nuclear expression   Immunohistochemistry (IHC) Testing for Mismatch Repair (MMR) Proteins       MSH6 Result   Intact nuclear expression   Immunohistochemistry (IHC) Testing for Mismatch Repair (MMR) Proteins       PMS2 Result   Intact nuclear expression   Immunohistochemistry (IHC) Testing for Mismatch Repair (MMR) Proteins   Background nonneoplastic tissue / internal control with intact nuclear expression   IHC Interpretation   No loss of nuclear expression of MMR proteins: low probability of MSI-H      Path consult pending       CT CAP 2/27/25:  IMPRESSION:  1.  Patient's known rectosigmoid mass is not well seen. If further characterization/staging is desired, recommend rectal MRI.  2.  Enlarged right mesorectal lymph node, consistent with regional lymph node metastatic disease in this patient with history of biopsy-proven rectosigmoid adenocarcinoma.  3.  No evidence of distant metastatic disease.  4.  Colonic diverticulosis without findings specific for diverticulitis.  MR Rectum 2/27/25 ([facility] SD):  IMPRESSION:   1.  Primary tumor location: Mid rectum  2.  MRI Stage: T2 N1  3.  Sphincter involvement: None  4.  MRF Status: Negative (T2).  5.  EMVI: Nonvisualized.  CEA 2.2  Saw Dr. Stewart in 2023 for second opinion of diverticulitis and decided on observation with repeat colonoscopy 1-2 months if bleeding continued.   Colonoscopy (5/23/2022):  Findings:        The perianal and digital rectal examinations were normal. Pertinent        negatives include normal sphincter tone.        Multiple small-mouthed diverticula were found from 20 to 50 cm proximal        to the anus.        A 4 mm polyp was found in the cecum. The polyp was sessile. The polyp        was removed with a cold snare. Resection and retrieval were complete.        Verification of patient identification for the specimen was done by the         physician, nurse and technician using the patient's name, birth date and        medical record number. Estimated blood loss was minimal.        A 4 mm polyp was found in the sigmoid colon. The polyp was sessile. The        polyp was removed with a jumbo cold forceps. Resection and retrieval        were complete. Verification of patient identification for the specimen        was done by the physician, nurse and technician using the patient's        name, birth date and medical record number. Estimated blood loss was        minimal.        External and internal hemorrhoids were found during retroflexion. The        hemorrhoids were mild and Grade I (internal hemorrhoids that do not        prolapse).                                                                                     Impression:    - Diverticulosis from 20 to 50 cm proximal to the anus.                          - One 4 mm polyp in the cecum, removed with a cold                          snare. Resected and retrieved.                          - One 4 mm polyp in the sigmoid colon, removed with a                          jumbo cold forceps. Resected and retrieved.                          - External and internal hemorrhoids.     Procedures: 3/3/25  Flexible sigmoidoscopy: 3 cm fungating ulcerated mass located at 7 cm from the anal verge at the right posterolateral aspect of the mid rectum. No additional abnormalities were seen.    She met with CRS at [place] 3/20  Assessment and Plan:   Options for Chemotherapy followed by surgery vs LUZMA with possible watch and wait reviewed. Given the low lay of her tumor, surgery would be robotic low anterior resection with low colorectal anastomosis and almost certainly require temporary diverting loop ileostomy regardless of radiation status. Bowel function changes are expected with both low colorectal anastomosis or with pelvic radiation. She is also concerned about changes to bladder function with radiation. LUZMA would  be long course chemoradiation followed by consolidation chemotherapy for best chance at nonoperative management and watch and wait approach. Would be a candidate for the JANUS trial. She is still unsure of which choice is best for her. She will meet with med onc, and we will try to arrange a visit with rad onc to help with decision making.   Second and third degree relatives on her father side with a history of colorectal cancer but she is unsure of this.  No previous anorectal operations.  PMH invasive ductal carcinoma of the left breast (ER+ and progesterone receptor +100% strong nuclear staining s/p bilateral mastectomy, pT2pN0 then s/p preventative bilateral BSO in 2023).   I met with her in March of this year and we discussed neoadjuvant chemotherapy    Treatment  Chemotherapy:   Regimen: FOLFOX  Start/stop dates: 4/3/2025-6/11/2025  Total number of cycles: 6    Follow Up  Most recent imaging:  MR Pelvis 6/20/2025:    IMPRESSION:   1. There has been a positive response to treatment, with a  corresponding tumor regression grade of mrTRG-2.   2. Decreased right mesorectal lymph node measuring 2 mm, previously 8  mm. No suspicious pelvic lymphadenopathy    CT Chest/Abdomen Pelvis 6/20/2025   IMPRESSION:  1.  No evidence of new or worsening disease in the chest, abdomen, or pelvis.  2.  Resolved mesorectal lymphadenopathy.  3.  Colonic diverticulosis with new faint pericolonic fat stranding about the descending colon, suggesting a mild acute and uncomplicated diverticuliti    Recent Labs   Lab Test 05/28/25  0914 03/24/25  0919 02/27/25  1355   CEA (R) 4.9 2.1 2.2       INTERVAL HISTORY: Did not tolerate chemotherapy very well.  She did have to stop working because of fatigue, diarrhea and dehydration.  Tolerating diet well.  Having semiformed bowel movements daily without blood.  Denies any sexual or urinary function alterations since her last visit.    Physical Examination:  BP (!) 152/96 (BP Location: Left arm,  "Patient Position: Sitting, Cuff Size: Adult Regular)   Pulse 94   Ht 1.651 m (5' 5\")   Wt 64.4 kg (142 lb)   LMP 03/14/2019   SpO2 99%   BMI 23.63 kg/m    Digital rectal examination: Was performed.   Sphincter tone: Good.  Palpable lesions: No.  Flexible sigmoidoscopy: after obtaining informed consent and performing a \"time out\", an adult flexible sigmoidoscope was introduced through the anus and passed up to the mid sigmoid colon. The quality of the prep was fair. Findings: 2 cm flat white scar with minor central granularity located at 7 cm from the anal verge at the right lateral aspect of the mid rectum. No additional abnormalities were seen. Total scope time: 10 minutes. The patient tolerated the procedure well.     ASSESSMENT  54-year-old female with mT2N1 mid rectal adenocarcinoma status post 6 cycles of induction systemic chemotherapy with a ncCR.  No evidence of M1 disease.  Does have a history of recurrent sigmoid diverticulitis.  We had a long discussion with regards to moving forward with TME versus long course chemoradiotherapy in the hopes for organ preservation, and further discussed the short and long-term risks of surgery.  Nilda would like to move forward with surgery and forego chemoradiotherapy.     PLAN  Schedule robotic low anterior resection with flexible sigmoidoscopy, possible loop ileostomy.  Will need PAC, labs, mechanical bowel prep with antibiotics and WOC marking/education.  If we are not able to get her in for surgery at the end of July she will be doing an extra cycle of chemotherapy this week with the hopes to add her to my OR scheduled mid August.    20 minutes spent on the date of encounter performing chart review, history and exam, documentation.     Jon Landa MD, MSc, FACS, FASCRS.  Professor and Chief, Division of Colon & Rectal Surgery  Stanley M. Goldberg, MD Endowed Chair, Colon & Rectal Surgery  Department of Surgery, [facility]       Referring " Provider:  No referring provider defined for this encounter.      Primary Care Provider:  Pearl Grover      CC:  Denise Herbert MD Margaret Reynolds, MD

## 2025-06-18 ENCOUNTER — HOME INFUSION (OUTPATIENT)
Dept: HOME HEALTH SERVICES | Facility: HOME HEALTH | Age: 55
End: 2025-06-18
Payer: COMMERCIAL

## 2025-06-18 DIAGNOSIS — C20 RECTAL CANCER (H): ICD-10-CM

## 2025-06-18 DIAGNOSIS — F43.22 ADJUSTMENT DISORDER WITH ANXIOUS MOOD: ICD-10-CM

## 2025-06-18 DIAGNOSIS — C20 RECTAL ADENOCARCINOMA (H): ICD-10-CM

## 2025-06-18 RX ORDER — LORAZEPAM 1 MG/1
TABLET ORAL
Qty: 45 TABLET | Refills: 1 | Status: SHIPPED | OUTPATIENT
Start: 2025-06-18

## 2025-06-20 ENCOUNTER — ANCILLARY PROCEDURE (OUTPATIENT)
Dept: MRI IMAGING | Facility: CLINIC | Age: 55
End: 2025-06-20
Attending: INTERNAL MEDICINE
Payer: COMMERCIAL

## 2025-06-20 ENCOUNTER — HOME INFUSION BILLING (OUTPATIENT)
Dept: HOME HEALTH SERVICES | Facility: HOME HEALTH | Age: 55
End: 2025-06-20
Payer: COMMERCIAL

## 2025-06-20 ENCOUNTER — ANCILLARY PROCEDURE (OUTPATIENT)
Dept: CT IMAGING | Facility: CLINIC | Age: 55
End: 2025-06-20
Attending: INTERNAL MEDICINE
Payer: COMMERCIAL

## 2025-06-20 DIAGNOSIS — C20 RECTAL CANCER (H): ICD-10-CM

## 2025-06-20 PROCEDURE — 74177 CT ABD & PELVIS W/CONTRAST: CPT | Performed by: INTERNAL MEDICINE

## 2025-06-20 PROCEDURE — 71260 CT THORAX DX C+: CPT | Performed by: INTERNAL MEDICINE

## 2025-06-20 PROCEDURE — A9270 NON-COVERED ITEM OR SERVICE: HCPCS

## 2025-06-20 PROCEDURE — 72197 MRI PELVIS W/O & W/DYE: CPT | Performed by: RADIOLOGY

## 2025-06-20 PROCEDURE — A9585 GADOBUTROL INJECTION: HCPCS | Performed by: RADIOLOGY

## 2025-06-20 RX ORDER — HEPARIN SODIUM (PORCINE) LOCK FLUSH IV SOLN 100 UNIT/ML 100 UNIT/ML
5 SOLUTION INTRAVENOUS EVERY 8 HOURS
Status: ACTIVE | OUTPATIENT
Start: 2025-06-20

## 2025-06-20 RX ORDER — IOPAMIDOL 755 MG/ML
79 INJECTION, SOLUTION INTRAVASCULAR ONCE
Status: COMPLETED | OUTPATIENT
Start: 2025-06-20 | End: 2025-06-20

## 2025-06-20 RX ORDER — GADOBUTROL 604.72 MG/ML
7.5 INJECTION INTRAVENOUS ONCE
Status: COMPLETED | OUTPATIENT
Start: 2025-06-20 | End: 2025-06-20

## 2025-06-20 RX ADMIN — IOPAMIDOL 79 ML: 755 INJECTION, SOLUTION INTRAVASCULAR at 13:53

## 2025-06-20 RX ADMIN — HEPARIN SODIUM (PORCINE) LOCK FLUSH IV SOLN 100 UNIT/ML 5 ML: 100 SOLUTION at 15:11

## 2025-06-20 RX ADMIN — GADOBUTROL 6.5 ML: 604.72 INJECTION INTRAVENOUS at 14:56

## 2025-06-20 NOTE — DISCHARGE INSTRUCTIONS

## 2025-06-20 NOTE — DISCHARGE INSTRUCTIONS
MRI Contrast Discharge Instructions    The IV contrast you received today will pass out of your body in your  urine. This will happen in the next 24 hours. You will not feel this process.  Your urine will not change color.    Drink at least 4 extra glasses of water or juice today (unless your doctor  has restricted your fluids). This reduces the stress on your kidneys.  You may take your regular medicines.    If you are on dialysis: It is best to have dialysis today.    If you have a reaction: Most reactions happen right away. If you have  any new symptoms after leaving the hospital (such as hives or swelling),  call your hospital at the correct number below. Or call your family doctor.  If you have breathing distress or wheezing, call 911.    Special instructions: ***    I have read and understand the above information.    Signature:______________________________________ Date:___________    Staff:__________________________________________ Date:___________     Time:__________    [place] [facility] Departments:    ___[place]: [phone]  ___[facility]: [phone]  ___[place]: [phone] ___[facility]: [phone]  ___[facility]: [phone]  ___[facility]: [phone]  ___[phone]  ___[facility]: [phone]  ___[phone]

## 2025-06-23 ENCOUNTER — OFFICE VISIT (OUTPATIENT)
Dept: SURGERY | Facility: CLINIC | Age: 55
End: 2025-06-23
Payer: COMMERCIAL

## 2025-06-23 ENCOUNTER — TELEPHONE (OUTPATIENT)
Dept: SURGERY | Facility: CLINIC | Age: 55
End: 2025-06-23

## 2025-06-23 VITALS
HEART RATE: 94 BPM | DIASTOLIC BLOOD PRESSURE: 96 MMHG | BODY MASS INDEX: 23.66 KG/M2 | OXYGEN SATURATION: 99 % | HEIGHT: 65 IN | SYSTOLIC BLOOD PRESSURE: 152 MMHG | WEIGHT: 142 LBS

## 2025-06-23 DIAGNOSIS — C20 RECTAL ADENOCARCINOMA (H): ICD-10-CM

## 2025-06-23 DIAGNOSIS — C20 RECTAL CANCER (H): Primary | ICD-10-CM

## 2025-06-23 PROCEDURE — 3077F SYST BP >= 140 MM HG: CPT | Performed by: COLON & RECTAL SURGERY

## 2025-06-23 PROCEDURE — 3080F DIAST BP >= 90 MM HG: CPT | Performed by: COLON & RECTAL SURGERY

## 2025-06-23 PROCEDURE — 45331 SIGMOIDOSCOPY AND BIOPSY: CPT | Performed by: COLON & RECTAL SURGERY

## 2025-06-23 PROCEDURE — 1126F AMNT PAIN NOTED NONE PRSNT: CPT | Performed by: COLON & RECTAL SURGERY

## 2025-06-23 PROCEDURE — 99214 OFFICE O/P EST MOD 30 MIN: CPT | Mod: 25 | Performed by: COLON & RECTAL SURGERY

## 2025-06-23 RX ORDER — ONDANSETRON 4 MG/1
4 TABLET, FILM COATED ORAL EVERY 6 HOURS
Qty: 3 TABLET | Refills: 0 | Status: SHIPPED | OUTPATIENT
Start: 2025-06-23

## 2025-06-23 RX ORDER — POLYETHYLENE GLYCOL 3350 17 G/17G
POWDER, FOR SOLUTION ORAL
Qty: 238 G | Refills: 0 | Status: SHIPPED | OUTPATIENT
Start: 2025-06-23

## 2025-06-23 RX ORDER — MAGNESIUM CARB/ALUMINUM HYDROX 105-160MG
296 TABLET,CHEWABLE ORAL ONCE
Qty: 296 ML | Refills: 0 | Status: SHIPPED | OUTPATIENT
Start: 2025-06-23 | End: 2025-06-23

## 2025-06-23 RX ORDER — METRONIDAZOLE 500 MG/1
500 TABLET ORAL EVERY 6 HOURS
Qty: 3 TABLET | Refills: 0 | Status: SHIPPED | OUTPATIENT
Start: 2025-06-23

## 2025-06-23 RX ORDER — NEOMYCIN SULFATE 500 MG/1
1000 TABLET ORAL EVERY 6 HOURS
Qty: 6 TABLET | Refills: 0 | Status: SHIPPED | OUTPATIENT
Start: 2025-06-23

## 2025-06-23 ASSESSMENT — PAIN SCALES - GENERAL: PAINLEVEL_OUTOF10: NO PAIN (0)

## 2025-06-23 NOTE — NURSING NOTE
"Chief Complaint   Patient presents with    Follow Up     Flex after chemo       Vitals:    06/23/25 1414   BP: (!) 152/96   BP Location: Left arm   Patient Position: Sitting   Cuff Size: Adult Regular   Pulse: 94   SpO2: 99%   Weight: 142 lb   Height: 5' 5\"       Body mass index is 23.63 kg/m .    Patience Manuel CMA    "

## 2025-06-23 NOTE — PATIENT INSTRUCTIONS
Follow up:    Surgery Scheduling will give you a call within three business days to schedule surgery   Please call them after 3 business days if you have not heard from them: [phone]  Middle of August - so you can get one more dose of chemotherapy this week    Appointments you will need in prep: pre-op physical with our anesthesia team, blood work, ostomy nurse visit   Our surgery schedules will schedule these with you during surgery scheduling process. You do NOT need to schedule these independently.    You will need to do a full bowel prep with antibiotics the day before surgery. I have sent these to your pharmacy. You will receive a surgery packet through [facility] and mail.     You will be undergoing a large operation. In preparation for your surgery we ask that you focus on a healthy lifestyle to help in the aid of your recovery and to reduce your post surgical complications. Below are a few guidelines to follow:  Schedule an appointment with your primary care physician to discuss how to best improve your overall health condition   If you have diabetes, please visit with your provider to optimize your blood sugar control   Engage in 30 minutes of exercise 5x a week or to the best of your ability. This can be in the form of walking, cycling, weight lifting, running or swimming  Focus on a healthy diet, high in fiber and protein  Vegetables and fruit at every meal   Lean proteins such as fish and chicken   Protein shakes are also encourage   Drink at least 64 ounces of water daily   Avoid alcohol and excessive caffeine   Stop smoking if you are currently smoking       Becca CARUSO [phone]    Clinic Fax Number [phone]    Surgery Scheduling [phone]    My Chart is available 24 hours a day and is a secure way to access your records and communicate with your care team.  I strongly recommend signing up if you haven't already done so, if you are comfortable with computers.  If you would like to inquire  about this or are having problems with My Chart access, you may call [phone] or go online at [email or web address].  Please allow at least 24 hours for a response and extra time on weekends and Holidays.

## 2025-06-23 NOTE — TELEPHONE ENCOUNTER
Per Becca, RNCC, they discussed DOS 8/15/2025 with patient in clinic today, so Case Request sent to schedule on Friday 8/15/2025 at [facility].    This writer will follow-up with patient tomorrow.    Jasmin english on 6/23/2025 at 5:06 PM

## 2025-06-23 NOTE — PROGRESS NOTES
Virtual Visit Details    Type of service:  Video Visit     Originating Location (pt. 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  It was pT2 N0 lesion.  Cancer was ER/% positive and HER2/tamara FISH negative.  Oncotype DX score was 0.  She was initially started on tamoxifen in December 2018 and then switched to exemestane and Zoladex in June 2019 and had bilateral salpingo-oophorectomy in February 2023.  After that she remained on exemestane and stopped in early March 2025.  Breast cancer index did not show that she will benefit from extended hormone therapy.     Related to her rectal cancer: she was having intermittent hematochezia for about 1 year and then on 2/21/2025 she had colonoscopy which showed a fungating nonobstructing medium-sized mass from 9 to 12 cm proximal to the anus.  Mass was noncircumferential and measured 3 cm in length.  Biopsy showed 3 sessile polyps were found in the sigmoid colon and transverse colon measuring 2 to 3 mm in size which were resected.  Biopsy from the sigmoid/rectal colon mass showed moderately differentiated invasive adenocarcinoma, MMR IHC intact.  Biopsy from the sessile polyps was tubular adenoma without any high-grade dysplasia or malignancy.     2/27/2025.  CT chest abdomen and pelvis was done which did not show the rectosigmoid mass but it showed enlarged right mesorectal lymph node consistent with metastatic spread.  No evidence of distant metastasis.     MRI of the rectum showed a T2 N1 disease in the mid rectum.     CEA was 2.2.    "  She met with Dr. Landa on 3/14/2025 who did a flexible sigmoidoscopy and it showed a 3 cm fungating ulcerated mass located at 7 cm from the anal verge at the right posterolateral aspect of the mid rectum.     Tumor board had recommendation being starting systemic chemotherapy based on PROSPECT trial followed by surgery and avoiding radiation if possible.    Treatment:   4/3/2025 began FOLFOX    She has completed 6 cycles with positive response. Now plan for one more cycle before going to surgery.     Interval History:  Nilda is seen on video. She overall is doing well. Happy with her visit with surgery yesterday. She is fatigued and has a hard time falling asleep at night. Has Ativan but doesn't seem to help as much as it used to. She is tolerating chemo OK. She has intermittent neuropathy, minimal cold sensitivity. Manages nausea with Zofran. Notes hair thinning. Otherwise no significant issues. She has had diverticulitis in the past, no current pain however she has had diarrhea (non-bloody) yesterday and today. Did use enema yesterday for procedure.     Current Outpatient Medications   Medication Sig Dispense Refill    Chemo Kit For RN use only. Do not remove items from bag. Contents: 1  sodium chloride 0.9% flush, 4 medium gloves, 1 chemo gown, 1/4 chemo mat, 1 connector female, 1 chemo bag. [id number] kit 0    cyanocobalamin (VITAMIN B-12) 500 MCG tablet Take 500 mcg by mouth daily.      denosumab (PROLIA) 60 mg/mL injection Inject 60 mg subcutaneously every 6 months. (Patient not taking: Reported on 6/23/2025)      dexAMETHasone (DECADRON) 4 MG tablet Take 2 tablets (8 mg) by mouth daily. Take for 2 days, starting the day after chemo. Take with food. 4 tablet 2    Emergency Supply Kit, Central, Patient use for emergency only. Contents: 3 sodium chloride 0.9% flushes, 1 dressing kit, 1 microclave ext set 14\", 4 nitrile gloves (med), 6 alcohol prep pads, 1 bacitracin, 1 syringe (10 cc 20 G 1\"). Call [phone] " to reorder. [id number] kit 0    [START ON 6/25/2025] Fluorouracil (ADRUCIL) 4,320 mg in sodium chloride 0.9 % 241 mL via HOMEPUMP C-Series Infuse 4,320 mg at 5.2 mL/hr over 46 hours into the vein once for 1 dose. 116929 mL 0    Fluticasone Propionate (FLONASE ALLERGY RELIEF NA) Spray 1 spray in nostril 2 times daily.      lisinopril (ZESTRIL) 30 MG tablet Take 1 tablet (30 mg) by mouth daily. 90 tablet 3    Loratadine (CLARITIN PO) Take by mouth.      LORazepam (ATIVAN) 1 MG tablet May use 1 tab every 8 hours by day and 2 tabs for night as needed for anxiety or sleep. 45 tablet 1    montelukast (SINGULAIR) 10 MG tablet Take 1 tablet (10 mg) by mouth at bedtime. 90 tablet 3    OLANZapine (ZYPREXA) 2.5 MG tablet Take 1 tablet (2.5 mg) by mouth at bedtime. 30 tablet 1    ondansetron (ZOFRAN) 8 MG tablet Take 1 tablet (8 mg) by mouth every 8 hours as needed for nausea (vomiting). 60 tablet 2    pegfilgrastim-jmdb (FULPHILA) 6 MG/0.6ML injection Inject 0.6 mLs (6 mg) subcutaneously every 2 weeks as needed (24 hours after chemotherapy disconnect per treatment plan). Remove from fridge for at least 30  minutes to allow syringe to reach room temperature. 15 mL 0    Port Access Kit For nurse use only.  Do not remove items from bag.  Use for port access.  Do not place syringe on sterile field. [id number] kit 0    prochlorperazine (COMPAZINE) 10 MG tablet Take 1 tablet (10 mg) by mouth every 6 hours as needed for nausea or vomiting. 30 tablet 2    propranolol (INDERAL) 10 MG tablet Take 2 tablets (20 mg) by mouth 2 times daily as needed (anxiety). (Patient not taking: Reported on 6/23/2025) 60 tablet 1    sodium chloride, PF, 0.9% PF flush Inject 10 mLs into the vein as needed for line flush. Flush IV before and after med administration as directed and/or at least every 24 hours, or prior to deaccessing for no further use and/or at least every 4 weeks when not accessed. [id number] mL 0       Past Medical History  Past Medical History:    Diagnosis Date    Allergic rhinitis, cause unspecified     Hypertension 8 years?    Injury, other and unspecified, knee, leg, ankle, and foot 8th grade    left ankle injury    Invasive ductal carcinoma of breast, female, left (H) 10/26/2018    Unsatisfactory cervical cytology smear 10/06/2021     Past Surgical History:   Procedure Laterality Date    ABDOMEN SURGERY  02/2023    removed ovaries    BIOPSY      BREAST LUMPECTOMY, RT/LT  06/23/2010    Breast Lumpectomy RT for likely adenomatous lumps    COLONOSCOPY N/A 08/16/2018    Procedure: COLONOSCOPY;  colonoscopy;  Surgeon: Vijay Almanza MD;  Location: [facility]    COLONOSCOPY N/A 05/23/2022    Procedure: COLONOSCOPY, FLEXIBLE, WITH LESION REMOVAL USING SNARE and biopsy;  Surgeon: Nabila Hart MD;  Location: [facility]    COLONOSCOPY N/A 2/21/2025    Procedure: COLONOSCOPY, FLEXIBLE, WITH LESION REMOVAL USING SNARE;  Surgeon: Nabila Hart MD;  Location: [facility]    ESOPHAGOSCOPY, GASTROSCOPY, DUODENOSCOPY (EGD), COMBINED N/A 10/01/2015    Procedure: COMBINED ESOPHAGOSCOPY, GASTROSCOPY, DUODENOSCOPY (EGD);  Surgeon: Vijay Almanza MD;  Location: [facility]    INSERT PORT VASCULAR ACCESS Right 3/28/2025    Procedure: Insert port vascular access;  Surgeon: Ciaran Carrington MD;  Location: [facility] OR    IR CHEST PORT PLACEMENT > 5 YRS OF AGE  3/28/2025    LAPAROSCOPIC SALPINGO-OOPHORECTOMY Bilateral 02/14/2023    Procedure: LAPAROSCOPIC BILATERAL salpingo-oophorectomy;  Surgeon: Elida Fay MD;  Location: [facility]    MASTECTOMY SIMPLE BILATERAL, SENTINEL NODE BILATERAL, COMBINED Bilateral 11/08/2018    Procedure: BILATERAL SIMPLE MASTECTOMIES WITH LEFT SENTINEL LYMPH NODE BIOPSY ;  Surgeon: Nabila Hart MD;  Location:  OR    SOFT TISSUE SURGERY      Ankle surgery 20+ years ago    SURGICAL HISTORY OF -   1989    arthroscopy of Left Ankle    SURGICAL HISTORY OF -   1990    arthroscopy of Left Ankle    SURGICAL HISTORY OF -   1993    4 wisdom teeth extracted  "    Allergies   Allergen Reactions    Aspirin Hives    Dust Mites Unknown    Morphine Nausea     Other reaction(s): GI Intolerance    Zoloft [Sertraline] Anxiety     Social History   Social History     Tobacco Use    Smoking status: Never    Smokeless tobacco: Never   Substance Use Topics    Alcohol use: Not Currently    Drug use: No      Past medical history and social history were reviewed.    Physical Examination:  Ht 1.651 m (5' 5\")   Wt 63.5 kg (140 lb)   LMP 03/14/2019   BMI 23.30 kg/m    Wt Readings from Last 10 Encounters:   06/23/25 64.4 kg (142 lb)   06/11/25 64.6 kg (142 lb 8 oz)   05/28/25 64.6 kg (142 lb 8 oz)   05/27/25 64.4 kg (142 lb)   05/14/25 64.3 kg (141 lb 11.2 oz)   04/30/25 64.6 kg (142 lb 6.4 oz)   04/29/25 64.4 kg (142 lb)   04/16/25 65.8 kg (145 lb)   04/03/25 67.1 kg (147 lb 14.4 oz)   03/28/25 65.8 kg (145 lb)     Video physical exam  General: Patient appears well in no acute distress.   Skin: No visualized rash or lesions on visualized skin  Eyes: EOMI, no erythema, sclera icterus or discharge noted  Resp: Appears to be breathing comfortably without accessory muscle usage, speaking in full sentences, no cough  MSK: Appears to have normal range of motion based on visualized movements  Neurologic: No apparent tremors, facial movements symmetric  Psych: affect normal, alert and oriented    Laboratory Data:  To be done with treatment tomorrow     CT CAP 6/20/25  INDICATION:  Rectal cancer (H)  COMPARISON: 2/27/2025.  TECHNIQUE: CT scan of the chest, abdomen, and pelvis was performed following injection of IV contrast. Multiplanar reformats were obtained. Dose reduction techniques were used.   CONTRAST: Isovue 370 79cc     FINDINGS:   LUNGS AND PLEURA: Patent airways. Stable 3 mm right lower lobe nodule (series 4, image 221). Calcified granulomas. No new pulmonary nodule, acute airspace opacity, or pleural effusion.     MEDIASTINUM/AXILLAE: Right chest wall port with tip in the distal " superior vena cava. No thoracic lymphadenopathy. No thoracic aorta aneurysm. Mastectomies.     CORONARY ARTERY CALCIFICATION: Trace.     HEPATOBILIARY: Normal.     PANCREAS: Normal.     SPLEEN: Normal.     ADRENAL GLANDS: Normal.     KIDNEYS/BLADDER: No significant mass, stone, or hydronephrosis.     BOWEL: No obstruction or inflammatory change. Normal appendix. Distal colonic diverticulosis with new faint pericolonic fat stranding about the descending colon posteriorly (series 3, image 388). The known rectal tumor will be better evaluated at same   day pelvis MRI.     LYMPH NODES: No lymphadenopathy. Interval decrease in size of the 10 x 11 mm right mesorectal lymph node which now measures 3 mm (series 3, image 552).     VASCULATURE: No abdominal aortic aneurysm.     PELVIC ORGANS: Normal.     MUSCULOSKELETAL: No aggressive osseous lesion. Stable T4 hemangioma.                                                                      IMPRESSION:  1.  No evidence of new or worsening disease in the chest, abdomen, or pelvis.  2.  Resolved mesorectal lymphadenopathy.  3.  Colonic diverticulosis with new faint pericolonic fat stranding about the descending colon, suggesting a mild acute and uncomplicated diverticulitis.    MR Rectum 6/20/25  FINDINGS:     LOCATION/SIZE:  On prior exam dated 2/27/2025 there was a tumor identified in the mid  to lower rectum. Previously this tumor measured 2.2 cm, and currently  it measures 1.8 cm. This is best seen on series 6 image 33.     TREATMENT RESPONSE:   Approximately 5-10% of the current mass demonstrates tumour signal  intensity on T2-weighted images with minimal residual asymmetric  enhancement along the mid to lower right rectal wall, with the  remainder appearing to represent fibrosis.  This corresponds to a  tumour response grade of mrTRG-2.      CIRCUMFERENTIAL RESECTION MARGIN (CRM):  In terms of the resection margin, there is not involvement of the  mesorectal fascia.      LYMPH NODES:  The previously identified suspicious lymph now measures 2 mm (7/17),  previously 8 mm.     There are no new suspicious appearing pelvic lymph nodes.     MISCELLANEOUS FINDINGS:  Colonic diverticulosis. Unremarkable urinary bladder. No abnormal  pelvic fluid. No aggressive osseous lesion.                                                                      IMPRESSION:   1. There has been a positive response to treatment, with a  corresponding tumor regression grade of mrTRG-2.   2. Decreased right mesorectal lymph node measuring 2 mm, previously 8  mm. No suspicious pelvic lymphadenopathy.      Assessment and Plan:  # Rectal Cancer  Stage IIIA- cT2 cN1a cM0 moderately differentiated carcinoma of the mid rectum, MMR IHC intact. S/p 6 cycles of neoadjuvant FOLFOX with positive response to treatment  - Plan for surgery 8/15/25  - Per surgery team and Dr. Herbert OK for one additional FOLFOX cycle this week, then hold until after surgery (will need 5 additional cycles in adjuvant setting)  - Continue with Cycle 7 tomorrow pending labs  - Continue neulasta support with prior chemotherapy induced neutropenia  - Continue icing to help with neuropathy/cold sensitivity   - Lab monitoring 4 weeks from now prior to surgery  - Dr. Herbert visit after surgery to review path and next steps      # Diverticulitis  Has had in the past. No current pain but has had some diarrhea. CT with possible mild acute diverticulitis  - Given risk of worsening infection with chemotherapy will treat with abx  - Will do Augmentin BID x 10 days  - Monitor GI symptoms   - May need to avoid Miralax/senna if diarrhea     # LFT Elevation  Likely from chemotherapy especially with normal liver on imaging  - Monitoring with treatment     # Sleep Concerns  Hard time falling asleep at night, then fatigued during the day  - Continue Ativan PRN  - Previously prescribed Zyprexa, she may try this in the future     # Breast cancer  She has left-sided  hormone receptor positive breast cancer and she completed adjuvant exemestane in early March 2025. Breast cancer index shows that she is unlikely to benefit from extended hormonal therapy.   - Being followed by Dr. Grijalva.     50 minutes spent on the date of the encounter doing chart review, review of test results, interpretation of tests, patient visit, and documentation, discussion with MD    The longitudinal plan of care for the diagnosis(es)/condition(s) as documented were addressed during this visit. Due to the added complexity in care, I will continue to support Nilda in the subsequent management and with ongoing continuity of care.    Kamran Peguero PA-C  Department of Hematology and Oncology  [facility]

## 2025-06-24 ENCOUNTER — VIRTUAL VISIT (OUTPATIENT)
Dept: ONCOLOGY | Facility: CLINIC | Age: 55
End: 2025-06-24
Attending: INTERNAL MEDICINE
Payer: COMMERCIAL

## 2025-06-24 ENCOUNTER — MYC MEDICAL ADVICE (OUTPATIENT)
Dept: SURGERY | Facility: CLINIC | Age: 55
End: 2025-06-24
Payer: COMMERCIAL

## 2025-06-24 VITALS — BODY MASS INDEX: 23.32 KG/M2 | WEIGHT: 140 LBS | HEIGHT: 65 IN

## 2025-06-24 DIAGNOSIS — K57.32 DIVERTICULITIS OF COLON: ICD-10-CM

## 2025-06-24 DIAGNOSIS — C20 RECTAL ADENOCARCINOMA (H): Primary | ICD-10-CM

## 2025-06-24 PROCEDURE — 98007 SYNCH AUDIO-VIDEO EST HI 40: CPT | Performed by: PHYSICIAN ASSISTANT

## 2025-06-24 PROCEDURE — G2211 COMPLEX E/M VISIT ADD ON: HCPCS | Performed by: PHYSICIAN ASSISTANT

## 2025-06-24 PROCEDURE — 1126F AMNT PAIN NOTED NONE PRSNT: CPT | Performed by: PHYSICIAN ASSISTANT

## 2025-06-24 RX ORDER — ALBUTEROL SULFATE 0.83 MG/ML
2.5 SOLUTION RESPIRATORY (INHALATION)
Status: CANCELLED | OUTPATIENT
Start: 2025-06-25

## 2025-06-24 RX ORDER — HEPARIN SODIUM,PORCINE 10 UNIT/ML
5-20 VIAL (ML) INTRAVENOUS DAILY PRN
Status: CANCELLED | OUTPATIENT
Start: 2025-06-25

## 2025-06-24 RX ORDER — EPINEPHRINE 1 MG/ML
0.3 INJECTION, SOLUTION INTRAMUSCULAR; SUBCUTANEOUS EVERY 5 MIN PRN
Status: CANCELLED | OUTPATIENT
Start: 2025-06-25

## 2025-06-24 RX ORDER — ALBUTEROL SULFATE 90 UG/1
1-2 INHALANT RESPIRATORY (INHALATION)
Status: CANCELLED
Start: 2025-06-25

## 2025-06-24 RX ORDER — DEXAMETHASONE 4 MG/1
8 TABLET ORAL DAILY
Qty: 4 TABLET | Refills: 2 | Status: SHIPPED | OUTPATIENT
Start: 2025-06-24

## 2025-06-24 RX ORDER — PALONOSETRON 0.05 MG/ML
0.25 INJECTION, SOLUTION INTRAVENOUS ONCE
Status: CANCELLED
Start: 2025-06-25 | End: 2025-06-25

## 2025-06-24 RX ORDER — HEPARIN SODIUM (PORCINE) LOCK FLUSH IV SOLN 100 UNIT/ML 100 UNIT/ML
5 SOLUTION INTRAVENOUS
OUTPATIENT
Start: 2025-06-27

## 2025-06-24 RX ORDER — MEPERIDINE HYDROCHLORIDE 25 MG/ML
25 INJECTION INTRAMUSCULAR; INTRAVENOUS; SUBCUTANEOUS
Status: CANCELLED | OUTPATIENT
Start: 2025-06-25

## 2025-06-24 RX ORDER — HEPARIN SODIUM (PORCINE) LOCK FLUSH IV SOLN 100 UNIT/ML 100 UNIT/ML
5 SOLUTION INTRAVENOUS
Status: CANCELLED | OUTPATIENT
Start: 2025-06-25

## 2025-06-24 RX ORDER — DIPHENHYDRAMINE HYDROCHLORIDE 50 MG/ML
25 INJECTION, SOLUTION INTRAMUSCULAR; INTRAVENOUS
Status: CANCELLED
Start: 2025-06-25

## 2025-06-24 RX ORDER — METHYLPREDNISOLONE SODIUM SUCCINATE 40 MG/ML
40 INJECTION INTRAMUSCULAR; INTRAVENOUS
Status: CANCELLED
Start: 2025-06-25

## 2025-06-24 RX ORDER — HEPARIN SODIUM,PORCINE 10 UNIT/ML
5-20 VIAL (ML) INTRAVENOUS DAILY PRN
OUTPATIENT
Start: 2025-06-27

## 2025-06-24 RX ORDER — FLUOROURACIL 50 MG/ML
400 INJECTION, SOLUTION INTRAVENOUS ONCE
Status: CANCELLED | OUTPATIENT
Start: 2025-06-25

## 2025-06-24 RX ORDER — DIPHENHYDRAMINE HYDROCHLORIDE 50 MG/ML
50 INJECTION, SOLUTION INTRAMUSCULAR; INTRAVENOUS
Status: CANCELLED
Start: 2025-06-25

## 2025-06-24 RX ORDER — LORAZEPAM 2 MG/ML
0.5 INJECTION INTRAMUSCULAR EVERY 4 HOURS PRN
Status: CANCELLED | OUTPATIENT
Start: 2025-06-25

## 2025-06-24 ASSESSMENT — PAIN SCALES - GENERAL: PAINLEVEL_OUTOF10: NO PAIN (0)

## 2025-06-24 NOTE — LETTER
6/24/2025      Desi Granado  [address]  [place] [address]      Dear Colleague,    Thank you for referring your patient, Desi Granado, to the [facility]. Please see a copy of my visit note below.    Virtual Visit Details    Type of service:  Video Visit     Originating Location (pt. Location): Home    Distant Location (provider location):  On-site  Platform used for Video Visit: 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  It was pT2 N0 lesion.  Cancer was ER/% positive and HER2/tamara FISH negative.  Oncotype DX score was 0.  She was initially started on tamoxifen in December 2018 and then switched to exemestane and Zoladex in June 2019 and had bilateral salpingo-oophorectomy in February 2023.  After that she remained on exemestane and stopped in early March 2025.  Breast cancer index did not show that she will benefit from extended hormone therapy.     Related to her rectal cancer: she was having intermittent hematochezia for about 1 year and then on 2/21/2025 she had colonoscopy which showed a fungating nonobstructing medium-sized mass from 9 to 12 cm proximal to the anus.  Mass was noncircumferential and measured 3 cm in length.  Biopsy showed 3 sessile polyps were found in the sigmoid colon and transverse colon measuring 2 to 3 mm in size which were resected.  Biopsy from the sigmoid/rectal colon mass showed moderately differentiated invasive adenocarcinoma, MMR IHC intact.  Biopsy from the sessile polyps was tubular adenoma without any high-grade dysplasia or malignancy.     2/27/2025.  CT chest abdomen and pelvis  was done which did not show the rectosigmoid mass but it showed enlarged right mesorectal lymph node consistent with metastatic spread.  No evidence of distant metastasis.     MRI of the rectum showed a T2 N1 disease in the mid rectum.     CEA was 2.2.     She met with Dr. Landa on 3/14/2025 who did a flexible sigmoidoscopy and it showed a 3 cm fungating ulcerated mass located at 7 cm from the anal verge at the right posterolateral aspect of the mid rectum.     Tumor board had recommendation being starting systemic chemotherapy based on PROSPECT trial followed by surgery and avoiding radiation if possible.    Treatment:   4/3/2025 began FOLFOX    She has completed 6 cycles with positive response. Now plan for one more cycle before going to surgery.     Interval History:  Nilda is seen on video. She overall is doing well. Happy with her visit with surgery yesterday. She is fatigued and has a hard time falling asleep at night. Has Ativan but doesn't seem to help as much as it used to. She is tolerating chemo OK. She has intermittent neuropathy, minimal cold sensitivity. Manages nausea with Zofran. Notes hair thinning. Otherwise no significant issues. She has had diverticulitis in the past, no current pain however she has had diarrhea (non-bloody) yesterday and today. Did use enema yesterday for procedure.     Current Outpatient Medications   Medication Sig Dispense Refill     Chemo Kit For RN use only. Do not remove items from bag. Contents: 1  sodium chloride 0.9% flush, 4 medium gloves, 1 chemo gown, 1/4 chemo mat, 1 connector female, 1 chemo bag. [id number] kit 0     cyanocobalamin (VITAMIN B-12) 500 MCG tablet Take 500 mcg by mouth daily.       denosumab (PROLIA) 60 mg/mL injection Inject 60 mg subcutaneously every 6 months. (Patient not taking: Reported on 6/23/2025)       dexAMETHasone (DECADRON) 4 MG tablet Take 2 tablets (8 mg) by mouth daily. Take for 2 days, starting the day after chemo. Take with food. 4  "tablet 2     Emergency Supply Kit, Central, Patient use for emergency only. Contents: 3 sodium chloride 0.9% flushes, 1 dressing kit, 1 microclave ext set 14\", 4 nitrile gloves (med), 6 alcohol prep pads, 1 bacitracin, 1 syringe (10 cc 20 G 1\"). Call [phone] to reorder. [id number] kit 0     [START ON 6/25/2025] Fluorouracil (ADRUCIL) 4,320 mg in sodium chloride 0.9 % 241 mL via HOMEPUMP C-Series Infuse 4,320 mg at 5.2 mL/hr over 46 hours into the vein once for 1 dose. 945273 mL 0     Fluticasone Propionate (FLONASE ALLERGY RELIEF NA) Spray 1 spray in nostril 2 times daily.       lisinopril (ZESTRIL) 30 MG tablet Take 1 tablet (30 mg) by mouth daily. 90 tablet 3     Loratadine (CLARITIN PO) Take by mouth.       LORazepam (ATIVAN) 1 MG tablet May use 1 tab every 8 hours by day and 2 tabs for night as needed for anxiety or sleep. 45 tablet 1     montelukast (SINGULAIR) 10 MG tablet Take 1 tablet (10 mg) by mouth at bedtime. 90 tablet 3     OLANZapine (ZYPREXA) 2.5 MG tablet Take 1 tablet (2.5 mg) by mouth at bedtime. 30 tablet 1     ondansetron (ZOFRAN) 8 MG tablet Take 1 tablet (8 mg) by mouth every 8 hours as needed for nausea (vomiting). 60 tablet 2     pegfilgrastim-jmdb (FULPHILA) 6 MG/0.6ML injection Inject 0.6 mLs (6 mg) subcutaneously every 2 weeks as needed (24 hours after chemotherapy disconnect per treatment plan). Remove from fridge for at least 30  minutes to allow syringe to reach room temperature. 15 mL 0     Port Access Kit For nurse use only.  Do not remove items from bag.  Use for port access.  Do not place syringe on sterile field. [id number] kit 0     prochlorperazine (COMPAZINE) 10 MG tablet Take 1 tablet (10 mg) by mouth every 6 hours as needed for nausea or vomiting. 30 tablet 2     propranolol (INDERAL) 10 MG tablet Take 2 tablets (20 mg) by mouth 2 times daily as needed (anxiety). (Patient not taking: Reported on 6/23/2025) 60 tablet 1     sodium chloride, PF, 0.9% PF flush Inject 10 mLs into " the vein as needed for line flush. Flush IV before and after med administration as directed and/or at least every 24 hours, or prior to deaccessing for no further use and/or at least every 4 weeks when not accessed. [id number] mL 0       Past Medical History  Past Medical History:   Diagnosis Date     Allergic rhinitis, cause unspecified      Hypertension 8 years?     Injury, other and unspecified, knee, leg, ankle, and foot 8th grade    left ankle injury     Invasive ductal carcinoma of breast, female, left (H) 10/26/2018     Unsatisfactory cervical cytology smear 10/06/2021     Past Surgical History:   Procedure Laterality Date     ABDOMEN SURGERY  02/2023    removed ovaries     BIOPSY       BREAST LUMPECTOMY, RT/LT  06/23/2010    Breast Lumpectomy RT for likely adenomatous lumps     COLONOSCOPY N/A 08/16/2018    Procedure: COLONOSCOPY;  colonoscopy;  Surgeon: Vijay Almanza MD;  Location: [facility]     COLONOSCOPY N/A 05/23/2022    Procedure: COLONOSCOPY, FLEXIBLE, WITH LESION REMOVAL USING SNARE and biopsy;  Surgeon: Nabila Hart MD;  Location: [facility]     COLONOSCOPY N/A 2/21/2025    Procedure: COLONOSCOPY, FLEXIBLE, WITH LESION REMOVAL USING SNARE;  Surgeon: Nabila Hart MD;  Location: [facility]     ESOPHAGOSCOPY, GASTROSCOPY, DUODENOSCOPY (EGD), COMBINED N/A 10/01/2015    Procedure: COMBINED ESOPHAGOSCOPY, GASTROSCOPY, DUODENOSCOPY (EGD);  Surgeon: Vijay Almanza MD;  Location: [facility]     INSERT PORT VASCULAR ACCESS Right 3/28/2025    Procedure: Insert port vascular access;  Surgeon: Ciaran Carrington MD;  [place] [facility] [place]     IR CHEST PORT PLACEMENT > 5 YRS OF AGE  3/28/2025     LAPAROSCOPIC SALPINGO-OOPHORECTOMY Bilateral 02/14/2023    Procedure: LAPAROSCOPIC BILATERAL salpingo-oophorectomy;  Surgeon: Elida Fay MD;  Location: [facility]     MASTECTOMY SIMPLE BILATERAL, SENTINEL NODE BILATERAL, COMBINED Bilateral 11/08/2018    Procedure: BILATERAL SIMPLE MASTECTOMIES WITH LEFT SENTINEL LYMPH NODE BIOPSY  ";  Surgeon: Nabila Hart MD;  Location: [facility]     SOFT TISSUE SURGERY      Ankle surgery 20+ years ago     SURGICAL HISTORY OF -   1989    arthroscopy of Left Ankle     SURGICAL HISTORY OF -   1990    arthroscopy of Left Ankle     SURGICAL HISTORY OF -   1993    4 wisdom teeth extracted     Allergies   Allergen Reactions     Aspirin Hives     Dust Mites Unknown     Morphine Nausea     Other reaction(s): GI Intolerance     Zoloft [Sertraline] Anxiety     Social History   Social History     Tobacco Use     Smoking status: Never     Smokeless tobacco: Never   Substance Use Topics     Alcohol use: Not Currently     Drug use: No      Past medical history and social history were reviewed.    Physical Examination:  Ht 1.651 m (5' 5\")   Wt 63.5 kg (140 lb)   LMP 03/14/2019   BMI 23.30 kg/m    Wt Readings from Last 10 Encounters:   06/23/25 64.4 kg (142 lb)   06/11/25 64.6 kg (142 lb 8 oz)   05/28/25 64.6 kg (142 lb 8 oz)   05/27/25 64.4 kg (142 lb)   05/14/25 64.3 kg (141 lb 11.2 oz)   04/30/25 64.6 kg (142 lb 6.4 oz)   04/29/25 64.4 kg (142 lb)   04/16/25 65.8 kg (145 lb)   04/03/25 67.1 kg (147 lb 14.4 oz)   03/28/25 65.8 kg (145 lb)     Video physical exam  General: Patient appears well in no acute distress.   Skin: No visualized rash or lesions on visualized skin  Eyes: EOMI, no erythema, sclera icterus or discharge noted  Resp: Appears to be breathing comfortably without accessory muscle usage, speaking in full sentences, no cough  MSK: Appears to have normal range of motion based on visualized movements  Neurologic: No apparent tremors, facial movements symmetric  Psych: affect normal, alert and oriented    Laboratory Data:  To be done with treatment tomorrow     CT CAP 6/20/25  INDICATION:  Rectal cancer (H)  COMPARISON: 2/27/2025.  TECHNIQUE: CT scan of the chest, abdomen, and pelvis was performed following injection of IV contrast. Multiplanar reformats were obtained. Dose reduction techniques were used. "   CONTRAST: Isovue 370 79cc     FINDINGS:   LUNGS AND PLEURA: Patent airways. Stable 3 mm right lower lobe nodule (series 4, image 221). Calcified granulomas. No new pulmonary nodule, acute airspace opacity, or pleural effusion.     MEDIASTINUM/AXILLAE: Right chest wall port with tip in the distal superior vena cava. No thoracic lymphadenopathy. No thoracic aorta aneurysm. Mastectomies.     CORONARY ARTERY CALCIFICATION: Trace.     HEPATOBILIARY: Normal.     PANCREAS: Normal.     SPLEEN: Normal.     ADRENAL GLANDS: Normal.     KIDNEYS/BLADDER: No significant mass, stone, or hydronephrosis.     BOWEL: No obstruction or inflammatory change. Normal appendix. Distal colonic diverticulosis with new faint pericolonic fat stranding about the descending colon posteriorly (series 3, image 388). The known rectal tumor will be better evaluated at same   day pelvis MRI.     LYMPH NODES: No lymphadenopathy. Interval decrease in size of the 10 x 11 mm right mesorectal lymph node which now measures 3 mm (series 3, image 552).     VASCULATURE: No abdominal aortic aneurysm.     PELVIC ORGANS: Normal.     MUSCULOSKELETAL: No aggressive osseous lesion. Stable T4 hemangioma.                                                                      IMPRESSION:  1.  No evidence of new or worsening disease in the chest, abdomen, or pelvis.  2.  Resolved mesorectal lymphadenopathy.  3.  Colonic diverticulosis with new faint pericolonic fat stranding about the descending colon, suggesting a mild acute and uncomplicated diverticulitis.    MR Rectum 6/20/25  FINDINGS:     LOCATION/SIZE:  On prior exam dated 2/27/2025 there was a tumor identified in the mid  to lower rectum. Previously this tumor measured 2.2 cm, and currently  it measures 1.8 cm. This is best seen on series 6 image 33.     TREATMENT RESPONSE:   Approximately 5-10% of the current mass demonstrates tumour signal  intensity on T2-weighted images with minimal residual  asymmetric  enhancement along the mid to lower right rectal wall, with the  remainder appearing to represent fibrosis.  This corresponds to a  tumour response grade of mrTRG-2.      CIRCUMFERENTIAL RESECTION MARGIN (CRM):  In terms of the resection margin, there is not involvement of the  mesorectal fascia.     LYMPH NODES:  The previously identified suspicious lymph now measures 2 mm (7/17),  previously 8 mm.     There are no new suspicious appearing pelvic lymph nodes.     MISCELLANEOUS FINDINGS:  Colonic diverticulosis. Unremarkable urinary bladder. No abnormal  pelvic fluid. No aggressive osseous lesion.                                                                      IMPRESSION:   1. There has been a positive response to treatment, with a  corresponding tumor regression grade of mrTRG-2.   2. Decreased right mesorectal lymph node measuring 2 mm, previously 8  mm. No suspicious pelvic lymphadenopathy.      Assessment and Plan:  # Rectal Cancer  Stage IIIA- cT2 cN1a cM0 moderately differentiated carcinoma of the mid rectum, MMR IHC intact. S/p 6 cycles of neoadjuvant FOLFOX with positive response to treatment  - Plan for surgery 8/15/25  - Per surgery team and Dr. Herbert OK for one additional FOLFOX cycle this week, then hold until after surgery (will need 5 additional cycles in adjuvant setting)  - Continue with Cycle 7 tomorrow pending labs  - Continue neulasta support with prior chemotherapy induced neutropenia  - Continue icing to help with neuropathy/cold sensitivity   - Lab monitoring 4 weeks from now prior to surgery  - Dr. Herbert visit after surgery to review path and next steps      # Diverticulitis  Has had in the past. No current pain but has had some diarrhea. CT with possible mild acute diverticulitis  - Given risk of worsening infection with chemotherapy will treat with abx  - Will do Augmentin BID x 10 days  - Monitor GI symptoms   - May need to avoid Miralax/senna if diarrhea     # LFT  Elevation  Likely from chemotherapy especially with normal liver on imaging  - Monitoring with treatment     # Sleep Concerns  Hard time falling asleep at night, then fatigued during the day  - Continue Ativan PRN  - Previously prescribed Zyprexa, she may try this in the future     # Breast cancer  She has left-sided hormone receptor positive breast cancer and she completed adjuvant exemestane in early March 2025. Breast cancer index shows that she is unlikely to benefit from extended hormonal therapy.   - Being followed by Dr. Grijalva.     50 minutes spent on the date of the encounter doing chart review, review of test results, interpretation of tests, patient visit, and documentation, discussion with MD    The longitudinal plan of care for the diagnosis(es)/condition(s) as documented were addressed during this visit. Due to the added complexity in care, I will continue to support Nilda in the subsequent management and with ongoing continuity of care.    Kamran Peguero PA-C  Department of Hematology and Oncology  [facility]       Again, thank you for allowing me to participate in the care of your patient.        Sincerely,        NITHYA Kelley    Electronically signed

## 2025-06-24 NOTE — NURSING NOTE
Current patient location: [address] [place] [address]    Is the patient currently in the state of [place]? YES    Visit mode: VIDEO    If the visit is dropped, the patient can be reconnected by:VIDEO VISIT: Send to e-mail at: [email or web address]    Will anyone else be joining the visit? NO  (If patient encounters technical issues they should call [phone]    Are changes needed to the allergy or medication list? No    Are refills needed on medications prescribed by this physician? NO    Rooming Documentation:  Questionnaire(s) not done per department protocol    Reason for visit: OSMEL LUND

## 2025-06-24 NOTE — TELEPHONE ENCOUNTER
"Please see patient's mychart update. Patient saw Dr. Landa on 6/23. Per his note:    \"PLAN  Schedule robotic low anterior resection with flexible sigmoidoscopy, possible loop ileostomy.  Will need PAC, labs, mechanical bowel prep with antibiotics and WOC marking/education.  If we are not able to get her in for surgery at the end of July she will be doing an extra cycle of chemotherapy this week with the hopes to add her to my OR scheduled mid August.\"      Jasmin Vincent RN on 6/24/2025 at 8:53 AM    "

## 2025-06-24 NOTE — LETTER
6/24/2025      Desi Granado  [address]  [place] [address]      Dear Colleague,    Thank you for referring your patient, Desi Granado, to the [facility]. Please see a copy of my visit note below.    Virtual Visit Details    Type of service:  Video Visit     Originating Location (pt. Location): Home    Distant Location (provider location):  On-site  Platform used for Video Visit: [facility]      Oncology/Hematology Visit Note  Jun 24, 2025    Reason for Visit: Follow up of rectal adenocarcinoma     History of Present Illness: Desi Granado is a 54 year old female with history of left breat cancer in 2018-She had bilateral mastectomy and left sentinel lymph node biopsy in 2018 for left solid papillary carcinoma measuring 22 mm, grade 2 with negative margins.  It was pT2 N0 lesion.  Cancer was ER/% positive and HER2/tamara FISH negative.  Oncotype DX score was 0.  She was initially started on tamoxifen in December 2018 and then switched to exemestane and Zoladex in June 2019 and had bilateral salpingo-oophorectomy in February 2023.  After that she remained on exemestane and stopped in early March 2025.  Breast cancer index did not show that she will benefit from extended hormone therapy.     Related to her rectal cancer: she was having intermittent hematochezia for about 1 year and then on 2/21/2025 she had colonoscopy which showed a fungating nonobstructing medium-sized mass from 9 to 12 cm proximal to the anus.  Mass was noncircumferential and measured 3 cm in length.  Biopsy showed 3 sessile polyps were found in the sigmoid colon and transverse colon measuring 2 to 3 mm in size which were resected.  Biopsy from the sigmoid/rectal colon mass showed moderately differentiated invasive adenocarcinoma, MMR IHC intact.  Biopsy from the sessile polyps was tubular adenoma without any high-grade dysplasia or malignancy.     2/27/2025.  CT chest abdomen and pelvis  was done which did not show the rectosigmoid mass but it showed enlarged right mesorectal lymph node consistent with metastatic spread.  No evidence of distant metastasis.     MRI of the rectum showed a T2 N1 disease in the mid rectum.     CEA was 2.2.     She met with Dr. Landa on 3/14/2025 who did a flexible sigmoidoscopy and it showed a 3 cm fungating ulcerated mass located at 7 cm from the anal verge at the right posterolateral aspect of the mid rectum.     Tumor board had recommendation being starting systemic chemotherapy based on PROSPECT trial followed by surgery and avoiding radiation if possible.    Treatment:   4/3/2025 began FOLFOX    She has completed 6 cycles with positive response. Now plan for one more cycle before going to surgery.     Interval History:  Nilda is seen on video. She overall is doing well. Happy with her visit with surgery yesterday. She is fatigued and has a hard time falling asleep at night. Has Ativan but doesn't seem to help as much as it used to. She is tolerating chemo OK. She has intermittent neuropathy, minimal cold sensitivity. Manages nausea with Zofran. Notes hair thinning. Otherwise no significant issues. She has had diverticulitis in the past, no current pain however she has had diarrhea (non-bloody) yesterday and today. Did use enema yesterday for procedure.     Current Outpatient Medications   Medication Sig Dispense Refill     Chemo Kit For RN use only. Do not remove items from bag. Contents: 1  sodium chloride 0.9% flush, 4 medium gloves, 1 chemo gown, 1/4 chemo mat, 1 connector female, 1 chemo bag. [id number] kit 0     cyanocobalamin (VITAMIN B-12) 500 MCG tablet Take 500 mcg by mouth daily.       denosumab (PROLIA) 60 mg/mL injection Inject 60 mg subcutaneously every 6 months. (Patient not taking: Reported on 6/23/2025)       dexAMETHasone (DECADRON) 4 MG tablet Take 2 tablets (8 mg) by mouth daily. Take for 2 days, starting the day after chemo. Take with food. 4  "tablet 2     Emergency Supply Kit, Central, Patient use for emergency only. Contents: 3 sodium chloride 0.9% flushes, 1 dressing kit, 1 microclave ext set 14\", 4 nitrile gloves (med), 6 alcohol prep pads, 1 bacitracin, 1 syringe (10 cc 20 G 1\"). Call [phone] to reorder. [id number] kit 0     [START ON 6/25/2025] Fluorouracil (ADRUCIL) 4,320 mg in sodium chloride 0.9 % 241 mL via HOMEPUMP C-Series Infuse 4,320 mg at 5.2 mL/hr over 46 hours into the vein once for 1 dose. [id number] mL 0     Fluticasone Propionate (FLONASE ALLERGY RELIEF NA) Spray 1 spray in nostril 2 times daily.       lisinopril (ZESTRIL) 30 MG tablet Take 1 tablet (30 mg) by mouth daily. 90 tablet 3     Loratadine (CLARITIN PO) Take by mouth.       LORazepam (ATIVAN) 1 MG tablet May use 1 tab every 8 hours by day and 2 tabs for night as needed for anxiety or sleep. 45 tablet 1     montelukast (SINGULAIR) 10 MG tablet Take 1 tablet (10 mg) by mouth at bedtime. 90 tablet 3     OLANZapine (ZYPREXA) 2.5 MG tablet Take 1 tablet (2.5 mg) by mouth at bedtime. 30 tablet 1     ondansetron (ZOFRAN) 8 MG tablet Take 1 tablet (8 mg) by mouth every 8 hours as needed for nausea (vomiting). 60 tablet 2     pegfilgrastim-jmdb (FULPHILA) 6 MG/0.6ML injection Inject 0.6 mLs (6 mg) subcutaneously every 2 weeks as needed (24 hours after chemotherapy disconnect per treatment plan). Remove from fridge for at least 30  minutes to allow syringe to reach room temperature. 15 mL 0     Port Access Kit For nurse use only.  Do not remove items from bag.  Use for port access.  Do not place syringe on sterile field. [id number] kit 0     prochlorperazine (COMPAZINE) 10 MG tablet Take 1 tablet (10 mg) by mouth every 6 hours as needed for nausea or vomiting. 30 tablet 2     propranolol (INDERAL) 10 MG tablet Take 2 tablets (20 mg) by mouth 2 times daily as needed (anxiety). (Patient not taking: Reported on 6/23/2025) 60 tablet 1     sodium chloride, PF, 0.9% PF flush Inject 10 mLs into " the vein as needed for line flush. Flush IV before and after med administration as directed and/or at least every 24 hours, or prior to deaccessing for no further use and/or at least every 4 weeks when not accessed. [id number] mL 0       Past Medical History  Past Medical History:   Diagnosis Date     Allergic rhinitis, cause unspecified      Hypertension 8 years?     Injury, other and unspecified, knee, leg, ankle, and foot 8th grade    left ankle injury     Invasive ductal carcinoma of breast, female, left (H) 10/26/2018     Unsatisfactory cervical cytology smear 10/06/2021     Past Surgical History:   Procedure Laterality Date     ABDOMEN SURGERY  02/2023    removed ovaries     BIOPSY       BREAST LUMPECTOMY, RT/LT  06/23/2010    Breast Lumpectomy RT for likely adenomatous lumps     COLONOSCOPY N/A 08/16/2018    Procedure: COLONOSCOPY;  colonoscopy;  Surgeon: Vijay Almanza MD;  Location: [facility]     COLONOSCOPY N/A 05/23/2022    Procedure: COLONOSCOPY, FLEXIBLE, WITH LESION REMOVAL USING SNARE and biopsy;  Surgeon: Nabila Hart MD;  Location: [facility]     COLONOSCOPY N/A 2/21/2025    Procedure: COLONOSCOPY, FLEXIBLE, WITH LESION REMOVAL USING SNARE;  Surgeon: Nabila Hart MD;  Location: [facility]     ESOPHAGOSCOPY, GASTROSCOPY, DUODENOSCOPY (EGD), COMBINED N/A 10/01/2015    Procedure: COMBINED ESOPHAGOSCOPY, GASTROSCOPY, DUODENOSCOPY (EGD);  Surgeon: Vijay Almanza MD;  Location: [facility]     INSERT PORT VASCULAR ACCESS Right 3/28/2025    Procedure: Insert port vascular access;  Surgeon: Ciaran Carrington MD;  [place] [facility]     IR CHEST PORT PLACEMENT > 5 YRS OF AGE  3/28/2025     LAPAROSCOPIC SALPINGO-OOPHORECTOMY Bilateral 02/14/2023    Procedure: LAPAROSCOPIC BILATERAL salpingo-oophorectomy;  Surgeon: Elida Fay MD;  Location: [facility]     MASTECTOMY SIMPLE BILATERAL, SENTINEL NODE BILATERAL, COMBINED Bilateral 11/08/2018    Procedure: BILATERAL SIMPLE MASTECTOMIES WITH LEFT SENTINEL LYMPH NODE BIOPSY  ";  Surgeon: Nabila Hart MD;  Location: [facility]     SOFT TISSUE SURGERY      Ankle surgery 20+ years ago     SURGICAL HISTORY OF -   1989    arthroscopy of Left Ankle     SURGICAL HISTORY OF -   1990    arthroscopy of Left Ankle     SURGICAL HISTORY OF -   1993    4 wisdom teeth extracted     Allergies   Allergen Reactions     Aspirin Hives     Dust Mites Unknown     Morphine Nausea     Other reaction(s): GI Intolerance     Zoloft [Sertraline] Anxiety     Social History   Social History     Tobacco Use     Smoking status: Never     Smokeless tobacco: Never   Substance Use Topics     Alcohol use: Not Currently     Drug use: No      Past medical history and social history were reviewed.    Physical Examination:  Ht 1.651 m (5' 5\")   Wt 63.5 kg (140 lb)   LMP 03/14/2019   BMI 23.30 kg/m    Wt Readings from Last 10 Encounters:   06/23/25 64.4 kg (142 lb)   06/11/25 64.6 kg (142 lb 8 oz)   05/28/25 64.6 kg (142 lb 8 oz)   05/27/25 64.4 kg (142 lb)   05/14/25 64.3 kg (141 lb 11.2 oz)   04/30/25 64.6 kg (142 lb 6.4 oz)   04/29/25 64.4 kg (142 lb)   04/16/25 65.8 kg (145 lb)   04/03/25 67.1 kg (147 lb 14.4 oz)   03/28/25 65.8 kg (145 lb)     Video physical exam  General: Patient appears well in no acute distress.   Skin: No visualized rash or lesions on visualized skin  Eyes: EOMI, no erythema, sclera icterus or discharge noted  Resp: Appears to be breathing comfortably without accessory muscle usage, speaking in full sentences, no cough  MSK: Appears to have normal range of motion based on visualized movements  Neurologic: No apparent tremors, facial movements symmetric  Psych: affect normal, alert and oriented    Laboratory Data:  To be done with treatment tomorrow     CT CAP 6/20/25  INDICATION:  Rectal cancer (H)  COMPARISON: 2/27/2025.  TECHNIQUE: CT scan of the chest, abdomen, and pelvis was performed following injection of IV contrast. Multiplanar reformats were obtained. Dose reduction techniques were used. "   CONTRAST: Isovue 370 79cc     FINDINGS:   LUNGS AND PLEURA: Patent airways. Stable 3 mm right lower lobe nodule (series 4, image 221). Calcified granulomas. No new pulmonary nodule, acute airspace opacity, or pleural effusion.     MEDIASTINUM/AXILLAE: Right chest wall port with tip in the distal superior vena cava. No thoracic lymphadenopathy. No thoracic aorta aneurysm. Mastectomies.     CORONARY ARTERY CALCIFICATION: Trace.     HEPATOBILIARY: Normal.     PANCREAS: Normal.     SPLEEN: Normal.     ADRENAL GLANDS: Normal.     KIDNEYS/BLADDER: No significant mass, stone, or hydronephrosis.     BOWEL: No obstruction or inflammatory change. Normal appendix. Distal colonic diverticulosis with new faint pericolonic fat stranding about the descending colon posteriorly (series 3, image 388). The known rectal tumor will be better evaluated at same   day pelvis MRI.     LYMPH NODES: No lymphadenopathy. Interval decrease in size of the 10 x 11 mm right mesorectal lymph node which now measures 3 mm (series 3, image 552).     VASCULATURE: No abdominal aortic aneurysm.     PELVIC ORGANS: Normal.     MUSCULOSKELETAL: No aggressive osseous lesion. Stable T4 hemangioma.                                                                      IMPRESSION:  1.  No evidence of new or worsening disease in the chest, abdomen, or pelvis.  2.  Resolved mesorectal lymphadenopathy.  3.  Colonic diverticulosis with new faint pericolonic fat stranding about the descending colon, suggesting a mild acute and uncomplicated diverticulitis.    MR Rectum 6/20/25  FINDINGS:     LOCATION/SIZE:  On prior exam dated 2/27/2025 there was a tumor identified in the mid  to lower rectum. Previously this tumor measured 2.2 cm, and currently  it measures 1.8 cm. This is best seen on series 6 image 33.     TREATMENT RESPONSE:   Approximately 5-10% of the current mass demonstrates tumour signal  intensity on T2-weighted images with minimal residual  asymmetric  enhancement along the mid to lower right rectal wall, with the  remainder appearing to represent fibrosis.  This corresponds to a  tumour response grade of mrTRG-2.      CIRCUMFERENTIAL RESECTION MARGIN (CRM):  In terms of the resection margin, there is not involvement of the  mesorectal fascia.     LYMPH NODES:  The previously identified suspicious lymph now measures 2 mm (7/17),  previously 8 mm.     There are no new suspicious appearing pelvic lymph nodes.     MISCELLANEOUS FINDINGS:  Colonic diverticulosis. Unremarkable urinary bladder. No abnormal  pelvic fluid. No aggressive osseous lesion.                                                                      IMPRESSION:   1. There has been a positive response to treatment, with a  corresponding tumor regression grade of mrTRG-2.   2. Decreased right mesorectal lymph node measuring 2 mm, previously 8  mm. No suspicious pelvic lymphadenopathy.      Assessment and Plan:  # Rectal Cancer  Stage IIIA- cT2 cN1a cM0 moderately differentiated carcinoma of the mid rectum, MMR IHC intact. S/p 6 cycles of neoadjuvant FOLFOX with positive response to treatment  - Plan for surgery 8/15/25  - Per surgery team and Dr. Herbert OK for one additional FOLFOX cycle this week, then hold until after surgery (will need 5 additional cycles in adjuvant setting)  - Continue with Cycle 7 tomorrow pending labs  - Continue neulasta support with prior chemotherapy induced neutropenia  - Continue icing to help with neuropathy/cold sensitivity   - Lab monitoring 4 weeks from now prior to surgery  - Dr. Herbert visit after surgery to review path and next steps      # Diverticulitis  Has had in the past. No current pain but has had some diarrhea. CT with possible mild acute diverticulitis  - Given risk of worsening infection with chemotherapy will treat with abx  - Will do Augmentin BID x 10 days  - Monitor GI symptoms   - May need to avoid Miralax/senna if diarrhea     # LFT  Elevation  Likely from chemotherapy especially with normal liver on imaging  - Monitoring with treatment     # Sleep Concerns  Hard time falling asleep at night, then fatigued during the day  - Continue Ativan PRN  - Previously prescribed Zyprexa, she may try this in the future     # Breast cancer  She has left-sided hormone receptor positive breast cancer and she completed adjuvant exemestane in early March 2025. Breast cancer index shows that she is unlikely to benefit from extended hormonal therapy.   - Being followed by Dr. Grijalva.     50 minutes spent on the date of the encounter doing chart review, review of test results, interpretation of tests, patient visit, and documentation, discussion with MD    The longitudinal plan of care for the diagnosis(es)/condition(s) as documented were addressed during this visit. Due to the added complexity in care, I will continue to support Nilda in the subsequent management and with ongoing continuity of care.    Kamran Peguero PA-C  Department of Hematology and Oncology  [facility]       Again, thank you for allowing me to participate in the care of your patient.        Sincerely,        NITHYA Kelley    Electronically signed

## 2025-06-25 ENCOUNTER — INFUSION THERAPY VISIT (OUTPATIENT)
Dept: INFUSION THERAPY | Facility: CLINIC | Age: 55
End: 2025-06-25
Attending: INTERNAL MEDICINE
Payer: COMMERCIAL

## 2025-06-25 ENCOUNTER — DOCUMENTATION ONLY (OUTPATIENT)
Dept: HOME HEALTH SERVICES | Facility: HOME HEALTH | Age: 55
End: 2025-06-25
Payer: COMMERCIAL

## 2025-06-25 ENCOUNTER — RESULTS FOLLOW-UP (OUTPATIENT)
Dept: ONCOLOGY | Facility: CLINIC | Age: 55
End: 2025-06-25

## 2025-06-25 VITALS
TEMPERATURE: 98.2 F | BODY MASS INDEX: 23.4 KG/M2 | SYSTOLIC BLOOD PRESSURE: 151 MMHG | RESPIRATION RATE: 16 BRPM | OXYGEN SATURATION: 100 % | WEIGHT: 140.6 LBS | DIASTOLIC BLOOD PRESSURE: 94 MMHG | HEART RATE: 111 BPM

## 2025-06-25 DIAGNOSIS — T45.1X5A CHEMOTHERAPY-INDUCED NEUTROPENIA: Primary | ICD-10-CM

## 2025-06-25 DIAGNOSIS — T45.1X5A CHEMOTHERAPY-INDUCED NEUTROPENIA: ICD-10-CM

## 2025-06-25 DIAGNOSIS — D70.1 CHEMOTHERAPY-INDUCED NEUTROPENIA: ICD-10-CM

## 2025-06-25 DIAGNOSIS — D70.1 CHEMOTHERAPY-INDUCED NEUTROPENIA: Primary | ICD-10-CM

## 2025-06-25 DIAGNOSIS — C20 RECTAL ADENOCARCINOMA (H): ICD-10-CM

## 2025-06-25 DIAGNOSIS — C20 RECTAL ADENOCARCINOMA (H): Primary | ICD-10-CM

## 2025-06-25 DIAGNOSIS — C50.919 MALIGNANT NEOPLASM OF BREAST IN FEMALE, ESTROGEN RECEPTOR POSITIVE, UNSPECIFIED LATERALITY, UNSPECIFIED SITE OF BREAST (H): ICD-10-CM

## 2025-06-25 DIAGNOSIS — C20 RECTAL CANCER (H): ICD-10-CM

## 2025-06-25 DIAGNOSIS — Z17.0 MALIGNANT NEOPLASM OF BREAST IN FEMALE, ESTROGEN RECEPTOR POSITIVE, UNSPECIFIED LATERALITY, UNSPECIFIED SITE OF BREAST (H): ICD-10-CM

## 2025-06-25 LAB
ALBUMIN SERPL BCG-MCNC: 3.8 G/DL (ref 3.5–5.2)
ALP SERPL-CCNC: 180 U/L (ref 40–150)
ALT SERPL W P-5'-P-CCNC: 79 U/L (ref 0–50)
ANION GAP SERPL CALCULATED.3IONS-SCNC: 11 MMOL/L (ref 7–15)
AST SERPL W P-5'-P-CCNC: 49 U/L (ref 0–45)
BASOPHILS # BLD MANUAL: 0 10E3/UL (ref 0–0.2)
BASOPHILS NFR BLD MANUAL: 0 %
BILIRUB SERPL-MCNC: 0.5 MG/DL
BUN SERPL-MCNC: 13.6 MG/DL (ref 6–20)
CALCIUM SERPL-MCNC: 9.3 MG/DL (ref 8.8–10.4)
CHLORIDE SERPL-SCNC: 102 MMOL/L (ref 98–107)
CREAT SERPL-MCNC: 0.72 MG/DL (ref 0.51–0.95)
DACRYOCYTES BLD QL SMEAR: SLIGHT
EGFRCR SERPLBLD CKD-EPI 2021: >90 ML/MIN/1.73M2
EOSINOPHIL # BLD MANUAL: 0 10E3/UL (ref 0–0.7)
EOSINOPHIL NFR BLD MANUAL: 0 %
ERYTHROCYTE [DISTWIDTH] IN BLOOD BY AUTOMATED COUNT: 18.7 % (ref 10–15)
GLUCOSE SERPL-MCNC: 118 MG/DL (ref 70–99)
HCO3 SERPL-SCNC: 25 MMOL/L (ref 22–29)
HCT VFR BLD AUTO: 36 % (ref 35–47)
HGB BLD-MCNC: 12.4 G/DL (ref 11.7–15.7)
LYMPHOCYTES # BLD MANUAL: 1.2 10E3/UL (ref 0.8–5.3)
LYMPHOCYTES NFR BLD MANUAL: 13 %
MCH RBC QN AUTO: 30.5 PG (ref 26.5–33)
MCHC RBC AUTO-ENTMCNC: 34.4 G/DL (ref 31.5–36.5)
MCV RBC AUTO: 89 FL (ref 78–100)
METAMYELOCYTES # BLD MANUAL: 0.1 10E3/UL
METAMYELOCYTES NFR BLD MANUAL: 1 %
MONOCYTES # BLD MANUAL: 0.3 10E3/UL (ref 0–1.3)
MONOCYTES NFR BLD MANUAL: 3 %
NEUTROPHILS # BLD MANUAL: 7.9 10E3/UL (ref 1.6–8.3)
NEUTROPHILS NFR BLD MANUAL: 83 %
PLAT MORPH BLD: ABNORMAL
PLATELET # BLD AUTO: 113 10E3/UL (ref 150–450)
POLYCHROMASIA BLD QL SMEAR: SLIGHT
POTASSIUM SERPL-SCNC: 3.3 MMOL/L (ref 3.4–5.3)
PROT SERPL-MCNC: 6.1 G/DL (ref 6.4–8.3)
RBC # BLD AUTO: 4.07 10E6/UL (ref 3.8–5.2)
RBC MORPH BLD: ABNORMAL
SODIUM SERPL-SCNC: 138 MMOL/L (ref 135–145)
WBC # BLD AUTO: 9.5 10E3/UL (ref 4–11)

## 2025-06-25 PROCEDURE — 82310 ASSAY OF CALCIUM: CPT | Performed by: PHYSICIAN ASSISTANT

## 2025-06-25 PROCEDURE — 96415 CHEMO IV INFUSION ADDL HR: CPT

## 2025-06-25 PROCEDURE — 85014 HEMATOCRIT: CPT | Performed by: PHYSICIAN ASSISTANT

## 2025-06-25 PROCEDURE — 85007 BL SMEAR W/DIFF WBC COUNT: CPT | Performed by: PHYSICIAN ASSISTANT

## 2025-06-25 PROCEDURE — 96367 TX/PROPH/DG ADDL SEQ IV INF: CPT

## 2025-06-25 PROCEDURE — 96411 CHEMO IV PUSH ADDL DRUG: CPT

## 2025-06-25 PROCEDURE — S9330 HIT CONT CHEM DIEM: HCPCS

## 2025-06-25 PROCEDURE — 96413 CHEMO IV INFUSION 1 HR: CPT

## 2025-06-25 PROCEDURE — 36591 DRAW BLOOD OFF VENOUS DEVICE: CPT | Performed by: PHYSICIAN ASSISTANT

## 2025-06-25 PROCEDURE — 258N000003 HC RX IP 258 OP 636: Performed by: PHYSICIAN ASSISTANT

## 2025-06-25 PROCEDURE — 96375 TX/PRO/DX INJ NEW DRUG ADDON: CPT

## 2025-06-25 PROCEDURE — 96368 THER/DIAG CONCURRENT INF: CPT

## 2025-06-25 PROCEDURE — G0498 CHEMO EXTEND IV INFUS W/PUMP: HCPCS

## 2025-06-25 PROCEDURE — 250N000011 HC RX IP 250 OP 636: Performed by: PHYSICIAN ASSISTANT

## 2025-06-25 RX ORDER — HEPARIN SODIUM (PORCINE) LOCK FLUSH IV SOLN 100 UNIT/ML 100 UNIT/ML
5 SOLUTION INTRAVENOUS
Status: DISCONTINUED | OUTPATIENT
Start: 2025-06-25 | End: 2025-06-25 | Stop reason: HOSPADM

## 2025-06-25 RX ORDER — HEPARIN SODIUM (PORCINE) LOCK FLUSH IV SOLN 100 UNIT/ML 100 UNIT/ML
5 SOLUTION INTRAVENOUS
OUTPATIENT
Start: 2025-06-25

## 2025-06-25 RX ORDER — FLUOROURACIL 50 MG/ML
400 INJECTION, SOLUTION INTRAVENOUS ONCE
Status: COMPLETED | OUTPATIENT
Start: 2025-06-25 | End: 2025-06-25

## 2025-06-25 RX ORDER — PALONOSETRON 0.05 MG/ML
0.25 INJECTION, SOLUTION INTRAVENOUS ONCE
Status: COMPLETED | OUTPATIENT
Start: 2025-06-25 | End: 2025-06-25

## 2025-06-25 RX ADMIN — LEUCOVORIN CALCIUM 650 MG: 350 INJECTION, POWDER, LYOPHILIZED, FOR SUSPENSION INTRAMUSCULAR; INTRAVENOUS at 11:17

## 2025-06-25 RX ADMIN — FOSAPREPITANT: 150 INJECTION, POWDER, LYOPHILIZED, FOR SOLUTION INTRAVENOUS at 10:49

## 2025-06-25 RX ADMIN — OXALIPLATIN 150 MG: 5 INJECTION, SOLUTION INTRAVENOUS at 11:23

## 2025-06-25 RX ADMIN — PALONOSETRON HYDROCHLORIDE 0.25 MG: 0.25 INJECTION INTRAVENOUS at 10:35

## 2025-06-25 RX ADMIN — FLUOROURACIL 720 MG: 50 INJECTION, SOLUTION INTRAVENOUS at 13:41

## 2025-06-25 RX ADMIN — FAMOTIDINE 20 MG: 10 INJECTION, SOLUTION INTRAVENOUS at 10:44

## 2025-06-25 RX ADMIN — DEXTROSE 250 ML: 5 SOLUTION INTRAVENOUS at 11:21

## 2025-06-25 ASSESSMENT — PAIN SCALES - GENERAL: PAINLEVEL_OUTOF10: NO PAIN (0)

## 2025-06-25 NOTE — PROGRESS NOTES
Infusion Nursing Note:  Desi Granado presents today for port labs.    Patient seen by provider today: No   present during visit today: Not Applicable.    Note: Patient is here today for port labs prior to treatment.  Port accessed without difficulty.      Intravenous Access:  Implanted Port.    Treatment Conditions:  Not Applicable.    Ana Collado RN

## 2025-06-25 NOTE — PROGRESS NOTES
"Infusion Nursing Note:  Desi Granado presents today for C7D1  Oxaliplatin, Fluorouracil, and fluorouracil home pump .    Patient seen by provider today: No   present during visit today: Not Applicable.    Note: Nilda is here today with her parents for treatment.  Port labs were drawn prior to start of treatment.       Intravenous Access:  Implanted Port.    Treatment Conditions:  Lab Results   Component Value Date    HGB 12.4 06/25/2025    WBC 9.5 06/25/2025    ANEU 7.9 06/25/2025     (L) 06/25/2025        Lab Results   Component Value Date     06/25/2025    POTASSIUM 3.3 (L) 06/25/2025    CR 0.72 06/25/2025    MARISELA 9.3 06/25/2025    BILITOTAL 0.5 06/25/2025    ALBUMIN 3.8 06/25/2025    ALT 79 (H) 06/25/2025    AST 49 (H) 06/25/2025       Results reviewed, labs MET treatment parameters, ok to proceed with treatment.    Prior to discharge: Port is secured in place with tegaderm and flushed with 10cc NS with positive blood return noted.    Continuous home infusion Dosi-Fuser pump connected.    All connectors secured in place and clamps taped open.    Pump started, \"running\" noted on display (CADD): Not Applicable.   Capillary element taped to pt's skin per protocol.  Pump Connection double checked with Dania Perez RN.  Patient instructed to call our clinic or [facility] with any questions or concerns at home.  Patient verbalized understanding.    Patient set up for pump disconnect at home with [facility] on 06/27/2025.          Post Infusion Assessment:  Patient tolerated infusion without incident.       Discharge Plan:   Patient discharged in stable condition accompanied by: self, mother, and father.  Departure Mode: Ambulatory.  Patient does not have any future infusion appts at this time.      Ana Collado RN  "

## 2025-06-25 NOTE — PROGRESS NOTES
[facility] d/c of Fluorouracil continuous infusion over 46 hours via C series pump.   Scheduled in [facility] for home disconnect on 6/27/25 at 11:45am.  Will require Fulphila injection Day 4, 6/28/29.

## 2025-06-26 ENCOUNTER — HOME INFUSION BILLING (OUTPATIENT)
Dept: HOME HEALTH SERVICES | Facility: HOME HEALTH | Age: 55
End: 2025-06-26
Payer: COMMERCIAL

## 2025-06-26 PROCEDURE — S9330 HIT CONT CHEM DIEM: HCPCS

## 2025-06-28 ENCOUNTER — HOME CARE VISIT (OUTPATIENT)
Dept: HOME HEALTH SERVICES | Facility: HOME HEALTH | Age: 55
End: 2025-06-28
Payer: COMMERCIAL

## 2025-06-28 PROCEDURE — 99601 HOME NFS VISIT <2 HRS: CPT

## 2025-06-28 PROCEDURE — S9537 HT HEM HORM INJ DIEM: HCPCS

## 2025-06-28 NOTE — PROGRESS NOTES
Nursing Visit Note:  Nurse visit today for fulphila injection  for Desi ASENZ Loy.     present during visit today: Not Applicable.    Intravenous Access:  No Intravenous access/labs at this visit.    Infusion Nursing Note:    Pre-infusion Checklist:   Have you had any delayed reaction since last infusion?   No     Have you recently had an elevated temperature, fever, chills productive cough, coughing for 3 weeks or longer or hemoptysis, abnormal vital signs, night sweats, chest pain, or have you noticed a decrease in your appetite, or noted unexplained weight loss or fatigue?   No     Do you have any open wounds or new incisions?  No     Do you have any recent or upcoming hospitalizations, surgeries, or dental procedures? Does not include esophagogastroduodenoscopy, colonoscopy, endoscopic retrograde cholangiopancreatography (ERCP), endoscopic ultrasound (EUS), dental procedures or joint aspiration/steroid injections.   No     Do you currently have or recently have had any signs of illness or infection or are you on any antibiotics?   No     Have you had any new, sudden, or worsening abdominal pain?   No     Have you or anyone in your household received a live vaccination in the past 4 weeks?   No     Have you recently been diagnosed with any new nervous system diseases or cancer diagnosis? (i.e., Multiple Sclerosis, Guillain [place]lin, seizures, neurological changes) Are you receiving any form of radiation or chemotherapy?   No     Are you pregnant or breastfeeding, or do you have plans of pregnancy in the future?   No     Have you been having any signs of worsening depression or suicidal ideation?  No     Have you had any other new onset medical symptoms?  No    Entyvio/Ocrevus/Tyasabri only: Have you been having any new or worsening medical problems such as issues with thinking, eyesight, balance or strength that have persisted over several days?   N/A    Benlysta only: Have you been having any signs of  "worsening depression or suicidal ideations?    N/A    IVIG only: Have you had any new blood clots?  N/A    Did the patient answer \"YES\" to any of the questions above?  No     Will the patient receive a medication that has an order for infusion reaction management?  Yes, and all drugs and supplies are available and none have .     If ordered, has the patient taken pre-medications?  N/A    Plan:   Therapy is appropriate, will proceed with treatment.     Post Infusion Assessment:  Patient tolerated injection without incident.     Note: patient alert and oriented for today s visit. Patient states she feels tired but overall well.     Saline administered: 0 (ml)    Supply Check:   Does the patient have all the supplies they need for the next visit?  Yes    Next visit plan: per richelle Yanez RN 2025  "

## 2025-07-09 NOTE — CONFIDENTIAL NOTE
FUTURE VISIT INFORMATION      SURGERY INFORMATION:  Date: 2025   Location:  OR   Surgeon:   Jon Landa MD   Anesthesia Type:  General  Procedure: RESECTION, RECTUM, LOW ANTERIOR, ROBOT-ASSISTED,       RECORDS REQUESTED FROM:       Primary Care Provider: Pearl Grover MD     Pertinent Medical History: Chemotherapy-induced neutropenia, Breast cancer, Essential hypertension, benign, CARDIOVASCULAR SCREENING; LDL GOAL LESS THAN 160, Essential hypertension, benign, Family history of other cardiovascular disease, Invasive ductal carcinoma of breast, female    Most recent EKG+ Tracin2023     Most recent ECHO:

## 2025-07-10 ASSESSMENT — ANXIETY QUESTIONNAIRES
5. BEING SO RESTLESS THAT IT IS HARD TO SIT STILL: NOT AT ALL
IF YOU CHECKED OFF ANY PROBLEMS ON THIS QUESTIONNAIRE, HOW DIFFICULT HAVE THESE PROBLEMS MADE IT FOR YOU TO DO YOUR WORK, TAKE CARE OF THINGS AT HOME, OR GET ALONG WITH OTHER PEOPLE: NOT DIFFICULT AT ALL
2. NOT BEING ABLE TO STOP OR CONTROL WORRYING: SEVERAL DAYS
1. FEELING NERVOUS, ANXIOUS, OR ON EDGE: SEVERAL DAYS
GAD7 TOTAL SCORE: 6
4. TROUBLE RELAXING: NEARLY EVERY DAY
GAD7 TOTAL SCORE: 6
6. BECOMING EASILY ANNOYED OR IRRITABLE: NOT AT ALL
8. IF YOU CHECKED OFF ANY PROBLEMS, HOW DIFFICULT HAVE THESE MADE IT FOR YOU TO DO YOUR WORK, TAKE CARE OF THINGS AT HOME, OR GET ALONG WITH OTHER PEOPLE?: NOT DIFFICULT AT ALL
7. FEELING AFRAID AS IF SOMETHING AWFUL MIGHT HAPPEN: NOT AT ALL
7. FEELING AFRAID AS IF SOMETHING AWFUL MIGHT HAPPEN: NOT AT ALL
3. WORRYING TOO MUCH ABOUT DIFFERENT THINGS: SEVERAL DAYS
GAD7 TOTAL SCORE: 6

## 2025-07-13 ENCOUNTER — MYC MEDICAL ADVICE (OUTPATIENT)
Dept: ONCOLOGY | Facility: CLINIC | Age: 55
End: 2025-07-13
Payer: COMMERCIAL

## 2025-07-15 ENCOUNTER — VIRTUAL VISIT (OUTPATIENT)
Dept: FAMILY MEDICINE | Facility: CLINIC | Age: 55
End: 2025-07-15
Payer: COMMERCIAL

## 2025-07-15 DIAGNOSIS — F19.982 DRUG-INDUCED INSOMNIA (H): Primary | ICD-10-CM

## 2025-07-15 PROCEDURE — 98006 SYNCH AUDIO-VIDEO EST MOD 30: CPT | Performed by: FAMILY MEDICINE

## 2025-07-15 RX ORDER — MIRTAZAPINE 15 MG/1
7.5-15 TABLET, FILM COATED ORAL AT BEDTIME
Qty: 30 TABLET | Refills: 3 | Status: SHIPPED | OUTPATIENT
Start: 2025-07-15

## 2025-07-15 NOTE — PROGRESS NOTES
Nilda is a 54 year old who is being evaluated via a billable video visit.    How would you like to obtain your AVS? MyChart  If the video visit is dropped, the invitation should be resent by: Send to e-mail at: [email or web address]  Will anyone else be joining your video visit? No      Assessment & Plan     Drug-induced insomnia (H)  Will try allerhgy tto asa is not a contraindication  - mirtazapine (REMERON) 15 MG tablet; Take 0.5-1 tablets (7.5-15 mg) by mouth at bedtime.    Follow-up       Subjective   Nilda is a 54 year old, presenting for the following health issues:  Recheck Medication      History of Present Illness       Mental Health Follow-up:  Patient presents to follow-up on Anxiety.    Patient's anxiety since last visit has been:  Medium  The patient is not having other symptoms associated with anxiety.  Any significant life events: health concerns  Patient is not feeling anxious or having panic attacks.  Patient has no concerns about alcohol or drug use.    She eats 0-1 servings of fruits and vegetables daily.She consumes 2 sweetened beverage(s) daily.She exercises with enough effort to increase her heart rate 9 or less minutes per day.  She exercises with enough effort to increase her heart rate 3 or less days per week.   She is taking medications regularly.        Anxiety       Social History     Tobacco Use    Smoking status: Never    Smokeless tobacco: Never   Substance Use Topics    Alcohol use: Not Currently    Drug use: No         7/9/2022    10:27 AM 2/25/2025     3:31 PM 7/10/2025     8:31 AM   CA-7 SCORE   Total Score 11 (moderate anxiety) 9 (mild anxiety) 6 (mild anxiety)   Total Score 11 9  6        Patient-reported         3/21/2019    11:38 AM 2/26/2025     7:53 AM   PHQ   PHQ-9 Total Score 1  3   Q9: Thoughts of better off dead/self-harm past 2 weeks Not at all  Not at all       Data saved with a previous flowsheet row definition       She has been having insomnia and she was given ativan her  brain is not shutting off             Review of Systems  Constitutional, HEENT, cardiovascular, pulmonary, gi and gu systems are negative, except as otherwise noted.      Objective           Vitals:  No vitals were obtained today due to virtual visit.    Physical Exam   GENERAL: alert and no distress  EYES: Eyes grossly normal to inspection.  No discharge or erythema, or obvious scleral/conjunctival abnormalities.  RESP: No audible wheeze, cough, or visible cyanosis.    SKIN: Visible skin clear. No significant rash, abnormal pigmentation or lesions.  NEURO: Cranial nerves grossly intact.  Mentation and speech appropriate for age.  PSYCH: Appropriate affect, tone, and pace of words          Video-Visit Details    Type of service:  Video Visit   Originating Location (pt. Location): Home    Distant Location (provider location):  On-site  Platform used for Video Visit: [facility]  Signed Electronically by: Pearl Grover MD

## 2025-07-25 PROCEDURE — 84134 ASSAY OF PREALBUMIN: CPT | Performed by: COLON & RECTAL SURGERY

## 2025-07-26 LAB — PREALB SERPL-MCNC: 21.8 MG/DL (ref 20–40)

## 2025-07-30 ENCOUNTER — TEAM CONFERENCE (OUTPATIENT)
Dept: SURGERY | Facility: CLINIC | Age: 55
End: 2025-07-30
Payer: COMMERCIAL

## 2025-07-30 DIAGNOSIS — C20 RECTAL CANCER (H): Primary | ICD-10-CM

## 2025-07-30 LAB
ABO + RH BLD: NORMAL
BLD GP AB SCN SERPL QL: NEGATIVE
SPECIMEN EXP DATE BLD: NORMAL

## 2025-07-30 NOTE — CONFIDENTIAL NOTE
COLON AND RECTAL SURGERY HUDDLE:    Patient was reviewed in preparation for their surgery the following was reviewed and has been completed:    Surgeon: Dr. Jon Landa    Surgery & Date: 8/15   RESECTION, RECTUM, LOW ANTERIOR, ROBOT-ASSISTED, N/A General   POSSIBLE DIVERTING LOOP ILEOSTOMY N/A General   FLEXIBLE SIGMOIDOSCOPY         Last MD Note: reviewed  PLAN  Schedule robotic low anterior resection with flexible sigmoidoscopy, possible loop ileostomy.  Will need PAC, labs, mechanical bowel prep with antibiotics and WOC marking/education.  If we are not able to get her in for surgery at the end of July she will be doing an extra cycle of chemotherapy this week with the hopes to add her to my OR scheduled mid August.      Anesthesia Type: General    Other Providers: No    PAC: Yes    WOC: Yes    Labs: Yes    Bowel Prep: Yes MiraLAX / Gatorade , Antibiotic, and Magnesium Citrate    Packet: Yes    Imaging: No    Post-Op Appointments: Yes    Pre op soap: Yes Questions about shower: no    Is patient on TPN?: No   If yes, I contacted the TPN pharmacist by paging the  pharmacy at [phone] or calling [phone]. I also contacted Jaye BARRIGA with inpatient colon and rectal team.     Pre op call with education complete: Yes Reminded patient to bring handout to hospital N/A

## 2025-07-31 ENCOUNTER — LAB (OUTPATIENT)
Dept: LAB | Facility: CLINIC | Age: 55
End: 2025-07-31
Attending: COLON & RECTAL SURGERY
Payer: COMMERCIAL

## 2025-07-31 ENCOUNTER — PRE VISIT (OUTPATIENT)
Dept: SURGERY | Facility: CLINIC | Age: 55
End: 2025-07-31

## 2025-07-31 ENCOUNTER — OFFICE VISIT (OUTPATIENT)
Dept: SURGERY | Facility: CLINIC | Age: 55
End: 2025-07-31
Payer: COMMERCIAL

## 2025-07-31 ENCOUNTER — OFFICE VISIT (OUTPATIENT)
Dept: WOUND CARE | Facility: CLINIC | Age: 55
End: 2025-07-31
Payer: COMMERCIAL

## 2025-07-31 VITALS
TEMPERATURE: 98.3 F | RESPIRATION RATE: 16 BRPM | SYSTOLIC BLOOD PRESSURE: 123 MMHG | OXYGEN SATURATION: 98 % | HEART RATE: 97 BPM | BODY MASS INDEX: 25.16 KG/M2 | DIASTOLIC BLOOD PRESSURE: 77 MMHG | HEIGHT: 65 IN | WEIGHT: 151 LBS

## 2025-07-31 DIAGNOSIS — Z01.818 PRE-OP EVALUATION: ICD-10-CM

## 2025-07-31 DIAGNOSIS — Z01.818 PRE-OP EVALUATION: Primary | ICD-10-CM

## 2025-07-31 DIAGNOSIS — C20 RECTAL ADENOCARCINOMA (H): ICD-10-CM

## 2025-07-31 DIAGNOSIS — C20 RECTAL CANCER (H): ICD-10-CM

## 2025-07-31 DIAGNOSIS — C50.912 INVASIVE DUCTAL CARCINOMA OF BREAST, FEMALE, LEFT (H): ICD-10-CM

## 2025-07-31 LAB
ALBUMIN SERPL BCG-MCNC: 4.1 G/DL (ref 3.5–5.2)
ALP SERPL-CCNC: 169 U/L (ref 40–150)
ALT SERPL W P-5'-P-CCNC: 38 U/L (ref 0–50)
ANION GAP SERPL CALCULATED.3IONS-SCNC: 10 MMOL/L (ref 7–15)
AST SERPL W P-5'-P-CCNC: 38 U/L (ref 0–45)
BASOPHILS # BLD AUTO: 0 10E3/UL (ref 0–0.2)
BASOPHILS NFR BLD AUTO: 0 %
BILIRUB SERPL-MCNC: 0.3 MG/DL
BUN SERPL-MCNC: 13.1 MG/DL (ref 6–20)
CALCIUM SERPL-MCNC: 9.9 MG/DL (ref 8.8–10.4)
CEA SERPL-MCNC: 3.1 NG/ML
CHLORIDE SERPL-SCNC: 106 MMOL/L (ref 98–107)
CREAT SERPL-MCNC: 0.8 MG/DL (ref 0.51–0.95)
EGFRCR SERPLBLD CKD-EPI 2021: 87 ML/MIN/1.73M2
EOSINOPHIL # BLD AUTO: 0.1 10E3/UL (ref 0–0.7)
EOSINOPHIL NFR BLD AUTO: 2 %
ERYTHROCYTE [DISTWIDTH] IN BLOOD BY AUTOMATED COUNT: 14.7 % (ref 10–15)
GLUCOSE SERPL-MCNC: 95 MG/DL (ref 70–99)
HCO3 SERPL-SCNC: 25 MMOL/L (ref 22–29)
HCT VFR BLD AUTO: 38.4 % (ref 35–47)
HGB BLD-MCNC: 12.3 G/DL (ref 11.7–15.7)
IMM GRANULOCYTES # BLD: 0 10E3/UL
IMM GRANULOCYTES NFR BLD: 1 %
LYMPHOCYTES # BLD AUTO: 0.9 10E3/UL (ref 0.8–5.3)
LYMPHOCYTES NFR BLD AUTO: 20 %
MCH RBC QN AUTO: 31.5 PG (ref 26.5–33)
MCHC RBC AUTO-ENTMCNC: 32 G/DL (ref 31.5–36.5)
MCV RBC AUTO: 98 FL (ref 78–100)
MONOCYTES # BLD AUTO: 0.4 10E3/UL (ref 0–1.3)
MONOCYTES NFR BLD AUTO: 8 %
NEUTROPHILS # BLD AUTO: 3.1 10E3/UL (ref 1.6–8.3)
NEUTROPHILS NFR BLD AUTO: 69 %
NRBC # BLD AUTO: 0 10E3/UL
NRBC BLD AUTO-RTO: 0 /100
PLATELET # BLD AUTO: 173 10E3/UL (ref 150–450)
POTASSIUM SERPL-SCNC: 3.7 MMOL/L (ref 3.4–5.3)
PREALB SERPL-MCNC: 20.2 MG/DL (ref 20–40)
PROT SERPL-MCNC: 6.4 G/DL (ref 6.4–8.3)
RBC # BLD AUTO: 3.91 10E6/UL (ref 3.8–5.2)
SODIUM SERPL-SCNC: 141 MMOL/L (ref 135–145)
WBC # BLD AUTO: 4.5 10E3/UL (ref 4–11)

## 2025-07-31 PROCEDURE — 36415 COLL VENOUS BLD VENIPUNCTURE: CPT

## 2025-07-31 PROCEDURE — 84134 ASSAY OF PREALBUMIN: CPT

## 2025-07-31 PROCEDURE — 85025 COMPLETE CBC W/AUTO DIFF WBC: CPT

## 2025-07-31 PROCEDURE — 82040 ASSAY OF SERUM ALBUMIN: CPT

## 2025-07-31 PROCEDURE — 86900 BLOOD TYPING SEROLOGIC ABO: CPT

## 2025-07-31 PROCEDURE — 82378 CARCINOEMBRYONIC ANTIGEN: CPT

## 2025-07-31 ASSESSMENT — PAIN SCALES - GENERAL: PAINLEVEL_OUTOF10: NO PAIN (0)

## 2025-07-31 ASSESSMENT — LIFESTYLE VARIABLES: TOBACCO_USE: 0

## 2025-07-31 NOTE — PATIENT INSTRUCTIONS
Name:  Desi Granado   MRN:  [id number]   :  1970   Today's Date:  2025         You were seen today for a pre-operative assessment in the:    Pre-operative Anesthesia Assessment Center(PAC)  [facility]  [address] [place] [id number]  phone [phone]      You will be receiving a call with location, date, arrival time and diet instructions from Preadmission Nursing at your surgical site:    -[facility]: [phone]   -[facility]: [phone]  -[facility]: [phone]    -[facility]: [phone]    -[facility]: [phone]  -[facility]: [phone]  -[facility]: [phone]  -[facility]: [phone]  -[facility]: [phone]        Anesthesia recommendations for medications:    Hold Aspirin for 7 days before procedure.  Hold Multivitamins for 7 days before procedure.   Hold Herbal medications and Supplements for 7 days before procedure.  Hold Ibuprofen for 1 day before procedure.   Hold Naproxen for 4 days before procedure.     No alcohol or cannabis products for 24 hours before your procedure         Please DO NOT take the following medications the day of procedure:  Lisinopril   Loratadine         Please take these medications the day of procedure:  Propranolol if needed          How do I prepare myself?  - Please take 2 showers (one the night prior to surgery and one the morning of surgery) using Scrubcare or Hibiclens soap.    Use this soap only from the neck to your toes.     Leave the soap on your skin for one minute--then rinse thoroughly.      You may use your own shampoo and conditioner. No other hair products.   - Please remove all jewelry and body piercings.  - No lotions, deodorants or fragrance.  - No makeup or fingernail polish.   - Bring your ID and insurance card.    -If you have a Deep Brain Stimulator, a Spinal Cord Stimulator, or any implanted Neuro  Device, you must bring the remote to your appointment                 For further questions regarding your surgery please call your surgeon's office.

## 2025-07-31 NOTE — PROGRESS NOTES
WOC Preoperative Ostomy Consult    Referral from Dr. Landa  Consult attended by patient and mother and father  Diagnosis:    Rectal cancer (H)  Rectal adenocarcinoma (H) Date of Surgery: 08/ 15/25  Type of Surgery:   RESECTION, RECTUM, LOW ANTERIOR, ROBOT-ASSISTED, N/A General   POSSIBLE DIVERTING LOOP ILEOSTOMY       Emotional readiness for surgery: no acute distress  Physical Limitations: Without limitations  Abdomen assessed for site by: Patient's ability to visiualize area, avoidance of skin creases and scars, palpating for rectus abdominus muscle, and clothing fit  Teaching: Anatomy/picture of stoma, stoma/bowel function postop, intro to pouches, diet, precautions with dehydration/blockage, returning to work/lifting, written/media offered, and role of WOC/postop followup explained.  Avery teaching kit used: None available  Stoma site marking:  Marking pen and tegaderm   Location chosen: right and left lower quadrant    Karin Garcia NP was available for supervision of care if needed or if questions should arise and regarding plan of care.  Laya Solis RN CWON

## 2025-07-31 NOTE — H&P
Pre-Operative H & P     CC:  Preoperative exam to assess for increased cardiopulmonary risk while undergoing surgery and anesthesia.    Date of Encounter: 7/31/2025  Primary Care Physician:  Pearl Grover     Reason for visit:   Encounter Diagnosis   Name Primary?    Pre-op evaluation Yes       HPI  Desi Granado is a 54 year old female who presents for pre-operative H & P in preparation for  Procedure Information       Case: [id number] Date/Time: 08/15/25 0730    Procedures:       RESECTION, RECTUM, LOW ANTERIOR, ROBOT-ASSISTED, (Pelvis)      POSSIBLE DIVERTING LOOP ILEOSTOMY (Abdomen)      FLEXIBLE SIGMOIDOSCOPY (Rectum)    Anesthesia type: General    Diagnosis: Rectal cancer (H) [C20]    Pre-op diagnosis: Rectal cancer (H) [C20]    Location:  OR [address] OR    Providers: Jon Landa MD            Patient is being evaluated for comorbid conditions of HTN, allergic rhinitis, history of breast cancer, and thrombocytopenia.    She has a history of rectal cancer s/p 6 cycles of chemotherapy. She was evaluated by Dr. Landa on 6/23/25 to discuss surgical management options. She is now scheduled as above.     History is obtained from the patient and chart review    Hx of abnormal bleeding or anti-platelet use: Denies    Menstrual history: Patient's last menstrual period was 03/14/2019.     Past Medical History  Past Medical History:   Diagnosis Date    Allergic rhinitis, cause unspecified     Hypertension 8 years?    Injury, other and unspecified, knee, leg, ankle, and foot 8th grade    left ankle injury    Invasive ductal carcinoma of breast, female, left (H) 10/26/2018    Unsatisfactory cervical cytology smear 10/06/2021       Past Surgical History  Past Surgical History:   Procedure Laterality Date    BIOPSY      BREAST LUMPECTOMY, RT/LT  06/23/2010    Breast Lumpectomy RT for likely adenomatous lumps    COLONOSCOPY N/A 08/16/2018    Procedure: COLONOSCOPY;  colonoscopy;  Surgeon: Vijay Almanza  MD LENORA;  Location: [facility]    COLONOSCOPY N/A 05/23/2022    Procedure: COLONOSCOPY, FLEXIBLE, WITH LESION REMOVAL USING SNARE and biopsy;  Surgeon: Nabila Hart MD;  Location: [facility]    COLONOSCOPY N/A 02/21/2025    Procedure: COLONOSCOPY, FLEXIBLE, WITH LESION REMOVAL USING SNARE;  Surgeon: Nabila Hart MD;  Location: [facility]    ESOPHAGOSCOPY, GASTROSCOPY, DUODENOSCOPY (EGD), COMBINED N/A 10/01/2015    Procedure: COMBINED ESOPHAGOSCOPY, GASTROSCOPY, DUODENOSCOPY (EGD);  Surgeon: Vijay Almanza MD;  Location: [facility]    INSERT PORT VASCULAR ACCESS Right 03/28/2025    Procedure: Insert port vascular access;  Surgeon: Ciaran Carrington MD;  Location: [facility] OR    IR CHEST PORT PLACEMENT > 5 YRS OF AGE  03/28/2025    LAPAROSCOPIC SALPINGO-OOPHORECTOMY Bilateral 02/14/2023    Procedure: LAPAROSCOPIC BILATERAL salpingo-oophorectomy;  Surgeon: Elida Fay MD;  Location: [facility]    MASTECTOMY SIMPLE BILATERAL, SENTINEL NODE BILATERAL, COMBINED Bilateral 11/08/2018    Procedure: BILATERAL SIMPLE MASTECTOMIES WITH LEFT SENTINEL LYMPH NODE BIOPSY ;  Surgeon: Nabila Hart MD;  Location:  OR    SOFT TISSUE SURGERY      Ankle surgery 20+ years ago    SURGICAL HISTORY OF -   1989    arthroscopy of Left Ankle    SURGICAL HISTORY OF -   1990    arthroscopy of Left Ankle    SURGICAL HISTORY OF -   1993    4 wisdom teeth extracted       Prior to Admission Medications  Current Outpatient Medications   Medication Sig Dispense Refill    lisinopril (ZESTRIL) 30 MG tablet Take 1 tablet (30 mg) by mouth daily. (Patient taking differently: Take 30 mg by mouth every morning.) 90 tablet 3    Loratadine (CLARITIN PO) Take 1 tablet by mouth every morning.      mirtazapine (REMERON) 15 MG tablet Take 0.5-1 tablets (7.5-15 mg) by mouth at bedtime. (Patient taking differently: Take 15 mg by mouth at bedtime.) 30 tablet 3    montelukast (SINGULAIR) 10 MG tablet Take 1 tablet (10 mg) by mouth at bedtime. 90 tablet 3    ondansetron  "(ZOFRAN) 4 MG tablet Take 1 tablet (4 mg) by mouth every 6 hours. At 8:00 am, 2:00 pm, 8:00 pm the day prior to your surgery with neomycin and flagyl. 3 tablet 0    Chemo Kit For RN use only. Do not remove items from bag. Contents: 1  sodium chloride 0.9% flush, 4 medium gloves, 1 chemo gown, 1/4 chemo mat, 1 connector female, 1 chemo bag. [id number] kit 0    cyanocobalamin (VITAMIN B-12) 500 MCG tablet Take 500 mcg by mouth daily. (Patient not taking: Reported on 7/31/2025)      denosumab (PROLIA) 60 mg/mL injection Inject 60 mg subcutaneously every 6 months. (Patient not taking: Reported on 5/12/2025)      dexAMETHasone (DECADRON) 4 MG tablet Take 2 tablets (8 mg) by mouth daily. Take for 2 days, starting the day after chemo. Take with food. (Patient not taking: Reported on 7/31/2025) 4 tablet 2    Emergency Supply Kit, Central, Patient use for emergency only. Contents: 3 sodium chloride 0.9% flushes, 1 dressing kit, 1 microclave ext set 14\", 4 nitrile gloves (med), 6 alcohol prep pads, 1 bacitracin, 1 syringe (10 cc 20 G 1\"). Call [phone] to reorder. [id number] kit 0    Fluticasone Propionate (FLONASE ALLERGY RELIEF NA) Spray 1 spray in nostril 2 times daily. (Patient not taking: Reported on 7/31/2025)      LORazepam (ATIVAN) 1 MG tablet May use 1 tab every 8 hours by day and 2 tabs for night as needed for anxiety or sleep. (Patient not taking: Reported on 7/31/2025) 45 tablet 1    metroNIDAZOLE (FLAGYL) 500 MG tablet Take 1 tablet (500 mg) by mouth every 6 hours. At 8:00 am, 2:00 pm, 8:00 pm the day prior to your surgery with neomycin and zofran. 3 tablet 0    neomycin (MYCIFRADIN) 500 MG tablet Take 2 tablets (1,000 mg) by mouth every 6 hours. At 8:00 am, 2:00 pm, 8:00 pm the day prior to your surgery with flagyl and zofran. 6 tablet 0    ondansetron (ZOFRAN) 8 MG tablet Take 1 tablet (8 mg) by mouth every 8 hours as needed for nausea (vomiting). (Patient not taking: Reported on 7/31/2025) 60 tablet 2    " pegfilgrastim-jmdb (FULPHILA) 6 MG/0.6ML injection Inject 0.6 mLs (6 mg) subcutaneously every 2 weeks as needed (24 hours after chemotherapy disconnect per treatment plan). Remove from fridge for at least 30  minutes to allow syringe to reach room temperature. (Patient not taking: Reported on 7/31/2025) 15 mL 0    polyethylene glycol (MIRALAX) 17 GM/Dose powder Please take 238 grams mixed with 64 oz of Gatorade at 4pm the night before surgery 238 g 0    Port Access Kit For nurse use only.  Do not remove items from bag.  Use for port access.  Do not place syringe on sterile field. [id number] kit 0    prochlorperazine (COMPAZINE) 10 MG tablet Take 1 tablet (10 mg) by mouth every 6 hours as needed for nausea or vomiting. (Patient not taking: Reported on 7/31/2025) 30 tablet 2    propranolol (INDERAL) 10 MG tablet Take 2 tablets (20 mg) by mouth 2 times daily as needed (anxiety). (Patient not taking: Reported on 7/31/2025) 60 tablet 1    sodium chloride, PF, 0.9% PF flush Inject 10 mLs into the vein as needed for line flush. Flush IV before and after med administration as directed and/or at least every 24 hours, or prior to deaccessing for no further use and/or at least every 4 weeks when not accessed. 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 No   Social History Narrative    Not on file     Social Drivers of Health     Financial Resource Strain: Low Risk  (8/30/2024)    Financial Resource Strain     Within the past 12 months, have you or your family members you live with been unable to get utilities (heat, electricity) when it was really needed?: No   Food Insecurity: No Food Insecurity (3/6/2025)    Received from [facility]    Hunger Vital Sign     Worried About Running Out of Food in the Last Year: Never true     Ran Out of Food in the Last Year: Never true   Transportation Needs: No Transportation Needs (3/6/2025)    Received from [facility]    PRAPARE - Transportation     Lack of Transportation (Medical): No     Lack of Transportation (Non-Medical): No   Physical Activity: Inactive (2/27/2025)    Received from [facility]    Exercise Vital Sign     Days of Exercise per Week: 0 days     Minutes of Exercise per Session: 0 min   Stress: No Stress Concern Present (8/30/2024)    [place] [employer] - Occupational Stress Questionnaire     Feeling of Stress : Only a little   Social Connections: Unknown (8/30/2024)    Social Connection and Isolation Panel [NHANES]     Frequency of Communication with Friends and Family: Not on file     Frequency of Social Gatherings with Friends and Family: Twice a week     Attends Yazdanism Services: Not on file     Active Member of Clubs or Organizations: Not on file     Attends Club or Organization Meetings: Not on file     Marital Status: Not on file   Interpersonal Safety: Low Risk  (3/28/2025)    Interpersonal Safety     Do you feel physically and emotionally safe where you currently live?: Yes     Within the past 12 months, have you been hit, slapped, kicked or otherwise physically hurt by someone?: No     Within the past 12 months, have you been humiliated or emotionally abused in other  ways by your partner or ex-partner?: No   Housing Stability: Low Risk  (3/6/2025)    Received from [facility]    Housing Stability     What is your living situation today?: I have a steady place to live       Family History  Family History   Problem Relation Age of Onset    Hypertension Mother     Allergies Mother     Thyroid Disease Mother         Taking thyroid medication    Obesity Mother     Cerebrovascular Disease Mother         TIA Feb 2017 and March 2021    Diabetes Mother     Breast Cancer Mother     Cancer - colorectal Mother     Asthma Mother     Hypertension Father     Thyroid Disease Father         two parathyroids removed    Hyperlipidemia Father     Other Cancer Father         Lymphoma diagnosed 2022    Prostate Problems Father     Breast Cancer Sister         benign lump indicated future risk    Breast Cancer Sister         benign lump indicated future risk    Cancer Maternal Grandmother         Bone CA    Allergies Maternal Grandmother     Circulatory Maternal Grandmother     Cerebrovascular Disease Maternal Grandmother     Other Cancer Maternal Grandmother         multiple myeloma    C.A.D. Maternal Grandfather     Respiratory Maternal Grandfather         asthma    Allergies Maternal Grandfather     Cerebrovascular Disease Maternal Grandfather     Arthritis Paternal Grandmother     Alzheimer Disease Paternal Grandfather     Neurologic Disorder Paternal Grandfather         Parkinsons    Respiratory Other         Emphysema    Diabetes Other     Asthma Other         genetic emphysema    Genetic Disorder Other         genetic emphysema    Diabetes Other     Coronary Artery Disease Other     Other Cancer Other         multiple myeloma    Colon Cancer Other     Other Cancer Other         Acute Leukemia    Cerebrovascular Disease Other     Breast Cancer Other         told aunt was diagnosed - unsure of type & treatment    Other Cancer Other         Leukemia    Colon Cancer Other     Other Cancer Other          Acute Leukemia    Diabetes Other     Coronary Artery Disease Other     Other Cancer Other         multiple myeloma    Cerebrovascular Disease Other     Breast Cancer Other         told aunt was diagnosed - unsure of type & treatment    Anesthesia Reaction No family hx of     Deep Vein Thrombosis (DVT) No family hx of        Review of Systems  The complete review of systems is negative other than noted in the HPI or here.   Anesthesia Evaluation   Pt has had prior anesthetic.     No history of anesthetic complications       ROS/MED HX  ENT/Pulmonary:     (+)     ROBERT risk factors,  hypertension,    allergic rhinitis,                          (-) tobacco use, asthma and recent URI   Neurologic:  - neg neurologic ROS     Cardiovascular:     (+)  hypertension- -   -  - -                                 Previous cardiac testing   Echo: Date: 2015 Results:  SUMMARY     Normal estimated left ventricular ejection fraction; 60%.   No regional wall motion abnormality.   Normal right ventricular size and function.   No TR present, so cannot assess PA systolic pressure.   Based on IVC geometry, the RA pressure is low normal.     Stress Test:  Date: Results:    ECG Reviewed:  Date: 3/4/25 Results:  NSR  Cath:  Date: Results:      METS/Exercise Tolerance:  Comment: METS > 4    Has been active with recently moving, walking when she can, able to go up and down stairs    Prior to cancer diagnosis was exercising intentionally   Hematologic: Comments: Thrombocytopenia   (-) history of blood clots and history of blood transfusion   Musculoskeletal:  - neg musculoskeletal ROS     GI/Hepatic:     (+)        bowel prep,         (-) GERD and liver disease   Renal/Genitourinary:  - neg Renal ROS     Endo:  - neg endo ROS     Psychiatric/Substance Use:  - neg psychiatric ROS     Infectious Disease:  - neg infectious disease ROS     Malignancy:   (+) Malignancy, History of Breast and GI.Breast CA Remission status post Surgery.  GI CA   "Active status post.      Other:            /77 (BP Location: Right arm, Patient Position: Sitting, Cuff Size: Adult Regular)   Pulse 97   Temp 98.3  F (36.8  C) (Oral)   Resp 16   Ht 1.651 m (5' 5\")   Wt 68.5 kg (151 lb)   LMP 03/14/2019   SpO2 98%   Breastfeeding No   BMI 25.13 kg/m      Physical Exam   Constitutional: Pleasant, no apparent distress, and appears stated age.  Eyes: Pupils equal, round and reactive to light, extra ocular muscles intact, sclera clear, conjunctiva normal.  HENT: Normocephalic and atraumatic, oral pharynx with moist mucus membranes, good dentition. No goiter appreciated.   Respiratory: Clear to auscultation bilaterally, no crackles or wheezing.  Cardiovascular: Regular rate and rhythm, normal S1 and S2, and no murmur noted.  Carotids +2, no bruits. No edema. Palpable pulses to radial  DP and PT arteries.   GI: Non-distended abdomen  Lymph/Hematologic: No cervical lymphadenopathy and no supraclavicular lymphadenopathy.  Genitourinary:  Deferred  Skin: Warm and dry.  No rashes on exposed skin   Musculoskeletal: Full ROM of neck. There is no redness, warmth, or swelling of the visible joints. Gross motor strength is normal.    Neurologic: Awake, alert, oriented to name, place and time. Cranial nerves II-XII are grossly intact. Gait is normal.   Neuropsychiatric: Calm, cooperative. Normal affect.       Prior Labs/Diagnostic Studies   All labs and imaging pertinent to the visit personally reviewed     EKG/ stress test - if available please see in ROS above   No results found.       No data to display                  The patient's records and results pertinent to the visit personally reviewed by this provider.     Outside records reviewed from: Care Everywhere    LAB/DIAGNOSTIC STUDIES TODAY:  T&S    Assessment  Desi Granado is a 54 year old female seen as a PAC referral for risk assessment and optimization for anesthesia.    Plan/Recommendations  Pt will be optimized for " "the proposed procedure.  See below for details on the assessment, risk, and preoperative recommendations    NEUROLOGY  - No history of TIA, CVA or seizure    -Post Op delirium risk factors:  No risk identified    HEENT  - No current airway concerns.  Will need to be reassessed day of surgery.  Mallampati: I  TM: > 3    - Allergic rhinitis  Continue loratadine, flonase, and montelukast    CARDIAC  - No history of CAD and Afib  - Hypertension  Well controlled  Hold lisinopril day of surgery.  - Patient denies other cardiac history or cardiac symptoms today. Most recent cardiac testing as above.     - METS (Metabolic Equivalents)  Patient performs 4 or more METS exercise without symptoms             Total Score: 0      RCRI-Low risk: Class 2 0.9% complication rate             Total Score: 1    RCRI: High Risk Surgery        PULMONARY    ROBERT Low Risk             Total Score: 2    ROBERT: Hypertension    ROBERT: Over 50 ys old        - Tobacco History    History   Smoking Status    Never   Smokeless Tobacco    Never       GI  - Rectal cancer  S/p chemotherapy  Above surgery planned for further management.    PONV High Risk  Total Score: 3           1 AN PONV: Pt is Female    1 AN PONV: Patient is not a current smoker    1 AN PONV: Intended Post Op Opioids        /RENAL  - Baseline Creatinine  0.72    ENDOCRINE    - BMI: Estimated body mass index is 25.13 kg/m  as calculated from the following:    Height as of this encounter: 1.651 m (5' 5\").    Weight as of this encounter: 68.5 kg (151 lb).  Overweight (BMI 25.0-29.9)  - No history of Diabetes Mellitus    HEME  VTE Medium Risk 1.8%             Total Score: 5    VTE: Current cancer      - No history of abnormal bleeding or antiplatelet use.  - Thrombocytopenia  Likely chemotherapy induced  Baseline Plts: 113-147  Recommend perioperative use of blood conservation techniques intraoperatively and close monitoring for postoperative bleeding.  - A type and screen has been ordered " for this patient    MSK  Patient is NOT Frail             Total Score: 0          PSYCH  - Continue mental health medications without interruption    ONCOLOGY/IMMUNOLOGY  - History of breast cancer, in remission  S/p bilateral mastectomy    Different anesthesia methods/types have been discussed with the patient, but they are aware that the final plan will be decided by the assigned anesthesia provider on the date of service.    The patient is optimized for their procedure. AVS with information on surgery time/arrival time, meds and NPO status given by nursing staff. No further diagnostic testing indicated.        30 minutes were spent on the date of the encounter performing chart review, history and exam, documentation and/or discussion with other providers about the issues documented above.    Milagros Cardona PA-C  Preoperative Assessment Center  [facility]  Clinic and Surgery Center  Phone: [phone]  Fax: [phone]

## 2025-08-05 ENCOUNTER — PATIENT OUTREACH (OUTPATIENT)
Dept: CARE COORDINATION | Facility: CLINIC | Age: 55
End: 2025-08-05
Payer: COMMERCIAL

## 2025-08-13 RX ORDER — MIRTAZAPINE 30 MG/1
30 TABLET, FILM COATED ORAL AT BEDTIME
COMMUNITY
End: 2025-08-13

## 2025-08-15 ENCOUNTER — HOSPITAL ENCOUNTER (INPATIENT)
Facility: CLINIC | Age: 55
LOS: 3 days | Discharge: HOME HEALTH | End: 2025-08-18
Attending: COLON & RECTAL SURGERY | Admitting: COLON & RECTAL SURGERY
Payer: COMMERCIAL

## 2025-08-15 DIAGNOSIS — C20 RECTAL CANCER (H): ICD-10-CM

## 2025-08-15 DIAGNOSIS — G89.18 ACUTE POST-OPERATIVE PAIN: Primary | ICD-10-CM

## 2025-08-15 DIAGNOSIS — Z93.2 ILEOSTOMY STATUS (H): ICD-10-CM

## 2025-08-15 LAB
ABO + RH BLD: NORMAL
BLD GP AB SCN SERPL QL: NEGATIVE
GLUCOSE SERPL-MCNC: 95 MG/DL (ref 70–99)
MCV RBC AUTO: 93.1 FL (ref 78–100)
PATH REPORT.COMMENTS IMP SPEC: NORMAL
PATH REPORT.INTRAOP OBS SPEC DOC: NORMAL
PLATELET # BLD AUTO: 154 10E3/UL (ref 150–450)
SPECIMEN EXP DATE BLD: NORMAL

## 2025-08-15 PROCEDURE — 120N000001 HC R&B MED SURG/OB

## 2025-08-15 PROCEDURE — 88331 PATH CONSLTJ SURG 1 BLK 1SPC: CPT | Mod: 26 | Performed by: PATHOLOGY

## 2025-08-15 PROCEDURE — 88309 TISSUE EXAM BY PATHOLOGIST: CPT | Mod: 26 | Performed by: STUDENT IN AN ORGANIZED HEALTH CARE EDUCATION/TRAINING PROGRAM

## 2025-08-15 PROCEDURE — 88332 PATH CONSLTJ SURG EA ADD BLK: CPT | Mod: 26 | Performed by: PATHOLOGY

## 2025-08-15 PROCEDURE — 360N000080 HC SURGERY LEVEL 7, PER MIN: Performed by: COLON & RECTAL SURGERY

## 2025-08-15 PROCEDURE — S2900 ROBOTIC SURGICAL SYSTEM: HCPCS | Performed by: COLON & RECTAL SURGERY

## 2025-08-15 PROCEDURE — 710N000009 HC RECOVERY PHASE 1, LEVEL 1, PER MIN: Performed by: COLON & RECTAL SURGERY

## 2025-08-15 PROCEDURE — 250N000013 HC RX MED GY IP 250 OP 250 PS 637: Performed by: COLON & RECTAL SURGERY

## 2025-08-15 PROCEDURE — 82947 ASSAY GLUCOSE BLOOD QUANT: CPT | Performed by: ANESTHESIOLOGY

## 2025-08-15 PROCEDURE — 258N000003 HC RX IP 258 OP 636: Performed by: COLON & RECTAL SURGERY

## 2025-08-15 PROCEDURE — 250N000025 HC SEVOFLURANE, PER MIN: Performed by: COLON & RECTAL SURGERY

## 2025-08-15 PROCEDURE — 258N000003 HC RX IP 258 OP 636: Performed by: ANESTHESIOLOGY

## 2025-08-15 PROCEDURE — 272N000001 HC OR GENERAL SUPPLY STERILE: Performed by: COLON & RECTAL SURGERY

## 2025-08-15 PROCEDURE — 370N000017 HC ANESTHESIA TECHNICAL FEE, PER MIN: Performed by: COLON & RECTAL SURGERY

## 2025-08-15 PROCEDURE — 258N000001 HC RX 258: Performed by: COLON & RECTAL SURGERY

## 2025-08-15 PROCEDURE — 88331 PATH CONSLTJ SURG 1 BLK 1SPC: CPT | Mod: TC | Performed by: COLON & RECTAL SURGERY

## 2025-08-15 PROCEDURE — 999N000141 HC STATISTIC PRE-PROCEDURE NURSING ASSESSMENT: Performed by: COLON & RECTAL SURGERY

## 2025-08-15 PROCEDURE — 0DJD8ZZ INSPECTION OF LOWER INTESTINAL TRACT, VIA NATURAL OR ARTIFICIAL OPENING ENDOSCOPIC: ICD-10-PCS | Performed by: COLON & RECTAL SURGERY

## 2025-08-15 PROCEDURE — 250N000009 HC RX 250: Performed by: COLON & RECTAL SURGERY

## 2025-08-15 PROCEDURE — 44207 L COLECTOMY/COLOPROCTOSTOMY: CPT | Performed by: COLON & RECTAL SURGERY

## 2025-08-15 PROCEDURE — 8E0W4CZ ROBOTIC ASSISTED PROCEDURE OF TRUNK REGION, PERCUTANEOUS ENDOSCOPIC APPROACH: ICD-10-PCS | Performed by: COLON & RECTAL SURGERY

## 2025-08-15 PROCEDURE — 88304 TISSUE EXAM BY PATHOLOGIST: CPT | Mod: 26 | Performed by: STUDENT IN AN ORGANIZED HEALTH CARE EDUCATION/TRAINING PROGRAM

## 2025-08-15 PROCEDURE — 250N000011 HC RX IP 250 OP 636: Performed by: ANESTHESIOLOGY

## 2025-08-15 PROCEDURE — 250N000011 HC RX IP 250 OP 636: Performed by: COLON & RECTAL SURGERY

## 2025-08-15 PROCEDURE — 4A1685H MONITORING OF LYMPHATIC FLOW USING INDOCYANINE GREEN DYE, VIA NATURAL OR ARTIFICIAL OPENING ENDOSCOPIC: ICD-10-PCS | Performed by: COLON & RECTAL SURGERY

## 2025-08-15 PROCEDURE — 0DBP4ZZ EXCISION OF RECTUM, PERCUTANEOUS ENDOSCOPIC APPROACH: ICD-10-PCS | Performed by: COLON & RECTAL SURGERY

## 2025-08-15 PROCEDURE — 0DBN4ZZ EXCISION OF SIGMOID COLON, PERCUTANEOUS ENDOSCOPIC APPROACH: ICD-10-PCS | Performed by: COLON & RECTAL SURGERY

## 2025-08-15 PROCEDURE — 44310 ILEOSTOMY/JEJUNOSTOMY: CPT | Performed by: COLON & RECTAL SURGERY

## 2025-08-15 PROCEDURE — 0D1B4Z4 BYPASS ILEUM TO CUTANEOUS, PERCUTANEOUS ENDOSCOPIC APPROACH: ICD-10-PCS | Performed by: COLON & RECTAL SURGERY

## 2025-08-15 PROCEDURE — 88307 TISSUE EXAM BY PATHOLOGIST: CPT | Mod: 26 | Performed by: STUDENT IN AN ORGANIZED HEALTH CARE EDUCATION/TRAINING PROGRAM

## 2025-08-15 PROCEDURE — 36415 COLL VENOUS BLD VENIPUNCTURE: CPT | Performed by: ANESTHESIOLOGY

## 2025-08-15 PROCEDURE — 86901 BLOOD TYPING SEROLOGIC RH(D): CPT | Performed by: COLON & RECTAL SURGERY

## 2025-08-15 PROCEDURE — 85049 AUTOMATED PLATELET COUNT: CPT | Performed by: ANESTHESIOLOGY

## 2025-08-15 RX ORDER — NALOXONE HYDROCHLORIDE 0.4 MG/ML
0.4 INJECTION, SOLUTION INTRAMUSCULAR; INTRAVENOUS; SUBCUTANEOUS
Status: DISCONTINUED | OUTPATIENT
Start: 2025-08-15 | End: 2025-08-18 | Stop reason: HOSPADM

## 2025-08-15 RX ORDER — LORAZEPAM 2 MG/ML
0.5 INJECTION INTRAMUSCULAR EVERY 6 HOURS PRN
Status: DISCONTINUED | OUTPATIENT
Start: 2025-08-15 | End: 2025-08-18 | Stop reason: HOSPADM

## 2025-08-15 RX ORDER — NALOXONE HYDROCHLORIDE 0.4 MG/ML
0.2 INJECTION, SOLUTION INTRAMUSCULAR; INTRAVENOUS; SUBCUTANEOUS
Status: DISCONTINUED | OUTPATIENT
Start: 2025-08-15 | End: 2025-08-18 | Stop reason: HOSPADM

## 2025-08-15 RX ORDER — KETOROLAC TROMETHAMINE 15 MG/ML
15 INJECTION, SOLUTION INTRAMUSCULAR; INTRAVENOUS EVERY 6 HOURS
Status: DISCONTINUED | OUTPATIENT
Start: 2025-08-15 | End: 2025-08-18

## 2025-08-15 RX ORDER — SODIUM CHLORIDE, SODIUM LACTATE, POTASSIUM CHLORIDE, CALCIUM CHLORIDE 600; 310; 30; 20 MG/100ML; MG/100ML; MG/100ML; MG/100ML
INJECTION, SOLUTION INTRAVENOUS CONTINUOUS
Status: DISCONTINUED | OUTPATIENT
Start: 2025-08-15 | End: 2025-08-16

## 2025-08-15 RX ORDER — NALOXONE HYDROCHLORIDE 0.4 MG/ML
0.1 INJECTION, SOLUTION INTRAMUSCULAR; INTRAVENOUS; SUBCUTANEOUS
Status: DISCONTINUED | OUTPATIENT
Start: 2025-08-15 | End: 2025-08-15 | Stop reason: HOSPADM

## 2025-08-15 RX ORDER — HYDROMORPHONE HCL IN WATER/PF 6 MG/30 ML
0.2 PATIENT CONTROLLED ANALGESIA SYRINGE INTRAVENOUS
Status: DISCONTINUED | OUTPATIENT
Start: 2025-08-15 | End: 2025-08-18 | Stop reason: HOSPADM

## 2025-08-15 RX ORDER — HYDRALAZINE HYDROCHLORIDE 20 MG/ML
2.5-5 INJECTION INTRAMUSCULAR; INTRAVENOUS EVERY 10 MIN PRN
Status: DISCONTINUED | OUTPATIENT
Start: 2025-08-15 | End: 2025-08-15 | Stop reason: HOSPADM

## 2025-08-15 RX ORDER — BUPIVACAINE HYDROCHLORIDE 2.5 MG/ML
INJECTION, SOLUTION INFILTRATION; PERINEURAL PRN
Status: DISCONTINUED | OUTPATIENT
Start: 2025-08-15 | End: 2025-08-15 | Stop reason: HOSPADM

## 2025-08-15 RX ORDER — HYDROMORPHONE HCL IN WATER/PF 6 MG/30 ML
0.4 PATIENT CONTROLLED ANALGESIA SYRINGE INTRAVENOUS
Status: DISCONTINUED | OUTPATIENT
Start: 2025-08-15 | End: 2025-08-18 | Stop reason: HOSPADM

## 2025-08-15 RX ORDER — CEFAZOLIN SODIUM/WATER 2 G/20 ML
2 SYRINGE (ML) INTRAVENOUS
Status: COMPLETED | OUTPATIENT
Start: 2025-08-15 | End: 2025-08-15

## 2025-08-15 RX ORDER — METRONIDAZOLE 500 MG/100ML
500 INJECTION, SOLUTION INTRAVENOUS
Status: COMPLETED | OUTPATIENT
Start: 2025-08-15 | End: 2025-08-15

## 2025-08-15 RX ORDER — METHOCARBAMOL 500 MG/1
500 TABLET, FILM COATED ORAL 3 TIMES DAILY PRN
Status: DISCONTINUED | OUTPATIENT
Start: 2025-08-15 | End: 2025-08-18 | Stop reason: HOSPADM

## 2025-08-15 RX ORDER — MONTELUKAST SODIUM 10 MG/1
10 TABLET ORAL AT BEDTIME
Status: DISCONTINUED | OUTPATIENT
Start: 2025-08-15 | End: 2025-08-18 | Stop reason: HOSPADM

## 2025-08-15 RX ORDER — ACETAMINOPHEN 160 MG
TABLET,DISINTEGRATING ORAL PRN
Status: DISCONTINUED | OUTPATIENT
Start: 2025-08-15 | End: 2025-08-15 | Stop reason: HOSPADM

## 2025-08-15 RX ORDER — GRANISETRON HYDROCHLORIDE 1 MG/ML
1 INJECTION INTRAVENOUS ONCE
Status: COMPLETED | OUTPATIENT
Start: 2025-08-15 | End: 2025-08-15

## 2025-08-15 RX ORDER — CALCIUM CARBONATE 500 MG/1
1000 TABLET, CHEWABLE ORAL 4 TIMES DAILY PRN
Status: DISCONTINUED | OUTPATIENT
Start: 2025-08-15 | End: 2025-08-18 | Stop reason: HOSPADM

## 2025-08-15 RX ORDER — ERTAPENEM 1 G/1
1 INJECTION, POWDER, LYOPHILIZED, FOR SOLUTION INTRAMUSCULAR; INTRAVENOUS ONCE
Status: COMPLETED | OUTPATIENT
Start: 2025-08-16 | End: 2025-08-16

## 2025-08-15 RX ORDER — LABETALOL HYDROCHLORIDE 5 MG/ML
10 INJECTION, SOLUTION INTRAVENOUS
Status: DISCONTINUED | OUTPATIENT
Start: 2025-08-15 | End: 2025-08-15 | Stop reason: HOSPADM

## 2025-08-15 RX ORDER — ACETAMINOPHEN 500 MG
1000 TABLET ORAL 4 TIMES DAILY
Status: DISCONTINUED | OUTPATIENT
Start: 2025-08-15 | End: 2025-08-18 | Stop reason: HOSPADM

## 2025-08-15 RX ORDER — ENOXAPARIN SODIUM 100 MG/ML
40 INJECTION SUBCUTANEOUS
Status: COMPLETED | OUTPATIENT
Start: 2025-08-15 | End: 2025-08-15

## 2025-08-15 RX ORDER — LIDOCAINE 40 MG/G
CREAM TOPICAL
Status: DISCONTINUED | OUTPATIENT
Start: 2025-08-15 | End: 2025-08-18 | Stop reason: HOSPADM

## 2025-08-15 RX ORDER — OXYCODONE HYDROCHLORIDE 5 MG/1
5 TABLET ORAL EVERY 4 HOURS PRN
Status: DISCONTINUED | OUTPATIENT
Start: 2025-08-15 | End: 2025-08-18 | Stop reason: HOSPADM

## 2025-08-15 RX ORDER — PROCHLORPERAZINE MALEATE 10 MG
10 TABLET ORAL EVERY 6 HOURS PRN
Status: DISCONTINUED | OUTPATIENT
Start: 2025-08-15 | End: 2025-08-18 | Stop reason: HOSPADM

## 2025-08-15 RX ORDER — OXYCODONE HYDROCHLORIDE 5 MG/1
10 TABLET ORAL EVERY 4 HOURS PRN
Status: DISCONTINUED | OUTPATIENT
Start: 2025-08-15 | End: 2025-08-18 | Stop reason: HOSPADM

## 2025-08-15 RX ORDER — DIPHENHYDRAMINE HYDROCHLORIDE 50 MG/ML
25 INJECTION, SOLUTION INTRAMUSCULAR; INTRAVENOUS EVERY 6 HOURS PRN
Status: DISCONTINUED | OUTPATIENT
Start: 2025-08-15 | End: 2025-08-18 | Stop reason: HOSPADM

## 2025-08-15 RX ORDER — CEFAZOLIN SODIUM/WATER 2 G/20 ML
2 SYRINGE (ML) INTRAVENOUS SEE ADMIN INSTRUCTIONS
Status: DISCONTINUED | OUTPATIENT
Start: 2025-08-15 | End: 2025-08-15 | Stop reason: HOSPADM

## 2025-08-15 RX ORDER — HYDROMORPHONE HCL IN WATER/PF 6 MG/30 ML
0.4 PATIENT CONTROLLED ANALGESIA SYRINGE INTRAVENOUS EVERY 5 MIN PRN
Status: DISCONTINUED | OUTPATIENT
Start: 2025-08-15 | End: 2025-08-15 | Stop reason: HOSPADM

## 2025-08-15 RX ORDER — HYDROXYZINE HYDROCHLORIDE 25 MG/1
25 TABLET, FILM COATED ORAL EVERY 6 HOURS PRN
Status: DISCONTINUED | OUTPATIENT
Start: 2025-08-15 | End: 2025-08-15 | Stop reason: HOSPADM

## 2025-08-15 RX ORDER — SODIUM CHLORIDE, SODIUM LACTATE, POTASSIUM CHLORIDE, CALCIUM CHLORIDE 600; 310; 30; 20 MG/100ML; MG/100ML; MG/100ML; MG/100ML
INJECTION, SOLUTION INTRAVENOUS CONTINUOUS
Status: DISCONTINUED | OUTPATIENT
Start: 2025-08-15 | End: 2025-08-15 | Stop reason: HOSPADM

## 2025-08-15 RX ORDER — HYDRALAZINE HYDROCHLORIDE 20 MG/ML
10 INJECTION INTRAMUSCULAR; INTRAVENOUS EVERY 4 HOURS PRN
Status: DISCONTINUED | OUTPATIENT
Start: 2025-08-15 | End: 2025-08-18 | Stop reason: HOSPADM

## 2025-08-15 RX ORDER — HYDROMORPHONE HCL IN WATER/PF 6 MG/30 ML
0.2 PATIENT CONTROLLED ANALGESIA SYRINGE INTRAVENOUS EVERY 5 MIN PRN
Status: DISCONTINUED | OUTPATIENT
Start: 2025-08-15 | End: 2025-08-15 | Stop reason: HOSPADM

## 2025-08-15 RX ORDER — ACETAMINOPHEN 325 MG/1
975 TABLET ORAL ONCE
Status: DISCONTINUED | OUTPATIENT
Start: 2025-08-15 | End: 2025-08-15 | Stop reason: HOSPADM

## 2025-08-15 RX ORDER — ONDANSETRON 4 MG/1
4 TABLET, ORALLY DISINTEGRATING ORAL EVERY 6 HOURS PRN
Status: DISCONTINUED | OUTPATIENT
Start: 2025-08-15 | End: 2025-08-18 | Stop reason: HOSPADM

## 2025-08-15 RX ORDER — DEXAMETHASONE SODIUM PHOSPHATE 4 MG/ML
4 INJECTION, SOLUTION INTRA-ARTICULAR; INTRALESIONAL; INTRAMUSCULAR; INTRAVENOUS; SOFT TISSUE
Status: DISCONTINUED | OUTPATIENT
Start: 2025-08-15 | End: 2025-08-15 | Stop reason: HOSPADM

## 2025-08-15 RX ORDER — ONDANSETRON 4 MG/1
4 TABLET, ORALLY DISINTEGRATING ORAL EVERY 30 MIN PRN
Status: DISCONTINUED | OUTPATIENT
Start: 2025-08-15 | End: 2025-08-15 | Stop reason: HOSPADM

## 2025-08-15 RX ORDER — LORATADINE 10 MG/1
10 TABLET ORAL DAILY
Status: DISCONTINUED | OUTPATIENT
Start: 2025-08-16 | End: 2025-08-18 | Stop reason: HOSPADM

## 2025-08-15 RX ORDER — ONDANSETRON 2 MG/ML
4 INJECTION INTRAMUSCULAR; INTRAVENOUS EVERY 6 HOURS PRN
Status: DISCONTINUED | OUTPATIENT
Start: 2025-08-15 | End: 2025-08-18 | Stop reason: HOSPADM

## 2025-08-15 RX ORDER — FENTANYL CITRATE 0.05 MG/ML
50 INJECTION, SOLUTION INTRAMUSCULAR; INTRAVENOUS EVERY 5 MIN PRN
Status: DISCONTINUED | OUTPATIENT
Start: 2025-08-15 | End: 2025-08-15 | Stop reason: HOSPADM

## 2025-08-15 RX ORDER — CELECOXIB 200 MG/1
200 CAPSULE ORAL ONCE
Status: COMPLETED | OUTPATIENT
Start: 2025-08-15 | End: 2025-08-15

## 2025-08-15 RX ORDER — ENOXAPARIN SODIUM 100 MG/ML
40 INJECTION SUBCUTANEOUS EVERY 24 HOURS
Status: DISCONTINUED | OUTPATIENT
Start: 2025-08-16 | End: 2025-08-18 | Stop reason: HOSPADM

## 2025-08-15 RX ORDER — FENTANYL CITRATE 0.05 MG/ML
25 INJECTION, SOLUTION INTRAMUSCULAR; INTRAVENOUS EVERY 5 MIN PRN
Status: DISCONTINUED | OUTPATIENT
Start: 2025-08-15 | End: 2025-08-15 | Stop reason: HOSPADM

## 2025-08-15 RX ORDER — ONDANSETRON 2 MG/ML
4 INJECTION INTRAMUSCULAR; INTRAVENOUS EVERY 30 MIN PRN
Status: DISCONTINUED | OUTPATIENT
Start: 2025-08-15 | End: 2025-08-15 | Stop reason: HOSPADM

## 2025-08-15 RX ORDER — MIRTAZAPINE 15 MG/1
30 TABLET, FILM COATED ORAL AT BEDTIME
Status: DISCONTINUED | OUTPATIENT
Start: 2025-08-15 | End: 2025-08-18 | Stop reason: HOSPADM

## 2025-08-15 RX ADMIN — KETOROLAC TROMETHAMINE 15 MG: 15 INJECTION, SOLUTION INTRAMUSCULAR; INTRAVENOUS at 23:26

## 2025-08-15 RX ADMIN — FENTANYL CITRATE 50 MCG: 50 INJECTION, SOLUTION INTRAMUSCULAR; INTRAVENOUS at 13:59

## 2025-08-15 RX ADMIN — SODIUM CHLORIDE, SODIUM LACTATE, POTASSIUM CHLORIDE, AND CALCIUM CHLORIDE: .6; .31; .03; .02 INJECTION, SOLUTION INTRAVENOUS at 06:26

## 2025-08-15 RX ADMIN — METRONIDAZOLE 500 MG: 500 INJECTION, SOLUTION INTRAVENOUS at 06:32

## 2025-08-15 RX ADMIN — GRANISETRON HYDROCHLORIDE 1 MG: 1 INJECTION, SOLUTION INTRAVENOUS at 06:30

## 2025-08-15 RX ADMIN — FENTANYL CITRATE 50 MCG: 50 INJECTION, SOLUTION INTRAMUSCULAR; INTRAVENOUS at 13:03

## 2025-08-15 RX ADMIN — KETOROLAC TROMETHAMINE 15 MG: 15 INJECTION, SOLUTION INTRAMUSCULAR; INTRAVENOUS at 17:00

## 2025-08-15 RX ADMIN — OXYCODONE HYDROCHLORIDE 10 MG: 5 TABLET ORAL at 16:05

## 2025-08-15 RX ADMIN — MONTELUKAST 10 MG: 10 TABLET, FILM COATED ORAL at 21:30

## 2025-08-15 RX ADMIN — CELECOXIB 200 MG: 200 CAPSULE ORAL at 06:21

## 2025-08-15 RX ADMIN — METHOCARBAMOL 500 MG: 500 TABLET ORAL at 16:05

## 2025-08-15 RX ADMIN — SODIUM CHLORIDE, SODIUM LACTATE, POTASSIUM CHLORIDE, AND CALCIUM CHLORIDE: .6; .31; .03; .02 INJECTION, SOLUTION INTRAVENOUS at 23:32

## 2025-08-15 RX ADMIN — ONDANSETRON 4 MG: 2 INJECTION, SOLUTION INTRAMUSCULAR; INTRAVENOUS at 16:53

## 2025-08-15 RX ADMIN — ONDANSETRON 4 MG: 2 INJECTION, SOLUTION INTRAMUSCULAR; INTRAVENOUS at 13:20

## 2025-08-15 RX ADMIN — ENOXAPARIN SODIUM 40 MG: 40 INJECTION SUBCUTANEOUS at 07:03

## 2025-08-15 RX ADMIN — MIRTAZAPINE 30 MG: 15 TABLET, FILM COATED ORAL at 21:30

## 2025-08-15 RX ADMIN — PROCHLORPERAZINE EDISYLATE 5 MG: 5 INJECTION INTRAMUSCULAR; INTRAVENOUS at 13:46

## 2025-08-15 RX ADMIN — ACETAMINOPHEN 1000 MG: 500 TABLET, FILM COATED ORAL at 19:45

## 2025-08-15 RX ADMIN — ACETAMINOPHEN 1000 MG: 500 TABLET, FILM COATED ORAL at 23:26

## 2025-08-15 ASSESSMENT — ACTIVITIES OF DAILY LIVING (ADL)
ADLS_ACUITY_SCORE: 26
ADLS_ACUITY_SCORE: 24
ADLS_ACUITY_SCORE: 24
ADLS_ACUITY_SCORE: 30
ADLS_ACUITY_SCORE: 30
ADLS_ACUITY_SCORE: 24
ADLS_ACUITY_SCORE: 30
ADLS_ACUITY_SCORE: 26
ADLS_ACUITY_SCORE: 30
ADLS_ACUITY_SCORE: 24
ADLS_ACUITY_SCORE: 30
ADLS_ACUITY_SCORE: 47
ADLS_ACUITY_SCORE: 24
ADLS_ACUITY_SCORE: 24
ADLS_ACUITY_SCORE: 30
ADLS_ACUITY_SCORE: 26

## 2025-08-16 LAB
ANION GAP SERPL CALCULATED.3IONS-SCNC: 10 MMOL/L (ref 7–15)
BUN SERPL-MCNC: 8.9 MG/DL (ref 6–20)
CALCIUM SERPL-MCNC: 8.7 MG/DL (ref 8.8–10.4)
CHLORIDE SERPL-SCNC: 102 MMOL/L (ref 98–107)
CREAT SERPL-MCNC: 0.67 MG/DL (ref 0.51–0.95)
EGFRCR SERPLBLD CKD-EPI 2021: >90 ML/MIN/1.73M2
GLUCOSE SERPL-MCNC: 106 MG/DL (ref 70–99)
HCO3 SERPL-SCNC: 22 MMOL/L (ref 22–29)
HGB BLD-MCNC: 12.3 G/DL (ref 11.7–15.7)
MAGNESIUM SERPL-MCNC: 2.1 MG/DL (ref 1.7–2.3)
MCV RBC AUTO: 93.8 FL (ref 78–100)
PHOSPHATE SERPL-MCNC: 2.7 MG/DL (ref 2.5–4.5)
POTASSIUM SERPL-SCNC: 3.9 MMOL/L (ref 3.4–5.3)
SODIUM SERPL-SCNC: 134 MMOL/L (ref 135–145)

## 2025-08-16 PROCEDURE — 120N000001 HC R&B MED SURG/OB

## 2025-08-16 PROCEDURE — 85018 HEMOGLOBIN: CPT | Performed by: COLON & RECTAL SURGERY

## 2025-08-16 PROCEDURE — 36415 COLL VENOUS BLD VENIPUNCTURE: CPT | Performed by: COLON & RECTAL SURGERY

## 2025-08-16 PROCEDURE — 84100 ASSAY OF PHOSPHORUS: CPT | Performed by: COLON & RECTAL SURGERY

## 2025-08-16 PROCEDURE — 250N000011 HC RX IP 250 OP 636: Performed by: COLON & RECTAL SURGERY

## 2025-08-16 PROCEDURE — 80048 BASIC METABOLIC PNL TOTAL CA: CPT | Performed by: COLON & RECTAL SURGERY

## 2025-08-16 PROCEDURE — 258N000003 HC RX IP 258 OP 636: Performed by: COLON & RECTAL SURGERY

## 2025-08-16 PROCEDURE — 250N000013 HC RX MED GY IP 250 OP 250 PS 637: Performed by: COLON & RECTAL SURGERY

## 2025-08-16 PROCEDURE — 83735 ASSAY OF MAGNESIUM: CPT | Performed by: COLON & RECTAL SURGERY

## 2025-08-16 RX ADMIN — LORATADINE 10 MG: 10 TABLET ORAL at 09:13

## 2025-08-16 RX ADMIN — MONTELUKAST 10 MG: 10 TABLET, FILM COATED ORAL at 21:51

## 2025-08-16 RX ADMIN — KETOROLAC TROMETHAMINE 15 MG: 15 INJECTION, SOLUTION INTRAMUSCULAR; INTRAVENOUS at 18:58

## 2025-08-16 RX ADMIN — ACETAMINOPHEN 1000 MG: 500 TABLET, FILM COATED ORAL at 12:58

## 2025-08-16 RX ADMIN — SODIUM CHLORIDE, SODIUM LACTATE, POTASSIUM CHLORIDE, AND CALCIUM CHLORIDE: .6; .31; .03; .02 INJECTION, SOLUTION INTRAVENOUS at 07:27

## 2025-08-16 RX ADMIN — OXYCODONE HYDROCHLORIDE 5 MG: 5 TABLET ORAL at 09:56

## 2025-08-16 RX ADMIN — KETOROLAC TROMETHAMINE 15 MG: 15 INJECTION, SOLUTION INTRAMUSCULAR; INTRAVENOUS at 06:01

## 2025-08-16 RX ADMIN — ACETAMINOPHEN 1000 MG: 500 TABLET, FILM COATED ORAL at 19:40

## 2025-08-16 RX ADMIN — METHOCARBAMOL 500 MG: 500 TABLET ORAL at 09:13

## 2025-08-16 RX ADMIN — KETOROLAC TROMETHAMINE 15 MG: 15 INJECTION, SOLUTION INTRAMUSCULAR; INTRAVENOUS at 12:58

## 2025-08-16 RX ADMIN — ENOXAPARIN SODIUM 40 MG: 40 INJECTION SUBCUTANEOUS at 09:13

## 2025-08-16 RX ADMIN — ERTAPENEM SODIUM 1 G: 1 INJECTION, POWDER, LYOPHILIZED, FOR SOLUTION INTRAMUSCULAR; INTRAVENOUS at 07:27

## 2025-08-16 RX ADMIN — ACETAMINOPHEN 1000 MG: 500 TABLET, FILM COATED ORAL at 09:13

## 2025-08-16 RX ADMIN — MIRTAZAPINE 30 MG: 15 TABLET, FILM COATED ORAL at 21:51

## 2025-08-16 RX ADMIN — LISINOPRIL 30 MG: 10 TABLET ORAL at 09:13

## 2025-08-16 RX ADMIN — OXYCODONE HYDROCHLORIDE 5 MG: 5 TABLET ORAL at 02:51

## 2025-08-16 ASSESSMENT — ACTIVITIES OF DAILY LIVING (ADL)
ADLS_ACUITY_SCORE: 32
ADLS_ACUITY_SCORE: 29
ADLS_ACUITY_SCORE: 32
ADLS_ACUITY_SCORE: 31
ADLS_ACUITY_SCORE: 32
ADLS_ACUITY_SCORE: 31
ADLS_ACUITY_SCORE: 29
ADLS_ACUITY_SCORE: 31
ADLS_ACUITY_SCORE: 32

## 2025-08-17 LAB
MAGNESIUM SERPL-MCNC: 2.2 MG/DL (ref 1.7–2.3)
PHOSPHATE SERPL-MCNC: 1.9 MG/DL (ref 2.5–4.5)
PHOSPHATE SERPL-MCNC: 3.2 MG/DL (ref 2.5–4.5)
POTASSIUM SERPL-SCNC: 4 MMOL/L (ref 3.4–5.3)

## 2025-08-17 PROCEDURE — 83735 ASSAY OF MAGNESIUM: CPT | Performed by: COLON & RECTAL SURGERY

## 2025-08-17 PROCEDURE — 250N000013 HC RX MED GY IP 250 OP 250 PS 637: Performed by: COLON & RECTAL SURGERY

## 2025-08-17 PROCEDURE — 999N000111 HC STATISTIC OT IP EVAL DEFER

## 2025-08-17 PROCEDURE — 84100 ASSAY OF PHOSPHORUS: CPT | Performed by: COLON & RECTAL SURGERY

## 2025-08-17 PROCEDURE — 84132 ASSAY OF SERUM POTASSIUM: CPT | Performed by: COLON & RECTAL SURGERY

## 2025-08-17 PROCEDURE — 250N000011 HC RX IP 250 OP 636: Performed by: COLON & RECTAL SURGERY

## 2025-08-17 PROCEDURE — 36415 COLL VENOUS BLD VENIPUNCTURE: CPT | Performed by: COLON & RECTAL SURGERY

## 2025-08-17 PROCEDURE — 120N000001 HC R&B MED SURG/OB

## 2025-08-17 PROCEDURE — 258N000003 HC RX IP 258 OP 636: Performed by: COLON & RECTAL SURGERY

## 2025-08-17 PROCEDURE — 999N000147 HC STATISTIC PT IP EVAL DEFER

## 2025-08-17 PROCEDURE — 250N000009 HC RX 250: Performed by: COLON & RECTAL SURGERY

## 2025-08-17 RX ADMIN — ACETAMINOPHEN 1000 MG: 500 TABLET, FILM COATED ORAL at 09:35

## 2025-08-17 RX ADMIN — MONTELUKAST 10 MG: 10 TABLET, FILM COATED ORAL at 22:21

## 2025-08-17 RX ADMIN — ENOXAPARIN SODIUM 40 MG: 40 INJECTION SUBCUTANEOUS at 09:35

## 2025-08-17 RX ADMIN — SODIUM PHOSPHATE, MONOBASIC, MONOHYDRATE AND SODIUM PHOSPHATE, DIBASIC, ANHYDROUS 15 MMOL: 142; 276 INJECTION, SOLUTION INTRAVENOUS at 09:56

## 2025-08-17 RX ADMIN — LORATADINE 10 MG: 10 TABLET ORAL at 09:35

## 2025-08-17 RX ADMIN — KETOROLAC TROMETHAMINE 15 MG: 15 INJECTION, SOLUTION INTRAMUSCULAR; INTRAVENOUS at 14:50

## 2025-08-17 RX ADMIN — KETOROLAC TROMETHAMINE 15 MG: 15 INJECTION, SOLUTION INTRAMUSCULAR; INTRAVENOUS at 01:11

## 2025-08-17 RX ADMIN — ACETAMINOPHEN 1000 MG: 500 TABLET, FILM COATED ORAL at 14:50

## 2025-08-17 RX ADMIN — MIRTAZAPINE 30 MG: 15 TABLET, FILM COATED ORAL at 22:20

## 2025-08-17 RX ADMIN — KETOROLAC TROMETHAMINE 15 MG: 15 INJECTION, SOLUTION INTRAMUSCULAR; INTRAVENOUS at 20:48

## 2025-08-17 RX ADMIN — LISINOPRIL 30 MG: 10 TABLET ORAL at 09:35

## 2025-08-17 RX ADMIN — KETOROLAC TROMETHAMINE 15 MG: 15 INJECTION, SOLUTION INTRAMUSCULAR; INTRAVENOUS at 07:37

## 2025-08-17 RX ADMIN — ACETAMINOPHEN 1000 MG: 500 TABLET, FILM COATED ORAL at 20:48

## 2025-08-17 RX ADMIN — METHOCARBAMOL 500 MG: 500 TABLET ORAL at 09:35

## 2025-08-17 RX ADMIN — ACETAMINOPHEN 1000 MG: 500 TABLET, FILM COATED ORAL at 01:11

## 2025-08-17 ASSESSMENT — ACTIVITIES OF DAILY LIVING (ADL)
ADLS_ACUITY_SCORE: 34
ADLS_ACUITY_SCORE: 32
ADLS_ACUITY_SCORE: 34
ADLS_ACUITY_SCORE: 34
ADLS_ACUITY_SCORE: 32
ADLS_ACUITY_SCORE: 34
ADLS_ACUITY_SCORE: 34
ADLS_ACUITY_SCORE: 32
ADLS_ACUITY_SCORE: 34
ADLS_ACUITY_SCORE: 32
ADLS_ACUITY_SCORE: 34
ADLS_ACUITY_SCORE: 34
ADLS_ACUITY_SCORE: 32
ADLS_ACUITY_SCORE: 34
ADLS_ACUITY_SCORE: 32
ADLS_ACUITY_SCORE: 34
ADLS_ACUITY_SCORE: 32
ADLS_ACUITY_SCORE: 34
ADLS_ACUITY_SCORE: 32
ADLS_ACUITY_SCORE: 32
ADLS_ACUITY_SCORE: 34
ADLS_ACUITY_SCORE: 32
ADLS_ACUITY_SCORE: 34

## 2025-08-18 VITALS
SYSTOLIC BLOOD PRESSURE: 138 MMHG | HEIGHT: 65 IN | RESPIRATION RATE: 16 BRPM | WEIGHT: 154.1 LBS | DIASTOLIC BLOOD PRESSURE: 83 MMHG | BODY MASS INDEX: 25.67 KG/M2 | OXYGEN SATURATION: 96 % | HEART RATE: 96 BPM | TEMPERATURE: 97.6 F

## 2025-08-18 LAB
MAGNESIUM SERPL-MCNC: 2.3 MG/DL (ref 1.7–2.3)
PHOSPHATE SERPL-MCNC: 3.9 MG/DL (ref 2.5–4.5)
POTASSIUM SERPL-SCNC: 4 MMOL/L (ref 3.4–5.3)

## 2025-08-18 PROCEDURE — 83735 ASSAY OF MAGNESIUM: CPT | Performed by: COLON & RECTAL SURGERY

## 2025-08-18 PROCEDURE — G0463 HOSPITAL OUTPT CLINIC VISIT: HCPCS

## 2025-08-18 PROCEDURE — 84132 ASSAY OF SERUM POTASSIUM: CPT | Performed by: COLON & RECTAL SURGERY

## 2025-08-18 PROCEDURE — 250N000013 HC RX MED GY IP 250 OP 250 PS 637: Performed by: COLON & RECTAL SURGERY

## 2025-08-18 PROCEDURE — 250N000011 HC RX IP 250 OP 636: Performed by: COLON & RECTAL SURGERY

## 2025-08-18 PROCEDURE — 36415 COLL VENOUS BLD VENIPUNCTURE: CPT | Performed by: COLON & RECTAL SURGERY

## 2025-08-18 PROCEDURE — 84100 ASSAY OF PHOSPHORUS: CPT | Performed by: COLON & RECTAL SURGERY

## 2025-08-18 RX ORDER — IBUPROFEN 800 MG/1
800 TABLET, FILM COATED ORAL 3 TIMES DAILY
Qty: 30 TABLET | Refills: 0 | Status: SHIPPED | OUTPATIENT
Start: 2025-08-18

## 2025-08-18 RX ORDER — OXYCODONE HYDROCHLORIDE 5 MG/1
5 TABLET ORAL EVERY 6 HOURS PRN
Qty: 12 TABLET | Refills: 0 | Status: SHIPPED | OUTPATIENT
Start: 2025-08-18

## 2025-08-18 RX ORDER — IBUPROFEN 400 MG/1
800 TABLET, FILM COATED ORAL 3 TIMES DAILY
Status: DISCONTINUED | OUTPATIENT
Start: 2025-08-18 | End: 2025-08-18 | Stop reason: HOSPADM

## 2025-08-18 RX ORDER — ACETAMINOPHEN 500 MG
1000 TABLET ORAL 4 TIMES DAILY
Qty: 75 TABLET | Refills: 0 | Status: SHIPPED | OUTPATIENT
Start: 2025-08-18

## 2025-08-18 RX ORDER — ENOXAPARIN SODIUM 100 MG/ML
40 INJECTION SUBCUTANEOUS EVERY 24 HOURS
Qty: 10.4 ML | Refills: 0 | Status: SHIPPED | OUTPATIENT
Start: 2025-08-18 | End: 2025-09-13

## 2025-08-18 RX ORDER — METHOCARBAMOL 500 MG/1
500 TABLET, FILM COATED ORAL 3 TIMES DAILY PRN
Qty: 30 TABLET | Refills: 0 | Status: SHIPPED | OUTPATIENT
Start: 2025-08-18

## 2025-08-18 RX ADMIN — LISINOPRIL 30 MG: 10 TABLET ORAL at 10:36

## 2025-08-18 RX ADMIN — ACETAMINOPHEN 1000 MG: 500 TABLET, FILM COATED ORAL at 10:36

## 2025-08-18 RX ADMIN — IBUPROFEN 800 MG: 400 TABLET ORAL at 10:36

## 2025-08-18 RX ADMIN — ACETAMINOPHEN 1000 MG: 500 TABLET, FILM COATED ORAL at 02:46

## 2025-08-18 RX ADMIN — KETOROLAC TROMETHAMINE 15 MG: 15 INJECTION, SOLUTION INTRAMUSCULAR; INTRAVENOUS at 02:46

## 2025-08-18 RX ADMIN — ENOXAPARIN SODIUM 40 MG: 40 INJECTION SUBCUTANEOUS at 11:33

## 2025-08-18 RX ADMIN — LORATADINE 10 MG: 10 TABLET ORAL at 10:36

## 2025-08-18 ASSESSMENT — ACTIVITIES OF DAILY LIVING (ADL)
ADLS_ACUITY_SCORE: 34
DEPENDENT_IADLS:: INDEPENDENT

## 2025-08-19 ENCOUNTER — PATIENT OUTREACH (OUTPATIENT)
Dept: CARE COORDINATION | Facility: CLINIC | Age: 55
End: 2025-08-19
Payer: COMMERCIAL

## 2025-08-20 ENCOUNTER — PATIENT OUTREACH (OUTPATIENT)
Dept: SURGERY | Facility: CLINIC | Age: 55
End: 2025-08-20
Payer: COMMERCIAL

## 2025-08-20 ENCOUNTER — TELEPHONE (OUTPATIENT)
Dept: SURGERY | Facility: CLINIC | Age: 55
End: 2025-08-20
Payer: COMMERCIAL

## 2025-08-21 ENCOUNTER — DOCUMENTATION ONLY (OUTPATIENT)
Dept: SURGERY | Facility: CLINIC | Age: 55
End: 2025-08-21

## 2025-08-21 ENCOUNTER — TELEPHONE (OUTPATIENT)
Dept: FAMILY MEDICINE | Facility: CLINIC | Age: 55
End: 2025-08-21

## 2025-08-25 ENCOUNTER — OFFICE VISIT (OUTPATIENT)
Dept: SURGERY | Facility: CLINIC | Age: 55
End: 2025-08-25
Payer: COMMERCIAL

## 2025-08-25 VITALS
WEIGHT: 154 LBS | HEART RATE: 95 BPM | OXYGEN SATURATION: 99 % | SYSTOLIC BLOOD PRESSURE: 122 MMHG | DIASTOLIC BLOOD PRESSURE: 80 MMHG | BODY MASS INDEX: 25.66 KG/M2 | HEIGHT: 65 IN

## 2025-08-25 DIAGNOSIS — Z08: Primary | ICD-10-CM

## 2025-08-25 LAB
PATH REPORT.COMMENTS IMP SPEC: ABNORMAL
PATH REPORT.COMMENTS IMP SPEC: ABNORMAL
PATH REPORT.COMMENTS IMP SPEC: YES
PATH REPORT.FINAL DX SPEC: ABNORMAL
PATH REPORT.GROSS SPEC: ABNORMAL
PATH REPORT.INTRAOP OBS SPEC DOC: ABNORMAL
PATH REPORT.MICROSCOPIC SPEC OTHER STN: ABNORMAL
PATH REPORT.RELEVANT HX SPEC: ABNORMAL
PATHOLOGY SYNOPTIC REPORT: ABNORMAL
PHOTO IMAGE: ABNORMAL

## 2025-08-25 PROCEDURE — 99024 POSTOP FOLLOW-UP VISIT: CPT | Performed by: COLON & RECTAL SURGERY

## 2025-08-25 PROCEDURE — 3079F DIAST BP 80-89 MM HG: CPT | Performed by: COLON & RECTAL SURGERY

## 2025-08-25 PROCEDURE — 1126F AMNT PAIN NOTED NONE PRSNT: CPT | Performed by: COLON & RECTAL SURGERY

## 2025-08-25 PROCEDURE — 3074F SYST BP LT 130 MM HG: CPT | Performed by: COLON & RECTAL SURGERY

## 2025-08-25 ASSESSMENT — PAIN SCALES - GENERAL: PAINLEVEL_OUTOF10: NO PAIN (0)

## 2025-09-02 ENCOUNTER — INFUSION THERAPY VISIT (OUTPATIENT)
Dept: INFUSION THERAPY | Facility: CLINIC | Age: 55
End: 2025-09-02
Attending: INTERNAL MEDICINE
Payer: COMMERCIAL

## 2025-09-02 ENCOUNTER — ONCOLOGY VISIT (OUTPATIENT)
Dept: ONCOLOGY | Facility: CLINIC | Age: 55
End: 2025-09-02
Attending: INTERNAL MEDICINE
Payer: COMMERCIAL

## 2025-09-02 VITALS
SYSTOLIC BLOOD PRESSURE: 111 MMHG | BODY MASS INDEX: 24.46 KG/M2 | HEART RATE: 92 BPM | OXYGEN SATURATION: 96 % | WEIGHT: 147 LBS | DIASTOLIC BLOOD PRESSURE: 74 MMHG

## 2025-09-02 DIAGNOSIS — C20 RECTAL ADENOCARCINOMA (H): Primary | ICD-10-CM

## 2025-09-02 DIAGNOSIS — D70.1 CHEMOTHERAPY-INDUCED NEUTROPENIA: ICD-10-CM

## 2025-09-02 DIAGNOSIS — T45.1X5A CHEMOTHERAPY-INDUCED NEUTROPENIA: ICD-10-CM

## 2025-09-02 PROCEDURE — 99214 OFFICE O/P EST MOD 30 MIN: CPT | Performed by: INTERNAL MEDICINE

## 2025-09-02 PROCEDURE — 99213 OFFICE O/P EST LOW 20 MIN: CPT | Performed by: INTERNAL MEDICINE

## 2025-09-02 PROCEDURE — 250N000011 HC RX IP 250 OP 636: Performed by: INTERNAL MEDICINE

## 2025-09-02 PROCEDURE — G2211 COMPLEX E/M VISIT ADD ON: HCPCS | Performed by: INTERNAL MEDICINE

## 2025-09-02 PROCEDURE — 99207 PR NO CHARGE LOS: CPT

## 2025-09-02 RX ORDER — METHYLPREDNISOLONE SODIUM SUCCINATE 40 MG/ML
40 INJECTION INTRAMUSCULAR; INTRAVENOUS
Start: 2025-09-22

## 2025-09-02 RX ORDER — MEPERIDINE HYDROCHLORIDE 25 MG/ML
25 INJECTION INTRAMUSCULAR; INTRAVENOUS; SUBCUTANEOUS
OUTPATIENT
Start: 2025-09-22

## 2025-09-02 RX ORDER — DIPHENHYDRAMINE HYDROCHLORIDE 50 MG/ML
50 INJECTION INTRAMUSCULAR; INTRAVENOUS
Start: 2025-09-22

## 2025-09-02 RX ORDER — HEPARIN SODIUM (PORCINE) LOCK FLUSH IV SOLN 100 UNIT/ML 100 UNIT/ML
5 SOLUTION INTRAVENOUS
OUTPATIENT
Start: 2025-09-22

## 2025-09-02 RX ORDER — PALONOSETRON 0.05 MG/ML
0.25 INJECTION, SOLUTION INTRAVENOUS ONCE
Start: 2025-09-22 | End: 2025-09-02

## 2025-09-02 RX ORDER — HEPARIN SODIUM (PORCINE) LOCK FLUSH IV SOLN 100 UNIT/ML 100 UNIT/ML
5 SOLUTION INTRAVENOUS
OUTPATIENT
Start: 2025-09-24

## 2025-09-02 RX ORDER — DIPHENHYDRAMINE HYDROCHLORIDE 50 MG/ML
25 INJECTION INTRAMUSCULAR; INTRAVENOUS
Start: 2025-09-22

## 2025-09-02 RX ORDER — FLUOROURACIL 50 MG/ML
400 INJECTION, SOLUTION INTRAVENOUS ONCE
OUTPATIENT
Start: 2025-09-22

## 2025-09-02 RX ORDER — ALBUTEROL SULFATE 0.83 MG/ML
2.5 SOLUTION RESPIRATORY (INHALATION)
OUTPATIENT
Start: 2025-09-22

## 2025-09-02 RX ORDER — HEPARIN SODIUM,PORCINE 10 UNIT/ML
5-20 VIAL (ML) INTRAVENOUS DAILY PRN
OUTPATIENT
Start: 2025-09-22

## 2025-09-02 RX ORDER — HEPARIN SODIUM,PORCINE 10 UNIT/ML
5-20 VIAL (ML) INTRAVENOUS DAILY PRN
OUTPATIENT
Start: 2025-09-24

## 2025-09-02 RX ORDER — ALBUTEROL SULFATE 90 UG/1
1-2 INHALANT RESPIRATORY (INHALATION)
Start: 2025-09-22

## 2025-09-02 RX ORDER — HEPARIN SODIUM (PORCINE) LOCK FLUSH IV SOLN 100 UNIT/ML 100 UNIT/ML
5 SOLUTION INTRAVENOUS
Status: DISCONTINUED | OUTPATIENT
Start: 2025-09-02 | End: 2025-09-02 | Stop reason: HOSPADM

## 2025-09-02 RX ORDER — EPINEPHRINE 1 MG/ML
0.3 INJECTION, SOLUTION INTRAMUSCULAR; SUBCUTANEOUS EVERY 5 MIN PRN
OUTPATIENT
Start: 2025-09-22

## 2025-09-02 RX ADMIN — SODIUM CHLORIDE, PRESERVATIVE FREE 5 ML: 5 INJECTION INTRAVENOUS at 15:51

## 2025-09-02 ASSESSMENT — PAIN SCALES - GENERAL: PAINLEVEL_OUTOF10: NO PAIN (0)

## 2025-09-04 ENCOUNTER — OFFICE VISIT (OUTPATIENT)
Dept: WOUND CARE | Facility: CLINIC | Age: 55
End: 2025-09-04
Payer: COMMERCIAL

## 2025-09-04 DIAGNOSIS — C20 RECTAL CANCER (H): Primary | ICD-10-CM

## 2025-09-04 DIAGNOSIS — C20 RECTAL ADENOCARCINOMA (H): ICD-10-CM

## (undated) DEVICE — Device

## (undated) DEVICE — SPONGE LAP 18X18" X8435

## (undated) DEVICE — DAVINCI XI DRAPE COLUMN 470341

## (undated) DEVICE — SOL WATER IRRIG 1000ML BOTTLE 2F7114

## (undated) DEVICE — SU VICRYL 4-0 P-3 18" UND  J494H

## (undated) DEVICE — SYSTEM LAPAROVUE VISIBILITY LAPVUE10

## (undated) DEVICE — DRAPE LAP TRANSVERSE 29421

## (undated) DEVICE — ENDO ACCESS PLATFORM GELPOINT MINI CNGL3

## (undated) DEVICE — DECANTER BAG 2002S

## (undated) DEVICE — ESU HOLSTER PLASTIC DISP E2400

## (undated) DEVICE — OSTOMY POUCH 12IN 2.5- IN CERAMIDE PLS FLT 1 PC CTF 8931

## (undated) DEVICE — PREP CHLORAPREP 26ML TINTED ORANGE  260815

## (undated) DEVICE — TUBING SUCTION MEDI-VAC SOFT 3/16"X20' N520A

## (undated) DEVICE — ANTIFOG SOLUTION W/FOAM PAD 31142527

## (undated) DEVICE — DRAIN JACKSON PRATT RESERVOIR 100ML SU130-1305

## (undated) DEVICE — LINEN GOWN XLG 5407

## (undated) DEVICE — DRSG GAUZE 4X4" 3033

## (undated) DEVICE — PACK MAJOR SBA15MAFSI

## (undated) DEVICE — GOWN LG DISP 9515

## (undated) DEVICE — PROBE COVER INTRAOPERATIVE 5"X96" PC1308

## (undated) DEVICE — LINEN TOWEL PACK X30 5481

## (undated) DEVICE — PAD CHUX UNDERPAD 30X30"

## (undated) DEVICE — SOLUTION IV IRRIGATION 0.9% NACL 3L R8206

## (undated) DEVICE — STOCKING SLEEVE COMPRESSION CALF MED

## (undated) DEVICE — SU VICRYL 0 CT-1 27" UND J260H

## (undated) DEVICE — SU STRATAFIX PDS PLUS CT-1 15CM SXPP1A420

## (undated) DEVICE — ENDO TROCAR FIRST ENTRY KII FIOS ADV FIX 05X100MM CFF03

## (undated) DEVICE — DRAPE POUCH INSTRUMENT 3 POCKET 1018L

## (undated) DEVICE — KNIFE HANDLE W/15 BLADE 371615

## (undated) DEVICE — DRAPE LAP W/ARMBOARD 29410

## (undated) DEVICE — GLOVE BIOGEL PI ULTRATOUCH G SZ 8.0 42180

## (undated) DEVICE — BASIN SET MINOR DISP

## (undated) DEVICE — GRASPER LAPAROSCOPIC EPIX 5MMX35CM C4130

## (undated) DEVICE — JELLY LUBRICATING SURGILUBE 4OZ TUBE

## (undated) DEVICE — SUCTION IRR STRYKERFLOW II W/TIP 250-070-520

## (undated) DEVICE — SUCTION MANIFOLD NEPTUNE 2 SYS 4 PORT 0702-020-000

## (undated) DEVICE — SOL NACL 0.9% IRRIG 1000ML BOTTLE 07138-09

## (undated) DEVICE — SU VICRYL+ 3-0 27IN SH UND VCP416H

## (undated) DEVICE — KIT CONNECTOR FOR OLYMPUS ENDOSCOPES DEFENDO 100310

## (undated) DEVICE — GLOVE ESTEEM POWDER FREE 5.5  2D72PL55

## (undated) DEVICE — ENDO POUCH RETRIEVAL SYSTEM OD11 MM 265 ML CD005

## (undated) DEVICE — SU MONOCRYL 4-0 PS-2 18" UND Y496G

## (undated) DEVICE — LINEN TOWEL PACK X5 5464

## (undated) DEVICE — SU ETHILON 2-0 FS 18" 664H

## (undated) DEVICE — ENDO FORCEP ENDOJAW BIOPSY 3.7MMX230CM FB-222U

## (undated) DEVICE — PROTECTOR ARM LG FOAM TRENDELENBURG TAP200

## (undated) DEVICE — ESU GROUND PAD UNIVERSAL W/O CORD

## (undated) DEVICE — SU WND CLOSURE VLOC 180 ABS 3-0 6" V-20 VLOCL0604

## (undated) DEVICE — SU SILK 2-0 SH 30" K833H

## (undated) DEVICE — ESU ELEC BLADE 6" COATED E1450-6

## (undated) DEVICE — PREP CHLORHEXIDINE 4% 4OZ (HIBICLENS) 57504

## (undated) DEVICE — ESU LIGASURE MARYLAND LAPAROSCOPIC SLR/DVDR 5MMX37CM LF1937

## (undated) DEVICE — TAPE UMBILICAL COTTON 30X1/16IN 2 STRAND 8619-03A 8886861903

## (undated) DEVICE — ENDO POUCH UNIVERSAL RETRIEVAL SYSTEM INZII 5MM CD003

## (undated) DEVICE — STPL DAVINCI SUREFORM 60MM RELOAD WHITE 48360W

## (undated) DEVICE — SU DERMABOND PRINEO 22CM CLR222US

## (undated) DEVICE — SU PDS II 1 CT MONOFIL Z353H

## (undated) DEVICE — TUBING INSUFFLATION W/FILTER CPC TO LUER 620-030-301

## (undated) DEVICE — BLADE KNIFE SURG 15 371115

## (undated) DEVICE — SU DERMABOND ADVANCED .7ML DNX12

## (undated) DEVICE — LINEN TOWEL PACK X6 WHITE 5487

## (undated) DEVICE — ADH SKIN CLOSURE PREMIERPRO EXOFIN 1.0ML 3470

## (undated) DEVICE — ENDO CAP AND TUBING STERILE FOR ENDOGATOR  100130

## (undated) DEVICE — DAVINCI XI REDUCER 8-12MM 470381

## (undated) DEVICE — SUCTION TIP POOLE K770

## (undated) DEVICE — PREP CHLORAPREP 26ML TINTED HI-LITE ORANGE 930815

## (undated) DEVICE — SUCTION TIP YANKAUER STR K87

## (undated) DEVICE — SU DERMABOND PROPEN .5ML DPP6

## (undated) DEVICE — BLADE KNIFE SURG 11 371111

## (undated) DEVICE — DRSG KERLIX FLUFFS X5

## (undated) DEVICE — GLOVE BIOGEL PI ULTRATOUCH SZ 7.5 41175

## (undated) DEVICE — SU MONOCRYL 4-0 PS-2 27" UND Y426H

## (undated) DEVICE — SUCTION TIP YANKAUER W/O VENT BULBOUS TIP

## (undated) DEVICE — ANTIFOG SOLUTION SEE SHARP 150M TROCAR SWABS 30978 (COI)

## (undated) DEVICE — GLOVE PROTEXIS W/NEU-THERA 6.5  2D73TE65

## (undated) DEVICE — STPL DAVINCI SUREFORM 60MM STR 480460

## (undated) DEVICE — BAG DECANTER STERILE WHITE DYNJDEC09

## (undated) DEVICE — GOWN XLG DISP 9545

## (undated) DEVICE — BARRIER SEPRAFILM 5X6" SINGLE SHEET 4301-02

## (undated) DEVICE — BNDG KLING 3" 2232

## (undated) DEVICE — GLOVE PROTEXIS MICRO 7.5 LT BLUE 2D73PM75

## (undated) DEVICE — SYR 10ML FINGER CONTROL W/O NDL 309695

## (undated) DEVICE — PACK LAPAROSCOPY/PELVISCOPY STD

## (undated) DEVICE — SU MONOCRYL 4-0 P-3 18" UND Y494G

## (undated) DEVICE — SUCTION MANIFOLD NEPTUNE 2 SYS 1 PORT 702-025-000

## (undated) DEVICE — PACK CENTRAL LINE INSERTION SAN32CLFCG

## (undated) DEVICE — SYR BULB IRRIG DOVER 60 ML LATEX FREE 67000

## (undated) DEVICE — KIT INTRODUCER FLUENT MICRO 5FRX10CM ECHO TIP KIT-038-04

## (undated) DEVICE — SU MONOCRYL 3-0 PS-2 18" UND Y497G

## (undated) DEVICE — SU PROLENE 2-0 CT-1 30" 8423H

## (undated) DEVICE — JELLY LUBRICATING SURGILUBE 2OZ TUBE

## (undated) DEVICE — SU VICRYL 3-0 SH CR 8X18" J774

## (undated) DEVICE — PAD PERI INDIV WRAP 11" 2022

## (undated) DEVICE — GLOVE ESTEEM BLUE W/NEU-THERA 6.0  2D73PB60

## (undated) DEVICE — BLADE KNIFE SURG 10 371110

## (undated) DEVICE — STPL DAVINCI SUREFORM 45MM RELOAD BLUE 48345B

## (undated) DEVICE — SOL WATER IRRIG 1000ML BOTTLE 07139-09

## (undated) DEVICE — ENDO SNARE EXACTO COLD 9MM LOOP 2.4MMX230CM 00711115

## (undated) DEVICE — PACK DAVINCI UROL

## (undated) DEVICE — GLOVE PROTEXIS BLUE W/NEU-THERA 7.5  2D73EB75

## (undated) DEVICE — ADH LIQUID MASTISOL TOPICAL VIAL 2-3ML 0523-48

## (undated) DEVICE — COVER TRANSDUCER PROBE 7X24" 610-575

## (undated) DEVICE — SU VICRYL 3-0 SH 27" J316H

## (undated) DEVICE — KIT ENDO FIRST STEP DISINFECTANT 200ML W/POUCH EP-4

## (undated) DEVICE — PREP DURAPREP 26ML APL 8630

## (undated) DEVICE — STPL DAVINCI SUREFORM 45MM STR TIPRELOAD 12FIRE 480445

## (undated) DEVICE — KIT POSITIONER TRENDELENBURG NUBLUE TP3000-NB

## (undated) DEVICE — DAVINCI XI SEAL UNIVERSAL 5-12MM 470500

## (undated) DEVICE — CONNECTOR MALE TO MALE LL

## (undated) DEVICE — ENDO TROCAR CONMED AIRSEAL BLADELESS 08X120MM IAS8-120LP

## (undated) DEVICE — GLOVE PROTEXIS BLUE W/NEU-THERA 7.0  2D73EB70

## (undated) DEVICE — ESU ENDO SCISSORS 5MM CVD 5DCS

## (undated) DEVICE — STPL CIRCULAR 29MM CVD CDH29P

## (undated) DEVICE — DRSG ABDOMINAL 07 1/2X8" 7197D

## (undated) DEVICE — DAVINCI XI DRAPE ARM 470015

## (undated) DEVICE — DRSG GAUZE 2X2" TRAY 1806

## (undated) DEVICE — ENDO TROCAR SLEEVE KII ADV FIXATION 05X100MM CFS02

## (undated) DEVICE — POUCH TISSUE RETRIEVAL 15MM 6.00" INTRO TRS190SB2

## (undated) DEVICE — DRAPE U SPLIT 74X120" 29440

## (undated) DEVICE — DEVICE SUTURE PASSER 14GA WECK EFX EFXSP2

## (undated) DEVICE — DRAIN JACKSON PRATT 15FR ROUND SU130-1323

## (undated) DEVICE — DRSG GAUZE 4X4" TRAY

## (undated) DEVICE — DRAPE IOBAN INCISE 23X17" 6650EZ

## (undated) DEVICE — CLIP APPLIER 11.5" PREMIUM II 134053

## (undated) DEVICE — DRAPE SHEET REV FOLD 3/4 9349

## (undated) DEVICE — PREP POVIDONE IODINE SCRUB 7.5% 120ML

## (undated) DEVICE — TUBING FILTER TRI-LUMEN AIRSEAL ASC-EVAC1

## (undated) DEVICE — DRSG STERI STRIP 1/2X4" R1547

## (undated) DEVICE — SU VICRYL 0 TIE 54" UND J287G

## (undated) DEVICE — ENDO FORCEP ENDOJAW BIOPSY 2.8MMX230CM FB-220U

## (undated) DEVICE — STPL SKIN 35W 6.9MM  PXW35

## (undated) DEVICE — TUBING SUCTION 12"X1/4" N612

## (undated) DEVICE — TAPE MICROPORE 3"

## (undated) DEVICE — DAVINCI HOT SHEARS TIP COVER  400180

## (undated) DEVICE — ESU ELEC BLADE 2.75" COATED/INSULATED E1455

## (undated) DEVICE — GLOVE PROTEGRITY MICRO 8 LATEX

## (undated) DEVICE — GLOVE PROTEXIS BLUE W/NEU-THERA 8.0  2D73EB80

## (undated) DEVICE — STPL DAVINCI SUREFORM 60MM RELOAD BLUE 48360B

## (undated) DEVICE — NDL INSUFFLATION 13GA 120MM C2201

## (undated) DEVICE — PACK MINOR PROCEDURE CUSTOM

## (undated) RX ORDER — FENTANYL CITRATE 0.05 MG/ML
INJECTION, SOLUTION INTRAMUSCULAR; INTRAVENOUS
Status: DISPENSED
Start: 2025-08-15

## (undated) RX ORDER — KETOROLAC TROMETHAMINE 30 MG/ML
INJECTION, SOLUTION INTRAMUSCULAR; INTRAVENOUS
Status: DISPENSED
Start: 2023-02-14

## (undated) RX ORDER — CEFAZOLIN SODIUM 2 G/50ML
SOLUTION INTRAVENOUS
Status: DISPENSED
Start: 2025-03-28

## (undated) RX ORDER — ONDANSETRON 2 MG/ML
INJECTION INTRAMUSCULAR; INTRAVENOUS
Status: DISPENSED
Start: 2025-08-15

## (undated) RX ORDER — ACETAMINOPHEN 325 MG/1
TABLET ORAL
Status: DISPENSED
Start: 2023-02-14

## (undated) RX ORDER — GABAPENTIN 300 MG/1
CAPSULE ORAL
Status: DISPENSED
Start: 2023-02-14

## (undated) RX ORDER — INDOCYANINE GREEN AND WATER 25 MG
KIT INJECTION
Status: DISPENSED
Start: 2025-08-15

## (undated) RX ORDER — DEXAMETHASONE SODIUM PHOSPHATE 4 MG/ML
INJECTION, SOLUTION INTRA-ARTICULAR; INTRALESIONAL; INTRAMUSCULAR; INTRAVENOUS; SOFT TISSUE
Status: DISPENSED
Start: 2023-02-14

## (undated) RX ORDER — DEXAMETHASONE SODIUM PHOSPHATE 10 MG/ML
INJECTION INTRAMUSCULAR; INTRAVENOUS
Status: DISPENSED
Start: 2018-11-08

## (undated) RX ORDER — HYDROMORPHONE HYDROCHLORIDE 1 MG/ML
INJECTION, SOLUTION INTRAMUSCULAR; INTRAVENOUS; SUBCUTANEOUS
Status: DISPENSED
Start: 2025-08-15

## (undated) RX ORDER — ONDANSETRON 2 MG/ML
INJECTION INTRAMUSCULAR; INTRAVENOUS
Status: DISPENSED
Start: 2023-02-14

## (undated) RX ORDER — CELECOXIB 200 MG/1
CAPSULE ORAL
Status: DISPENSED
Start: 2025-08-15

## (undated) RX ORDER — PROPOFOL 10 MG/ML
INJECTION, EMULSION INTRAVENOUS
Status: DISPENSED
Start: 2023-02-14

## (undated) RX ORDER — FENTANYL CITRATE 50 UG/ML
INJECTION, SOLUTION INTRAMUSCULAR; INTRAVENOUS
Status: DISPENSED
Start: 2025-08-15

## (undated) RX ORDER — ONDANSETRON 2 MG/ML
INJECTION INTRAMUSCULAR; INTRAVENOUS
Status: DISPENSED
Start: 2025-03-28

## (undated) RX ORDER — HEPARIN SODIUM (PORCINE) LOCK FLUSH IV SOLN 100 UNIT/ML 100 UNIT/ML
SOLUTION INTRAVENOUS
Status: DISPENSED
Start: 2025-06-20

## (undated) RX ORDER — ONDANSETRON 2 MG/ML
INJECTION INTRAMUSCULAR; INTRAVENOUS
Status: DISPENSED
Start: 2018-11-08

## (undated) RX ORDER — BUPIVACAINE HYDROCHLORIDE 2.5 MG/ML
INJECTION, SOLUTION EPIDURAL; INFILTRATION; INTRACAUDAL; PERINEURAL
Status: DISPENSED
Start: 2025-08-15

## (undated) RX ORDER — HYDROMORPHONE HYDROCHLORIDE 1 MG/ML
INJECTION, SOLUTION INTRAMUSCULAR; INTRAVENOUS; SUBCUTANEOUS
Status: DISPENSED
Start: 2018-11-08

## (undated) RX ORDER — LIDOCAINE HYDROCHLORIDE 10 MG/ML
INJECTION, SOLUTION EPIDURAL; INFILTRATION; INTRACAUDAL; PERINEURAL
Status: DISPENSED
Start: 2023-02-14

## (undated) RX ORDER — OXYCODONE HYDROCHLORIDE 5 MG/1
TABLET ORAL
Status: DISPENSED
Start: 2023-02-14

## (undated) RX ORDER — FENTANYL CITRATE 50 UG/ML
INJECTION, SOLUTION INTRAMUSCULAR; INTRAVENOUS
Status: DISPENSED
Start: 2018-11-08

## (undated) RX ORDER — BUPIVACAINE HYDROCHLORIDE AND EPINEPHRINE 2.5; 5 MG/ML; UG/ML
INJECTION, SOLUTION EPIDURAL; INFILTRATION; INTRACAUDAL; PERINEURAL
Status: DISPENSED
Start: 2023-02-14

## (undated) RX ORDER — LIDOCAINE HYDROCHLORIDE 10 MG/ML
INJECTION, SOLUTION EPIDURAL; INFILTRATION; INTRACAUDAL; PERINEURAL
Status: DISPENSED
Start: 2018-08-16

## (undated) RX ORDER — FENTANYL CITRATE 50 UG/ML
INJECTION, SOLUTION INTRAMUSCULAR; INTRAVENOUS
Status: DISPENSED
Start: 2023-02-14

## (undated) RX ORDER — CEFAZOLIN SODIUM/WATER 2 G/20 ML
SYRINGE (ML) INTRAVENOUS
Status: DISPENSED
Start: 2025-08-15

## (undated) RX ORDER — LIDOCAINE HYDROCHLORIDE 20 MG/ML
INJECTION, SOLUTION EPIDURAL; INFILTRATION; INTRACAUDAL; PERINEURAL
Status: DISPENSED
Start: 2018-11-08

## (undated) RX ORDER — FENTANYL CITRATE-0.9 % NACL/PF 10 MCG/ML
PLASTIC BAG, INJECTION (ML) INTRAVENOUS
Status: DISPENSED
Start: 2023-02-14

## (undated) RX ORDER — DEXAMETHASONE SODIUM PHOSPHATE 4 MG/ML
INJECTION, SOLUTION INTRA-ARTICULAR; INTRALESIONAL; INTRAMUSCULAR; INTRAVENOUS; SOFT TISSUE
Status: DISPENSED
Start: 2025-08-15

## (undated) RX ORDER — ACETAMINOPHEN 160 MG
TABLET,DISINTEGRATING ORAL
Status: DISPENSED
Start: 2025-08-15

## (undated) RX ORDER — CEFAZOLIN SODIUM 1 G/3ML
INJECTION, POWDER, FOR SOLUTION INTRAMUSCULAR; INTRAVENOUS
Status: DISPENSED
Start: 2018-11-08

## (undated) RX ORDER — METRONIDAZOLE 500 MG/100ML
INJECTION, SOLUTION INTRAVENOUS
Status: DISPENSED
Start: 2025-08-15

## (undated) RX ORDER — PROPOFOL 10 MG/ML
INJECTION, EMULSION INTRAVENOUS
Status: DISPENSED
Start: 2025-08-15

## (undated) RX ORDER — DIPHENHYDRAMINE HYDROCHLORIDE 50 MG/ML
INJECTION, SOLUTION INTRAMUSCULAR; INTRAVENOUS
Status: DISPENSED
Start: 2025-03-28

## (undated) RX ORDER — LIDOCAINE HYDROCHLORIDE AND EPINEPHRINE 10; 10 MG/ML; UG/ML
INJECTION, SOLUTION INFILTRATION; PERINEURAL
Status: DISPENSED
Start: 2018-11-08

## (undated) RX ORDER — FENTANYL CITRATE 50 UG/ML
INJECTION, SOLUTION INTRAMUSCULAR; INTRAVENOUS
Status: DISPENSED
Start: 2025-03-28

## (undated) RX ORDER — ENOXAPARIN SODIUM 100 MG/ML
INJECTION SUBCUTANEOUS
Status: DISPENSED
Start: 2025-08-15

## (undated) RX ORDER — LIDOCAINE HYDROCHLORIDE 10 MG/ML
INJECTION, SOLUTION EPIDURAL; INFILTRATION; INTRACAUDAL; PERINEURAL
Status: DISPENSED
Start: 2025-02-21